# Patient Record
Sex: FEMALE | Race: WHITE | NOT HISPANIC OR LATINO | Employment: OTHER | URBAN - METROPOLITAN AREA
[De-identification: names, ages, dates, MRNs, and addresses within clinical notes are randomized per-mention and may not be internally consistent; named-entity substitution may affect disease eponyms.]

---

## 2017-03-31 ENCOUNTER — OFFICE VISIT (OUTPATIENT)
Dept: FAMILY MEDICINE | Facility: CLINIC | Age: 57
End: 2017-03-31
Payer: COMMERCIAL

## 2017-03-31 ENCOUNTER — RADIANT APPOINTMENT (OUTPATIENT)
Dept: GENERAL RADIOLOGY | Facility: CLINIC | Age: 57
End: 2017-03-31
Attending: FAMILY MEDICINE
Payer: COMMERCIAL

## 2017-03-31 VITALS
HEART RATE: 74 BPM | BODY MASS INDEX: 41.41 KG/M2 | SYSTOLIC BLOOD PRESSURE: 136 MMHG | TEMPERATURE: 98 F | OXYGEN SATURATION: 96 % | WEIGHT: 225 LBS | DIASTOLIC BLOOD PRESSURE: 82 MMHG | HEIGHT: 62 IN

## 2017-03-31 DIAGNOSIS — Z13.220 SCREENING CHOLESTEROL LEVEL: ICD-10-CM

## 2017-03-31 DIAGNOSIS — M25.551 HIP PAIN, RIGHT: Primary | ICD-10-CM

## 2017-03-31 DIAGNOSIS — I10 ESSENTIAL HYPERTENSION WITH GOAL BLOOD PRESSURE LESS THAN 140/90: ICD-10-CM

## 2017-03-31 DIAGNOSIS — G47.00 INSOMNIA, UNSPECIFIED TYPE: ICD-10-CM

## 2017-03-31 LAB
ANION GAP SERPL CALCULATED.3IONS-SCNC: 9 MMOL/L (ref 3–14)
BASOPHILS # BLD AUTO: 0.1 10E9/L (ref 0–0.2)
BASOPHILS NFR BLD AUTO: 1.3 %
BUN SERPL-MCNC: 12 MG/DL (ref 7–30)
CALCIUM SERPL-MCNC: 9.1 MG/DL (ref 8.5–10.1)
CHLORIDE SERPL-SCNC: 108 MMOL/L (ref 94–109)
CHOLEST SERPL-MCNC: 177 MG/DL
CO2 SERPL-SCNC: 25 MMOL/L (ref 20–32)
CREAT SERPL-MCNC: 0.96 MG/DL (ref 0.52–1.04)
DIFFERENTIAL METHOD BLD: NORMAL
EOSINOPHIL # BLD AUTO: 0.1 10E9/L (ref 0–0.7)
EOSINOPHIL NFR BLD AUTO: 1.8 %
ERYTHROCYTE [DISTWIDTH] IN BLOOD BY AUTOMATED COUNT: 13.4 % (ref 10–15)
GFR SERPL CREATININE-BSD FRML MDRD: 60 ML/MIN/1.7M2
GLUCOSE SERPL-MCNC: 92 MG/DL (ref 70–99)
HCT VFR BLD AUTO: 40 % (ref 35–47)
HDLC SERPL-MCNC: 48 MG/DL
HGB BLD-MCNC: 12.7 G/DL (ref 11.7–15.7)
LDLC SERPL CALC-MCNC: 101 MG/DL
LYMPHOCYTES # BLD AUTO: 2.4 10E9/L (ref 0.8–5.3)
LYMPHOCYTES NFR BLD AUTO: 43.8 %
MCH RBC QN AUTO: 28.9 PG (ref 26.5–33)
MCHC RBC AUTO-ENTMCNC: 31.8 G/DL (ref 31.5–36.5)
MCV RBC AUTO: 91 FL (ref 78–100)
MONOCYTES # BLD AUTO: 0.5 10E9/L (ref 0–1.3)
MONOCYTES NFR BLD AUTO: 9 %
NEUTROPHILS # BLD AUTO: 2.4 10E9/L (ref 1.6–8.3)
NEUTROPHILS NFR BLD AUTO: 44.1 %
NONHDLC SERPL-MCNC: 129 MG/DL
PLATELET # BLD AUTO: 315 10E9/L (ref 150–450)
POTASSIUM SERPL-SCNC: 4.2 MMOL/L (ref 3.4–5.3)
RBC # BLD AUTO: 4.4 10E12/L (ref 3.8–5.2)
SODIUM SERPL-SCNC: 142 MMOL/L (ref 133–144)
TRIGL SERPL-MCNC: 142 MG/DL
WBC # BLD AUTO: 5.5 10E9/L (ref 4–11)

## 2017-03-31 PROCEDURE — 80061 LIPID PANEL: CPT | Performed by: FAMILY MEDICINE

## 2017-03-31 PROCEDURE — 99215 OFFICE O/P EST HI 40 MIN: CPT | Performed by: FAMILY MEDICINE

## 2017-03-31 PROCEDURE — 73502 X-RAY EXAM HIP UNI 2-3 VIEWS: CPT

## 2017-03-31 PROCEDURE — 36415 COLL VENOUS BLD VENIPUNCTURE: CPT | Performed by: FAMILY MEDICINE

## 2017-03-31 PROCEDURE — 85025 COMPLETE CBC W/AUTO DIFF WBC: CPT | Performed by: FAMILY MEDICINE

## 2017-03-31 PROCEDURE — 80048 BASIC METABOLIC PNL TOTAL CA: CPT | Performed by: FAMILY MEDICINE

## 2017-03-31 RX ORDER — ATENOLOL 25 MG/1
25 TABLET ORAL DAILY
Qty: 30 TABLET | Refills: 6 | Status: SHIPPED | OUTPATIENT
Start: 2017-03-31 | End: 2017-08-22

## 2017-03-31 RX ORDER — TRAZODONE HYDROCHLORIDE 50 MG/1
100 TABLET, FILM COATED ORAL
Qty: 60 TABLET | Refills: 6 | Status: SHIPPED | OUTPATIENT
Start: 2017-03-31 | End: 2018-01-23

## 2017-03-31 ASSESSMENT — ANXIETY QUESTIONNAIRES
6. BECOMING EASILY ANNOYED OR IRRITABLE: NOT AT ALL
7. FEELING AFRAID AS IF SOMETHING AWFUL MIGHT HAPPEN: NOT AT ALL
3. WORRYING TOO MUCH ABOUT DIFFERENT THINGS: NOT AT ALL
2. NOT BEING ABLE TO STOP OR CONTROL WORRYING: NOT AT ALL
IF YOU CHECKED OFF ANY PROBLEMS ON THIS QUESTIONNAIRE, HOW DIFFICULT HAVE THESE PROBLEMS MADE IT FOR YOU TO DO YOUR WORK, TAKE CARE OF THINGS AT HOME, OR GET ALONG WITH OTHER PEOPLE: NOT DIFFICULT AT ALL
5. BEING SO RESTLESS THAT IT IS HARD TO SIT STILL: NOT AT ALL
GAD7 TOTAL SCORE: 0
1. FEELING NERVOUS, ANXIOUS, OR ON EDGE: NOT AT ALL

## 2017-03-31 ASSESSMENT — PATIENT HEALTH QUESTIONNAIRE - PHQ9: 5. POOR APPETITE OR OVEREATING: NOT AT ALL

## 2017-03-31 NOTE — PATIENT INSTRUCTIONS
1. Continue your current medications.    2. Please stop by the  and make an appointment with the orthopedic doctor. Or call [phone].    3. I will contact you via mail about your test results.    4. If all is well, see you back in 6 months for a physical - we will not need labs at that time.

## 2017-03-31 NOTE — PROGRESS NOTES
"  HPI:     Angela is a 56 year old female who presents to clinic to discuss:    Right hip pain - present since around 2013. Worse recently up to 7/10. Interferes with sleep, throbbing at night. No trauma. She feels the pain more on the right groin area. No bulge. There is morning stiffness. Worse with activity? Better with Tylenol. She feels the right leg is weak. No numbness. Not sure about right low back pain. Has h/o right ankle surgery.     XR HIP RIGHT 2-3 VIEWS 3/31/2017 8:10 AM     HISTORY: Pain in right hip     COMPARISON: None.         IMPRESSION: Mild degenerative changes of the hip. Otherwise negative.        RUBÉN MACE MD    Frequent UTI's/atrophic vaginitis - was happening every other month. Goes away with antibiotic treatment. Symptoms are frequency, burning, as if passing \"clot.\" previous culture grew e coli which was sensitive to all antibiotics tested. Bactrim right before or right after intercourse is working well. Last UTI was Oct 2014. I saw her on 3/3/15 with urinary symptoms Again for the last few days she has had mild burning with urination and some itching. Has baseline frequency. She also has had dryness and painful intercourse. No hematuria. No fevers. She has had hysterectomy.  Evaluation and Treatment:   The u/a has been normal on multiple occasions. For atrophic vaginitis I had prescribed Estrogen vaginal pill but stopped due to fear of side effects. Bactrim prn with intercourse has been helpful.    Depression/insomnia -    Specific type: mild major depression  Duration: since around 2011  Symptoms: decreased mood, mood swings, anhedonia, sleep disturbance, decreased energy.  Compounding factors: \"hates\" her work.   Anxiety: Worries about her kid. Thinks too much at night.  SI or HI: denies  Delusions/hallucinations: denies  Rupali or hypomania now or in the past: denies  Current treatment: Trazodone prn for sleep only.  Compliant: denies  Side effects: denies  Treatments:    Zoloft " stopped since not needed   Trazodone helps but not always   Referral for counseling previously but she says it does not help   she says she only wants to be on Trazodone prn which is fine.   Counseling: done today about the importance of proper diet, regular exercise, avoiding stressful situations, avoiding tobacco, drugs, or excessive alcohol and caffeine.    PHQ-9 SCORE 8/21/2015 3/7/2016 9/8/2016   Total Score - - -   Total Score 6 3 0       DENG-7 SCORE 7/15/2013 8/21/2015 9/8/2016   Total Score 6 - -   Total Score - 3 0     HTN - dx'd around: 2009. Not checking at home. Controlled in clinic.  Treatment:   Atenolol 25 mg qd (chosen due to migraines) - no side effects.        Last Basic Metabolic Panel:  NA      140   8/21/2015   POTASSIUM      4.4   8/21/2015  CHLORIDE      106   8/21/2015  BRITANY      9.3   8/21/2015  CO2       27   8/21/2015  BUN       11   8/21/2015  CR     0.94   8/21/2015  GLC       88   8/21/2015  CBC RESULTS:   Recent Labs   Lab Test  12/09/15   1452   WBC  10.1   RBC  4.35   HGB  12.1   HCT  38.0   MCV  87   MCH  27.8   MCHC  31.8   RDW  13.1   PLT  344     Multiple falls and fractures/osteopenia - She informs me that she has fallen about 6 times in as many years. These were related to stepping on a pot hole, stumbling on stairs and sometimes the ankle is weak and gives way. On 9/2/13 she stepped on a pot hole. She was seen in the ED and dx'd with right distal fibular fx and old lateral malleolus fx. She was then managed by sports medicine with CAM walker. On 10/8/13 she was walking her dog when her right ankle gave out and she fell on her left side. She was seen in ED again and dx'd with left ulnar comminuted fracture. She was subsequently seen by ortho and underwent ORIF on 10/15/13. At the end of Nov 2014 she kicked a chair. She was seen in urgent care on 12/8/14 and dx'd with left 5th proximal phalanx fracture. In addition to all this she has had left humerus fx in the past that required  ORIF.    Has not fallen since Oct 2013. Previously followed with endocrine. Reclast - yearly. It made her sick so she stopped it. She takes Vitamin 2000 units per day. Takes Calcium over the counter - dose unknown.   DX HIP/PELVIS/SPINE 11/19/2013 8:24 AM  HISTORY: Screening. History of fall.  IMPRESSION  IMPRESSION:  1. The T-score of the lumbar spine in the region of L1-L4 is -1.6.  This correlates with moderate osteopenia. If one looks at the L1  vertebral body alone the T score is -2.5 which correlates with  osteoporosis.  2. The T-score of the right femoral neck is -2.3. This correlates with  severe osteopenia.  3. The T-score of the left femoral neck is -2.2. This correlates with  severe osteopenia.  NOTE: With regards to the hips, the T-score for either the femoral  neck or the total hip is reported, whichever is worse.  JUAN ANTONIO SARMIENTO MD    Balance problem - no h/o CVA or other neurological problems. She feels her balance is better since the ankle has improved after surgery.    B12 deficiency - took liquid B12, 1/2 oz per day previously. Not at this time. Last B12 was normal Oct 2013.     Right Carpal tunnel - No further problems.     Obesity - diet and exercise discussed. Commended her losing weight.    Wt Readings from Last 5 Encounters:   03/31/17 225 lb (102.1 kg)   10/21/16 226 lb (102.5 kg)   10/10/16 226 lb (102.5 kg)   09/08/16 223 lb (101.2 kg)   05/12/16 231 lb 3.2 oz (104.9 kg)     Surgical history:     Right ankle surgery   Hysterectomy 2008 due to abnormal PAP, ovaries still in place.  Preventive:        Immunization History   Administered Date(s) Administered     Influenza (IIV3) 11/08/2012     Influenza Vaccine IM 3yrs+ 4 Valent IIV4 10/25/2013, 02/04/2015, 09/08/2016     TD (ADULT, 7+) 01/01/1988, 06/20/2000     TDAP Vaccine (Adacel) 11/08/2012     Lipids screen:     Recent Labs   Lab Test  08/21/15   1229  08/13/14   0743   CHOL  191  196   HDL  39*  47*   LDL  117  116   TRIG  175*  164*  "  CHOLHDMARCIALO  4.9  4.2     Mammogram: up to date  PAP: n/a due to hysterectomy  Colonoscopy: 10/29/07 - repeat in 10 years. Declines any further colonoscopy. Not even FIT.  Advanced Directive: has one at home - she will bring a copy.    ROS:     Const: No fevers or night sweats recently.   ENT: No runny nose, sore throat or ear pain.   Resp: No cough or shortness of breath.   CV: No chest pain, dizziness or cardiac palpitations.   GI: No nausea, vomiting or constipation. Denies blood in stools or black stools.   : No dysuria, frequency or hematuria.     SH:    Marital status:  - good relationship  Kids: one  Employment: administrative work  Exercise: biking and walking and swimming  Tobacco: no  Etoh: none  Recreational drugs: none  Caffeine: one diet coke per day    Exam:    /82 (Cuff Size: Adult Large)  Pulse 74  Temp 98  F (36.7  C) (Oral)  Ht 5' 2\" (1.575 m)  Wt 225 lb (102.1 kg)  LMP 01/08/2008  SpO2 96%  BMI 41.15 kg/m2    Gen: Healthy appearing female in no acute distress  Neck: No enlarged lymph nodes, thyromegally or other masses.  Lungs: Good air movement and otherwise clear.  CV: Heart RRR with no murmurs. No JVD, carotid bruits. Trace ankle edema.  MS: the right hip has pain on ROM. There is not tenderness to palpation over the right hip or right low back and SI areas.      Assessment and Plan - Decision Making    1. Insomnia, unspecified type  Refilled Trazodone,  - traZODone (DESYREL) 50 MG tablet; Take 2 tablets (100 mg) by mouth nightly as needed for sleep  Dispense: 60 tablet; Refill: 6    2. Essential hypertension with goal blood pressure less than 140/90  Seems controlled. Continue same treatment. Check labs.  - atenolol (TENORMIN) 25 MG tablet; Take 1 tablet (25 mg) by mouth daily  Dispense: 30 tablet; Refill: 6  - Basic metabolic panel  - CBC with platelets differential    3. Hip pain, right  Xray showed signs of osteo. I am referring her to ortho. If that does not help " we will have to look at other causes.  - XR Hip Right 2-3 Views  - ORTHOPEDICS ADULT REFERRAL    4. Screening cholesterol level  Fasting labs.  - Lipid panel reflex to direct LDL      Written instructions given as follows:    Patient Instructions   1. Continue your current medications.    2. Please stop by the  and make an appointment with the orthopedic doctor. Or call [phone].    3. I will contact you via mail about your test results.    4. If all is well, see you back in 6 months for a physical - we will not need labs at that time.

## 2017-03-31 NOTE — MR AVS SNAPSHOT
After Visit Summary   3/31/2017    Angela Ibarra    MRN: [id number]           Patient Information     Date Of Birth          1960        Visit Information        Provider Department      3/31/2017 7:30 AM Germán Jernigan MD [facility]        Today's Diagnoses     Hip pain, right    -  1    Insomnia, unspecified type        Essential hypertension with goal blood pressure less than 140/90        Screening cholesterol level          Care Instructions    1. Continue your current medications.    2. Please stop by the  and make an appointment with the orthopedic doctor. Or call [phone].    3. I will contact you via mail about your test results.    4. If all is well, see you back in 6 months for a physical - we will not need labs at that time.        Follow-ups after your visit        Additional Services     ORTHOPEDICS ADULT REFERRAL       Your provider has referred you to: [facility]: [facility] [phone]   [email or web address]    Please be aware that coverage of these services is subject to the terms and limitations of your health insurance plan.  Call member services at your health plan with any benefit or coverage questions.      Please bring the following to your appointment:    >>   Any x-rays, CTs or MRIs which have been performed.  Contact the facility where they were done to arrange for  prior to your scheduled appointment.  Any new CT, MRI or other procedures ordered by your specialist must be performed at a [place] facility or coordinated by your clinic's referral office.    >>   List of current medications   >>   This referral request   >>   Any documents/labs given to you for this referral                  Who to contact     If you have questions or need follow up information about today's clinic visit or your schedule please contact [facility] directly at [phone].  Normal or non-critical  "lab and imaging results will be communicated to you by MyChart, letter or phone within 4 business days after the clinic has received the results. If you do not hear from us within 7 days, please contact the clinic through [employer] or phone. If you have a critical or abnormal lab result, we will notify you by phone as soon as possible.  Submit refill requests through [employer] or call your pharmacy and they will forward the refill request to us. Please allow 3 business days for your refill to be completed.          Additional Information About Your Visit        [employer] Information     [employer] lets you send messages to your doctor, view your test results, renew your prescriptions, schedule appointments and more. To sign up, go to [email or web address] . Click on \"Log in\" on the left side of the screen, which will take you to the Welcome page. Then click on \"Sign up Now\" on the right side of the page.     You will be asked to enter the access code listed below, as well as some personal information. Please follow the directions to create your username and password.     Your access code is: [email or web address]  Expires: 2017  8:29 AM     Your access code will  in 90 days. If you need help or a new code, please call your [place] clinic or [phone].        Care EveryWhere ID     This is your Care EveryWhere ID. This could be used by other organizations to access your [place] medical records  [id number]        Your Vitals Were     Pulse Temperature Height Last Period Pulse Oximetry BMI (Body Mass Index)    74 98  F (36.7  C) (Oral) 5' 2\" (1.575 m) 2008 96% 41.15 kg/m2       Blood Pressure from Last 3 Encounters:   17 136/82   10/10/16 128/81   16 138/80    Weight from Last 3 Encounters:   17 225 lb (102.1 kg)   10/21/16 226 lb (102.5 kg)   10/10/16 226 lb (102.5 kg)              We Performed the Following     Basic metabolic panel     CBC with platelets differential     Lipid panel reflex " to direct LDL     ORTHOPEDICS ADULT REFERRAL     XR Hip Right 2-3 Views          Where to get your medicines      These medications were sent to [id number] - [place], [place] - [address]  [address] [address] [place] [address]     Phone:  [phone]     atenolol 25 MG tablet    traZODone 50 MG tablet          Primary Care Provider Office Phone # Fax #    Germán Jernigan [phone]       [facility] [address] [facility] [address]        Thank you!     Thank you for choosing [facility]  for your care. Our goal is always to provide you with excellent care. Hearing back from our patients is one way we can continue to improve our services. Please take a few minutes to complete the written survey that you may receive in the mail after your visit with us. Thank you!             Your Updated Medication List - Protect others around you: Learn how to safely use, store and throw away your medicines at www.disposemymeds.org.          This list is accurate as of: 3/31/17  8:29 AM.  Always use your most recent med list.                   Brand Name Dispense Instructions for use    ADVIL PO      Take  by mouth. prn       atenolol 25 MG tablet    TENORMIN    30 tablet    Take 1 tablet (25 mg) by mouth daily       CALCIUM + D PO      1 tablet daily       calcium carbonate 500 MG chewable tablet    TUMS    180 chew tab    Take 1 tablet (500 mg) by mouth 3 times daily (with meals)       conjugated estrogens cream    PREMARIN    30 g    Place 0.5 g vaginally twice a week       sulfamethoxazole-trimethoprim 800-160 MG per tablet    BACTRIM DS/SEPTRA DS    30 tablet    Take one pill right before or right after sex.       traZODone 50 MG tablet    DESYREL    60 tablet    Take 2 tablets (100 mg) by mouth nightly as needed for sleep

## 2017-03-31 NOTE — LETTER
[facility]  [address] [place] [address] [phone]        April 3, 2017    Angela Ibarra  [address] [place] [address]            Dear Clarissa Miller,    All your labs were fine. I know you are seeing the orthopedic doctor on 4/6/17 for your hip.    Regards,    Germán Jernigan M.D.    Results for orders placed or performed in visit on 03/31/17   XR Hip Right 2-3 Views    Narrative    XR HIP RIGHT 2-3 VIEWS  3/31/2017 8:10 AM    HISTORY:  Pain in right hip    COMPARISON:  None.      Impression    IMPRESSION:  Mild degenerative changes of the hip. Otherwise negative.      RUBÉN MACE MD   Basic metabolic panel   Result Value Ref Range    Sodium 142 133 - 144 mmol/L    Potassium 4.2 3.4 - 5.3 mmol/L    Chloride 108 94 - 109 mmol/L    Carbon Dioxide 25 20 - 32 mmol/L    Anion Gap 9 3 - 14 mmol/L    Glucose 92 70 - 99 mg/dL    Urea Nitrogen 12 7 - 30 mg/dL    Creatinine 0.96 0.52 - 1.04 mg/dL    GFR Estimate 60 (L) >60 mL/min/1.7m2    GFR Estimate If Black 73 >60 mL/min/1.7m2    Calcium 9.1 8.5 - 10.1 mg/dL   Lipid panel reflex to direct LDL   Result Value Ref Range    Cholesterol 177 <200 mg/dL    Triglycerides 142 <150 mg/dL    HDL Cholesterol 48 (L) >49 mg/dL    LDL Cholesterol Calculated 101 (H) <100 mg/dL    Non HDL Cholesterol 129 <130 mg/dL   CBC with platelets differential   Result Value Ref Range    WBC 5.5 4.0 - 11.0 10e9/L    RBC Count 4.40 3.8 - 5.2 10e12/L    Hemoglobin 12.7 11.7 - 15.7 g/dL    Hematocrit 40.0 35.0 - 47.0 %    MCV 91 78 - 100 fl    MCH 28.9 26.5 - 33.0 pg    MCHC 31.8 31.5 - 36.5 g/dL    RDW 13.4 10.0 - 15.0 %    Platelet Count 315 150 - 450 10e9/L    Diff Method Automated Method     % Neutrophils 44.1 %    % Lymphocytes 43.8 %    % Monocytes 9.0 %    % Eosinophils 1.8 %    % Basophils 1.3 %    Absolute Neutrophil 2.4 1.6 - 8.3 10e9/L    Absolute Lymphocytes 2.4 0.8 - 5.3 10e9/L    Absolute Monocytes 0.5 0.0 - 1.3 10e9/L    Absolute  Eosinophils 0.1 0.0 - 0.7 10e9/L    Absolute Basophils 0.1 0.0 - 0.2 10e9/L

## 2017-03-31 NOTE — NURSING NOTE
"Chief Complaint   Patient presents with     Ankle/Foot right     pain     Hypertension       Initial /82 (Cuff Size: Adult Large)  Pulse 74  Temp 98  F (36.7  C) (Oral)  Ht 5' 2\" (1.575 m)  Wt 225 lb (102.1 kg)  LMP 01/08/2008  SpO2 96%  BMI 41.15 kg/m2 Estimated body mass index is 41.15 kg/(m^2) as calculated from the following:    Height as of this encounter: 5' 2\" (1.575 m).    Weight as of this encounter: 225 lb (102.1 kg).  Medication Reconciliation: complete    Susanne Gotti LPN    "

## 2017-04-01 ASSESSMENT — PATIENT HEALTH QUESTIONNAIRE - PHQ9: SUM OF ALL RESPONSES TO PHQ QUESTIONS 1-9: 0

## 2017-04-01 ASSESSMENT — ANXIETY QUESTIONNAIRES: GAD7 TOTAL SCORE: 0

## 2017-04-03 NOTE — PROGRESS NOTES
Please mail letter:    Clarissa Miller,    All your labs were fine. I know you are seeing the orthopedic doctor on 4/6/17 for your hip.    Regards,    Germán Jernigan M.D.

## 2017-04-05 NOTE — PROGRESS NOTES
SUBJECTIVE:  Angela Ibarra is a 56 year old female who is seen in consultation at the request of Germán Jernigan MD for right hip pain that started 1 month ago.       For quite sometime she has been unable to cross the right leg over the left leg.   Cause: unknown  Symptoms: stiffness, pain at night, pain in low back, pain in right groin, pain radiating to thigh region, worst pain in the buttock, worse with activity.   Symptoms have been worsening within the last few days.     Prior history of related problems: no prior problems with this area in the past.    Treatment up to this point: Antiinflammatories, water aerobic exercises.    All past medical, surgical, family, and social histories have been reveiwed.     Orthopedic PMH: Severe low back pain since spinal anesthetic in . She traces her inability to cross her leg possibly due to urologic procedure in .     Past medical history:  Past Medical History:   Diagnosis Date     Calculus of kidney      Migraine, unspecified, without mention of intractable migraine without mention of status migrainosus      Other specified iron deficiency anemias      Papanicolaou smear of cervix with low grade squamous intraepithelial lesion (LGSIL)     Colpo and hyst pathology benign     Unspecified essential hypertension     Essential hypertension     Surgical:   Past Surgical History:   Procedure Laterality Date     ARTHROSCOPY ANKLE  2014    Procedure: ARTHROSCOPY ANKLE;  Surgeon: Shaun Bhatt DPM;  Location: [facility]     C  DELIVERY ONLY      , Low Cervical     C GASTRIC BYPASS,OBESITY,W/SM BOWEL RECONS  10/99     C LIGATE FALLOPIAN TUBE  2000    laparoscopy     HYSTERECTOMY, PAP NO LONGER INDICATED  2008     LAPAROSCOPIC CHOLECYSTECTOMY  8/3/2012    Procedure: LAPAROSCOPIC CHOLECYSTECTOMY;  Laparoscopic Cholecystectomy ;  Surgeon: Corwin Cabezas MD;  Location: [facility]     OPEN REDUCTION INTERNAL FIXATION ANKLE   4/22/2014    Procedure: OPEN REDUCTION INTERNAL FIXATION ANKLE;  Surgeon: Shaun Bhatt DPM;  [facility]     OPEN REDUCTION INTERNAL FIXATION ELBOW  10/15/2013    Procedure: OPEN REDUCTION INTERNAL FIXATION ELBOW;  OPEN REDUCTION INTERNAL FIXATION LEFT PROXIMAL ULNA;  Surgeon: Artem Last DO;  Location: [facility]     ORTHOPEDIC SURGERY      left shoulder surgery     REMOVE MESH VAGINA  10/10/2011    Procedure:REMOVE MESH VAGINA; Excision of Vaginal Mesh; Surgeon:SERGE SALGADO; Location:[facility]     SURGICAL HISTORY OF -   4/20/10    Transobturator midurethral sling     SURGICAL HISTORY OF -   8/1999    exploratory laparotomy, colitis     SURGICAL HISTORY OF -   4/20/10    Transobturator midurethral sling     TUBAL LIGATION       Family Hx:    Family History   Problem Relation Age of Onset     C.A.D. Mother      MI     Hypertension Mother      Hypertension Father      Breast Cancer No family hx of      Social History     Marital status:      Spouse name: N/A     Number of children: 1     Years of education: N/A     Occupational History      [employer]     Social History Main Topics     Smoking status: Never Smoker     Smokeless tobacco: Never Used     Alcohol use Yes      Comment: occas     Drug use: No     Sexual activity: Yes     Partners: Male     Birth control/ protection: Surgical      Comment: tvh     Other Topics Concern      Service Yes     in [place] out [place] 85-88     Blood Transfusions No     Caffeine Concern No     Occupational Exposure No     Hobby Hazards No     Sleep Concern Yes     Stress Concern No     Weight Concern Yes     always      Special Diet No     watches sugar intake     Back Care No     Exercise Yes     treadmill and bike     Bike Helmet No     Seat Belt Yes     Self-Exams No     Parent/Sibling W/ Cabg, Mi Or Angioplasty Before 65f 55m? No     Social History Narrative    Dairy/d 1 servings/d.     Caffeine 0 servings/d    Exercise 5  "x week, treadmill, bike 1 hour/day    Sunscreen used - No    Seatbelts used - Yes    Working smoke/CO detectors in the home - Yes    Guns stored in the home - No    Self Breast Exams - No    Self Testicular Exam - NA    Eye Exam up to date - Yes    Dental Exam up to date - Yes    Pap Smear up to date - Yes    Mammogram up to date - No 2000    PSA up to date - NA    Dexa Scan up to date - NA    Flex Sig / Colonoscopy up to date - Yes 8/99 Abd pain=neg    Immunizations up to date - unsure    Abuse: Current or Past(Physical, Sexual or Emotional)- No    Do you feel safe in your environment - Yes                     REVIEW OF SYSTEMS:    CONSTITUTIONAL:  NEGATIVE for fever, chills, change in weight  INTEGUMENTARY/SKIN:  NEGATIVE for worrisome rashes, moles or lesions  EYES:  NEGATIVE for vision changes or irritation  ENT/MOUTH:  NEGATIVE for ear, mouth and throat problems  RESP:  NEGATIVE for significant cough or SOB  BREAST:  NEGATIVE for masses, tenderness or discharge  CV:  NEGATIVE for chest pain, palpitations or peripheral edema  GI:  NEGATIVE for nausea, abdominal pain, heartburn, or change in bowel habits  :  Negative   MUSCULOSKELETAL:  See HPI above  NEURO:  NEGATIVE for weakness, dizziness or paresthesias  ENDOCRINE:  NEGATIVE for temperature intolerance, skin/hair changes  HEME/ALLERGY/IMMUNE:  NEGATIVE for bleeding problems  PSYCHIATRIC:  NEGATIVE for changes in mood or affect    /78 (BP Location: Left arm, Patient Position: Chair, Cuff Size: Adult Large)  Pulse 70  Resp 14  Ht 1.575 m (5' 2\")  Wt 100.2 kg (221 lb)  LMP 01/08/2008  BMI 40.42 kg/m2    EXAM:  GENERAL APPEARANCE: healthy, alert and no distress   GAIT: Walks with slightly antalgic gait   SKIN: no suspicious lesions or rashes  NEURO: normal strength and tone, sentation intact, reflexes normal, DTR symmetrically normal in upper extremities and DTR symmetrically normal in lower extremities  PSYCH:  mentation appears normal and affect " normal/bright  VASCULAR: pulses intact, good cappillary refill  LYMPH:  no lymphadenopathy  RESP:  no overt rhonchi or wheezes    MUSCULOSKELETAL:  RIGHT HIP: Palpation: Tender: right greater trochanter, right sciatic notch without radiation, sacroiliac joint. Severe tenderness over the center of sacral area which is chronic from an old injury.  Range of Motion: lacks approximately 20 degrees of hip flexion, 20 degrees flexion contracture, external rotation 30* degrees, internal rotation 15 degrees, abduction full*  Strength: Hip flexor weakness, mild abduction weakness    Lumbar range of motion: flexion to touch toes, extension good without reproduction of pain, side bend: good without reproduction of pain  Toe stand: Able.    Heel stand: Able  Seated SLR: negative  Supine SLR: negative    X-RAY INTERPRETATION: Obtained 3/31/17 of the RIGHT HIP: 2-3 views, reviewed in the office with the patient by myself today and show no joint space narrowing, some osteophytes on the femoral head with some mild CAM lesion on the superior femoral head.     ASSESSMENT:  1. Mild to moderate OA of the right hip    PLAN:  I discussed the findings and diagnosis with the patient. We talked about the treatment options: image guided steroid injection and physical therapy. I recommended a steroid injection under image guidance. She should monitor the amount of relief the injection gives. All questions were answered.   physical therapy declined.  Intra-articular steroid injection under image guidance ordered.     Return to clinic PRN.     NIMISHA Tang MD  Dept. Orthopedic Surgery  [facility]    This document serves as a record of the services and decisions personally performed and made by Dr. NIMISHA Tang MD. It was created on his behalf by Shireen Talvaera, a trained medical scribe. The creation of this record is based on the provider's personal observations and the statements of the patient. This document has been checked and  approved by the attending provider.   Shireen Talavera April 6, 2017 9:31 AM

## 2017-04-06 ENCOUNTER — OFFICE VISIT (OUTPATIENT)
Dept: ORTHOPEDICS | Facility: CLINIC | Age: 57
End: 2017-04-06
Payer: COMMERCIAL

## 2017-04-06 ENCOUNTER — TELEPHONE (OUTPATIENT)
Dept: PALLIATIVE MEDICINE | Facility: CLINIC | Age: 57
End: 2017-04-06

## 2017-04-06 VITALS
HEART RATE: 70 BPM | HEIGHT: 62 IN | RESPIRATION RATE: 14 BRPM | BODY MASS INDEX: 40.67 KG/M2 | SYSTOLIC BLOOD PRESSURE: 137 MMHG | WEIGHT: 221 LBS | DIASTOLIC BLOOD PRESSURE: 78 MMHG

## 2017-04-06 DIAGNOSIS — M16.11 PRIMARY OSTEOARTHRITIS OF RIGHT HIP: Primary | ICD-10-CM

## 2017-04-06 PROCEDURE — 99243 OFF/OP CNSLTJ NEW/EST LOW 30: CPT | Performed by: ORTHOPAEDIC SURGERY

## 2017-04-06 ASSESSMENT — PAIN SCALES - GENERAL: PAINLEVEL: MILD PAIN (2)

## 2017-04-06 NOTE — TELEPHONE ENCOUNTER
Pre-screening Questions for Radiology Injections:    Injection to be done at which interventional clinic site? [facility]    Procedure ordered by     Procedure ordered?  Hip Joint Injection    What insurance would patient like us to bill for this procedure? [employer]care      Worker's comp-Any injection DO NOT SCHEDULE and route to Angela Lopez.      [employer] insurance - For SI joint injections, DO NOT SCHEDULE and route Angela Lopez.      [employer]- MBB's must be scheduled at LEAST two weeks apart      [employer] - Any injection besides hip/shoulder/knee joint DO NOT SCHEDULE and route to Angela Lopez. She will obtain PA and call pt back to schedule procedure or notify pt of denial.     Any chance of pregnancy? Not Applicable   If YES, do NOT schedule and route to RN pool    Is an  needed? No     Patient has a drive home? (mandatory) YES:     Is patient taking any blood thinners (plavix, coumadin, jantoven, warfarin, heparin, pradaxa or dabigatran )? No   If hold needed, do NOT schedule, route to RN pool     Is patient taking any aspirin products? no    If more than 325mg/day do NOT schedule; route to RN pool     For CERVICAL procedures, hold all aspirin products for 6 days.      Does the patient have a bleeding or clotting disorder? No     If yes, okay to schedule AND route to RN nurse pool    **For any patients with platelet count <100, must be forwarded to provider**    Is patient diabetic?  No  If YES, have them bring their glucometer.    Does patient have an active infection or treated for one within the past week? No     Is patient currently taking any antibiotics?  No     For patients on chronic, preventative, or prophylactic antibiotics, procedures may be scheduled.     For patients on antibiotics for active or recent infection:    Aide Brito [place], [place]-antibiotic course must have been completed for 4 days    Lisa Feng and Florian-antibiotic  course must have been completed for 7 days    Is patient currently taking any steroid medications? (i.e. Prednisone, Medrol)  No     For patients on steroid medications:    Lisa Goldsmith, Ellyn Noble [place]-steroid course must have been completed for 4 days    Lisa Ricketts-steroid course must have been completed for 7 days    Reviewed with patient:  If you are started on any steroids or antibiotics between now and your appointment, you must contact us because it may affect our ability to perform your procedure.  Yes    Is patient actively being treated for cancer or immunocompromised, including the spleen having been removed? No    If YES, do NOT schedule and route to RN pool     **For Dr. Feng patients without spleens should have the chart sent to her**    Are you able to get on and off an exam table with minimal or no assistance? Yes  If NO, do NOT schedule and route to RN pool    Are you able to roll over and lay on your stomach with minimal or no assistance? Yes  If NO, do NOT schedule and route to RN pool     Any allergies to contrast dye, iodine, shellfish, or numbing and steroid medications? No  If YES, route to RN pool AND add allergy information to appointment notes    Allergies: No known allergies        Has the patient had a flu shot or any other vaccinations within 7 days before or after the procedure.  No       Does patient have an MRI/CT?  Not Applicable  (SI joint, hip injections, lumbar sympathetic blocks, and stellate ganglion blocks do not require an MRI)    Was the MRI done w/in the last 3 years?  NA    Was MRI done at [facility]? No      If not, where was it done? N/A       If MRI was not done at [facility], [facility] or [facility] do NOT schedule and route to nursing.  If pt has an imaging disc, the injection may be scheduled but pt has to bring disc to appt. If they show up w/out disc the injection cannot be done    Reminders (please tell patient if applicable):       Instructed pt  to arrive 30 minutes early for IV start if this is for a cervical procedure, ALL sympathetic (stellate ganglion, hypogastric, or lumbar sympathetic block) and all sedation procedures (RFA, spinal cord stimulation trials).  Not Applicable    -IVs are not routinely placed for Noble and [place] cervical case       If NPO for sedation, informed patient that it is okay to take medications with sips of water (except if they are to hold blood thinners).  Not Applicable   *DO take blood pressure medication if it is prescribed*      If this is for a cervical ANSON, informed patient that aspirin needs to be held for 6 days.   Not Applicable      For all patients not having spinal cord stimulator (SCS) trials or radiofrequency ablations (RFAs), informed patient:  IV sedation is not provided for this procedure.  If you feel that an oral anti-anxiety medication is needed, you can discuss this further with your referring provider or primary care provider.  The Pain Clinic provider will discuss specifics of what the procedure includes at your appointment.  Most procedures last 10-20 minutes.  We use numbing medications to help with any discomfort during the procedure.  Not Applicable      Do not schedule procedures requiring IV placement in the first appointment of the day or first appointment after lunch.       For patients 85 or older we recommend having an adult stay w/ them for the remainder of the day.       Does the patient have any questions?  No  Van Reese  [facility]

## 2017-04-06 NOTE — PATIENT INSTRUCTIONS
Please remember to call and schedule a follow up appointment if one was recommended at your earliest convenience.   Orthopedics CLINIC HOURS TELEPHONE NUMBER   Mono Tang M.D.      Cathy Bryson  Medical Assistant Tuesday 8 am -5 pm    Wednesday 8 am -1 pm    Thursday 8 am -5 pm   Specialty schedulers:   [phone] to make an appointment with any Specialty Provider.   Main Clinic:   [phone] to make an appointment with your primary provider   Urgent Care locations:    [facility] Monday-Friday 5 pm - 9 pm  Saturday-Sunday 9 am -5pm      Monday-Friday 11 am - 9 pm  Saturday 9 am - 5 pm [phone]     If SURGERY has been recommended, please call our Specialty Schedulers at [phone] to schedule your procedure.    If you need a medication refill, please contact your pharmacy. Please allow 3 business days for your refill to be completed.  Use [employer] (secure e-mail communication and access to your chart) to send a message or to make an appointment. Please ask how you can sign up for [employer].

## 2017-04-06 NOTE — LETTER
2017       RE: Angela Ibarra  [address] [place] [address]           Dear Colleague,    Thank you for referring your patient, Angela Ibarra, to the [facility]. Please see a copy of my visit note below.    SUBJECTIVE:  Angela Ibarra is a 56 year old female who is seen in consultation at the request of Germán Jernigan MD for right hip pain that started 1 month ago.       For quite sometime she has been unable to cross the right leg over the left leg.   Cause: unknown  Symptoms: stiffness, pain at night, pain in low back, pain in right groin, pain radiating to thigh region, worst pain in the buttock, worse with activity.   Symptoms have been worsening within the last few days.     Prior history of related problems: no prior problems with this area in the past.    Treatment up to this point: Antiinflammatories, water aerobic exercises.    All past medical, surgical, family, and social histories have been reveiwed.     Orthopedic PMH: Severe low back pain since spinal anesthetic in . She traces her inability to cross her leg possibly due to urologic procedure in .     Past medical history:  Past Medical History:   Diagnosis Date     Calculus of kidney      Migraine, unspecified, without mention of intractable migraine without mention of status migrainosus      Other specified iron deficiency anemias      Papanicolaou smear of cervix with low grade squamous intraepithelial lesion (LGSIL)     Colpo and hyst pathology benign     Unspecified essential hypertension     Essential hypertension     Surgical:   Past Surgical History:   Procedure Laterality Date     ARTHROSCOPY ANKLE  2014    Procedure: ARTHROSCOPY ANKLE;  Surgeon: Shaun Bhatt DPM;  [facility]     C  DELIVERY ONLY      , Low Cervical     C GASTRIC BYPASS,OBESITY,W/SM BOWEL RECONS  10/99     C LIGATE FALLOPIAN TUBE  2000    laparoscopy     HYSTERECTOMY, PAP NO LONGER  INDICATED  1-     LAPAROSCOPIC CHOLECYSTECTOMY  8/3/2012    Procedure: LAPAROSCOPIC CHOLECYSTECTOMY;  Laparoscopic Cholecystectomy ;  Surgeon: Corwin Cabezas MD;  [facility]     OPEN REDUCTION INTERNAL FIXATION ANKLE  4/22/2014    Procedure: OPEN REDUCTION INTERNAL FIXATION ANKLE;  Surgeon: Shaun Bhatt DPM;  [facility]     OPEN REDUCTION INTERNAL FIXATION ELBOW  10/15/2013    Procedure: OPEN REDUCTION INTERNAL FIXATION ELBOW;  OPEN REDUCTION INTERNAL FIXATION LEFT PROXIMAL ULNA;  Surgeon: Artem Last DO;  Location: [facility]     ORTHOPEDIC SURGERY      left shoulder surgery     REMOVE MESH VAGINA  10/10/2011    Procedure:REMOVE MESH VAGINA; Excision of Vaginal Mesh; Surgeon:SERGE SALGADO; Location:[facility] OR     SURGICAL HISTORY OF -   4/20/10    Transobturator midurethral sling     SURGICAL HISTORY OF - 8/1999    exploratory laparotomy, colitis     SURGICAL HISTORY OF -   4/20/10    Transobturator midurethral sling     TUBAL LIGATION       Family Hx:    Family History   Problem Relation Age of Onset     C.A.D. Mother      MI     Hypertension Mother      Hypertension Father      Breast Cancer No family hx of      Social History     Marital status:      Spouse name: N/A     Number of children: 1     Years of education: N/A     Occupational History      [employer]     Social History Main Topics     Smoking status: Never Smoker     Smokeless tobacco: Never Used     Alcohol use Yes      Comment: occas     Drug use: No     Sexual activity: Yes     Partners: Male     Birth control/ protection: Surgical      Comment: tvh     Other Topics Concern      Service Yes     [place] out [place] 85-88     Blood Transfusions No     Caffeine Concern No     Occupational Exposure No     Hobby Hazards No     Sleep Concern Yes     Stress Concern No     Weight Concern Yes     always      Special Diet No     watches sugar intake     Back Care No     Exercise Yes  "    treadmill and bike     Bike Helmet No     Seat Belt Yes     Self-Exams No     Parent/Sibling W/ Cabg, Mi Or Angioplasty Before 65f 55m? No     Social History Narrative    Dairy/d 1 servings/d.     Caffeine 0 servings/d    Exercise 5 x week, treadmill, bike 1 hour/day    Sunscreen used - No    Seatbelts used - Yes    Working smoke/CO detectors in the home - Yes    Guns stored in the home - No    Self Breast Exams - No    Self Testicular Exam - NA    Eye Exam up to date - Yes    Dental Exam up to date - Yes    Pap Smear up to date - Yes    Mammogram up to date - No 2000    PSA up to date - NA    Dexa Scan up to date - NA    Flex Sig / Colonoscopy up to date - Yes 8/99 Abd pain=neg    Immunizations up to date - unsure    Abuse: Current or Past(Physical, Sexual or Emotional)- No    Do you feel safe in your environment - Yes                     REVIEW OF SYSTEMS:    CONSTITUTIONAL:  NEGATIVE for fever, chills, change in weight  INTEGUMENTARY/SKIN:  NEGATIVE for worrisome rashes, moles or lesions  EYES:  NEGATIVE for vision changes or irritation  ENT/MOUTH:  NEGATIVE for ear, mouth and throat problems  RESP:  NEGATIVE for significant cough or SOB  BREAST:  NEGATIVE for masses, tenderness or discharge  CV:  NEGATIVE for chest pain, palpitations or peripheral edema  GI:  NEGATIVE for nausea, abdominal pain, heartburn, or change in bowel habits  :  Negative   MUSCULOSKELETAL:  See HPI above  NEURO:  NEGATIVE for weakness, dizziness or paresthesias  ENDOCRINE:  NEGATIVE for temperature intolerance, skin/hair changes  HEME/ALLERGY/IMMUNE:  NEGATIVE for bleeding problems  PSYCHIATRIC:  NEGATIVE for changes in mood or affect    /78 (BP Location: Left arm, Patient Position: Chair, Cuff Size: Adult Large)  Pulse 70  Resp 14  Ht 1.575 m (5' 2\")  Wt 100.2 kg (221 lb)  LMP 01/08/2008  BMI 40.42 kg/m2    EXAM:  GENERAL APPEARANCE: healthy, alert and no distress   GAIT: Walks with slightly antalgic gait   SKIN: no " suspicious lesions or rashes  NEURO: normal strength and tone, sentation intact, reflexes normal, DTR symmetrically normal in upper extremities and DTR symmetrically normal in lower extremities  PSYCH:  mentation appears normal and affect normal/bright  VASCULAR: pulses intact, good cappillary refill  LYMPH:  no lymphadenopathy  RESP:  no overt rhonchi or wheezes    MUSCULOSKELETAL:  RIGHT HIP: Palpation: Tender: right greater trochanter, right sciatic notch without radiation, sacroiliac joint. Severe tenderness over the center of sacral area which is chronic from an old injury.  Range of Motion: lacks approximately 20 degrees of hip flexion, 20 degrees flexion contracture, external rotation 30* degrees, internal rotation 15 degrees, abduction full*  Strength: Hip flexor weakness, mild abduction weakness    Lumbar range of motion: flexion to touch toes, extension good without reproduction of pain, side bend: good without reproduction of pain  Toe stand: Able.    Heel stand: Able  Seated SLR: negative  Supine SLR: negative    X-RAY INTERPRETATION: Obtained 3/31/17 of the RIGHT HIP: 2-3 views, reviewed in the office with the patient by myself today and show no joint space narrowing, some osteophytes on the femoral head with some mild CAM lesion on the superior femoral head.     ASSESSMENT:  1. Mild to moderate OA of the right hip    PLAN:  I discussed the findings and diagnosis with the patient. We talked about the treatment options: image guided steroid injection and physical therapy. I recommended a steroid injection under image guidance. She should monitor the amount of relief the injection gives. All questions were answered.   physical therapy declined.  Intra-articular steroid injection under image guidance ordered.     Return to clinic VANESSA.     NIMISHA Tang MD  Dept. Orthopedic Surgery  [facility]    This document serves as a record of the services and decisions personally performed and made  by Dr. NIMISHA Tang MD. It was created on his behalf by Shireen Talavera, a trained medical scribe. The creation of this record is based on the provider's personal observations and the statements of the patient. This document has been checked and approved by the attending provider.   Shireen Talavera April 6, 2017 9:31 AM    Again, thank you for allowing me to participate in the care of your patient.        Sincerely,              Mono Tang MD

## 2017-04-06 NOTE — NURSING NOTE
"Chief Complaint   Patient presents with     Musculoskeletal Problem     Patient is here for right hip pain. sleep, throbbing at night. No trauma. She feels the pain more on the right groin area. morning stiffness. Worse with activity? Better with Tylenol. She feels the right leg is weak. Has right low back pain. She can't lift her leg up to put her socks       Initial /78 (BP Location: Left arm, Patient Position: Chair, Cuff Size: Adult Large)  Pulse 70  Resp 14  Ht 1.575 m (5' 2\")  Wt 100.2 kg (221 lb)  LMP 01/08/2008  BMI 40.42 kg/m2 Estimated body mass index is 40.42 kg/(m^2) as calculated from the following:    Height as of this encounter: 1.575 m (5' 2\").    Weight as of this encounter: 100.2 kg (221 lb).  Medication Reconciliation: complete   Cathy Bryson MA      "

## 2017-04-06 NOTE — MR AVS SNAPSHOT
After Visit Summary   4/6/2017    Angela Ibarra    MRN: [id number]           Patient Information     Date Of Birth          1960        Visit Information        Provider Department      4/6/2017 9:00 AM Mono Tang MD [facility]        Care Instructions    Please remember to call and schedule a follow up appointment if one was recommended at your earliest convenience.   Orthopedics CLINIC HOURS TELEPHONE NUMBER   Mono Tang M.D.      Cathy Bryson  Medical Assistant Tuesday 8 am -5 pm    Wednesday 8 am -1 pm    Thursday 8 am -5 pm   Specialty schedulers:   [phone] to make an appointment with any Specialty Provider.   Main Clinic:   [phone] to make an appointment with your primary provider   Urgent Care locations:    [facility] Monday-Friday 5 pm - 9 pm  Saturday-Sunday 9 am -5pm      Monday-Friday 11 am - 9 pm  Saturday 9 am - 5 pm [phone]     If SURGERY has been recommended, please call our Specialty Schedulers at [phone] to schedule your procedure.    If you need a medication refill, please contact your pharmacy. Please allow 3 business days for your refill to be completed.  Use [employer] (secure e-mail communication and access to your chart) to send a message or to make an appointment. Please ask how you can sign up for [employer].        Follow-ups after your visit        Who to contact     If you have questions or need follow up information about today's clinic visit or your schedule please contact [facility] directly at [phone].  Normal or non-critical lab and imaging results will be communicated to you by MyChart, letter or phone within 4 business days after the clinic has received the results. If you do not hear from us within 7 days, please contact the clinic through [employer]knowledge[employer] or phone. If you have a critical or abnormal lab result, we will notify you by phone as soon as  "possible.  Submit refill requests through [employer] or call your pharmacy and they will forward the refill request to us. Please allow 3 business days for your refill to be completed.          Additional Information About Your Visit        [email or web address] Information     [employer] lets you send messages to your doctor, view your test results, renew your prescriptions, schedule appointments and more. To sign up, go to [email or web address] . Click on \"Log in\" on the left side of the screen, which will take you to the Welcome page. Then click on \"Sign up Now\" on the right side of the page.     You will be asked to enter the access code listed below, as well as some personal information. Please follow the directions to create your username and password.     Your access code is: [email or web address]  Expires: 2017  8:29 AM     Your access code will  in 90 days. If you need help or a new code, please call your [place] clinic or [phone].        Care EveryWhere ID     This is your Care EveryWhere ID. This could be used by other organizations to access your [place] medical records  [id number]        Your Vitals Were     Pulse Respirations Height Last Period BMI (Body Mass Index)       70 14 1.575 m (5' 2\") 2008 40.42 kg/m2        Blood Pressure from Last 3 Encounters:   17 137/78   17 136/82   10/10/16 128/81    Weight from Last 3 Encounters:   17 100.2 kg (221 lb)   17 102.1 kg (225 lb)   10/21/16 102.5 kg (226 lb)              Today, you had the following     No orders found for display       Primary Care Provider Office Phone # Fax #    Germán Jernigan [phone]       [facility] [address] [address]        Thank you!     Thank you for choosing [facility]  for your care. Our goal is always to provide you with excellent care. Hearing back from our patients is one way we can continue to improve our services. Please take a few " minutes to complete the written survey that you may receive in the mail after your visit with us. Thank you!             Your Updated Medication List - Protect others around you: Learn how to safely use, store and throw away your medicines at www.disposemymeds.org.          This list is accurate as of: 4/6/17  9:08 AM.  Always use your most recent med list.                   Brand Name Dispense Instructions for use    ADVIL PO      Take  by mouth. prn       atenolol 25 MG tablet    TENORMIN    30 tablet    Take 1 tablet (25 mg) by mouth daily       CALCIUM + D PO      1 tablet daily       calcium carbonate 500 MG chewable tablet    TUMS    180 chew tab    Take 1 tablet (500 mg) by mouth 3 times daily (with meals)       conjugated estrogens cream    PREMARIN    30 g    Place 0.5 g vaginally twice a week       sulfamethoxazole-trimethoprim 800-160 MG per tablet    BACTRIM DS/SEPTRA DS    30 tablet    Take one pill right before or right after sex.       traZODone 50 MG tablet    DESYREL    60 tablet    Take 2 tablets (100 mg) by mouth nightly as needed for sleep

## 2017-04-10 ENCOUNTER — TELEPHONE (OUTPATIENT)
Dept: FAMILY MEDICINE | Facility: CLINIC | Age: 57
End: 2017-04-10

## 2017-04-10 NOTE — TELEPHONE ENCOUNTER
Pt states she is scheduled with [facility] imaging on Thursday of this week.  She states disregard previous message, she will be fine now that she has appointment that soon.  Melyssa Slaughter RN

## 2017-04-10 NOTE — TELEPHONE ENCOUNTER
Pt saw Dr. Tang on 4/6 and he ordered injection, she is scheduled to get this May 4.  Pt states pain is worsening and it is hard to walk and she is not sleeping.  She is using Motrin PM 3 tablets at night and Aleve and Tylenol during the day.      Advised pt to check with [facility] to [facility] or if another pain clinic would be covered for injection so she can get it sooner.    To provider to advise  Melyssa Slaughter RN

## 2017-04-13 ENCOUNTER — TRANSFERRED RECORDS (OUTPATIENT)
Dept: HEALTH INFORMATION MANAGEMENT | Facility: CLINIC | Age: 57
End: 2017-04-13

## 2017-05-30 ENCOUNTER — OFFICE VISIT (OUTPATIENT)
Dept: URGENT CARE | Facility: URGENT CARE | Age: 57
End: 2017-05-30
Payer: COMMERCIAL

## 2017-05-30 VITALS
WEIGHT: 225 LBS | HEART RATE: 71 BPM | OXYGEN SATURATION: 96 % | DIASTOLIC BLOOD PRESSURE: 79 MMHG | TEMPERATURE: 97.9 F | BODY MASS INDEX: 41.15 KG/M2 | SYSTOLIC BLOOD PRESSURE: 135 MMHG

## 2017-05-30 DIAGNOSIS — N39.0 URINARY TRACT INFECTION, SITE UNSPECIFIED: Primary | ICD-10-CM

## 2017-05-30 LAB
ALBUMIN UR-MCNC: NEGATIVE MG/DL
APPEARANCE UR: ABNORMAL
BACTERIA #/AREA URNS HPF: ABNORMAL /HPF
BILIRUB UR QL STRIP: NEGATIVE
COLOR UR AUTO: YELLOW
GLUCOSE UR STRIP-MCNC: NEGATIVE MG/DL
HGB UR QL STRIP: ABNORMAL
KETONES UR STRIP-MCNC: NEGATIVE MG/DL
LEUKOCYTE ESTERASE UR QL STRIP: ABNORMAL
NITRATE UR QL: NEGATIVE
NON-SQ EPI CELLS #/AREA URNS LPF: ABNORMAL /LPF
PH UR STRIP: 6 PH (ref 5–7)
RBC #/AREA URNS AUTO: ABNORMAL /HPF (ref 0–2)
SP GR UR STRIP: <=1.005 (ref 1–1.03)
URN SPEC COLLECT METH UR: ABNORMAL
UROBILINOGEN UR STRIP-ACNC: 0.2 EU/DL (ref 0.2–1)
WBC #/AREA URNS AUTO: ABNORMAL /HPF (ref 0–2)

## 2017-05-30 PROCEDURE — 87186 SC STD MICRODIL/AGAR DIL: CPT | Performed by: INTERNAL MEDICINE

## 2017-05-30 PROCEDURE — 81001 URINALYSIS AUTO W/SCOPE: CPT | Performed by: INTERNAL MEDICINE

## 2017-05-30 PROCEDURE — 87088 URINE BACTERIA CULTURE: CPT | Performed by: INTERNAL MEDICINE

## 2017-05-30 PROCEDURE — 87086 URINE CULTURE/COLONY COUNT: CPT | Performed by: INTERNAL MEDICINE

## 2017-05-30 PROCEDURE — 99213 OFFICE O/P EST LOW 20 MIN: CPT | Performed by: INTERNAL MEDICINE

## 2017-05-30 RX ORDER — SULFAMETHOXAZOLE/TRIMETHOPRIM 800-160 MG
1 TABLET ORAL 2 TIMES DAILY
Qty: 10 TABLET | Refills: 0 | Status: SHIPPED | OUTPATIENT
Start: 2017-05-30 | End: 2017-06-01 | Stop reason: ALTCHOICE

## 2017-05-30 NOTE — MR AVS SNAPSHOT
"              After Visit Summary   2017    Angela Ibarra    MRN: [id number]           Patient Information     Date Of Birth          1960        Visit Information        Provider Department      2017 5:25 PM Angella Dickey MD [facility]        Today's Diagnoses     Urinary tract infection, site unspecified    -  1       Follow-ups after your visit        Follow-up notes from your care team     Return if symptoms worsen or fail to improve.      Who to contact     If you have questions or need follow up information about today's clinic visit or your schedule please contact [facility] directly at [phone].  Normal or non-critical lab and imaging results will be communicated to you by MyChart, letter or phone within 4 business days after the clinic has received the results. If you do not hear from us within 7 days, please contact the clinic through [employer] or phone. If you have a critical or abnormal lab result, we will notify you by phone as soon as possible.  Submit refill requests through [employer] or call your pharmacy and they will forward the refill request to us. Please allow 3 business days for your refill to be completed.          Additional Information About Your Visit        MyChart Information     [employer] lets you send messages to your doctor, view your test results, renew your prescriptions, schedule appointments and more. To sign up, go to [email or web address] . Click on \"Log in\" on the left side of the screen, which will take you to the Welcome page. Then click on \"Sign up Now\" on the right side of the page.     You will be asked to enter the access code listed below, as well as some personal information. Please follow the directions to create your username and password.     Your access code is: [email or web address]  Expires: 2017  8:29 AM     Your access code will  in 90 days. If you need help or a new code, please call your [facility] or " [phone].        Care EveryWhere ID     This is your Care EveryWhere ID. This could be used by other organizations to access your [place] medical records  [id number]        Your Vitals Were     Pulse Temperature Last Period Pulse Oximetry BMI (Body Mass Index)       71 97.9  F (36.6  C) (Oral) 01/08/2008 96% 41.15 kg/m2        Blood Pressure from Last 3 Encounters:   05/30/17 135/79   04/06/17 137/78   03/31/17 136/82    Weight from Last 3 Encounters:   05/30/17 225 lb (102.1 kg)   04/06/17 221 lb (100.2 kg)   03/31/17 225 lb (102.1 kg)              We Performed the Following     UA reflex to Microscopic and Culture     Urine Culture Aerobic Bacterial     Urine Microscopic          Today's Medication Changes          These changes are accurate as of: 5/30/17  6:59 PM.  If you have any questions, ask your nurse or doctor.               These medicines have changed or have updated prescriptions.        Dose/Directions    * sulfamethoxazole-trimethoprim 800-160 MG per tablet   Commonly known as:  BACTRIM DS/SEPTRA DS   This may have changed:  Another medication with the same name was added. Make sure you understand how and when to take each.   Used for:  Frequent UTI   Changed by:  Germán Jernigan MD        Take one pill right before or right after sex.   Quantity:  30 tablet   Refills:  2       * sulfamethoxazole-trimethoprim 800-160 MG per tablet   Commonly known as:  BACTRIM DS/SEPTRA DS   This may have changed:  You were already taking a medication with the same name, and this prescription was added. Make sure you understand how and when to take each.   Used for:  Urinary tract infection, site unspecified   Changed by:  Angella Dickey MD        Dose:  1 tablet   Take 1 tablet by mouth 2 times daily for 5 days   Quantity:  10 tablet   Refills:  0       * Notice:  This list has 2 medication(s) that are the same as other medications prescribed for you. Read the directions carefully, and ask your doctor or  other care provider to review them with you.         Where to get your medicines      These medications were sent to [id number] [place] [address] [facility]  [address] [address] [place] [address]     Phone:  [phone]     sulfamethoxazole-trimethoprim 800-160 MG per tablet                Primary Care Provider Office Phone # Fax #    Germán Jernigan [phone]       [facility] [address] [address] [facility] [address]        Thank you!     Thank you for choosing [facility]  for your care. Our goal is always to provide you with excellent care. Hearing back from our patients is one way we can continue to improve our services. Please take a few minutes to complete the written survey that you may receive in the mail after your visit with us. Thank you!             Your Updated Medication List - Protect others around you: Learn how to safely use, store and throw away your medicines at www.disposemymeds.org.          This list is accurate as of: 5/30/17  6:59 PM.  Always use your most recent med list.                   Brand Name Dispense Instructions for use    ADVIL PO      Take  by mouth. prn       atenolol 25 MG tablet    TENORMIN    30 tablet    Take 1 tablet (25 mg) by mouth daily       CALCIUM + D PO      1 tablet daily       calcium carbonate 500 MG chewable tablet    TUMS    180 chew tab    Take 1 tablet (500 mg) by mouth 3 times daily (with meals)       conjugated estrogens cream    PREMARIN    30 g    Place 0.5 g vaginally twice a week       * sulfamethoxazole-trimethoprim 800-160 MG per tablet    BACTRIM DS/SEPTRA DS    30 tablet    Take one pill right before or right after sex.       * sulfamethoxazole-trimethoprim 800-160 MG per tablet    BACTRIM DS/SEPTRA DS    10 tablet    Take 1 tablet by mouth 2 times daily for 5 days       traZODone 50 MG tablet    DESYREL    60 tablet    Take 2 tablets (100 mg) by mouth nightly as needed for  sleep       * Notice:  This list has 2 medication(s) that are the same as other medications prescribed for you. Read the directions carefully, and ask your doctor or other care provider to review them with you.

## 2017-05-30 NOTE — NURSING NOTE
"Chief Complaint   Patient presents with     UTI     Patient is here with urinary symptoms since saturday.       Initial /79 (BP Location: Left arm, Patient Position: Chair, Cuff Size: Adult Large)  Pulse 71  Temp 97.9  F (36.6  C) (Oral)  Wt 225 lb (102.1 kg)  LMP 01/08/2008  SpO2 96%  BMI 41.15 kg/m2 Estimated body mass index is 41.15 kg/(m^2) as calculated from the following:    Height as of 4/6/17: 5' 2\" (1.575 m).    Weight as of this encounter: 225 lb (102.1 kg).  Medication Reconciliation: complete    Sindy Lucas CMA  "

## 2017-05-30 NOTE — PROGRESS NOTES
SUBJECTIVE:  Angela Ibarra is an 56 year old female who presents for possible urine infection.  Has h/o UTIs in past and this feels like one.  Last one was about a year ago.  Having burning and itching with urination over the past four days.  A little lower abdominal pressure.  Urine looks a little cloudy.  Increased urinary frequency and urgency.  No fevers, chills or sweats.  No n/v/d.  No back pain.  Took a leftover bactrim tablet this morning.  No cough or runny nose.  No diarrhea.  No vaginal discharge.       has a past medical history of Calculus of kidney; Migraine, unspecified, without mention of intractable migraine without mention of status migrainosus; Other specified iron deficiency anemias; Papanicolaou smear of cervix with low grade squamous intraepithelial lesion (LGSIL) (11/07); and Unspecified essential hypertension (1999).  ALLERGIES:  No known allergies    Current Outpatient Prescriptions   Medication     sulfamethoxazole-trimethoprim (BACTRIM DS/SEPTRA DS) 800-160 MG per tablet     traZODone (DESYREL) 50 MG tablet     atenolol (TENORMIN) 25 MG tablet     calcium carbonate (TUMS) 500 MG chewable tablet     Ibuprofen (ADVIL PO)     CALCIUM + D OR     conjugated estrogens (PREMARIN) vaginal cream     sulfamethoxazole-trimethoprim (BACTRIM DS,SEPTRA DS) 800-160 MG per tablet     No current facility-administered medications for this visit.      Facility-Administered Medications Ordered in Other Visits   Medication     bupivacaine 0.5% EPINEPHrine 1:200,000 injection         ROS:  ROS is done and is negative for general/constitutional, eye, ENT, Respiratory, cardiovascular, GI, , Skin, musculoskeletal except as noted elsewhere.      OBJECTIVE:  /79 (BP Location: Left arm, Patient Position: Chair, Cuff Size: Adult Large)  Pulse 71  Temp 97.9  F (36.6  C) (Oral)  Wt 225 lb (102.1 kg)  LMP 01/08/2008  SpO2 96%  BMI 41.15 kg/m2  GENERAL APPEARANCE: Alert, in no acute distress  EYES:  normal  NOSE: normal  OROPHARYNX:normal  NECK:No adenopathy,masses or thyromegaly  RESP: normal and clear to auscultation  CV:regular rate and rhythm and no murmurs, clicks, or gallops  ABDOMEN: Abdomen soft, non-tender. BS normal. No masses, organomegaly  SKIN: no ulcers, lesions or rash  MUSCULOSKELETAL:Musculoskeletal normal      RECENT LAB RESULTS  Results for orders placed or performed in visit on 05/30/17   UA reflex to Microscopic and Culture   Result Value Ref Range    Color Urine Yellow     Appearance Urine Slightly Cloudy     Glucose Urine Negative NEG mg/dL    Bilirubin Urine Negative NEG    Ketones Urine Negative NEG mg/dL    Specific Gravity Urine <=1.005 1.003 - 1.035    Blood Urine Trace (A) NEG    pH Urine 6.0 5.0 - 7.0 pH    Protein Albumin Urine Negative NEG mg/dL    Urobilinogen Urine 0.2 0.2 - 1.0 EU/dL    Nitrite Urine Negative NEG    Leukocyte Esterase Urine Moderate (A) NEG    Source Midstream Urine    Urine Microscopic   Result Value Ref Range    WBC Urine 10-25 (A) 0 - 2 /HPF    RBC Urine O - 2 0 - 2 /HPF    Squamous Epithelial /LPF Urine Few FEW /LPF    Bacteria Urine Many (A) NEG /HPF   .    ASSESSMENT/PLAN:    ASSESSMENT / PLAN:  (N39.0) Urinary tract infection, site unspecified  (primary encounter diagnosis)  Comment: sxs and UA c/w UTI.  UA results likely altered by the dose of bactrim pt took this morning.  Plan: Urine Culture Aerobic Bacterial, Urine         Microscopic, sulfamethoxazole-trimethoprim         (BACTRIM DS/SEPTRA DS) 800-160 MG per tablet        Reviewed medication instructions and side effects. Follow up if experiences side effects.. I reviewed supportive care, expected course, and signs of concern.  Follow up prn or if she does not improve or if worsens in any way.  Await urine cx and adjust tx if needed.      See [facility] for orders, medications, letters, patient instructions    Angella Dickey M.D.

## 2017-06-01 ENCOUNTER — TELEPHONE (OUTPATIENT)
Dept: URGENT CARE | Facility: URGENT CARE | Age: 57
End: 2017-06-01

## 2017-06-01 DIAGNOSIS — R30.0 DYSURIA: Primary | ICD-10-CM

## 2017-06-01 RX ORDER — NITROFURANTOIN 25; 75 MG/1; MG/1
100 CAPSULE ORAL 2 TIMES DAILY
Qty: 14 CAPSULE | Refills: 0 | Status: SHIPPED | OUTPATIENT
Start: 2017-06-01 | End: 2017-06-08

## 2017-06-01 NOTE — TELEPHONE ENCOUNTER
Notes Recorded by Rachel Amezcua MD on 6/1/2017 at 11:10 AM  Please inform urine culture is resistant to the antibiotic she is on and most of the oral antibiotics as well except for macrobid. So stop bactrim. Start on nitrofurantoin 100mg po bid x 7 days no refills. Follow up if symptoms persist or no relief

## 2017-06-01 NOTE — TELEPHONE ENCOUNTER
Called patient and gave providers message/ instructions.  Patient understands this.   Rx sent to preferred pharmacy.    Eleanor Alejandro RN

## 2017-06-02 LAB
BACTERIA SPEC CULT: ABNORMAL
MICRO REPORT STATUS: ABNORMAL
MICROORGANISM SPEC CULT: ABNORMAL
SPECIMEN SOURCE: ABNORMAL

## 2017-06-12 ENCOUNTER — OFFICE VISIT (OUTPATIENT)
Dept: FAMILY MEDICINE | Facility: CLINIC | Age: 57
End: 2017-06-12
Payer: COMMERCIAL

## 2017-06-12 VITALS
BODY MASS INDEX: 41.22 KG/M2 | WEIGHT: 224 LBS | TEMPERATURE: 98.6 F | SYSTOLIC BLOOD PRESSURE: 134 MMHG | HEIGHT: 62 IN | DIASTOLIC BLOOD PRESSURE: 80 MMHG | HEART RATE: 74 BPM

## 2017-06-12 DIAGNOSIS — N39.0 FREQUENT UTI: ICD-10-CM

## 2017-06-12 DIAGNOSIS — R30.0 DYSURIA: Primary | ICD-10-CM

## 2017-06-12 LAB
ALBUMIN UR-MCNC: NEGATIVE MG/DL
APPEARANCE UR: ABNORMAL
BACTERIA #/AREA URNS HPF: ABNORMAL /HPF
BILIRUB UR QL STRIP: NEGATIVE
COLOR UR AUTO: YELLOW
GLUCOSE UR STRIP-MCNC: NEGATIVE MG/DL
HGB UR QL STRIP: ABNORMAL
KETONES UR STRIP-MCNC: NEGATIVE MG/DL
LEUKOCYTE ESTERASE UR QL STRIP: ABNORMAL
NITRATE UR QL: NEGATIVE
NON-SQ EPI CELLS #/AREA URNS LPF: ABNORMAL /LPF
PH UR STRIP: 6 PH (ref 5–7)
RBC #/AREA URNS AUTO: ABNORMAL /HPF (ref 0–2)
SP GR UR STRIP: 1 (ref 1–1.03)
URN SPEC COLLECT METH UR: ABNORMAL
UROBILINOGEN UR STRIP-ACNC: 0.2 EU/DL (ref 0.2–1)
WBC #/AREA URNS AUTO: ABNORMAL /HPF (ref 0–2)

## 2017-06-12 PROCEDURE — 81001 URINALYSIS AUTO W/SCOPE: CPT | Performed by: FAMILY MEDICINE

## 2017-06-12 PROCEDURE — 87086 URINE CULTURE/COLONY COUNT: CPT | Performed by: FAMILY MEDICINE

## 2017-06-12 PROCEDURE — 99214 OFFICE O/P EST MOD 30 MIN: CPT | Performed by: FAMILY MEDICINE

## 2017-06-12 RX ORDER — CEFTRIAXONE 1 G/1
1000 INJECTION, POWDER, FOR SOLUTION INTRAMUSCULAR; INTRAVENOUS DAILY
Qty: 1 EACH | Refills: 2 | OUTPATIENT
Start: 2017-06-12 | End: 2017-06-15

## 2017-06-12 RX ORDER — CEFTRIAXONE 1 G/1
1000 INJECTION, POWDER, FOR SOLUTION INTRAMUSCULAR; INTRAVENOUS ONCE
Qty: 10 ML | Refills: 0 | OUTPATIENT
Start: 2017-06-12 | End: 2017-06-12

## 2017-06-12 NOTE — PROGRESS NOTES
"SUBJECTIVE:  Angela Ibarra, a 56 year old female scheduled an appointment to discuss the following issues:     Dysuria  Frequent UTI  Seen for uti +e.coli in urgent care. Bactrim changed to macrobid - no sensitive and possible rash. On macrobid and not better. Low grade fever/lower abdominal cramping and continue cloudy urine/frequency. No nausea, vomiting or diarrhea. No constipation. No flank pain. Water intake good. On diet coke daily. No juice. No history dm. Seen urology about 5 years ago and s/p hysterectomy and bladder mesh surgery.   Medical, social, surgical, and family histories reviewed.    ROS:    OBJECTIVE:  /80  Pulse 74  Temp 98.6  F (37  C)  Ht 5' 2\" (1.575 m)  Wt 224 lb (101.6 kg)  LMP 01/08/2008  BMI 40.97 kg/m2    GENERAL APPEARANCE: healthy, alert and no distress  EYES: EOMI,  PERRL  NECK: no adenopathy, no asymmetry, masses, or scars and thyroid normal to palpation  RESP: lungs clear to auscultation - no rales, rhonchi or wheezes  CV: regular rates and rhythm, normal S1 S2, no S3 or S4 and no murmur, click or rub -  ABDOMEN:  soft, nontender, no HSM or masses and bowel sounds normal  MS: extremities normal- no gross deformities noted, no evidence of inflammation in joints, FROM in all extremities.  PSYCH: mentation appears normal and affect normal/bright      ASSESSMENT / PLAN:  (R30.0) Dysuria  (primary encounter diagnosis)  Comment: patient believes uti NOT cleared with macrobid  Plan: UA reflex to Microscopic and Culture, UROLOGY         ADULT REFERRAL        See below    (N39.0) Frequent UTI  Comment: multiple resistencs  Plan: UROLOGY ADULT REFERRAL, cefTRIAXone (ROCEPHIN)         1 GM vial        Rocephin IM today and tomorrow - set-up RN shot visit done and follow-up urology in next 1-2 days. If can't due urology until later will need 3 days total rocephin. Will repeat urine culture too.To ER if worsening symptoms like emesis/flank pain/high fevers. Push water. Reveiwed " risks and side effects of medication  Expected course and warning signs reviewed. Call/email with questions/concerns    Braeden Schilling MD

## 2017-06-12 NOTE — NURSING NOTE
The following medication was given:     MEDICATION: Rocephin 1000 mg and Lidocaine 2.1cc- 1%  Lot # [id number]  Exp   ndc [id number]  ROUTE: IM  SITE: [facility] - Gluteus  DOSE: 2.5 ml  LOT #: [id number]  :  Stephanie  EXPIRATION DATE:      NDC#: 94963-899-67  Melyssa Funez CMA     She did stay 20 minutes

## 2017-06-12 NOTE — MR AVS SNAPSHOT
After Visit Summary   6/12/2017    Angela Ibarra    MRN: [id number]           Patient Information     Date Of Birth          1960        Visit Information        Provider Department      6/12/2017 2:10 PM Braeden Schilling MD [facility]        Today's Diagnoses     Dysuria    -  1    Frequent UTI           Follow-ups after your visit        Additional Services     UROLOGY ADULT REFERRAL       Your provider has referred you to: [facility]: [facility] [place] [phone]   [email or web address]  [facility]: [facility] [place] [facility] [place] [phone]   [email or web address]  FM[facility]: [facility] - [place] [phone]   [email or web address]  FM[facility]: [facility] - [place] [phone]   [email or web address]    Please be aware that coverage of these services is subject to the terms and limitations of your health insurance plan.  Call member services at your health plan with any benefit or coverage questions.      Please bring the following with you to your appointment:    (1) Any X-Rays, CTs or MRIs which have been performed.  Contact the facility where they were done to arrange for  prior to your scheduled appointment.    (2) List of current medications  (3) This referral request   (4) Any documents/labs given to you for this referral                  Your next 10 appointments already scheduled     Jun 13, 2017  8:00 AM CDT   Nurse Only with AN ANCILLARY   [facility]    [address] [facility] [address] [phone]              Who to contact     If you have questions or need follow up information about today's clinic visit or your schedule please contact [facility] directly at [phone].  Normal or non-critical lab and imaging results will be  "communicated to you by [facility], letter or phone within 4 business days after the clinic has received the results. If you do not hear from us within 7 days, please contact the clinic through [employer] or phone. If you have a critical or abnormal lab result, we will notify you by phone as soon as possible.  Submit refill requests through [employer] or call your pharmacy and they will forward the refill request to us. Please allow 3 business days for your refill to be completed.          Additional Information About Your Visit        [employer] Information     [employer] lets you send messages to your doctor, view your test results, renew your prescriptions, schedule appointments and more. To sign up, go to [email or web address] . Click on \"Log in\" on the left side of the screen, which will take you to the Welcome page. Then click on \"Sign up Now\" on the right side of the page.     You will be asked to enter the access code listed below, as well as some personal information. Please follow the directions to create your username and password.     Your access code is: [email or web address]  Expires: 2017  8:29 AM     Your access code will  in 90 days. If you need help or a new code, please call your [place] clinic or [phone].        Care EveryWhere ID     This is your Care EveryWhere ID. This could be used by other organizations to access your [place] medical records  [id number]        Your Vitals Were     Pulse Temperature Height Last Period BMI (Body Mass Index)       74 98.6  F (37  C) 5' 2\" (1.575 m) 2008 40.97 kg/m2        Blood Pressure from Last 3 Encounters:   17 134/80   17 135/79   17 137/78    Weight from Last 3 Encounters:   17 224 lb (101.6 kg)   17 225 lb (102.1 kg)   17 221 lb (100.2 kg)              We Performed the Following     UA reflex to Microscopic and Culture     Urine Culture Aerobic Bacterial     UROLOGY ADULT REFERRAL          Today's Medication Changes "          These changes are accurate as of: 6/12/17  2:37 PM.  If you have any questions, ask your nurse or doctor.               Start taking these medicines.        Dose/Directions    cefTRIAXone 1 GM vial   Commonly known as:  ROCEPHIN   Used for:  Frequent UTI        Dose:  1000 mg   Inject 1 g (1,000 mg) into the muscle once for 1 dose   Quantity:  10 mL   Refills:  0            Where to get your medicines      Some of these will need a paper prescription and others can be bought over the counter.  Ask your nurse if you have questions.     You don't need a prescription for these medications     cefTRIAXone 1 GM vial                Primary Care Provider Office Phone # Fax #    Germán Jernigan [phone]       [facility] [address] [facility] [address]        Thank you!     Thank you for choosing [facility]  for your care. Our goal is always to provide you with excellent care. Hearing back from our patients is one way we can continue to improve our services. Please take a few minutes to complete the written survey that you may receive in the mail after your visit with us. Thank you!             Your Updated Medication List - Protect others around you: Learn how to safely use, store and throw away your medicines at www.disposemymeds.org.          This list is accurate as of: 6/12/17  2:37 PM.  Always use your most recent med list.                   Brand Name Dispense Instructions for use    ADVIL PO      Take  by mouth. prn       atenolol 25 MG tablet    TENORMIN    30 tablet    Take 1 tablet (25 mg) by mouth daily       CALCIUM + D PO      1 tablet daily       calcium carbonate 500 MG chewable tablet    TUMS    180 chew tab    Take 1 tablet (500 mg) by mouth 3 times daily (with meals)       cefTRIAXone 1 GM vial    ROCEPHIN    10 mL    Inject 1 g (1,000 mg) into the muscle once for 1 dose       conjugated estrogens cream    PREMARIN    30 g    Place 0.5 g  vaginally twice a week       sulfamethoxazole-trimethoprim 800-160 MG per tablet    BACTRIM DS/SEPTRA DS    30 tablet    Take one pill right before or right after sex.       traZODone 50 MG tablet    DESYREL    60 tablet    Take 2 tablets (100 mg) by mouth nightly as needed for sleep

## 2017-06-13 ENCOUNTER — OFFICE VISIT (OUTPATIENT)
Dept: UROLOGY | Facility: CLINIC | Age: 57
End: 2017-06-13
Payer: COMMERCIAL

## 2017-06-13 ENCOUNTER — TELEPHONE (OUTPATIENT)
Dept: FAMILY MEDICINE | Facility: CLINIC | Age: 57
End: 2017-06-13

## 2017-06-13 ENCOUNTER — ALLIED HEALTH/NURSE VISIT (OUTPATIENT)
Dept: NURSING | Facility: CLINIC | Age: 57
End: 2017-06-13
Payer: COMMERCIAL

## 2017-06-13 VITALS
BODY MASS INDEX: 40.97 KG/M2 | HEART RATE: 70 BPM | TEMPERATURE: 99 F | SYSTOLIC BLOOD PRESSURE: 136 MMHG | WEIGHT: 224 LBS | RESPIRATION RATE: 14 BRPM | DIASTOLIC BLOOD PRESSURE: 73 MMHG

## 2017-06-13 DIAGNOSIS — R30.0 DYSURIA: Primary | ICD-10-CM

## 2017-06-13 LAB
BACTERIA SPEC CULT: NORMAL
MICRO REPORT STATUS: NORMAL
SPECIMEN SOURCE: NORMAL

## 2017-06-13 PROCEDURE — 99207 ZZC NO CHARGE NURSE ONLY: CPT

## 2017-06-13 PROCEDURE — 96372 THER/PROPH/DIAG INJ SC/IM: CPT

## 2017-06-13 PROCEDURE — 99203 OFFICE O/P NEW LOW 30 MIN: CPT | Mod: 25 | Performed by: UROLOGY

## 2017-06-13 NOTE — TELEPHONE ENCOUNTER
For ancillary nurses, patient states the urologist said she does not need a 3rd shot.    Thank you.

## 2017-06-13 NOTE — NURSING NOTE
"Chief Complaint   Patient presents with     Consult     UTI hx       Initial /73 (BP Location: Left arm, Patient Position: Chair)  Pulse 70  Temp 99  F (37.2  C) (Oral)  Resp 14  Wt 224 lb (101.6 kg)  LMP 01/08/2008  BMI 40.97 kg/m2 Estimated body mass index is 40.97 kg/(m^2) as calculated from the following:    Height as of 6/12/17: 5' 2\" (1.575 m).    Weight as of this encounter: 224 lb (101.6 kg).  Medication Reconciliation: complete   Angella Zhang LPN    "

## 2017-06-13 NOTE — TELEPHONE ENCOUNTER
This writer cancelled tomorrow's injection per urologist office notes from todays visit.    Eleanor Alejandro RN

## 2017-06-13 NOTE — MR AVS SNAPSHOT
"              After Visit Summary   6/13/2017    Angela Ibarra    MRN: [id number]           Patient Information     Date Of Birth          1960        Visit Information        Provider Department      6/13/2017 8:00 AM AN ANCILLARY [facility]        Today's Diagnoses     Dysuria    -  1       Follow-ups after your visit        Your next 10 appointments already scheduled     Jun 13, 2017 10:15 AM CDT   New Visit with Eddie Mack MD   [facility]    [address] [facility] [address] [phone]              Who to contact     If you have questions or need follow up information about today's clinic visit or your schedule please contact [facility] directly at [phone].  Normal or non-critical lab and imaging results will be communicated to you by MyChart, letter or phone within 4 business days after the clinic has received the results. If you do not hear from us within 7 days, please contact the clinic through MyChart or phone. If you have a critical or abnormal lab result, we will notify you by phone as soon as possible.  Submit refill requests through [employer] or call your pharmacy and they will forward the refill request to us. Please allow 3 business days for your refill to be completed.          Additional Information About Your Visit        MyChart Information     [employer] lets you send messages to your doctor, view your test results, renew your prescriptions, schedule appointments and more. To sign up, go to [email or web address] . Click on \"Log in\" on the left side of the screen, which will take you to the Welcome page. Then click on \"Sign up Now\" on the right side of the page.     You will be asked to enter the access code listed below, as well as some personal information. Please follow the directions to create your username and password.     Your access code is: [email or web address]  Expires: 6/29/2017  8:29 AM   "   Your access code will  in 90 days. If you need help or a new code, please call your [place] clinic or [phone].        Care EveryWhere ID     This is your Care EveryWhere ID. This could be used by other organizations to access your [place] medical records  [id number]        Your Vitals Were     Last Period                   2008            Blood Pressure from Last 3 Encounters:   17 134/80   17 135/79   17 137/78    Weight from Last 3 Encounters:   17 224 lb (101.6 kg)   17 225 lb (102.1 kg)   17 221 lb (100.2 kg)              We Performed the Following     CEFTRIAXONE NA INJ /250MG     INJECTION INTRAMUSCULAR OR SUB-Q        Primary Care Provider Office Phone # Fax #    Germán Jernigan [phone]       [facility] [address] [facility] [address]        Thank you!     Thank you for choosing [facility]  for your care. Our goal is always to provide you with excellent care. Hearing back from our patients is one way we can continue to improve our services. Please take a few minutes to complete the written survey that you may receive in the mail after your visit with us. Thank you!             Your Updated Medication List - Protect others around you: Learn how to safely use, store and throw away your medicines at www.disposemymeds.org.          This list is accurate as of: 17  8:46 AM.  Always use your most recent med list.                   Brand Name Dispense Instructions for use    ADVIL PO      Take  by mouth. prn       atenolol 25 MG tablet    TENORMIN    30 tablet    Take 1 tablet (25 mg) by mouth daily       CALCIUM + D PO      1 tablet daily       calcium carbonate 500 MG chewable tablet    TUMS    180 chew tab    Take 1 tablet (500 mg) by mouth 3 times daily (with meals)       cefTRIAXone 1 GM vial    ROCEPHIN    1 each    Inject 1 g (1,000 mg) into the muscle daily for 3 days Total  = 3 days        conjugated estrogens cream    PREMARIN    30 g    Place 0.5 g vaginally twice a week       sulfamethoxazole-trimethoprim 800-160 MG per tablet    BACTRIM DS/SEPTRA DS    30 tablet    Take one pill right before or right after sex.       traZODone 50 MG tablet    DESYREL    60 tablet    Take 2 tablets (100 mg) by mouth nightly as needed for sleep

## 2017-06-13 NOTE — NURSING NOTE
The following medication was given:     MEDICATION: Rocephin 1000mg and Lidocaine 2.1cc  ROUTE: IM  SITE: Vastus Lateralis - Left  DOSE: 1gram  LOT #: [id number]  :  [employer]  EXPIRATION DATE:  5/2018  NDC#: 66014-133-27  Daphne Salgado MA    LIDOCAINE:1% W/O EPI  LOT:-[id number]  EXP:9/1/2018  NDC:1658-9800-60

## 2017-06-13 NOTE — MR AVS SNAPSHOT
"              After Visit Summary   6/13/2017    Angela Ibarra    MRN: [id number]           Patient Information     Date Of Birth          1960        Visit Information        Provider Department      6/13/2017 10:15 AM Eddie Mack MD [facility]        Today's Diagnoses     Dysuria    -  1       Follow-ups after your visit        Follow-up notes from your care team     Return in about 2 weeks (around 6/27/2017).      Your next 10 appointments already scheduled     Jun 14, 2017  8:00 AM CDT   (Arrive by 7:00 AM)   [facility]    [address] [facility] [address] [phone]            Jun 27, 2017  8:00 AM CDT   Return Visit with Eddie Mack MD   [facility] ([facility])    [address] [facility] [address] [phone]              Who to contact     If you have questions or need follow up information about today's clinic visit or your schedule please contact [facility] directly at [phone].  Normal or non-critical lab and imaging results will be communicated to you by MyChart, letter or phone within 4 business days after the clinic has received the results. If you do not hear from us within 7 days, please contact the clinic through [facility] or phone. If you have a critical or abnormal lab result, we will notify you by phone as soon as possible.  Submit refill requests through [employer] or call your pharmacy and they will forward the refill request to us. Please allow 3 business days for your refill to be completed.          Additional Information About Your Visit        MyChart Information     [employer] lets you send messages to your doctor, view your test results, renew your prescriptions, schedule appointments and more. To sign up, go to [email or web address] . Click on \"Log in\" on the left side of the screen, which will take you " "to the Welcome page. Then click on \"Sign up Now\" on the right side of the page.     You will be asked to enter the access code listed below, as well as some personal information. Please follow the directions to create your username and password.     Your access code is: [email or web address]  Expires: 2017  8:29 AM     Your access code will  in 90 days. If you need help or a new code, please call your [place] clinic or [phone].        Care EveryWhere ID     This is your Care EveryWhere ID. This could be used by other organizations to access your [place] medical records  [id number]        Your Vitals Were     Pulse Temperature Respirations Last Period BMI (Body Mass Index)       70 99  F (37.2  C) (Oral) 14 2008 40.97 kg/m2        Blood Pressure from Last 3 Encounters:   17 136/73   17 134/80   17 135/79    Weight from Last 3 Encounters:   17 224 lb (101.6 kg)   17 224 lb (101.6 kg)   17 225 lb (102.1 kg)              Today, you had the following     No orders found for display       Primary Care Provider Office Phone # Fax #    Germán Jernigan [phone]       [facility] [address] [facility] [address]        Thank you!     Thank you for choosing [facility]  for your care. Our goal is always to provide you with excellent care. Hearing back from our patients is one way we can continue to improve our services. Please take a few minutes to complete the written survey that you may receive in the mail after your visit with us. Thank you!             Your Updated Medication List - Protect others around you: Learn how to safely use, store and throw away your medicines at www.disposemymeds.org.          This list is accurate as of: 17 10:40 AM.  Always use your most recent med list.                   Brand Name Dispense Instructions for use    ADVIL PO      Take  by mouth. prn       atenolol 25 MG tablet    TENORMIN "    30 tablet    Take 1 tablet (25 mg) by mouth daily       CALCIUM + D PO      1 tablet daily       calcium carbonate 500 MG chewable tablet    TUMS    180 chew tab    Take 1 tablet (500 mg) by mouth 3 times daily (with meals)       cefTRIAXone 1 GM vial    ROCEPHIN    1 each    Inject 1 g (1,000 mg) into the muscle daily for 3 days Total  = 3 days       traZODone 50 MG tablet    DESYREL    60 tablet    Take 2 tablets (100 mg) by mouth nightly as needed for sleep

## 2017-06-13 NOTE — PROGRESS NOTES
"Here for f/u rec UTI.   Hx incont, s/p [place] done elsewhere 4/10, excision after erosion by us 10/11, then Durasphere 3/6/12.    5/30/17: Seen for UTI, UC grew E coli; initially started on Bactrim but switched to Macrobid based on sensitivities    6/12/17: Seen for dysuria, 'itching down there,' abd and bilateral mid back pain  UA: neg nitrite, 10-25 WBC, 0-2 RBC, few bacteria >> Rocephin 1 gm IM yesterday, second dose today    Now reports feeling much better. Has resolution of both dysuria and 'itching.' Abd and back pain are better; some back pain persists in mid back, bilateral, right = left. Also has chronic back pain in lower midline; [facility] but pt states it is still very tender.    States that she has had issues with \"many, many\" UTI's over the last several yrs; was on post-coital therapy.    Recently took Bactrim as rx, developed skin rash \"on about the third day of therapy;\" no other rxn to Bactrim.    Yesterday had nausea, now resolved. No emesis. No fever, chills.     States that one of her dogs also has UTI and the other has uncontrollable diarrhea; wonders if this may be related.    Bladder control now excellent; wears no pads.    No dysuria, today; also denies gross hematuria. Voiding about q 90 minutes today, has been pushing [place]ana. Noc x 3.    Hx 1 c/s, hyster. Not on HRT. Was rx with Blane Cream many years ago but did not take it; \"it made me feel funny.\"    Drinks 5 Gulkana per day, one diet Coke.      Current Outpatient Prescriptions   Medication     cefTRIAXone (ROCEPHIN) 1 GM vial     traZODone (DESYREL) 50 MG tablet     atenolol (TENORMIN) 25 MG tablet     calcium carbonate (TUMS) 500 MG chewable tablet     Ibuprofen (ADVIL PO)     CALCIUM + D OR     No current facility-administered medications for this visit.      Facility-Administered Medications Ordered in Other Visits   Medication     bupivacaine 0.5% EPINEPHrine 1:200,000 injection             PE: Looks entirely well. No " CVAT. Afebrile. (Remainder of exam deferred)        Component      Latest Ref Rng & Units 7/1/2015   Specimen Description       Midstream Urine   Culture Micro       >100,000 colonies/mL Citrobacter freundii complex (A)   Micro Report Status       FINAL 07/03/2015   Organism:       >100,000 colonies/mL Citrobacter freundii complex     Component      Latest Ref Rng & Units 12/9/2015   Specimen Description       Midstream Urine   Culture Micro       <10,000 colonies/mL mixed urogenital ross   Micro Report Status       FINAL 12/10/2015   Organism:            Component      Latest Ref Rng & Units 5/12/2016   Specimen Description       Midstream Urine   Culture Micro       No growth   Micro Report Status       FINAL 05/13/2016   Organism:            Component      Latest Ref Rng & Units 5/30/2017   Specimen Description       Midstream Urine   Culture Micro       >100,000 colonies/mL Escherichia coli (A)   Micro Report Status       FINAL 06/02/2017   Organism:       >100,000 colonies/mL Escherichia coli         IMP:  1. Possible UTI  2. Hx incont, resolved      PLAN:  1. Discussed situation with patient in detail.  2. Pt has had 2 gm Rocephin at this point and is much improved clinically; will therefore await culture results; may benefit from additional PO abx  3. RTC 2 wks for test-of-cure, pelvic, cysto  4. Precautions given in detail  5. 30 minutes spent with patient, more than 50% spent in counseling and coordination of care for UTI.  6. Copy Y Delon            UC from 6/12/17 is negative. Information left on pt's voice mail. F/u as recommended above.    SBM

## 2017-06-27 ENCOUNTER — OFFICE VISIT (OUTPATIENT)
Dept: UROLOGY | Facility: CLINIC | Age: 57
End: 2017-06-27
Payer: COMMERCIAL

## 2017-06-27 VITALS
HEART RATE: 75 BPM | DIASTOLIC BLOOD PRESSURE: 70 MMHG | SYSTOLIC BLOOD PRESSURE: 135 MMHG | TEMPERATURE: 98.3 F | RESPIRATION RATE: 14 BRPM

## 2017-06-27 DIAGNOSIS — R30.0 DYSURIA: Primary | ICD-10-CM

## 2017-06-27 LAB
ALBUMIN UR-MCNC: NEGATIVE MG/DL
APPEARANCE UR: CLEAR
BILIRUB UR QL STRIP: NEGATIVE
COLOR UR AUTO: YELLOW
GLUCOSE UR STRIP-MCNC: 100 MG/DL
HGB UR QL STRIP: NEGATIVE
KETONES UR STRIP-MCNC: NEGATIVE MG/DL
LEUKOCYTE ESTERASE UR QL STRIP: NEGATIVE
NITRATE UR QL: NEGATIVE
PH UR STRIP: 5.5 PH (ref 5–7)
SP GR UR STRIP: 1.02 (ref 1–1.03)
URN SPEC COLLECT METH UR: ABNORMAL
UROBILINOGEN UR STRIP-ACNC: 0.2 EU/DL (ref 0.2–1)

## 2017-06-27 PROCEDURE — 99215 OFFICE O/P EST HI 40 MIN: CPT | Mod: 25 | Performed by: UROLOGY

## 2017-06-27 PROCEDURE — 52000 CYSTOURETHROSCOPY: CPT | Performed by: UROLOGY

## 2017-06-27 PROCEDURE — 81003 URINALYSIS AUTO W/O SCOPE: CPT | Performed by: UROLOGY

## 2017-06-27 RX ORDER — TOLTERODINE 4 MG/1
4 CAPSULE, EXTENDED RELEASE ORAL DAILY
Qty: 30 CAPSULE | Refills: 1 | Status: SHIPPED | OUTPATIENT
Start: 2017-06-27 | End: 2017-09-28

## 2017-06-27 NOTE — PROGRESS NOTES
Here for f/u rec UTI.   Hx incont, s/p [place] done elsewhere 4/10, excision after erosion by us 10/11, then Durasphere 3/6/12.    Seen here on 6/13/17 for possible UTI; UC was benign.    Isn't feeling 'quite right' today.    Denies dysuria, gross hematuria, frequency; can go 2 hrs between voids during the day and noc x 1.     Completely dry; no pad day or nite.     BM's about QOD.    Was tested for DM in Spring 'and I was told I am OK.'    Has been compliant with Estrace cream thrice weekly.        Current Outpatient Prescriptions   Medication     traZODone (DESYREL) 50 MG tablet     atenolol (TENORMIN) 25 MG tablet     calcium carbonate (TUMS) 500 MG chewable tablet     Ibuprofen (ADVIL PO)     CALCIUM + D OR     No current facility-administered medications for this visit.      Facility-Administered Medications Ordered in Other Visits   Medication     bupivacaine 0.5% EPINEPHrine 1:200,000 injection         Physical Exam:    GENL: NAD.    ABD: Obese, soft, non-tender, no masses.    EG: Moderately-estrogenized, no masses.    VAGINA: Moderagtely-estrogenized, no masses.    BN HYPERMOBILITY: None.    CYSTOCELE: None.    APICAL PROLAPSE: None.    RECTOCELE: None.    BIMANUAL: No mass or tenderness, no palpable mesh.        Cysto:    (Informed consent obtained. Pause for cause performed)   Sterile prep.    17 Fr scope inserted through urethra. Systematic examination w 70 degree lens.   PVR: 10 cc   MUCOSA: Normal without lesion; moderate trabeculation throughout; Durasphere at bladder neck   ORIFICES: Normal location and morphology   CAPACITY: 350 cc; no pain with filling   Scope withdrawn without untoward effect.      Valsalva:   No hypermobility, no leakage noted.      (Pt tolerated procedure without difficulty).        Results for orders placed or performed in visit on 06/27/17   *UA reflex to Microscopic and Culture ([facility] (except [place] and [place])   Result Value Ref Range    Color Urine  Yellow     Appearance Urine Clear     Glucose Urine 100 (A) NEG mg/dL    Bilirubin Urine Negative NEG    Ketones Urine Negative NEG mg/dL    Specific Gravity Urine 1.025 1.003 - 1.035    Blood Urine Negative NEG    pH Urine 5.5 5.0 - 7.0 pH    Protein Albumin Urine Negative NEG mg/dL    Urobilinogen Urine 0.2 0.2 - 1.0 EU/dL    Nitrite Urine Negative NEG    Leukocyte Esterase Urine Negative NEG    Source Midstream Urine      Fasting glucose on 3/31/17: 92        IMP:  1. OAB dry  2. Resolution of LAKSHMI after sling (and excision of part of [place])  3. Glucosuria, new      PLAN:  1. Discussed situation with patient in detail.  2. Consider Detrol; discussed rationale, mech of action, side effects, etc; pt elects trial  3. Consider w/u of glucosuria by Dr Jernigan  4. Continue Estrace cream thrice weekly  5. RTC 2 mos to review progress  6. 45 minutes spent with patient, more than 50% in counseling and coordination of care for OAB, which did not include time spent for the procedure.  7. Copy Y Delon

## 2017-06-27 NOTE — NURSING NOTE
"Chief Complaint   Patient presents with     RECHECK     dysuria       Initial /70 (BP Location: Left arm, Patient Position: Chair)  Pulse 75  Temp 98.3  F (36.8  C) (Oral)  Resp 14  LMP 01/08/2008 Estimated body mass index is 40.97 kg/(m^2) as calculated from the following:    Height as of 6/12/17: 5' 2\" (1.575 m).    Weight as of 6/13/17: 224 lb (101.6 kg).  Medication Reconciliation: complete   Angella Zhang LPN    "

## 2017-06-27 NOTE — MR AVS SNAPSHOT
After Visit Summary   6/27/2017    Angela Ibarra    MRN: [id number]           Patient Information     Date Of Birth          1960        Visit Information        Provider Department      6/27/2017 8:00 AM Eddie Mack MD [facility]        Today's Diagnoses     Dysuria    -  1       Follow-ups after your visit        Follow-up notes from your care team     Return in about 2 months (around 8/27/2017).      Your next 10 appointments already scheduled     Jul 07, 2017  8:50 AM CDT   [facility]    [address] [facility] [address] [phone]           Bring a current list of meds and any records pertaining to this visit.  For Physicals, please bring immunization records and any forms needing to be filled out.  Please arrive 10 minutes early to complete paperwork.            Aug 29, 2017  8:00 AM CDT   Return Visit with Eddie Mack MD   [facility] ([facility]    [address] [address] [phone]              Who to contact     If you have questions or need follow up information about today's clinic visit or your schedule please contact [facility] directly at [phone].  Normal or non-critical lab and imaging results will be communicated to you by MyChart, letter or phone within 4 business days after the clinic has received the results. If you do not hear from us within 7 days, please contact the clinic through MyChart or phone. If you have a critical or abnormal lab result, we will notify you by phone as soon as possible.  Submit refill requests through [employer] or call your pharmacy and they will forward the refill request to us. Please allow 3 business days for your refill to be completed.          Additional Information About Your Visit        [employer] Information     [employer] lets you send messages to  "your doctor, view your test results, renew your prescriptions, schedule appointments and more. To sign up, go to [email or web address] . Click on \"Log in\" on the left side of the screen, which will take you to the Welcome page. Then click on \"Sign up Now\" on the right side of the page.     You will be asked to enter the access code listed below, as well as some personal information. Please follow the directions to create your username and password.     Your access code is: [email or web address]  Expires: 2017  8:29 AM     Your access code will  in 90 days. If you need help or a new code, please call your [facility] clinic or [phone].        Care EveryWhere ID     This is your Care EveryWhere ID. This could be used by other organizations to access your [facility] medical records  [id number]        Your Vitals Were     Pulse Temperature Respirations Last Period          75 98.3  F (36.8  C) (Oral) 14 2008         Blood Pressure from Last 3 Encounters:   17 135/70   17 136/73   17 134/80    Weight from Last 3 Encounters:   17 224 lb (101.6 kg)   17 224 lb (101.6 kg)   17 225 lb (102.1 kg)              We Performed the Following     *UA reflex to Microscopic and Culture ([place] and [facility] (except [place] and [place])     CYSTOURETHROSCOPY          Today's Medication Changes          These changes are accurate as of: 17  8:55 AM.  If you have any questions, ask your nurse or doctor.               Start taking these medicines.        Dose/Directions    tolterodine 4 MG 24 hr capsule   Commonly known as:  DETROL LA   Used for:  Dysuria   Started by:  Eddie Mack MD        Dose:  4 mg   Take 1 capsule (4 mg) by mouth daily   Quantity:  30 capsule   Refills:  1            Where to get your medicines      These medications were sent to [facility] [place], [place] - [address] [address] [facility]  [address] [facility] [address]     " Phone:  [phone]     tolterodine 4 MG 24 hr capsule                Primary Care Provider Office Phone # Fax #    Germán Jernigan [phone]       [facility] [address] [address] [address]        Equal Access to Services     ENOCH ARROYO : Hadii max ku hadasho Soomaali, waaxda luqadaha, qaybta kaalmada adeegyada, waxay omarin haylydian bell ortegamarthaherbie sandoval. So [facility] [phone].    ATENCIÓN: Si habla español, tiene a lebron disposición servicios gratuitos de asistencia lingüística. Kasandra al [phone].    We comply with applicable federal civil rights laws and [place] laws. We do not discriminate on the basis of race, color, national origin, age, disability sex, sexual orientation or gender identity.            Thank you!     Thank you for choosing [facility]  for your care. Our goal is always to provide you with excellent care. Hearing back from our patients is one way we can continue to improve our services. Please take a few minutes to complete the written survey that you may receive in the mail after your visit with us. Thank you!             Your Updated Medication List - Protect others around you: Learn how to safely use, store and throw away your medicines at www.disposemymeds.org.          This list is accurate as of: 6/27/17  8:55 AM.  Always use your most recent med list.                   Brand Name Dispense Instructions for use Diagnosis    ADVIL PO      Take  by mouth. prn        atenolol 25 MG tablet    TENORMIN    30 tablet    Take 1 tablet (25 mg) by mouth daily    Essential hypertension with goal blood pressure less than 140/90       CALCIUM + D PO      1 tablet daily        calcium carbonate 500 MG chewable tablet    TUMS    180 chew tab    Take 1 tablet (500 mg) by mouth 3 times daily (with meals)    Osteoporosis, unspecified       tolterodine 4 MG 24 hr capsule    DETROL LA    30 capsule    Take 1 capsule (4 mg) by mouth daily    Dysuria        traZODone 50 MG tablet    DESYREL    60 tablet    Take 2 tablets (100 mg) by mouth nightly as needed for sleep    Insomnia, unspecified type

## 2017-07-07 ENCOUNTER — OFFICE VISIT (OUTPATIENT)
Dept: FAMILY MEDICINE | Facility: CLINIC | Age: 57
End: 2017-07-07
Payer: COMMERCIAL

## 2017-07-07 VITALS
HEART RATE: 74 BPM | SYSTOLIC BLOOD PRESSURE: 136 MMHG | DIASTOLIC BLOOD PRESSURE: 78 MMHG | TEMPERATURE: 98.6 F | WEIGHT: 222 LBS | OXYGEN SATURATION: 97 % | BODY MASS INDEX: 40.6 KG/M2

## 2017-07-07 DIAGNOSIS — Z12.11 SCREEN FOR COLON CANCER: ICD-10-CM

## 2017-07-07 DIAGNOSIS — R81 GLUCOSURIA: Primary | ICD-10-CM

## 2017-07-07 DIAGNOSIS — I10 ESSENTIAL HYPERTENSION WITH GOAL BLOOD PRESSURE LESS THAN 140/90: ICD-10-CM

## 2017-07-07 LAB
ANION GAP SERPL CALCULATED.3IONS-SCNC: 6 MMOL/L (ref 3–14)
BUN SERPL-MCNC: 13 MG/DL (ref 7–30)
CALCIUM SERPL-MCNC: 9.3 MG/DL (ref 8.5–10.1)
CHLORIDE SERPL-SCNC: 110 MMOL/L (ref 94–109)
CO2 SERPL-SCNC: 27 MMOL/L (ref 20–32)
CREAT SERPL-MCNC: 0.93 MG/DL (ref 0.52–1.04)
GFR SERPL CREATININE-BSD FRML MDRD: 63 ML/MIN/1.7M2
GLUCOSE SERPL-MCNC: 89 MG/DL (ref 70–99)
HBA1C MFR BLD: 5.9 % (ref 4.3–6)
POTASSIUM SERPL-SCNC: 4.5 MMOL/L (ref 3.4–5.3)
SODIUM SERPL-SCNC: 143 MMOL/L (ref 133–144)

## 2017-07-07 PROCEDURE — 99214 OFFICE O/P EST MOD 30 MIN: CPT | Performed by: FAMILY MEDICINE

## 2017-07-07 PROCEDURE — 36415 COLL VENOUS BLD VENIPUNCTURE: CPT | Performed by: FAMILY MEDICINE

## 2017-07-07 PROCEDURE — 80048 BASIC METABOLIC PNL TOTAL CA: CPT | Performed by: FAMILY MEDICINE

## 2017-07-07 PROCEDURE — 83036 HEMOGLOBIN GLYCOSYLATED A1C: CPT | Performed by: FAMILY MEDICINE

## 2017-07-07 RX ORDER — ATENOLOL 50 MG/1
50 TABLET ORAL DAILY
Qty: 30 TABLET | Refills: 5 | Status: SHIPPED | OUTPATIENT
Start: 2017-07-07 | End: 2018-01-11

## 2017-07-07 NOTE — PROGRESS NOTES
"  HPI:     Angela is a 56 year old female who presents to clinic to discuss:     Glucosuria - this was noted during her urology visit on 6/27/17. The glucose was 100 and the rest of the u/a was negative. She has no h/o diabetes and denies symptoms high glucose.  Evaluation and treatment:   This is most like insignificant glucosuria. But we will check BMP and a1c.    Right hip pain - present since around 2013. Interferes with sleep, throbbing at night. No trauma. She feels the pain more on the right groin area. No bulge. There is morning stiffness. Worse with activity? Better with Tylenol. She feels the right leg is weak. No numbness. Not sure about right low back pain. Has h/o right ankle surgery.   Evaluation and treatment:   Saw ortho and steroid injection was helpful.     XR HIP RIGHT 2-3 VIEWS 3/31/2017 8:10 AM     HISTORY: Pain in right hip     COMPARISON: None.         IMPRESSION: Mild degenerative changes of the hip. Otherwise negative.        RUBÉN MACE MD    Frequent UTI's/atrophic vaginitis - was happening every other month. Goes away with antibiotic treatment. Symptoms are frequency, burning, as if passing \"clot.\" previous culture grew e coli which was sensitive to all antibiotics tested.  She also has had dryness and painful intercourse. No hematuria. No fevers. She has had hysterectomy.   Bactrim right before or right after intercourse is working well.    She saw as baseline frequency.    The u/a has been normal on multiple occasions.    For atrophic vaginitis I had prescribed Estrogen vaginal pill but stopped due to fear of side effects.   Saw urologist - advised to continue Estrogen and consider Detrol      Depression/insomnia -    Specific type: mild major depression  Duration: since around 2011  Symptoms: decreased mood, mood swings, anhedonia, sleep disturbance, decreased energy.  Compounding factors: \"hates\" her work.   Anxiety: Worries about her kid. Thinks too much at night.  SI or HI: " denies  Delusions/hallucinations: denies  Rupali or hypomania now or in the past: denies  Current treatment: Trazodone prn for sleep only.  Compliant: denies  Side effects: denies  Treatments:    Zoloft stopped since not needed   Trazodone helps but not always   Referral for counseling previously but she says it does not help   she says she only wants to be on Trazodone prn which is fine.   Counseling: about the importance of proper diet, regular exercise, avoiding stressful situations, avoiding tobacco, drugs, or excessive alcohol and caffeine.    PHQ-9 SCORE 3/7/2016 9/8/2016 3/31/2017   Total Score - - -   Total Score 3 0 0       DENG-7 SCORE 8/21/2015 9/8/2016 3/31/2017   Total Score - - -   Total Score 3 0 0     HTN - dx'd around: 2009. Not checking at home. Fairly controlled in clinic.  Treatment:   Atenolol 25 mg qd (chosen due to migraines) - no side effects - we will increase to 50 mg qd for better control.    Last Basic Metabolic Panel:  Lab Results   Component Value Date     03/31/2017      Lab Results   Component Value Date    POTASSIUM 4.2 03/31/2017     Lab Results   Component Value Date    CHLORIDE 108 03/31/2017     Lab Results   Component Value Date    BRITANY 9.1 03/31/2017     Lab Results   Component Value Date    CO2 25 03/31/2017     Lab Results   Component Value Date    BUN 12 03/31/2017     Lab Results   Component Value Date    CR 0.96 03/31/2017     Lab Results   Component Value Date    GLC 92 03/31/2017       Multiple falls and fractures/osteopenia - She informs me that she has fallen about 6 times in as many years. These were related to stepping on a pot hole, stumbling on stairs and sometimes the ankle is weak and gives way. On 9/2/13 she stepped on a pot hole. She was seen in the ED and dx'd with right distal fibular fx and old lateral malleolus fx. She was then managed by sports medicine with CAM walker. On 10/8/13 she was walking her dog when her right ankle gave out and she fell on her  left side. She was seen in ED again and dx'd with left ulnar comminuted fracture. She was subsequently seen by ortho and underwent ORIF on 10/15/13. At the end of Nov 2014 she kicked a chair. She was seen in urgent care on 12/8/14 and dx'd with left 5th proximal phalanx fracture. In addition to all this she has had left humerus fx in the past that required ORIF.    Has not fallen since Oct 2013. Previously followed with endocrine. Reclast - yearly. It made her sick so she stopped it. She takes Vitamin 2000 units per day. Takes Calcium over the counter - dose unknown.   DX HIP/PELVIS/SPINE 11/19/2013 8:24 AM  HISTORY: Screening. History of fall.  IMPRESSION  IMPRESSION:  1. The T-score of the lumbar spine in the region of L1-L4 is -1.6.  This correlates with moderate osteopenia. If one looks at the L1  vertebral body alone the T score is -2.5 which correlates with  osteoporosis.  2. The T-score of the right femoral neck is -2.3. This correlates with  severe osteopenia.  3. The T-score of the left femoral neck is -2.2. This correlates with  severe osteopenia.  NOTE: With regards to the hips, the T-score for either the femoral  neck or the total hip is reported, whichever is worse.  JUAN ANTONIO SARMIENTO MD    Balance problem - no h/o CVA or other neurological problems. She feels her balance is better since the ankle has improved after surgery.    B12 deficiency - took liquid B12, 1/2 oz per day previously. Not at this time. Last B12 was normal Oct 2013.     Right Carpal tunnel - No further problems.     Obesity - diet and exercise discussed. Commended her losing weight.    Wt Readings from Last 5 Encounters:   07/07/17 222 lb (100.7 kg)   06/13/17 224 lb (101.6 kg)   06/12/17 224 lb (101.6 kg)   05/30/17 225 lb (102.1 kg)   04/06/17 221 lb (100.2 kg)     Surgical history:     Right ankle surgery   Hysterectomy 2008 due to abnormal PAP, ovaries still in place.    Preventive:        Immunization History   Administered Date(s)  Administered     Influenza (IIV3) 11/08/2012     Influenza Vaccine IM 3yrs+ 4 Valent IIV4 10/25/2013, 02/04/2015, 09/08/2016     TD (ADULT, 7+) 01/01/1988, 06/20/2000     TDAP Vaccine (Adacel) 11/08/2012     Lipids screen:     Recent Labs   Lab Test  08/21/15   1229  08/13/14   0743   CHOL  191  196   HDL  39*  47*   LDL  117  116   TRIG  175*  164*   CHOLHDLRATIO  4.9  4.2     Mammogram: up to date  PAP: n/a due to hysterectomy  Colonoscopy: 10/29/07 - repeat in 10 years. Had declines any further colonoscopy, not even FIT. But not she changed her mind - colonoscopy ordered.  Advanced Directive: has one at home - she will bring a copy.    ROS:     Const: No fevers or night sweats recently.   ENT: No runny nose, sore throat or ear pain.   Resp: No cough or shortness of breath.   CV: No chest pain, dizziness or cardiac palpitations.   GI: No nausea, vomiting or constipation. Denies blood in stools or black stools.   : No dysuria, frequency or hematuria.     SH:    Marital status:  - good relationship  Kids: one  Employment: administrative work  Exercise: biking and walking and swimming  Tobacco: no  Etoh: none  Recreational drugs: none  Caffeine: one diet coke per day    Exam:    /78  Pulse 74  Temp 98.6  F (37  C) (Oral)  Wt 222 lb (100.7 kg)  LMP 01/08/2008  SpO2 97%  BMI 40.6 kg/m2    Gen: Healthy appearing female in no acute distress  Neck: No enlarged lymph nodes, thyromegally or other masses.  Lungs: Good air movement and otherwise clear.  CV: Heart RRR with no murmurs. No JVD, carotid bruits. Trace ankle edema.    Assessment and Plan - Decision Making    1. Glucosuria  Per HPI  - Hemoglobin A1c  - Basic metabolic panel    2. Essential hypertension with goal blood pressure less than 140/90  Per HPI  - atenolol (TENORMIN) 50 MG tablet; Take 1 tablet (50 mg) by mouth daily  Dispense: 30 tablet; Refill: 5    3. Screen for colon cancer  Per HPI  - GASTROENTEROLOGY ADULT REF PROCEDURE  ONLY      Written instructions given as follows:    Patient Instructions   1. Increase the Atenolol to 50 mg daily.    2. I will contact you via My Chart.     3. Call [phone] to set up the colonoscopy.    4. If all is well, see you back in 6 months for a physical with fasting labs.

## 2017-07-07 NOTE — MR AVS SNAPSHOT
After Visit Summary   7/7/2017    Angela Ibarra    MRN: [id number]           Patient Information     Date Of Birth          1960        Visit Information        Provider Department      7/7/2017 8:50 AM Germán Jernigan MD [facility]        Today's Diagnoses     Glucosuria    -  1    Essential hypertension with goal blood pressure less than 140/90        Screen for colon cancer          Care Instructions    1. Increase the Atenolol to 50 mg daily.    2. I will contact you via My Chart.     3. Call [phone] to set up the colonoscopy.    4. If all is well, see you back in 6 months for a physical with fasting labs.          Follow-ups after your visit        Additional Services     GASTROENTEROLOGY ADULT REF PROCEDURE ONLY                 Your next 10 appointments already scheduled     Aug 29, 2017  8:00 AM CDT   Return Visit with Eddie Mack MD   [facility]    [address] [facility] [address] [address] [phone]              Who to contact     If you have questions or need follow up information about today's clinic visit or your schedule please contact [facility] directly at [phone].  Normal or non-critical lab and imaging results will be communicated to you by MyChart, letter or phone within 4 business days after the clinic has received the results. If you do not hear from us within 7 days, please contact the clinic through MyChart or phone. If you have a critical or abnormal lab result, we will notify you by phone as soon as possible.  Submit refill requests through [employer] or call your pharmacy and they will forward the refill request to us. Please allow 3 business days for your refill to be completed.          Additional Information About Your Visit        MyChart Information     [employer] lets you send messages to your doctor, view your test results, renew your prescriptions, schedule appointments  "and more. To sign up, go to [email or web address] . Click on \"Log in\" on the left side of the screen, which will take you to the Welcome page. Then click on \"Sign up Now\" on the right side of the page.     You will be asked to enter the access code listed below, as well as some personal information. Please follow the directions to create your username and password.     Your access code is: [email or web address]  Expires: 10/5/2017  9:15 AM     Your access code will  in 90 days. If you need help or a new code, please call your [facility] clinic or [phone].        Care EveryWhere ID     This is your Care EveryWhere ID. This could be used by other organizations to access your [facility] medical records  [id number]        Your Vitals Were     Pulse Temperature Last Period Pulse Oximetry BMI (Body Mass Index)       74 98.6  F (37  C) (Oral) 2008 97% 40.6 kg/m2        Blood Pressure from Last 3 Encounters:   17 136/78   17 135/70   17 136/73    Weight from Last 3 Encounters:   17 222 lb (100.7 kg)   17 224 lb (101.6 kg)   17 224 lb (101.6 kg)              We Performed the Following     Basic metabolic panel     GASTROENTEROLOGY ADULT REF PROCEDURE ONLY     Hemoglobin A1c          Today's Medication Changes          These changes are accurate as of: 17  9:15 AM.  If you have any questions, ask your nurse or doctor.               These medicines have changed or have updated prescriptions.        Dose/Directions    * atenolol 25 MG tablet   Commonly known as:  TENORMIN   This may have changed:  Another medication with the same name was added. Make sure you understand how and when to take each.   Used for:  Essential hypertension with goal blood pressure less than 140/90   Changed by:  Germán Jernigan MD        Dose:  25 mg   Take 1 tablet (25 mg) by mouth daily   Quantity:  30 tablet   Refills:  6       * atenolol 50 MG tablet   Commonly known as:  TENORMIN   This may have " changed:  You were already taking a medication with the same name, and this prescription was added. Make sure you understand how and when to take each.   Used for:  Essential hypertension with goal blood pressure less than 140/90   Changed by:  Germán Jernigan MD        Dose:  50 mg   Take 1 tablet (50 mg) by mouth daily   Quantity:  30 tablet   Refills:  5       * Notice:  This list has 2 medication(s) that are the same as other medications prescribed for you. Read the directions carefully, and ask your doctor or other care provider to review them with you.         Where to get your medicines      These medications were sent to [facility] [id number]  [place] [address] [address] [facility]  [address] [facility] [address]     Phone:  [phone]     atenolol 50 MG tablet                Primary Care Provider Office Phone # Fax #    Germán Jernigan [phone]       [facility] [address] [place] [address]        Equal Access to Services     ENOCH ARROYO AH: Hadii aad ku hadasho Soomaali, waaxda luqadaha, qaybta kaalmada adeegyada, waxay idiin hayaan adeeg kharaherbie la'polo . So [facility] [phone].    ATENCIÓN: Si habla español, tiene a lebron disposición servicios gratuitos de asistencia lingüística. [facility] [phone].    We comply with applicable federal civil rights laws and [place] laws. We do not discriminate on the basis of race, color, national origin, age, disability sex, sexual orientation or gender identity.            Thank you!     Thank you for choosing [facility]  for your care. Our goal is always to provide you with excellent care. Hearing back from our patients is one way we can continue to improve our services. Please take a few minutes to complete the written survey that you may receive in the mail after your visit with us. Thank you!             Your Updated Medication List - Protect others around you: Learn how to safely use, store  and throw away your medicines at www.disposemymeds.org.          This list is accurate as of: 7/7/17  9:15 AM.  Always use your most recent med list.                   Brand Name Dispense Instructions for use Diagnosis    ADVIL PO      Take  by mouth. prn        * atenolol 25 MG tablet    TENORMIN    30 tablet    Take 1 tablet (25 mg) by mouth daily    Essential hypertension with goal blood pressure less than 140/90       * atenolol 50 MG tablet    TENORMIN    30 tablet    Take 1 tablet (50 mg) by mouth daily    Essential hypertension with goal blood pressure less than 140/90       CALCIUM + D PO      1 tablet daily        calcium carbonate 500 MG chewable tablet    TUMS    180 chew tab    Take 1 tablet (500 mg) by mouth 3 times daily (with meals)    Osteoporosis, unspecified       tolterodine 4 MG 24 hr capsule    DETROL LA    30 capsule    Take 1 capsule (4 mg) by mouth daily    Dysuria       traZODone 50 MG tablet    DESYREL    60 tablet    Take 2 tablets (100 mg) by mouth nightly as needed for sleep    Insomnia, unspecified type       * Notice:  This list has 2 medication(s) that are the same as other medications prescribed for you. Read the directions carefully, and ask your doctor or other care provider to review them with you.

## 2017-07-07 NOTE — NURSING NOTE
"Chief Complaint   Patient presents with     RECHECK       Initial /81 (Cuff Size: Adult Large)  Pulse 74  Temp 98.6  F (37  C) (Oral)  Wt 222 lb (100.7 kg)  LMP 01/08/2008  SpO2 97%  BMI 40.6 kg/m2 Estimated body mass index is 40.6 kg/(m^2) as calculated from the following:    Height as of 6/12/17: 5' 2\" (1.575 m).    Weight as of this encounter: 222 lb (100.7 kg).  Medication Reconciliation: complete    Susanne Gotti LPN    "

## 2017-07-07 NOTE — PATIENT INSTRUCTIONS
1. Increase the Atenolol to 50 mg daily.    2. I will contact you via My Chart.     3. Call [phone] to set up the colonoscopy.    4. If all is well, see you back in 6 months for a physical with fasting labs.

## 2017-07-10 NOTE — PROGRESS NOTES
Clarissa Miller,    All results were fine. See you back in 6 months.    Regards,    Germán Jernigan M.D.

## 2017-07-16 ENCOUNTER — OFFICE VISIT (OUTPATIENT)
Dept: URGENT CARE | Facility: URGENT CARE | Age: 57
End: 2017-07-16
Payer: COMMERCIAL

## 2017-07-16 VITALS
TEMPERATURE: 98.2 F | HEART RATE: 74 BPM | RESPIRATION RATE: 15 BRPM | WEIGHT: 224 LBS | DIASTOLIC BLOOD PRESSURE: 80 MMHG | SYSTOLIC BLOOD PRESSURE: 156 MMHG | BODY MASS INDEX: 40.97 KG/M2 | OXYGEN SATURATION: 99 %

## 2017-07-16 DIAGNOSIS — R82.90 NONSPECIFIC FINDING ON EXAMINATION OF URINE: ICD-10-CM

## 2017-07-16 DIAGNOSIS — N30.01 ACUTE CYSTITIS WITH HEMATURIA: ICD-10-CM

## 2017-07-16 DIAGNOSIS — R30.0 DYSURIA: Primary | ICD-10-CM

## 2017-07-16 LAB
ALBUMIN UR-MCNC: ABNORMAL MG/DL
APPEARANCE UR: ABNORMAL
BACTERIA #/AREA URNS HPF: ABNORMAL /HPF
BILIRUB UR QL STRIP: NEGATIVE
COLOR UR AUTO: YELLOW
GLUCOSE UR STRIP-MCNC: NEGATIVE MG/DL
HGB UR QL STRIP: ABNORMAL
KETONES UR STRIP-MCNC: NEGATIVE MG/DL
LEUKOCYTE ESTERASE UR QL STRIP: ABNORMAL
NITRATE UR QL: NEGATIVE
NON-SQ EPI CELLS #/AREA URNS LPF: ABNORMAL /LPF
PH UR STRIP: 5.5 PH (ref 5–7)
RBC #/AREA URNS AUTO: ABNORMAL /HPF (ref 0–2)
SP GR UR STRIP: 1.01 (ref 1–1.03)
URN SPEC COLLECT METH UR: ABNORMAL
UROBILINOGEN UR STRIP-ACNC: 0.2 EU/DL (ref 0.2–1)
WBC #/AREA URNS AUTO: >100 /HPF (ref 0–2)

## 2017-07-16 PROCEDURE — 87186 SC STD MICRODIL/AGAR DIL: CPT | Performed by: FAMILY MEDICINE

## 2017-07-16 PROCEDURE — 81001 URINALYSIS AUTO W/SCOPE: CPT | Performed by: FAMILY MEDICINE

## 2017-07-16 PROCEDURE — 99213 OFFICE O/P EST LOW 20 MIN: CPT | Performed by: FAMILY MEDICINE

## 2017-07-16 PROCEDURE — 87086 URINE CULTURE/COLONY COUNT: CPT | Performed by: FAMILY MEDICINE

## 2017-07-16 PROCEDURE — 87088 URINE BACTERIA CULTURE: CPT | Performed by: FAMILY MEDICINE

## 2017-07-16 RX ORDER — NITROFURANTOIN 25; 75 MG/1; MG/1
100 CAPSULE ORAL 2 TIMES DAILY
Qty: 14 CAPSULE | Refills: 0 | Status: SHIPPED | OUTPATIENT
Start: 2017-07-16 | End: 2017-08-29

## 2017-07-16 NOTE — PROGRESS NOTES
SUBJECTIVE:                                                    Angela Ibarra is a 56 year old female who presents to clinic today for the following health issues:      URINARY TRACT SYMPTOMS      Duration: 1 day    Description  dysuria, frequency and odor    Intensity:  moderate    Accompanying signs and symptoms:  Fever/chills: no   Flank pain no   Nausea and vomiting: no   Vaginal symptoms: none  Abdominal/Pelvic Pain: YES    History  History of frequent UTI's: YES  History of kidney stones: YES- [id number]  Sexually Active: YES  Possibility of pregnancy: Yes    Precipitating or alleviating factors: None    Therapies tried and outcome: none   Outcome: none      Problem list and histories reviewed & adjusted, as indicated.  Additional history: as documented    Patient Active Problem List   Diagnosis     Obesity     NONSPECIFIC COLITIS     Migraine headache     Stress incontinence     Balance disorder     Right leg weakness     Right carpal tunnel syndrome     Osteoporosis     Loose body in ankle and foot joint     Synovitis of ankle     Malunion, fracture     Pain in joint involving ankle and foot     Abnormal gait     Post-proc states NEC     Chondromalacia, left ankle and joints of left foot     CARDIOVASCULAR SCREENING; LDL GOAL LESS THAN 130     Advanced directives, counseling/discussion     Insomnia, unspecified type     Major depressive disorder, recurrent episode, mild (H)     Essential hypertension with goal blood pressure less than 140/90     Past Surgical History:   Procedure Laterality Date     ARTHROSCOPY ANKLE  2014    Procedure: ARTHROSCOPY ANKLE;  Surgeon: Shaun Bhatt DPM;  Location: [facility]     C  DELIVERY ONLY      , Low Cervical     C GASTRIC BYPASS,OBESITY,W/SM BOWEL RECONS  10/99     C LIGATE FALLOPIAN TUBE  2000    laparoscopy     HYSTERECTOMY, PAP NO LONGER INDICATED  2008     LAPAROSCOPIC CHOLECYSTECTOMY  8/3/2012    Procedure: LAPAROSCOPIC  CHOLECYSTECTOMY;  Laparoscopic Cholecystectomy ;  Surgeon: Corwin Cabezas MD;  Location: [facility]     OPEN REDUCTION INTERNAL FIXATION ANKLE  4/22/2014    Procedure: OPEN REDUCTION INTERNAL FIXATION ANKLE;  Surgeon: Shaun Bhatt DPM;  Location: [facility]     OPEN REDUCTION INTERNAL FIXATION ELBOW  10/15/2013    Procedure: OPEN REDUCTION INTERNAL FIXATION ELBOW;  OPEN REDUCTION INTERNAL FIXATION LEFT PROXIMAL ULNA;  Surgeon: Artem Last DO;  Location: [facility]     ORTHOPEDIC SURGERY      left shoulder surgery     REMOVE MESH VAGINA  10/10/2011    Procedure:REMOVE MESH VAGINA; Excision of Vaginal Mesh; Surgeon:SERGE SALGADO; Location:[facility]     SURGICAL HISTORY OF -   4/20/10    Transobturator midurethral sling     SURGICAL HISTORY OF - 8/1999    exploratory laparotomy, colitis     SURGICAL HISTORY OF -   4/20/10    Transobturator midurethral sling     TUBAL LIGATION         Social History   Substance Use Topics     Smoking status: Never Smoker     Smokeless tobacco: Never Used     Alcohol use Yes      Comment: occas     Family History   Problem Relation Age of Onset     C.A.D. Mother      MI     Hypertension Mother      Hypertension Father      Breast Cancer No family hx of          Current Outpatient Prescriptions   Medication Sig Dispense Refill     atenolol (TENORMIN) 50 MG tablet Take 1 tablet (50 mg) by mouth daily 30 tablet 5     tolterodine (DETROL LA) 4 MG 24 hr capsule Take 1 capsule (4 mg) by mouth daily 30 capsule 1     traZODone (DESYREL) 50 MG tablet Take 2 tablets (100 mg) by mouth nightly as needed for sleep 60 tablet 6     calcium carbonate (TUMS) 500 MG chewable tablet Take 1 tablet (500 mg) by mouth 3 times daily (with meals) 180 chew tab 3     Ibuprofen (ADVIL PO) Take  by mouth. prn       CALCIUM + D OR 1 tablet daily       atenolol (TENORMIN) 25 MG tablet Take 1 tablet (25 mg) by mouth daily (Patient not taking: Reported on 7/16/2017) 30 tablet 6     Allergies   Allergen  Reactions     Bactrim [Sulfamethoxazole W/Trimethoprim]      itching     No Known Allergies      Recent Labs   Lab Test  07/07/17   0922  03/31/17   0816  08/21/15   1229  08/13/14   0743   10/25/13   1236   07/16/13   0757   07/30/12   1637  07/23/12   1615   A1C  5.9   --    --    --    --    --    --    --    --    --    --    LDL   --   101*  117  116   --    --    --   117   < >   --    --    HDL   --   48*  39*  47*   --    --    --   36*   < >   --    --    TRIG   --   142  175*  164*   --    --    --   145   < >   --    --    ALT   --    --    --    --    --    --    --   17   --   10  10   CR  0.93  0.96  0.94  1.07*   < >   --    --   0.86   --   0.94  0.89   GFRESTIMATED  63  60*  62  53*   < >   --    --   69   --   63  67   GFRESTBLACK  76  73  75  65   < >   --    --   84   --   76  81   POTASSIUM  4.5  4.2  4.4  4.3   --    --    < >  4.3   --   4.3  4.3   TSH   --    --    --    --    --   1.80   --    --    --    --    --     < > = values in this interval not displayed.      BP Readings from Last 3 Encounters:   07/16/17 156/80   07/07/17 136/78   06/27/17 135/70    Wt Readings from Last 3 Encounters:   07/16/17 224 lb (101.6 kg)   07/07/17 222 lb (100.7 kg)   06/13/17 224 lb (101.6 kg)                  Labs reviewed in EPIC    ROS:  Constitutional, HEENT, cardiovascular, pulmonary, gi and gu systems are negative, except as otherwise noted.    OBJECTIVE:     /80  Pulse 74  Temp 98.2  F (36.8  C)  Resp 15  Wt 224 lb (101.6 kg)  LMP 01/08/2008  SpO2 99%  BMI 40.97 kg/m2  Body mass index is 40.97 kg/(m^2).  GENERAL: alert, no distress and obese  NECK: no adenopathy, no asymmetry, masses, or scars and thyroid normal to palpation  RESP: lungs clear to auscultation - no rales, rhonchi or wheezes  CV: regular rate and rhythm, normal S1 S2, no S3 or S4, no murmur, click or rub, no peripheral edema and peripheral pulses strong  ABDOMEN: soft, nontender, no hepatosplenomegaly, no masses and bowel  sounds normal, no CVA tenderness   MS: no gross musculoskeletal defects noted, no edema    Diagnostic Test Results:  Results for orders placed or performed in visit on 07/16/17 (from the past 24 hour(s))   *UA reflex to Microscopic and Culture ([place] [facility] (except [place] and [place])   Result Value Ref Range    Color Urine Yellow     Appearance Urine Cloudy     Glucose Urine Negative NEG mg/dL    Bilirubin Urine Negative NEG    Ketones Urine Negative NEG mg/dL    Specific Gravity Urine 1.010 1.003 - 1.035    Blood Urine Large (A) NEG    pH Urine 5.5 5.0 - 7.0 pH    Protein Albumin Urine Trace (A) NEG mg/dL    Urobilinogen Urine 0.2 0.2 - 1.0 EU/dL    Nitrite Urine Negative NEG    Leukocyte Esterase Urine Large (A) NEG    Source Midstream Urine    Urine Microscopic   Result Value Ref Range    WBC Urine >100 (A) 0 - 2 /HPF    RBC Urine 10-25 (A) 0 - 2 /HPF    Squamous Epithelial /LPF Urine Few FEW /LPF    Bacteria Urine Moderate (A) NEG /HPF       ASSESSMENT/PLAN:           ICD-10-CM    1. Dysuria R30.0 *UA reflex to Microscopic and Culture ([place] [facility] (except [place] and [place])     Urine Microscopic   2. Nonspecific finding on examination of urine R82.90 Urine Culture Aerobic Bacterial   3. Acute cystitis with hematuria N30.01 nitroFURantoin, macrocrystal-monohydrate, (MACROBID) 100 MG capsule       Discussed in detail differentials and further management. Symptoms are likely secondary to acute cystitis, recurrent. Macrobid ordered, common side effects discussed. Recommended well hydration, azo and  over-the-counter analgesia. Has an appointment with urology in 2 weeks. Written instructions/information provided. Patient understood and in agreement with the above plan. All questions are answered. Follow-up if symptoms persist or worsen.      MEDICATIONS:   Orders Placed This Encounter   Medications     nitroFURantoin, macrocrystal-monohydrate, (MACROBID) 100 MG capsule      "Sig: Take 1 capsule (100 mg) by mouth 2 times daily     Dispense:  14 capsule     Refill:  0       Patient Instructions      * BLADDER INFECTION,Female (Adult)    A bladder infection (\"cystitis\" or \"UTI\") usually causes a constant urge to urinate and a burning when passing urine. Urine may be cloudy, smelly or dark. There may be pain in the lower abdomen. A bladder infection occurs when bacteria from the vaginal area enter the bladder opening (urethra). This can occur from sexual intercourse, wearing tight clothing, dehydration and other factors.  HOME CARE:  1. Drink lots of fluids (at least 6-8 glasses a day, unless you must restrict fluids for other medical reasons). This will force the medicine into your urinary system and flush the bacteria out of your body. Cranberry juice has been shown to help clear out the bacteria.  2. Avoid sexual intercourse until your symptoms are gone.  3. A bladder infection is treated with antibiotics. You may also be given Pyridium (generic = phenazopyridine) to reduce the burning sensation. This medicine will cause your urine to become a bright orange color. The orange urine may stain clothing. You may wear a pad or panty-liner to protect clothing.  PREVENTING FUTURE INFECTIONS:  1. Always wipe from front to back after a bowel movement.  2. Keep the genital area clean and dry.  3. Drink plenty of fluids each day to avoid dehydration.  4. Urinate right after intercourse to flush out the bladder.  5. Wear cotton underwear and cotton-lined panty hose; avoid tight-fitting pants.  6. If you are on birth control pills and are having frequent bladder infections, discuss with your doctor.  FOLLOW UP: Return to this facility or see your doctor if ALL symptoms are not gone after three days of treatment.  GET PROMPT MEDICAL ATTENTION if any of the following occur:    Fever over 101 F (38.3 C)    No improvement by the third day of treatment    Increasing back or abdominal pain    Repeated " vomiting; unable to keep medicine down    Weakness, dizziness or fainting    Vaginal discharge    Pain, redness or swelling in the labia (outer vaginal area)    4819-5604 [employer], [address] [place] [address], [place] [address]. All rights reserved. This information is not intended as a substitute for professional medical care. Always follow your healthcare professional's instructions.    Patient Education    Nitrofurantoin Oral suspension    Nitrofurantoin Oral tablet    Nitrofurantoin, Macrocrystalline Oral capsule    Nitrofurantoin, Nitrofurantoin, Macrocrystalline Oral capsule  Nitrofurantoin Oral tablet  What is this medicine?  NITROFURANTOIN (jazz troe fyoor AN toyn) is an antibiotic. It is used to treat urinary tract infections.  This medicine may be used for other purposes; ask your health care provider or pharmacist if you have questions.  What should I tell my health care provider before I take this medicine?  They need to know if you have any of these conditions:    anemia    diabetes    glucose-6-phosphate dehydrogenase deficiency    kidney disease    liver disease    lung disease    other chronic illness    an unusual or allergic reaction to nitrofurantoin, other antibiotics, other medicines, foods, dyes or preservatives    pregnant or trying to get pregnant    breast-feeding  How should I use this medicine?  Take this medicine by mouth with a glass of water. Follow the directions on the prescription label. Take this medicine with food or milk. Take your doses at regular intervals. Do not take your medicine more often than directed. Do not stop taking except on your doctor's advice.  Talk to your pediatrician regarding the use of this medicine in children. While this drug may be prescribed for selected conditions, precautions do apply.  Overdosage: If you think you have taken too much of this medicine contact a poison control center or emergency room at once.  NOTE: This medicine is only for you.  Do not share this medicine with others.  What if I miss a dose?  If you miss a dose, take it as soon as you can. If it is almost time for your next dose, take only that dose. Do not take double or extra doses.  What may interact with this medicine?    antacids containing magnesium trisilicate    probenecid    quinolone antibiotics like ciprofloxacin, lomefloxacin, norfloxacin and ofloxacin    sulfinpyrazone  This list may not describe all possible interactions. Give your health care provider a list of all the medicines, herbs, non-prescription drugs, or dietary supplements you use. Also tell them if you smoke, drink alcohol, or use illegal drugs. Some items may interact with your medicine.  What should I watch for while using this medicine?  Tell your doctor or health care professional if your symptoms do not improve or if you get new symptoms. Drink several glasses of water a day. If you are taking this medicine for a long time, visit your doctor for regular checks on your progress.  If you are diabetic, you may get a false positive result for sugar in your urine with certain brands of urine tests. Check with your doctor.  What side effects may I notice from receiving this medicine?  Side effects that you should report to your doctor or health care professional as soon as possible:    allergic reactions like skin rash or hives, swelling of the face, lips, or tongue    chest pain    cough    difficulty breathing    dizziness, drowsiness    fever or infection    joint aches or pains    pale or blue-tinted skin    redness, blistering, peeling or loosening of the skin, including inside the mouth    tingling, burning, pain, or numbness in hands or feet    unusual bleeding or bruising    unusually weak or tired    yellowing of eyes or skin  Side effects that usually do not require medical attention (report to your doctor or health care professional if they continue or are bothersome):    dark  urine    diarrhea    headache    loss of appetite    nausea or vomiting    temporary hair loss  This list may not describe all possible side effects. Call your doctor for medical advice about side effects. You may report side effects to FDA at 5-306-XDX-8727.  Where should I keep my medicine?  Keep out of the reach of children.  Store at room temperature between 15 and 30 degrees C (59 and 86 degrees F). Protect from light. Throw away any unused medicine after the expiration date.  NOTE:This sheet is a summary. It may not cover all possible information. If you have questions about this medicine, talk to your doctor, pharmacist, or health care provider. Copyright  2016 [employer]            Marty Suazo MD  [facility]

## 2017-07-16 NOTE — PATIENT INSTRUCTIONS
"   * BLADDER INFECTION,Female (Adult)    A bladder infection (\"cystitis\" or \"UTI\") usually causes a constant urge to urinate and a burning when passing urine. Urine may be cloudy, smelly or dark. There may be pain in the lower abdomen. A bladder infection occurs when bacteria from the vaginal area enter the bladder opening (urethra). This can occur from sexual intercourse, wearing tight clothing, dehydration and other factors.  HOME CARE:  1. Drink lots of fluids (at least 6-8 glasses a day, unless you must restrict fluids for other medical reasons). This will force the medicine into your urinary system and flush the bacteria out of your body. Cranberry juice has been shown to help clear out the bacteria.  2. Avoid sexual intercourse until your symptoms are gone.  3. A bladder infection is treated with antibiotics. You may also be given Pyridium (generic = phenazopyridine) to reduce the burning sensation. This medicine will cause your urine to become a bright orange color. The orange urine may stain clothing. You may wear a pad or panty-liner to protect clothing.  PREVENTING FUTURE INFECTIONS:  1. Always wipe from front to back after a bowel movement.  2. Keep the genital area clean and dry.  3. Drink plenty of fluids each day to avoid dehydration.  4. Urinate right after intercourse to flush out the bladder.  5. Wear cotton underwear and cotton-lined panty hose; avoid tight-fitting pants.  6. If you are on birth control pills and are having frequent bladder infections, discuss with your doctor.  FOLLOW UP: Return to this facility or see your doctor if ALL symptoms are not gone after three days of treatment.  GET PROMPT MEDICAL ATTENTION if any of the following occur:    Fever over 101 F (38.3 C)    No improvement by the third day of treatment    Increasing back or abdominal pain    Repeated vomiting; unable to keep medicine down    Weakness, dizziness or fainting    Vaginal discharge    Pain, redness or swelling in " the labia (outer vaginal area)    0071-4719 [employer], [address] [place] [address], [place] [address]. All rights reserved. This information is not intended as a substitute for professional medical care. Always follow your healthcare professional's instructions.    Patient Education    Nitrofurantoin Oral suspension    Nitrofurantoin Oral tablet    Nitrofurantoin, Macrocrystalline Oral capsule    Nitrofurantoin, Nitrofurantoin, Macrocrystalline Oral capsule  Nitrofurantoin Oral tablet  What is this medicine?  NITROFURANTOIN (jazz trosophie oro AN toyn) is an antibiotic. It is used to treat urinary tract infections.  This medicine may be used for other purposes; ask your health care provider or pharmacist if you have questions.  What should I tell my health care provider before I take this medicine?  They need to know if you have any of these conditions:    anemia    diabetes    glucose-6-phosphate dehydrogenase deficiency    kidney disease    liver disease    lung disease    other chronic illness    an unusual or allergic reaction to nitrofurantoin, other antibiotics, other medicines, foods, dyes or preservatives    pregnant or trying to get pregnant    breast-feeding  How should I use this medicine?  Take this medicine by mouth with a glass of water. Follow the directions on the prescription label. Take this medicine with food or milk. Take your doses at regular intervals. Do not take your medicine more often than directed. Do not stop taking except on your doctor's advice.  Talk to your pediatrician regarding the use of this medicine in children. While this drug may be prescribed for selected conditions, precautions do apply.  Overdosage: If you think you have taken too much of this medicine contact a poison control center or emergency room at once.  NOTE: This medicine is only for you. Do not share this medicine with others.  What if I miss a dose?  If you miss a dose, take it as soon as you can. If it is  almost time for your next dose, take only that dose. Do not take double or extra doses.  What may interact with this medicine?    antacids containing magnesium trisilicate    probenecid    quinolone antibiotics like ciprofloxacin, lomefloxacin, norfloxacin and ofloxacin    sulfinpyrazone  This list may not describe all possible interactions. Give your health care provider a list of all the medicines, herbs, non-prescription drugs, or dietary supplements you use. Also tell them if you smoke, drink alcohol, or use illegal drugs. Some items may interact with your medicine.  What should I watch for while using this medicine?  Tell your doctor or health care professional if your symptoms do not improve or if you get new symptoms. Drink several glasses of water a day. If you are taking this medicine for a long time, visit your doctor for regular checks on your progress.  If you are diabetic, you may get a false positive result for sugar in your urine with certain brands of urine tests. Check with your doctor.  What side effects may I notice from receiving this medicine?  Side effects that you should report to your doctor or health care professional as soon as possible:    allergic reactions like skin rash or hives, swelling of the face, lips, or tongue    chest pain    cough    difficulty breathing    dizziness, drowsiness    fever or infection    joint aches or pains    pale or blue-tinted skin    redness, blistering, peeling or loosening of the skin, including inside the mouth    tingling, burning, pain, or numbness in hands or feet    unusual bleeding or bruising    unusually weak or tired    yellowing of eyes or skin  Side effects that usually do not require medical attention (report to your doctor or health care professional if they continue or are bothersome):    dark urine    diarrhea    headache    loss of appetite    nausea or vomiting    temporary hair loss  This list may not describe all possible side effects.  Call your doctor for medical advice about side effects. You may report side effects to FDA at 0-533-OOY-6026.  Where should I keep my medicine?  Keep out of the reach of children.  Store at room temperature between 15 and 30 degrees C (59 and 86 degrees F). Protect from light. Throw away any unused medicine after the expiration date.  NOTE:This sheet is a summary. It may not cover all possible information. If you have questions about this medicine, talk to your doctor, pharmacist, or health care provider. Copyright  2016 [employer]

## 2017-07-16 NOTE — NURSING NOTE
"Chief Complaint   Patient presents with     UTI     x1 day       Initial /80  Pulse 74  Temp 98.2  F (36.8  C)  Resp 15  Wt 224 lb (101.6 kg)  LMP 01/08/2008  SpO2 99%  BMI 40.97 kg/m2 Estimated body mass index is 40.97 kg/(m^2) as calculated from the following:    Height as of 6/12/17: 5' 2\" (1.575 m).    Weight as of this encounter: 224 lb (101.6 kg).  Medication Reconciliation: complete     Eliz Rao. MA      "

## 2017-07-16 NOTE — MR AVS SNAPSHOT
"              After Visit Summary   7/16/2017    Angela Ibarra    MRN: [id number]           Patient Information     Date Of Birth          1960        Visit Information        Provider Department      7/16/2017 10:10 AM Marty Suazo MD [facility]        Today's Diagnoses     Dysuria    -  1    Nonspecific finding on examination of urine        Acute cystitis with hematuria          Care Instructions       * BLADDER INFECTION,Female (Adult)    A bladder infection (\"cystitis\" or \"UTI\") usually causes a constant urge to urinate and a burning when passing urine. Urine may be cloudy, smelly or dark. There may be pain in the lower abdomen. A bladder infection occurs when bacteria from the vaginal area enter the bladder opening (urethra). This can occur from sexual intercourse, wearing tight clothing, dehydration and other factors.  HOME CARE:  1. Drink lots of fluids (at least 6-8 glasses a day, unless you must restrict fluids for other medical reasons). This will force the medicine into your urinary system and flush the bacteria out of your body. Cranberry juice has been shown to help clear out the bacteria.  2. Avoid sexual intercourse until your symptoms are gone.  3. A bladder infection is treated with antibiotics. You may also be given Pyridium (generic = phenazopyridine) to reduce the burning sensation. This medicine will cause your urine to become a bright orange color. The orange urine may stain clothing. You may wear a pad or panty-liner to protect clothing.  PREVENTING FUTURE INFECTIONS:  1. Always wipe from front to back after a bowel movement.  2. Keep the genital area clean and dry.  3. Drink plenty of fluids each day to avoid dehydration.  4. Urinate right after intercourse to flush out the bladder.  5. Wear cotton underwear and cotton-lined panty hose; avoid tight-fitting pants.  6. If you are on birth control pills and are having frequent bladder infections, discuss with your " doctor.  FOLLOW UP: Return to this facility or see your doctor if ALL symptoms are not gone after three days of treatment.  GET PROMPT MEDICAL ATTENTION if any of the following occur:    Fever over 101 F (38.3 C)    No improvement by the third day of treatment    Increasing back or abdominal pain    Repeated vomiting; unable to keep medicine down    Weakness, dizziness or fainting    Vaginal discharge    Pain, redness or swelling in the labia (outer vaginal area)    7937-9374 [employer], [address] [place] [address], [place] [address]. All rights reserved. This information is not intended as a substitute for professional medical care. Always follow your healthcare professional's instructions.    Patient Education    Nitrofurantoin Oral suspension    Nitrofurantoin Oral tablet    Nitrofurantoin, Macrocrystalline Oral capsule    Nitrofurantoin, Nitrofurantoin, Macrocrystalline Oral capsule  Nitrofurantoin Oral tablet  What is this medicine?  NITROFURANTOIN (jazz troe fyoor AN toyn) is an antibiotic. It is used to treat urinary tract infections.  This medicine may be used for other purposes; ask your health care provider or pharmacist if you have questions.  What should I tell my health care provider before I take this medicine?  They need to know if you have any of these conditions:    anemia    diabetes    glucose-6-phosphate dehydrogenase deficiency    kidney disease    liver disease    lung disease    other chronic illness    an unusual or allergic reaction to nitrofurantoin, other antibiotics, other medicines, foods, dyes or preservatives    pregnant or trying to get pregnant    breast-feeding  How should I use this medicine?  Take this medicine by mouth with a glass of water. Follow the directions on the prescription label. Take this medicine with food or milk. Take your doses at regular intervals. Do not take your medicine more often than directed. Do not stop taking except on your doctor's advice.  Talk to  your pediatrician regarding the use of this medicine in children. While this drug may be prescribed for selected conditions, precautions do apply.  Overdosage: If you think you have taken too much of this medicine contact a poison control center or emergency room at once.  NOTE: This medicine is only for you. Do not share this medicine with others.  What if I miss a dose?  If you miss a dose, take it as soon as you can. If it is almost time for your next dose, take only that dose. Do not take double or extra doses.  What may interact with this medicine?    antacids containing magnesium trisilicate    probenecid    quinolone antibiotics like ciprofloxacin, lomefloxacin, norfloxacin and ofloxacin    sulfinpyrazone  This list may not describe all possible interactions. Give your health care provider a list of all the medicines, herbs, non-prescription drugs, or dietary supplements you use. Also tell them if you smoke, drink alcohol, or use illegal drugs. Some items may interact with your medicine.  What should I watch for while using this medicine?  Tell your doctor or health care professional if your symptoms do not improve or if you get new symptoms. Drink several glasses of water a day. If you are taking this medicine for a long time, visit your doctor for regular checks on your progress.  If you are diabetic, you may get a false positive result for sugar in your urine with certain brands of urine tests. Check with your doctor.  What side effects may I notice from receiving this medicine?  Side effects that you should report to your doctor or health care professional as soon as possible:    allergic reactions like skin rash or hives, swelling of the face, lips, or tongue    chest pain    cough    difficulty breathing    dizziness, drowsiness    fever or infection    joint aches or pains    pale or blue-tinted skin    redness, blistering, peeling or loosening of the skin, including inside the mouth    tingling,  burning, pain, or numbness in hands or feet    unusual bleeding or bruising    unusually weak or tired    yellowing of eyes or skin  Side effects that usually do not require medical attention (report to your doctor or health care professional if they continue or are bothersome):    dark urine    diarrhea    headache    loss of appetite    nausea or vomiting    temporary hair loss  This list may not describe all possible side effects. Call your doctor for medical advice about side effects. You may report side effects to FDA at 7-848-GJK-2827.  Where should I keep my medicine?  Keep out of the reach of children.  Store at room temperature between 15 and 30 degrees C (59 and 86 degrees F). Protect from light. Throw away any unused medicine after the expiration date.  NOTE:This sheet is a summary. It may not cover all possible information. If you have questions about this medicine, talk to your doctor, pharmacist, or health care provider. Copyright  2016 [employer]                Follow-ups after your visit        Your next 10 appointments already scheduled     Aug 29, 2017  8:00 AM CDT   [facility]    [address] [facility] [address] [phone]              Who to contact     If you have questions or need follow up information about today's clinic visit or your schedule please contact [facility] directly at [phone].  Normal or non-critical lab and imaging results will be communicated to you by MyChart, letter or phone within 4 business days after the clinic has received the results. If you do not hear from us within 7 days, please contact the clinic through MyChart or phone. If you have a critical or abnormal lab result, we will notify you by phone as soon as possible.  Submit refill requests through [employer] or call your pharmacy and they will forward the refill request to us. Please allow 3  business days for your refill to be completed.          Additional Information About Your Visit        MyChart Information     [employer] gives you secure access to your electronic health record. If you see a primary care provider, you can also send messages to your care team and make appointments. If you have questions, please call your primary care clinic.  If you do not have a primary care provider, please call [phone] and they will assist you.        Care EveryWhere ID     This is your Care EveryWhere ID. This could be used by other organizations to access your [place] medical records  [id number]        Your Vitals Were     Pulse Temperature Respirations Last Period Pulse Oximetry BMI (Body Mass Index)    74 98.2  F (36.8  C) 15 01/08/2008 99% 40.97 kg/m2       Blood Pressure from Last 3 Encounters:   07/16/17 156/80   07/07/17 136/78   06/27/17 135/70    Weight from Last 3 Encounters:   07/16/17 224 lb (101.6 kg)   07/07/17 222 lb (100.7 kg)   06/13/17 224 lb (101.6 kg)              We Performed the Following     *UA reflex to Microscopic and Culture ([facility] (except [place] and [place])     Urine Culture Aerobic Bacterial     Urine Microscopic          Today's Medication Changes          These changes are accurate as of: 7/16/17 10:28 AM.  If you have any questions, ask your nurse or doctor.               Start taking these medicines.        Dose/Directions    nitroFURantoin (macrocrystal-monohydrate) 100 MG capsule   Commonly known as:  MACROBID   Used for:  Acute cystitis with hematuria   Started by:  Marty Suazo MD        Dose:  100 mg   Take 1 capsule (100 mg) by mouth 2 times daily   Quantity:  14 capsule   Refills:  0            Where to get your medicines      These medications were sent to [facility] [id number]  [place], [place] - [address] [address]  [address] [address] [facility] [address]     Phone:  [phone]     nitroFURantoin (macrocrystal-monohydrate)  100 MG capsule                Primary Care Provider Office Phone # Fax #    Germán Jernigan [phone]       [facility] [address] [facility] [address]        Equal Access to Services     ENOCH ARROYO : Haddominick max moreno ediliao Soraymondali, waaxda luqadaha, qaybta kaalmada adeegyada, waxrain idiin hayaan bell johnson josefina sandoval. So [facility] [phone].    ATENCIÓN: Si habla español, tiene a lebron disposición servicios gratuitos de asistencia lingüística. Llame al [phone].    We comply with applicable federal civil rights laws and [place] laws. We do not discriminate on the basis of race, color, national origin, age, disability sex, sexual orientation or gender identity.            Thank you!     Thank you for choosing [facility]  for your care. Our goal is always to provide you with excellent care. Hearing back from our patients is one way we can continue to improve our services. Please take a few minutes to complete the written survey that you may receive in the mail after your visit with us. Thank you!             Your Updated Medication List - Protect others around you: Learn how to safely use, store and throw away your medicines at www.disposemymeds.org.          This list is accurate as of: 7/16/17 10:28 AM.  Always use your most recent med list.                   Brand Name Dispense Instructions for use Diagnosis    ADVIL PO      Take  by mouth. prn        * atenolol 25 MG tablet    TENORMIN    30 tablet    Take 1 tablet (25 mg) by mouth daily    Essential hypertension with goal blood pressure less than 140/90       * atenolol 50 MG tablet    TENORMIN    30 tablet    Take 1 tablet (50 mg) by mouth daily    Essential hypertension with goal blood pressure less than 140/90       CALCIUM + D PO      1 tablet daily        calcium carbonate 500 MG chewable tablet    TUMS    180 chew tab    Take 1 tablet (500 mg) by mouth 3 times daily (with meals)    Osteoporosis,  unspecified       nitroFURantoin (macrocrystal-monohydrate) 100 MG capsule    MACROBID    14 capsule    Take 1 capsule (100 mg) by mouth 2 times daily    Acute cystitis with hematuria       tolterodine 4 MG 24 hr capsule    DETROL LA    30 capsule    Take 1 capsule (4 mg) by mouth daily    Dysuria       traZODone 50 MG tablet    DESYREL    60 tablet    Take 2 tablets (100 mg) by mouth nightly as needed for sleep    Insomnia, unspecified type       * Notice:  This list has 2 medication(s) that are the same as other medications prescribed for you. Read the directions carefully, and ask your doctor or other care provider to review them with you.

## 2017-07-24 ENCOUNTER — OFFICE VISIT (OUTPATIENT)
Dept: URGENT CARE | Facility: URGENT CARE | Age: 57
End: 2017-07-24
Payer: COMMERCIAL

## 2017-07-24 VITALS
HEART RATE: 65 BPM | TEMPERATURE: 98.7 F | SYSTOLIC BLOOD PRESSURE: 127 MMHG | DIASTOLIC BLOOD PRESSURE: 79 MMHG | OXYGEN SATURATION: 97 %

## 2017-07-24 DIAGNOSIS — Z20.7 SCABIES EXPOSURE: Primary | ICD-10-CM

## 2017-07-24 DIAGNOSIS — L29.9 ITCHING: ICD-10-CM

## 2017-07-24 PROCEDURE — 99213 OFFICE O/P EST LOW 20 MIN: CPT | Performed by: FAMILY MEDICINE

## 2017-07-24 RX ORDER — LORATADINE 10 MG/1
10 TABLET ORAL DAILY
Qty: 10 TABLET | Refills: 1 | Status: SHIPPED | OUTPATIENT
Start: 2017-07-24 | End: 2017-08-29

## 2017-07-24 RX ORDER — PREDNISONE 50 MG/1
50 TABLET ORAL DAILY
Qty: 3 TABLET | Refills: 1 | Status: SHIPPED | OUTPATIENT
Start: 2017-07-24 | End: 2017-07-24

## 2017-07-24 RX ORDER — PERMETHRIN 50 MG/G
CREAM TOPICAL
Qty: 60 G | Refills: 1 | Status: SHIPPED | OUTPATIENT
Start: 2017-07-24 | End: 2017-07-24

## 2017-07-24 RX ORDER — PERMETHRIN 50 MG/G
CREAM TOPICAL
Qty: 60 G | Refills: 1 | Status: SHIPPED | OUTPATIENT
Start: 2017-07-24 | End: 2017-08-29

## 2017-07-24 RX ORDER — LORATADINE 10 MG/1
10 TABLET ORAL DAILY
Qty: 10 TABLET | Refills: 1 | Status: SHIPPED | OUTPATIENT
Start: 2017-07-24 | End: 2017-07-24

## 2017-07-24 RX ORDER — PREDNISONE 50 MG/1
50 TABLET ORAL DAILY
Qty: 3 TABLET | Refills: 1 | Status: SHIPPED | OUTPATIENT
Start: 2017-07-24 | End: 2017-08-29

## 2017-07-24 ASSESSMENT — PAIN SCALES - GENERAL: PAINLEVEL: NO PAIN (0)

## 2017-07-24 NOTE — MR AVS SNAPSHOT
After Visit Summary   7/24/2017    Angela Ibarra    MRN: [id number]           Patient Information     Date Of Birth          1960        Visit Information        Provider Department      7/24/2017 12:30 PM Balaji Locke MD [facility]        Today's Diagnoses     Scabies exposure    -  1    Itching           Follow-ups after your visit        Your next 10 appointments already scheduled     Aug 29, 2017  9:00 AM CDT   Return Visit with Eddie Mack MD   [facility]    [address] [facility] [address]   [phone]              Who to contact     If you have questions or need follow up information about today's clinic visit or your schedule please contact [facility] directly at [phone].  Normal or non-critical lab and imaging results will be communicated to you by MyChart, letter or phone within 4 business days after the clinic has received the results. If you do not hear from us within 7 days, please contact the clinic through MyChart or phone. If you have a critical or abnormal lab result, we will notify you by phone as soon as possible.  Submit refill requests through [employer] or call your pharmacy and they will forward the refill request to us. Please allow 3 business days for your refill to be completed.          Additional Information About Your Visit        MyChart Information     [employer] gives you secure access to your electronic health record. If you see a primary care provider, you can also send messages to your care team and make appointments. If you have questions, please call your primary care clinic.  If you do not have a primary care provider, please call [phone] and they will assist you.        Care EveryWhere ID     This is your Care EveryWhere ID. This could be used by other organizations to access your [place] medical records  [id number]        Your Vitals  Were     Pulse Temperature Last Period Pulse Oximetry Breastfeeding?       65 98.7  F (37.1  C) (Oral) 01/08/2008 97% No        Blood Pressure from Last 3 Encounters:   07/24/17 127/79   07/16/17 156/80   07/07/17 136/78    Weight from Last 3 Encounters:   07/16/17 224 lb (101.6 kg)   07/07/17 222 lb (100.7 kg)   06/13/17 224 lb (101.6 kg)              Today, you had the following     No orders found for display         Today's Medication Changes          These changes are accurate as of: 7/24/17  1:08 PM.  If you have any questions, ask your nurse or doctor.               Start taking these medicines.        Dose/Directions    loratadine 10 MG tablet   Commonly known as:  CLARITIN   Used for:  Itching   Started by:  Balaji Locke MD        Dose:  10 mg   Take 1 tablet (10 mg) by mouth daily for 10 days   Quantity:  10 tablet   Refills:  1       permethrin 5 % cream   Commonly known as:  ELIMITE   Used for:  Scabies exposure   Started by:  Balaji Locke MD        Apply cream from head to toe (except the face); leave on for 8-14 hours then wash off with water; reapply in 1 week if live mites appear.   Quantity:  60 g   Refills:  1       predniSONE 50 MG tablet   Commonly known as:  DELTASONE   Used for:  Itching   Started by:  Balaji Locke MD        Dose:  50 mg   Take 1 tablet (50 mg) by mouth daily for 3 days   Quantity:  3 tablet   Refills:  1            Where to get your medicines      These medications were sent to [facility] [place], [place] - [address] [facility]  [address] [facility] [address]     Phone:  [phone]     loratadine 10 MG tablet    permethrin 5 % cream    predniSONE 50 MG tablet                Primary Care Provider Office Phone # Fax #    Germán Jernigan [phone]       [facility] [address] [address] [facility] [address]        Equal Access to Services     ENOCH ARROYO AH: maryam Cleveland  juanmeaghan zack vann, oleksandr sandoval. So [facility] [phone].    ATENCIÓN: Si oly nazario, tiene a lebron disposición servicios gratuitos de asistencia lingüística. Kasandra al [phone].    We comply with applicable federal civil rights laws and [place] laws. We do not discriminate on the basis of race, color, national origin, age, disability sex, sexual orientation or gender identity.            Thank you!     Thank you for choosing [facility]  for your care. Our goal is always to provide you with excellent care. Hearing back from our patients is one way we can continue to improve our services. Please take a few minutes to complete the written survey that you may receive in the mail after your visit with us. Thank you!             Your Updated Medication List - Protect others around you: Learn how to safely use, store and throw away your medicines at www.disposemymeds.org.          This list is accurate as of: 7/24/17  1:08 PM.  Always use your most recent med list.                   Brand Name Dispense Instructions for use Diagnosis    ADVIL PO      Take  by mouth. prn        * atenolol 25 MG tablet    TENORMIN    30 tablet    Take 1 tablet (25 mg) by mouth daily    Essential hypertension with goal blood pressure less than 140/90       * atenolol 50 MG tablet    TENORMIN    30 tablet    Take 1 tablet (50 mg) by mouth daily    Essential hypertension with goal blood pressure less than 140/90       CALCIUM + D PO      1 tablet daily        calcium carbonate 500 MG chewable tablet    TUMS    180 chew tab    Take 1 tablet (500 mg) by mouth 3 times daily (with meals)    Osteoporosis, unspecified       loratadine 10 MG tablet    CLARITIN    10 tablet    Take 1 tablet (10 mg) by mouth daily for 10 days    Itching       nitroFURantoin (macrocrystal-monohydrate) 100 MG capsule    MACROBID    14 capsule    Take 1 capsule (100 mg) by mouth 2 times daily    Acute  cystitis with hematuria       permethrin 5 % cream    ELIMITE    60 g    Apply cream from head to toe (except the face); leave on for 8-14 hours then wash off with water; reapply in 1 week if live mites appear.    Scabies exposure       predniSONE 50 MG tablet    DELTASONE    3 tablet    Take 1 tablet (50 mg) by mouth daily for 3 days    Itching       tolterodine 4 MG 24 hr capsule    DETROL LA    30 capsule    Take 1 capsule (4 mg) by mouth daily    Dysuria       traZODone 50 MG tablet    DESYREL    60 tablet    Take 2 tablets (100 mg) by mouth nightly as needed for sleep    Insomnia, unspecified type       * Notice:  This list has 2 medication(s) that are the same as other medications prescribed for you. Read the directions carefully, and ask your doctor or other care provider to review them with you.

## 2017-07-24 NOTE — NURSING NOTE
"Chief Complaint   Patient presents with     Derm Problem     Rash hands       Initial /79  Pulse 65  Temp 98.7  F (37.1  C) (Oral)  LMP 01/08/2008  SpO2 97%  Breastfeeding? No Estimated body mass index is 40.97 kg/(m^2) as calculated from the following:    Height as of 6/12/17: 5' 2\" (1.575 m).    Weight as of 7/16/17: 224 lb (101.6 kg).  Medication Reconciliation: complete   Andi ANGELES          "

## 2017-07-24 NOTE — PROGRESS NOTES
Some of this note was populated by a medical assistant.    SUBJECTIVE:                                                    Angela Ibarra is a 56 year old female who presents to clinic today for the following health issues:    Itchy      Duration: x Saturday    Description  Location: both hands  Itching: severe    Intensity:  severe    Accompanying signs and symptoms: None    History (similar episodes/previous evaluation): None    Precipitating or alleviating factors:  New exposures:  None  Recent travel: no      Therapies tried and outcome: Benadryl/diphenhydramine -  usually effective  Benadryl tablets.     Denies seasonal or environmental allergies or food allergies.     Problem list and histories reviewed & adjusted, as indicated.  Additional history: as documented    Patient Active Problem List   Diagnosis     Obesity     NONSPECIFIC COLITIS     Migraine headache     Stress incontinence     Balance disorder     Right leg weakness     Right carpal tunnel syndrome     Osteoporosis     Loose body in ankle and foot joint     Synovitis of ankle     Malunion, fracture     Pain in joint involving ankle and foot     Abnormal gait     Post-proc states NEC     Chondromalacia, left ankle and joints of left foot     CARDIOVASCULAR SCREENING; LDL GOAL LESS THAN 130     Advanced directives, counseling/discussion     Insomnia, unspecified type     Major depressive disorder, recurrent episode, mild (H)     Essential hypertension with goal blood pressure less than 140/90     Past Surgical History:   Procedure Laterality Date     ARTHROSCOPY ANKLE  2014    Procedure: ARTHROSCOPY ANKLE;  Surgeon: Shaun Bhatt DPM;  Location: [facility] OR     C  DELIVERY ONLY      , Low Cervical     C GASTRIC BYPASS,OBESITY,W/SM BOWEL RECONS  10/99     C LIGATE FALLOPIAN TUBE  2000    laparoscopy     HYSTERECTOMY, PAP NO LONGER INDICATED  2008     LAPAROSCOPIC CHOLECYSTECTOMY  8/3/2012     Procedure: LAPAROSCOPIC CHOLECYSTECTOMY;  Laparoscopic Cholecystectomy ;  Surgeon: Corwin Cabezas MD;  Location: [facility]     OPEN REDUCTION INTERNAL FIXATION ANKLE  4/22/2014    Procedure: OPEN REDUCTION INTERNAL FIXATION ANKLE;  Surgeon: Shaun Bhatt DPM;  Location: [facility]     OPEN REDUCTION INTERNAL FIXATION ELBOW  10/15/2013    Procedure: OPEN REDUCTION INTERNAL FIXATION ELBOW;  OPEN REDUCTION INTERNAL FIXATION LEFT PROXIMAL ULNA;  Surgeon: Artem Last DO;  Location: [facility]     ORTHOPEDIC SURGERY      left shoulder surgery     REMOVE MESH VAGINA  10/10/2011    Procedure:REMOVE MESH VAGINA; Excision of Vaginal Mesh; Surgeon:SERGE SALGADO; Location:[facility] OR     SURGICAL HISTORY OF -   4/20/10    Transobturator midurethral sling     SURGICAL HISTORY OF - 8/1999    exploratory laparotomy, colitis     SURGICAL HISTORY OF -   4/20/10    Transobturator midurethral sling     TUBAL LIGATION         Social History   Substance Use Topics     Smoking status: Never Smoker     Smokeless tobacco: Never Used     Alcohol use Yes      Comment: occas     Family History   Problem Relation Age of Onset     C.A.D. Mother      MI     Hypertension Mother      Hypertension Father      Breast Cancer No family hx of          Current Outpatient Prescriptions   Medication Sig Dispense Refill     nitroFURantoin, macrocrystal-monohydrate, (MACROBID) 100 MG capsule Take 1 capsule (100 mg) by mouth 2 times daily 14 capsule 0     atenolol (TENORMIN) 50 MG tablet Take 1 tablet (50 mg) by mouth daily 30 tablet 5     tolterodine (DETROL LA) 4 MG 24 hr capsule Take 1 capsule (4 mg) by mouth daily 30 capsule 1     traZODone (DESYREL) 50 MG tablet Take 2 tablets (100 mg) by mouth nightly as needed for sleep 60 tablet 6     atenolol (TENORMIN) 25 MG tablet Take 1 tablet (25 mg) by mouth daily 30 tablet 6     calcium carbonate (TUMS) 500 MG chewable tablet Take 1 tablet (500 mg) by mouth 3 times daily (with meals) 180 chew  tab 3     Ibuprofen (ADVIL PO) Take  by mouth. prn       CALCIUM + D OR 1 tablet daily       Allergies   Allergen Reactions     Bactrim [Sulfamethoxazole W/Trimethoprim]      itching     No Known Allergies          Reviewed and updated as needed this visit by clinical staffTobacco  Allergies  Meds       Reviewed and updated as needed this visit by Provider         ROS:  Constitutional, HEENT, cardiovascular, pulmonary, gi and gu systems are negative, except as otherwise noted.      OBJECTIVE:   /79  Pulse 65  Temp 98.7  F (37.1  C) (Oral)  LMP 01/08/2008  SpO2 97%  Breastfeeding? No  There is no height or weight on file to calculate BMI.  GENERAL: healthy, alert and no distress  NECK: no adenopathy, no asymmetry, masses, or scars and thyroid normal to palpation  RESP: lungs clear to auscultation - no rales, rhonchi or wheezes  CV: regular rate and rhythm, normal S1 S2, no S3 or S4, no murmur, click or rub, no peripheral edema and peripheral pulses strong  ABDOMEN: soft, nontender, no hepatosplenomegaly, no masses and bowel sounds normal  MS: no gross musculoskeletal defects noted, no edema  Noted few punctate burrow lesions with minimal excoriation consistent with scabies.     Diagnostic Test Results:  none     ASSESSMENT/PLAN:       ICD-10-CM    1. Scabies exposure Z20.89 permethrin (ELIMITE) 5 % cream     DISCONTINUED: permethrin (ELIMITE) 5 % cream   2. Itching L29.9 loratadine (CLARITIN) 10 MG tablet     predniSONE (DELTASONE) 50 MG tablet     DISCONTINUED: loratadine (CLARITIN) 10 MG tablet     DISCONTINUED: predniSONE (DELTASONE) 50 MG tablet      PLAN  Preventative measures explained in written and verbal form.   Patient educational/instructional material provided including reasons for follow-up   The patient indicates understanding of these issues and agrees with the plan.  Balaji Locke MD        [facility]

## 2017-07-27 ENCOUNTER — MYC MEDICAL ADVICE (OUTPATIENT)
Dept: FAMILY MEDICINE | Facility: CLINIC | Age: 57
End: 2017-07-27

## 2017-07-27 DIAGNOSIS — L29.9 ITCHING: Primary | ICD-10-CM

## 2017-07-27 RX ORDER — TRIAMCINOLONE ACETONIDE 1 MG/G
CREAM TOPICAL 2 TIMES DAILY PRN
Qty: 30 G | Refills: 0 | Status: SHIPPED | OUTPATIENT
Start: 2017-07-27 | End: 2017-09-28

## 2017-07-27 RX ORDER — PREDNISONE 10 MG/1
TABLET ORAL
Qty: 30 TABLET | Refills: 0 | Status: SHIPPED | OUTPATIENT
Start: 2017-07-27 | End: 2017-07-27

## 2017-07-27 RX ORDER — PREDNISONE 10 MG/1
TABLET ORAL
Qty: 30 TABLET | Refills: 0 | Status: SHIPPED | OUTPATIENT
Start: 2017-07-27 | End: 2017-08-29

## 2017-08-17 ENCOUNTER — THERAPY VISIT (OUTPATIENT)
Dept: PHYSICAL THERAPY | Facility: CLINIC | Age: 57
End: 2017-08-17
Payer: COMMERCIAL

## 2017-08-17 DIAGNOSIS — M54.50 CHRONIC RIGHT-SIDED LOW BACK PAIN WITHOUT SCIATICA: Primary | ICD-10-CM

## 2017-08-17 DIAGNOSIS — G89.29 CHRONIC RIGHT-SIDED LOW BACK PAIN WITHOUT SCIATICA: Primary | ICD-10-CM

## 2017-08-17 PROCEDURE — 97110 THERAPEUTIC EXERCISES: CPT | Mod: GP | Performed by: PHYSICAL THERAPIST

## 2017-08-17 PROCEDURE — 97162 PT EVAL MOD COMPLEX 30 MIN: CPT | Mod: GP | Performed by: PHYSICAL THERAPIST

## 2017-08-17 PROCEDURE — 97112 NEUROMUSCULAR REEDUCATION: CPT | Mod: GP | Performed by: PHYSICAL THERAPIST

## 2017-08-17 NOTE — MR AVS SNAPSHOT
After Visit Summary   8/17/2017    Angela Ibarra    MRN: [id number]           Patient Information     Date Of Birth          1960        Visit Information        Provider Department      8/17/2017 7:50 AM Rox Julian PT [facility]        Today's Diagnoses     Chronic right-sided low back pain without sciatica    -  1       Follow-ups after your visit        Your next 10 appointments already scheduled     Aug 23, 2017 11:10 AM CDT   [facility] Spine with Rox Julian PT   [facility] Physical [facility] ([facility])    [address]  [facility] [address]   [phone]            Aug 29, 2017  9:00 AM CDT   Return Visit with Eddie Mack MD   [facility]    [address] [address] [phone]              Who to contact     If you have questions or need follow up information about today's clinic visit or your schedule please contact [facility] PH[facility] directly at [phone].  Normal or non-critical lab and imaging results will be communicated to you by SyndicatePlushart, letter or phone within 4 business days after the clinic has received the results. If you do not hear from us within 7 days, please contact the clinic through SyndicatePlushart or phone. If you have a critical or abnormal lab result, we will notify you by phone as soon as possible.  Submit refill requests through Remotemedical or call your pharmacy and they will forward the refill request to us. Please allow 3 business days for your refill to be completed.          Additional Information About Your Visit        MyChart Information     Remotemedical gives you secure access to your electronic health record. If you see a primary care provider, you can also send messages to your care team and make appointments. If you have questions, please  call your primary care clinic.  If you do not have a primary care provider, please call [phone] and they will assist you.        Care EveryWhere ID     This is your Care EveryWhere ID. This could be used by other organizations to access your [facility] medical records  [id number]        Your Vitals Were     Last Period                   01/08/2008            Blood Pressure from Last 3 Encounters:   07/24/17 127/79   07/16/17 156/80   07/07/17 136/78    Weight from Last 3 Encounters:   07/16/17 101.6 kg (224 lb)   07/07/17 100.7 kg (222 lb)   06/13/17 101.6 kg (224 lb)              We Performed the Following     [facility] Inital Eval Report     Neuromuscular Re-Education     PT Margie, Moderate Complexity ([id number])     Therapeutic Exercises        Primary Care Provider Office Phone # Fax #    Germán Jernigan [phone] [address] [facility] [address]        Equal Access to Services     EL[facility]IN [facility] : Hadii aad ku hadasho Soomaali, waaxda luqadaha, qaybta kaalmada adeegyada, waxay idiin hayaan gregeg elizabeth rocha . So [facility] [phone].    ATENCIÓN: Si habla español, tiene a lebron disposición servicios gratuitos de asistencia lingüística. Llame al [phone].    We comply with applicable federal civil rights laws and [place] laws. We do not discriminate on the basis of race, color, national origin, age, disability sex, sexual orientation or gender identity.            Thank you!     Thank you for choosing [facility] PH[facility]  for your care. Our goal is always to provide you with excellent care. Hearing back from our patients is one way we can continue to improve our services. Please take a few minutes to complete the written survey that you may receive in the mail after your visit with us. Thank you!             Your Updated Medication List - Protect others around you: Learn how to safely use, store and throw away your medicines at  www.disposemymeds.org.          This list is accurate as of: 8/17/17 11:59 PM.  Always use your most recent med list.                   Brand Name Dispense Instructions for use Diagnosis    ADVIL PO      Take  by mouth. prn        * atenolol 25 MG tablet    TENORMIN    30 tablet    Take 1 tablet (25 mg) by mouth daily    Essential hypertension with goal blood pressure less than 140/90       * atenolol 50 MG tablet    TENORMIN    30 tablet    Take 1 tablet (50 mg) by mouth daily    Essential hypertension with goal blood pressure less than 140/90       CALCIUM + D PO      1 tablet daily        calcium carbonate 500 MG chewable tablet    TUMS    180 chew tab    Take 1 tablet (500 mg) by mouth 3 times daily (with meals)    Osteoporosis, unspecified       loratadine 10 MG tablet    CLARITIN    10 tablet    Take 1 tablet (10 mg) by mouth daily    Itching       nitroFURantoin (macrocrystal-monohydrate) 100 MG capsule    MACROBID    14 capsule    Take 1 capsule (100 mg) by mouth 2 times daily    Acute cystitis with hematuria       permethrin 5 % cream    ELIMITE    60 g    Apply cream from head to toe (except the face); leave on for 8-14 hours then wash off with water; reapply in 1 week if live mites appear.    Scabies exposure       * predniSONE 50 MG tablet    DELTASONE    3 tablet    Take 1 tablet (50 mg) by mouth daily    Itching       * predniSONE 10 MG tablet    DELTASONE    30 tablet    Take 4 pills per day for 3 days. Then 3 pills per day for 3 days. Then 2 pills per day for 3 days. Then 1 pill per day for 3 days.    Itching       tolterodine 4 MG 24 hr capsule    DETROL LA    30 capsule    Take 1 capsule (4 mg) by mouth daily    Dysuria       traZODone 50 MG tablet    DESYREL    60 tablet    Take 2 tablets (100 mg) by mouth nightly as needed for sleep    Insomnia, unspecified type       triamcinolone 0.1 % cream    KENALOG    30 g    Apply topically 2 times daily as needed for irritation    Itching       * Notice:   This list has 4 medication(s) that are the same as other medications prescribed for you. Read the directions carefully, and ask your doctor or other care provider to review them with you.

## 2017-08-17 NOTE — PROGRESS NOTES
[facility] Initial Evaluation  Subjecti  ve:    Patient is a 56 year old female presenting with rehab back hpi. The history is provided by the patient. No  was used.   Angela Ibarra is a 56 year old female with a lumbar condition.  Condition occurred with:  Other reason.  Condition occurred: other.  This is a chronic condition  Patient reports that in March of 1986, she had to have surgery and they were trying to give her a spinal tap and has had lower back pain since that time. Her most recent episode was that she went to the doctor about her right hip and when the doctor touched her lower back, he noticed that she had had a lot of pain from the pressure and he recommended her to have therapy. MD order from 8-14-17.She has had increased pain over the years. She has never had any treatment to her lower back. She had a recent cortisone injection to her hip which resolved that R hip pain..    Patient reports pain:  Central lumbar spine and lumbar spine right.    Pain is described as aching and is intermittent and reported as 2/10 (up to 8/10 when bad).   Pain is the same all the time.  Symptoms are exacerbated by bending, sitting and lifting (sit yara 1 hour with 4/10 pain, limits her lifting, cannot sleep on her back at all) and relieved by NSAID's and activity/movement.  Since onset symptoms are unchanged.  Special tests:  X-ray (some arthritis per patient).      General health as reported by patient is fair.  Pertinent medical history includes:  High blood pressure, history of fractures and overweight.  Medical allergies: no.  Other surgeries include:  Orthopedic surgery and other.  Current medications:  High blood pressure medication.  Current occupation is administration.  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  Primary job tasks include:  Prolonged sitting.    Barriers include:  None as reported by the patient.    Red flags:  None as reported by the  patient.                        Objective:    System         Lumbar/SI Evaluation  ROM:    AROM Lumbar:   Flexion:          Fingertips to toes and no change  Ext:                    Mod loss and feels better   Side Bend:        Left:     Right:   Rotation:           Left:     Right:   Side Glide:        Left:  Min loss and no change    Right:  Min loss and no change          Lumbar Myotomes:  normal            Lumbar DTR's:  normal      Cord Signs:  normal      Neural Tension/Mobility:  Lumbar:  Normal        Lumbar Palpation:    Tenderness present at Left:    Erector Spinae  Tenderness present at Right: Erector Spinae                                                       Ange Lumbar Evaluation    Posture:  Sitting: fair  Standing: fair  Lordosis: WNL  Lateral Shift: no  Correction of Posture: better      Test Movements:  FIS: During: no effect  After: no effect    Repeat FIS: During: increases  After: worse    EIS: During: no effect  After: no effect    Repeat EIS: During: no effect  After: no effect    ERMA: During: no effect  After: no effect    Repeat ERMA: During: no effect  After: no effect    EIL: During: no effect  After: no effect    Repeat EIL: During: decreases  After: better                                                   ROS    Assessment/Plan:      Patient is a 56 year old female with lumbar complaints.    Patient has the following significant findings with corresponding treatment plan.                Diagnosis 1:  Chronic LBP  Pain -  manual therapy, self management, education, directional preference exercise and home program  Decreased ROM/flexibility - manual therapy, therapeutic exercise and home program  Impaired muscle performance - neuro re-education and home program  Decreased function - therapeutic activities and home program  Impaired posture - neuro re-education and home program    Therapy Evaluation Codes:   1) History comprised of:   Personal factors that impact the plan of care:       Time since onset of symptoms.    Comorbidity factors that impact the plan of care are:      None.     Medications impacting care: None.  2) Examination of Body Systems comprised of:   Body structures and functions that impact the plan of care:      Lumbar spine.   Activity limitations that impact the plan of care are:      Bending, Driving, Dressing, Lifting and Sitting.  3) Clinical presentation characteristics are:   Evolving/Changing.  4) Decision-Making    Moderate complexity using standardized patient assessment instrument and/or measureable assessment of functional outcome.  Cumulative Therapy Evaluation is: Moderate complexity.    Previous and current functional limitations:  (See Goal Flow Sheet for this information)    Short term and Long term goals: (See Goal Flow Sheet for this information)     Communication ability:  Patient appears to be able to clearly communicate and understand verbal and written communication and follow directions correctly.  Treatment Explanation - The following has been discussed with the patient:   RX ordered/plan of care  Anticipated outcomes  Possible risks and side effects  This patient would benefit from PT intervention to resume normal activities.   Rehab potential is good.    Frequency:  1 X week, once daily  Duration:  for 8 weeks  Discharge Plan:  Achieve all LTG.  Independent in home treatment program.  Reach maximal therapeutic benefit.    Please refer to the daily flowsheet for treatment today, total treatment time and time spent performing 1:1 timed codes.

## 2017-08-17 NOTE — LETTER
[facility]  [address]  [place] [id number] [phone]    2017    Re: Angela Ibarra   :   1960  MRN:  [id number]   REFERRING PHYSICIAN:   Israel Haynes    [facility]ICAL [facility]  Date of Initial Evaluation:  2017  Visits:  Rxs Used: 1  Reason for Referral:  Chronic right-sided low back pain without sciatica  EVALUATION SUMMARY  [facility] Initial Evaluation  Subjective:  Patient is a 56 year old female presenting with rehab back hpi. The history is provided by the patient. No  was used.   Angela Ibarra is a 56 year old female with a lumbar condition.  Condition occurred with:  Other reason.  Condition occurred: other.  This is a chronic condition  Patient reports that in 1986, she had to have surgery and they were trying to give her a spinal tap and has had lower back pain since that time. Her most recent episode was that she went to the doctor about her right hip and when the doctor touched her lower back, he noticed that she had had a lot of pain from the pressure and he recommended her to have therapy. MD order from 17.She has had increased pain over the years. She has never had any treatment to her lower back. She had a recent cortisone injection to her hip which resolved that R hip pain..    Patient reports pain:  Central lumbar spine and lumbar spine right.    Pain is described as aching and is intermittent and reported as 2/10 (up to 8/10 when bad).   Pain is the same all the time.  Symptoms are exacerbated by bending, sitting and lifting (sit yara 1 hour with 4/10 pain, limits her lifting, cannot sleep on her back at all) and relieved by NSAID's and activity/movement.  Since onset symptoms are unchanged.  Special tests:  X-ray (some arthritis per patient).      General health as reported by patient is  fair.  Pertinent medical history includes:  High blood pressure, history of fractures and overweight.  Medical allergies: no.  Other surgeries include:  Orthopedic surgery and other.  Current medications:  High blood pressure medication.  Current occupation is administration.  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  Primary job tasks include:  Prolonged sitting.  Barriers include:  None as reported by the patient.  Red flags:  None as reported by the patient.   Objective:     Lumbar/SI Evaluation  ROM:    AROM Lumbar:   Flexion:          Fingertips to toes and no change  Ext:                    Mod loss and feels better   Side Bend:        Left:     Right:   Rotation:           Left:     Right:   Side Glide:        Left:  Min loss and no change    Right:  Min loss and no change          Lumbar Myotomes:  normal  Lumbar DTR's:  normal  Cord Signs:  normal  Neural Tension/Mobility:  Lumbar:  Normal    Lumbar Palpation:    Tenderness present at Left:    Erector Spinae  Tenderness present at Right: Erector Spinae      Ange Lumbar Evaluation  Posture:  Sitting: fair  Standing: fair  Lordosis: WNL  Lateral Shift: no  Correction of Posture: better  Test Movements:  FIS: During: no effect  After: no effect    Repeat FIS: During: increases  After: worse    EIS: During: no effect  After: no effect    Repeat EIS: During: no effect  After: no effect    ERMA: During: no effect  After: no effect    Repeat ERMA: During: no effect  After: no effect    EIL: During: no effect  After: no effect    Repeat EIL: During: decreases  After: better    Assessment/Plan:    Patient is a 56 year old female with lumbar complaints.    Patient has the following significant findings with corresponding treatment plan.                Diagnosis 1:  Chronic LBP  Pain -  manual therapy, self management, education, directional preference exercise and home program  Decreased ROM/flexibility - manual therapy, therapeutic exercise and home  program  Impaired muscle performance - neuro re-education and home program  Decreased function - therapeutic activities and home program  Impaired posture - neuro re-education and home program    Therapy Evaluation Codes:   1) History comprised of:   Personal factors that impact the plan of care:      Time since onset of symptoms.    Comorbidity factors that impact the plan of care are:      None.     Medications impacting care: None.  2) Examination of Body Systems comprised of:   Body structures and functions that impact the plan of care:      Lumbar spine.   Activity limitations that impact the plan of care are:      Bending, Driving, Dressing, Lifting and Sitting.  3) Clinical presentation characteristics are:   Evolving/Changing.  4) Decision-Making    Moderate complexity using standardized patient assessment instrument and/or measureable assessment of functional outcome.  Cumulative Therapy Evaluation is: Moderate complexity.    Previous and current functional limitations:  (See Goal Flow Sheet for this information)    Short term and Long term goals: (See Goal Flow Sheet for this information)     Communication ability:  Patient appears to be able to clearly communicate and understand verbal and written communication and follow directions correctly.  Treatment Explanation - The following has been discussed with the patient:   RX ordered/plan of care  Anticipated outcomes  Possible risks and side effects  This patient would benefit from PT intervention to resume normal activities.   Rehab potential is good.    Frequency:  1 X week, once daily  Duration:  for 8 weeks  Discharge Plan:  Achieve all LTG.  Independent in home treatment program.  Reach maximal therapeutic benefit.  Thank you for your referral.    INQUIRIES  Therapist: Rox Julian, PT  [facility]  [address] [place] [address]. [address]  [facility] [address]  Phone: [phone]  Fax: [phone]

## 2017-08-19 PROBLEM — G89.29 CHRONIC RIGHT-SIDED LOW BACK PAIN WITHOUT SCIATICA: Status: ACTIVE | Noted: 2017-08-19

## 2017-08-19 PROBLEM — M54.50 CHRONIC RIGHT-SIDED LOW BACK PAIN WITHOUT SCIATICA: Status: ACTIVE | Noted: 2017-08-19

## 2017-08-22 ENCOUNTER — OFFICE VISIT (OUTPATIENT)
Dept: FAMILY MEDICINE | Facility: CLINIC | Age: 57
End: 2017-08-22
Payer: COMMERCIAL

## 2017-08-22 VITALS
WEIGHT: 215 LBS | HEART RATE: 79 BPM | SYSTOLIC BLOOD PRESSURE: 126 MMHG | DIASTOLIC BLOOD PRESSURE: 77 MMHG | TEMPERATURE: 98.8 F | OXYGEN SATURATION: 96 % | BODY MASS INDEX: 39.32 KG/M2

## 2017-08-22 DIAGNOSIS — R11.0 NAUSEA: ICD-10-CM

## 2017-08-22 DIAGNOSIS — R42 LIGHTHEADEDNESS: ICD-10-CM

## 2017-08-22 DIAGNOSIS — R19.7 DIARRHEA OF PRESUMED INFECTIOUS ORIGIN: Primary | ICD-10-CM

## 2017-08-22 PROCEDURE — 99213 OFFICE O/P EST LOW 20 MIN: CPT | Performed by: FAMILY MEDICINE

## 2017-08-22 RX ORDER — ONDANSETRON 4 MG/1
TABLET, ORALLY DISINTEGRATING ORAL
Qty: 2 TABLET | Refills: 0 | COMMUNITY
Start: 2017-08-22 | End: 2017-08-29

## 2017-08-22 RX ORDER — ONDANSETRON 8 MG/1
8 TABLET, FILM COATED ORAL EVERY 8 HOURS PRN
Qty: 15 TABLET | Refills: 0 | Status: SHIPPED | OUTPATIENT
Start: 2017-08-22 | End: 2017-08-29

## 2017-08-22 RX ORDER — DIPHENOXYLATE HCL/ATROPINE 2.5-.025MG
2 TABLET ORAL 4 TIMES DAILY PRN
Qty: 30 TABLET | Refills: 0 | Status: SHIPPED | OUTPATIENT
Start: 2017-08-22 | End: 2017-09-28

## 2017-08-22 NOTE — NURSING NOTE
"Chief Complaint   Patient presents with     Diarrhea     x 3 days - abdomen cramps- - tired       Initial /77  Pulse 79  Temp 98.8  F (37.1  C) (Oral)  Wt 215 lb (97.5 kg)  LMP 01/08/2008  SpO2 96%  BMI 39.32 kg/m2 Estimated body mass index is 39.32 kg/(m^2) as calculated from the following:    Height as of 6/12/17: 5' 2\" (1.575 m).    Weight as of this encounter: 215 lb (97.5 kg).  Medication Reconciliation: complete  Courtney Gunderson M.A.    "

## 2017-08-22 NOTE — PROGRESS NOTES
Nausea- diarrhea - abdomen cramps- bloating - tired x 3 days    --------------------------------------------------------------------------------------------------------------------------------------    SUBJECTIVE:  Chief Complaint   Patient presents with     Diarrhea     x 3 days - abdomen cramps- - tired       HPI: Angela Ibarra is a 56 year old female who presents with the CC of diarrhea.    Her symptoms began 3 days ago and started with cramping and diarrhea. After that the patient started having cramping and then started to have nausea.   The patient reports nausea, denies any vomiting,  reports fever (up to 99.6) and reports abdominal cramping.  The patient reports having 8-10 stools/day and the frequency is not changing. She is having type 6 stools today.  The patient  denies any blood in the stool.  The patient reports that she has been drinking fluids frequently.    The patient  denies any recent antibiotic(s) use.  The patient  denies any recent travel outside of the [place].  The patient  denies any recent camping or outdoor trips.   The patient  denies any possible bad food exposures.    Past Medical History:   Diagnosis Date     Calculus of kidney      Migraine, unspecified, without mention of intractable migraine without mention of status migrainosus      Other specified iron deficiency anemias      Papanicolaou smear of cervix with low grade squamous intraepithelial lesion (LGSIL) 11/07    Colpo and hyst pathology benign     Unspecified essential hypertension 1999    Essential hypertension   .  Current Outpatient Prescriptions   Medication Sig Dispense Refill     triamcinolone (KENALOG) 0.1 % cream Apply topically 2 times daily as needed for irritation 30 g 0     predniSONE (DELTASONE) 10 MG tablet Take 4 pills per day for 3 days. Then 3 pills per day for 3 days. Then 2 pills per day for 3 days. Then 1 pill per day for 3 days. 30 tablet 0     permethrin (ELIMITE) 5 % cream Apply cream from  head to toe (except the face); leave on for 8-14 hours then wash off with water; reapply in 1 week if live mites appear. 60 g 1     loratadine (CLARITIN) 10 MG tablet Take 1 tablet (10 mg) by mouth daily 10 tablet 1     predniSONE (DELTASONE) 50 MG tablet Take 1 tablet (50 mg) by mouth daily 3 tablet 1     nitroFURantoin, macrocrystal-monohydrate, (MACROBID) 100 MG capsule Take 1 capsule (100 mg) by mouth 2 times daily 14 capsule 0     atenolol (TENORMIN) 50 MG tablet Take 1 tablet (50 mg) by mouth daily 30 tablet 5     tolterodine (DETROL LA) 4 MG 24 hr capsule Take 1 capsule (4 mg) by mouth daily 30 capsule 1     traZODone (DESYREL) 50 MG tablet Take 2 tablets (100 mg) by mouth nightly as needed for sleep 60 tablet 6     calcium carbonate (TUMS) 500 MG chewable tablet Take 1 tablet (500 mg) by mouth 3 times daily (with meals) 180 chew tab 3     Ibuprofen (ADVIL PO) Take  by mouth. prn       CALCIUM + D OR 1 tablet daily       [DISCONTINUED] atenolol (TENORMIN) 25 MG tablet Take 1 tablet (25 mg) by mouth daily 30 tablet 6     Social History   Substance Use Topics     Smoking status: Never Smoker     Smokeless tobacco: Never Used     Alcohol use Yes      Comment: occas         OBJECTIVE:  /77  Pulse 79  Temp 98.8  F (37.1  C) (Oral)  Wt 215 lb (97.5 kg)  LMP 01/08/2008  SpO2 96%  BMI 39.32 kg/m2     Orthostatic Vitals   BP Pulse Position Site Cuff Size Time Date   126/81 81 Standing Right Arm Large 12:10 PM 8/22/2017   127/81 79 Sitting Right Arm Large 12:10 PM 8/22/2017   106/70 78 Supine Right Arm Large 12:09 PM 8/22/2017     No repeat blood pressure data filed.  No peak flow data filed.  No pain information filed.        GENERAL APPEARANCE: healthy, alert and no distress  EYES: EOMI,  PERRL, conjunctiva clear  HENT: ear canals and TM's normal.  Nose and mouth without ulcers, erythema or lesions  NECK: supple, nontender, no lymphadenopathy  RESP: lungs clear to auscultation - no rales, rhonchi or  wheezes  CV: regular rates and rhythm, normal S1 S2, no murmur noted  ABDOMEN:  soft, nontender, no HSM or masses and bowel sounds normal  ABDOMEN: hyperactive bowel sounds  NEURO: Normal strength and tone, sensory exam grossly normal,  normal speech and mentation  SKIN: no suspicious lesions or rashes    ASSESSMENT:  (A09) Diarrhea of presumed infectious origin  (primary encounter diagnosis)  Comment:   Plan: diphenoxylate-atropine (LOMOTIL) 2.5-0.025 MG         per tablet            (R11.0) Nausea  Comment:   Plan: ondansetron (ZOFRAN ODT) 4 MG ODT tab,         ondansetron (ZOFRAN) 8 MG tablet            (R42) Lightheadedness  She was given 8 mg of zofran SL and then was able to drink 12 ounces of water without difficulty   Comment: she is not orthostatic on testing so she should be able to rehydrate orally especially with the zofran on board  Plan: Orthostatic blood pressure and pulse              I recommended that the patient frequently take small sips of electrolytes containing fluids such as Gatorade or Powerade. Start with bland foods such as soups, apple sauce, toast, crackers and advance to more solid foods    Over the counter Immodium as needed.          Follow-up if your symptoms acutely worsen, if you are dehydrated or have a high fever over 101.5 or if there is blood in your stool.

## 2017-08-22 NOTE — NURSING NOTE
The following medication was given:     MEDICATION: Zofran  ROUTE: PO  SITE: mouth  DOSE: 8 mg sub ligual  LOT #: [id number]  :  Deirdre  EXPIRATION DATE:  06/30/2018  Courtney Gunderson M.A.

## 2017-08-22 NOTE — MR AVS SNAPSHOT
After Visit Summary   8/22/2017    Angela Ibarra    MRN: [id number]           Patient Information     Date Of Birth          1960        Visit Information        Provider Department      8/22/2017 10:45 AM Serge Tolbert MD [facility]        Today's Diagnoses     Diarrhea of presumed infectious origin    -  1    Nausea        Lightheadedness          Care Instructions      Dehydration (Adult)  Dehydration occurs when your body loses too much fluid. This may be the result of prolonged vomiting or diarrhea, excessive sweating, or a high fever. It may also happen if you don t drink enough fluid when you re sick or out in the heat. Misuse of diuretics (water pills) can also be a cause.  Symptoms include thirst and decreased urine output. You may also feel dizzy, weak, fatigued, or very drowsy. The diet described below is usually enough to treat dehydration. In some cases, you may need medicine.  Home care    Drink at least 12 8-ounce glasses of fluid every day to resolve the dehydration. Fluid may include water; orange juice; lemonade; apple, grape, or cranberry juice; clear fruit drinks; electrolyte replacement and sports drinks; and teas and coffee without caffeine. If you have been diagnosed with a kidney disease, ask your doctor how much and what types of fluids you should drink to prevent dehydration. If you have kidney disease, fluid can build up in the body. This can be dangerous to your health.    If you have a fever, muscle aches, or a headache as a result of a cold or flu, you may take acetaminophen or ibuprofen, unless another medicine was prescribed. If you have chronic liver or kidney disease, or have ever had a stomach ulcer or gastrointestinal bleeding, talk with your health care provider before using these medicines. Don't take aspirin if you are younger than 18 and have a fever. Aspirin raises the chance for severe liver injury.  Follow-up care  Follow up with  your health care provider, or as advised.  When to seek medical advice  Call your health care provider right away if any of these occur:    Continued vomiting    Frequent diarrhea (more than 5 times a day); blood (red or black color) or mucus in diarrhea    Blood in vomit or stool    Swollen abdomen or increasing abdominal pain    Weakness, dizziness, or fainting    Unusual drowsiness or confusion    Reduced urine output or extreme thirst    Fever of 100.4 F (34 C) or higher  Date Last Reviewed: 5/31/2015 2000-2017 [employer]. [address] [place] [address]. All rights reserved. This information is not intended as a substitute for professional medical care. Always follow your healthcare professional's instructions.                Follow-ups after your visit        Your next 10 appointments already scheduled     Aug 23, 2017 11:10 AM CDT   WESLEY Spine with Rox Julian    [place] [facility] ([facility])    [address]  [facility] [address]   [phone]            Aug 29, 2017  9:00 AM CDT   Return Visit with Eddie Mack MD   [facility]    [address] [facility] [address] [phone]              Who to contact     If you have questions or need follow up information about today's clinic visit or your schedule please contact [facility] directly at [phone].  Normal or non-critical lab and imaging results will be communicated to you by MyChart, letter or phone within 4 business days after the clinic has received the results. If you do not hear from us within 7 days, please contact the clinic through MyChart or phone. If you have a critical or abnormal lab result, we will notify you by phone as soon as possible.  Submit refill requests through [employer] or call your pharmacy and they will forward the refill request to us. Please allow 3  business days for your refill to be completed.          Additional Information About Your Visit        C[employer] Information     [employer] gives you secure access to your electronic health record. If you see a primary care provider, you can also send messages to your care team and make appointments. If you have questions, please call your primary care clinic.  If you do not have a primary care provider, please call [phone] and they will assist you.        Care EveryWhere ID     This is your Care EveryWhere ID. This could be used by other organizations to access your [place] medical records  [id number]        Your Vitals Were     Pulse Temperature Last Period Pulse Oximetry BMI (Body Mass Index)       79 98.8  F (37.1  C) (Oral) 01/08/2008 96% 39.32 kg/m2        Blood Pressure from Last 3 Encounters:   08/22/17 126/77   07/24/17 127/79   07/16/17 156/80    Weight from Last 3 Encounters:   08/22/17 215 lb (97.5 kg)   07/16/17 224 lb (101.6 kg)   07/07/17 222 lb (100.7 kg)              We Performed the Following     Orthostatic blood pressure and pulse          Today's Medication Changes          These changes are accurate as of: 8/22/17 12:11 PM.  If you have any questions, ask your nurse or doctor.               Start taking these medicines.        Dose/Directions    diphenoxylate-atropine 2.5-0.025 MG per tablet   Commonly known as:  LOMOTIL   Used for:  Diarrhea of presumed infectious origin        Dose:  2 tablet   Take 2 tablets by mouth 4 times daily as needed for diarrhea   Quantity:  30 tablet   Refills:  0       ondansetron 4 MG ODT tab   Commonly known as:  ZOFRAN ODT   Used for:  Nausea        Give 8 mg in clinic now via the sublingual route   Quantity:  2 tablet   Refills:  0       ondansetron 8 MG tablet   Commonly known as:  ZOFRAN   Used for:  Nausea        Dose:  8 mg   Take 1 tablet (8 mg) by mouth every 8 hours as needed for nausea   Quantity:  15 tablet   Refills:  0            Where to get your  medicines      Some of these will need a paper prescription and others can be bought over the counter.  Ask your nurse if you have questions.     Bring a paper prescription for each of these medications     diphenoxylate-atropine 2.5-0.025 MG per tablet    ondansetron 8 MG tablet       You don't need a prescription for these medications     ondansetron 4 MG ODT tab                Primary Care Provider Office Phone # Fax #    Germán Jernigan [phone] [address] [facility] [address]        Equal Access to Services     [facility]: Hadii aad ku hadasho Soomaali, waaxda luqadaha, qaybta kaalmada adeegyada, waxrain rocha . So [facility] [phone].    ATENCIÓN: Si habla español, tiene a lebron disposición servicios gratuitos de asistencia lingüística. [facility] [phone].    We comply with applicable federal civil rights laws and [place] laws. We do not discriminate on the basis of race, color, national origin, age, disability sex, sexual orientation or gender identity.            Thank you!     Thank you for choosing [facility] [place] [facility]  for your care. Our goal is always to provide you with excellent care. Hearing back from our patients is one way we can continue to improve our services. Please take a few minutes to complete the written survey that you may receive in the mail after your visit with us. Thank you!             Your Updated Medication List - Protect others around you: Learn how to safely use, store and throw away your medicines at www.disposemymeds.org.          This list is accurate as of: 8/22/17 12:11 PM.  Always use your most recent med list.                   Brand Name Dispense Instructions for use Diagnosis    ADVIL PO      Take  by mouth. prn        atenolol 50 MG tablet    TENORMIN    30 tablet    Take 1 tablet (50 mg) by mouth daily    Essential hypertension with goal blood pressure less than 140/90       CALCIUM + D PO      1 tablet  daily        calcium carbonate 500 MG chewable tablet    TUMS    180 chew tab    Take 1 tablet (500 mg) by mouth 3 times daily (with meals)    Osteoporosis, unspecified       diphenoxylate-atropine 2.5-0.025 MG per tablet    LOMOTIL    30 tablet    Take 2 tablets by mouth 4 times daily as needed for diarrhea    Diarrhea of presumed infectious origin       loratadine 10 MG tablet    CLARITIN    10 tablet    Take 1 tablet (10 mg) by mouth daily    Itching       nitroFURantoin (macrocrystal-monohydrate) 100 MG capsule    MACROBID    14 capsule    Take 1 capsule (100 mg) by mouth 2 times daily    Acute cystitis with hematuria       ondansetron 4 MG ODT tab    ZOFRAN ODT    2 tablet    Give 8 mg in clinic now via the sublingual route    Nausea       ondansetron 8 MG tablet    ZOFRAN    15 tablet    Take 1 tablet (8 mg) by mouth every 8 hours as needed for nausea    Nausea       permethrin 5 % cream    ELIMITE    60 g    Apply cream from head to toe (except the face); leave on for 8-14 hours then wash off with water; reapply in 1 week if live mites appear.    Scabies exposure       * predniSONE 50 MG tablet    DELTASONE    3 tablet    Take 1 tablet (50 mg) by mouth daily    Itching       * predniSONE 10 MG tablet    DELTASONE    30 tablet    Take 4 pills per day for 3 days. Then 3 pills per day for 3 days. Then 2 pills per day for 3 days. Then 1 pill per day for 3 days.    Itching       tolterodine 4 MG 24 hr capsule    DETROL LA    30 capsule    Take 1 capsule (4 mg) by mouth daily    Dysuria       traZODone 50 MG tablet    DESYREL    60 tablet    Take 2 tablets (100 mg) by mouth nightly as needed for sleep    Insomnia, unspecified type       triamcinolone 0.1 % cream    KENALOG    30 g    Apply topically 2 times daily as needed for irritation    Itching       * Notice:  This list has 2 medication(s) that are the same as other medications prescribed for you. Read the directions carefully, and ask your doctor or other  care provider to review them with you.

## 2017-08-22 NOTE — PATIENT INSTRUCTIONS
Dehydration (Adult)  Dehydration occurs when your body loses too much fluid. This may be the result of prolonged vomiting or diarrhea, excessive sweating, or a high fever. It may also happen if you don t drink enough fluid when you re sick or out in the heat. Misuse of diuretics (water pills) can also be a cause.  Symptoms include thirst and decreased urine output. You may also feel dizzy, weak, fatigued, or very drowsy. The diet described below is usually enough to treat dehydration. In some cases, you may need medicine.  Home care    Drink at least 12 8-ounce glasses of fluid every day to resolve the dehydration. Fluid may include water; orange juice; lemonade; apple, grape, or cranberry juice; clear fruit drinks; electrolyte replacement and sports drinks; and teas and coffee without caffeine. If you have been diagnosed with a kidney disease, ask your doctor how much and what types of fluids you should drink to prevent dehydration. If you have kidney disease, fluid can build up in the body. This can be dangerous to your health.    If you have a fever, muscle aches, or a headache as a result of a cold or flu, you may take acetaminophen or ibuprofen, unless another medicine was prescribed. If you have chronic liver or kidney disease, or have ever had a stomach ulcer or gastrointestinal bleeding, talk with your health care provider before using these medicines. Don't take aspirin if you are younger than 18 and have a fever. Aspirin raises the chance for severe liver injury.  Follow-up care  Follow up with your health care provider, or as advised.  When to seek medical advice  Call your health care provider right away if any of these occur:    Continued vomiting    Frequent diarrhea (more than 5 times a day); blood (red or black color) or mucus in diarrhea    Blood in vomit or stool    Swollen abdomen or increasing abdominal pain    Weakness, dizziness, or fainting    Unusual drowsiness or confusion    Reduced urine  output or extreme thirst    Fever of 100.4 F (34 C) or higher  Date Last Reviewed: 5/31/2015 2000-2017 [employer]. [address] [place] [address], [place] [address]. All rights reserved. This information is not intended as a substitute for professional medical care. Always follow your healthcare professional's instructions.

## 2017-08-29 ENCOUNTER — OFFICE VISIT (OUTPATIENT)
Dept: UROLOGY | Facility: CLINIC | Age: 57
End: 2017-08-29
Payer: COMMERCIAL

## 2017-08-29 VITALS
RESPIRATION RATE: 16 BRPM | DIASTOLIC BLOOD PRESSURE: 70 MMHG | BODY MASS INDEX: 39.32 KG/M2 | WEIGHT: 215 LBS | SYSTOLIC BLOOD PRESSURE: 123 MMHG | HEART RATE: 73 BPM

## 2017-08-29 DIAGNOSIS — R30.0 DYSURIA: Primary | ICD-10-CM

## 2017-08-29 LAB
ALBUMIN UR-MCNC: NEGATIVE MG/DL
APPEARANCE UR: CLEAR
BILIRUB UR QL STRIP: NEGATIVE
COLOR UR AUTO: YELLOW
GLUCOSE UR STRIP-MCNC: NEGATIVE MG/DL
HGB UR QL STRIP: NEGATIVE
KETONES UR STRIP-MCNC: NEGATIVE MG/DL
LEUKOCYTE ESTERASE UR QL STRIP: NEGATIVE
NITRATE UR QL: NEGATIVE
PH UR STRIP: 6 PH (ref 5–7)
SOURCE: NORMAL
SP GR UR STRIP: 1.01 (ref 1–1.03)
UROBILINOGEN UR STRIP-ACNC: 0.2 EU/DL (ref 0.2–1)

## 2017-08-29 PROCEDURE — 99213 OFFICE O/P EST LOW 20 MIN: CPT | Performed by: UROLOGY

## 2017-08-29 PROCEDURE — 81003 URINALYSIS AUTO W/O SCOPE: CPT | Performed by: UROLOGY

## 2017-08-29 NOTE — MR AVS SNAPSHOT
After Visit Summary   8/29/2017    Angela Ibarra    MRN: [id number]           Patient Information     Date Of Birth          1960        Visit Information        Provider Department      8/29/2017 9:00 AM Eddie Mack MD [facility]        Today's Diagnoses     Dysuria    -  1       Follow-ups after your visit        Follow-up notes from your care team     Return if symptoms worsen or fail to improve.      Your next 10 appointments already scheduled     Aug 31, 2017  3:00 PM CDT   WESLEY Spine with Rox Julian, VICKIE   [place] [facility] ([facility])    [address]  [facility] [id number] [phone]            Sep 28, 2017   Procedure with Cody Arana MD   [facility]    [address]  [facility] [address] [phone]              Who to contact     If you have questions or need follow up information about today's clinic visit or your schedule please contact [facility] directly at [phone].  Normal or non-critical lab and imaging results will be communicated to you by MyChart, letter or phone within 4 business days after the clinic has received the results. If you do not hear from us within 7 days, please contact the clinic through FullStoryhart or phone. If you have a critical or abnormal lab result, we will notify you by phone as soon as possible.  Submit refill requests through [employer] or call your pharmacy and they will forward the refill request to us. Please allow 3 business days for your refill to be completed.          Additional Information About Your Visit        MyChart Information     [employer] gives you secure access to your electronic health record. If you see a primary care provider, you can also send messages to your care team and make appointments. If you have questions, please call your primary care clinic.  If you do not have a  primary care provider, please call [phone] and they will assist you.        Care EveryWhere ID     This is your Care EveryWhere ID. This could be used by other organizations to access your [place] medical records  [id number]        Your Vitals Were     Pulse Respirations Last Period BMI (Body Mass Index)          73 16 01/08/2008 39.32 kg/m2         Blood Pressure from Last 3 Encounters:   08/29/17 123/70   08/22/17 126/77   07/24/17 127/79    Weight from Last 3 Encounters:   08/29/17 97.5 kg (215 lb)   08/22/17 97.5 kg (215 lb)   07/16/17 101.6 kg (224 lb)              We Performed the Following     *UA reflex to Microscopic and Culture ([place] and [facility] (except [place] and [place])        Primary Care Provider Office Phone # Fax #    Germán Jernigan [phone] [address] [facility] [address]        Equal Access to Services     [facility] : Hadii aad ku hadasho Soomaali, waaxda luqadaha, qaybta kaalmada adeegyada, waxay idiin haylydian bell rocha . So [facility] [phone].    ATENCIÓN: Si habla español, tiene a lebron disposición servicios gratuitos de asistencia lingüística. Llame al [phone].    We comply with applicable federal civil rights laws and [place] laws. We do not discriminate on the basis of race, color, national origin, age, disability sex, sexual orientation or gender identity.            Thank you!     Thank you for choosing [facility]  for your care. Our goal is always to provide you with excellent care. Hearing back from our patients is one way we can continue to improve our services. Please take a few minutes to complete the written survey that you may receive in the mail after your visit with us. Thank you!             Your Updated Medication List - Protect others around you: Learn how to safely use, store and throw away your medicines at www.disposemymeds.org.          This list is accurate as of: 8/29/17 11:17 AM.   Always use your most recent med list.                   Brand Name Dispense Instructions for use Diagnosis    ADVIL PO      Take  by mouth. prn        atenolol 50 MG tablet    TENORMIN    30 tablet    Take 1 tablet (50 mg) by mouth daily    Essential hypertension with goal blood pressure less than 140/90       CALCIUM + D PO      1 tablet daily        calcium carbonate 500 MG chewable tablet    TUMS    180 chew tab    Take 1 tablet (500 mg) by mouth 3 times daily (with meals)    Osteoporosis, unspecified       diphenoxylate-atropine 2.5-0.025 MG per tablet    LOMOTIL    30 tablet    Take 2 tablets by mouth 4 times daily as needed for diarrhea    Diarrhea of presumed infectious origin       tolterodine 4 MG 24 hr capsule    DETROL LA    30 capsule    Take 1 capsule (4 mg) by mouth daily    Dysuria       traZODone 50 MG tablet    DESYREL    60 tablet    Take 2 tablets (100 mg) by mouth nightly as needed for sleep    Insomnia, unspecified type       triamcinolone 0.1 % cream    KENALOG    30 g    Apply topically 2 times daily as needed for irritation    Itching

## 2017-08-29 NOTE — NURSING NOTE
"Chief Complaint   Patient presents with     Recheck Medication     Detrol LA        Initial /70 (BP Location: Left arm, Patient Position: Chair)  Pulse 73  Resp 16  Wt 215 lb (97.5 kg)  LMP 01/08/2008  BMI 39.32 kg/m2 Estimated body mass index is 39.32 kg/(m^2) as calculated from the following:    Height as of 6/12/17: 5' 2\" (1.575 m).    Weight as of this encounter: 215 lb (97.5 kg).  Medication Reconciliation: complete   Angella Zhang LPN    "

## 2017-08-29 NOTE — PROGRESS NOTES
"Here for f/u rec UTI.   Hx incont, s/p Louise done elsewhere 4/10, excision after erosion by us 10/11, then [facility] 3/6/12.     Had a single UTI in mid July (E coli), now good. Specifically denies dysuria, gross hematuria, frequency.    Detrol did not seem to have much benefit; has not been on it for about one month.    Continues with chronic constipation; had bagel with cream cheese for breakfast; loose stools recently felt to be viral.    Has not been using Estrace; initially did not bother but then caused some \"stinging\" upon application so she d/c'd.      Current Outpatient Prescriptions   Medication     atenolol (TENORMIN) 50 MG tablet     traZODone (DESYREL) 50 MG tablet     Ibuprofen (ADVIL PO)     CALCIUM + D OR     diphenoxylate-atropine (LOMOTIL) 2.5-0.025 MG per tablet     triamcinolone (KENALOG) 0.1 % cream     tolterodine (DETROL LA) 4 MG 24 hr capsule     calcium carbonate (TUMS) 500 MG chewable tablet     No current facility-administered medications for this visit.      Facility-Administered Medications Ordered in Other Visits   Medication     bupivacaine 0.5% EPINEPHrine 1:200,000 injection           Results for orders placed or performed in visit on 08/29/17   *UA reflex to Microscopic and Culture ([place] and [facility] (except [place] and [place])   Result Value Ref Range    Color Urine Yellow     Appearance Urine Clear     Glucose Urine Negative NEG^Negative mg/dL    Bilirubin Urine Negative NEG^Negative    Ketones Urine Negative NEG^Negative mg/dL    Specific Gravity Urine 1.015 1.003 - 1.035    Blood Urine Negative NEG^Negative    pH Urine 6.0 5.0 - 7.0 pH    Protein Albumin Urine Negative NEG^Negative mg/dL    Urobilinogen Urine 0.2 0.2 - 1.0 EU/dL    Nitrite Urine Negative NEG^Negative    Leukocyte Esterase Urine Negative NEG^Negative    Source Midstream Urine        IMP:  1. Rec UTI, clear today  2. Atrophic vaginitis  3. Chronic constipation      PLAN:  1. Discussed situation " with patient in detail.  2. Consider am fiber; pt elects to proceed with Metamucil, as she did in the past  3. Restart Estrace; again disucssed rationale, application, etc; pt expresses understanding  4. Stressed importance of UC prior to starting abx  5. RTC only prn  6. 15 minutes spent with patient, more than 50% spent in counseling and coordination of care for UTI.  7. Copy PMD

## 2017-08-31 ENCOUNTER — THERAPY VISIT (OUTPATIENT)
Dept: PHYSICAL THERAPY | Facility: CLINIC | Age: 57
End: 2017-08-31
Payer: COMMERCIAL

## 2017-08-31 DIAGNOSIS — G89.29 CHRONIC RIGHT-SIDED LOW BACK PAIN WITHOUT SCIATICA: ICD-10-CM

## 2017-08-31 DIAGNOSIS — M54.50 CHRONIC RIGHT-SIDED LOW BACK PAIN WITHOUT SCIATICA: ICD-10-CM

## 2017-08-31 PROCEDURE — 97110 THERAPEUTIC EXERCISES: CPT | Mod: GP | Performed by: PHYSICAL THERAPIST

## 2017-08-31 PROCEDURE — 97140 MANUAL THERAPY 1/> REGIONS: CPT | Mod: GP | Performed by: PHYSICAL THERAPIST

## 2017-08-31 NOTE — PROGRESS NOTES
Subjective:    HPI                    Objective:    System    Physical Exam    General     ROS    Assessment/Plan:      SUBJECTIVE  Subjective changes as noted by pt:  Patient reports that she is doing the press ups about 5-6 times a day. She feels looser after doing them. She feels about 25-35% better since starting therapy. She is being more aware of using better postures. She has less pain with sitting when using better postures.      Current pain level: 0/10 Current Pain level: 0/10   Changes in function:  Yes (See Goal flowsheet attached for changes in current functional level)     Adverse reaction to treatment or activity:  None    OBJECTIVE  Changes in objective findings:  Standing LROM: FB with fingertips to toes and no pain but onset of pain with reps, BB mod loss and no pain and pain decreased and better with increased ROM following reps. Improved postural awareness, but still needs reminders.         ASSESSMENT  Angela continues to require intervention to meet STG and LTG's: PT  Patient's symptoms are resolving.  Patient is progressing as expected.  Response to therapy has shown an improvement in  pain level, posture, body mechanics and function  Progress made towards STG/LTG?  Yes (See Goal flowsheet attached for updates on achievement of STG and LTG)    PLAN  Current treatment program is being advanced to more complex exercises.  The following procedures have been added:  manual therapy    PTA/ATC plan:  N/A    Please refer to the daily flowsheet for treatment today, total treatment time and time spent performing 1:1 timed codes.

## 2017-08-31 NOTE — MR AVS SNAPSHOT
After Visit Summary   8/31/2017    Angela Ibarra    MRN: [id number]           Patient Information     Date Of Birth          1960        Visit Information        Provider Department      8/31/2017 3:00 PM Rox Julian, PT [facility]        Today's Diagnoses     Chronic right-sided low back pain without sciatica           Follow-ups after your visit        Your next 10 appointments already scheduled     Sep 07, 2017 11:50 AM CDT   [facility] Spine with Rox Julian PT   [facility] ([facility])    [address]  [facility] [address]   [phone]            Sep 12, 2017 11:10 AM CDT   [facility] Spine with Rox Julian PT   [facility] ([facility])    [address]  [facility] [id number] [phone]            Sep 28, 2017   Procedure with [place] Arana MD   [facility] (--)    [address]  [facility] [address] [phone]              Who to contact     If you have questions or need follow up information about today's clinic visit or your schedule please contact [facility]ER[facility] directly at [phone].  Normal or non-critical lab and imaging results will be communicated to you by MyChart, letter or phone within 4 business days after the clinic has received the results. If you do not hear from us within 7 days, please contact the clinic through MyChart or phone. If you have a critical or abnormal lab result, we will notify you by phone as soon as possible.  Submit refill requests through frooly or call your pharmacy and they will forward the refill request to us. Please allow 3 business days for your refill to be completed.          Additional Information About Your Visit        MyChart Information     Jump[employer]  gives you secure access to your electronic health record. If you see a primary care provider, you can also send messages to your care team and make appointments. If you have questions, please call your primary care clinic.  If you do not have a primary care provider, please call [phone] and they will assist you.        Care EveryWhere ID     This is your Care EveryWhere ID. This could be used by other organizations to access your [place] medical records  [id number]        Your Vitals Were     Last Period                   01/08/2008            Blood Pressure from Last 3 Encounters:   08/29/17 123/70   08/22/17 126/77   07/24/17 127/79    Weight from Last 3 Encounters:   08/29/17 97.5 kg (215 lb)   08/22/17 97.5 kg (215 lb)   07/16/17 101.6 kg (224 lb)              We Performed the Following     Manual Ther Tech, 1+Regions, EA 15 min     Therapeutic Exercises        Primary Care Provider Office Phone # Fax #    Germán Jernigan [phone] [address] [facility] [address]        Equal Access to Services     [facility]: Hadii aad ku hadasho Soomaali, waaxda luqadaha, qaybta kaalmada adeegyayazmin, oleksandr rocha . So [facility] [phone].    ATENCIÓN: Si habla español, tiene a lebron disposición servicios gratmnsos de asistencia lingüística. [facility] [phone].    We comply with applicable federal civil rights laws and [place] laws. We do not discriminate on the basis of race, color, national origin, age, disability sex, sexual orientation or gender identity.            Thank you!     Thank you for choosing [facility]  for your care. Our goal is always to provide you with excellent care. Hearing back from our patients is one way we can continue to improve our services. Please take a few minutes to complete the written survey that you may receive in the mail after your visit with us. Thank you!              Your Updated Medication List - Protect others around you: Learn how to safely use, store and throw away your medicines at www.disposemymeds.org.          This list is accurate as of: 8/31/17 11:59 PM.  Always use your most recent med list.                   Brand Name Dispense Instructions for use Diagnosis    ADVIL PO      Take  by mouth. prn        atenolol 50 MG tablet    TENORMIN    30 tablet    Take 1 tablet (50 mg) by mouth daily    Essential hypertension with goal blood pressure less than 140/90       CALCIUM + D PO      1 tablet daily        calcium carbonate 500 MG chewable tablet    TUMS    180 chew tab    Take 1 tablet (500 mg) by mouth 3 times daily (with meals)    Osteoporosis, unspecified       diphenoxylate-atropine 2.5-0.025 MG per tablet    LOMOTIL    30 tablet    Take 2 tablets by mouth 4 times daily as needed for diarrhea    Diarrhea of presumed infectious origin       tolterodine 4 MG 24 hr capsule    DETROL LA    30 capsule    Take 1 capsule (4 mg) by mouth daily    Dysuria       traZODone 50 MG tablet    DESYREL    60 tablet    Take 2 tablets (100 mg) by mouth nightly as needed for sleep    Insomnia, unspecified type       triamcinolone 0.1 % cream    KENALOG    30 g    Apply topically 2 times daily as needed for irritation    Itching

## 2017-09-07 ENCOUNTER — THERAPY VISIT (OUTPATIENT)
Dept: PHYSICAL THERAPY | Facility: CLINIC | Age: 57
End: 2017-09-07
Payer: COMMERCIAL

## 2017-09-07 DIAGNOSIS — M54.50 CHRONIC RIGHT-SIDED LOW BACK PAIN WITHOUT SCIATICA: ICD-10-CM

## 2017-09-07 DIAGNOSIS — G89.29 CHRONIC RIGHT-SIDED LOW BACK PAIN WITHOUT SCIATICA: ICD-10-CM

## 2017-09-07 PROCEDURE — 97140 MANUAL THERAPY 1/> REGIONS: CPT | Mod: GP | Performed by: PHYSICAL THERAPIST

## 2017-09-07 PROCEDURE — 97110 THERAPEUTIC EXERCISES: CPT | Mod: GP | Performed by: PHYSICAL THERAPIST

## 2017-09-07 NOTE — MR AVS SNAPSHOT
"              After Visit Summary   9/7/2017    Angela Ibarra    MRN: [id number]           Patient Information     Date Of Birth          1960        Visit Information        Provider Department      9/7/2017 11:50 AM Rox Julian PT [facility]        Today's Diagnoses     Chronic right-sided low back pain without sciatica           Follow-ups after your visit        Your next 10 appointments already scheduled     Sep 12, 2017 11:10 AM CDT   [facility] Spine with Rox Julian PT   [facility] ([facility])    [address]  [facility] [id number] [phone]            Sep 28, 2017   Procedure with [place] Arana MD   [facility] [place]    [address]  [facility] [address] [phone]              Who to contact     If you have questions or need follow up information about today's clinic visit or your schedule please contact [facility] directly at [phone].  Normal or non-critical lab and imaging results will be communicated to you by MyChart, letter or phone within 4 business days after the clinic has received the results. If you do not hear from us within 7 days, please contact the clinic through [employer] or phone. If you have a critical or abnormal lab result, we will notify you by phone as soon as possible.  Submit refill requests through Bit[employer] or call your pharmacy and they will forward the refill request to us. Please allow 3 business days for your refill to be completed.          Additional Information About Your Visit        MyChart Information     [employer] lets you send messages to your doctor, view your test results, renew your prescriptions, schedule appointments and more. To sign up, go to [email or web address] . Click on \"Log in\" on the left side of the screen, which will " "take you to the Welcome page. Then click on \"Sign up Now\" on the right side of the page.     You will be asked to enter the access code listed below, as well as some personal information. Please follow the directions to create your username and password.     Your access code is: [email or web address]  Expires: 12/10/2017  5:49 PM     Your access code will  in 90 days. If you need help or a new code, please call your [place] clinic or [phone].        Care EveryWhere ID     This is your Care EveryWhere ID. This could be used by other organizations to access your [place] medical records  [id number]        Your Vitals Were     Last Period                   2008            Blood Pressure from Last 3 Encounters:   17 (!) 132/94   17 134/81   17 123/70    Weight from Last 3 Encounters:   17 90.7 kg (200 lb)   17 99.8 kg (220 lb)   17 97.5 kg (215 lb)              We Performed the Following     Manual Ther Tech, 1+Regions, EA 15 min     Therapeutic Exercises        Primary Care Provider Office Phone # Fax #    Germán Jernigan [phone] [address] [facility] [address]        Equal Access to Services     [facility] : Hadii aad ku hadasho Soomaali, waaxda luqadaha, qaybta kaalmada adeegyada, oleksandr chen haylydian bell rocha . So [facility] [phone].    ATENCIÓN: Si habla español, tiene a lebron disposición servicios gratuitos de asistencia lingüística. Llame al [phone].    We comply with applicable federal civil rights laws and [place] laws. We do not discriminate on the basis of race, color, national origin, age, disability sex, sexual orientation or gender identity.            Thank you!     Thank you for choosing [facility] PH[facility]ICAL [facility]  for your care. Our goal is always to provide you with excellent care. Hearing back from our patients is one way we can continue to improve our services. Please " take a few minutes to complete the written survey that you may receive in the mail after your visit with us. Thank you!             Your Updated Medication List - Protect others around you: Learn how to safely use, store and throw away your medicines at www.disposemymeds.org.          This list is accurate as of: 9/7/17 11:59 PM.  Always use your most recent med list.                   Brand Name Dispense Instructions for use Diagnosis    ADVIL PO      Take  by mouth. prn        atenolol 50 MG tablet    TENORMIN    30 tablet    Take 1 tablet (50 mg) by mouth daily    Essential hypertension with goal blood pressure less than 140/90       CALCIUM + D PO      1 tablet daily        calcium carbonate 500 MG chewable tablet    TUMS    180 chew tab    Take 1 tablet (500 mg) by mouth 3 times daily (with meals)    Osteoporosis, unspecified       diphenoxylate-atropine 2.5-0.025 MG per tablet    LOMOTIL    30 tablet    Take 2 tablets by mouth 4 times daily as needed for diarrhea    Diarrhea of presumed infectious origin       tolterodine 4 MG 24 hr capsule    DETROL LA    30 capsule    Take 1 capsule (4 mg) by mouth daily    Dysuria       traZODone 50 MG tablet    DESYREL    60 tablet    Take 2 tablets (100 mg) by mouth nightly as needed for sleep    Insomnia, unspecified type       triamcinolone 0.1 % cream    KENALOG    30 g    Apply topically 2 times daily as needed for irritation    Itching

## 2017-09-07 NOTE — PROGRESS NOTES
Subjective:    HPI                    Objective:    System    Physical Exam    General     ROS    Assessment/Plan:      SUBJECTIVE  Subjective changes as noted by pt:  Patient reports that she is about 30-40% better since starting therapy. She no longer has pain every day. She is a bit sorer today and is not sure why. She is doing press ups about twice a day and the standing arch back exercises about 5-6 times a day.     Current Pain level: 2/10   Changes in function:  Yes (See Goal flowsheet attached for changes in current functional level)     Adverse reaction to treatment or activity:  None    OBJECTIVE  Changes in objective findings:  Standing LROM: FB with fingertips to toes and no change, BB mod loss and increase LBP and DAREN decrease and some better and decrease and better following REIL and lumbar extension mobs.         ASSESSMENT  Angela continues to require intervention to meet STG and LTG's: PT  Patient's symptoms are resolving.  Patient is progressing as expected.  Response to therapy has shown an improvement in  pain level, ROM , posture, body mechanics and function  Progress made towards STG/LTG?  Yes (See Goal flowsheet attached for updates on achievement of STG and LTG)    PLAN  Current treatment program is being advanced to more complex exercises.    PTA/ATC plan:  N/A    Please refer to the daily flowsheet for treatment today, total treatment time and time spent performing 1:1 timed codes.

## 2017-09-11 ENCOUNTER — HOSPITAL ENCOUNTER (EMERGENCY)
Facility: CLINIC | Age: 57
Discharge: HOME | End: 2017-09-11
Attending: FAMILY MEDICINE | Admitting: FAMILY MEDICINE
Payer: COMMERCIAL

## 2017-09-11 ENCOUNTER — OFFICE VISIT (OUTPATIENT)
Dept: URGENT CARE | Facility: URGENT CARE | Age: 57
End: 2017-09-11
Payer: COMMERCIAL

## 2017-09-11 VITALS
SYSTOLIC BLOOD PRESSURE: 134 MMHG | RESPIRATION RATE: 15 BRPM | DIASTOLIC BLOOD PRESSURE: 81 MMHG | HEART RATE: 75 BPM | TEMPERATURE: 97.9 F | BODY MASS INDEX: 40.24 KG/M2 | WEIGHT: 220 LBS | OXYGEN SATURATION: 97 %

## 2017-09-11 VITALS
TEMPERATURE: 97.8 F | HEART RATE: 73 BPM | BODY MASS INDEX: 36.8 KG/M2 | HEIGHT: 62 IN | WEIGHT: 200 LBS | DIASTOLIC BLOOD PRESSURE: 94 MMHG | RESPIRATION RATE: 16 BRPM | OXYGEN SATURATION: 95 % | SYSTOLIC BLOOD PRESSURE: 132 MMHG

## 2017-09-11 DIAGNOSIS — T78.40XA ALLERGIC REACTION CAUSED BY A DRUG, INITIAL ENCOUNTER: ICD-10-CM

## 2017-09-11 DIAGNOSIS — R82.90 NONSPECIFIC FINDING ON EXAMINATION OF URINE: ICD-10-CM

## 2017-09-11 DIAGNOSIS — N30.00 ACUTE CYSTITIS WITHOUT HEMATURIA: Primary | ICD-10-CM

## 2017-09-11 LAB
ALBUMIN UR-MCNC: ABNORMAL MG/DL
APPEARANCE UR: ABNORMAL
BACTERIA #/AREA URNS HPF: ABNORMAL /HPF
BILIRUB UR QL STRIP: NEGATIVE
COLOR UR AUTO: YELLOW
GLUCOSE UR STRIP-MCNC: NEGATIVE MG/DL
HGB UR QL STRIP: ABNORMAL
KETONES UR STRIP-MCNC: NEGATIVE MG/DL
LEUKOCYTE ESTERASE UR QL STRIP: ABNORMAL
NITRATE UR QL: NEGATIVE
NON-SQ EPI CELLS #/AREA URNS LPF: ABNORMAL /LPF
PH UR STRIP: 6 PH (ref 5–7)
RBC #/AREA URNS AUTO: ABNORMAL /HPF
SOURCE: ABNORMAL
SP GR UR STRIP: 1.01 (ref 1–1.03)
UROBILINOGEN UR STRIP-ACNC: 0.2 EU/DL (ref 0.2–1)
WBC #/AREA URNS AUTO: >100 /HPF

## 2017-09-11 PROCEDURE — 25000125 ZZHC RX 250: Performed by: FAMILY MEDICINE

## 2017-09-11 PROCEDURE — 81001 URINALYSIS AUTO W/SCOPE: CPT | Performed by: NURSE PRACTITIONER

## 2017-09-11 PROCEDURE — 25000128 H RX IP 250 OP 636: Performed by: FAMILY MEDICINE

## 2017-09-11 PROCEDURE — 99284 EMERGENCY DEPT VISIT MOD MDM: CPT | Performed by: FAMILY MEDICINE

## 2017-09-11 PROCEDURE — 87186 SC STD MICRODIL/AGAR DIL: CPT | Performed by: NURSE PRACTITIONER

## 2017-09-11 PROCEDURE — 96372 THER/PROPH/DIAG INJ SC/IM: CPT

## 2017-09-11 PROCEDURE — 87086 URINE CULTURE/COLONY COUNT: CPT | Performed by: NURSE PRACTITIONER

## 2017-09-11 PROCEDURE — 99284 EMERGENCY DEPT VISIT MOD MDM: CPT | Mod: 25

## 2017-09-11 PROCEDURE — 87088 URINE BACTERIA CULTURE: CPT | Performed by: NURSE PRACTITIONER

## 2017-09-11 PROCEDURE — 99213 OFFICE O/P EST LOW 20 MIN: CPT | Performed by: NURSE PRACTITIONER

## 2017-09-11 RX ORDER — NITROFURANTOIN 25; 75 MG/1; MG/1
100 CAPSULE ORAL 2 TIMES DAILY
Qty: 14 CAPSULE | Refills: 0 | Status: SHIPPED | OUTPATIENT
Start: 2017-09-11 | End: 2017-09-28

## 2017-09-11 RX ORDER — PERMETHRIN 50 MG/G
CREAM TOPICAL
COMMUNITY
Start: 2017-07-24 | End: 2017-09-11

## 2017-09-11 RX ORDER — DIPHENHYDRAMINE HYDROCHLORIDE 50 MG/ML
50 INJECTION INTRAMUSCULAR; INTRAVENOUS ONCE
Status: COMPLETED | OUTPATIENT
Start: 2017-09-11 | End: 2017-09-11

## 2017-09-11 RX ORDER — DEXAMETHASONE SODIUM PHOSPHATE 4 MG/ML
10 VIAL (ML) INJECTION ONCE
Status: COMPLETED | OUTPATIENT
Start: 2017-09-11 | End: 2017-09-11

## 2017-09-11 RX ORDER — CIPROFLOXACIN 500 MG/1
500 TABLET, FILM COATED ORAL 2 TIMES DAILY
Qty: 14 TABLET | Refills: 0 | Status: SHIPPED | OUTPATIENT
Start: 2017-09-11 | End: 2017-09-13 | Stop reason: ALTCHOICE

## 2017-09-11 RX ADMIN — DIPHENHYDRAMINE HYDROCHLORIDE 50 MG: 50 INJECTION, SOLUTION INTRAMUSCULAR; INTRAVENOUS at 22:11

## 2017-09-11 RX ADMIN — DEXAMETHASONE SODIUM PHOSPHATE 10 MG: 4 INJECTION, SOLUTION INTRAMUSCULAR; INTRAVENOUS at 22:10

## 2017-09-11 NOTE — ED AVS SNAPSHOT
[facility] Emergency Department    [address] [facility] [address]    Phone:  [phone]    Fax:  [phone]                                       Angela Ibarra   MRN: [id number]    Department:  [facility] Emergency Department   Date of Visit:  9/11/2017           Patient Information     Date Of Birth          1960        Your diagnoses for this visit were:     Allergic reaction caused by a drug, initial encounter macrobid       You were seen by Garry Carvalho MD.        Discharge Instructions       Return to the Emergency Room if the following occurs:     Worsened breathing, new mouth swelling, or for any concern at anytime.    Or, follow-up with the following provider as we discussed:     Return to your primary doctor later this week if urinary symptoms aren't improved.     Medications discussed:    Ciprofloxacin.  STOP Macrobid.    If you received pain-relieving or sedating medication during your time in the ER, avoid alcohol, driving automobiles, or working with machinery.  Also, a responsible adult must stay with you.      If you had X-rays or labs done we will attempt to contact you if there is a change needed in your care.      Call the Nurse Advice Line at [phone] or [phone] for any concern at anytime.      Future Appointments        Provider Department Dept Phone Center    9/12/2017 11:10 AM KIT DARDEN PT [facility] [phone] [facility]      24 Hour Appointment Hotline       To make an appointment at any [facility], call [phone] ([phone]). If you don't have a family doctor or clinic, we will help you find one. [facility] are conveniently located to serve the needs of you and your family.             Review of your medicines      START taking        Dose / Directions Last dose taken    ciprofloxacin 500 MG tablet   Commonly known as:  CIPRO   Dose:  500 mg   Quantity:  14  tablet        Take 1 tablet (500 mg) by mouth 2 times daily for 7 days   Refills:  0          Our records show that you are taking the medicines listed below. If these are incorrect, please call your family doctor or clinic.        Dose / Directions Last dose taken    ADVIL PO        Take  by mouth. prn   Refills:  0        atenolol 50 MG tablet   Commonly known as:  TENORMIN   Dose:  50 mg   Quantity:  30 tablet        Take 1 tablet (50 mg) by mouth daily   Refills:  5        CALCIUM + D PO        1 tablet daily   Refills:  0        calcium carbonate 500 MG chewable tablet   Commonly known as:  TUMS   Dose:  1 chew tab   Quantity:  180 chew tab        Take 1 tablet (500 mg) by mouth 3 times daily (with meals)   Refills:  3        diphenoxylate-atropine 2.5-0.025 MG per tablet   Commonly known as:  LOMOTIL   Dose:  2 tablet   Quantity:  30 tablet        Take 2 tablets by mouth 4 times daily as needed for diarrhea   Refills:  0        nitroFURantoin (macrocrystal-monohydrate) 100 MG capsule   Commonly known as:  MACROBID   Dose:  100 mg   Quantity:  14 capsule        Take 1 capsule (100 mg) by mouth 2 times daily   Refills:  0        tolterodine 4 MG 24 hr capsule   Commonly known as:  DETROL LA   Dose:  4 mg   Quantity:  30 capsule        Take 1 capsule (4 mg) by mouth daily   Refills:  1        traZODone 50 MG tablet   Commonly known as:  DESYREL   Dose:  100 mg   Quantity:  60 tablet        Take 2 tablets (100 mg) by mouth nightly as needed for sleep   Refills:  6        triamcinolone 0.1 % cream   Commonly known as:  KENALOG   Quantity:  30 g        Apply topically 2 times daily as needed for irritation   Refills:  0                Prescriptions were sent or printed at these locations (1 Prescription)                   Other Prescriptions                Printed at Department/Unit printer (1 of 1)         ciprofloxacin (CIPRO) 500 MG tablet                Orders Needing Specimen Collection     None      Pending  "Results     Date and Time Order Name Status Description    2017 1729 URINE CULTURE AEROBIC BACTERIAL In process             Pending Culture Results     Date and Time Order Name Status Description    2017 1729 URINE CULTURE AEROBIC BACTERIAL In process             Pending Results Instructions     If you had any lab results that were not finalized at the time of your Discharge, you can call the ED Lab Result RN at [phone]. You will be contacted by this team for any positive Lab results or changes in treatment. The nurses are available 7 days a week from 10A to 6:30P.  You can leave a message 24 hours per day and they will return your call.        Test Results From Your Hospital Stay               Thank you for choosing [place]       Thank you for choosing [place] for your care. Our goal is always to provide you with excellent care. Hearing back from our patients is one way we can continue to improve our services. Please take a few minutes to complete the written survey that you may receive in the mail after you visit with us. Thank you!        Who is Undercover Spyharfrenting Information     [employer] lets you send messages to your doctor, view your test results, renew your prescriptions, schedule appointments and more. To sign up, go to [email or web address] . Click on \"Log in\" on the left side of the screen, which will take you to the Welcome page. Then click on \"Sign up Now\" on the right side of the page.     You will be asked to enter the access code listed below, as well as some personal information. Please follow the directions to create your username and password.     Your access code is: [email or web address]  Expires: 12/10/2017  5:49 PM     Your access code will  in 90 days. If you need help or a new code, please call your [place] clinic or [phone].        Care EveryWhere ID     This is your Care EveryWhere ID. This could be used by other organizations to access your [place] medical records  [id number]      "   Equal Access to Services     ENOCH ARROYO : Anusha Diaz, maryam jimneez, oleksandr richards. So [facility] [phone].    ATENCIÓN: Si habla español, tiene a lebron disposición servicios gratuitos de asistencia lingüística. Llame al [phone].    We comply with applicable federal civil rights laws and [place] laws. We do not discriminate on the basis of race, color, national origin, age, disability sex, sexual orientation or gender identity.            After Visit Summary       This is your record. Keep this with you and show to your community pharmacist(s) and doctor(s) at your next visit.

## 2017-09-11 NOTE — ED AVS SNAPSHOT
[facility]    [address] [address]    Phone:  [phone]    Fax:  [phone]                                       Angela Ibarra   MRN: [id number]    Department:  [facility]   Date of Visit:  9/11/2017           After Visit Summary Signature Page     I have received my discharge instructions, and my questions have been answered. I have discussed any challenges I see with this plan with the nurse or doctor.    ..........................................................................................................................................  Patient/Patient Representative Signature      ..........................................................................................................................................  Patient Representative Print Name and Relationship to Patient    ..................................................               ................................................  Date                                            Time    ..........................................................................................................................................  Reviewed by Signature/Title    ...................................................              ..............................................  Date                                                            Time

## 2017-09-11 NOTE — PROGRESS NOTES
SUBJECTIVE:                                                    Angela Ibarra is a 56 year old female who presents to clinic today for the following health issues:      URINARY TRACT SYMPTOMS      Duration: 2 days    Description  dysuria, frequency and urgency    Intensity:  moderate    Accompanying signs and symptoms:  Fever/chills: no   Flank pain no   Nausea and vomiting: no   Vaginal symptoms: odor and itching  Abdominal/Pelvic Pain: no     History  History of frequent UTI's: YES  History of kidney stones: YES  Sexually Active: YES  Possibility of pregnancy: No    Precipitating or alleviating factors: None    Therapies tried and outcome: none   Outcome:       Problem list and histories reviewed & adjusted, as indicated.  Additional history: as documented    Patient Active Problem List   Diagnosis     Obesity     NONSPECIFIC COLITIS     Migraine headache     Stress incontinence     Balance disorder     Right leg weakness     Right carpal tunnel syndrome     Osteoporosis     Loose body in ankle and foot joint     Synovitis of ankle     Malunion, fracture     Pain in joint involving ankle and foot     Abnormal gait     Post-proc states NEC     Chondromalacia, left ankle and joints of left foot     CARDIOVASCULAR SCREENING; LDL GOAL LESS THAN 130     Advanced directives, counseling/discussion     Insomnia, unspecified type     Major depressive disorder, recurrent episode, mild (H)     Essential hypertension with goal blood pressure less than 140/90     Chronic right-sided low back pain without sciatica     Past Surgical History:   Procedure Laterality Date     ARTHROSCOPY ANKLE  2014    Procedure: ARTHROSCOPY ANKLE;  Surgeon: Shaun Bhatt DPM;  Location: [facility] OR     C  DELIVERY ONLY      , Low Cervical     C GASTRIC BYPASS,OBESITY,W/SM BOWEL RECONS  10/99     C LIGATE FALLOPIAN TUBE  2000    laparoscopy     HYSTERECTOMY, PAP NO LONGER INDICATED  2008     LAPAROSCOPIC  CHOLECYSTECTOMY  8/3/2012    Procedure: LAPAROSCOPIC CHOLECYSTECTOMY;  Laparoscopic Cholecystectomy ;  Surgeon: Corwin Cabezas MD;  Location: [facility]     OPEN REDUCTION INTERNAL FIXATION ANKLE  4/22/2014    Procedure: OPEN REDUCTION INTERNAL FIXATION ANKLE;  Surgeon: Shaun Bhatt DPM;  Location: [facility] OR     OPEN REDUCTION INTERNAL FIXATION ELBOW  10/15/2013    Procedure: OPEN REDUCTION INTERNAL FIXATION ELBOW;  OPEN REDUCTION INTERNAL FIXATION LEFT PROXIMAL ULNA;  Surgeon: Artem Last DO;  Location: [facility] OR     ORTHOPEDIC SURGERY      left shoulder surgery     REMOVE MESH VAGINA  10/10/2011    Procedure:REMOVE MESH VAGINA; Excision of Vaginal Mesh; Surgeon:SERGE SALGADO; Location:[facility] OR     SURGICAL HISTORY OF -   4/20/10    Transobturator midurethral sling     SURGICAL HISTORY OF -   8/1999    exploratory laparotomy, colitis     SURGICAL HISTORY OF -   4/20/10    Transobturator midurethral sling     TUBAL LIGATION         Social History   Substance Use Topics     Smoking status: Never Smoker     Smokeless tobacco: Never Used     Alcohol use Yes      Comment: occas     Family History   Problem Relation Age of Onset     C.A.D. Mother      MI     Hypertension Mother      Hypertension Father      Breast Cancer No family hx of              ROS:  Constitutional, HEENT, cardiovascular, pulmonary, gi and gu systems are negative, except as otherwise noted.      OBJECTIVE:   /81  Pulse 75  Temp 97.9  F (36.6  C) (Oral)  Resp 15  Wt 220 lb (99.8 kg)  LMP 01/08/2008  SpO2 97%  Breastfeeding? No  BMI 40.24 kg/m2  Body mass index is 40.24 kg/(m^2).  GENERAL: healthy, alert and no distress  RESP: lungs clear to auscultation - no rales, rhonchi or wheezes  CV: regular rate and rhythm, normal S1 S2, no S3 or S4, no murmur, click or rub, no peripheral edema and peripheral pulses strong  ABDOMEN: soft, nontender, no hepatosplenomegaly, no masses and bowel sounds normal    Diagnostic Test  Results:  Results for orders placed or performed in visit on 09/11/17 (from the past 24 hour(s))   *UA reflex to Microscopic and Culture ([place] [facility] (except [place] and [place])   Result Value Ref Range    Color Urine Yellow     Appearance Urine Cloudy     Glucose Urine Negative NEG^Negative mg/dL    Bilirubin Urine Negative NEG^Negative    Ketones Urine Negative NEG^Negative mg/dL    Specific Gravity Urine 1.010 1.003 - 1.035    Blood Urine Large (A) NEG^Negative    pH Urine 6.0 5.0 - 7.0 pH    Protein Albumin Urine Trace (A) NEG^Negative mg/dL    Urobilinogen Urine 0.2 0.2 - 1.0 EU/dL    Nitrite Urine Negative NEG^Negative    Leukocyte Esterase Urine Large (A) NEG^Negative    Source Midstream Urine    Urine Microscopic   Result Value Ref Range    WBC Urine >100 (A) OTO2^O - 2 /HPF    RBC Urine 10-25 (A) OTO2^O - 2 /HPF    Squamous Epithelial /LPF Urine Few FEW^Few /LPF    Bacteria Urine Few (A) NEG^Negative /HPF       ASSESSMENT/PLAN:     1. Acute cystitis without hematuria    crystal-monohydrate, (MACROBID) 100 MG capsule; Take 1 capsule (100 mg) by mouth 2 times daily  Dispense: 14 capsule; Refill: 0    2. Nonspecific finding on examination of urine    - Urine Culture Aerobic Bacterial    See Patient Instructions    [facility]

## 2017-09-11 NOTE — MR AVS SNAPSHOT
"              After Visit Summary   9/11/2017    Angela Ibarra    MRN: [id number]           Patient Information     Date Of Birth          1960        Visit Information        Provider Department      9/11/2017 5:20 PM Zuleyma Zhang APRN CNP [facility]        Today's Diagnoses     Acute cystitis without hematuria    -  1    Nonspecific finding on examination of urine           Follow-ups after your visit        Your next 10 appointments already scheduled     Sep 12, 2017 11:10 AM CDT   [facility] with Rox Julian PT   [facility] ([facility])    [address]  [facility] [id number] [phone]            Sep 28, 2017   Procedure with Cody Arana MD   [facility]    [address]  [facility] [id number] [phone]              Who to contact     If you have questions or need follow up information about today's clinic visit or your schedule please contact [facility] directly at [phone].  Normal or non-critical lab and imaging results will be communicated to you by [employer], letter or phone within 4 business days after the clinic has received the results. If you do not hear from us within 7 days, please contact the clinic through [employer] or phone. If you have a critical or abnormal lab result, we will notify you by phone as soon as possible.  Submit refill requests through [employer] or call your pharmacy and they will forward the refill request to us. Please allow 3 business days for your refill to be completed.          Additional Information About Your Visit        [employer] Information     [employer] lets you send messages to your doctor, view your test results, renew your prescriptions, schedule appointments and more. To sign up, go to [email or web address] . Click on \"Log in\" on the left side of the screen, which will take you to the Welcome page. " "Then click on \"Sign up Now\" on the right side of the page.     You will be asked to enter the access code listed below, as well as some personal information. Please follow the directions to create your username and password.     Your access code is: [email or web address]  Expires: 12/10/2017  5:49 PM     Your access code will  in 90 days. If you need help or a new code, please call your [place] clinic or [phone].        Care EveryWhere ID     This is your Care EveryWhere ID. This could be used by other organizations to access your [place] medical records  [id number]        Your Vitals Were     Pulse Temperature Respirations Last Period Pulse Oximetry Breastfeeding?    75 97.9  F (36.6  C) (Oral) 15 2008 97% No    BMI (Body Mass Index)                   40.24 kg/m2            Blood Pressure from Last 3 Encounters:   17 134/81   17 123/70   17 126/77    Weight from Last 3 Encounters:   17 220 lb (99.8 kg)   17 215 lb (97.5 kg)   17 215 lb (97.5 kg)              We Performed the Following     *UA reflex to Microscopic and Culture ([place] and [facility] (except [place] and [place])     Urine Culture Aerobic Bacterial     Urine Microscopic          Today's Medication Changes          These changes are accurate as of: 17  5:49 PM.  If you have any questions, ask your nurse or doctor.               Start taking these medicines.        Dose/Directions    nitroFURantoin (macrocrystal-monohydrate) 100 MG capsule   Commonly known as:  MACROBID   Used for:  Acute cystitis without hematuria   Started by:  Zuleyma Zhang APRN CNP        Dose:  100 mg   Take 1 capsule (100 mg) by mouth 2 times daily   Quantity:  14 capsule   Refills:  0            Where to get your medicines      These medications were sent to [facility] [address]  [address] [address] [facility] [address], [facility] [address]     Phone:  [phone]     " nitroFURantoin (macrocrystal-monohydrate) 100 MG capsule                Primary Care Provider Office Phone # Fax #    Germán Jernigan [phone] [address] [address] [facility] [address]        Equal Access to Services     ENOCH ARROYO : Hadii aad ku hadinoo Soomaali, waaxda luqadaha, qaybta kaalmada adeegyada, waxrain omarin haylydian ademario johnson la'lydiamahendra sandoval. So [facility] [phone].    ATENCIÓN: Si habla español, tiene a lebron disposición servicios gratuitos de asistencia lingüística. Llame al [phone].    We comply with applicable federal civil rights laws and [place] laws. We do not discriminate on the basis of race, color, national origin, age, disability sex, sexual orientation or gender identity.            Thank you!     Thank you for choosing [facility]  for your care. Our goal is always to provide you with excellent care. Hearing back from our patients is one way we can continue to improve our services. Please take a few minutes to complete the written survey that you may receive in the mail after your visit with us. Thank you!             Your Updated Medication List - Protect others around you: Learn how to safely use, store and throw away your medicines at www.disposemymeds.org.          This list is accurate as of: 9/11/17  5:49 PM.  Always use your most recent med list.                   Brand Name Dispense Instructions for use Diagnosis    ADVIL PO      Take  by mouth. prn        atenolol 50 MG tablet    TENORMIN    30 tablet    Take 1 tablet (50 mg) by mouth daily    Essential hypertension with goal blood pressure less than 140/90       CALCIUM + D PO      1 tablet daily        calcium carbonate 500 MG chewable tablet    TUMS    180 chew tab    Take 1 tablet (500 mg) by mouth 3 times daily (with meals)    Osteoporosis, unspecified       diphenoxylate-atropine 2.5-0.025 MG per tablet    LOMOTIL    30 tablet    Take 2 tablets by mouth 4 times daily as needed for diarrhea     Diarrhea of presumed infectious origin       nitroFURantoin (macrocrystal-monohydrate) 100 MG capsule    MACROBID    14 capsule    Take 1 capsule (100 mg) by mouth 2 times daily    Acute cystitis without hematuria       tolterodine 4 MG 24 hr capsule    DETROL LA    30 capsule    Take 1 capsule (4 mg) by mouth daily    Dysuria       traZODone 50 MG tablet    DESYREL    60 tablet    Take 2 tablets (100 mg) by mouth nightly as needed for sleep    Insomnia, unspecified type       triamcinolone 0.1 % cream    KENALOG    30 g    Apply topically 2 times daily as needed for irritation    Itching

## 2017-09-11 NOTE — NURSING NOTE
"Chief Complaint   Patient presents with     UTI     c/o frequency       Initial /81  Pulse 75  Temp 97.9  F (36.6  C) (Oral)  Resp 15  Wt 220 lb (99.8 kg)  LMP 01/08/2008  SpO2 97%  Breastfeeding? No  BMI 40.24 kg/m2 Estimated body mass index is 40.24 kg/(m^2) as calculated from the following:    Height as of 6/12/17: 5' 2\" (1.575 m).    Weight as of this encounter: 220 lb (99.8 kg).  Medication Reconciliation: complete   Deonte Montaño MA      "

## 2017-09-12 ENCOUNTER — THERAPY VISIT (OUTPATIENT)
Dept: PHYSICAL THERAPY | Facility: CLINIC | Age: 57
End: 2017-09-12
Payer: COMMERCIAL

## 2017-09-12 DIAGNOSIS — M54.50 CHRONIC RIGHT-SIDED LOW BACK PAIN WITHOUT SCIATICA: ICD-10-CM

## 2017-09-12 DIAGNOSIS — G89.29 CHRONIC RIGHT-SIDED LOW BACK PAIN WITHOUT SCIATICA: ICD-10-CM

## 2017-09-12 DIAGNOSIS — I10 ESSENTIAL HYPERTENSION WITH GOAL BLOOD PRESSURE LESS THAN 140/90: ICD-10-CM

## 2017-09-12 PROCEDURE — 97140 MANUAL THERAPY 1/> REGIONS: CPT | Mod: GP | Performed by: PHYSICAL THERAPIST

## 2017-09-12 PROCEDURE — 97110 THERAPEUTIC EXERCISES: CPT | Mod: GP | Performed by: PHYSICAL THERAPIST

## 2017-09-12 NOTE — PROGRESS NOTES
Subjective:    HPI                    Objective:    System    Physical Exam    General     ROS    Assessment/Plan:      SUBJECTIVE  Subjective changes as noted by pt:  Patient [place] and the drive bothered and the beds weren't the greatest. She also went [place] for about an hour and got quite sore from that. Her pain level was about 5/10 on Sunday. She had only done her press ups about twice each day on the weekend. She started to feel much better after doing the press ups more often yesterday. She is better overall though.       Current pain level: 0/10     Changes in function:  Yes (See Goal flowsheet attached for changes in current functional level)     Adverse reaction to treatment or activity:  None    OBJECTIVE  Changes in objective findings:  Standing LROM: FB with fingertips to toes and no pain and RFIS increases and worse, BB mod loss and increase, but decrease with reps. Following REIL and lumbar extension mobs, pain decreased by 50+% and BB min to mod loss and min ache.         ASSESSMENT  Angela continues to require intervention to meet STG and LTG's: PT  Patient has experienced an exacerbation of symptoms, but is now again improving.     Response to therapy has shown a worsening of  pain level  Progress made towards STG/LTG?  Yes (See Goal flowsheet attached for updates on achievement of STG and LTG)    PLAN  Continue current treatment plan until patient demonstrates readiness to progress to higher level exercises.    PTA/ATC plan:  N/A    Please refer to the daily flowsheet for treatment today, total treatment time and time spent performing 1:1 timed codes.

## 2017-09-12 NOTE — ED PROVIDER NOTES
"HPI  Patient is a 56-year-old female presenting with concern for medication reaction versus allergy.  She was diagnosed with a urinary tract infection today while in the clinic.  She was told to take Macrobid.  She took her first tablet at about 6:15 PM.  Approximately 30 minutes later, she began to feel diffuse pruritus.  She did not appreciate a rash.  She had some nausea but no vomiting.  She denies chest tightness or wheezing.  No difficulty with breathing.  No diarrhea.  No lightheadedness or fainting.  She has taken Macrobid previously without difficulty.    ROS: All other review of systems are negative other than that noted above.   PMH: Reviewed.  SH: Reviewed.  FH: Reviewed.      PHYSICAL  BP (!) 132/94  Pulse 72  Temp 97.8  F (36.6  C) (Oral)  Resp 16  Ht 1.575 m (5' 2\")  Wt 90.7 kg (200 lb)  LMP 01/08/2008  SpO2 95%  BMI 36.58 kg/m2  General: Patient is alert and in moderate distress.  Leaning against her , concerned.  Ob.  Neurological: Alert.  Moving upper and lower extremities equally, bilaterally.  Head / Neck: Atraumatic.  Ears: Not done.  Eyes: Pupils are equal, round, and reactive.  Normal conjunctiva.  Nose: Midline.  No epistaxis.  Mouth / Throat: No ulcerations or lesions.  Upper pharynx is not erythematous.  Moist.  Respiratory: No respiratory distress. CTA B.  Cardiovascular: Regular rhythm.  Peripheral extremities are warm.  No edema.  No calf tenderness.  Abdomen / Pelvis: Not tender.  No distention.  Soft throughout.  Genitalia: Not done.  Musculoskeletal: No tenderness over major muscles and joints.  Skin: No evidence of rash or trauma.        PHYSICIAN  2207.  Patient has symptoms concerning for an allergic reaction.  She talks about having \"a loss of time\" for about 30 minutes shortly after the onset of her pruritus.  I'm not sure if this is going to be related to her reaction or if this is hyperventilation or anxiety.  I will provide Benadryl 50 mg IM and Decadron 10 mg " oral dosing here.    2253.  Patient no longer has pruritus.  She feels better, overall.  She does continue to have some anxiety but is comfortable going home.  Ciprofloxacin will be filled and started tomorrow.      IMPRESSION    ICD-10-CM    1. Allergic reaction caused by a drug, initial encounter T78.40XA     macrobid           Critical Care time:  none                      Garry Carvalho MD  09/11/17 [id number]

## 2017-09-12 NOTE — DISCHARGE INSTRUCTIONS
Return to the Emergency Room if the following occurs:     Worsened breathing, new mouth swelling, or for any concern at anytime.    Or, follow-up with the following provider as we discussed:     Return to your primary doctor later this week if urinary symptoms aren't improved.     Medications discussed:    Ciprofloxacin.  STOP Macrobid.    If you received pain-relieving or sedating medication during your time in the ER, avoid alcohol, driving automobiles, or working with machinery.  Also, a responsible adult must stay with you.      If you had X-rays or labs done we will attempt to contact you if there is a change needed in your care.      Call the Nurse Advice Line at [phone] or [phone] for any concern at anytime.

## 2017-09-12 NOTE — MR AVS SNAPSHOT
After Visit Summary   9/12/2017    Angela Ibarra    MRN: [id number]           Patient Information     Date Of Birth          1960        Visit Information        Provider Department      9/12/2017 11:10 AM Rox Julian, PT [facility]        Today's Diagnoses     Chronic right-sided low back pain without sciatica           Follow-ups after your visit        Your next 10 appointments already scheduled     Sep 19, 2017 11:50 AM CDT   [facility]LEY Spine with Rox Julian PT   [facility] ([facility])    [address]  [facility] [address]   [phone]            Sep 26, 2017 11:50 AM CDT   [facility] Spine with Rox Julian PT   [facility] ([facility])    [address]  [place] [id number] [phone]            Sep 28, 2017   Procedure with Cody Arana MD   [facility] (--)    [address]  [facility] [address] [phone]              Who to contact     If you have questions or need follow up information about today's clinic visit or your schedule please contact [facility] PHYSICAL THERAP[facility] directly at [phone].  Normal or non-critical lab and imaging results will be communicated to you by MyChart, letter or phone within 4 business days after the clinic has received the results. If you do not hear from us within 7 days, please contact the clinic through MyChart or phone. If you have a critical or abnormal lab result, we will notify you by phone as soon as possible.  Submit refill requests through [employer] or call your pharmacy and they will forward the refill request to us. Please allow 3 business days for your refill to be completed.          Additional Information About Your Visit        MyChart Information     [employer]  gives you secure access to your electronic health record. If you see a primary care provider, you can also send messages to your care team and make appointments. If you have questions, please call your primary care clinic.  If you do not have a primary care provider, please call [phone] and they will assist you.        Care EveryWhere ID     This is your Care EveryWhere ID. This could be used by other organizations to access your [facility] medical records  [id number]        Your Vitals Were     Last Period                   01/08/2008            Blood Pressure from Last 3 Encounters:   09/11/17 (!) 132/94   09/11/17 134/81   08/29/17 123/70    Weight from Last 3 Encounters:   09/11/17 90.7 kg (200 lb)   09/11/17 99.8 kg (220 lb)   08/29/17 97.5 kg (215 lb)              We Performed the Following     Manual Ther Tech, 1+Regions, EA 15 min     Therapeutic Exercises        Primary Care Provider Office Phone # Fax #    Germán Jernigan [phone] [address] [facility] [address]        Equal Access to Services     : Hadii aad ku hadasho Soomaali, waaxda luqadaha, qaybta kaalmada soo, oleksandr rocha . So [facility] [phone].    ATENCIÓN: Si habla español, tiene a lebron disposición servicios gratgkpos de asistencia lingüística. Kasandra al [phone].    We comply with applicable federal civil rights laws and [place] laws. We do not discriminate on the basis of race, color, national origin, age, disability sex, sexual orientation or gender identity.            Thank you!     Thank you for choosing [facility] PH[facility]ICAL TH[facility]  for your care. Our goal is always to provide you with excellent care. Hearing back from our patients is one way we can continue to improve our services. Please take a few minutes to complete the written survey that you may receive in the mail after your visit with us. Thank you!              Your Updated Medication List - Protect others around you: Learn how to safely use, store and throw away your medicines at www.disposemymeds.org.          This list is accurate as of: 9/12/17 11:59 PM.  Always use your most recent med list.                   Brand Name Dispense Instructions for use Diagnosis    ADVIL PO      Take  by mouth. prn        atenolol 50 MG tablet    TENORMIN    30 tablet    Take 1 tablet (50 mg) by mouth daily    Essential hypertension with goal blood pressure less than 140/90       CALCIUM + D PO      1 tablet daily        calcium carbonate 500 MG chewable tablet    TUMS    180 chew tab    Take 1 tablet (500 mg) by mouth 3 times daily (with meals)    Osteoporosis, unspecified       ciprofloxacin 500 MG tablet    CIPRO    14 tablet    Take 1 tablet (500 mg) by mouth 2 times daily for 7 days        diphenoxylate-atropine 2.5-0.025 MG per tablet    LOMOTIL    30 tablet    Take 2 tablets by mouth 4 times daily as needed for diarrhea    Diarrhea of presumed infectious origin       nitroFURantoin (macrocrystal-monohydrate) 100 MG capsule    MACROBID    14 capsule    Take 1 capsule (100 mg) by mouth 2 times daily    Acute cystitis without hematuria       tolterodine 4 MG 24 hr capsule    DETROL LA    30 capsule    Take 1 capsule (4 mg) by mouth daily    Dysuria       traZODone 50 MG tablet    DESYREL    60 tablet    Take 2 tablets (100 mg) by mouth nightly as needed for sleep    Insomnia, unspecified type       triamcinolone 0.1 % cream    KENALOG    30 g    Apply topically 2 times daily as needed for irritation    Itching

## 2017-09-12 NOTE — ED NOTES
Pt presents to ED with complaints of itching, chills and confusion after taking a new medicine today. Some dizziness earlier today (room spinning), was accompanied with nausea and both have now passed. Pt reports her mental status has cleared up some. Pt notes she was dropping things, which is abnormal for her. Concerned with allergic reaction; was prescribed Nitrofurantoin after being diagnosed with UTI earlier today. No hives. No trouble breathing, but pt feels tired/worn out and therefore short of breath.

## 2017-09-13 RX ORDER — ATENOLOL 50 MG/1
50 TABLET ORAL DAILY
Qty: 30 TABLET | Refills: 5 | OUTPATIENT
Start: 2017-09-13

## 2017-09-14 LAB
BACTERIA SPEC CULT: ABNORMAL
BACTERIA SPEC CULT: ABNORMAL
SPECIMEN SOURCE: ABNORMAL

## 2017-09-19 ENCOUNTER — THERAPY VISIT (OUTPATIENT)
Dept: PHYSICAL THERAPY | Facility: CLINIC | Age: 57
End: 2017-09-19
Payer: COMMERCIAL

## 2017-09-19 DIAGNOSIS — M54.50 CHRONIC RIGHT-SIDED LOW BACK PAIN WITHOUT SCIATICA: ICD-10-CM

## 2017-09-19 DIAGNOSIS — G89.29 CHRONIC RIGHT-SIDED LOW BACK PAIN WITHOUT SCIATICA: ICD-10-CM

## 2017-09-19 PROCEDURE — 97140 MANUAL THERAPY 1/> REGIONS: CPT | Mod: GP | Performed by: PHYSICAL THERAPIST

## 2017-09-19 PROCEDURE — 97110 THERAPEUTIC EXERCISES: CPT | Mod: GP | Performed by: PHYSICAL THERAPIST

## 2017-09-19 NOTE — PROGRESS NOTES
Subjective:    HPI                    Objective:    System    Physical Exam    General     ROS    Assessment/Plan:      DISCHARGE REPORT    Progress reporting period is from 8-17-17 to 9-19-17.       SUBJECTIVE  Subjective changes noted by patient:  Patient reports that she has improved by about 75% since starting therapy. She states that she still can't take a lot of extreme pressure to the center of her back, but it is quite a bit better. She is doing the standing extension exercise at least every 2 hours and the press ups 3 times a day.  The extension exercises help a lot. She has had no pain at all for the last 3 days and that is very unusual.      Current Pain level: 0/10.     Previous pain level was  2/10(up to 8/10)  .   Changes in function:  Yes (See Goal flowsheet attached for changes in current functional level)  Adverse reaction to treatment or activity: None    OBJECTIVE  Changes noted in objective findings:  Standing LROM: FB with fingertips to floor and no pain and RFIS caused no pain, BB min loss and no pain, and B side glides min loss and no pain. Neuro B L/E intact. -dural signs/symptoms. Able to tolerate pressure of Grade IV PAs tot he lumbar spine without problems. Much improved postural awareness noted.       ASSESSMENT/PLAN  Updated problem list and treatment plan: Diagnosis 1:  Chronic LBP  Pain -  manual therapy, self management, education, directional preference exercise and home program  Decreased ROM/flexibility - manual therapy, therapeutic exercise and home program  Decreased strength - therapeutic exercise, therapeutic activities and home program  Impaired muscle performance - neuro re-education and home program  Decreased function - therapeutic activities and home program  Impaired posture - neuro re-education and home program  STG/LTGs have been met or progress has been made towards goals:  Yes (See Goal flow sheet completed today.)  Assessment of Progress: The patient's condition is  improving.  The patient's condition has potential to improve.  The patient has met all of their long term goals.  Self Management Plans:  Patient is independent in a home treatment program.  Patient is independent in self management of symptoms.    Angela continues to require the following intervention to meet STG and LTG's:  PT intervention is no longer required to meet STG/LTG.    Recommendations:  This patient is ready to be discharged from therapy and continue their home treatment program.    Please refer to the daily flowsheet for treatment today, total treatment time and time spent performing 1:1 timed codes.

## 2017-09-19 NOTE — MR AVS SNAPSHOT
After Visit Summary   9/19/2017    Angela Ibarra    MRN: [id number]           Patient Information     Date Of Birth          1960        Visit Information        Provider Department      9/19/2017 11:50 AM Rox Julian PT [facility]        Today's Diagnoses     Chronic right-sided low back pain without sciatica           Follow-ups after your visit        Your next 10 appointments already scheduled     Sep 26, 2017 11:50 AM CDT   [facility]ES[facility] Spine with Rox Julian PT   [facility] ([facility])    [address]  [facility] [address] [phone]            Sep 28, 2017   Procedure with Cody Arana MD   [facility] (--)    [address]  [facility] [address] [phone]              Who to contact     If you have questions or need follow up information about today's clinic visit or your schedule please contact [facility] ATHLET[facility] PHYSICAL THERAPY directly at [phone].  Normal or non-critical lab and imaging results will be communicated to you by [email or web address], letter or phone within 4 business days after the clinic has received the results. If you do not hear from us within 7 days, please contact the clinic through [email or web address] or phone. If you have a critical or abnormal lab result, we will notify you by phone as soon as possible.  Submit refill requests through [employer] or call your pharmacy and they will forward the refill request to us. Please allow 3 business days for your refill to be completed.          Additional Information About Your Visit        [email or web address] Information     [employer] gives you secure access to your electronic health record. If you see a primary care provider, you can also send messages to your care team and make appointments. If you have questions, please call your primary care clinic.  If  you do not have a primary care provider, please call [phone] and they will assist you.        Care EveryWhere ID     This is your Care EveryWhere ID. This could be used by other organizations to access your [place] medical records  [id number]        Your Vitals Were     Last Period                   01/08/2008            Blood Pressure from Last 3 Encounters:   09/11/17 (!) 132/94   09/11/17 134/81   08/29/17 123/70    Weight from Last 3 Encounters:   09/11/17 90.7 kg (200 lb)   09/11/17 99.8 kg (220 lb)   08/29/17 97.5 kg (215 lb)              We Performed the Following     WESLEY Progress Notes Report     Manual Ther Tech, 1+Regions, EA 15 min     Therapeutic Exercises        Primary Care Provider Office Phone # Fax #    Germán Jernigan [phone] [address] [facility] [address]        Equal Access to Services     ELVIN [facility] Hospital[facility] : Hadii aad ku hadasho Soraymondali, waaxda luqadaha, qaybta kaalmada adeegyada, oleksandr nelsonin haylydian bell rocha . So [facility] [phone].    ATENCIÓN: Si habla español, tiene a lebron disposición servicios gratuitos de asistencia lingüística. Llame al [phone].    We comply with applicable federal civil rights laws and [place] laws. We do not discriminate on the basis of race, color, national origin, age, disability sex, sexual orientation or gender identity.            Thank you!     Thank you for choosing [facility] PHYSICAL THERAPY  for your care. Our goal is always to provide you with excellent care. Hearing back from our patients is one way we can continue to improve our services. Please take a few minutes to complete the written survey that you may receive in the mail after your visit with us. Thank you!             Your Updated Medication List - Protect others around you: Learn how to safely use, store and throw away your medicines at www.disposemymeds.org.          This list is accurate as of: 9/19/17 11:59  PM.  Always use your most recent med list.                   Brand Name Dispense Instructions for use Diagnosis    ADVIL PO      Take  by mouth. prn        atenolol 50 MG tablet    TENORMIN    30 tablet    Take 1 tablet (50 mg) by mouth daily    Essential hypertension with goal blood pressure less than 140/90       CALCIUM + D PO      1 tablet daily        calcium carbonate 500 MG chewable tablet    TUMS    180 chew tab    Take 1 tablet (500 mg) by mouth 3 times daily (with meals)    Osteoporosis, unspecified       cefuroxime 250 MG tablet    CEFTIN    20 tablet    Take 1 tablet (250 mg) by mouth 2 times daily for 10 days    Acute cystitis with hematuria       diphenoxylate-atropine 2.5-0.025 MG per tablet    LOMOTIL    30 tablet    Take 2 tablets by mouth 4 times daily as needed for diarrhea    Diarrhea of presumed infectious origin       nitroFURantoin (macrocrystal-monohydrate) 100 MG capsule    MACROBID    14 capsule    Take 1 capsule (100 mg) by mouth 2 times daily    Acute cystitis without hematuria       tolterodine 4 MG 24 hr capsule    DETROL LA    30 capsule    Take 1 capsule (4 mg) by mouth daily    Dysuria       traZODone 50 MG tablet    DESYREL    60 tablet    Take 2 tablets (100 mg) by mouth nightly as needed for sleep    Insomnia, unspecified type       triamcinolone 0.1 % cream    KENALOG    30 g    Apply topically 2 times daily as needed for irritation    Itching

## 2017-09-19 NOTE — LETTER
[facility] PHYSICAL THERAPY  [address]  [place] [address] [phone]    2017    Re: Angela Ibarra   :   1960  MRN:  [id number]   REFERRING PHYSICIAN:   Demian Ferguson    [facility]    Date of Initial Evaluation:  2017  Visits:  Rxs Used: 5  Reason for Referral:  Chronic right-sided low back pain without sciatica    DISCHARGE REPORT    Progress reporting period is from 17 to 17.       SUBJECTIVE  Subjective changes noted by patient:  Patient reports that she has improved by about 75% since starting therapy. She states that she still can't take a lot of extreme pressure to the center of her back, but it is quite a bit better. She is doing the standing extension exercise at least every 2 hours and the press ups 3 times a day.  The extension exercises help a lot. She has had no pain at all for the last 3 days and that is very unusual.      Current Pain level: 0/10.     Previous pain level was  2/10(up to 8/10)  .   Changes in function:  Yes (See Goal flowsheet attached for changes in current functional level)  Adverse reaction to treatment or activity: None    OBJECTIVE  Changes noted in objective findings:  Standing LROM: FB with fingertips to floor and no pain and RFIS caused no pain, BB min loss and no pain, and B side glides min loss and no pain. Neuro B L/E intact. -dural signs/symptoms. Able to tolerate pressure of Grade IV PAs tot he lumbar spine without problems. Much improved postural awareness noted.       ASSESSMENT/PLAN  Updated problem list and treatment plan: Diagnosis 1:  Chronic LBP  Pain -  manual therapy, self management, education, directional preference exercise and home program  Decreased ROM/flexibility - manual therapy, therapeutic exercise and home program  Decreased strength - therapeutic exercise, therapeutic activities and home  program  Impaired muscle performance - neuro re-education and home program  Decreased function - therapeutic activities and home program  Impaired posture - neuro re-education and home program  STG/LTGs have been met or progress has been made towards goals:  Yes (See Goal flow sheet completed today.)  Assessment of Progress: The patient's condition is improving.  Re: Angela Ibarra   :   1960        The patient's condition has potential to improve.  The patient has met all of their long term goals.  Self Management Plans:  Patient is independent in a home treatment program.  Patient is independent in self management of symptoms.    Angela continues to require the following intervention to meet STG and LTG's:  PT intervention is no longer required to meet STG/LTG.    Recommendations:  This patient is ready to be discharged from therapy and continue their home treatment program.    Thank you for your referral.        INQUIRIES  Therapist: Rox Julian, PT   [facility]  [address] [place] [address]. [address]  [facility] [address]  Phone: [phone]  Fax: [phone]

## 2017-09-20 PROBLEM — G89.29 CHRONIC RIGHT-SIDED LOW BACK PAIN WITHOUT SCIATICA: Status: RESOLVED | Noted: 2017-08-19 | Resolved: 2017-09-20

## 2017-09-20 PROBLEM — M54.50 CHRONIC RIGHT-SIDED LOW BACK PAIN WITHOUT SCIATICA: Status: RESOLVED | Noted: 2017-08-19 | Resolved: 2017-09-20

## 2017-09-28 ENCOUNTER — SURGERY (OUTPATIENT)
Age: 57
End: 2017-09-28

## 2017-09-28 ENCOUNTER — HOSPITAL ENCOUNTER (OUTPATIENT)
Facility: AMBULATORY SURGERY CENTER | Age: 57
Discharge: HOME | End: 2017-09-28
Attending: SURGERY | Admitting: SURGERY
Payer: COMMERCIAL

## 2017-09-28 VITALS
HEIGHT: 62 IN | BODY MASS INDEX: 36.8 KG/M2 | RESPIRATION RATE: 16 BRPM | WEIGHT: 200 LBS | TEMPERATURE: 97.3 F | SYSTOLIC BLOOD PRESSURE: 132 MMHG | DIASTOLIC BLOOD PRESSURE: 86 MMHG | OXYGEN SATURATION: 96 %

## 2017-09-28 LAB — COLONOSCOPY: NORMAL

## 2017-09-28 PROCEDURE — 99153 MOD SED SAME PHYS/QHP EA: CPT | Performed by: SURGERY

## 2017-09-28 PROCEDURE — 45385 COLONOSCOPY W/LESION REMOVAL: CPT

## 2017-09-28 PROCEDURE — G8918 PT W/O PREOP ORDER IV AB PRO: HCPCS

## 2017-09-28 PROCEDURE — 88305 TISSUE EXAM BY PATHOLOGIST: CPT | Performed by: SURGERY

## 2017-09-28 PROCEDURE — G8907 PT DOC NO EVENTS ON DISCHARG: HCPCS

## 2017-09-28 PROCEDURE — 99152 MOD SED SAME PHYS/QHP 5/>YRS: CPT | Performed by: SURGERY

## 2017-09-28 PROCEDURE — 45385 COLONOSCOPY W/LESION REMOVAL: CPT | Mod: PT | Performed by: SURGERY

## 2017-09-28 RX ORDER — LIDOCAINE 40 MG/G
CREAM TOPICAL
Status: DISCONTINUED | OUTPATIENT
Start: 2017-09-28 | End: 2017-09-29 | Stop reason: HOSPADM

## 2017-09-28 RX ORDER — DIPHENHYDRAMINE HYDROCHLORIDE 50 MG/ML
INJECTION INTRAMUSCULAR; INTRAVENOUS PRN
Status: DISCONTINUED | OUTPATIENT
Start: 2017-09-28 | End: 2017-09-28 | Stop reason: HOSPADM

## 2017-09-28 RX ORDER — FENTANYL CITRATE 50 UG/ML
INJECTION, SOLUTION INTRAMUSCULAR; INTRAVENOUS PRN
Status: DISCONTINUED | OUTPATIENT
Start: 2017-09-28 | End: 2017-09-28 | Stop reason: HOSPADM

## 2017-09-28 RX ADMIN — FENTANYL CITRATE 100 MCG: 50 INJECTION, SOLUTION INTRAMUSCULAR; INTRAVENOUS at 09:58

## 2017-09-28 RX ADMIN — FENTANYL CITRATE 50 MCG: 50 INJECTION, SOLUTION INTRAMUSCULAR; INTRAVENOUS at 10:03

## 2017-09-28 RX ADMIN — FENTANYL CITRATE 50 MCG: 50 INJECTION, SOLUTION INTRAMUSCULAR; INTRAVENOUS at 10:01

## 2017-09-28 RX ADMIN — DIPHENHYDRAMINE HYDROCHLORIDE 12.5 MG: 50 INJECTION INTRAMUSCULAR; INTRAVENOUS at 09:53

## 2017-09-28 NOTE — DISCHARGE INSTRUCTIONS
You may benefit from MAC.  Make sure your doctor orders it with MAC.  This is just added to the orders and is just typed into the area where they ask questions about your taking blood thinners.  This is administered by anesthesia to make you more comfortable than I can do safely during your colonosocpy.  This is done with anesthesia.   You have a lot of sharp turns in your colon and ansesthesia may be able to keep you more comfortable.  You will need and History and physical for this.  Just remind your primary doctor about this when ordering your next colonocsocpy.

## 2017-09-28 NOTE — LETTER
[facility]           [address] [place] [address]           Tel [phone]  Angela Varnerdamian  [address] [place] [address]      October 3, 2017    Dear Angela,  This letter is to inform you of the results of your pathology report on your colonoscopy.  If you have questions please feel free to call my assistant  At [phone] .    Your pathology report was:  Showed an Adenomatous polyp. This is a benign (not cancerous) growth; however these can become cancer over time. This polyp is usually removed completely at the time of the biopsy. Because it is an Adenomatous polyp you do have a slight higher risk for colon cancer. This is why you will need a repeat colonoscopy in approximately 5 years.  If you do have further questions please don t hesitate to call my assistant at  .  We do not have someone answering the phone all the time at my assistants number so if leave a message may take a day or so to get back to you.  So if more urgent then call the below number.    To make an appointment call [phone]: .   Sincerely,     Cody Arana M.D.  ___

## 2017-10-03 LAB — COPATH REPORT: NORMAL

## 2018-01-11 DIAGNOSIS — I10 ESSENTIAL HYPERTENSION WITH GOAL BLOOD PRESSURE LESS THAN 140/90: ICD-10-CM

## 2018-01-11 RX ORDER — ATENOLOL 50 MG/1
TABLET ORAL
Qty: 30 TABLET | Refills: 0 | Status: SHIPPED | OUTPATIENT
Start: 2018-01-11 | End: 2018-01-23

## 2018-01-11 NOTE — TELEPHONE ENCOUNTER
#30 only given as patient is due for physical and labs this month per PCP's last office notes.     TC, patient due for:  Physical and fastin labs.     Eleanor Alejandro RN

## 2018-01-15 ENCOUNTER — DOCUMENTATION ONLY (OUTPATIENT)
Dept: LAB | Facility: CLINIC | Age: 58
End: 2018-01-15

## 2018-01-15 DIAGNOSIS — Z00.00 ROUTINE GENERAL MEDICAL EXAMINATION AT A HEALTH CARE FACILITY: ICD-10-CM

## 2018-01-15 DIAGNOSIS — Z13.6 CARDIOVASCULAR SCREENING; LDL GOAL LESS THAN 130: ICD-10-CM

## 2018-01-15 DIAGNOSIS — I10 ESSENTIAL HYPERTENSION WITH GOAL BLOOD PRESSURE LESS THAN 140/90: Primary | ICD-10-CM

## 2018-01-15 NOTE — PROGRESS NOTES
This patient is scheduled for lab work on 1/18/2018 but does not qualify for pre-visit protocol. Please place future orders, or have your care team call and advise patient to cancel lab appointment. she has an appointment with Dr. Jernigan on 1/23/2018.    Thank you,    [facility]

## 2018-01-18 DIAGNOSIS — I10 ESSENTIAL HYPERTENSION WITH GOAL BLOOD PRESSURE LESS THAN 140/90: ICD-10-CM

## 2018-01-18 DIAGNOSIS — Z13.6 CARDIOVASCULAR SCREENING; LDL GOAL LESS THAN 130: ICD-10-CM

## 2018-01-18 DIAGNOSIS — Z00.00 ROUTINE GENERAL MEDICAL EXAMINATION AT A HEALTH CARE FACILITY: ICD-10-CM

## 2018-01-18 LAB
ANION GAP SERPL CALCULATED.3IONS-SCNC: 10 MMOL/L (ref 3–14)
BUN SERPL-MCNC: 17 MG/DL (ref 7–30)
CALCIUM SERPL-MCNC: 8.9 MG/DL (ref 8.5–10.1)
CHLORIDE SERPL-SCNC: 107 MMOL/L (ref 94–109)
CHOLEST SERPL-MCNC: 152 MG/DL
CO2 SERPL-SCNC: 25 MMOL/L (ref 20–32)
CREAT SERPL-MCNC: 0.9 MG/DL (ref 0.52–1.04)
GFR SERPL CREATININE-BSD FRML MDRD: 65 ML/MIN/1.7M2
GLUCOSE SERPL-MCNC: 88 MG/DL (ref 70–99)
HDLC SERPL-MCNC: 49 MG/DL
LDLC SERPL CALC-MCNC: 82 MG/DL
NONHDLC SERPL-MCNC: 103 MG/DL
POTASSIUM SERPL-SCNC: 4.4 MMOL/L (ref 3.4–5.3)
SODIUM SERPL-SCNC: 142 MMOL/L (ref 133–144)
TRIGL SERPL-MCNC: 104 MG/DL

## 2018-01-18 PROCEDURE — 80048 BASIC METABOLIC PNL TOTAL CA: CPT | Performed by: FAMILY MEDICINE

## 2018-01-18 PROCEDURE — 80061 LIPID PANEL: CPT | Performed by: FAMILY MEDICINE

## 2018-01-18 PROCEDURE — 36415 COLL VENOUS BLD VENIPUNCTURE: CPT | Performed by: FAMILY MEDICINE

## 2018-01-23 ENCOUNTER — OFFICE VISIT (OUTPATIENT)
Dept: FAMILY MEDICINE | Facility: CLINIC | Age: 58
End: 2018-01-23
Payer: COMMERCIAL

## 2018-01-23 VITALS
DIASTOLIC BLOOD PRESSURE: 80 MMHG | SYSTOLIC BLOOD PRESSURE: 130 MMHG | HEART RATE: 72 BPM | BODY MASS INDEX: 41.41 KG/M2 | OXYGEN SATURATION: 96 % | TEMPERATURE: 97.4 F | HEIGHT: 62 IN | WEIGHT: 225 LBS

## 2018-01-23 DIAGNOSIS — R06.2 WHEEZING: ICD-10-CM

## 2018-01-23 DIAGNOSIS — I10 ESSENTIAL HYPERTENSION WITH GOAL BLOOD PRESSURE LESS THAN 140/90: ICD-10-CM

## 2018-01-23 DIAGNOSIS — Z00.00 ROUTINE GENERAL MEDICAL EXAMINATION AT A HEALTH CARE FACILITY: Primary | ICD-10-CM

## 2018-01-23 DIAGNOSIS — Z12.31 VISIT FOR SCREENING MAMMOGRAM: ICD-10-CM

## 2018-01-23 DIAGNOSIS — G47.00 INSOMNIA, UNSPECIFIED TYPE: ICD-10-CM

## 2018-01-23 PROCEDURE — 99396 PREV VISIT EST AGE 40-64: CPT | Performed by: FAMILY MEDICINE

## 2018-01-23 RX ORDER — ATENOLOL 50 MG/1
50 TABLET ORAL DAILY
Qty: 90 TABLET | Refills: 3 | Status: SHIPPED | OUTPATIENT
Start: 2018-01-23 | End: 2019-01-23

## 2018-01-23 RX ORDER — ALBUTEROL SULFATE 90 UG/1
2 AEROSOL, METERED RESPIRATORY (INHALATION) EVERY 4 HOURS PRN
Qty: 1 INHALER | Refills: 0 | Status: SHIPPED | OUTPATIENT
Start: 2018-01-23 | End: 2018-02-16

## 2018-01-23 RX ORDER — PREDNISONE 20 MG/1
20 TABLET ORAL DAILY
Qty: 5 TABLET | Refills: 0 | Status: SHIPPED | OUTPATIENT
Start: 2018-01-23 | End: 2018-01-28

## 2018-01-23 RX ORDER — TRAZODONE HYDROCHLORIDE 50 MG/1
100 TABLET, FILM COATED ORAL
Qty: 180 TABLET | Refills: 3 | Status: SHIPPED | OUTPATIENT
Start: 2018-01-23 | End: 2019-01-23

## 2018-01-23 ASSESSMENT — PATIENT HEALTH QUESTIONNAIRE - PHQ9
5. POOR APPETITE OR OVEREATING: NOT AT ALL
SUM OF ALL RESPONSES TO PHQ QUESTIONS 1-9: 2

## 2018-01-23 ASSESSMENT — ANXIETY QUESTIONNAIRES
5. BEING SO RESTLESS THAT IT IS HARD TO SIT STILL: NOT AT ALL
GAD7 TOTAL SCORE: 0
6. BECOMING EASILY ANNOYED OR IRRITABLE: NOT AT ALL
7. FEELING AFRAID AS IF SOMETHING AWFUL MIGHT HAPPEN: NOT AT ALL
IF YOU CHECKED OFF ANY PROBLEMS ON THIS QUESTIONNAIRE, HOW DIFFICULT HAVE THESE PROBLEMS MADE IT FOR YOU TO DO YOUR WORK, TAKE CARE OF THINGS AT HOME, OR GET ALONG WITH OTHER PEOPLE: NOT DIFFICULT AT ALL
2. NOT BEING ABLE TO STOP OR CONTROL WORRYING: NOT AT ALL
3. WORRYING TOO MUCH ABOUT DIFFERENT THINGS: NOT AT ALL
1. FEELING NERVOUS, ANXIOUS, OR ON EDGE: NOT AT ALL

## 2018-01-23 NOTE — PROGRESS NOTES
"   SUBJECTIVE:   CC: Angela Ibarra is an 57 year old woman who presents for preventive health visit.     Physical   Annual:     Getting at least 3 servings of Calcium per day::  Yes    Bi-annual eye exam::  Yes    Dental care twice a year::  Yes    Sleep apnea or symptoms of sleep apnea::  None    Diet::  Regular (no restrictions)    Frequency of exercise::  2-3 days/week    Duration of exercise::  45-60 minutes    Taking medications regularly::  Yes    Medication side effects::  None    Additional concerns today::  No                Acute bronchitis - one week of sinus congestion and cough. No shortness of breath but there is wheezing. No fevers or chills.  Evaluation and treatment:    Prednisone and Alb rx'd   Come back if not better next week.    Glucosuria - this was noted during her urology visit on 6/27/17. The glucose was 100 and the rest of the u/a was negative. She has no h/o diabetes and denies symptoms high glucose.  Evaluation and treatment:   This is most like insignificant glucosuria. Serum glucose is fine.    Right hip pain - present since around 2013. Interferes with sleep, throbbing at night. No trauma. She feels the pain more on the right groin area. No bulge. There is morning stiffness. Worse with activity? Better with Tylenol. She feels the right leg is weak. No numbness. Not sure about right low back pain. Has h/o right ankle surgery.   Evaluation and treatment:   Saw ortho and steroid injection was helpful.     XR HIP RIGHT 2-3 VIEWS 3/31/2017 8:10 AM     HISTORY: Pain in right hip     COMPARISON: None.         IMPRESSION: Mild degenerative changes of the hip. Otherwise negative.        RUBÉN MACE MD    Frequent UTI's/atrophic vaginitis - was happening every other month. Goes away with antibiotic treatment. Symptoms are frequency, burning, as if passing \"clot.\" previous culture grew e coli which was sensitive to all antibiotics tested.  She also has had dryness and painful intercourse. " "No hematuria. No fevers. She has had hysterectomy.   Bactrim right before or right after intercourse is working well.    She saw as baseline frequency.    The u/a has been normal on multiple occasions.    For atrophic vaginitis I had prescribed Estrogen vaginal pill but stopped due to fear of side effects.   Saw urologist - advised to continue Estrogen and consider Detrol      Previous Depression and ongoing insomnia -    Specific type: mild major depression  Duration: since around 2011  Symptoms: decreased mood, mood swings, anhedonia, sleep disturbance, decreased energy.  Compounding factors: \"hates\" her work.   Anxiety: Worries about her kid. Thinks too much at night.  SI or HI: denies  Delusions/hallucinations: denies  Rupali or hypomania now or in the past: denies  Current treatment: Trazodone prn for sleep only.  Compliant: denies  Side effects: denies  Treatments:    Zoloft stopped since not needed   Trazodone helps sometimes.   Referral for counseling previously but she says it does not help   she says she only wants to be on Trazodone prn which is fine.     PHQ-9 SCORE 9/8/2016 3/31/2017 1/23/2018   Total Score - - -   Total Score 0 0 2       DENG-7 SCORE 9/8/2016 3/31/2017 1/23/2018   Total Score - - -   Total Score 0 0 0     HTN - dx'd around: 2009. Not checking at home. Fairly controlled in clinic.  Evaluation and treatment:    Atenolol 50 mg qd (chosen due to migraines) - no side effects.   Continue same tx.    Last Basic Metabolic Panel:  Lab Results   Component Value Date     01/18/2018      Lab Results   Component Value Date    POTASSIUM 4.4 01/18/2018     Lab Results   Component Value Date    CHLORIDE 107 01/18/2018     Lab Results   Component Value Date    BRITANY 8.9 01/18/2018     Lab Results   Component Value Date    CO2 25 01/18/2018     Lab Results   Component Value Date    BUN 17 01/18/2018     Lab Results   Component Value Date    CR 0.90 01/18/2018     Lab Results   Component Value Date    " GLC 88 01/18/2018     Multiple falls and fractures/osteopenia - She informs me that she has fallen about 6 times in as many years. These were related to stepping on a pot hole, stumbling on stairs and sometimes the ankle is weak and gives way. On 9/2/13 she stepped on a pot hole. She was seen in the ED and dx'd with right distal fibular fx and old lateral malleolus fx. She was then managed by sports medicine with CAM walker. On 10/8/13 she was walking her dog when her right ankle gave out and she fell on her left side. She was seen in ED again and dx'd with left ulnar comminuted fracture. She was subsequently seen by ortho and underwent ORIF on 10/15/13. At the end of Nov 2014 she kicked a chair. She was seen in urgent care on 12/8/14 and dx'd with left 5th proximal phalanx fracture. In addition to all this she has had left humerus fx in the past that required ORIF.    Has not fallen since Oct 2013. Previously followed with endocrine. Reclast - yearly. It made her sick so she stopped it. She takes Vitamin 2000 units per day. Takes Calcium over the counter - dose unknown.   DX HIP/PELVIS/SPINE 11/19/2013 8:24 AM  HISTORY: Screening. History of fall.  IMPRESSION  IMPRESSION:  1. The T-score of the lumbar spine in the region of L1-L4 is -1.6.  This correlates with moderate osteopenia. If one looks at the L1  vertebral body alone the T score is -2.5 which correlates with  osteoporosis.  2. The T-score of the right femoral neck is -2.3. This correlates with  severe osteopenia.  3. The T-score of the left femoral neck is -2.2. This correlates with  severe osteopenia.  NOTE: With regards to the hips, the T-score for either the femoral  neck or the total hip is reported, whichever is worse.  JUAN ANTONIO SARMIENTO MD    Balance problem - no h/o CVA or other neurological problems. She feels her balance is better since the ankle has improved after surgery.    B12 deficiency - took liquid B12, 1/2 oz per day previously. Not at this  time. Last B12 was normal Oct 2013.     Right Carpal tunnel - No further problems.     Obesity - diet and exercise discussed. Commended her losing weight.    Wt Readings from Last 5 Encounters:   01/23/18 225 lb (102.1 kg)   09/21/17 200 lb (90.7 kg)   09/11/17 200 lb (90.7 kg)   09/11/17 220 lb (99.8 kg)   08/29/17 215 lb (97.5 kg)     Surgical history:     Right ankle surgery   Hysterectomy 2008 due to abnormal PAP, ovaries still in place.    Preventive:        Immunization History   Administered Date(s) Administered     Influenza (IIV3) PF 11/08/2012     Influenza Vaccine IM 3yrs+ 4 Valent IIV4 10/25/2013, 02/04/2015, 09/08/2016, 10/11/2017     TD (ADULT, 7+) 01/01/1988, 06/20/2000     TDAP Vaccine (Adacel) 11/08/2012     Lipids screen:     Recent Labs   Lab Test  01/18/18   0714  03/31/17   0816  08/21/15   1229  08/13/14   0743   CHOL  152  177  191  196   HDL  49*  48*  39*  47*   LDL  82  101*  117  116   TRIG  104  142  175*  164*   CHOLHDLRATIO   --    --   4.9  4.2         Mammogram: ordered.    PAP: n/a due to hysterectomy    Colonoscopy: 9/28/17 - repeat in 5 years. We are supposed to order it with MAC next time due to tortuous colon.     Advanced Directive: has one at home - she will bring a copy.    SH:    Marital status:  - good relationship  Kids: one  Employment: administrative work  Exercise: biking and walking and swimming  Tobacco: no  Etoh: none  Recreational drugs: none  Caffeine: one diet coke per day          Today's PHQ-2 Score: PHQ-2 ( 1999 Pfizer) 1/23/2018   Q1: Little interest or pleasure in doing things 0   Q2: Feeling down, depressed or hopeless 0   PHQ-2 Score 0   Q1: Little interest or pleasure in doing things Not at all   Q2: Feeling down, depressed or hopeless Not at all   PHQ-2 Score 0       Abuse: Current or Past(Physical, Sexual or Emotional)- No  Do you feel safe in your environment - Yes    Social History   Substance Use Topics     Smoking status: Never Smoker      "Smokeless tobacco: Never Used     Alcohol use Yes      Comment: occas     Alcohol Use 1/23/2018   If you drink alcohol, do you typically have greater than 3 drinks per day OR greater than 7 drinks per week?   No   No flowsheet data found.      Reviewed orders with patient.  Reviewed health maintenance and updated orders accordingly - Yes    Review of Systems  C: NEGATIVE for fever, chills, change in weight  I: NEGATIVE for worrisome rashes, moles or lesions  E: NEGATIVE for vision changes or irritation  ENT: NEGATIVE for ear, mouth and throat problems  R: NEGATIVE for significant cough or SOB  B: NEGATIVE for masses, tenderness or discharge  CV: NEGATIVE for chest pain, palpitations or peripheral edema  GI: NEGATIVE for nausea, abdominal pain, heartburn, or change in bowel habits  : NEGATIVE for unusual urinary or vaginal symptoms. No vaginal bleeding.  M: NEGATIVE for significant arthralgias or myalgia  N: NEGATIVE for weakness, dizziness or paresthesias  P: NEGATIVE for changes in mood or affect      OBJECTIVE:   /80  Pulse 72  Temp 97.4  F (36.3  C) (Oral)  Ht 5' 1.75\" (1.568 m)  Wt 225 lb (102.1 kg)  LMP 01/08/2008  SpO2 96%  BMI 41.49 kg/m2  Physical Exam  GENERAL APPEARANCE: healthy, alert and no distress  EYES: Eyes grossly normal to inspection, PERRL and conjunctivae and sclerae normal  HENT: ear canals and TM's normal, nose and mouth without ulcers or lesions, oropharynx clear and oral mucous membranes moist  NECK: no adenopathy, no asymmetry, masses, or scars and thyroid normal to palpation  RESP: lungs with good air movement and mild exp wheezing. O 2 sat noted. No tachypnea or other signs of respiratory distress.   BREAST: normal without masses, tenderness or nipple discharge and no palpable axillary masses or adenopathy  CV: regular rate and rhythm, normal S1 S2, no S3 or S4, no murmur, click or rub, no peripheral edema and peripheral pulses strong  ABDOMEN: soft, nontender, no " "hepatosplenomegaly, no masses and bowel sounds normal  MS: no musculoskeletal defects are noted and gait is age appropriate without ataxia  SKIN: no suspicious lesions or rashes  NEURO: Normal strength and tone, sensory exam grossly normal, mentation intact and speech normal  PSYCH: mentation appears normal and affect normal/bright    ASSESSMENT/PLAN:       COUNSELING:  Reviewed preventive health counseling, as reflected in patient instructions       Regular exercise       Healthy diet/nutrition         reports that she has never smoked. She has never used smokeless tobacco.    Estimated body mass index is 36.58 kg/(m^2) as calculated from the following:    Height as of 9/21/17: 5' 2\" (1.575 m).    Weight as of 9/21/17: 200 lb (90.7 kg).   Weight management plan: Discussed healthy diet and exercise guidelines and patient will follow up in 12 months in clinic to re-evaluate.    Assessment and Plan - Decision Making    1. Routine general medical examination at a health care facility    Results of today's exam given to patient verbally along with age appropriate preventive measures. Written instructions reviewed and handed to the patient.      2. Insomnia, unspecified type  Per HPI  - traZODone (DESYREL) 50 MG tablet; Take 2 tablets (100 mg) by mouth nightly as needed for sleep  Dispense: 180 tablet; Refill: 3    3. Essential hypertension with goal blood pressure less than 140/90  Per HPI  - atenolol (TENORMIN) 50 MG tablet; Take 1 tablet (50 mg) by mouth daily  Dispense: 90 tablet; Refill: 3    4. Visit for screening mammogram  Per HPI  - *MA Screening Digital Bilateral; Future    5. Wheezing  Per HPI  - predniSONE (DELTASONE) 20 MG tablet; Take 1 tablet (20 mg) by mouth daily for 5 days  Dispense: 5 tablet; Refill: 0  - albuterol (PROAIR HFA/PROVENTIL HFA/VENTOLIN HFA) 108 (90 BASE) MCG/ACT Inhaler; Inhale 2 puffs into the lungs every 4 hours as needed for shortness of breath / dyspnea or wheezing  Dispense: 1 " Inhaler; Refill: 0      Written instructions given as follows:    Patient Instructions   Keep up the good work.    See you in one year for a physical with fasting labs.

## 2018-01-23 NOTE — MR AVS SNAPSHOT
After Visit Summary   1/23/2018    Angela Ibarra    MRN: [id number]           Patient Information     Date Of Birth          1960        Visit Information        Provider Department      1/23/2018 12:30 PM Germán Jernigan MD [facility]        Today's Diagnoses     Routine general medical examination at a health care facility    -  1    Insomnia, unspecified type        Essential hypertension with goal blood pressure less than 140/90        Visit for screening mammogram          Care Instructions    Keep up the good work.    See you in one year for a physical with fasting labs.          Follow-ups after your visit        Your next 10 appointments already scheduled     Feb 01, 2018  5:15 PM CST   (Arrive by 5:00 PM)   MA SCREENING DIGITAL BILATERAL with [facility]   [facility]    [address] [facility] [address] [phone]           Do not use any powder, lotion or deodorant under your arms or on your breast. If you do, we will ask you to remove it before your exam.  Wear comfortable, two-piece clothing.  If you have any allergies, tell your care team.  Bring any previous mammograms from other facilities or have them mailed to the breast center.              Future tests that were ordered for you today     Open Future Orders        Priority Expected Expires Ordered    *MA Screening Digital Bilateral Routine  1/23/2019 1/23/2018            Who to contact     If you have questions or need follow up information about today's clinic visit or your schedule please contact [facility] directly at [phone].  Normal or non-critical lab and imaging results will be communicated to you by MyChart, letter or phone within 4 business days after the clinic has received the results. If you do not hear from us within 7 days, please contact the clinic through MyChart or phone. If you have a critical or abnormal lab result, we will  "notify you by phone as soon as possible.  Submit refill requests through [employer] or call your pharmacy and they will forward the refill request to us. Please allow 3 business days for your refill to be completed.          Additional Information About Your Visit        [employer] Information     [employer] gives you secure access to your electronic health record. If you see a primary care provider, you can also send messages to your care team and make appointments. If you have questions, please call your primary care clinic.  If you do not have a primary care provider, please call [phone] and they will assist you.        Care EveryWhere ID     This is your Care EveryWhere ID. This could be used by other organizations to access your [facility] medical records  [id number]        Your Vitals Were     Pulse Temperature Height Last Period Pulse Oximetry BMI (Body Mass Index)    72 97.4  F (36.3  C) (Oral) 5' 1.75\" (1.568 m) 01/08/2008 96% 41.49 kg/m2       Blood Pressure from Last 3 Encounters:   01/23/18 130/80   09/28/17 132/86   09/11/17 (!) 132/94    Weight from Last 3 Encounters:   01/23/18 225 lb (102.1 kg)   09/21/17 200 lb (90.7 kg)   09/11/17 200 lb (90.7 kg)                 Today's Medication Changes          These changes are accurate as of: 1/23/18  1:01 PM.  If you have any questions, ask your nurse or doctor.               These medicines have changed or have updated prescriptions.        Dose/Directions    atenolol 50 MG tablet   Commonly known as:  TENORMIN   This may have changed:  See the new instructions.   Used for:  Essential hypertension with goal blood pressure less than 140/90   Changed by:  Germán Jernigan MD        Dose:  50 mg   Take 1 tablet (50 mg) by mouth daily   Quantity:  90 tablet   Refills:  3            Where to get your medicines      These medications were sent to [facility] [place], [place] - [address] [facility], [address]  [address] [facility], [address] [address]     " Phone:  [phone]     atenolol 50 MG tablet    traZODone 50 MG tablet                Primary Care Provider Office Phone # Fax #    Germán Jernigan [phone] [address] [facility] [address]        Equal Access to Services     [facility]: Hadii aad ku hadasho Soomaali, waaxda luqadaha, qaybta kaalmada adeegyada, waxay idiin hayaan adeeg khmarthash la'lydian jaime. So [facility] [phone].    ATENCIÓN: Si habla español, tiene a lebron disposición servicios gratuitos de asistencia lingüística. [facility] [phone].    We comply with applicable federal civil rights laws and [place] laws. We do not discriminate on the basis of race, color, national origin, age, disability, sex, sexual orientation, or gender identity.            Thank you!     Thank you for choosing [facility]  for your care. Our goal is always to provide you with excellent care. Hearing back from our patients is one way we can continue to improve our services. Please take a few minutes to complete the written survey that you may receive in the mail after your visit with us. Thank you!             Your Updated Medication List - Protect others around you: Learn how to safely use, store and throw away your medicines at www.disposemymeds.org.          This list is accurate as of: 1/23/18  1:01 PM.  Always use your most recent med list.                   Brand Name Dispense Instructions for use Diagnosis    ADVIL PO      Take  by mouth. prn        atenolol 50 MG tablet    TENORMIN    90 tablet    Take 1 tablet (50 mg) by mouth daily    Essential hypertension with goal blood pressure less than 140/90       traZODone 50 MG tablet    DESYREL    180 tablet    Take 2 tablets (100 mg) by mouth nightly as needed for sleep    Insomnia, unspecified type

## 2018-01-23 NOTE — NURSING NOTE
"Chief Complaint   Patient presents with     Physical       Initial /82 (Cuff Size: Adult Large)  Pulse 72  Temp 97.4  F (36.3  C) (Oral)  Ht 5' 1.75\" (1.568 m)  Wt 225 lb (102.1 kg)  LMP 01/08/2008  SpO2 96%  BMI 41.49 kg/m2 Estimated body mass index is 41.49 kg/(m^2) as calculated from the following:    Height as of this encounter: 5' 1.75\" (1.568 m).    Weight as of this encounter: 225 lb (102.1 kg).  Medication Reconciliation: complete    Susanne Gotti LPN    "

## 2018-01-23 NOTE — LETTER
My Depression Action Plan  Name: Angela Ibarra   Date of Birth 1960  Date: 1/23/2018    My doctor: Germán Jernigan   My clinic: [address] [address] [phone]          GREEN    ZONE   Good Control    What it looks like:     Things are going generally well. You have normal up s and down s. You may even feel depressed from time to time, but bad moods usually last less than a day.   What you need to do:  1. Continue to care for yourself (see self care plan)  2. Check your depression survival kit and update it as needed  3. Follow your physician s recommendations including any medication.  4. Do not stop taking medication unless you consult with your physician first.           YELLOW         ZONE Getting Worse    What it looks like:     Depression is starting to interfere with your life.     It may be hard to get out of bed; you may be starting to isolate yourself from others.    Symptoms of depression are starting to last most all day and this has happened for several days.     You may have suicidal thoughts but they are not constant.   What you need to do:     1. Call your care team, your response to treatment will improve if you keep your care team informed of your progress. Yellow periods are signs an adjustment may need to be made.     2. Continue your self-care, even if you have to fake it!    3. Talk to someone in your support network    4. Open up your depression survival kit           RED    ZONE Medical Alert - Get Help    What it looks like:     Depression is seriously interfering with your life.     You may experience these or other symptoms: You can t get out of bed most days, can t work or engage in other necessary activities, you have trouble taking care of basic hygiene, or basic responsibilities, thoughts of suicide or death that will not go away, self-injurious behavior.     What you need to do:  1. Call your care team and  request a same-day appointment. If they are not available (weekends or after hours) call your local crisis line, emergency room or 911.        Depression Self Care Plan / Survival Kit    Self-Care for Depression  Here s the deal. Your body and mind are really not as separate as most people think.  What you do and think affects how you feel and how you feel influences what you do and think. This means if you do things that people who feel good do, it will help you feel better.  Sometimes this is all it takes.  There is also a place for medication and therapy depending on how severe your depression is, so be sure to consult with your medical provider and/ or Behavioral Health Consultant if your symptoms are worsening or not improving.     In order to better manage my stress, I will:    Exercise  Get some form of exercise, every day. This will help reduce pain and release endorphins, the  feel good  chemicals in your brain. This is almost as good as taking antidepressants!  This is not the same as joining a gym and then never going! (they count on that by the way ) It can be as simple as just going for a walk or doing some gardening, anything that will get you moving.      Hygiene   Maintain good hygiene (Get out of bed in the morning, Make your bed, Brush your teeth, Take a shower, and Get dressed like you were going to work, even if you are unemployed).  If your clothes don't fit try to get ones that do.    Diet  I will strive to eat foods that are good for me, drink plenty of water, and avoid excessive sugar, caffeine, alcohol, and other mood-altering substances.  Some foods that are helpful in depression are: complex carbohydrates, B vitamins, flaxseed, fish or fish oil, fresh fruits and vegetables.    Psychotherapy  I agree to participate in Individual Therapy (if recommended).    Medication  If prescribed medications, I agree to take them.  Missing doses can result in serious side effects.  I understand that  drinking alcohol, or other illicit drug use, may cause potential side effects.  I will not stop my medication abruptly without first discussing it with my provider.    Staying Connected With Others  I will stay in touch with my friends, family members, and my primary care provider/team.    Use your imagination  Be creative.  We all have a creative side; it doesn t matter if it s oil painting, sand castles, or mud pies! This will also kick up the endorphins.    Witness Beauty  (AKA stop and smell the roses) Take a look outside, even in mid-winter. Notice colors, textures. Watch the squirrels and birds.     Service to others  Be of service to others.  There is always someone else in need.  By helping others we can  get out of ourselves  and remember the really important things.  This also provides opportunities for practicing all the other parts of the program.    Humor  Laugh and be silly!  Adjust your TV habits for less news and crime-drama and more comedy.    Control your stress  Try breathing deep, massage therapy, biofeedback, and meditation. Find time to relax each day.     My support system    Clinic Contact:  Phone number:    Contact 1:  Phone number:    Contact 2:  Phone number:    Taoism/:  Phone number:    Therapist:  Phone number:    Local crisis center:    Phone number:    Other community support:  Phone number:

## 2018-01-24 ASSESSMENT — ANXIETY QUESTIONNAIRES: GAD7 TOTAL SCORE: 0

## 2018-01-27 ENCOUNTER — HEALTH MAINTENANCE LETTER (OUTPATIENT)
Age: 58
End: 2018-01-27

## 2018-02-01 ENCOUNTER — RADIANT APPOINTMENT (OUTPATIENT)
Dept: MAMMOGRAPHY | Facility: CLINIC | Age: 58
End: 2018-02-01
Attending: FAMILY MEDICINE
Payer: COMMERCIAL

## 2018-02-01 DIAGNOSIS — Z12.31 VISIT FOR SCREENING MAMMOGRAM: ICD-10-CM

## 2018-02-01 PROCEDURE — 77067 SCR MAMMO BI INCL CAD: CPT | Mod: TC

## 2018-02-16 ENCOUNTER — TRANSFERRED RECORDS (OUTPATIENT)
Dept: HEALTH INFORMATION MANAGEMENT | Facility: CLINIC | Age: 58
End: 2018-02-16

## 2018-02-16 ENCOUNTER — OFFICE VISIT (OUTPATIENT)
Dept: FAMILY MEDICINE | Facility: CLINIC | Age: 58
End: 2018-02-16
Payer: COMMERCIAL

## 2018-02-16 VITALS
HEART RATE: 68 BPM | DIASTOLIC BLOOD PRESSURE: 80 MMHG | RESPIRATION RATE: 18 BRPM | OXYGEN SATURATION: 98 % | SYSTOLIC BLOOD PRESSURE: 136 MMHG | TEMPERATURE: 97.6 F | HEIGHT: 62 IN

## 2018-02-16 DIAGNOSIS — M16.11 PRIMARY OSTEOARTHRITIS OF RIGHT HIP: Primary | ICD-10-CM

## 2018-02-16 PROCEDURE — 99213 OFFICE O/P EST LOW 20 MIN: CPT | Performed by: FAMILY MEDICINE

## 2018-02-16 NOTE — PROGRESS NOTES
"HPI:    Angela is a 57 year old female here to discuss:    Right hip osteoarthritis - present since around 2013. Seemed ok after injection. Now back in the last 3 weeks. Interferes with sleep, throbbing at night. No trauma. She feels the pain more on the right groin area. No bulge. There is morning stiffness. Worse with activity? Better with Tylenol. She feels the right leg is weak. No numbness. Not sure about right low back pain. Has h/o right ankle surgery.   Evaluation and treatment:   Saw ortho and steroid injection was helpful 4/13/17 done at [facility].   She would like to get another steroid injection at [facility] which I ordered.   Declines PT.    XR HIP RIGHT 2-3 VIEWS 3/31/2017 8:10 AM     HISTORY: Pain in right hip     COMPARISON: None.         IMPRESSION: Mild degenerative changes of the hip. Otherwise negative.        RUBÉN MACE MD    The following issues reviewed but not addressed today:    Glucosuria - this was noted during her urology visit on 6/27/17. The glucose was 100 and the rest of the u/a was negative. She has no h/o diabetes and denies symptoms high glucose.  Evaluation and treatment:   This is most like insignificant glucosuria. Serum glucose is fine.    Frequent UTI's/atrophic vaginitis - was happening every other month. Goes away with antibiotic treatment. Symptoms are frequency, burning, as if passing \"clot.\" previous culture grew e coli which was sensitive to all antibiotics tested.  She also has had dryness and painful intercourse. No hematuria. No fevers. She has had hysterectomy.   Bactrim right before or right after intercourse is working well.    She saw as baseline frequency.    The u/a has been normal on multiple occasions.    For atrophic vaginitis I had prescribed Estrogen vaginal pill but stopped due to fear of side effects.   Saw urologist - advised to continue Estrogen and consider Detrol    Previous Depression and ongoing insomnia -    Specific type: mild major " "depression  Duration: since around 2011  Symptoms: decreased mood, mood swings, anhedonia, sleep disturbance, decreased energy.  Compounding factors: \"hates\" her work.   Anxiety: Worries about her kid. Thinks too much at night.  SI or HI: denies  Delusions/hallucinations: denies  Rupali or hypomania now or in the past: denies  Current treatment: Trazodone prn for sleep only.  Compliant: denies  Side effects: denies  Treatments:    Zoloft stopped since not needed   Trazodone helps sometimes.   Referral for counseling previously but she says it does not help   she says she only wants to be on Trazodone prn which is fine.     PHQ-9 SCORE 9/8/2016 3/31/2017 1/23/2018   Total Score - - -   Total Score 0 0 2       DENG-7 SCORE 9/8/2016 3/31/2017 1/23/2018   Total Score - - -   Total Score 0 0 0     HTN - dx'd around: 2009. Not checking at home. Fairly controlled in clinic.  Evaluation and treatment:    Atenolol 50 mg qd (chosen due to migraines) - no side effects.   Continue same tx.    BP Readings from Last 6 Encounters:   02/16/18 145/80   01/23/18 130/80   09/28/17 132/86   09/11/17 (!) 132/94   09/11/17 134/81   08/29/17 123/70         Last Basic Metabolic Panel:  Lab Results   Component Value Date     01/18/2018      Lab Results   Component Value Date    POTASSIUM 4.4 01/18/2018     Lab Results   Component Value Date    CHLORIDE 107 01/18/2018     Lab Results   Component Value Date    BRITANY 8.9 01/18/2018     Lab Results   Component Value Date    CO2 25 01/18/2018     Lab Results   Component Value Date    BUN 17 01/18/2018     Lab Results   Component Value Date    CR 0.90 01/18/2018     Lab Results   Component Value Date    GLC 88 01/18/2018     Multiple falls and fractures/osteopenia - She informs me that she has fallen about 6 times in as many years. These were related to stepping on a pot hole, stumbling on stairs and sometimes the ankle is weak and gives way. On 9/2/13 she stepped on a pot hole. She was seen in " the ED and dx'd with right distal fibular fx and old lateral malleolus fx. She was then managed by sports medicine with CAM walker. On 10/8/13 she was walking her dog when her right ankle gave out and she fell on her left side. She was seen in ED again and dx'd with left ulnar comminuted fracture. She was subsequently seen by ortho and underwent ORIF on 10/15/13. At the end of Nov 2014 she kicked a chair. She was seen in urgent care on 12/8/14 and dx'd with left 5th proximal phalanx fracture. In addition to all this she has had left humerus fx in the past that required ORIF.    Has not fallen since Oct 2013. Previously followed with endocrine. Reclast - yearly. It made her sick so she stopped it. She takes Vitamin 2000 units per day. Takes Calcium over the counter - dose unknown.   DX HIP/PELVIS/SPINE 11/19/2013 8:24 AM  HISTORY: Screening. History of fall.  IMPRESSION  IMPRESSION:  1. The T-score of the lumbar spine in the region of L1-L4 is -1.6.  This correlates with moderate osteopenia. If one looks at the L1  vertebral body alone the T score is -2.5 which correlates with  osteoporosis.  2. The T-score of the right femoral neck is -2.3. This correlates with  severe osteopenia.  3. The T-score of the left femoral neck is -2.2. This correlates with  severe osteopenia.  NOTE: With regards to the hips, the T-score for either the femoral  neck or the total hip is reported, whichever is worse.  JUAN ANTONIO SARMIENTO MD    Balance problem - no h/o CVA or other neurological problems. She feels her balance is better since the ankle has improved after surgery.    B12 deficiency - took liquid B12, 1/2 oz per day previously. Not at this time. Last B12 was normal Oct 2013.     Right Carpal tunnel - No further problems.     Obesity - diet and exercise discussed. Commended her losing weight.    Wt Readings from Last 5 Encounters:   01/23/18 225 lb (102.1 kg)   09/21/17 200 lb (90.7 kg)   09/11/17 200 lb (90.7 kg)   09/11/17 220 lb  "(99.8 kg)   08/29/17 215 lb (97.5 kg)     Surgical history:     Right ankle surgery   Hysterectomy 2008 due to abnormal PAP, ovaries still in place.    Preventive:    Immunization History   Administered Date(s) Administered     Influenza (IIV3) PF 11/08/2012     Influenza Vaccine IM 3yrs+ 4 Valent IIV4 10/25/2013, 02/04/2015, 09/08/2016, 10/11/2017     TD (ADULT, 7+) 01/01/1988, 06/20/2000     TDAP Vaccine (Adacel) 11/08/2012     Lipids screen:     Recent Labs   Lab Test  01/18/18   0714  03/31/17   0816  08/21/15   1229  08/13/14   0743   CHOL  152  177  191  196   HDL  49*  48*  39*  47*   LDL  82  101*  117  116   TRIG  104  142  175*  164*   CHOLHDLRATIO   --    --   4.9  4.2         Mammogram: negative 2/1/18    PAP: n/a due to hysterectomy    Colonoscopy: 9/28/17 - repeat in 5 years. We are supposed to order it with MAC next time due to tortuous colon.     Advanced Directive: has one at home - she will bring a copy.    SH:    Marital status:  - good relationship  Kids: one  Employment: administrative work  Exercise: biking and walking and swimming  Tobacco: no  Etoh: none  Recreational drugs: none  Caffeine: one diet coke per day        Exam:    /80  Pulse 68  Temp 97.6  F (36.4  C) (Oral)  Resp 18  Ht 5' 2\" (1.575 m)  LMP 01/08/2008  SpO2 98%    Gen: Healthy appearing female in no acute distress  MS: pain with ROM at the right hip.    Assessment and Plan - Decision Making    1. Primary osteoarthritis of right hip  Per HPI  - RADIOLOGY REFERRAL      Written instructions given as follows:    Patient Instructions   1. Set up the hip injection.    2. If all is well, see you in Jan 2019 for a physical with fasting labs - sooner if needed.      "

## 2018-02-16 NOTE — MR AVS SNAPSHOT
After Visit Summary   2/16/2018    Angela Ibarra    MRN: [id number]           Patient Information     Date Of Birth          1960        Visit Information        Provider Department      2/16/2018 10:10 AM Germán Jernigan MD [facility]        Today's Diagnoses     Primary osteoarthritis of right hip    -  1      Care Instructions    1. Set up the hip injection.    2. If all is well, see you in Jan 2019 for a physical with fasting labs - sooner if needed.          Follow-ups after your visit        Additional Services     RADIOLOGY REFERRAL       Your provider has referred you to: [facility]: [facility] [place] [phone]   [email or web address]  [place] [phone]   [email or web address]    Steroid injection of the right hip.                  Who to contact     If you have questions or need follow up information about today's clinic visit or your schedule please contact [facility] directly at [phone].  Normal or non-critical lab and imaging results will be communicated to you by [employer], letter or phone within 4 business days after the clinic has received the results. If you do not hear from us within 7 days, please contact the clinic through [facility] [employer] or phone. If you have a critical or abnormal lab result, we will notify you by phone as soon as possible.  Submit refill requests through [employer] or call your pharmacy and they will forward the refill request to us. Please allow 3 business days for your refill to be completed.          Additional Information About Your Visit        MyChart Information     [employer] gives you secure access to your electronic health record. If you see a primary care provider, you can also send messages to your care team and make appointments. If you have questions, please call your primary care clinic.  If you do not have a primary care provider, please call [phone] and they will assist you.        Care EveryWhere ID      "This is your Care EveryWhere ID. This could be used by other organizations to access your [place] medical records  [id number]        Your Vitals Were     Pulse Temperature Respirations Height Last Period Pulse Oximetry    68 97.6  F (36.4  C) (Oral) 18 5' 2\" (1.575 m) 01/08/2008 98%       Blood Pressure from Last 3 Encounters:   02/16/18 136/80   01/23/18 130/80   09/28/17 132/86    Weight from Last 3 Encounters:   01/23/18 225 lb (102.1 kg)   09/21/17 200 lb (90.7 kg)   09/11/17 200 lb (90.7 kg)              We Performed the Following     RADIOLOGY REFERRAL          Today's Medication Changes          These changes are accurate as of 2/16/18 10:59 AM.  If you have any questions, ask your nurse or doctor.               Stop taking these medicines if you haven't already. Please contact your care team if you have questions.     albuterol 108 (90 BASE) MCG/ACT Inhaler   Commonly known as:  PROAIR HFA/PROVENTIL HFA/VENTOLIN HFA   Stopped by:  Germná Jernigan MD                    Primary Care Provider Office Phone # Fax #    Germán Jernigan [phone] [address] [facility] [address]        Equal Access to Services     ENOCH ARROYO : Hadii max moreno hadasho Soomaali, waaxda luqadaha, qaybta kaalmada adeegyada, waxay omarin haypolo rocha . So [facility] [phone].    ATENCIÓN: Si habla español, tiene a lebron disposición servicios gratuitos de asistencia lingüística. Ll[facility] [phone].    We comply with applicable federal civil rights laws and [place] laws. We do not discriminate on the basis of race, color, national origin, age, disability, sex, sexual orientation, or gender identity.            Thank you!     Thank you for choosing [facility]  for your care. Our goal is always to provide you with excellent care. Hearing back from our patients is one way we can continue to improve our services. Please take a few minutes to complete the written survey that you may " receive in the mail after your visit with us. Thank you!             Your Updated Medication List - Protect others around you: Learn how to safely use, store and throw away your medicines at www.disposemymeds.org.          This list is accurate as of 2/16/18 10:59 AM.  Always use your most recent med list.                   Brand Name Dispense Instructions for use Diagnosis    ADVIL PO      Take  by mouth. prn        atenolol 50 MG tablet    TENORMIN    90 tablet    Take 1 tablet (50 mg) by mouth daily    Essential hypertension with goal blood pressure less than 140/90       traZODone 50 MG tablet    DESYREL    180 tablet    Take 2 tablets (100 mg) by mouth nightly as needed for sleep    Insomnia, unspecified type

## 2018-02-16 NOTE — PATIENT INSTRUCTIONS
1. Set up the hip injection.    2. If all is well, see you in Jan 2019 for a physical with fasting labs - sooner if needed.

## 2018-03-01 ENCOUNTER — TELEPHONE (OUTPATIENT)
Dept: FAMILY MEDICINE | Facility: CLINIC | Age: 58
End: 2018-03-01

## 2018-04-02 NOTE — PATIENT INSTRUCTIONS
1. For your blood pressure, please add Amlodipine 5 mg daily.    2. Check the blood pressure at home a couple of times per week - left me know if 140/90 or higher persistently.    3. You can take all your medications as usual including the day of surgery - but only with a sip of water.    4. If all is well, see you in Jan 2019 for a physical with fasting labs.

## 2018-04-06 ENCOUNTER — OFFICE VISIT (OUTPATIENT)
Dept: FAMILY MEDICINE | Facility: CLINIC | Age: 58
End: 2018-04-06
Payer: COMMERCIAL

## 2018-04-06 VITALS
BODY MASS INDEX: 42.25 KG/M2 | HEART RATE: 72 BPM | RESPIRATION RATE: 16 BRPM | WEIGHT: 231 LBS | DIASTOLIC BLOOD PRESSURE: 70 MMHG | OXYGEN SATURATION: 100 % | SYSTOLIC BLOOD PRESSURE: 138 MMHG | TEMPERATURE: 97.6 F

## 2018-04-06 DIAGNOSIS — H26.9 CATARACT OF BOTH EYES, UNSPECIFIED CATARACT TYPE: ICD-10-CM

## 2018-04-06 DIAGNOSIS — Z01.818 PREOP GENERAL PHYSICAL EXAM: Primary | ICD-10-CM

## 2018-04-06 DIAGNOSIS — I10 ESSENTIAL HYPERTENSION WITH GOAL BLOOD PRESSURE LESS THAN 140/90: ICD-10-CM

## 2018-04-06 PROCEDURE — 99215 OFFICE O/P EST HI 40 MIN: CPT | Performed by: FAMILY MEDICINE

## 2018-04-06 RX ORDER — AMLODIPINE BESYLATE 5 MG/1
5 TABLET ORAL DAILY
Qty: 90 TABLET | Refills: 2 | Status: SHIPPED | OUTPATIENT
Start: 2018-04-06 | End: 2019-01-17

## 2018-04-06 NOTE — MR AVS SNAPSHOT
After Visit Summary   4/6/2018    Angela Ibarra    MRN: [id number]           Patient Information     Date Of Birth          1960        Visit Information        Provider Department      4/6/2018 7:50 AM Germán Jernigan MD [facility]        Today's Diagnoses     Preop general physical exam    -  1    Cataract of both eyes, unspecified cataract type        Essential hypertension with goal blood pressure less than 140/90          Care Instructions    1. For your blood pressure, please add Amlodipine 5 mg daily.    2. Check the blood pressure at home a couple of times per week - left me know if 140/90 or higher persistently.    3. You can take all your medications as usual including the day of surgery - but only with a sip of water.    4. If all is well, see you in Jan 2019 for a physical with fasting labs.            Follow-ups after your visit        Who to contact     If you have questions or need follow up information about today's clinic visit or your schedule please contact [facility] directly at [phone].  Normal or non-critical lab and imaging results will be communicated to you by [facility], letter or phone within 4 business days after the clinic has received the results. If you do not hear from us within 7 days, please contact the clinic through [employer] or phone. If you have a critical or abnormal lab result, we will notify you by phone as soon as possible.  Submit refill requests through [employer] or call your pharmacy and they will forward the refill request to us. Please allow 3 business days for your refill to be completed.          Additional Information About Your Visit        [facility] Information     [employer] gives you secure access to your electronic health record. If you see a primary care provider, you can also send messages to your care team and make appointments. If you have questions, please call your primary care clinic.  If you do not have a  primary care provider, please call [phone] and they will assist you.        Care EveryWhere ID     This is your Care EveryWhere ID. This could be used by other organizations to access your [place] medical records  [id number]        Your Vitals Were     Pulse Temperature Respirations Last Period Pulse Oximetry Breastfeeding?    72 97.6  F (36.4  C) (Oral) 16 01/08/2008 100% No    BMI (Body Mass Index)                   42.25 kg/m2            Blood Pressure from Last 3 Encounters:   04/06/18 138/70   02/16/18 136/80   01/23/18 130/80    Weight from Last 3 Encounters:   04/06/18 231 lb (104.8 kg)   01/23/18 225 lb (102.1 kg)   09/21/17 200 lb (90.7 kg)              Today, you had the following     No orders found for display         Today's Medication Changes          These changes are accurate as of 4/6/18  8:20 AM.  If you have any questions, ask your nurse or doctor.               Start taking these medicines.        Dose/Directions    amLODIPine 5 MG tablet   Commonly known as:  NORVASC   Used for:  Essential hypertension with goal blood pressure less than 140/90   Started by:  Germán Jernigan MD        Dose:  5 mg   Take 1 tablet (5 mg) by mouth daily   Quantity:  90 tablet   Refills:  2            Where to get your medicines      These medications were sent to [facility] [address], [address]  [address] [place] [address]     Phone:  [phone]     amLODIPine 5 MG tablet                Primary Care Provider Office Phone # Fax #    Germán Jernigan [phone]       [address] [place] [address]        Equal Access to Services     [facility] : Hadii max moreno hadasho Soomaali, waaxda luqadaha, qaybta kaalmada adeegyada, oleksandr rocha . So [facility] [phone].    ATENCIÓN: Si habla español, tiene a lebron disposición servicios gratuitos de asistencia lingüística. Llame al [phone].    We comply with  applicable federal civil rights laws and [place] laws. We do not discriminate on the basis of race, color, national origin, age, disability, sex, sexual orientation, or gender identity.            Thank you!     [facility] you [facility]  [facility]. Our goal is always to provide you with excellent care. Hearing back from our patients is one way we can continue to improve our services. Please take a few minutes to complete the written survey that you may receive in the mail after your visit with us. Thank you!             Your Updated Medication List - Protect others around you: Learn how to safely use, store and throw away your medicines at www.disposemymeds.org.          This list is accurate as of 4/6/18  8:20 AM.  Always use your most recent med list.                   Brand Name Dispense Instructions for use Diagnosis    amLODIPine 5 MG tablet    NORVASC    90 tablet    Take 1 tablet (5 mg) by mouth daily    Essential hypertension with goal blood pressure less than 140/90       atenolol 50 MG tablet    TENORMIN    90 tablet    Take 1 tablet (50 mg) by mouth daily    Essential hypertension with goal blood pressure less than 140/90       traZODone 50 MG tablet    DESYREL    180 tablet    Take 2 tablets (100 mg) by mouth nightly as needed for sleep    Insomnia, unspecified type

## 2018-04-06 NOTE — NURSING NOTE
"Chief Complaint   Patient presents with     Pre-Op Exam       Initial /78 (Cuff Size: Adult Large)  Pulse 72  Temp 97.6  F (36.4  C) (Oral)  Resp 16  Wt 231 lb (104.8 kg)  LMP 01/08/2008  SpO2 100%  Breastfeeding? No  BMI 42.25 kg/m2 Estimated body mass index is 42.25 kg/(m^2) as calculated from the following:    Height as of 2/16/18: 5' 2\" (1.575 m).    Weight as of this encounter: 231 lb (104.8 kg).      Susanne Gotti LPN    "

## 2018-04-08 ENCOUNTER — OFFICE VISIT (OUTPATIENT)
Dept: URGENT CARE | Facility: URGENT CARE | Age: 58
End: 2018-04-08
Payer: COMMERCIAL

## 2018-04-08 VITALS
HEART RATE: 68 BPM | HEIGHT: 62 IN | SYSTOLIC BLOOD PRESSURE: 141 MMHG | BODY MASS INDEX: 42.14 KG/M2 | WEIGHT: 229 LBS | OXYGEN SATURATION: 97 % | DIASTOLIC BLOOD PRESSURE: 75 MMHG | TEMPERATURE: 97.1 F

## 2018-04-08 DIAGNOSIS — N30.91 CYSTITIS WITH HEMATURIA: Primary | ICD-10-CM

## 2018-04-08 DIAGNOSIS — N30.01 ACUTE CYSTITIS WITH HEMATURIA: ICD-10-CM

## 2018-04-08 DIAGNOSIS — N39.0 RECURRENT UTI: ICD-10-CM

## 2018-04-08 DIAGNOSIS — N89.8 VAGINAL ITCHING: ICD-10-CM

## 2018-04-08 LAB
ALBUMIN UR-MCNC: 100 MG/DL
APPEARANCE UR: ABNORMAL
BACTERIA #/AREA URNS HPF: ABNORMAL /HPF
BILIRUB UR QL STRIP: NEGATIVE
COLOR UR AUTO: YELLOW
GLUCOSE UR STRIP-MCNC: NEGATIVE MG/DL
HGB UR QL STRIP: ABNORMAL
KETONES UR STRIP-MCNC: NEGATIVE MG/DL
LEUKOCYTE ESTERASE UR QL STRIP: ABNORMAL
NITRATE UR QL: NEGATIVE
PH UR STRIP: 7.5 PH (ref 5–7)
RBC #/AREA URNS AUTO: ABNORMAL /HPF
SOURCE: ABNORMAL
SP GR UR STRIP: 1.02 (ref 1–1.03)
SPECIMEN SOURCE: NORMAL
UROBILINOGEN UR STRIP-ACNC: 1 EU/DL (ref 0.2–1)
WBC #/AREA URNS AUTO: >100 /HPF
WET PREP SPEC: NORMAL

## 2018-04-08 PROCEDURE — 87088 URINE BACTERIA CULTURE: CPT | Performed by: FAMILY MEDICINE

## 2018-04-08 PROCEDURE — 87086 URINE CULTURE/COLONY COUNT: CPT | Performed by: FAMILY MEDICINE

## 2018-04-08 PROCEDURE — 99214 OFFICE O/P EST MOD 30 MIN: CPT | Performed by: FAMILY MEDICINE

## 2018-04-08 PROCEDURE — 87186 SC STD MICRODIL/AGAR DIL: CPT | Performed by: FAMILY MEDICINE

## 2018-04-08 PROCEDURE — 81001 URINALYSIS AUTO W/SCOPE: CPT | Performed by: FAMILY MEDICINE

## 2018-04-08 PROCEDURE — 87210 SMEAR WET MOUNT SALINE/INK: CPT | Performed by: FAMILY MEDICINE

## 2018-04-08 RX ORDER — CEFUROXIME AXETIL 250 MG/1
250 TABLET ORAL 2 TIMES DAILY
Qty: 20 TABLET | Refills: 0 | Status: SHIPPED | OUTPATIENT
Start: 2018-04-08 | End: 2018-05-29

## 2018-04-08 NOTE — MR AVS SNAPSHOT
After Visit Summary   4/8/2018    Angela Ibarra    MRN: [id number]           Patient Information     Date Of Birth          1960        Visit Information        Provider Department      4/8/2018 11:25 AM Rachel Amezcua MD [facility]        Today's Diagnoses     Cystitis with hematuria    -  1    Recurrent UTI        Vaginal itching        Acute cystitis with hematuria           Follow-ups after your visit        Future tests that were ordered for you today     Open Future Orders        Priority Expected Expires Ordered    UA with Microscopic reflex to Culture Routine 5/20/2018 4/8/2019 4/8/2018            Who to contact     If you have questions or need follow up information about today's clinic visit or your schedule please contact [facility] directly at [phone].  Normal or non-critical lab and imaging results will be communicated to you by [facility], letter or phone within 4 business days after the clinic has received the results. If you do not hear from us within 7 days, please contact the clinic through [facility] or phone. If you have a critical or abnormal lab result, we will notify you by phone as soon as possible.  Submit refill requests through [employer] or call your pharmacy and they will forward the refill request to us. Please allow 3 business days for your refill to be completed.          Additional Information About Your Visit        MyChart Information     [employer] gives you secure access to your electronic health record. If you see a primary care provider, you can also send messages to your care team and make appointments. If you have questions, please call your primary care clinic.  If you do not have a primary care provider, please call [phone] and they will assist you.        Care EveryWhere ID     This is your Care EveryWhere ID. This could be used by other organizations to access your [facility] medical records  [id number]       "  Your Vitals Were     Pulse Temperature Height Last Period Pulse Oximetry BMI (Body Mass Index)    68 97.1  F (36.2  C) (Oral) 5' 2\" (1.575 m) 01/08/2008 97% 41.88 kg/m2       Blood Pressure from Last 3 Encounters:   04/08/18 141/75   04/06/18 138/70   02/16/18 136/80    Weight from Last 3 Encounters:   04/08/18 229 lb (103.9 kg)   04/06/18 231 lb (104.8 kg)   01/23/18 225 lb (102.1 kg)              We Performed the Following     UA reflex to Microscopic and Culture     Urine Culture Aerobic Bacterial     Urine Microscopic     Wet prep          Today's Medication Changes          These changes are accurate as of 4/8/18 12:45 PM.  If you have any questions, ask your nurse or doctor.               Start taking these medicines.        Dose/Directions    cefuroxime 250 MG tablet   Commonly known as:  CEFTIN   Used for:  Acute cystitis with hematuria   Started by:  Rachel Amezcua MD        Dose:  250 mg   Take 1 tablet (250 mg) by mouth 2 times daily   Quantity:  20 tablet   Refills:  0            Where to get your medicines      These medications were sent to [facility]/pharmacy #[id number] - [address] AT [address] [address]     Phone:  [phone]     cefuroxime 250 MG tablet                Primary Care Provider Office Phone # Fax #    Germán Jernigan [phone] [address] [facility] [address]        Equal Access to Services     ELVIN ARROYO AH: Haddominick rodriguez Soisamar, waaxda luqadaha, qaybta kaalmada ademarioyayazmin, waxay idiin hayaan ademario sandoval. So [facility] [phone].    ATENCIÓN: Si habla español, tiene a lebron disposición servicios gratuitos de asistencia lingüística. Llame al [phone].    We comply with applicable federal civil rights laws and [place] laws. We do not discriminate on the basis of race, color, national origin, age, disability, sex, sexual orientation, or gender " identity.            Thank you!     Thank you for choosing [facility]  [facility]. Our goal is always to provide you with excellent care. Hearing back from our patients is one way we can continue to improve our services. Please take a few minutes to complete the written survey that you may receive in the mail after your visit with us. Thank you!             Your Updated Medication List - Protect others around you: Learn how to safely use, store and throw away your medicines at www.disposemymeds.org.          This list is accurate as of 4/8/18 12:45 PM.  Always use your most recent med list.                   Brand Name Dispense Instructions for use Diagnosis    amLODIPine 5 MG tablet    NORVASC    90 tablet    Take 1 tablet (5 mg) by mouth daily    Essential hypertension with goal blood pressure less than 140/90       atenolol 50 MG tablet    TENORMIN    90 tablet    Take 1 tablet (50 mg) by mouth daily    Essential hypertension with goal blood pressure less than 140/90       cefuroxime 250 MG tablet    CEFTIN    20 tablet    Take 1 tablet (250 mg) by mouth 2 times daily    Acute cystitis with hematuria       traZODone 50 MG tablet    DESYREL    180 tablet    Take 2 tablets (100 mg) by mouth nightly as needed for sleep    Insomnia, unspecified type

## 2018-04-08 NOTE — LETTER
April 11, 2018      Angela Fred  [address] [place] [address]        Dear ,    We are writing to inform you of your test results.    Your test results fall within the expected range(s) or remain unchanged from previous results.  Please continue with current treatment plan.    Resulted Orders   UA reflex to Microscopic and Culture   Result Value Ref Range    Color Urine Yellow     Appearance Urine Cloudy     Glucose Urine Negative NEG^Negative mg/dL    Bilirubin Urine Negative NEG^Negative    Ketones Urine Negative NEG^Negative mg/dL    Specific Gravity Urine 1.020 1.003 - 1.035    Blood Urine Large (A) NEG^Negative    pH Urine 7.5 (H) 5.0 - 7.0 pH    Protein Albumin Urine 100 (A) NEG^Negative mg/dL    Urobilinogen Urine 1.0 0.2 - 1.0 EU/dL    Nitrite Urine Negative NEG^Negative    Leukocyte Esterase Urine Moderate (A) NEG^Negative    Source Midstream Urine    Urine Microscopic   Result Value Ref Range    WBC Urine >100 (A) OTO5^0 - 5 /HPF    RBC Urine 25-50 (A) OTO2^O - 2 /HPF    Bacteria Urine Few (A) NEG^Negative /HPF   Urine Culture Aerobic Bacterial   Result Value Ref Range    Specimen Description Midstream Urine     Culture Micro (A)      >100,000 colonies/mL  Escherichia coli  This isolate does not meet the criteria of an ESBL . A different resistance   mechanism may be present.      Culture Micro       <10,000 colonies/mL  urogenital ross  Susceptibility testing not routinely done     Wet prep   Result Value Ref Range    Specimen Description Vagina     Wet Prep No yeast seen     Wet Prep No clue cells seen     Wet Prep No Trichomonas seen        If you have any questions or concerns, please call the clinic at the number listed above.       Sincerely,        Rachel Amezcua MD

## 2018-04-08 NOTE — PROGRESS NOTES
"    SUBJECTIVE:                                                    Angela Ibarra is a 57 year old female who presents to clinic today for the following health issues:      URINARY TRACT SYMPTOMS      Duration: yesterday    Description  dysuria, frequency, urgency, odor and hx kidney stone years ago    Intensity:  moderate    Accompanying signs and symptoms:  Fever/chills: YES - just chills   Flank pain NONE   Nausea and vomiting: YES  Vaginal symptoms: odor and itching  Abdominal/Pelvic Pain: YES    History  History of frequent UTI's: YES  History of kidney stones: YES- years ago  Sexually Active: YES  Possibility of pregnancy: No    Precipitating or alleviating factors: None    Therapies tried and outcome: increase fluid intake      Not sure of the reaction to macrobid - had a reaction felt \"wierd\" and itchy.  In the past macrobid has worked for her and is willing to try again.     Felt nauseous yesterday and had some itchiness and burning  Today the odor is just horrible and now having a lot of pain with urination.       Problem list and histories reviewed & adjusted, as indicated.  Additional history: as documented    Problem list, Medication list, Allergies, and Medical/Social/Surgical histories reviewed in EPIC and updated as appropriate.    ROS:  Constitutional, HEENT, cardiovascular, pulmonary, gi and gu systems are negative, except as otherwise noted.    OBJECTIVE:                                                    /75 (BP Location: Right arm, Patient Position: Sitting, Cuff Size: Adult Large)  Pulse 68  Temp 97.1  F (36.2  C) (Oral)  Ht 5' 2\" (1.575 m)  Wt 229 lb (103.9 kg)  LMP 01/08/2008  SpO2 97%  BMI 41.88 kg/m2  Body mass index is 41.88 kg/(m^2).  GENERAL: healthy, alert and no distress  NECK: no adenopathy, no asymmetry, masses, or scars and thyroid normal to palpation  RESP: lungs clear to auscultation - no rales, rhonchi or wheezes  CV: regular rate and rhythm, normal S1 S2, no S3 or " S4, no murmur, click or rub, no peripheral edema and peripheral pulses strong  ABDOMEN: soft, nontender, no hepatosplenomegaly, no masses and bowel sounds normal  MS: no gross musculoskeletal defects noted, no edema    Diagnostic Test Results:  Results for orders placed or performed in visit on 04/08/18 (from the past 24 hour(s))   UA reflex to Microscopic and Culture   Result Value Ref Range    Color Urine Yellow     Appearance Urine Cloudy     Glucose Urine Negative NEG^Negative mg/dL    Bilirubin Urine Negative NEG^Negative    Ketones Urine Negative NEG^Negative mg/dL    Specific Gravity Urine 1.020 1.003 - 1.035    Blood Urine Large (A) NEG^Negative    pH Urine 7.5 (H) 5.0 - 7.0 pH    Protein Albumin Urine 100 (A) NEG^Negative mg/dL    Urobilinogen Urine 1.0 0.2 - 1.0 EU/dL    Nitrite Urine Negative NEG^Negative    Leukocyte Esterase Urine Moderate (A) NEG^Negative    Source Midstream Urine    Urine Microscopic   Result Value Ref Range    WBC Urine >100 (A) OTO5^0 - 5 /HPF    RBC Urine 25-50 (A) OTO2^O - 2 /HPF    Bacteria Urine Few (A) NEG^Negative /HPF   Wet prep   Result Value Ref Range    Specimen Description Vagina     Wet Prep No yeast seen     Wet Prep No clue cells seen     Wet Prep No Trichomonas seen         ASSESSMENT/PLAN:                                                        ICD-10-CM    1. Cystitis with hematuria N30.91 UA reflex to Microscopic and Culture     Urine Microscopic     UA with Microscopic reflex to Culture   2. Recurrent UTI N39.0 Urine Culture Aerobic Bacterial   3. Vaginal itching L29.8 Wet prep   4. Acute cystitis with hematuria N30.01 cefuroxime (CEFTIN) 250 MG tablet       Prescribed with ceftin - patient stated this one worked best for her last time and preferred this one  Patient with hematuria advised a repeat urinalysis in 6 weeks is needed to make sure hematuria is resolved. If persistent aware will need further evaluation to rule out possibility of stones or tumors. Patient  will make lab appointment  Alarm signs or symptoms discussed, if present recommend go to ER   Mild vaginal discomfort- wet prep negative - she declined pelvic exam  Aware to go to ER or come in immediately if with any fever chills nausea vomiting or flank pain.  Adverse reactions of medications discussed.  Over the counter medications discussed.   Aware to come back in if with worsening symptoms or if no relief despite treatment plan  Patient voiced understanding and had no further questions.     >15 minutes of the 25 minute visit was spent counseling the patient on her diagnosis and treatment plan see above. Face to face.       MD Rachel Callaway MD  [facility]

## 2018-04-09 ENCOUNTER — TELEPHONE (OUTPATIENT)
Dept: FAMILY MEDICINE | Facility: CLINIC | Age: 58
End: 2018-04-09

## 2018-04-09 NOTE — TELEPHONE ENCOUNTER
Robin from [facility] calling in regards to pt preop info. Please fax over to [phone].

## 2018-04-11 LAB
BACTERIA SPEC CULT: ABNORMAL
BACTERIA SPEC CULT: ABNORMAL
SPECIMEN SOURCE: ABNORMAL

## 2018-05-27 ENCOUNTER — VIRTUAL VISIT (OUTPATIENT)
Dept: FAMILY MEDICINE | Facility: OTHER | Age: 58
End: 2018-05-27

## 2018-05-27 NOTE — PROGRESS NOTES
"Date:   Clinician: Serge Parks  Clinician NPI: [id number]  Patient: Angela Ibarra  Patient : 1960  Patient Address: [address] [place] [address] [place] [address]  Patient Phone: [phone]  Visit Protocol: UTI  Patient Summary:  Angela is a 57 year old ( : 1960 ) female who initiated a Visit for a presumed bladder infection. When asked the question \"Please sign me up to receive news, health information and promotions. \", Angela responded \"No\".    Her symptoms began yesterday and consist of urinary frequency, dysuria, foul smelling urine, hesitation, urgency, and urinary incontinence.   Symptom Details   Urinary Frequency: Several times each hour    She denies flank pain, loss of appetite, feeling feverish, recent antibiotic use, chills, nausea, hematuria, vaginal discharge, abdominal pain, and vomiting. She has not been hospitalized, been a patient in a nursing home, or had a catheter in the past two weeks. She denies risk factors for sexually transmitted infections.   She has a history of kidney stones. Her last episode was more than 6 months ago.   Angela has had two (2) UTIs in the past 12 months. Her most recent bladder infection was not within the last 4 weeks. Her current symptoms are similar to the previous UTI symptoms. She took an antibiotic for her last infection but does not remember which one.   Angela does not get yeast infections when she takes antibiotics.   She denies pregnancy and denies breastfeeding. She does not menstruate.  MEDICATIONS: [{\"id\"=&gt;[id number], \"description\"=&gt;\"atenolol oral\"}], ALLERGIES: []  Clinician Response:  Dear Angela,  Based on the information you have provided, you likely have a bladder infection, also called acute urinary tract infection (UTI).   To treat your infection, I am prescribing:   Sulfamethoxazole-trimethoprim (Bactrim DS) 800-160 mg oral tablet. Take 1 tablet by mouth every 12 hours for 3 days. Continue taking the tablets even if you " feel better before all of the medication is gone. There are no refills with this prescription.  Some women may develop a yeast infection as a side effect of taking antibiotics. If you notice symptoms of a yeast infection, [facility] can help treat that condition as well. Simply log in and complete another Visit, which will cover all of the necessary questions to determine the best treatment for you.   Some people develop allergies to antibiotics. If you notice a new rash, significant swelling, or difficulty breathing, stop the medication immediately and go into a clinic for physical evaluation.   If you become pregnant during this course of treatment, stop taking the medication and contact your primary care provider.   Unless you are allergic to the following over-the-counter medication, I recommend:  phenazopyridine (AZO, Uristat, or store brand) oral tablet to treat your discomfort with urination. Swallow two (2) tablets three times a day for up to 2 days. Take the pills with a full glass of water after a meal.  You will notice that this medication adds an orange/red color to your urine, which may stain fabric. Your contact lenses may also stain if handled after touching the tablets. If your skin or the whites of your eyes develop a yellowish color, it may indicate that your kidneys are not correctly removing the medication. Although this is uncommon, stop using the medication and immediately contact your clinic if this happens.  This is an over-the-counter medication that does not require a prescription.   To help treat your current UTI and prevent future occurrences, remember to:     Drink 8-10, 8-ounce glasses of water daily.    Urinate after sexual intercourse.    Wipe front to back after using the bathroom.     You should visit a clinic for a follow-up visit if your symptoms do not improve in 1-2 days or if you experience another urinary tract infection soon after completing this treatment.   Diagnosis: Acute  uncomplicated bladder infection  Diagnosis ICD: N39.0  Prescription: sulfamethoxazole-trimethoprim (Bactrim DS) 800-160 mg oral tablet 6 tablet, 3 days supply. Take 1 tablet by mouth every 12 hours for 3 days. Refills: 0, Refill as needed: no, Allow substitutions: yes  Pharmacy: [facility]/pharmacy #[id number] - [phone] - [address] [place] [address]

## 2018-05-29 ENCOUNTER — OFFICE VISIT (OUTPATIENT)
Dept: OBGYN | Facility: CLINIC | Age: 58
End: 2018-05-29
Payer: COMMERCIAL

## 2018-05-29 VITALS
HEART RATE: 71 BPM | TEMPERATURE: 98.3 F | OXYGEN SATURATION: 93 % | SYSTOLIC BLOOD PRESSURE: 149 MMHG | WEIGHT: 232 LBS | HEIGHT: 62 IN | RESPIRATION RATE: 16 BRPM | BODY MASS INDEX: 42.69 KG/M2 | DIASTOLIC BLOOD PRESSURE: 79 MMHG

## 2018-05-29 DIAGNOSIS — R30.0 DYSURIA: Primary | ICD-10-CM

## 2018-05-29 DIAGNOSIS — N30.01 ACUTE CYSTITIS WITH HEMATURIA: ICD-10-CM

## 2018-05-29 LAB
ALBUMIN UR-MCNC: NEGATIVE MG/DL
APPEARANCE UR: ABNORMAL
BACTERIA #/AREA URNS HPF: ABNORMAL /HPF
BILIRUB UR QL STRIP: NEGATIVE
COLOR UR AUTO: YELLOW
GLUCOSE UR STRIP-MCNC: NEGATIVE MG/DL
HGB UR QL STRIP: ABNORMAL
KETONES UR STRIP-MCNC: NEGATIVE MG/DL
LEUKOCYTE ESTERASE UR QL STRIP: ABNORMAL
NITRATE UR QL: NEGATIVE
NON-SQ EPI CELLS #/AREA URNS LPF: ABNORMAL /LPF
PH UR STRIP: 6 PH (ref 5–7)
RBC #/AREA URNS AUTO: ABNORMAL /HPF
SOURCE: ABNORMAL
SP GR UR STRIP: <=1.005 (ref 1–1.03)
UROBILINOGEN UR STRIP-ACNC: 0.2 EU/DL (ref 0.2–1)
WBC #/AREA URNS AUTO: ABNORMAL /HPF

## 2018-05-29 PROCEDURE — 87088 URINE BACTERIA CULTURE: CPT | Performed by: NURSE PRACTITIONER

## 2018-05-29 PROCEDURE — 81001 URINALYSIS AUTO W/SCOPE: CPT | Performed by: NURSE PRACTITIONER

## 2018-05-29 PROCEDURE — 87186 SC STD MICRODIL/AGAR DIL: CPT | Performed by: NURSE PRACTITIONER

## 2018-05-29 PROCEDURE — 87086 URINE CULTURE/COLONY COUNT: CPT | Performed by: NURSE PRACTITIONER

## 2018-05-29 PROCEDURE — 99203 OFFICE O/P NEW LOW 30 MIN: CPT | Performed by: NURSE PRACTITIONER

## 2018-05-29 RX ORDER — CEFUROXIME AXETIL 250 MG/1
250 TABLET ORAL 2 TIMES DAILY
Qty: 20 TABLET | Refills: 0 | Status: SHIPPED | OUTPATIENT
Start: 2018-05-29 | End: 2018-09-27

## 2018-05-29 NOTE — MR AVS SNAPSHOT
"              After Visit Summary   5/29/2018    Angela Ibarra    MRN: [id number]           Patient Information     Date Of Birth          1960        Visit Information        Provider Department      5/29/2018 2:50 PM Yelena James APRN CNP [facility]        Today's Diagnoses     Dysuria    -  1    Acute cystitis with hematuria           Follow-ups after your visit        Who to contact     If you have questions or need follow up information about today's clinic visit or your schedule please contact [facility] directly at [phone].  Normal or non-critical lab and imaging results will be communicated to you by "[employer]hart, letter or phone within 4 business days after the clinic has received the results. If you do not hear from us within 7 days, please contact the clinic through [employer] or phone. If you have a critical or abnormal lab result, we will notify you by phone as soon as possible.  Submit refill requests through [employer] or call your pharmacy and they will forward the refill request to us. Please allow 3 business days for your refill to be completed.          Additional Information About Your Visit        MyChart Information     [employer] gives you secure access to your electronic health record. If you see a primary care provider, you can also send messages to your care team and make appointments. If you have questions, please call your primary care clinic.  If you do not have a primary care provider, please call [phone] and they will assist you.        Care EveryWhere ID     This is your Care EveryWhere ID. This could be used by other organizations to access your [facility] medical records  [id number]        Your Vitals Were     Pulse Temperature Respirations Height Last Period Pulse Oximetry    71 98.3  F (36.8  C) (Oral) 16 5' 2\" (1.575 m) 01/08/2008 93%    BMI (Body Mass Index)                   42.43 kg/m2            Blood Pressure from Last 3 Encounters: "   05/29/18 149/79   04/08/18 141/75   04/06/18 138/70    Weight from Last 3 Encounters:   05/29/18 232 lb (105.2 kg)   04/08/18 229 lb (103.9 kg)   04/06/18 231 lb (104.8 kg)              We Performed the Following     UA with Microscopic     Urine Culture Aerobic Bacterial          Where to get your medicines      These medications were sent to [facility] [address] [address] [facility], [address]  [address] [facility], [address], [facility] [address]     Phone:  [phone]     cefuroxime 250 MG tablet          Primary Care Provider Office Phone # Fax #    Germán Jernigan [phone] [address] [address] [facility] [address]        Equal Access to Services     ENOCH ARROYO : Anusha rodriguez Soisamar, waaxda luqadaha, qaybta kaalmada ademarioyayazmin, oleksandr rocha . So [facility] [phone].    ATENCIÓN: Si habla español, tiene a lebron disposición servicios gratuitos de asistencia lingüística. Kasandra al [phone].    We comply with applicable federal civil rights laws and [place] laws. We do not discriminate on the basis of race, color, national origin, age, disability, sex, sexual orientation, or gender identity.            Thank you!     Thank you for choosing [facility]  for your care. Our goal is always to provide you with excellent care. Hearing back from our patients is one way we can continue to improve our services. Please take a few minutes to complete the written survey that you may receive in the mail after your visit with us. Thank you!             Your Updated Medication List - Protect others around you: Learn how to safely use, store and throw away your medicines at www.disposemymeds.org.          This list is accurate as of 5/29/18  3:09 PM.  Always use your most recent med list.                   Brand Name Dispense Instructions for use Diagnosis    amLODIPine 5 MG tablet    NORVASC    90 tablet    Take 1 tablet (5 mg) by mouth  daily    Essential hypertension with goal blood pressure less than 140/90       atenolol 50 MG tablet    TENORMIN    90 tablet    Take 1 tablet (50 mg) by mouth daily    Essential hypertension with goal blood pressure less than 140/90       cefuroxime 250 MG tablet    CEFTIN    20 tablet    Take 1 tablet (250 mg) by mouth 2 times daily    Acute cystitis with hematuria       traZODone 50 MG tablet    DESYREL    180 tablet    Take 2 tablets (100 mg) by mouth nightly as needed for sleep    Insomnia, unspecified type

## 2018-05-29 NOTE — PROGRESS NOTES
SUBJECTIVE:   Angela Ibarra is a 57 year old female who presents to clinic today for the following health issues:    konrad Ibarra is 57 year old female   Chief Complaint   Patient presents with     UTI     URINARY TRACT SYMPTOMS  Onset: saturday    Description:   Painful urination (Dysuria): YES  Blood in urine (Hematuria): no   Delay in urine (Hesitency): no     Intensity: mild    Progression of Symptoms:  worsening    Accompanying Signs & Symptoms:  Fever/chills: no   Flank pain no   Nausea and vomiting: no   Any vaginal symptoms: none  Abdominal/Pelvic Pain: no     History:   History of frequent UTI's: YES  History of kidney stones: no   Sexually Active: YES  Possibility of pregnancy: No    Precipitating factors:   na    Therapies Tried and outcome: Bactrium and Increase fluid intake    Patient did [facility] visit 3 days ago and was prescribed Bactrim-not feeling any improvement of symptoms, feels the frequency has increased. Continues to have dysuria, frequency, cloudy and foul smelling urine, urgency. Denies hematuria, low back pain, fevers, nausea.   Has had a few infections in the last 1 year with E. Coli. Resistant to Cipro, Bactrim. Saw Urology in August. Was to start Estrace cream as she had not been using it. Patient admits to being very bad about using it. Has been good with hygiene before and after intercourse.   Denies current abnormal vaginal symptoms.     Problem list and histories reviewed & adjusted, as indicated.  Additional history: as documented    Patient Active Problem List   Diagnosis     Obesity     NONSPECIFIC COLITIS     Migraine headache     Stress incontinence     Balance disorder     Right leg weakness     Right carpal tunnel syndrome     Osteoporosis     Loose body in ankle and foot joint     Synovitis of ankle     Malunion, fracture     Pain in joint involving ankle and foot     Abnormal gait     Other postprocedural status(V45.89)     Chondromalacia, left ankle and joints of  left foot     CARDIOVASCULAR SCREENING; LDL GOAL LESS THAN 130     Advanced directives, counseling/discussion     Insomnia, unspecified type     Essential hypertension with goal blood pressure less than 140/90     Primary osteoarthritis of right hip     Cataract of both eyes, unspecified cataract type     Past Surgical History:   Procedure Laterality Date     ARTHROSCOPY ANKLE  2014    Procedure: ARTHROSCOPY ANKLE;  Surgeon: Shaun Bhatt DPM;  Location: [facility] OR     C  DELIVERY ONLY      , Low Cervical     C GASTRIC BYPASS,OBESITY,W/SM BOWEL RECONS  10/99     C LIGATE FALLOPIAN TUBE  2000    laparoscopy     COLONOSCOPY WITH CO2 INSUFFLATION N/A 2017    Procedure: COLONOSCOPY WITH CO2 INSUFFLATION;  COLON SCREEN/ YURI;  Surgeon: Cody Arana MD;  Location: [facility]     HYSTERECTOMY, PAP NO LONGER INDICATED  2008     LAPAROSCOPIC CHOLECYSTECTOMY  8/3/2012    Procedure: LAPAROSCOPIC CHOLECYSTECTOMY;  Laparoscopic Cholecystectomy ;  Surgeon: Corwin Cabezas MD;  Location: [facility]     OPEN REDUCTION INTERNAL FIXATION ANKLE  2014    Procedure: OPEN REDUCTION INTERNAL FIXATION ANKLE;  Surgeon: Shaun Bhatt DPM;  Location: [facility]     OPEN REDUCTION INTERNAL FIXATION ELBOW  10/15/2013    Procedure: OPEN REDUCTION INTERNAL FIXATION ELBOW;  OPEN REDUCTION INTERNAL FIXATION LEFT PROXIMAL ULNA;  Surgeon: Artem Last DO;  Location: [facility]     ORTHOPEDIC SURGERY      left shoulder surgery     REMOVE MESH VAGINA  10/10/2011    Procedure:REMOVE MESH VAGINA; Excision of Vaginal Mesh; Surgeon:SERGE SALGADO; Location:[facility] OR     SURGICAL HISTORY OF -   4/20/10    Transobturator midurethral sling     SURGICAL HISTORY OF -   1999    exploratory laparotomy, colitis     SURGICAL HISTORY OF -   4/20/10    Transobturator midurethral sling     TUBAL LIGATION         Social History   Substance Use Topics     Smoking status: Never Smoker     Smokeless  "tobacco: Never Used     Alcohol use Yes      Comment: occas     Family History   Problem Relation Age of Onset     C.A.D. Mother      MI     Hypertension Mother      Hypertension Father      Breast Cancer No family hx of            Reviewed and updated as needed this visit by clinical staff  Tobacco  Allergies  Meds  Problems  Med Hx  Surg Hx  Fam Hx  Soc Hx        Reviewed and updated as needed this visit by Provider  Tobacco  Allergies  Meds  Problems  Med Hx  Surg Hx  Fam Hx  Soc Hx          ROS:  Constitutional, HEENT, cardiovascular, pulmonary, gi and gu systems are negative, except as otherwise noted.    OBJECTIVE:     /79  Pulse 71  Temp 98.3  F (36.8  C) (Oral)  Resp 16  Ht 5' 2\" (1.575 m)  Wt 232 lb (105.2 kg)  LMP 01/08/2008  SpO2 93%  BMI 42.43 kg/m2  Body mass index is 42.43 kg/(m^2).  GENERAL: healthy, alert and no distress  RESP: lungs clear to auscultation - no rales, rhonchi or wheezes  CV: regular rate and rhythm, normal S1 S2, no S3 or S4, no murmur, click or rub, no peripheral edema and peripheral pulses strong  ABDOMEN: soft, nontender, no hepatosplenomegaly, no masses and bowel sounds normal  MS: no gross musculoskeletal defects noted, no edema  SKIN: no suspicious lesions or rashes  BACK: no CVA tenderness, no paralumbar tenderness  PSYCH: mentation appears normal, affect normal/bright    Diagnostic Test Results:  Results for orders placed or performed in visit on 05/29/18 (from the past 24 hour(s))   UA with Microscopic   Result Value Ref Range    Color Urine Yellow     Appearance Urine Slightly Cloudy     Glucose Urine Negative NEG^Negative mg/dL    Bilirubin Urine Negative NEG^Negative    Ketones Urine Negative NEG^Negative mg/dL    Specific Gravity Urine <=1.005 1.003 - 1.035    pH Urine 6.0 5.0 - 7.0 pH    Protein Albumin Urine Negative NEG^Negative mg/dL    Urobilinogen Urine 0.2 0.2 - 1.0 EU/dL    Nitrite Urine Negative NEG^Negative    Blood Urine Trace (A) " NEG^Negative    Leukocyte Esterase Urine Moderate (A) NEG^Negative    Source Midstream Urine     WBC Urine 10-25 (A) OTO5^0 - 5 /HPF    RBC Urine 2-5 (A) OTO2^O - 2 /HPF    Squamous Epithelial /LPF Urine Few FEW^Few /LPF    Bacteria Urine Few (A) NEG^Negative /HPF       ASSESSMENT/PLAN:   1. Dysuria  - Urine Culture Aerobic Bacterial  - UA with Microscopic    2. Acute cystitis with hematuria  Reviewed her history of recurrent urinary tract infections in the last 12 months. Will treat with Ceftin as previous cultures have been resistant to Cipro, has allergies to Macrobid and Bactrim-though has tolerated Bactrim for the last 3 days. Encouraged patient to restart her vaginal estrogen cream. We discussed how vaginal atrophy can increase urinary infections after menopause. To increased fluid intake, reviewed symptoms to monitor for and when to return to clinic. Questions answered, does not need new prescription for estrogen cream.  - cefuroxime (CEFTIN) 250 MG tablet; Take 1 tablet (250 mg) by mouth 2 times daily  Dispense: 20 tablet; Refill: 0    LORIE Torres CNP  [facility]

## 2018-06-01 ENCOUNTER — TELEPHONE (OUTPATIENT)
Dept: OBGYN | Facility: CLINIC | Age: 58
End: 2018-06-01

## 2018-06-01 DIAGNOSIS — N95.2 ATROPHY OF VAGINA: Primary | ICD-10-CM

## 2018-06-01 LAB
BACTERIA SPEC CULT: ABNORMAL
BACTERIA SPEC CULT: ABNORMAL
SPECIMEN SOURCE: ABNORMAL

## 2018-06-01 RX ORDER — ESTRADIOL 0.1 MG/G
2 CREAM VAGINAL
Qty: 42.5 G | Refills: 2 | Status: ON HOLD | OUTPATIENT
Start: 2018-06-04 | End: 2020-07-06

## 2018-06-01 NOTE — TELEPHONE ENCOUNTER
Spoke with patient. Symptoms have resolved on Ceftin. Discussed multiple cultures with antibiotic resistance. Patient has restarted vaginal estrogen in hopes of reducing infections-does need new prescription as she has less remaining than she thought. Will send new prescription for her. Discussed that if she continues to have infections, recommend Urology follow up due to multiple antibiotic resistance. Questions answered. Yelena MELO CNP

## 2018-06-01 NOTE — TELEPHONE ENCOUNTER
Left message on machine for patient to call back regarding results. Last few urine cultures have shown infection with E.coli, but resistant to many drugs. Yelena MELO CNP

## 2018-08-02 ENCOUNTER — MYC MEDICAL ADVICE (OUTPATIENT)
Dept: FAMILY MEDICINE | Facility: CLINIC | Age: 58
End: 2018-08-02

## 2018-08-07 ENCOUNTER — OFFICE VISIT (OUTPATIENT)
Dept: FAMILY MEDICINE | Facility: CLINIC | Age: 58
End: 2018-08-07
Payer: COMMERCIAL

## 2018-08-07 VITALS
TEMPERATURE: 98.3 F | OXYGEN SATURATION: 96 % | BODY MASS INDEX: 42.43 KG/M2 | HEART RATE: 72 BPM | DIASTOLIC BLOOD PRESSURE: 83 MMHG | WEIGHT: 232 LBS | RESPIRATION RATE: 18 BRPM | SYSTOLIC BLOOD PRESSURE: 128 MMHG

## 2018-08-07 DIAGNOSIS — M54.17 RIGHT LUMBOSACRAL RADICULOPATHY: Primary | ICD-10-CM

## 2018-08-07 PROCEDURE — 99214 OFFICE O/P EST MOD 30 MIN: CPT | Performed by: FAMILY MEDICINE

## 2018-08-07 RX ORDER — PREDNISONE 20 MG/1
40 TABLET ORAL DAILY
Qty: 10 TABLET | Refills: 0 | Status: SHIPPED | OUTPATIENT
Start: 2018-08-07 | End: 2018-08-12

## 2018-08-07 ASSESSMENT — PAIN SCALES - GENERAL: PAINLEVEL: MILD PAIN (2)

## 2018-08-07 NOTE — PATIENT INSTRUCTIONS
1. Take Prednisone as prescribed. This is a steroid anti-inflammatory medication. When used long term it can have many health consequences. But short term use is safe - common side effects are insomnia and anxiety. It should be taken 1st part of the day.    2. Set up the MRI and I will contact you with results. I will order you a steroid injection if needed.

## 2018-08-07 NOTE — MR AVS SNAPSHOT
After Visit Summary   8/7/2018    Angela Ibarra    MRN: [id number]           Patient Information     Date Of Birth          1960        Visit Information        Provider Department      8/7/2018 2:10 PM Germán Jernigan MD [facility]        Today's Diagnoses     Right lumbosacral radiculopathy    -  1      Care Instructions    1. Take Prednisone as prescribed. This is a steroid anti-inflammatory medication. When used long term it can have many health consequences. But short term use is safe - common side effects are insomnia and anxiety. It should be taken 1st part of the day.    2. Set up the MRI and I will contact you with results. I will order you a steroid injection if needed.          Follow-ups after your visit        Your next 10 appointments already scheduled     Aug 13, 2018 12:10 PM CDT   [facility]Chart Long with Germán Jernigan MD   [facility]    [address] [facility] [address] [phone]              Future tests that were ordered for you today     Open Future Orders        Priority Expected Expires Ordered    MR Lumbar Spine w/o Contrast Routine  9/21/2018 8/7/2018            Who to contact     If you have questions or need follow up information about today's clinic visit or your schedule please contact [facility] directly at [phone].  Normal or non-critical lab and imaging results will be communicated to you by MyChart, letter or phone within 4 business days after the clinic has received the results. If you do not hear from us within 7 days, please contact the clinic through MyChart or phone. If you have a critical or abnormal lab result, we will notify you by phone as soon as possible.  Submit refill requests through [employer] or call your pharmacy and they will forward the refill request to us. Please allow 3 business days for your refill to be completed.          Additional Information About Your  Visit        [employer] Information     [employer] gives you secure access to your electronic health record. If you see a primary care provider, you can also send messages to your care team and make appointments. If you have questions, please call your primary care clinic.  If you do not have a primary care provider, please call [phone] and they will assist you.        Care EveryWhere ID     This is your Care EveryWhere ID. This could be used by other organizations to access your [facility] medical records  [id number]        Your Vitals Were     Pulse Temperature Respirations Last Period Pulse Oximetry Breastfeeding?    72 98.3  F (36.8  C) (Oral) 18 01/08/2008 96% No    BMI (Body Mass Index)                   42.43 kg/m2            Blood Pressure from Last 3 Encounters:   08/07/18 128/83   05/29/18 149/79   04/08/18 141/75    Weight from Last 3 Encounters:   08/07/18 232 lb (105.2 kg)   05/29/18 232 lb (105.2 kg)   04/08/18 229 lb (103.9 kg)                 Today's Medication Changes          These changes are accurate as of 8/7/18  2:15 PM.  If you have any questions, ask your nurse or doctor.               Start taking these medicines.        Dose/Directions    predniSONE 20 MG tablet   Commonly known as:  DELTASONE   Used for:  Right lumbosacral radiculopathy   Started by:  Germán Jernigan MD        Dose:  40 mg   Take 2 tablets (40 mg) by mouth daily for 5 days   Quantity:  10 tablet   Refills:  0            Where to get your medicines      These medications were sent to [facility] [address], [facility] [address]  [address] [address]     Phone:  [phone]     predniSONE 20 MG tablet                Primary Care Provider Office Phone # Fax #    Germán Jernigan [phone] [address] [facility] [address]        Equal Access to Services     ENOCH ARROYO AH: Anusha Diaz, wadonnda lujed, qaybta kesha vann,  oleksandr cosbypolo keating'aan ah. So [facility] [phone].    ATENCIÓN: Si nawafla mansi, tiene a lebron disposición servicios gratuitos de asistencia lingüística. Kasandra currie [phone].    We comply with applicable federal civil rights laws and [place] laws. We do not discriminate on the basis of race, color, national origin, age, disability, sex, sexual orientation, or gender identity.            Thank you!     Thank you for choosing [facility]  for your care. Our goal is always to provide you with excellent care. Hearing back from our patients is one way we can continue to improve our services. Please take a few minutes to complete the written survey that you may receive in the mail after your visit with us. Thank you!             Your Updated Medication List - Protect others around you: Learn how to safely use, store and throw away your medicines at www.disposemymeds.org.          This list is accurate as of 8/7/18  2:15 PM.  Always use your most recent med list.                   Brand Name Dispense Instructions for use Diagnosis    amLODIPine 5 MG tablet    NORVASC    90 tablet    Take 1 tablet (5 mg) by mouth daily    Essential hypertension with goal blood pressure less than 140/90       atenolol 50 MG tablet    TENORMIN    90 tablet    Take 1 tablet (50 mg) by mouth daily    Essential hypertension with goal blood pressure less than 140/90       cefuroxime 250 MG tablet    CEFTIN    20 tablet    Take 1 tablet (250 mg) by mouth 2 times daily    Acute cystitis with hematuria       estradiol 0.1 MG/GM cream    ESTRACE    42.5 g    Place 2 g vaginally twice a week    Atrophy of vagina       predniSONE 20 MG tablet    DELTASONE    10 tablet    Take 2 tablets (40 mg) by mouth daily for 5 days    Right lumbosacral radiculopathy       traZODone 50 MG tablet    DESYREL    180 tablet    Take 2 tablets (100 mg) by mouth nightly as needed for sleep    Insomnia, unspecified type

## 2018-08-07 NOTE — NURSING NOTE
"Chief Complaint   Patient presents with     Hip right     leg pain       Initial /83 (Cuff Size: Adult Regular)  Pulse 72  Temp 98.3  F (36.8  C) (Oral)  Resp 18  Wt 232 lb (105.2 kg)  LMP 01/08/2008  SpO2 96%  Breastfeeding? No  BMI 42.43 kg/m2 Estimated body mass index is 42.43 kg/(m^2) as calculated from the following:    Height as of 5/29/18: 5' 2\" (1.575 m).    Weight as of this encounter: 232 lb (105.2 kg).      Susanne Gotti LPN    "

## 2018-08-07 NOTE — PROGRESS NOTES
HPI:    Angela is a 57 year old female here to discuss:    Back pain - present since 2011 intermittent but this episode started around 7/20/18. No trauma or other precipitating events. The pain is located on the right lower lumbar area. It is described as a cramp and rated as moderate to severe (makes her cry). It is aggravated by activity. It is alleviated by rest. It radiates to the right buttock and right thigh. No numbness, tingling, focal weakness. No red flag symptoms like fever, weight loss or incontinence. Of note she has had right hip arthritis - shows helped (see below).  Evaluation and treatment:    I discussed the diff dx which includes lumbar radiculopathy and/or hip arthritis.   She tells me PT did not help in the past.   I ordered lumbar MRI for her. I showed her some stretching exercises.   Tizanidine and Prednisone - side effects discussed.    Cataracts - has had blurry vision.  Evaluation and treatment:    She had surgery April 2018.    Right hip osteoarthritis - present since around 2013. Seemed ok after injection. Now back in the last 3 weeks. Interferes with sleep, throbbing at night. No trauma. She feels the pain more on the right groin area. No bulge. There is morning stiffness. Worse with activity? Better with Tylenol. She feels the right leg is weak. No numbness. Not sure about right low back pain. Has h/o right ankle surgery.   Evaluation and treatment:   Saw ortho and steroid injection was helpful 4/13/17 done at [facility].   [facility] was helpful.   Declines PT.    XR HIP RIGHT 2-3 VIEWS 3/31/2017 8:10 AM     HISTORY: Pain in right hip     COMPARISON: None.         IMPRESSION: Mild degenerative changes of the hip. Otherwise negative.        RUBÉN MACE MD    MRI RIGHT LOWER EXTREMITY NON-JOINT    May 2, 2011 4:58:00 PM     HISTORY: Right thigh pain.      COMPARISON: None.     TECHNIQUE: Routine multiplanar, multisequence imaging was  "performed.     FINDINGS:   Osseous structures: Bone marrow signal is normal throughout. No  evidence of fracture or AVN.     Additional findings: No masses or soft tissue signal abnormality  identified. Common hamstrings tendon is normal bilaterally.     IMPRESSION: Unremarkable MRI of the thigh regions.    Glucosuria - this was noted during her urology visit on 6/27/17. The glucose was 100 and the rest of the u/a was negative. She has no h/o diabetes and denies symptoms high glucose.  Evaluation and treatment:   This is most like insignificant glucosuria. Serum glucose is fine.    Frequent UTI's/atrophic vaginitis - was happening every other month. Goes away with antibiotic treatment. Symptoms are frequency, burning, as if passing \"clot.\" previous culture grew e coli which was sensitive to all antibiotics tested.  She also has had dryness and painful intercourse. No hematuria. No fevers. She has had hysterectomy.   Bactrim right before or right after intercourse is working well.    She saw as baseline frequency.    The u/a has been normal on multiple occasions.    For atrophic vaginitis I had prescribed Estrogen vaginal pill but stopped due to fear of side effects.   Saw urologist - advised to continue Estrogen and consider Detrol    Previous Depression and ongoing insomnia -    Specific type: mild major depression  Duration: since around 2011  Symptoms: decreased mood, mood swings, anhedonia, sleep disturbance, decreased energy.  Compounding factors: \"hates\" her work.   Anxiety: Worries about her kid. Thinks too much at night.  SI or HI: denies  Delusions/hallucinations: denies  Rupali or hypomania now or in the past: denies  Current treatment: Trazodone prn for sleep only.  Compliant: denies  Side effects: denies  Treatments:    Zoloft stopped since not needed   Trazodone helps sometimes.   Referral for counseling previously but she says it does not help   she says she only wants to be on Trazodone prn which is " fine.     PHQ-9 SCORE 9/8/2016 3/31/2017 1/23/2018   Total Score - - -   Total Score 0 0 2       DENG-7 SCORE 9/8/2016 3/31/2017 1/23/2018   Total Score - - -   Total Score 0 0 0     HTN - dx'd around: 2009. Not checking at home. Fairly controlled in clinic but could be better.  Evaluation and treatment:    Atenolol 50 mg qd (chosen due to migraines) - no side effects.   Add Norvasc 5 mg qd.    BP Readings from Last 6 Encounters:   08/07/18 128/83   05/29/18 149/79   04/08/18 141/75   04/06/18 138/70   02/16/18 136/80   01/23/18 130/80         Last Basic Metabolic Panel:  Lab Results   Component Value Date     01/18/2018      Lab Results   Component Value Date    POTASSIUM 4.4 01/18/2018     Lab Results   Component Value Date    CHLORIDE 107 01/18/2018     Lab Results   Component Value Date    BRITANY 8.9 01/18/2018     Lab Results   Component Value Date    CO2 25 01/18/2018     Lab Results   Component Value Date    BUN 17 01/18/2018     Lab Results   Component Value Date    CR 0.90 01/18/2018     Lab Results   Component Value Date    GLC 88 01/18/2018     Multiple falls and fractures/osteopenia - She informs me that she has fallen about 6 times in as many years. These were related to stepping on a pot hole, stumbling on stairs and sometimes the ankle is weak and gives way. On 9/2/13 she stepped on a pot hole. She was seen in the ED and dx'd with right distal fibular fx and old lateral malleolus fx. She was then managed by sports medicine with CAM walker. On 10/8/13 she was walking her dog when her right ankle gave out and she fell on her left side. She was seen in ED again and dx'd with left ulnar comminuted fracture. She was subsequently seen by ortho and underwent ORIF on 10/15/13. At the end of Nov 2014 she kicked a chair. She was seen in urgent care on 12/8/14 and dx'd with left 5th proximal phalanx fracture. In addition to all this she has had left humerus fx in the past that required ORIF.    Has not fallen  since Oct 2013. Previously followed with endocrine. Reclast - yearly. It made her sick so she stopped it. She takes Vitamin 2000 units per day. Takes Calcium over the counter - dose unknown.   DX HIP/PELVIS/SPINE 11/19/2013 8:24 AM  HISTORY: Screening. History of fall.  IMPRESSION  IMPRESSION:  1. The T-score of the lumbar spine in the region of L1-L4 is -1.6.  This correlates with moderate osteopenia. If one looks at the L1  vertebral body alone the T score is -2.5 which correlates with  osteoporosis.  2. The T-score of the right femoral neck is -2.3. This correlates with  severe osteopenia.  3. The T-score of the left femoral neck is -2.2. This correlates with  severe osteopenia.  NOTE: With regards to the hips, the T-score for either the femoral  neck or the total hip is reported, whichever is worse.  JUAN ANTONIO SARMIENTO MD    Balance problem - no h/o CVA or other neurological problems. She feels her balance is better since the ankle has improved after surgery.    B12 deficiency - took liquid B12, 1/2 oz per day previously. Not at this time. Last B12 was normal Oct 2013.     Right Carpal tunnel - No further problems.     Obesity - diet and exercise discussed. Commended her losing weight.    Wt Readings from Last 5 Encounters:   08/07/18 232 lb (105.2 kg)   05/29/18 232 lb (105.2 kg)   04/08/18 229 lb (103.9 kg)   04/06/18 231 lb (104.8 kg)   01/23/18 225 lb (102.1 kg)     Surgical history:     Right ankle surgery   Hysterectomy 2008 due to abnormal PAP, ovaries still in place.    Preventive:    Immunization History   Administered Date(s) Administered     Influenza (IIV3) PF 11/08/2012     Influenza Vaccine IM 3yrs+ 4 Valent IIV4 10/25/2013, 02/04/2015, 09/08/2016, 10/11/2017     TD (ADULT, 7+) 01/01/1988, 06/20/2000     TDAP Vaccine (Adacel) 11/08/2012     Lipids screen:     Recent Labs   Lab Test  01/18/18   0714  03/31/17   0816  08/21/15   1229  08/13/14   0743   CHOL  152  177  191  196   HDL  49*  48*  39*  47*    LDL  82  101*  117  116   TRIG  104  142  175*  164*   CHOLHDLRATIO   --    --   4.9  4.2     Mammogram: negative 2/1/18    PAP: n/a due to hysterectomy    Colonoscopy: 9/28/17 - repeat in 5 years. We are supposed to order it with MAC next time due to tortuous colon.     Advanced Directive: has one at home - she will bring a copy.    SH:    Marital status:  - good relationship  Kids: one  Employment: administrative work  Exercise: biking and walking and swimming  Tobacco: no  Etoh: none  Recreational drugs: none  Caffeine: one diet coke per day      Exam:    /83 (Cuff Size: Adult Regular)  Pulse 72  Temp 98.3  F (36.8  C) (Oral)  Resp 18  Wt 232 lb (105.2 kg)  LMP 01/08/2008  SpO2 96%  Breastfeeding? No  BMI 42.43 kg/m2    Gen: Healthy appearing female in no acute distress  Lungs: Good air movement and otherwise clear.  CV: Heart RRR with no murmurs. No JVD, carotid bruits or leg edema.  MS: Full ROM at the lumbar spine. There is mild tenderness at the low right lumbar area. No bony tenderness. FABERE test negative both sides.  Neuro: Straight leg raise positive right leg. No sensory or motor defecits lower ext. Gait is antalgic.      Assessment and Plan - Decision Making    1. Right lumbosacral radiculopathy  Per HPI  - predniSONE (DELTASONE) 20 MG tablet; Take 2 tablets (40 mg) by mouth daily for 5 days  Dispense: 10 tablet; Refill: 0  - MR Lumbar Spine w/o Contrast; Future  - tiZANidine (ZANAFLEX) 4 MG tablet; Take 1 tablet (4 mg) by mouth 3 times daily as needed for muscle spasms  Dispense: 30 tablet; Refill: 0      Written instructions given as follows:    Patient Instructions   1. Take Prednisone as prescribed. This is a steroid anti-inflammatory medication. When used long term it can have many health consequences. But short term use is safe - common side effects are insomnia and anxiety. It should be taken 1st part of the day.    2. Set up the MRI and I will contact you with results. I  will order you a steroid injection if needed.

## 2018-08-10 ENCOUNTER — OFFICE VISIT (OUTPATIENT)
Dept: FAMILY MEDICINE | Facility: CLINIC | Age: 58
End: 2018-08-10
Payer: COMMERCIAL

## 2018-08-10 ENCOUNTER — RADIANT APPOINTMENT (OUTPATIENT)
Dept: GENERAL RADIOLOGY | Facility: CLINIC | Age: 58
End: 2018-08-10
Attending: FAMILY MEDICINE
Payer: COMMERCIAL

## 2018-08-10 VITALS
DIASTOLIC BLOOD PRESSURE: 80 MMHG | OXYGEN SATURATION: 96 % | HEART RATE: 65 BPM | TEMPERATURE: 98 F | SYSTOLIC BLOOD PRESSURE: 132 MMHG | BODY MASS INDEX: 41.7 KG/M2 | WEIGHT: 228 LBS

## 2018-08-10 DIAGNOSIS — M25.512 LEFT SHOULDER PAIN, UNSPECIFIED CHRONICITY: ICD-10-CM
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
## 2018-08-10 DIAGNOSIS — S16.1XXA STRAIN OF NECK MUSCLE, INITIAL ENCOUNTER: ICD-10-CM

## 2018-08-10 PROCEDURE — 72040 X-RAY EXAM NECK SPINE 2-3 VW: CPT | Mod: FY

## 2018-08-10 PROCEDURE — 99214 OFFICE O/P EST MOD 30 MIN: CPT | Performed by: FAMILY MEDICINE

## 2018-08-10 PROCEDURE — 73030 X-RAY EXAM OF SHOULDER: CPT | Mod: LT
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
## 2018-08-10 NOTE — PATIENT INSTRUCTIONS
1. You can expect more pain in 1-2 days.    2. Massage, heat, gentle stretching.    3. If the left shoulder continues to be a problem in the next 3-4 weeks, then we have to visit with the specialist.
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
## 2018-08-10 NOTE — LETTER
[place] [address] [address] [facility] [address]  Phone: [phone]    August 10, 2018        Angela Ibarra  [address] [place] [address]        To whom it may concern:    RE: Angela Ibarra    Patient was seen and treated today at our clinic and missed work. Please excuse her absence.    Please contact me for questions or concerns.      Sincerely,        ALE WELCH MD

## 2018-08-14 ENCOUNTER — MYC MEDICAL ADVICE (OUTPATIENT)
Dept: FAMILY MEDICINE | Facility: CLINIC | Age: 58
End: 2018-08-14

## 2018-08-15 ENCOUNTER — RADIANT APPOINTMENT (OUTPATIENT)
Dept: MRI IMAGING | Facility: CLINIC | Age: 58
End: 2018-08-15
Attending: FAMILY MEDICINE
Payer: COMMERCIAL

## 2018-08-15 DIAGNOSIS — M54.17 RIGHT LUMBOSACRAL RADICULOPATHY: ICD-10-CM

## 2018-08-15 PROCEDURE — 72148 MRI LUMBAR SPINE W/O DYE: CPT | Mod: TC

## 2018-08-16 ENCOUNTER — TELEPHONE (OUTPATIENT)
Dept: FAMILY MEDICINE | Facility: CLINIC | Age: 58
End: 2018-08-16

## 2018-08-16 DIAGNOSIS — M47.819 ARTHROPATHY OF FACET JOINT: Primary | ICD-10-CM

## 2018-08-17 ENCOUNTER — MYC MEDICAL ADVICE (OUTPATIENT)
Dept: FAMILY MEDICINE | Facility: CLINIC | Age: 58
End: 2018-08-17

## 2018-08-17 ENCOUNTER — TELEPHONE (OUTPATIENT)
Dept: PALLIATIVE MEDICINE | Facility: CLINIC | Age: 58
End: 2018-08-17
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
## 2018-08-17 NOTE — TELEPHONE ENCOUNTER
Per our discussion in clinic I ordered her a spine injection. I sent her a Results Note on the MRI.    Germán Jernigan M.D.

## 2018-08-28 ENCOUNTER — RADIANT APPOINTMENT (OUTPATIENT)
Dept: GENERAL RADIOLOGY | Facility: CLINIC | Age: 58
End: 2018-08-28
Attending: PHYSICIAN ASSISTANT
Payer: COMMERCIAL

## 2018-08-28 ENCOUNTER — OFFICE VISIT (OUTPATIENT)
Dept: URGENT CARE | Facility: URGENT CARE | Age: 58
End: 2018-08-28
Payer: COMMERCIAL

## 2018-08-28 VITALS
DIASTOLIC BLOOD PRESSURE: 83 MMHG | SYSTOLIC BLOOD PRESSURE: 145 MMHG | WEIGHT: 216.2 LBS | BODY MASS INDEX: 39.54 KG/M2 | TEMPERATURE: 98.2 F | OXYGEN SATURATION: 97 % | HEART RATE: 70 BPM
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
## 2018-08-28 DIAGNOSIS — S89.90XA KNEE INJURY, UNSPECIFIED LATERALITY, INITIAL ENCOUNTER: ICD-10-CM

## 2018-08-28 DIAGNOSIS — S89.90XA KNEE INJURY, UNSPECIFIED LATERALITY, INITIAL ENCOUNTER: Primary | ICD-10-CM

## 2018-08-28 PROBLEM — E66.01 MORBID OBESITY (H): Status: ACTIVE | Noted: 2018-08-28

## 2018-08-28 PROCEDURE — 99213 OFFICE O/P EST LOW 20 MIN: CPT | Performed by: PHYSICIAN ASSISTANT

## 2018-08-28 PROCEDURE — 73562 X-RAY EXAM OF KNEE 3: CPT | Mod: RT

## 2018-08-28 PROCEDURE — 73562 X-RAY EXAM OF KNEE 3: CPT | Mod: LT

## 2018-08-28 ASSESSMENT — PAIN SCALES - GENERAL: PAINLEVEL: WORST PAIN (10)

## 2018-08-28 NOTE — MR AVS SNAPSHOT
After Visit Summary   8/28/2018    Angela Ibarra    MRN: [id number]           Patient Information     Date Of Birth          1960        Visit Information        Provider Department      8/28/2018 6:30 PM Dawn Triplett PA-C [facility]        Today's Diagnoses     Knee injury, unspecified laterality, initial encounter    -  1    Fall, initial encounter          Care Instructions    Can rotate with Tylenol every 3 hours.    Take 2 OTC Aleve 2 times a day          Follow-ups after your visit        Additional Services     ORTHO  REFERRAL       [facility] is referring you to the Orthopedic  Services at [facility].       The  Representative will assist you in the coordination of your Orthopedic and Musculoskeletal Care as prescribed by your physician.    The  Representative will call you within 1 business day to help schedule your appointment, or you may contact the  Representative at:    All areas ~ [phone]     Type of Referral : Non Surgical       Timeframe requested: 3 - 5 days  L>R knee pain after a fall. Left knee knees like it will lock or give out    Coverage of these services is subject to the terms and limitations of your health insurance plan.  Please call member services at your health plan with any benefit or coverage questions.      If X-rays, CT or MRI's have been performed, please contact the facility where they were done to arrange for , prior to your scheduled appointment.  Please bring this referral request to your appointment and present it to your specialist.                  Your next 10 appointments already scheduled     Sep 07, 2018  7:45 AM CDT   Radiology Injections with Noel Radford MD   [facility] [place] ([facility] [place])    [address]  [place] [address] [phone]              Who to contact      If you have questions or need follow up information about today's clinic visit or your schedule please contact [facility] directly at [phone].  Normal or non-critical lab and imaging results will be communicated to you by MyChart, letter or phone within 4 business days after the clinic has received the results. If you do not hear from us within 7 days, please contact the clinic through [employer] or phone. If you have a critical or abnormal lab result, we will notify you by phone as soon as possible.  Submit refill requests through [employer] or call your pharmacy and they will forward the refill request to us. Please allow 3 business days for your refill to be completed.          Additional Information About Your Visit        PreggersharDatapipe Information     [employer]ient gives you secure access to your electronic health record. If you see a primary care provider, you can also send messages to your care team and make appointments. If you have questions, please call your primary care clinic.  If you do not have a primary care provider, please call [phone] and they will assist you.        Care EveryWhere ID     This is your Care EveryWhere ID. This could be used by other organizations to access your [place] medical records  [id number]        Your Vitals Were     Pulse Temperature Last Period Pulse Oximetry BMI (Body Mass Index)       70 98.2  F (36.8  C) (Tympanic) 01/08/2008 97% 39.54 kg/m2        Blood Pressure from Last 3 Encounters:   08/28/18 145/83   08/10/18 132/80   08/07/18 128/83    Weight from Last 3 Encounters:   08/28/18 216 lb 3.2 oz (98.1 kg)   08/10/18 228 lb (103.4 kg)   08/07/18 232 lb (105.2 kg)              We Performed the Following     ORTHO  REFERRAL          Today's Medication Changes          These changes are accurate as of 8/28/18  7:36 PM.  If you have any questions, ask your nurse or doctor.               Start taking these medicines.        Dose/Directions    order for DME    Used for:  Knee injury, unspecified laterality, initial encounter, Fall, initial encounter   Started by:  Dawn Triplett PA-C        Equipment being ordered: Hinged knee brace   Quantity:  1 Device   Refills:  0            Where to get your medicines      Some of these will need a paper prescription and others can be bought over the counter.  Ask your nurse if you have questions.     Bring a paper prescription for each of these medications     order for DME                Primary Care Provider Office Phone # Fax #    Germán Jernigan [phone] [address] [address]        Equal Access to Services     [facility]: Hadii aad ku hadasho Soomaali, waaxda luqadaha, qaybta kaalmada adeegyada, oleksandr chen haypolo rocha . So [facility] [phone].    ATENCIÓN: Si habla español, tiene a lebron disposición servicios gratuitos de asistencia lingüística. [facility] [phone].    We comply with applicable federal civil rights laws and [place] laws. We do not discriminate on the basis of race, color, national origin, age, disability, sex, sexual orientation, or gender identity.            Thank you!     Thank you for choosing [facility]  for your care. Our goal is always to provide you with excellent care. Hearing back from our patients is one way we can continue to improve our services. Please take a few minutes to complete the written survey that you may receive in the mail after your visit with us. Thank you!             Your Updated Medication List - Protect others around you: Learn how to safely use, store and throw away your medicines at www.disposemymeds.org.          This list is accurate as of 8/28/18  7:36 PM.  Always use your most recent med list.                   Brand Name Dispense Instructions for use Diagnosis    amLODIPine 5 MG tablet    NORVASC    90 tablet    Take 1 tablet (5 mg) by mouth daily    Essential hypertension with goal blood  pressure less than 140/90       atenolol 50 MG tablet    TENORMIN    90 tablet    Take 1 tablet (50 mg) by mouth daily    Essential hypertension with goal blood pressure less than 140/90       cefuroxime 250 MG tablet    CEFTIN    20 tablet    Take 1 tablet (250 mg) by mouth 2 times daily    Acute cystitis with hematuria       estradiol 0.1 MG/GM cream    ESTRACE    42.5 g    Place 2 g vaginally twice a week    Atrophy of vagina       order for DME     1 Device    Equipment being ordered: Hinged knee brace    Knee injury, unspecified laterality, initial encounter, Fall, initial encounter       tiZANidine 4 MG tablet    ZANAFLEX    30 tablet    Take 1 tablet (4 mg) by mouth 3 times daily as needed for muscle spasms    Right lumbosacral radiculopathy       traZODone 50 MG tablet    DESYREL    180 tablet    Take 2 tablets (100 mg) by mouth nightly as needed for sleep    Insomnia, unspecified type

## 2018-08-29 NOTE — PROGRESS NOTES
S: 57-year-old female presents for evaluation of left greater than right knee pain after a fall 1 week ago.  She states she was on uneven pavement.  The left knee feels like it will lock or give out.  She does have an abrasion on her right knee her knee.  Last tetanus 2012.  No fever.  No calf pain or swelling.  No shortness of breath.        Allergies   Allergen Reactions     Bactrim [Sulfamethoxazole W/Trimethoprim]      itching     Nitrofurantoin Itching       Past Medical History:   Diagnosis Date     Calculus of kidney      Migraine, unspecified, without mention of intractable migraine without mention of status migrainosus      Other specified iron deficiency anemias      Papanicolaou smear of cervix with low grade squamous intraepithelial lesion (LGSIL) 11/07    Colpo and hyst pathology benign     Primary osteoarthritis of right hip      Synovitis of ankle      Unspecified essential hypertension 1999    Essential hypertension         Current Outpatient Prescriptions on File Prior to Visit:  amLODIPine (NORVASC) 5 MG tablet Take 1 tablet (5 mg) by mouth daily   atenolol (TENORMIN) 50 MG tablet Take 1 tablet (50 mg) by mouth daily   cefuroxime (CEFTIN) 250 MG tablet Take 1 tablet (250 mg) by mouth 2 times daily   estradiol (ESTRACE) 0.1 MG/GM cream Place 2 g vaginally twice a week   tiZANidine (ZANAFLEX) 4 MG tablet Take 1 tablet (4 mg) by mouth 3 times daily as needed for muscle spasms   traZODone (DESYREL) 50 MG tablet Take 2 tablets (100 mg) by mouth nightly as needed for sleep     Current Facility-Administered Medications on File Prior to Visit:  bupivacaine 0.5% EPINEPHrine 1:200,000 injection       Social History   Substance Use Topics     Smoking status: Never Smoker     Smokeless tobacco: Never Used     Alcohol use Yes      Comment: occas       ROS:  General: no fevers  Musculoskel: + as above  Skin: No erythema    OBJECTIVE:  /83  Pulse 70  Temp 98.2  F (36.8  C) (Tympanic)  Wt 216 lb 3.2 oz  (98.1 kg)  [facility] 01/08/2008  SpO2 97%  BMI 39.54 kg/m2   General:   awake, alert, and cooperative.  NAD.   Head: Normocephalic, atraumatic.  Eyes: Conjunctiva clear,   MS:   Right knee is with a 3 cm x 2 cm oval abrasion at the tibial tuberosity.  No exudate.  No surrounding erythema.  She has bruising and ecchymosis on the anterior shin.  Knee without any gross laxity.  Full range of motion.  No joint line tenderness.  No tenderness over the patella.     Left knee is with no obvious effusion.  She has a bruise over the medial joint line.  No gross laxity noted.  She has decreased extension 110 .  Decreased flexion to about 90 .  Very tender over the medial joint line.     Neuro: Alert and oriented - normal speech.  Gait-antalgic.  Limping on that left knee.  Circulation sensation lower extremities intact.  No calf tenderness or swelling.  Negative Homans bilaterally.  X-rays knees-I see no obvious fracture.  ASSESSMENT:    ICD-10-CM    1. Knee injury, unspecified laterality, initial encounter S89.90XA XR Knee Right 3 Views     XR Knee Left 3 Views     ORTHO  REFERRAL   2. Fall, initial encounter W19.XXXA XR Knee Right 3 Views     XR Knee Left 3 Views     ORTHO  REFERRAL           PLAN: She has tried ibuprofen and Aleve for the pain.  Instructed to increase the over-the-counter Aleve to 2 tablets twice a day with food.  Told her she could also alternate this every 3 hours with over-the-counter Tylenol.  She declines a knee immobilizer.  I think the neoprene knee sleeve opening will put too much pressure on the medial joint line.  DME order given to try a hinged knee brace but she refuses to even try it on.  See Ortho this week.  I suspect she may have a meniscal tear.  Ice.  Elevate.  Advised about symptoms which might herald more serious problems.      Dawn Triplett PA-C

## 2018-08-30 ENCOUNTER — MYC MEDICAL ADVICE (OUTPATIENT)
Dept: FAMILY MEDICINE | Facility: CLINIC | Age: 58
End: 2018-08-30

## 2018-08-30 DIAGNOSIS — E66.01 MORBID OBESITY (H): Primary | ICD-10-CM

## 2018-08-31 ENCOUNTER — HOSPITAL ENCOUNTER (EMERGENCY)
Facility: CLINIC | Age: 58
Discharge: HOME | End: 2018-08-31
Attending: FAMILY MEDICINE | Admitting: FAMILY MEDICINE
Payer: COMMERCIAL

## 2018-08-31 VITALS
TEMPERATURE: 98.1 F | SYSTOLIC BLOOD PRESSURE: 132 MMHG | HEART RATE: 63 BPM | DIASTOLIC BLOOD PRESSURE: 86 MMHG | OXYGEN SATURATION: 97 % | WEIGHT: 210 LBS | BODY MASS INDEX: 38.41 KG/M2 | RESPIRATION RATE: 16 BRPM

## 2018-08-31 DIAGNOSIS — S89.92XA KNEE INJURY, LEFT, INITIAL ENCOUNTER: ICD-10-CM

## 2018-08-31 PROCEDURE — 99282 EMERGENCY DEPT VISIT SF MDM: CPT | Performed by: FAMILY MEDICINE

## 2018-08-31 PROCEDURE — 99284 EMERGENCY DEPT VISIT MOD MDM: CPT | Mod: Z6 | Performed by: FAMILY MEDICINE

## 2018-08-31 RX ORDER — TRAMADOL HYDROCHLORIDE 50 MG/1
50-100 TABLET ORAL EVERY 6 HOURS PRN
Qty: 12 TABLET | Refills: 0 | Status: SHIPPED | OUTPATIENT
Start: 2018-08-31 | End: 2018-09-27

## 2018-08-31 NOTE — ED PROVIDER NOTES
HPI  Patient is a 57-year-old female presenting by private car with her  for persistent left knee pain.  She was seen on  for similar symptoms.  She reported an injury that occurred while at work.  She denies any prior injury to her left knee.  No prior surgery involving her left knee.  Other past medical history is reviewed.  Medications reviewed.    The patient reports falling in the parking lot while at work about 7-10 days ago.  She describes landing onto both knees.  She had difficulty getting up following.  She has had severe pain since.  She has a known abrasion on the right knee but no skin trauma on the left.  She feels like her left knee is swollen and has limited range of motion.  She has difficulty bearing weight because of pain.  She feels like her pain is worsening with time.  She has some radiating pain down the back side of her knee into the calf.  No leg edema.  No skin changes.  No chest pain.  No shortness of breath.  No fever.    ROS: All other review of systems are negative other than that noted above.     Past Medical History:   Diagnosis Date     Calculus of kidney      Migraine, unspecified, without mention of intractable migraine without mention of status migrainosus      Other specified iron deficiency anemias      Papanicolaou smear of cervix with low grade squamous intraepithelial lesion (LGSIL)     Colpo and hyst pathology benign     Primary osteoarthritis of right hip      Synovitis of ankle      Unspecified essential hypertension     Essential hypertension     Past Surgical History:   Procedure Laterality Date     ARTHROSCOPY ANKLE  2014    Procedure: ARTHROSCOPY ANKLE;  Surgeon: Shaun Bhatt DPM;  Location: [facility] OR     C  DELIVERY ONLY      , Low Cervical     C GASTRIC BYPASS,OBESITY,W/SM BOWEL RECONS  10/99     C LIGATE FALLOPIAN TUBE  2000    laparoscopy     COLONOSCOPY WITH CO2 INSUFFLATION N/A 2017    Procedure:  COLONOSCOPY WITH CO2 INSUFFLATION;  COLON SCREEN/ YURI;  Surgeon: Cody Arana MD;  Location: [facility] OR     HYSTERECTOMY, PAP NO LONGER INDICATED  1-     LAPAROSCOPIC CHOLECYSTECTOMY  8/3/2012    Procedure: LAPAROSCOPIC CHOLECYSTECTOMY;  Laparoscopic Cholecystectomy ;  Surgeon: Corwin Cabezas MD;  Location: [facility] OR     OPEN REDUCTION INTERNAL FIXATION ANKLE  4/22/2014    Procedure: OPEN REDUCTION INTERNAL FIXATION ANKLE;  Surgeon: Shaun Bhatt DPM;  Location: [facility] OR     OPEN REDUCTION INTERNAL FIXATION ELBOW  10/15/2013    Procedure: OPEN REDUCTION INTERNAL FIXATION ELBOW;  OPEN REDUCTION INTERNAL FIXATION LEFT PROXIMAL ULNA;  Surgeon: Artem Last DO;  Location: [facility] OR     ORTHOPEDIC SURGERY      left shoulder surgery     REMOVE MESH VAGINA  10/10/2011    Procedure:REMOVE MESH VAGINA; Excision of Vaginal Mesh; Surgeon:SERGE SALGADO; Location:[facility] OR     SURGICAL HISTORY OF -   4/20/10    Transobturator midurethral sling     SURGICAL HISTORY OF -   8/1999    exploratory laparotomy, colitis     SURGICAL HISTORY OF -   4/20/10    Transobturator midurethral sling     TUBAL LIGATION       Family History   Problem Relation Age of Onset     C.A.D. Mother      MI     Hypertension Mother      Hypertension Father      Breast Cancer No family hx of      Social History   Substance Use Topics     Smoking status: Never Smoker     Smokeless tobacco: Never Used     Alcohol use Yes      Comment: occas         PHYSICAL  /86  Pulse 63  Temp 98.1  F (36.7  C) (Oral)  Resp 16  Wt 95.3 kg (210 lb)  LMP 01/08/2008  SpO2 97%  BMI 38.41 kg/m2  General: Patient is alert and in moderate to severe distress.  Neurological: Alert.  Moving upper and lower extremities equally, bilaterally.  Head / Neck: Atraumatic.  Ears: Not done.  Eyes: Pupils are equal, round, and reactive.  Normal conjunctiva.  Nose: Midline.  No epistaxis.  Mouth / Throat: No ulcerations or lesions.  Upper pharynx is  not erythematous.  Moist.  Respiratory: No respiratory distress. CTA B.  Cardiovascular: Regular rhythm.  Peripheral extremities are warm.  No edema.  No calf tenderness.  Abdomen / Pelvis: Not tender.  No distention.  Soft throughout.  Genitalia: Not done.  Musculoskeletal: No obvious effusion involving the left knee with visualization and palpation.  She has tenderness as I palpate along the medial and anterior knee.  She has some overlying bruising involving the skin in this area.  She has very limited range of motion because of pain.  I am not able to perform any further examination maneuvers because of her pain.  Skin: No evidence of rash.  Bruising is present on the anterior knee, left side.      ED COURSE  0758.  The patient has had an x-ray of the left knee in the clinic 3 days ago.  This was unremarkable.  She reports blunt trauma with a fall while at work.  She has an appointment with orthopedics tomorrow.  I am unable to provide an MRI of the knee today.  She has crutches.  She has been using ibuprofen and Tylenol.  I will provide tramadol, #12 tablets.    Labs Ordered and Resulted from Time of ED Arrival Up to the Time of Departure from the ED - No data to display    Medications - No data to display      IMPRESSION    ICD-10-CM    1. Knee injury, left, initial encounter S89.92XA            Critical Care time:  none                    Garry Carvalho MD  08/31/18 0758

## 2018-08-31 NOTE — ED NOTES
Pt upset over plan of tylenol/ibuprofen/tramadol medications. Stated coming here was a waste of time. Offered to have md come back in. Pt declined

## 2018-08-31 NOTE — DISCHARGE INSTRUCTIONS
Return to the Emergency Room if the following occurs:     Fever >101, expanding redness and tenderness involving the skin overlying the knee, or for any concern at anytime.    Or, follow-up with the following provider as we discussed:     Return to your orthopedic doctor tomorrow as scheduled.    Medications discussed:    Ibuprofen 600 mg every six hours for pain (7 days duration).  Tylenol 1000 mg every six hours for pain (7 days duration).  Therefore, you can alternate these every three hours and do it safely.  Tramadol for pain not relieved by the above.    If you received pain-relieving or sedating medication during your time in the ER, avoid alcohol, driving automobiles, or working with machinery.  Also, a responsible adult must stay with you.        Call the Nurse Advice Line at [phone] or [phone] for any concern at anytime.

## 2018-08-31 NOTE — ED AVS SNAPSHOT
[facility] Emergency Department    [address]    [facility] [address]    Phone:  [phone]    Fax:  [phone]                                       Angela Ibarra   MRN: [id number]    Department:  [facility] Emergency Department   Date of Visit:  8/31/2018           Patient Information     Date Of Birth          1960        Your diagnoses for this visit were:     Knee injury, left, initial encounter        You were seen by Garry Carvalho MD.        Discharge Instructions       Return to the Emergency Room if the following occurs:     Fever >101, expanding redness and tenderness involving the skin overlying the knee, or for any concern at anytime.    Or, follow-up with the following provider as we discussed:     Return to your orthopedic doctor tomorrow as scheduled.    Medications discussed:    Ibuprofen 600 mg every six hours for pain (7 days duration).  Tylenol 1000 mg every six hours for pain (7 days duration).  Therefore, you can alternate these every three hours and do it safely.  Tramadol for pain not relieved by the above.    If you received pain-relieving or sedating medication during your time in the ER, avoid alcohol, driving automobiles, or working with machinery.  Also, a responsible adult must stay with you.        Call the Nurse Advice Line at [phone] or [phone] for any concern at anytime.      Your next 10 appointments already scheduled     Sep 01, 2018  9:20 AM CDT   New Visit with Albert Yeo, MD   [facility] [place] ([facility] [place])    [address]  [place] [id number] [phone]            Sep 07, 2018  7:45 AM CDT   Radiology Injections with Noel Radford MD   [facility] [place] ([facility] [place])    [address]  [place] [id number] [phone]              24 Hour Appointment Hotline       To make an appointment at any [facility]  clinic, call [phone] ([phone]). If you don't have a family doctor or clinic, we will help you find one. [facility] are conveniently located to serve the needs of you and your family.             Review of your medicines      START taking        Dose / Directions Last dose taken    traMADol 50 MG tablet   Commonly known as:  ULTRAM   Dose:   mg   Quantity:  12 tablet        Take 1-2 tablets ( mg) by mouth every 6 hours as needed for severe pain   Refills:  0          Our records show that you are taking the medicines listed below. If these are incorrect, please call your family doctor or clinic.        Dose / Directions Last dose taken    amLODIPine 5 MG tablet   Commonly known as:  NORVASC   Dose:  5 mg   Quantity:  90 tablet        Take 1 tablet (5 mg) by mouth daily   Refills:  2        atenolol 50 MG tablet   Commonly known as:  TENORMIN   Dose:  50 mg   Quantity:  90 tablet        Take 1 tablet (50 mg) by mouth daily   Refills:  3        cefuroxime 250 MG tablet   Commonly known as:  CEFTIN   Dose:  250 mg   Quantity:  20 tablet        Take 1 tablet (250 mg) by mouth 2 times daily   Refills:  0        estradiol 0.1 MG/GM cream   Commonly known as:  ESTRACE   Dose:  2 g   Quantity:  42.5 g        Place 2 g vaginally twice a week   Refills:  2        tiZANidine 4 MG tablet   Commonly known as:  ZANAFLEX   Dose:  4 mg   Quantity:  30 tablet        Take 1 tablet (4 mg) by mouth 3 times daily as needed for muscle spasms   Refills:  0        traZODone 50 MG tablet   Commonly known as:  DESYREL   Dose:  100 mg   Quantity:  180 tablet        Take 2 tablets (100 mg) by mouth nightly as needed for sleep   Refills:  3                Information about OPIOIDS     PRESCRIPTION OPIOIDS: WHAT YOU NEED TO KNOW   We gave you an opioid (narcotic) pain medicine. It is important to manage your pain, but opioids are not always the best choice. You should first try all the other options your care team  gave you. Take this medicine for as short a time (and as few doses) as possible.    Some activities can increase your pain, such as bandage changes or therapy sessions. It may help to take your pain medicine 30 to 60 minutes before these activities. Reduce your stress by getting enough sleep, working on hobbies you enjoy and practicing relaxation or meditation. Talk to your care team about ways to manage your pain beyond prescription opioids.    These medicines have risks:    DO NOT drive when on new or higher doses of pain medicine. These medicines can affect your alertness and reaction times, and you could be arrested for driving under the influence (DUI). If you need to use opioids long-term, talk to your care team about driving.    DO NOT operate heavy machinery    DO NOT do any other dangerous activities while taking these medicines.    DO NOT drink any alcohol while taking these medicines.     If the opioid prescribed includes acetaminophen, DO NOT take with any other medicines that contain acetaminophen. Read all labels carefully. Look for the word  acetaminophen  or  Tylenol.  Ask your pharmacist if you have questions or are unsure.    You can get addicted to pain medicines, especially if you have a history of addiction (chemical, alcohol or substance dependence). Talk to your care team about ways to reduce this risk.    All opioids tend to cause constipation. Drink plenty of water and eat foods that have a lot of fiber, such as fruits, vegetables, prune juice, apple juice and high-fiber cereal. Take a laxative (Miralax, milk of magnesia, Colace, Senna) if you don t move your bowels at least every other day. Other side effects include upset stomach, sleepiness, dizziness, throwing up, tolerance (needing more of the medicine to have the same effect), physical dependence and slowed breathing.    Store your pills in a secure place, locked if possible. We will not replace any lost or stolen medicine. If you  don t finish your medicine, please throw away (dispose) as directed by your pharmacist. The [place] Pollution Control Agency has more information about safe disposal: https://www.pca.state.[place]/living-green/managing-unwanted-medications        Prescriptions were sent or printed at these locations (1 Prescription)                   Other Prescriptions                Printed at Department/Unit printer (1 of 1)         traMADol (ULTRAM) 50 MG tablet                Orders Needing Specimen Collection     None      Pending Results     No orders found from 8/29/2018 to 9/1/2018.            Pending Culture Results     No orders found from 8/29/2018 to 9/1/2018.            Pending Results Instructions     If you had any lab results that were not finalized at the time of your Discharge, you can call the ED Lab Result RN at [phone]. You will be contacted by this team for any positive Lab results or changes in treatment. The nurses are available 7 days a week from 10A to 6:30P.  You can leave a message 24 hours per day and they will return your call.        Test Results From Your Hospital Stay               Thank you for choosing [facility]       Thank you for choosing [facility] for your care. Our goal is always to provide you with excellent care. Hearing back from our patients is one way we can continue to improve our services. Please take a few minutes to complete the written survey that you may receive in the mail after you visit with us. Thank you!        [employer] Information     [employer] gives you secure access to your electronic health record. If you see a primary care provider, you can also send messages to your care team and make appointments. If you have questions, please call your primary care clinic.  If you do not have a primary care provider, please call [phone] and they will assist you.        Care EveryWhere ID     This is your Care EveryWhere ID. This could be used by other organizations to access your  [facility] medical records  [id number]        Equal Access to Services     ENOCH ARROYO : Anusha Diaz, maryam jimenez, zack vann, oleksandr sandoval. So [facility] [phone].    ATENCIÓN: Si habla español, tiene a lebron disposición servicios gratuitos de asistencia lingüística. Llame al [phone].    We comply with applicable federal civil rights laws and [place] laws. We do not discriminate on the basis of race, color, national origin, age, disability, sex, sexual orientation, or gender identity.            After Visit Summary       This is your record. Keep this with you and show to your community pharmacist(s) and doctor(s) at your next visit.

## 2018-09-01 ENCOUNTER — OFFICE VISIT (OUTPATIENT)
Dept: ORTHOPEDICS | Facility: CLINIC | Age: 58
End: 2018-09-01
Payer: COMMERCIAL

## 2018-09-01 VITALS
WEIGHT: 210 LBS | BODY MASS INDEX: 38.64 KG/M2 | SYSTOLIC BLOOD PRESSURE: 130 MMHG | HEIGHT: 62 IN | DIASTOLIC BLOOD PRESSURE: 84 MMHG

## 2018-09-01 DIAGNOSIS — M25.562 ACUTE PAIN OF LEFT KNEE: Primary | ICD-10-CM

## 2018-09-01 DIAGNOSIS — M25.561 ACUTE PAIN OF RIGHT KNEE: ICD-10-CM

## 2018-09-01 PROCEDURE — 99244 OFF/OP CNSLTJ NEW/EST MOD 40: CPT | Performed by: FAMILY MEDICINE

## 2018-09-01 NOTE — MR AVS SNAPSHOT
After Visit Summary   9/1/2018    Angela Ibarra    MRN: [id number]           Patient Information     Date Of Birth          1960        Visit Information        Provider Department      9/1/2018 9:20 AM Yeo, Albert, MD [facility] [place]        Today's Diagnoses     Acute pain of left knee    -  1    Acute pain of right knee          Care Instructions    1. Acute pain of left knee    2. Acute pain of right knee      -Patient has bilateral knee pain after a fall that happened at work.  -Right knee pain is due to contusions and aggravation of her osteoarthritis in the right knee.  -Left knee pain may be due to a medial meniscus tear and/or other intra-articular injuries.  -Once patient has her Workmen's Comp. claim open, will obtain an MRI without contrast for further evaluation.  -Patient should continue with the pain medications as needed which were prescribed by her previous physician.  -She will ambulate as tolerated on crutches until after her Workmen's Comp. claim is open and further medical treatment can be administered.  -Call direct clinic number [[phone]] at any time with questions or concerns.    Albert Yeo MD [facility]  [facility]                Follow-ups after your visit        Your next 10 appointments already scheduled     Sep 07, 2018  7:45 AM CDT   Radiology Injections with Noel Radford MD   [facility] [place] ([facility] [place])    [address]  [place] [address] [phone]              Who to contact     If you have questions or need follow up information about today's clinic visit or your schedule please contact [facility] directly at [phone].  Normal or non-critical lab and imaging results will be communicated to you by MyChart, letter or phone within 4 business days after the clinic has received the  "results. If you do not hear from us within 7 days, please contact the clinic through [employer] or phone. If you have a critical or abnormal lab result, we will notify you by phone as soon as possible.  Submit refill requests through [employer] or call your pharmacy and they will forward the refill request to us. Please allow 3 business days for your refill to be completed.          Additional Information About Your Visit        [employer] Information     [employer] gives you secure access to your electronic health record. If you see a primary care provider, you can also send messages to your care team and make appointments. If you have questions, please call your primary care clinic.  If you do not have a primary care provider, please call [phone] and they will assist you.        Care EveryWhere ID     This is your Care EveryWhere ID. This could be used by other organizations to access your [facility] medical records  [id number]        Your Vitals Were     Height Last Period BMI (Body Mass Index)             5' 2\" (1.575 m) 01/08/2008 38.41 kg/m2          Blood Pressure from Last 3 Encounters:   09/01/18 130/84   08/31/18 132/86   08/28/18 145/83    Weight from Last 3 Encounters:   09/01/18 210 lb (95.3 kg)   08/31/18 210 lb (95.3 kg)   08/28/18 216 lb 3.2 oz (98.1 kg)              Today, you had the following     No orders found for display       Primary Care Provider Office Phone # Fax #    Germán Jernigan [phone] [address] [address]        Equal Access to Services     ELVIN [facility] : Hadii aad ku hadasho Soomaali, waaxda luqadaha, qaybta kaalmada adeegyada, waxay gustavo rocha . So [facility] [phone].    ATENCIÓN: Si habla español, tiene a lebron disposición servicios gratuitos de asistencia lingüística. Llame al [phone].    We comply with applicable federal civil rights laws and [place] laws. We do not discriminate on the basis of race, color, " national origin, age, disability, sex, sexual orientation, or gender identity.            Thank you!     Thank you for choosing [facility]  for your care. Our goal is always to provide you with excellent care. Hearing back from our patients is one way we can continue to improve our services. Please take a few minutes to complete the written survey that you may receive in the mail after your visit with us. Thank you!             Your Updated Medication List - Protect others around you: Learn how to safely use, store and throw away your medicines at www.disposemymeds.org.          This list is accurate as of 9/1/18  9:44 AM.  Always use your most recent med list.                   Brand Name Dispense Instructions for use Diagnosis    amLODIPine 5 MG tablet    NORVASC    90 tablet    Take 1 tablet (5 mg) by mouth daily    Essential hypertension with goal blood pressure less than 140/90       atenolol 50 MG tablet    TENORMIN    90 tablet    Take 1 tablet (50 mg) by mouth daily    Essential hypertension with goal blood pressure less than 140/90       cefuroxime 250 MG tablet    CEFTIN    20 tablet    Take 1 tablet (250 mg) by mouth 2 times daily    Acute cystitis with hematuria       estradiol 0.1 MG/GM cream    ESTRACE    42.5 g    Place 2 g vaginally twice a week    Atrophy of vagina       tiZANidine 4 MG tablet    ZANAFLEX    30 tablet    Take 1 tablet (4 mg) by mouth 3 times daily as needed for muscle spasms    Right lumbosacral radiculopathy       traMADol 50 MG tablet    ULTRAM    12 tablet    Take 1-2 tablets ( mg) by mouth every 6 hours as needed for severe pain        traZODone 50 MG tablet    DESYREL    180 tablet    Take 2 tablets (100 mg) by mouth nightly as needed for sleep    Insomnia, unspecified type

## 2018-09-01 NOTE — PROGRESS NOTES
ASSESSMENT & PLAN  Patient Instructions     1. Acute pain of left knee    2. Acute pain of right knee      -Patient has bilateral knee pain after a fall that happened at work.  -Right knee pain is due to contusions and aggravation of her osteoarthritis in the right knee.  -Left knee pain may be due to a medial meniscus tear and/or other intra-articular injuries.  -Once patient has her Workmen's Comp. claim open, will obtain an MRI without contrast for further evaluation.  -Patient should continue with the pain medications as needed which were prescribed by her previous physician.  -She will ambulate as tolerated on crutches until after her Workmen's Comp. claim is open and further medical treatment can be administered.  -Patient will follow-up after MRI is complete to review results and determine further care.  -Call direct clinic number [[phone]] at any time with questions or concerns.    Albert Yeo MD [facility]  [facility]          -----    SUBJECTIVE  Angela Ibarra is a/an 57 year old female who is seen in consultation at the request of  Dawn Triplett M.D. for evaluation of bilateral knee pain. The patient is seen with their .    Onset: 10 day(s) ago. Patient describes injury as fell directly on both knees.   Location of Pain: left knee, anterior and medial  Rating of Pain at worst: 10/10  Rating of Pain Currently: 6/10  Worsened by: weight bearing   Better with: activity avoidance, pain medication  Treatments tried: ice, Tylenol, ibuprofen and other medications: Tramadol (Ultram)  Associated symptoms: no distal numbness or tingling; denies swelling or warmth  Orthopedic history: NO  Relevant surgical history: NO  Past Medical History:   Diagnosis Date     Calculus of kidney      Migraine, unspecified, without mention of intractable migraine without mention of status migrainosus      Other specified iron deficiency anemias       Papanicolaou smear of cervix with low grade squamous intraepithelial lesion (LGSIL) 11/07    Colpo and hyst pathology benign     Primary osteoarthritis of right hip      Synovitis of ankle      Unspecified essential hypertension 1999    Essential hypertension     Social History     Social History     Marital status:      Spouse name: N/A     Number of children: 1     Years of education: N/A     Occupational History      [employer]     Social History Main Topics     Smoking status: Never Smoker     Smokeless tobacco: Never Used     Alcohol use Yes      Comment: occas     Drug use: No     Sexual activity: Yes     Partners: Male     Birth control/ protection: Surgical      Comment: tvh     Other Topics Concern      Service Yes     in [place] out of [place] 85[place]88     Blood Transfusions No     Caffeine Concern No     Occupational Exposure No     Hobby Hazards No     Sleep Concern Yes     Stress Concern No     Weight Concern Yes     always      Special Diet No     watches sugar intake     Back Care No     Exercise Yes     treadmill and bike     Bike Helmet No     Seat Belt Yes     Self-Exams No     Parent/Sibling W/ Cabg, Mi Or Angioplasty Before 65f 55m? No     Social History Narrative    Dairy/d 1 servings/d.     Caffeine 0 servings/d    Exercise 5 x week, treadmill, bike 1 hour/day    Sunscreen used - No    Seatbelts used - Yes    Working smoke/CO detectors in the home - Yes    Guns stored in the home - No    Self Breast Exams - No    Self Testicular Exam - NA    Eye Exam up to date - Yes    Dental Exam up to date - Yes    Pap Smear up to date - Yes    Mammogram up to date - No 2000    PSA up to date - NA    Dexa Scan up to date - NA    Flex Sig / Colonoscopy up to date - Yes 8/99 Abd pain=neg    Immunizations up to date - unsure    Abuse: Current or Past(Physical, Sexual or Emotional)- No    Do you feel safe in your environment - Yes                         Patient's past medical, surgical,  "social, and family histories were reviewed today and no changes are noted.    REVIEW OF SYSTEMS:  10 point ROS is negative other than symptoms noted above in HPI, Past Medical History or as stated below  Constitutional: NEGATIVE for fever, chills, change in weight  Skin: NEGATIVE for worrisome rashes, moles or lesions  GI/: NEGATIVE for bowel or bladder changes  Neuro: NEGATIVE for weakness, dizziness or paresthesias    OBJECTIVE:  /84  Ht 5' 2\" (1.575 m)  Wt 210 lb (95.3 kg)  LMP 01/08/2008  BMI 38.41 kg/m2   General: healthy, alert and in no distress  HEENT: no scleral icterus or conjunctival erythema  Skin: no suspicious lesions or rash. No jaundice.  CV: no pedal edema  Resp: normal respiratory effort without conversational dyspnea   Psych: normal mood and affect  Gait: normal steady gait with appropriate coordination and balance  Neuro: Normal light sensory exam of lower extremity  MSK:  BILATERAL KNEE  Inspection:  Approximately 1 x 1 cm healed 6 superficial abrasion with overlying scab on the inferolateral portion of the right knee.  Ecchymosis along the proximal to mid shin region.  Palpation:    Tender about the lateral joint line and medial joint line of the left knee. Remainder of bony and ligamentous landmarks are nontender.  Patient denies bony tenderness in the right knee.    No effusion is present    Patellofemoral crepitus is Present  Range of Motion:     00 extension to 1200 flexion on the right, 0  of extension to 40  flexion on the left  Strength:    Quadriceps 5-/5 and hamstrings 5-/5    Extensor mechanism intact  Special Tests:    Positive: None    Negative: MCL/valgus stress (0 & 30 deg), LCL/varus stress (0 & 30 deg), Lachman's, anterior drawer, posterior drawer, Pauly's in the right knee.  Patient was unable to tolerate special testing left knee due to limited range of motion and pain.    Independent visualization of the below image:  Recent Results (from the past 744 hour(s)) "                                                            XR Knee Right 3 Views    Narrative    XR KNEE RT 3 VW 8/28/2018 7:27 PM     HISTORY: ; Knee injury, unspecified laterality, initial encounter;  Fall, initial encounter    COMPARISON: None      Impression    IMPRESSION: No evidence of fracture. There is mild medial compartment  joint space narrowing.    PANCHO LOYOLA MD   XR Knee Left 3 Views    Narrative    XR KNEE LT 3 VW 8/28/2018 7:28 PM     HISTORY: ; Knee injury, unspecified laterality, initial encounter;  Fall, initial encounter    COMPARISON: None      Impression    IMPRESSION: No evidence of fracture. Joint spaces are maintained.    PANCHO LOYOLA MD       XR KNEE LT 3 VW 8/28/2018 7:28 PM      HISTORY: ; Knee injury, unspecified laterality, initial encounter;  Fall, initial encounter     COMPARISON: None         IMPRESSION: No evidence of fracture. Joint spaces are maintained.     PANCHO LOYOLA MD    Patient's conditions were thoroughly discussed during today's visit with greater than 50% of the visit spent counseling the patient with total time spent face-to-face with the patient being 30 minutes.    Albert Yeo MD [facility]

## 2018-09-01 NOTE — LETTER
September 1, 2018      Angela Ibarra  [address]  [facility] [address]        To Whom It May Concern:    Angela Ibarra  was seen on 9/1/2018.  Please excuse her  until medically cleared due to injury of her knees that occurred while leaving work.  Patient is unable to ambulate, stand or bend her knees.        Sincerely,        Albert Yeo, MD

## 2018-09-01 NOTE — LETTER
9/1/2018         RE: Angela Ibarra  [address] [place] [address]        Dear Colleague,    Thank you for referring your patient, Angela Ibarra, to the [facility]. Please see a copy of my visit note below.    ASSESSMENT & PLAN  Patient Instructions     1. Acute pain of left knee    2. Acute pain of right knee      -Patient has bilateral knee pain after a fall that happened at work.  -Right knee pain is due to contusions and aggravation of her osteoarthritis in the right knee.  -Left knee pain may be due to a medial meniscus tear and/or other intra-articular injuries.  -Once patient has her Workmen's Comp. claim open, will obtain an MRI without contrast for further evaluation.  -Patient should continue with the pain medications as needed which were prescribed by her previous physician.  -She will ambulate as tolerated on crutches until after her Workmen's Comp. claim is open and further medical treatment can be administered.  -Patient will follow-up after MRI is complete to review results and determine further care.  -Call direct clinic number [[phone]] at any time with questions or concerns.    Albert Yeo MD [facility]  [facility] [place]          -----    SUBJECTIVE  Angela Ibarra is a/an 57 year old female who is seen in consultation at the request of  Dawn Triplett M.D. for evaluation of bilateral knee pain. The patient is seen with their .    Onset: 10 day(s) ago. Patient describes injury as fell directly on both knees.   Location of Pain: left knee, anterior and medial  Rating of Pain at worst: 10/10  Rating of Pain Currently: 6/10  Worsened by: weight bearing   Better with: activity avoidance, pain medication  Treatments tried: ice, Tylenol, ibuprofen and other medications: Tramadol (Ultram)  Associated symptoms: no distal numbness or tingling; denies swelling or warmth  Orthopedic history:  NO  Relevant surgical history: NO  Past Medical History:   Diagnosis Date     Calculus of kidney      Migraine, unspecified, without mention of intractable migraine without mention of status migrainosus      Other specified iron deficiency anemias      Papanicolaou smear of cervix with low grade squamous intraepithelial lesion (LGSIL) 11/07    Colpo and hyst pathology benign     Primary osteoarthritis of right hip      Synovitis of ankle      Unspecified essential hypertension 1999    Essential hypertension     Social History     Social History     Marital status:      Spouse name: N/A     Number of children: 1     Years of education: N/A     Occupational History      [employer]     Social History Main Topics     Smoking status: Never Smoker     Smokeless tobacco: Never Used     Alcohol use Yes      Comment: occas     Drug use: No     Sexual activity: Yes     Partners: Male     Birth control/ protection: Surgical      Comment: tvh     Other Topics Concern      Service Yes     in [place] out of [place] 85[place]88     Blood Transfusions No     Caffeine Concern No     Occupational Exposure No     Hobby Hazards No     Sleep Concern Yes     Stress Concern No     Weight Concern Yes     always      Special Diet No     watches sugar intake     Back Care No     Exercise Yes     treadmill and bike     Bike Helmet No     Seat Belt Yes     Self-Exams No     Parent/Sibling W/ Cabg, Mi Or Angioplasty Before 65f 55m? No     Social History Narrative    Dairy/d 1 servings/d.     Caffeine 0 servings/d    Exercise 5 x week, treadmill, bike 1 hour/day    Sunscreen used - No    Seatbelts used - Yes    Working smoke/CO detectors in the home - Yes    Guns stored in the home - No    Self Breast Exams - No    Self Testicular Exam - NA    Eye Exam up to date - Yes    Dental Exam up to date - Yes    Pap Smear up to date - Yes    Mammogram up to date - No 2000    PSA up to date - NA    Dexa Scan up to date - NA    Flex  "Sig / Colonoscopy up to date - Yes 8/99 Abd pain=neg    Immunizations up to date - unsure    Abuse: Current or Past(Physical, Sexual or Emotional)- No    Do you feel safe in your environment - Yes                         Patient's past medical, surgical, social, and family histories were reviewed today and no changes are noted.    REVIEW OF SYSTEMS:  10 point ROS is negative other than symptoms noted above in HPI, Past Medical History or as stated below  Constitutional: NEGATIVE for fever, chills, change in weight  Skin: NEGATIVE for worrisome rashes, moles or lesions  GI/: NEGATIVE for bowel or bladder changes  Neuro: NEGATIVE for weakness, dizziness or paresthesias    OBJECTIVE:  /84  Ht 5' 2\" (1.575 m)  Wt 210 lb (95.3 kg)  LMP 01/08/2008  BMI 38.41 kg/m2   General: healthy, alert and in no distress  HEENT: no scleral icterus or conjunctival erythema  Skin: no suspicious lesions or rash. No jaundice.  CV: no pedal edema  Resp: normal respiratory effort without conversational dyspnea   Psych: normal mood and affect  Gait: normal steady gait with appropriate coordination and balance  Neuro: Normal light sensory exam of lower extremity  MSK:  BILATERAL KNEE  Inspection:  Approximately 1 x 1 cm healed 6 superficial abrasion with overlying scab on the inferolateral portion of the right knee.  Ecchymosis along the proximal to mid shin region.  Palpation:    Tender about the lateral joint line and medial joint line of the left knee. Remainder of bony and ligamentous landmarks are nontender.  Patient denies bony tenderness in the right knee.    No effusion is present    Patellofemoral crepitus is Present  Range of Motion:     00 extension to 1200 flexion on the right, 0  of extension to 40  flexion on the left  Strength:    Quadriceps 5-/5 and hamstrings 5-/5    Extensor mechanism intact  Special Tests:    Positive: None    Negative: MCL/valgus stress (0 & 30 deg), LCL/varus stress (0 & 30 deg), Lachman's, " anterior drawer, posterior drawer, Pauly's in the right knee.  Patient was unable to tolerate special testing left knee due to limited range of motion and pain.    Independent visualization of the below image:  Recent Results (from the past 744 hour(s))                                                            XR Knee Right 3 Views    Narrative    XR KNEE RT 3 VW 8/28/2018 7:27 PM     HISTORY: ; Knee injury, unspecified laterality, initial encounter;  Fall, initial encounter    COMPARISON: None      Impression    IMPRESSION: No evidence of fracture. There is mild medial compartment  joint space narrowing.    PANCHO LOYOLA MD   XR Knee Left 3 Views    Narrative    XR KNEE LT 3 VW 8/28/2018 7:28 PM     HISTORY: ; Knee injury, unspecified laterality, initial encounter;  Fall, initial encounter    COMPARISON: None      Impression    IMPRESSION: No evidence of fracture. Joint spaces are maintained.    PANCHO LOYOLA MD       XR KNEE LT 3 VW 8/28/2018 7:28 PM      HISTORY: ; Knee injury, unspecified laterality, initial encounter;  Fall, initial encounter     COMPARISON: None         IMPRESSION: No evidence of fracture. Joint spaces are maintained.     PANCHO LOYOLA MD    Patient's conditions were thoroughly discussed during today's visit with greater than 50% of the visit spent counseling the patient with total time spent face-to-face with the patient being 30 minutes.    Albert Yeo MD [facility]      Again, thank you for allowing me to participate in the care of your patient.        Sincerely,        Albert Yeo, MD

## 2018-09-01 NOTE — PATIENT INSTRUCTIONS
1. Acute pain of left knee    2. Acute pain of right knee      -Patient has bilateral knee pain after a fall that happened at work.  -Right knee pain is due to contusions and aggravation of her osteoarthritis in the right knee.  -Left knee pain may be due to a medial meniscus tear and/or other intra-articular injuries.  -Once patient has her Workmen's Comp. claim open, will obtain an MRI without contrast for further evaluation.  -Patient should continue with the pain medications as needed which were prescribed by her previous physician.  -She will ambulate as tolerated on crutches until after her Workmen's Comp. claim is open and further medical treatment can be administered.  -Patient will follow up after MRI is complete to review results and determine further care.  -Call direct clinic number [[phone]] at any time with questions or concerns.    Albert Yeo MD [facility]  [facility] [place]

## 2018-09-04 ENCOUNTER — MYC MEDICAL ADVICE (OUTPATIENT)
Dept: ORTHOPEDICS | Facility: CLINIC | Age: 58
End: 2018-09-04

## 2018-09-04 NOTE — TELEPHONE ENCOUNTER
Called patient to discuss her request for a new work letter allowing her to go back to work before she receives her MRI. Informed patient that Dr. 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 Yeo for her MRI results. A letter was sent to the patient through [employer]. Patient can call with any other questions or concerns.    Katya Mcgee ATC, ATR

## 2018-09-04 NOTE — LETTER
September 4, 2018      Angela Ibarra  [address] [place] [address]        To Whom It May Concern:    Angela Ibarra was seen in our clinic on 9/1/18. She may return to work and can complete activities as tolerated until her follow up appointment for MRI results.      Sincerely,        Albert Yeo, MD

## 2018-09-07 ENCOUNTER — RADIANT APPOINTMENT (OUTPATIENT)
Dept: RADIOLOGY | Facility: CLINIC | Age: 58
End: 2018-09-07
Attending: PAIN MEDICINE
Payer: COMMERCIAL

## 2018-09-07 ENCOUNTER — RADIOLOGY INJECTION OFFICE VISIT (OUTPATIENT)
Dept: PALLIATIVE MEDICINE | Facility: CLINIC | Age: 58
End: 2018-09-07
Attending: FAMILY MEDICINE
Payer: COMMERCIAL

## 2018-09-07 VITALS — SYSTOLIC BLOOD PRESSURE: 145 MMHG | OXYGEN SATURATION: 96 % | DIASTOLIC BLOOD PRESSURE: 71 MMHG | HEART RATE: 64 BPM

## 2018-09-07 DIAGNOSIS — M54.16 LUMBAR RADICULOPATHY: ICD-10-CM

## 2018-09-07 DIAGNOSIS — M54.16 LUMBAR RADICULOPATHY: Primary | ICD-10-CM

## 2018-09-07 PROCEDURE — 64483 NJX AA&/STRD TFRM EPI L/S 1: CPT | Mod: RT | Performed by: PAIN MEDICINE

## 2018-09-07 PROCEDURE — 64484 NJX AA&/STRD TFRM EPI L/S EA: CPT | Mod: RT | Performed by: PAIN MEDICINE

## 2018-09-07 ASSESSMENT — PAIN SCALES - GENERAL: PAINLEVEL: MILD PAIN (3)

## 2018-09-07 NOTE — PROGRESS NOTES
Pre procedure Diagnosis: lumbar radiculopathy, lumbar degenerative disc disease   Post procedure Diagnosis: Same  Procedure performed: lumbar transforaminal epidural steroid injection at R L2-3, L3-4, fluoroscopically guided, contrast controlled  Anesthesia: none  Complications: none  Operators: Noel Radford MD     Indications:   Angela Ibarra is a 57 year old female.  They have a history of R LBp radiating to her groin and ant thigh.  Exam shows + bk/fadir unable to tolerate SLR and they have tried conservative treatment including meds.    MRI   T12-L1: No foraminal or spinal canal stenosis.      L1-L2: No foraminal or spinal canal stenosis.       L2-L3: No foraminal or spinal canal stenosis.       L3-L4: No foraminal or spinal canal stenosis.  A 2 mm facet joint cyst  is present posteriorly off the posteroinferior aspect of the right  L2-L3 facet joint where there is mild facet arthropathy.     L4-L5: No foraminal or spinal canal stenosis. Moderate to severe  bilateral facet arthropathy.       L5-S1: No foraminal or spinal canal stenosis. Moderate bilateral facet  arthropathy.         IMPRESSION: Mild degenerative changes detailed. No foraminal or spinal  canal stenosis.  Options/alternatives, benefits and risks were discussed with the patient including bleeding, infection, tissue trauma, numbness, weakness, paralysis, spinal cord injury, radiation exposure, headache and reaction to medications. Questions were answered to her satisfaction and she agrees to proceed. Voluntary informed consent was obtained and signed.     Vitals were reviewed: Yes  /71  Pulse 64  LMP 01/08/2008  SpO2 96%  Allergies were reviewed:  Yes   Medications were reviewed:  Yes   Pre-procedure pain score: /10    Procedure:  After getting informed consent, patient was brought into the procedure suite and was placed in a prone position on the procedure table.   A Pause for the Cause was performed.  Patient was prepped and  draped in sterile fashion.     After identifying the right L2-3, l3-4 neuroforamen, the C-arm was rotated to a right lateral oblique angle.  A total of 4ml of Lidocaine 1% was used to anesthetize the skin and the needle track at a skin entry site coaxial with the fluoroscopy beam, and overriding the superior aspect of the neuroforamen.  A 22 gauge 3.5 inch spinal needle was advanced under intermittent fluoroscopy until it entered the foramen superiorly.    The position was then inspected from anteroposterior and lateral views, and the needle adjusted appropriately.  A total of 1ml of Omnipaque-300 was injected, confirming appropriate position, with spread into the nerve root sheath and the epidural space, with no intravascular uptake. 9ml was wasted    Then, after repeated negative aspiration, a combination of Decadron 10 mg, 0.25% bupivacaine 2 ml, diluted with 3ml of normal saline was injected.     Hemostasis was achieved, the area was cleaned, and bandaids were placed when appropriate.  The patient tolerated the procedure well, and was taken to the recovery room.    Images were saved to PACS.    Post-procedure pain score: 0/10  Follow-up includes:   -f/u phone call in one week  -f/u with referring provider  - May consider hip injection if not relief with epidural. Would need new order would preform with fluoroscopy   Noel Radford MD  [facility] [place]

## 2018-09-07 NOTE — NURSING NOTE
Discharge Information    IV Discontiued Time:  NA    Amount of Fluid Infused:  NA    Discharge Criteria = When patient returns to baseline or as per MD order    Consciousness:  Pt is fully awake    Circulation:  BP +/- 20% of pre-procedure level    Respiration:  Patient is able to breathe deeply    O2 Sat:  Patient is able to maintain O2 Sat >92% on room air    Activity:  Moves 4 extremities on command    Ambulation:  Patient is able to stand and walk or stand and pivot into wheelchair    Dressing:  Clean/dry or No Dressing    Notes:   Discharge instructions and AVS given to patient    Patient meets criteria for discharge?  YES    Admitted to PCU?  No    Responsible adult present to accompany patient home?  Yes    Signature/Title:    narda jansen RN Care Coordinator  [facility]

## 2018-09-07 NOTE — Clinical Note
- May consider hip injection if not relief with epidural. Would need new order would preform with fluoroscopy

## 2018-09-07 NOTE — PATIENT INSTRUCTIONS
[facility]   Procedure Discharge Instructions    Today you saw:   Dr. Noel Radford      You had an:  Epidural steroid injection  -lumbar  Medications used:  Lidocaine   Bupivacaine   Dexamethasone  Omnipaque            Be cautious when walking. Numbness and/or weakness in the lower extremities may occur for up to 6-8 hours after the procedure due to effect of the local anesthetic    Do not drive for 6 hours. The effect of the local anesthetic could slow your reflexes.     You may resume your regular activities after 24 hours    Avoid strenuous activity for the first 24 hours    You may shower, however avoid swimming, tub baths or hot tubs for 24 hours following your procedure    You may have a mild to moderate increase in pain for several days following the injection.    It may take up to 14 days for the steroid medication to start working although you may feel the effect as early as a few days after the procedure.       You may use ice packs for 10-15 minutes, 3 to 4 times a day at the injection site for comfort    Do not use heat to painful areas for 6 to 8 hours. This will give the local anesthetic time to wear off and prevent you from accidentally burning your skin.     You may use anti-inflammatory medications (such as Ibuprofen or Aleve or Advil) or Tylenol for pain control if necessary    If you were fasting, you may resume your normal diet and medications after the procedure    If you have diabetes, check your blood sugar more frequently than usual as your blood sugar may be higher than normal for 10-14 days following a steroid injection. Contact your doctor who manages your diabetes if your blood sugar is higher than usual    If you experience any of the following, call the Pain Clinic during work hours at [phone] or the Provider Line after hours at [phone]:  -Fever over 100 degree F  -Swelling, bleeding, redness, drainage, warmth at the injection site  -Progressive  weakness or numbness in your legs or arms  -Loss of bowel or bladder function  -Unusual headache that is not relieved by Tylenol or other pain reliever  -Unusual new onset of pain that is not improving

## 2018-09-07 NOTE — NURSING NOTE
Pre-procedure Intake    Have you been fasting? NA    If yes, for how long? No     Are you taking a prescribed blood thinner such as coumadin, Plavix, Xarelto?    No    If yes, when did you take your last dose? No     Do you take aspirin?  No    If cervical procedure, have you held aspirin for 6 days?   NA    Do you have any allergies to contrast dye, iodine, steroid and/or numbing medications?  NO    Are you currently taking antibiotics or have an active infection?  NO    Have you had a fever/elevated temperature within the past week? NO    Are you currently taking oral steroids? NO    Do you have a ? Yes       Are you pregnant or breastfeeding?  NO    Are the vital signs normal?  Yes  Beto Hong MA

## 2018-09-07 NOTE — MR AVS SNAPSHOT
After Visit Summary   9/7/2018    Angela Ibarra    MRN: [id number]           Patient Information     Date Of Birth          1960        Visit Information        Provider Department      9/7/2018 7:45 AM Noel Radford MD [facility] ([facility])        Care Instructions    [facility]   Procedure Discharge Instructions    Today you saw:   Dr. Noel Radford      You had an:  Epidural steroid injection  -lumbar  Medications used:  Lidocaine   Bupivacaine   Dexamethasone  Omnipaque            Be cautious when walking. Numbness and/or weakness in the lower extremities may occur for up to 6-8 hours after the procedure due to effect of the local anesthetic    Do not drive for 6 hours. The effect of the local anesthetic could slow your reflexes.     You may resume your regular activities after 24 hours    Avoid strenuous activity for the first 24 hours    You may shower, however avoid swimming, tub baths or hot tubs for 24 hours following your procedure    You may have a mild to moderate increase in pain for several days following the injection.    It may take up to 14 days for the steroid medication to start working although you may feel the effect as early as a few days after the procedure.       You may use ice packs for 10-15 minutes, 3 to 4 times a day at the injection site for comfort    Do not use heat to painful areas for 6 to 8 hours. This will give the local anesthetic time to wear off and prevent you from accidentally burning your skin.     You may use anti-inflammatory medications (such as Ibuprofen or Aleve or Advil) or Tylenol for pain control if necessary    If you were fasting, you may resume your normal diet and medications after the procedure    If you have diabetes, check your blood sugar more frequently than usual as your blood sugar may be higher than normal for 10-14 days following a steroid injection. Contact your doctor who manages your  diabetes if your blood sugar is higher than usual    If you experience any of the following, call the Pain Clinic during work hours at [phone] or the Provider Line after hours at [phone]:  -Fever over 100 degree F  -Swelling, bleeding, redness, drainage, warmth at the injection site  -Progressive weakness or numbness in your legs or arms  -Loss of bowel or bladder function  -Unusual headache that is not relieved by Tylenol or other pain reliever  -Unusual new onset of pain that is not improving                Follow-ups after your visit        Your next 10 appointments already scheduled     Sep 18, 2018  7:15 AM CDT   MR KNEE LEFT W/O CONTRAST with BEMR1   [facility]    [address] [facility] [address] [phone]           Take your medicines as usual, unless your doctor tells you not to. Bring a list of your current medicines to your exam (including vitamins, minerals and over-the-counter drugs). Also bring the results of similar scans you may have had.  Please remove any body piercings and hair extensions before you arrive.  Follow your doctor s orders. If you do not, we may have to postpone your exam.  You may or may not receive IV contrast for this exam pending the discretion of the Radiologist.  You do not need to do anything special to prepare.  The MRI machine uses a strong magnet. Please wear clothes without metal (snaps, zippers). A sweatsuit works well, or we may give you a hospital gown.   **IMPORTANT** THE INSTRUCTIONS BELOW ARE ONLY FOR THOSE PATIENTS WHO HAVE BEEN PRESCRIBED SEDATION OR GENERAL ANESTHESIA DURING THEIR MRI PROCEDURE:  IF YOUR DOCTOR PRESCRIBED ORAL SEDATION (take medicine to help you relax during your exam):   You must get the medicine from your doctor (oral medication) before you arrive. Bring the medicine to the exam. Do not take it at home. You ll be told when to take it upon arriving for your exam.   Arrive one  hour early. Bring someone who can take you home after the test. Your medicine will make you sleepy. After the exam, you may not drive, take a bus or take a taxi by yourself.  IF YOUR DOCTOR PRESCRIBED IV SEDATION:   Arrive one hour early. Bring someone who can take you home after the test. Your medicine will make you sleepy. After the exam, you may not drive, take a bus or take a taxi by yourself.   No eating 6 hours before your exam. You may have clear liquids up until 4 hours before your exam. (Clear liquids include water, clear tea, black coffee and fruit juice without pulp.)  IF YOUR DOCTOR PRESCRIBED ANESTHESIA (be asleep for your exam):   Arrive 1 1/2 hours early. Bring someone who can take you home after the test. You may not drive, take a bus or take a taxi by yourself.   No eating 8 hours before your exam. You may have clear liquids up until 4 hours before your exam. (Clear liquids include water, clear tea, black coffee and fruit juice without pulp.)   You will spend four to five hours in the recovery room.  Please call the Imaging Department at your exam site with any questions.              Who to contact     If you have questions or need follow up information about today's clinic visit or your schedule please contact [facility] ([facility]) directly at [phone].  Normal or non-critical lab and imaging results will be communicated to you by i[employer], letter or phone within 4 business days after the clinic has received the results. If you do not hear from us within 7 days, please contact the clinic through i[employer] or phone. If you have a critical or abnormal lab result, we will notify you by phone as soon as possible.  Submit refill requests through [employer] or call your pharmacy and they will forward the refill request to us. Please allow 3 business days for your refill to be completed.          Additional Information About Your Visit        [employer] Information     [employer] gives you secure access to  your electronic health record. If you see a primary care provider, you can also send messages to your care team and make appointments. If you have questions, please call your primary care clinic.  If you do not have a primary care provider, please call [phone] and they will assist you.        Care EveryWhere ID     This is your Care EveryWhere ID. This could be used by other organizations to access your [facility] medical records  [id number]        Your Vitals Were     Pulse Last Period                61 01/08/2008           Blood Pressure from Last 3 Encounters:   09/07/18 149/77   09/01/18 130/84   08/31/18 132/86    Weight from Last 3 Encounters:   09/01/18 95.3 kg (210 lb)   08/31/18 95.3 kg (210 lb)   08/28/18 98.1 kg (216 lb 3.2 oz)              Today, you had the following     No orders found for display       Primary Care Provider Office Phone # Fax #    Germán Jernigan [phone] [address] [facility] [address]        Equal Access to Services     [facility]: Hadii aad ku hadasho Soomaali, waaxda luqadaha, qaybta kaalmada adeegyada, oleksandr rocha . So [facility] [phone].    ATENCIÓN: Si habla español, tiene a lebron disposición servicios gratuitos de asistencia lingüística. Llame al [phone].    We comply with applicable federal civil rights laws and [place] laws. We do not discriminate on the basis of race, color, national origin, age, disability, sex, sexual orientation, or gender identity.            Thank you!     Thank you for choosing [facility]  for your care. Our goal is always to provide you with excellent care. Hearing back from our patients is one way we can continue to improve our services. Please take a few minutes to complete the written survey that you may receive in the mail after your visit with us. Thank you!             Your Updated Medication List - Protect others around you: Learn how to safely use, store and  throw away your medicines at www.disposemymeds.org.          This list is accurate as of 9/7/18  8:11 AM.  Always use your most recent med list.                   Brand Name Dispense Instructions for use Diagnosis    amLODIPine 5 MG tablet    NORVASC    90 tablet    Take 1 tablet (5 mg) by mouth daily    Essential hypertension with goal blood pressure less than 140/90       atenolol 50 MG tablet    TENORMIN    90 tablet    Take 1 tablet (50 mg) by mouth daily    Essential hypertension with goal blood pressure less than 140/90       cefuroxime 250 MG tablet    CEFTIN    20 tablet    Take 1 tablet (250 mg) by mouth 2 times daily    Acute cystitis with hematuria       estradiol 0.1 MG/GM cream    ESTRACE    42.5 g    Place 2 g vaginally twice a week    Atrophy of vagina       tiZANidine 4 MG tablet    ZANAFLEX    30 tablet    Take 1 tablet (4 mg) by mouth 3 times daily as needed for muscle spasms    Right lumbosacral radiculopathy       traMADol 50 MG tablet    ULTRAM    12 tablet    Take 1-2 tablets ( mg) by mouth every 6 hours as needed for severe pain        traZODone 50 MG tablet    DESYREL    180 tablet    Take 2 tablets (100 mg) by mouth nightly as needed for sleep    Insomnia, unspecified type

## 2018-09-13 ENCOUNTER — RADIANT APPOINTMENT (OUTPATIENT)
Dept: MRI IMAGING | Facility: CLINIC | Age: 58
End: 2018-09-13
Attending: FAMILY MEDICINE
Payer: COMMERCIAL

## 2018-09-13 DIAGNOSIS — M25.562 ACUTE PAIN OF LEFT KNEE: ICD-10-CM

## 2018-09-13 PROCEDURE — 73721 MRI JNT OF LWR EXTRE W/O DYE: CPT | Mod: LT | Performed by: RADIOLOGY

## 2018-09-14 ENCOUNTER — TELEPHONE (OUTPATIENT)
Dept: PALLIATIVE MEDICINE | Facility: CLINIC | Age: 58
End: 2018-09-14

## 2018-09-14 NOTE — TELEPHONE ENCOUNTER
Patient had a lumbar transforaminal epidural steroid injection at R L2-3, L3-4 on 9/7/18.  Called patient for an update.      Left message that we were calling for an update about how she was doing after the injection.  LM that if she has any problems or questions to call the clinic at [phone].

## 2018-09-19 ENCOUNTER — OFFICE VISIT (OUTPATIENT)
Dept: ORTHOPEDICS | Facility: CLINIC | Age: 58
End: 2018-09-19
Payer: COMMERCIAL

## 2018-09-19 VITALS
BODY MASS INDEX: 38.64 KG/M2 | SYSTOLIC BLOOD PRESSURE: 134 MMHG | HEIGHT: 62 IN | DIASTOLIC BLOOD PRESSURE: 80 MMHG | RESPIRATION RATE: 12 BRPM | WEIGHT: 210 LBS

## 2018-09-19 DIAGNOSIS — S83.242D OTHER TEAR OF MEDIAL MENISCUS OF LEFT KNEE AS CURRENT INJURY, SUBSEQUENT ENCOUNTER: Primary | ICD-10-CM

## 2018-09-19 PROCEDURE — 99214 OFFICE O/P EST MOD 30 MIN: CPT | Performed by: PEDIATRICS

## 2018-09-19 NOTE — PROGRESS NOTES
Sports Medicine Clinic Visit    PCP: Germán Jernigan    Angela Ibarra is a 57 year old female who is seen  as a self referral presenting with left knee pain    Injury: Fell 18    **  Acute injury of left knee. Positive for swelling and ecchymosis after acute injury.  Negative for sound, catching, or locking.  No pain at rest. Reports instability. Causes difficulty sleeping. Aggravated by sit to stand, steps. Wears knee compression sleeve that improves pain.        Location of Pain: left medial knee  Duration of Pain: 18  Rating of Pain at worst: 8/10  Rating of Pain Currently: 4/10  Symptoms are better with:  Ice, rest   Symptoms are worse with: stairs, walking, standing  Additional Features:   Positive: swelling, instability, paresthesias, numbness and weakness  Other evaluation and/or treatments so far consists of: MRI, brace  Prior History of related problems: none    Social History: Retired    Review of Systems  Musculoskeletal: as above  Remainder of review of systems is negative including constitutional, CV, pulmonary, GI, Skin and Neurologic except as noted in HPI or medical history.    Past Medical History:   Diagnosis Date     Calculus of kidney      Migraine, unspecified, without mention of intractable migraine without mention of status migrainosus      Other specified iron deficiency anemias      Papanicolaou smear of cervix with low grade squamous intraepithelial lesion (LGSIL)     Colpo and hyst pathology benign     Primary osteoarthritis of right hip      Synovitis of ankle      Unspecified essential hypertension     Essential hypertension     Past Surgical History:   Procedure Laterality Date     ARTHROSCOPY ANKLE  2014    Procedure: ARTHROSCOPY ANKLE;  Surgeon: Shaun Bhatt DPM;  Location: [facility] OR     C  DELIVERY ONLY      , Low Cervical     C GASTRIC BYPASS,OBESITY,W/SM BOWEL RECONS  10/99     C LIGATE FALLOPIAN TUBE  2000    laparoscopy      COLONOSCOPY WITH CO2 INSUFFLATION N/A 9/28/2017    Procedure: COLONOSCOPY WITH CO2 INSUFFLATION;  COLON SCREEN/ YURI;  Surgeon: Cody Arana MD;  Location: [facility] OR     HYSTERECTOMY, PAP NO LONGER INDICATED  1-     LAPAROSCOPIC CHOLECYSTECTOMY  8/3/2012    Procedure: LAPAROSCOPIC CHOLECYSTECTOMY;  Laparoscopic Cholecystectomy ;  Surgeon: Corwin Cabezas MD;  Location: [facility] OR     OPEN REDUCTION INTERNAL FIXATION ANKLE  4/22/2014    Procedure: OPEN REDUCTION INTERNAL FIXATION ANKLE;  Surgeon: Shaun Bhatt DPM;  Location: [facility] OR     OPEN REDUCTION INTERNAL FIXATION ELBOW  10/15/2013    Procedure: OPEN REDUCTION INTERNAL FIXATION ELBOW;  OPEN REDUCTION INTERNAL FIXATION LEFT PROXIMAL ULNA;  Surgeon: Artem Last DO;  Location: [facility] OR     ORTHOPEDIC SURGERY      left shoulder surgery     REMOVE MESH VAGINA  10/10/2011    Procedure:REMOVE MESH VAGINA; Excision of Vaginal Mesh; Surgeon:SERGE SALGADO; Location:[facility] OR     SURGICAL HISTORY OF -   4/20/10    Transobturator midurethral sling     SURGICAL HISTORY OF - 8/1999    exploratory laparotomy, colitis     SURGICAL HISTORY OF -   4/20/10    Transobturator midurethral sling     TUBAL LIGATION       Family History   Problem Relation Age of Onset     C.A.D. Mother      MI     Hypertension Mother      Hypertension Father      Breast Cancer No family hx of      Social History     Social History     Marital status:      Spouse name: N/A     Number of children: 1     Years of education: N/A     Occupational History      [employer]     Social History Main Topics     Smoking status: Never Smoker     Smokeless tobacco: Never Used     Alcohol use Yes      Comment: occas     Drug use: No     Sexual activity: Yes     Partners: Male     Birth control/ protection: Surgical      Comment: tvh     Other Topics Concern      Service Yes     in and out of [place] 85[place]88     Blood Transfusions No     Caffeine Concern  "No     Occupational Exposure No     Hobby Hazards No     Sleep Concern Yes     Stress Concern No     Weight Concern Yes     always      Special Diet No     watches sugar intake     Back Care No     Exercise Yes     treadmill and bike     Bike Helmet No     Seat Belt Yes     Self-Exams No     Parent/Sibling W/ Cabg, Mi Or Angioplasty Before 65f 55m? No     Social History Narrative    Dairy/d 1 servings/d.     Caffeine 0 servings/d    Exercise 5 x week, treadmill, bike 1 hour/day    Sunscreen used - No    Seatbelts used - Yes    Working smoke/CO detectors in the home - Yes    Guns stored in the home - No    Self Breast Exams - No    Self Testicular Exam - NA    Eye Exam up to date - Yes    Dental Exam up to date - Yes    Pap Smear up to date - Yes    Mammogram up to date - No 2000    PSA up to date - NA    Dexa Scan up to date - NA    Flex Sig / Colonoscopy up to date - Yes 8/99 Abd pain=neg    Immunizations up to date - unsure    Abuse: Current or Past(Physical, Sexual or Emotional)- No    Do you feel safe in your environment - Yes                     This document serves as a record of the services and decisions personally performed and made by DO CARLOS Grande. It was created on their behalf by Marck Ye, a trained medical scribe. The creation of this document is based the provider's statements to the medical scribe.  Marck Ye September 19, 2018 8:10 AM    Objective  /80  Resp 12  Ht 5' 2\" (1.575 m)  Wt 210 lb (95.3 kg)  LMP 01/08/2008  BMI 38.41 kg/m2    GENERAL APPEARANCE: obese, alert and no distress   GAIT: antalgic favoring left   SKIN: no suspicious lesions or rashes  NEURO: Normal strength and tone, mentation intact and speech normal  PSYCH:  mentation appears normal and affect normal/bright   HEENT: no scleral icterus  CV: no extremity edema  RESP: nonlabored breathing      Left Knee exam    ROM:        Flexion 110 degrees       Extension lacking 10-20 degrees actively; passive " grossly full    Inspection:       no visible ecchymosis       Trace-mild effusion    Skin:       no visible deformities       well perfused       capillary refill brisk    Tender:        medial joint line    Non Tender:         remainder of knee area    Special Tests:        Mild pain with Pauly       pain with forced extension        Radiology  Visualized MRI of the left knee without contrast taken on 9/13/2018, and reviewed images and report with patient.  MRI demonstrates     MR left knee without contrast 9/13/2018 8:44 AM     Techniques: Multiplanar multisequence imaging of the left knee was  obtained without  administration of intra-articular or intravenous  contrast using routing protocol.     History: ; Acute pain of left knee      Additional History from EMR: History of acute knee pain beginning  after a fall. Date of injury approximately 8/21/2018.     Comparison: Knee radiographs dated 8/28/2018.     Findings:     MENISCI:  Medial meniscus: Predominantly radial tearing of the posterior  horn/root ligament junction of the medial meniscus with tear extending  into the posterior root ligament.  Lateral meniscus: Intact     LIGAMENTS  Cruciate ligaments: Intact.  Medial supporting structures: There is edema superficial to the tibial  collateral ligament, extending posteriorly over the posterior oblique  ligament. Findings are consistent with low-grade ligamentous sprain,  likely secondary to altered biomechanics related to underlying  meniscal pathology.  Lateral supporting structures: Edema superficial to the iliotibial  band,  consistent with low-grade sprain. Lateral supporting structures  are otherwise intact.     EXTENSOR MECHANISM  Intact.      FLUID  Small joint effusion. No substantial Baker's cyst.     OSSEOUS and ARTICULAR STRUCTURES  Bones: No evidence of fracture or contusion.     Patellofemoral compartment: Increased signal in the lateral patellar  facet consistent with low-grade  "chondromalacia. Focal area of  subchondral cystic change/mild edema in the central trochlear with  associated overlying chondral fissuring.     Medial compartment: No high-grade cartilage loss.     Lateral compartment: No high-grade cartilage loss.     ANCILLARY FINDINGS  Mild, nonspecific edema of the subcutaneous tissues anterior to the  knee, may be related to reported recent trauma.         Impression:  1. Medial meniscal radial tear involving the posterior horn/root  ligament junction with extension into the posterior root.  2. 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  3. Nonspecific subcutaneous edema in the anterior soft tissues, which  may be related to reported recent trauma.     I have personally reviewed the examination and initial interpretation  and I agree with the findings.     CATHERINE RODRIGUEZ    ==============================================================    Visualized radiographs of left knee taken on 8/28/2018, and reviewed the images with the patient.  Impression:   XR KNEE LT 3 VW 8/28/2018 7:28 PM      HISTORY: ; Knee injury, unspecified laterality, initial encounter;  Fall, initial encounter     COMPARISON: None         IMPRESSION: No evidence of fracture. Joint spaces are maintained.     PANCHO LOYOLA MD      Assessment:  1. Other tear of medial meniscus of left knee as current injury, subsequent encounter        Plan:  Discussed the assessment with the patient. Discussed nature of injury. With potential for posterior root involvement, and with intact medial and lateral compartment weightbearing articular surfaces, we discussed potential see orthopedic surgery.  Plan this next, for discussion of a \"fix\" for her knee condition.    Radiologic images reviewed and discussed with patient today  We discussed the following: symptom treatment, activity modification/rest, rehab, injection therapy, imaging, support for the " affected area and referral to Orthopedic Surgeon.     Following discussion, plan:  Topical Treatments: Ice as needed  Over the counter medication: Patient's preferred OTC medication as needed.  Activity Modification: as discussed  Discussed referral to an Orthopedic Surgeon. Referral placed. She desires to discuss more definitive options for her knee.  Discussed support for left knee. Patient presented with a compression sleeve. May continue with prn.  Rehab: Physical Therapy: Discussed as consideration. Hold for now given referral.  Steroid injection of the left knee was discussed today in clinic. Patient declined for now, will proceed with referral. May return for injection if desired.  Follow up: as needed  Questions answered. The patient indicates understanding of these issues and agrees with the plan.    Shaun Patten DO, CAQ            Disclaimer: This note consists of symbols derived from keyboarding, dictation and/or voice recognition software. As a result, there may be errors in the script that have gone undetected. Please consider this when interpreting information found in this chart.    The information in this document, created by the medical scribe for me, accurately reflects the services I personally performed and the decisions made by me. I have reviewed and approved this document for accuracy prior to leaving the patient care area.

## 2018-09-19 NOTE — MR AVS SNAPSHOT
After Visit Summary   9/19/2018    Angela Ibarra    MRN: [id number]           Patient Information     Date Of Birth          1960        Visit Information        Provider Department      9/19/2018 8:00 AM Shaun Patten,  [facility]        Today's Diagnoses     Other tear of medial meniscus of left knee as current injury, subsequent encounter    -  1      Care Instructions    We discussed:  Icing, over the counter medication  Modifying activities  Support--ok to continue with knee compression  Physical therapy  Trial of injection  Referral to orthopedic surgery--referral placed          Follow-ups after your visit        Additional Services     ORTHO  REFERRAL       [facility] is referring you to the Orthopedic  Services at [facility].       The  Representative will assist you in the coordination of your Orthopedic and Musculoskeletal Care as prescribed by your physician.    The  Representative will call you within 24 hours to help schedule your appointment, or you may contact the  Representative at:    [facility] ~ [phone]  [facility] ~ [phone]  [facility] ~ [phone]    Type of Referral : Surgical / Specialist -- orthopedic surgeon, posterior root medial meniscus tear      Timeframe requested: Routine     Coverage of these services is subject to the terms and limitations of your health insurance plan.  Please call member services at your health plan with any benefit or coverage questions.      If X-rays, CT or MRI's have been performed, please contact the facility where they were done to arrange for , prior to your scheduled appointment.  Please bring this referral request to your appointment and present it to your specialist.                  Follow-up notes from your care team     Return if symptoms  "worsen or fail to improve.      Who to contact     If you have questions or need follow up information about today's clinic visit or your schedule please contact [facility] directly at [phone].  Normal or non-critical lab and imaging results will be communicated to you by MyChart, letter or phone within 4 business days after the clinic has received the results. If you do not hear from us within 7 days, please contact the clinic through MyChart or phone. If you have a critical or abnormal lab result, we will notify you by phone as soon as possible.  Submit refill requests through [employer] or call your pharmacy and they will forward the refill request to us. Please allow 3 business days for your refill to be completed.          Additional Information About Your Visit        [employer] Information     [employer] gives you secure access to your electronic health record. If you see a primary care provider, you can also send messages to your care team and make appointments. If you have questions, please call your primary care clinic.  If you do not have a primary care provider, please call [phone] and they will assist you.        Care EveryWhere ID     This is your Care EveryWhere ID. This could be used by other organizations to access your [facility] medical records  [id number]        Your Vitals Were     Respirations Height Last Period BMI (Body Mass Index)          12 5' 2\" (1.575 m) 01/08/2008 38.41 kg/m2         Blood Pressure from Last 3 Encounters:   09/19/18 134/80   09/07/18 145/71   09/01/18 130/84    Weight from Last 3 Encounters:   09/19/18 210 lb (95.3 kg)   09/01/18 210 lb (95.3 kg)   08/31/18 210 lb (95.3 kg)              We Performed the Following     ORTHO  REFERRAL        Primary Care Provider Office Phone # Fax #    Germán Jernigan [phone] [address] [facility] [address]        Equal Access to Services     ENOCH ARROYO AH: Hadii " max Diaz, wadonnda luqadaha, qaybta kaalmada gregyao, waxrain gustavo ortegamarthaherbie rocha jaime. So [facility] [phone].    ATENCIÓN: Si habla mansi, tiene a lebron disposición servicios gratuitos de asistencia lingüística. Kasandra al [phone].    We comply with applicable federal civil rights laws and [place] laws. We do not discriminate on the basis of race, color, national origin, age, disability, sex, sexual orientation, or gender identity.            Thank you!     Thank you for choosing [facility]  for your care. Our goal is always to provide you with excellent care. Hearing back from our patients is one way we can continue to improve our services. Please take a few minutes to complete the written survey that you may receive in the mail after your visit with us. Thank you!             Your Updated Medication List - Protect others around you: Learn how to safely use, store and throw away your medicines at www.disposemymeds.org.          This list is accurate as of 9/19/18  8:37 AM.  Always use your most recent med list.                   Brand Name Dispense Instructions for use Diagnosis    amLODIPine 5 MG tablet    NORVASC    90 tablet    Take 1 tablet (5 mg) by mouth daily    Essential hypertension with goal blood pressure less than 140/90       atenolol 50 MG tablet    TENORMIN    90 tablet    Take 1 tablet (50 mg) by mouth daily    Essential hypertension with goal blood pressure less than 140/90       cefuroxime 250 MG tablet    CEFTIN    20 tablet    Take 1 tablet (250 mg) by mouth 2 times daily    Acute cystitis with hematuria       estradiol 0.1 MG/GM cream    ESTRACE    42.5 g    Place 2 g vaginally twice a week    Atrophy of vagina       tiZANidine 4 MG tablet    ZANAFLEX    30 tablet    Take 1 tablet (4 mg) by mouth 3 times daily as needed for muscle spasms    Right lumbosacral radiculopathy       traMADol 50 MG tablet    ULTRAM    12 tablet    Take 1-2  tablets ( mg) by mouth every 6 hours as needed for severe pain        traZODone 50 MG tablet    DESYREL    180 tablet    Take 2 tablets (100 mg) by mouth nightly as needed for sleep    Insomnia, unspecified type

## 2018-09-19 NOTE — PATIENT INSTRUCTIONS
We discussed:  Icing, over the counter medication  Modifying activities  Support--ok to continue with knee compression  Physical therapy  Trial of injection  Referral to orthopedic surgery--referral placed

## 2018-09-19 NOTE — LETTER
9/19/2018         RE: Angela Ibarra  [address] [place] [address]        Dear Colleague,    Thank you for referring your patient, Angela Ibarra, to the [facility]. Please see a copy of my visit note below.    Sports Medicine Clinic Visit    PCP: Germán Jernigan    Angela Ibarra is a 57 year old female who is seen  as a self referral presenting with left knee pain    Injury: Fell 8/21/18    **  Acute injury of left knee. Positive for swelling and ecchymosis after acute injury.  Negative for sound, catching, or locking.  No pain at rest. Reports instability. Causes difficulty sleeping. Aggravated by sit to stand, steps. Wears knee compression sleeve that improves pain.        Location of Pain: left medial knee  Duration of Pain: 8/21/18  Rating of Pain at worst: 8/10  Rating of Pain Currently: 4/10  Symptoms are better with:  Ice, rest   Symptoms are worse with: stairs, walking, standing  Additional Features:   Positive: swelling, instability, paresthesias, numbness and weakness  Other evaluation and/or treatments so far consists of: MRI, brace  Prior History of related problems: none    Social History: Retired    Review of Systems  Musculoskeletal: as above  Remainder of review of systems is negative including constitutional, CV, pulmonary, GI, Skin and Neurologic except as noted in HPI or medical history.    Past Medical History:   Diagnosis Date     Calculus of kidney      Migraine, unspecified, without mention of intractable migraine without mention of status migrainosus      Other specified iron deficiency anemias      Papanicolaou smear of cervix with low grade squamous intraepithelial lesion (LGSIL) 11/07    Colpo and hyst pathology benign     Primary osteoarthritis of right hip      Synovitis of ankle      Unspecified essential hypertension 1999    Essential hypertension     Past Surgical History:   Procedure Laterality Date     ARTHROSCOPY ANKLE  4/22/2014     Procedure: ARTHROSCOPY ANKLE;  Surgeon: Shaun Bhatt DPM;  Location: [facility] OR     C  DELIVERY ONLY      , Low Cervical     C GASTRIC BYPASS,OBESITY,W/SM BOWEL RECONS  10/99     C LIGATE FALLOPIAN TUBE  2000    laparoscopy     COLONOSCOPY WITH CO2 INSUFFLATION N/A 2017    Procedure: COLONOSCOPY WITH CO2 INSUFFLATION;  COLON SCREEN/ YURI;  Surgeon: Cody Arana MD;  Location: [facility] OR     HYSTERECTOMY, PAP NO LONGER INDICATED  2008     LAPAROSCOPIC CHOLECYSTECTOMY  8/3/2012    Procedure: LAPAROSCOPIC CHOLECYSTECTOMY;  Laparoscopic Cholecystectomy ;  Surgeon: Corwin Cabezas MD;  Location: [facility] OR     OPEN REDUCTION INTERNAL FIXATION ANKLE  2014    Procedure: OPEN REDUCTION INTERNAL FIXATION ANKLE;  Surgeon: Shaun Bhatt DPM;  Location: [facility] OR     OPEN REDUCTION INTERNAL FIXATION ELBOW  10/15/2013    Procedure: OPEN REDUCTION INTERNAL FIXATION ELBOW;  OPEN REDUCTION INTERNAL FIXATION LEFT PROXIMAL ULNA;  Surgeon: Artem Last DO;  Location: [facility] OR     ORTHOPEDIC SURGERY      left shoulder surgery     REMOVE MESH VAGINA  10/10/2011    Procedure:REMOVE MESH VAGINA; Excision of Vaginal Mesh; Surgeon:SERGE SALGADO; Location:[facility] OR     SURGICAL HISTORY OF -   4/20/10    Transobturator midurethral sling     SURGICAL HISTORY OF -   1999    exploratory laparotomy, colitis     SURGICAL HISTORY OF -   4/20/10    Transobturator midurethral sling     TUBAL LIGATION       Family History   Problem Relation Age of Onset     C.A.D. Mother      MI     Hypertension Mother      Hypertension Father      Breast Cancer No family hx of      Social History     Social History     Marital status:      Spouse name: N/A     Number of children: 1     Years of education: N/A     Occupational History      [employer]     Social History Main Topics     Smoking status: Never Smoker     Smokeless tobacco: Never Used     Alcohol use Yes      Comment:  "occas     Drug use: No     Sexual activity: Yes     Partners: Male     Birth control/ protection: Surgical      Comment: tvh     Other Topics Concern      Service Yes     in and out of [place] 85[place]88     Blood Transfusions No     Caffeine Concern No     Occupational Exposure No     Hobby Hazards No     Sleep Concern Yes     Stress Concern No     Weight Concern Yes     always      Special Diet No     watches sugar intake     Back Care No     Exercise Yes     treadmill and bike     Bike Helmet No     Seat Belt Yes     Self-Exams No     Parent/Sibling W/ Cabg, Mi Or Angioplasty Before 65f 55m? No     Social History Narrative    Dairy/d 1 servings/d.     Caffeine 0 servings/d    Exercise 5 x week, treadmill, bike 1 hour/day    Sunscreen used - No    Seatbelts used - Yes    Working smoke/CO detectors in the home - Yes    Guns stored in the home - No    Self Breast Exams - No    Self Testicular Exam - NA    Eye Exam up to date - Yes    Dental Exam up to date - Yes    Pap Smear up to date - Yes    Mammogram up to date - No 2000    PSA up to date - NA    Dexa Scan up to date - NA    Flex Sig / Colonoscopy up to date - Yes 8/99 Abd pain=neg    Immunizations up to date - unsure    Abuse: Current or Past(Physical, Sexual or Emotional)- No    Do you feel safe in your environment - Yes                     This document serves as a record of the services and decisions personally performed and made by DO CARLOS Grande. It was created on their behalf by Marck Ye, a trained medical scribe. The creation of this document is based the provider's statements to the medical scribe.  Marck Ye September 19, 2018 8:10 AM    Objective  /80  Resp 12  Ht 5' 2\" (1.575 m)  Wt 210 lb (95.3 kg)  LMP 01/08/2008  BMI 38.41 kg/m2    GENERAL APPEARANCE: obese, alert and no distress   GAIT: antalgic favoring left   SKIN: no suspicious lesions or rashes  NEURO: Normal strength and tone, mentation intact and speech " normal  PSYCH:  mentation appears normal and affect normal/bright   HEENT: no scleral icterus  CV: no extremity edema  RESP: nonlabored breathing      Left Knee exam    ROM:        Flexion 110 degrees       Extension lacking 10-20 degrees actively; passive grossly full    Inspection:       no visible ecchymosis       Trace-mild effusion    Skin:       no visible deformities       well perfused       capillary refill brisk    Tender:        medial joint line    Non Tender:         remainder of knee area    Special Tests:        Mild pain with Pauly       pain with forced extension        Radiology  Visualized MRI of the left knee without contrast taken on 9/13/2018, and reviewed images and report with patient.  MRI demonstrates     MR left knee without contrast 9/13/2018 8:44 AM     Techniques: Multiplanar multisequence imaging of the left knee was  obtained without  administration of intra-articular or intravenous  contrast using routing protocol.     History: ; Acute pain of left knee      Additional History from EMR: History of acute knee pain beginning  after a fall. Date of injury approximately 8/21/2018.     Comparison: Knee radiographs dated 8/28/2018.     Findings:     MENISCI:  Medial meniscus: Predominantly radial tearing of the posterior  horn/root ligament junction of the medial meniscus with tear extending  into the posterior root ligament.  Lateral meniscus: Intact     LIGAMENTS  Cruciate ligaments: Intact.  Medial supporting structures: There is edema superficial to the tibial  collateral ligament, extending posteriorly over the posterior oblique  ligament. Findings are consistent with low-grade ligamentous sprain,  likely secondary to altered biomechanics related to underlying  meniscal pathology.  Lateral supporting structures: Edema superficial to the iliotibial  band,  consistent with low-grade sprain. Lateral supporting structures  are otherwise intact.     EXTENSOR MECHANISM  Intact.       FLUID  Small joint effusion. No substantial Baker's cyst.     OSSEOUS and ARTICULAR STRUCTURES  Bones: No evidence of fracture or contusion.     Patellofemoral compartment: Increased signal in the lateral patellar  facet consistent with low-grade chondromalacia. Focal area of  subchondral cystic change/mild edema in the central trochlear with  associated overlying chondral fissuring.     Medial compartment: No high-grade cartilage loss.     Lateral compartment: No high-grade cartilage loss.     ANCILLARY FINDINGS  Mild, nonspecific edema of the subcutaneous tissues anterior to the  knee, may be related to reported recent trauma.         Impression:  1. Medial meniscal radial tear involving the posterior horn/root  ligament junction with extension into the posterior root.  2. 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  3. Nonspecific subcutaneous edema in the anterior soft tissues, which  may be related to reported recent trauma.     I have personally reviewed the examination and initial interpretation  and I agree with the findings.     CATHERINE RODRIGUEZ    ==============================================================    Visualized radiographs of left knee taken on 8/28/2018, and reviewed the images with the patient.  Impression:   XR KNEE LT 3 VW 8/28/2018 7:28 PM      HISTORY: ; Knee injury, unspecified laterality, initial encounter;  Fall, initial encounter     COMPARISON: None         IMPRESSION: No evidence of fracture. Joint spaces are maintained.     PANCHO LOYOLA MD      Assessment:  1. Other tear of medial meniscus of left knee as current injury, subsequent encounter        Plan:  Discussed the assessment with the patient. Discussed nature of injury. With potential for posterior root involvement, and with intact medial and lateral compartment weightbearing articular surfaces, we discussed potential see orthopedic surgery.   "Plan this next, for discussion of a \"fix\" for her knee condition.    Radiologic images reviewed and discussed with patient today  We discussed the following: symptom treatment, activity modification/rest, rehab, injection therapy, imaging, support for the affected area and referral to Orthopedic Surgeon.     Following discussion, plan:  Topical Treatments: Ice as needed  Over the counter medication: Patient's preferred OTC medication as needed.  Activity Modification: as discussed  Discussed referral to an Orthopedic Surgeon. Referral placed. She desires to discuss more definitive options for her knee.  Discussed support for left knee. Patient presented with a compression sleeve. May continue with prn.  Rehab: Physical Therapy: Discussed as consideration. Hold for now given referral.  Steroid injection of the left knee was discussed today in clinic. Patient declined for now, will proceed with referral. May return for injection if desired.  Follow up: as needed  Questions answered. The patient indicates understanding of these issues and agrees with the plan.    Shaun Patten DO, CAQ            Disclaimer: This note consists of symbols derived from keyboarding, dictation and/or voice recognition software. As a result, there may be errors in the script that have gone undetected. Please consider this when interpreting information found in this chart.    The information in this document, created by the medical scribe for me, accurately reflects the services I personally performed and the decisions made by me. I have reviewed and approved this document for accuracy prior to leaving the patient care area.      Again, thank you for allowing me to participate in the care of your patient.        Sincerely,        Shaun Patten DO    "

## 2018-09-20 ENCOUNTER — OFFICE VISIT (OUTPATIENT)
Dept: ORTHOPEDICS | Facility: CLINIC | Age: 58
End: 2018-09-20
Payer: COMMERCIAL

## 2018-09-20 VITALS
HEART RATE: 66 BPM | WEIGHT: 228.6 LBS | OXYGEN SATURATION: 95 % | SYSTOLIC BLOOD PRESSURE: 122 MMHG | HEIGHT: 62 IN | BODY MASS INDEX: 42.07 KG/M2 | DIASTOLIC BLOOD PRESSURE: 77 MMHG

## 2018-09-20 DIAGNOSIS — S83.232A COMPLEX TEAR OF MEDIAL MENISCUS OF LEFT KNEE AS CURRENT INJURY, INITIAL ENCOUNTER: Primary | ICD-10-CM

## 2018-09-20 DIAGNOSIS — M22.42 CHONDROMALACIA OF PATELLA, LEFT: ICD-10-CM

## 2018-09-20 PROCEDURE — 99244 OFF/OP CNSLTJ NEW/EST MOD 40: CPT | Performed by: ORTHOPAEDIC SURGERY

## 2018-09-20 ASSESSMENT — PAIN SCALES - GENERAL: PAINLEVEL: MODERATE PAIN (4)

## 2018-09-20 NOTE — LETTER
9/20/2018         RE: Angela Ibarra  [address] [place] [address]        Dear Colleague,    Thank you for referring your patient, Angela Ibarra, to the [facility] [place]. Please see a copy of my visit note below.    chief complaint:   Chief Complaint   Patient presents with     Left Knee - Pain     DOI: 8/21/18. Patient states she fell in the parking lot at her work, landing on both knees, fell forward. W/C denied. Immediate pain and swelling. Pain is posterior and medial. No treatments. Wears a knee sleeve.     Angela Ibarra is seen today in the [facility] for evaluation of left knee pain at the request of Dr. Shaun Patten,       HISTORY OF PRESENT ILLNESS    Angela Ibarra is a 57 year old female seen for evaluation of a left knee injury that occurred on 8/21/2018, 1 month ago. The injury occurred at her work parking lot. She tripped in a pothole or bump, fell forward and landed on both her knees. Had immediate pain and swelling. She was seen at urgent care on 8/28/2018. Right knee pain improved, but left knee pain has continued. Moderate pain today, 4/10. Her pain is posterior and medial. Pain occurs with walking and other weightbearing activities. For treatment, she wears a knee sleeve. She has also tried icing and resting. Also reports some numbness and tingling from the knee down to the ankle. Reports no previous left knee injury or pain in the past. Presents with her .     Of note: has right hip/low back issues, severely altering gait.     Present symptoms: moderate pain, + swelling.    Symptoms occur with walking.    The frequency of symptoms frequently.  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     Orthopedic PMH: history of right hip/low back pain. She reports having a long history of orthopaedics: \"a shoulder surgery, a plate in the arm surgery\"    Other PMH:  has a past medical history of Calculus of kidney; Migraine, unspecified, without mention of intractable migraine without mention of status migrainosus; Other specified iron deficiency anemias; Papanicolaou smear of cervix with low grade squamous intraepithelial lesion (LGSIL) (11/07); Primary osteoarthritis of right hip; Synovitis of ankle; and Unspecified essential hypertension (1999). She also has no past medical history of Cancer (H); Cerebral infarction (H); Congestive heart failure (H); COPD (chronic obstructive pulmonary disease) (H); Depressive disorder; Diabetes (H); Heart disease; History of blood transfusion; Malignant hyperthermia; Thyroid disease; or Uncomplicated asthma.  Patient Active Problem List    Diagnosis Date Noted     Morbid obesity (H) 08/28/2018     Priority: Medium     Arthropathy of facet joint 08/16/2018     Priority: Medium     Cataract of both eyes, unspecified cataract type 04/06/2018     Priority: Medium     Primary osteoarthritis of right hip 02/16/2018     Priority: Medium     Insomnia, unspecified type 09/08/2016     Priority: Medium     Essential hypertension with goal blood pressure less than 140/90 09/08/2016     Priority: Medium     Advanced directives, counseling/discussion 12/29/2015     Priority: Medium     Patient states has Advance Directive and will bring in a copy to clinic.  Susanne Gotti LPN         CARDIOVASCULAR SCREENING; LDL GOAL LESS THAN 130 08/04/2014     Priority: Medium     Chondromalacia, left ankle and joints of left foot 05/22/2014     Priority: Medium     Pain in joint involving ankle and foot 05/13/2014     " Priority: Medium     Abnormal gait 2014     Priority: Medium     Other postprocedural status(V45.89) 2014     Priority: Medium     Malunion, fracture 2014     Priority: Medium     Loose body in ankle and foot joint 2014     Priority: Medium     Synovitis of ankle 2014     Priority: Medium     Osteoporosis 2013     Priority: Medium     Problem list name updated by automated process. Provider to review       Right carpal tunnel syndrome 2013     Priority: Medium     Right leg weakness 2011     Priority: Medium     Balance disorder 2011     Priority: Medium     Thought possibly to be due to low B12 by neuro       Stress incontinence 2010     Priority: Medium     S/p TOT procedure 4/20/10       Migraine headache 2008     Priority: Medium     Reduced with atenolol  (Problem list name updated by automated process. Provider to review and confirm.)       Obesity 2007     Priority: Medium     Gastric bypass  Problem list name updated by automated process. Provider to review       NONSPECIFIC COLITIS 2007     Priority: Medium     Hosp in 99  Recurrent ? Infectious colitis  No definate signs of inflamatory bowel disease         Surgical Hx:  has a past surgical history that includes surgical history of -  (4/20/10); surgical history of -  (1999); surgical history of -  (4/20/10);  DELIVERY ONLY (); LIGATE FALLOPIAN TUBE (2000); hysterectomy, pap no longer indicated (2008); tubal ligation; GASTRIC BYPASS,OBESITY,W/SM BOWEL RECONS (10/99); orthopedic surgery; Remove mesh vagina (10/10/2011); Laparoscopic cholecystectomy (8/3/2012); Open reduction internal fixation elbow (10/15/2013); Arthroscopy ankle (2014); Open reduction internal fixation ankle (2014); and Colonoscopy with CO2 insufflation (N/A, 2017).    Medications:   Current Outpatient Prescriptions:      amLODIPine (NORVASC) 5 MG tablet, Take 1 tablet (5 mg)  by mouth daily, Disp: 90 tablet, Rfl: 2     atenolol (TENORMIN) 50 MG tablet, Take 1 tablet (50 mg) by mouth daily, Disp: 90 tablet, Rfl: 3     cefuroxime (CEFTIN) 250 MG tablet, Take 1 tablet (250 mg) by mouth 2 times daily, Disp: 20 tablet, Rfl: 0     estradiol (ESTRACE) 0.1 MG/GM cream, Place 2 g vaginally twice a week, Disp: 42.5 g, Rfl: 2     tiZANidine (ZANAFLEX) 4 MG tablet, Take 1 tablet (4 mg) by mouth 3 times daily as needed for muscle spasms, Disp: 30 tablet, Rfl: 0     traMADol (ULTRAM) 50 MG tablet, Take 1-2 tablets ( mg) by mouth every 6 hours as needed for severe pain, Disp: 12 tablet, Rfl: 0     traZODone (DESYREL) 50 MG tablet, Take 2 tablets (100 mg) by mouth nightly as needed for sleep, Disp: 180 tablet, Rfl: 3  No current facility-administered medications for this visit.     Facility-Administered Medications Ordered in Other Visits:      bupivacaine 0.5% EPINEPHrine 1:200,000 injection, , , PRN, Fausto Estrada APRN CRNA, 30 mL at 10/15/13 1001    Allergies:   Allergies   Allergen Reactions     Bactrim [Sulfamethoxazole W/Trimethoprim]      itching     Nitrofurantoin Itching       Social Hx: administrative duties.   reports that she has never smoked. She has never used smokeless tobacco. She reports that she drinks alcohol. She reports that she does not use illicit drugs.    Family Hx: family history includes C.A.D. in her mother; Hypertension in her father and mother. There is no history of Breast Cancer.    REVIEW OF SYSTEMS: 10 point ROS neg other than the symptoms noted above in the HPI and PMH. Notables include  CONSTITUTIONAL:NEGATIVE for fever, chills, change in weight  INTEGUMENTARY/SKIN: NEGATIVE for worrisome rashes, moles or lesions  MUSCULOSKELETAL:See HPI above  NEURO: NEGATIVE for weakness, dizziness or paresthesias    This document serves as a record of the services and decisions personally performed and made by Aryan Holley MD. It was created on his behalf by Stella Cortes  "a trained medical scribe. The creation of this document is based the provider's statements to the medical scribe.    Tiarra Stella Cortes 4:09 PM 9/20/2018    PHYSICAL EXAM:  /77  Pulse 66  Ht 1.575 m (5' 2\")  Wt 103.7 kg (228 lb 9.6 oz)  LMP 01/08/2008  SpO2 95%  BMI 41.81 kg/m2   GENERAL APPEARANCE: healthy, alert, no distress; accompanied by   SKIN: no suspicious lesions or rashes  NEURO: Normal strength and tone, mentation intact and speech normal  PSYCH:  mentation appears normal and affect normal, not anxious  RESPIRATORY: No increased work of breathing.    Hands: no clubbing, nail pitting or nodes.    BILATERAL LOWER EXTREMITIES:  Gait: exaggerated antalgic favoring left knee  No gross deformities or masses.  No Quad atrophy, strength normal.  Intact sensation deep peroneal nerve, superficial peroneal nerve, med/lat tibial nerve, sural nerve, saphenous nerve  Intact EHL, EDL, TA, FHL, GS, quadriceps hamstrings and hip flexors  Toes warm and well perfused, brisk capillary refill. Palpable 2+ dp pulses.  Bilateral calf soft and nttp or squeeze.  No palpable popliteal lymphadenopathy.  DTRs: achilles 2+, patella 2+.  Edema: none    LEFT KNEE EXAM:    Skin: intact, no ecchymosis or erythema  Squat: limited by pain.     ROM: guarded, 0 extension to 120 flexion  Tight hamstrings on straight leg raise.  Effusion: trace  Tender: hypersensitivity to medial joint line, anterior knee  nontender to palpation lat joint line, posterior knee  McMurrays: negative    Valgus stress/MCL: stable, and non-painful at both 0 and 30 degrees knee flexion  Varus stress: stable, and non-painful at both 0 and 30 degrees knee flexion  Patellofemoral joint:                Extensor Lag: none              Q Angle: normal              Patellar Mobility: normal              Apprehension: negative              Crepitations: mild   Grind: positive     RIGHT KNEE EXAM:    Skin: intact, no ecchymosis or erythema; right " anterolateral knee abrasion.   ROM: 0 extension to 120 flexion  Tight hamstrings on straight leg raise.  Effusion: none  Tender: NTTP med/lat joint line, anterior or posterior knee  McMurrays: negative    Valgus stress/MCL: stable, and non-painful at both 0 and 30 degrees knee flexion  Varus stress: stable, and non-painful at both 0 and 30 degrees knee flexion  Patellofemoral joint:                Extensor Lag: none              Q Angle: normal              Patellar Mobility: normal              Apprehension: negative              Crepitations: minimal   Grind: negative     X-RAY:  3 views left knee from 8/28/2018 were reviewed in clinic today. On my review, no obvious fractures or dislocations. Preserved joint spaces, slight medial narrowing.    MRI:  MRI left knee from 9/13/2018 was reviewed in clinic today.    Impression:  1. Medial meniscal radial tear involving the posterior horn/root  ligament junction with extension into the posterior root.  2. 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  3. Nonspecific subcutaneous edema in the anterior soft tissues, which  may be related to reported recent trauma.      Impression:  57 year old female with acute left knee pain, medial meniscus tear, patellofemoral chondromalacia , contusion.    Plan:   * discussed injury with patient, what appears to be a left knee medial meniscal tear on MRI, as well as some underlying arthritic changes, which is consistent with symptoms and physical examination findings.     * Discussed treatment options including nonoperative treatment with continued rest, ice, elevation, activity modification, NSAIDS and Physical Therapy, bracing and potential injections versus surgical treatment with arthroscopy and meniscal repair versus debridement. Risks and benefits of each discussed in detail.  * in the setting of underlying chondrosis, predictability of arthroscopy is  uncertain, unless mechanical symptoms present due to the meniscus tear.    * surgical risks discussed: bleeding, infection, pain, scar, damage to adjacent structures (nerve, vessels, cartilage), stiffness, post-traumatic arthritis, failure to relieve symptoms, recurrence of symptoms, blood clots (DVT), pulmonary emolism, risks of anesthesia and death. This surgery is not intended nor expected to alleviate arthritic pain symptoms, nor will it treat or correct underlying arthritic changes. Arthritis and symptoms related to arthritis could worsen with arthroscopy and meniscal / chondral debridement. Patient understands.    * understanding the risks of surgery, patient elected to proceed with arthroscopy  * plan: left knee arthroscopy with partial meniscal debridement versus repair, possible chondral debridement, outpatient surgery  * will arrange at a time in future mutual convenience  * will need H+P from PCP prior to surgery  * patient will be on ASA x2 weeks postoperative, as well as use of TEDs, crutches  * plan Physical Therapy to start 1-2 weeks postoperative, to be determined at postoperative visit.  * return to clinic 2 week postop for wound check, sooner as needed.  * all questions addressed and answered prior to discharge from clinic today.  * patient to call if any questions or concerns in the meantime.        The information in this document, created by a scribe for me, accurately reflects the services I personally performed and the decisions made by me. I have reviewed and approved this document for accuracy.     Aryan Holley M.D., M.S.  Dept. of Orthopaedic Surgery  [facility]      Again, thank you for allowing me to participate in the care of your patient.        Sincerely,        Aryan Holley MD

## 2018-09-20 NOTE — PROGRESS NOTES
chief complaint:   Chief Complaint   Patient presents with     Left Knee - Pain     DOI: 8/21/18. Patient states she fell in the parking lot at her work, landing on both knees, fell forward. W/C denied. Immediate pain and swelling. Pain is posterior and medial. No treatments. Wears a knee sleeve.     Angela Ibarra is seen today in the [facility] for evaluation of left knee pain at the request of Dr. Shaun Patten,       HISTORY OF PRESENT ILLNESS    Angela Ibarra is a 57 year old female seen for evaluation of a left knee injury that occurred on 8/21/2018, 1 month ago. The injury occurred at her work parking lot. She tripped in a pothole or bump, fell forward and landed on both her knees. Had immediate pain and swelling. She was seen at urgent care on 8/28/2018. Right knee pain improved, but left knee pain has continued. Moderate pain today, 4/10. Her pain is posterior and medial. Pain occurs with walking and other weightbearing activities. For treatment, she wears a knee sleeve. She has also tried icing and resting. Also reports some numbness and tingling from the knee down to the ankle. Reports no previous left knee injury or pain in the past. Presents with her .     Of note: has right hip/low back issues, severely altering gait.     Present symptoms: moderate pain, + swelling.    Symptoms occur with walking.    The frequency of symptoms frequently.  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     Orthopedic PMH: history of right hip/low back pain. She reports having a long  "history of orthopaedics: \"a shoulder surgery, a plate in the arm surgery\"    Other PMH:  has a past medical history of Calculus of kidney; Migraine, unspecified, without mention of intractable migraine without mention of status migrainosus; Other specified iron deficiency anemias; Papanicolaou smear of cervix with low grade squamous intraepithelial lesion (LGSIL) (11/07); Primary osteoarthritis of right hip; Synovitis of ankle; and Unspecified essential hypertension (1999). She also has no past medical history of Cancer (H); Cerebral infarction (H); Congestive heart failure (H); COPD (chronic obstructive pulmonary disease) (H); Depressive disorder; Diabetes (H); Heart disease; History of blood transfusion; Malignant hyperthermia; Thyroid disease; or Uncomplicated asthma.  Patient Active Problem List    Diagnosis Date Noted     Morbid obesity (H) 08/28/2018     Priority: Medium     Arthropathy of facet joint 08/16/2018     Priority: Medium     Cataract of both eyes, unspecified cataract type 04/06/2018     Priority: Medium     Primary osteoarthritis of right hip 02/16/2018     Priority: Medium     Insomnia, unspecified type 09/08/2016     Priority: Medium     Essential hypertension with goal blood pressure less than 140/90 09/08/2016     Priority: Medium     Advanced directives, counseling/discussion 12/29/2015     Priority: Medium     Patient states has Advance Directive and will bring in a copy to clinic.  Susanne Gotti LPN         CARDIOVASCULAR SCREENING; LDL GOAL LESS THAN 130 08/04/2014     Priority: Medium     Chondromalacia, left ankle and joints of left foot 05/22/2014     Priority: Medium     Pain in joint involving ankle and foot 05/13/2014     Priority: Medium     Abnormal gait 05/13/2014     Priority: Medium     Other postprocedural status(V45.89) 05/13/2014     Priority: Medium     Malunion, fracture 03/21/2014     Priority: Medium     Loose body in ankle and foot joint 03/07/2014     Priority: Medium "     Synovitis of ankle 2014     Priority: Medium     Osteoporosis 2013     Priority: Medium     Problem list name updated by automated process. Provider to review       Right carpal tunnel syndrome 2013     Priority: Medium     Right leg weakness 2011     Priority: Medium     Balance disorder 2011     Priority: Medium     Thought possibly to be due to low B12 by neuro       Stress incontinence 2010     Priority: Medium     S/p TOT procedure 4/20/10       Migraine headache 2008     Priority: Medium     Reduced with atenolol  (Problem list name updated by automated process. Provider to review and confirm.)       Obesity 2007     Priority: Medium     Gastric bypass  Problem list name updated by automated process. Provider to review       NONSPECIFIC COLITIS 2007     Priority: Medium     Hosp in 99  Recurrent ? Infectious colitis  No definate signs of inflamatory bowel disease         Surgical Hx:  has a past surgical history that includes surgical history of -  (4/20/10); surgical history of -  (1999); surgical history of -  (4/20/10);  DELIVERY ONLY (); LIGATE FALLOPIAN TUBE (2000); hysterectomy, pap no longer indicated (2008); tubal ligation; GASTRIC BYPASS,OBESITY,W/SM BOWEL RECONS (10/99); orthopedic surgery; Remove mesh vagina (10/10/2011); Laparoscopic cholecystectomy (8/3/2012); Open reduction internal fixation elbow (10/15/2013); Arthroscopy ankle (2014); Open reduction internal fixation ankle (2014); and Colonoscopy with CO2 insufflation (N/A, 2017).    Medications:   Current Outpatient Prescriptions:      amLODIPine (NORVASC) 5 MG tablet, Take 1 tablet (5 mg) by mouth daily, Disp: 90 tablet, Rfl: 2     atenolol (TENORMIN) 50 MG tablet, Take 1 tablet (50 mg) by mouth daily, Disp: 90 tablet, Rfl: 3     cefuroxime (CEFTIN) 250 MG tablet, Take 1 tablet (250 mg) by mouth 2 times daily, Disp: 20 tablet, Rfl: 0     estradiol  "(ESTRACE) 0.1 MG/GM cream, Place 2 g vaginally twice a week, Disp: 42.5 g, Rfl: 2     tiZANidine (ZANAFLEX) 4 MG tablet, Take 1 tablet (4 mg) by mouth 3 times daily as needed for muscle spasms, Disp: 30 tablet, Rfl: 0     traMADol (ULTRAM) 50 MG tablet, Take 1-2 tablets ( mg) by mouth every 6 hours as needed for severe pain, Disp: 12 tablet, Rfl: 0     traZODone (DESYREL) 50 MG tablet, Take 2 tablets (100 mg) by mouth nightly as needed for sleep, Disp: 180 tablet, Rfl: 3  No current facility-administered medications for this visit.     Facility-Administered Medications Ordered in Other Visits:      bupivacaine 0.5% EPINEPHrine 1:200,000 injection, , , GORDONN, Fausto Estrada APRN CRNA, 30 mL at 10/15/13 1001    Allergies:   Allergies   Allergen Reactions     Bactrim [Sulfamethoxazole W/Trimethoprim]      itching     Nitrofurantoin Itching       Social Hx: administrative duties.   reports that she has never smoked. She has never used smokeless tobacco. She reports that she drinks alcohol. She reports that she does not use illicit drugs.    Family Hx: family history includes C.A.D. in her mother; Hypertension in her father and mother. There is no history of Breast Cancer.    REVIEW OF SYSTEMS: 10 point ROS neg other than the symptoms noted above in the HPI and PMH. Notables include  CONSTITUTIONAL:NEGATIVE for fever, chills, change in weight  INTEGUMENTARY/SKIN: NEGATIVE for worrisome rashes, moles or lesions  MUSCULOSKELETAL:See HPI above  NEURO: NEGATIVE for weakness, dizziness or paresthesias    This document serves as a record of the services and decisions personally performed and made by Aryan Holley MD. It was created on his behalf by Stella Cortes, a trained medical scribe. The creation of this document is based the provider's statements to the medical scribe.    Tiarra Cortes 4:09 PM 9/20/2018    PHYSICAL EXAM:  /77  Pulse 66  Ht 1.575 m (5' 2\")  Wt 103.7 kg (228 lb 9.6 oz)  LMP 01/08/2008  " SpO2 95%  BMI 41.81 kg/m2   GENERAL APPEARANCE: healthy, alert, no distress; accompanied by   SKIN: no suspicious lesions or rashes  NEURO: Normal strength and tone, mentation intact and speech normal  PSYCH:  mentation appears normal and affect normal, not anxious  RESPIRATORY: No increased work of breathing.    Hands: no clubbing, nail pitting or nodes.    BILATERAL LOWER EXTREMITIES:  Gait: exaggerated antalgic favoring left knee  No gross deformities or masses.  No Quad atrophy, strength normal.  Intact sensation deep peroneal nerve, superficial peroneal nerve, med/lat tibial nerve, sural nerve, saphenous nerve  Intact EHL, EDL, TA, FHL, GS, quadriceps hamstrings and hip flexors  Toes warm and well perfused, brisk capillary refill. Palpable 2+ dp pulses.  Bilateral calf soft and nttp or squeeze.  No palpable popliteal lymphadenopathy.  DTRs: achilles 2+, patella 2+.  Edema: none    LEFT KNEE EXAM:    Skin: intact, no ecchymosis or erythema  Squat: limited by pain.     ROM: guarded, 0 extension to 120 flexion  Tight hamstrings on straight leg raise.  Effusion: trace  Tender: hypersensitivity to medial joint line, anterior knee  nontender to palpation lat joint line, posterior knee  McMurrays: negative    Valgus stress/MCL: stable, and non-painful at both 0 and 30 degrees knee flexion  Varus stress: stable, and non-painful at both 0 and 30 degrees knee flexion  Patellofemoral joint:                Extensor Lag: none              Q Angle: normal              Patellar Mobility: normal              Apprehension: negative              Crepitations: mild   Grind: positive     RIGHT KNEE EXAM:    Skin: intact, no ecchymosis or erythema; right anterolateral knee abrasion.   ROM: 0 extension to 120 flexion  Tight hamstrings on straight leg raise.  Effusion: none  Tender: NTTP med/lat joint line, anterior or posterior knee  McMurrays: negative    Valgus stress/MCL: stable, and non-painful at both 0 and 30 degrees  knee flexion  Varus stress: stable, and non-painful at both 0 and 30 degrees knee flexion  Patellofemoral joint:                Extensor Lag: none              Q Angle: normal              Patellar Mobility: normal              Apprehension: negative              Crepitations: minimal   Grind: negative     X-RAY:  3 views left knee from 8/28/2018 were reviewed in clinic today. On my review, no obvious fractures or dislocations. Preserved joint spaces, slight medial narrowing.    MRI:  MRI left knee from 9/13/2018 was reviewed in clinic today.    Impression:  1. Medial meniscal radial tear involving the posterior horn/root  ligament junction with extension into the posterior root.  2. 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  3. Nonspecific subcutaneous edema in the anterior soft tissues, which  may be related to reported recent trauma.      Impression:  57 year old female with acute left knee pain, medial meniscus tear, patellofemoral chondromalacia , contusion.    Plan:   * discussed injury with patient, what appears to be a left knee medial meniscal tear on MRI, as well as some underlying arthritic changes, which is consistent with symptoms and physical examination findings.     * Discussed treatment options including nonoperative treatment with continued rest, ice, elevation, activity modification, NSAIDS and Physical Therapy, bracing and potential injections versus surgical treatment with arthroscopy and meniscal repair versus debridement. Risks and benefits of each discussed in detail.  * in the setting of underlying chondrosis, predictability of arthroscopy is uncertain, unless mechanical symptoms present due to the meniscus tear.    * surgical risks discussed: bleeding, infection, pain, scar, damage to adjacent structures (nerve, vessels, cartilage), stiffness, post-traumatic arthritis, failure to relieve symptoms, recurrence of  symptoms, blood clots (DVT), pulmonary emolism, risks of anesthesia and death. This surgery is not intended nor expected to alleviate arthritic pain symptoms, nor will it treat or correct underlying arthritic changes. Arthritis and symptoms related to arthritis could worsen with arthroscopy and meniscal / chondral debridement. Patient understands.    * understanding the risks of surgery, patient elected to proceed with arthroscopy  * plan: left knee arthroscopy with partial meniscal debridement versus repair, possible chondral debridement, outpatient surgery  * will arrange at a time in future mutual convenience  * will need H+P from PCP prior to surgery  * patient will be on ASA x2 weeks postoperative, as well as use of TEDs, crutches  * plan Physical Therapy to start 1-2 weeks postoperative, to be determined at postoperative visit.  * return to clinic 2 week postop for wound check, sooner as needed.  * all questions addressed and answered prior to discharge from clinic today.  * patient to call if any questions or concerns in the meantime.        The information in this document, created by a scribe for me, accurately reflects the services I personally performed and the decisions made by me. I have reviewed and approved this document for accuracy.     Aryan Holley M.D., M.S.  Dept. of Orthopaedic Surgery  [facility]

## 2018-09-21 ENCOUNTER — TELEPHONE (OUTPATIENT)
Dept: ORTHOPEDICS | Facility: CLINIC | Age: 58
End: 2018-09-21

## 2018-09-21 NOTE — TELEPHONE ENCOUNTER
Type of surgery: Left knee arthroscopy meniscal chondral debridement   CPT 53773  Complex tear of medial meniscus of left knee as current injury, initial encounter [S83.232A]  - Primary       Chondromalacia of patella, left [M22.42]         Location of surgery: [facility]  Date and time of surgery: 10-4-18  TBD  Surgeon: Dr Holley  Pre-Op Appt Date: 9-26-18  Post-Op Appt Date: 10-22-18   Packet sent out: Yes  Pre-cert/Authorization completed: I submitted insurance precert on [employer], waiting for response  The notification/prior authorization reference number is [id number]  Date: 09/21/2018

## 2018-09-26 ENCOUNTER — OFFICE VISIT (OUTPATIENT)
Dept: FAMILY MEDICINE | Facility: CLINIC | Age: 58
End: 2018-09-26
Payer: COMMERCIAL

## 2018-09-26 VITALS
BODY MASS INDEX: 42.14 KG/M2 | DIASTOLIC BLOOD PRESSURE: 83 MMHG | SYSTOLIC BLOOD PRESSURE: 149 MMHG | HEIGHT: 62 IN | RESPIRATION RATE: 17 BRPM | OXYGEN SATURATION: 97 % | HEART RATE: 76 BPM | TEMPERATURE: 97.5 F | WEIGHT: 229 LBS

## 2018-09-26 DIAGNOSIS — M23.352 MENISCUS, LATERAL, POSTERIOR HORN DERANGEMENT, LEFT: ICD-10-CM

## 2018-09-26 DIAGNOSIS — Z01.818 PREOP GENERAL PHYSICAL EXAM: Primary | ICD-10-CM

## 2018-09-26 PROCEDURE — 93000 ELECTROCARDIOGRAM COMPLETE: CPT | Performed by: FAMILY MEDICINE

## 2018-09-26 PROCEDURE — 99214 OFFICE O/P EST MOD 30 MIN: CPT | Performed by: FAMILY MEDICINE

## 2018-09-26 ASSESSMENT — PAIN SCALES - GENERAL: PAINLEVEL: NO PAIN (0)

## 2018-09-26 NOTE — MR AVS SNAPSHOT
After Visit Summary   9/26/2018    Angela Ibarra    MRN: [id number]           Patient Information     Date Of Birth          1960        Visit Information        Provider Department      9/26/2018 4:30 PM Hany Hinojosa MD [facility]        Today's Diagnoses     Preop general physical exam    -  1    Meniscus, lateral, posterior horn derangement, left          Care Instructions      Before Your Surgery      Call your surgeon if there is any change in your health. This includes signs of a cold or flu (such as a sore throat, runny nose, cough, rash or fever).    Do not smoke, drink alcohol or take over the counter medicine (unless your surgeon or primary care doctor tells you to) for the 24 hours before and after surgery.    If you take prescribed drugs: Follow your doctor s orders about which medicines to take and which to stop until after surgery.    Eating and drinking prior to surgery: follow the instructions from your surgeon    Take a shower or bath the night before surgery. Use the soap your surgeon gave you to gently clean your skin. If you do not have soap from your surgeon, use your regular soap. Do not shave or scrub the surgery site.  Wear clean pajamas and have clean sheets on your bed.           Follow-ups after your visit        Your next 10 appointments already scheduled     Oct 04, 2018   Procedure with Aryan Holley MD   [facility] (--)    [address] [place] [address] [phone]            Oct 22, 2018  8:15 AM CDT   Return Visit with Aryan Holley MD   [facility] [place] ([facility] [place])    [address]  [place] [address] [phone]              Who to contact     If you have questions or need follow up information about today's clinic visit or your schedule please contact [facility] directly at [phone].  Normal or  "non-critical lab and imaging results will be communicated to you by MyChart, letter or phone within 4 business days after the clinic has received the results. If you do not hear from us within 7 days, please contact the clinic through [employer] or phone. If you have a critical or abnormal lab result, we will notify you by phone as soon as possible.  Submit refill requests through [employer] or call your pharmacy and they will forward the refill request to us. Please allow 3 business days for your refill to be completed.          Additional Information About Your Visit        [employer] Information     [employer] gives you secure access to your electronic health record. If you see a primary care provider, you can also send messages to your care team and make appointments. If you have questions, please call your primary care clinic.  If you do not have a primary care provider, please call [phone] and they will assist you.        Care EveryWhere ID     This is your Care EveryWhere ID. This could be used by other organizations to access your [facility] medical records  [id number]        Your Vitals Were     Pulse Temperature Respirations Height Last Period Pulse Oximetry    76 97.5  F (36.4  C) (Oral) 17 5' 2\" (1.575 m) 01/08/2008 97%    BMI (Body Mass Index)                   41.88 kg/m2            Blood Pressure from Last 3 Encounters:   09/26/18 149/83   09/20/18 122/77   09/19/18 134/80    Weight from Last 3 Encounters:   09/26/18 229 lb (103.9 kg)   09/20/18 228 lb 9.6 oz (103.7 kg)   09/19/18 210 lb (95.3 kg)              We Performed the Following     EKG 12-lead complete w/read - Clinics        Primary Care Provider Office Phone # Fax #    Germán Jernigan [phone] [address] [facility] [address]        Equal Access to Services     [facility] CRUZ : Anusha yeboaho Soisamar, waaxda luqadaha, qaybta kaalmada adeegyada, oleksandr sandoval. So wa " [phone].    ATENCIÓN: Si oly nazario, tiene a lebron disposición servicios gratuitos de asistencia lingüística. Kasandra currie [phone].    We comply with applicable federal civil rights laws and [place] laws. We do not discriminate on the basis of race, color, national origin, age, disability, sex, sexual orientation, or gender identity.            Thank you!     Thank you for choosing [facility]  for your care. Our goal is always to provide you with excellent care. Hearing back from our patients is one way we can continue to improve our services. Please take a few minutes to complete the written survey that you may receive in the mail after your visit with us. Thank you!             Your Updated Medication List - Protect others around you: Learn how to safely use, store and throw away your medicines at www.disposemymeds.org.          This list is accurate as of 9/26/18  5:03 PM.  Always use your most recent med list.                   Brand Name Dispense Instructions for use Diagnosis    amLODIPine 5 MG tablet    NORVASC    90 tablet    Take 1 tablet (5 mg) by mouth daily    Essential hypertension with goal blood pressure less than 140/90       atenolol 50 MG tablet    TENORMIN    90 tablet    Take 1 tablet (50 mg) by mouth daily    Essential hypertension with goal blood pressure less than 140/90       cefuroxime 250 MG tablet    CEFTIN    20 tablet    Take 1 tablet (250 mg) by mouth 2 times daily    Acute cystitis with hematuria       estradiol 0.1 MG/GM cream    ESTRACE    42.5 g    Place 2 g vaginally twice a week    Atrophy of vagina       tiZANidine 4 MG tablet    ZANAFLEX    30 tablet    Take 1 tablet (4 mg) by mouth 3 times daily as needed for muscle spasms    Right lumbosacral radiculopathy       traMADol 50 MG tablet    ULTRAM    12 tablet    Take 1-2 tablets ( mg) by mouth every 6 hours as needed for severe pain        traZODone 50 MG tablet    DESYREL    180 tablet    Take 2  tablets (100 mg) by mouth nightly as needed for sleep    Insomnia, unspecified type

## 2018-09-26 NOTE — NURSING NOTE
"Chief Complaint   Patient presents with     Pre-Op Exam       Initial /83  Pulse 76  Temp 97.5  F (36.4  C) (Oral)  Resp 17  Ht 5' 2\" (1.575 m)  Wt 229 lb (103.9 kg)  LMP 01/08/2008  SpO2 97%  BMI 41.88 kg/m2 Estimated body mass index is 41.88 kg/(m^2) as calculated from the following:    Height as of this encounter: 5' 2\" (1.575 m).    Weight as of this encounter: 229 lb (103.9 kg).  Medication Reconciliation: complete  Courtney Gunderson M.A.    "

## 2018-09-26 NOTE — PROGRESS NOTES
[facility]  [address] [facility] [id number] [phone]  Dept: [phone]    PRE-OP EVALUATION:  Today's date: 2018    Angela Ibarra (: 1960) presents for pre-operative evaluation assessment as requested by Dr. Jones.  She requires evaluation and anesthesia risk assessment prior to undergoing surgery/procedure for treatment of knee- left .    Fax number for surgical facility: [facility]  Primary Physician: Germán Jernigan  Type of Anesthesia Anticipated: General    Patient has a Health Care Directive or Living Will:  NO    Preop Questions 2018   Who is doing your surgery? dr parmjit jones   What are you having done? left knee   Date of Surgery/Procedure: oct 4   Facility or Hospital where procedure/surgery will be performed: [facility]   1.  Do you have a history of Heart attack, stroke, stent, coronary bypass surgery, or other heart surgery? No   2.  Do you ever have any pain or discomfort in your chest? No   3.  Do you have a history of  Heart Failure? No   4.   Are you troubled by shortness of breath when:  walking on a level surface, or up a slight hill, or at night? No   5.  Do you currently have a cold, bronchitis or other respiratory infection? No   6.  Do you have a cough, shortness of breath, or wheezing? No   7.  Do you sometimes get pains in the calves of your legs when you walk? No   8. Do you or anyone in your family have previous history of blood clots? No   9.  Do you or does anyone in your family have a serious bleeding problem such as prolonged bleeding following surgeries or cuts? No   10. Have you ever had problems with anemia or been told to take iron pills? No   11. Have you had any abnormal blood loss such as black, tarry or bloody stools, or abnormal vaginal bleeding? No   12. Have you ever had a blood transfusion? No   13. Have you or any of your relatives ever had problems with anesthesia? No   14. Do you  have sleep apnea, excessive snoring or daytime drowsiness? No   15. Do you have any prosthetic heart valves? No   16. Do you have prosthetic joints? No   17. Is there any chance that you may be pregnant? No         HPI:     HPI related to upcoming procedure: left knee pain      HYPERTENSION - Patient has longstanding history of HTN , currently denies any symptoms referable to elevated blood pressure. Specifically denies chest pain, palpitations, dyspnea, orthopnea, PND or peripheral edema. Blood pressure readings have been in normal range. Current medication regimen is as listed below. Patient denies any side effects of medication.                                                                                                                                                                                          .    MEDICAL HISTORY:     Patient Active Problem List    Diagnosis Date Noted     Morbid obesity (H) 08/28/2018     Priority: Medium     Arthropathy of facet joint 08/16/2018     Priority: Medium     Cataract of both eyes, unspecified cataract type 04/06/2018     Priority: Medium     Primary osteoarthritis of right hip 02/16/2018     Priority: Medium     Insomnia, unspecified type 09/08/2016     Priority: Medium     Essential hypertension with goal blood pressure less than 140/90 09/08/2016     Priority: Medium     Advanced directives, counseling/discussion 12/29/2015     Priority: Medium     Patient states has Advance Directive and will bring in a copy to clinic.  Susanne Gotti LPN         CARDIOVASCULAR SCREENING; LDL GOAL LESS THAN 130 08/04/2014     Priority: Medium     Chondromalacia, left ankle and joints of left foot 05/22/2014     Priority: Medium     Pain in joint involving ankle and foot 05/13/2014     Priority: Medium     Abnormal gait 05/13/2014     Priority: Medium     Other postprocedural status(V45.89) 05/13/2014     Priority: Medium     Malunion, fracture 03/21/2014     Priority: Medium      Loose body in ankle and foot joint 2014     Priority: Medium     Synovitis of ankle 2014     Priority: Medium     Osteoporosis 2013     Priority: Medium     Problem list name updated by automated process. Provider to review       Right carpal tunnel syndrome 2013     Priority: Medium     Right leg weakness 2011     Priority: Medium     Balance disorder 2011     Priority: Medium     Thought possibly to be due to low B12 by neuro       Stress incontinence 2010     Priority: Medium     S/p TOT procedure 4/20/10       Migraine headache 2008     Priority: Medium     Reduced with atenolol  (Problem list name updated by automated process. Provider to review and confirm.)       Obesity 2007     Priority: Medium     Gastric bypass  Problem list name updated by automated process. Provider to review       NONSPECIFIC COLITIS 2007     Priority: Medium     Hosp in 99  Recurrent ? Infectious colitis  No definate signs of inflamatory bowel disease        Past Medical History:   Diagnosis Date     Calculus of kidney      Migraine, unspecified, without mention of intractable migraine without mention of status migrainosus      Other specified iron deficiency anemias      Papanicolaou smear of cervix with low grade squamous intraepithelial lesion (LGSIL)     Colpo and hyst pathology benign     Primary osteoarthritis of right hip      Synovitis of ankle      Unspecified essential hypertension     Essential hypertension     Past Surgical History:   Procedure Laterality Date     ARTHROSCOPY ANKLE  2014    Procedure: ARTHROSCOPY ANKLE;  Surgeon: Shaun Bhatt DPM;  Location: [facility] OR     C  DELIVERY ONLY      , Low Cervical     C GASTRIC BYPASS,OBESITY,W/SM BOWEL RECONS  10/99     C LIGATE FALLOPIAN TUBE  2000    laparoscopy     COLONOSCOPY WITH CO2 INSUFFLATION N/A 2017    Procedure: COLONOSCOPY WITH CO2 INSUFFLATION;  COLON SCREEN/  YURI;  Surgeon: Cody Arana MD;  Location: [facility] OR     HYSTERECTOMY, PAP NO LONGER INDICATED  1-     LAPAROSCOPIC CHOLECYSTECTOMY  8/3/2012    Procedure: LAPAROSCOPIC CHOLECYSTECTOMY;  Laparoscopic Cholecystectomy ;  Surgeon: Corwin Cabezas MD;  Location: [facility] OR     OPEN REDUCTION INTERNAL FIXATION ANKLE  4/22/2014    Procedure: OPEN REDUCTION INTERNAL FIXATION ANKLE;  Surgeon: Shaun Bhatt DPM;  Location: [facility] OR     OPEN REDUCTION INTERNAL FIXATION ELBOW  10/15/2013    Procedure: OPEN REDUCTION INTERNAL FIXATION ELBOW;  OPEN REDUCTION INTERNAL FIXATION LEFT PROXIMAL ULNA;  Surgeon: Artem Last DO;  Location: [facility] OR     ORTHOPEDIC SURGERY      left shoulder surgery     REMOVE MESH VAGINA  10/10/2011    Procedure:REMOVE MESH VAGINA; Excision of Vaginal Mesh; Surgeon:SERGE SALGADO; Location:[facility] OR     SURGICAL HISTORY OF -   4/20/10    Transobturator midurethral sling     SURGICAL HISTORY OF -   8/1999    exploratory laparotomy, colitis     SURGICAL HISTORY OF -   4/20/10    Transobturator midurethral sling     TUBAL LIGATION       Current Outpatient Prescriptions   Medication Sig Dispense Refill     amLODIPine (NORVASC) 5 MG tablet Take 1 tablet (5 mg) by mouth daily 90 tablet 2     atenolol (TENORMIN) 50 MG tablet Take 1 tablet (50 mg) by mouth daily 90 tablet 3     cefuroxime (CEFTIN) 250 MG tablet Take 1 tablet (250 mg) by mouth 2 times daily 20 tablet 0     estradiol (ESTRACE) 0.1 MG/GM cream Place 2 g vaginally twice a week 42.5 g 2     tiZANidine (ZANAFLEX) 4 MG tablet Take 1 tablet (4 mg) by mouth 3 times daily as needed for muscle spasms 30 tablet 0     traMADol (ULTRAM) 50 MG tablet Take 1-2 tablets ( mg) by mouth every 6 hours as needed for severe pain 12 tablet 0     traZODone (DESYREL) 50 MG tablet Take 2 tablets (100 mg) by mouth nightly as needed for sleep 180 tablet 3     OTC products: None, except as noted above    Allergies   Allergen Reactions  "    Bactrim [Sulfamethoxazole W/Trimethoprim]      itching     Nitrofurantoin Itching      Latex Allergy: NO    Social History   Substance Use Topics     Smoking status: Never Smoker     Smokeless tobacco: Never Used     Alcohol use Yes      Comment: occas     History   Drug Use No       REVIEW OF SYSTEMS:   CONSTITUTIONAL: NEGATIVE for fever, chills, change in weight  INTEGUMENTARY/SKIN: NEGATIVE for worrisome rashes, moles or lesions  EYES: NEGATIVE for vision changes or irritation  ENT/MOUTH: NEGATIVE for ear, mouth and throat problems  RESP: NEGATIVE for significant cough or SOB  BREAST: NEGATIVE for masses, tenderness or discharge  CV: NEGATIVE for chest pain, palpitations or peripheral edema  GI: NEGATIVE for nausea, abdominal pain, heartburn, or change in bowel habits  : NEGATIVE for frequency, dysuria, or hematuria  MUSCULOSKELETAL: NEGATIVE for significant arthralgias or myalgia  NEURO: NEGATIVE for weakness, dizziness or paresthesias  ENDOCRINE: NEGATIVE for temperature intolerance, skin/hair changes  HEME: NEGATIVE for bleeding problems  PSYCHIATRIC: NEGATIVE for changes in mood or affect    EXAM:   /83  Pulse 76  Temp 97.5  F (36.4  C) (Oral)  Resp 17  Ht 5' 2\" (1.575 m)  Wt 229 lb (103.9 kg)  LMP 01/08/2008  SpO2 97%  BMI 41.88 kg/m2    GENERAL APPEARANCE: healthy, alert and no distress     EYES: EOMI, PERRL     HENT: ear canals and TM's normal and nose and mouth without ulcers or lesions     NECK: no adenopathy, no asymmetry, masses, or scars and thyroid normal to palpation     RESP: lungs clear to auscultation - no rales, rhonchi or wheezes     CV: regular rates and rhythm, normal S1 S2, no S3 or S4 and no murmur, click or rub     ABDOMEN:  soft, nontender, no HSM or masses and bowel sounds normal     MS: extremities normal- no gross deformities noted, no evidence of inflammation in joints, FROM in all extremities.     SKIN: no suspicious lesions or rashes     NEURO: Normal strength " and tone, sensory exam grossly normal, mentation intact and speech normal     PSYCH: mentation appears normal. and affect normal/bright     LYMPHATICS: No cervical adenopathy    DIAGNOSTICS:   EKG: appears normal, NSR    Recent Labs   Lab Test  01/18/18   0714  07/07/17   0922  03/31/17   0816  12/09/15   1452   01/19/11   0750   HGB   --    --   12.7  12.1   < >  14.0   PLT   --    --   315  344   < >  266   INR   --    --    --    --    --   0.91   NA  142  143  142   --    < >   --    POTASSIUM  4.4  4.5  4.2   --    < >   --    CR  0.90  0.93  0.96   --    < >   --    A1C   --   5.9   --    --    --    --     < > = values in this interval not displayed.        IMPRESSION:   Reason for surgery/procedure: .medial meniscal tear  Diagnosis/reason for consult: Anesthesia     The proposed surgical procedure is considered INTERMEDIATE risk.    REVISED CARDIAC RISK INDEX  The patient has the following serious cardiovascular risks for perioperative complications such as (MI, PE, VFib and 3  AV Block):  No serious cardiac risks  INTERPRETATION: 0 risks: Class I (very low risk - 0.4% complication rate)    The patient has the following additional risks for perioperative complications:  No identified additional risks      ICD-10-CM    1. Preop general physical exam Z01.818 EKG 12-lead complete w/read - Clinics   2. Meniscus, lateral, posterior horn derangement, left M23.352        RECOMMENDATIONS:       --Patient is to take all scheduled medications on the day of surgery EXCEPT for modifications listed below.    APPROVAL GIVEN to proceed with proposed procedure, without further diagnostic evaluation       Signed Electronically by: Hany Hinojosa MD    Copy of this evaluation report is provided to requesting physician.    [facility] Preop Guidelines    Revised Cardiac Risk Index

## 2018-09-27 RX ORDER — IBUPROFEN 200 MG
400 TABLET ORAL EVERY 6 HOURS PRN
COMMUNITY
End: 2024-05-17

## 2018-10-01 NOTE — TELEPHONE ENCOUNTER
Per [facility] website:  [id number] Arthroscopy, knee, surgical; with menisc more Covered/Approved 09/21/2018    Thank you,   Angella Crook   [facility] Department  [phone]

## 2018-10-03 ENCOUNTER — ANESTHESIA EVENT (OUTPATIENT)
Dept: SURGERY | Facility: AMBULATORY SURGERY CENTER | Age: 58
End: 2018-10-03

## 2018-10-04 ENCOUNTER — ANESTHESIA (OUTPATIENT)
Dept: SURGERY | Facility: AMBULATORY SURGERY CENTER | Age: 58
End: 2018-10-04
Payer: COMMERCIAL

## 2018-10-04 ENCOUNTER — SURGERY (OUTPATIENT)
Age: 58
End: 2018-10-04

## 2018-10-04 ENCOUNTER — HOSPITAL ENCOUNTER (OUTPATIENT)
Facility: AMBULATORY SURGERY CENTER | Age: 58
Discharge: HOME | End: 2018-10-04
Attending: ORTHOPAEDIC SURGERY | Admitting: ORTHOPAEDIC SURGERY
Payer: COMMERCIAL

## 2018-10-04 VITALS
OXYGEN SATURATION: 92 % | DIASTOLIC BLOOD PRESSURE: 68 MMHG | SYSTOLIC BLOOD PRESSURE: 130 MMHG | TEMPERATURE: 98.2 F | RESPIRATION RATE: 14 BRPM

## 2018-10-04 DIAGNOSIS — S83.232D COMPLEX TEAR OF MEDIAL MENISCUS OF LEFT KNEE, SUBSEQUENT ENCOUNTER: Primary | ICD-10-CM

## 2018-10-04 PROCEDURE — G8907 PT DOC NO EVENTS ON DISCHARG: HCPCS

## 2018-10-04 PROCEDURE — 29881 ARTHRS KNE SRG MNISECTMY M/L: CPT | Mod: LT

## 2018-10-04 PROCEDURE — G8916 PT W IV AB GIVEN ON TIME: HCPCS

## 2018-10-04 PROCEDURE — 29881 ARTHRS KNE SRG MNISECTMY M/L: CPT | Mod: LT | Performed by: ORTHOPAEDIC SURGERY

## 2018-10-04 RX ORDER — FENTANYL CITRATE 50 UG/ML
25-50 INJECTION, SOLUTION INTRAMUSCULAR; INTRAVENOUS
Status: DISCONTINUED | OUTPATIENT
Start: 2018-10-04 | End: 2018-10-05 | Stop reason: HOSPADM

## 2018-10-04 RX ORDER — GABAPENTIN 300 MG/1
300 CAPSULE ORAL ONCE
Status: COMPLETED | OUTPATIENT
Start: 2018-10-04 | End: 2018-10-04

## 2018-10-04 RX ORDER — ONDANSETRON 4 MG/1
4 TABLET, ORALLY DISINTEGRATING ORAL
Status: DISCONTINUED | OUTPATIENT
Start: 2018-10-04 | End: 2018-10-05 | Stop reason: HOSPADM

## 2018-10-04 RX ORDER — ONDANSETRON 2 MG/ML
4 INJECTION INTRAMUSCULAR; INTRAVENOUS EVERY 30 MIN PRN
Status: DISCONTINUED | OUTPATIENT
Start: 2018-10-04 | End: 2018-10-05 | Stop reason: HOSPADM

## 2018-10-04 RX ORDER — AMOXICILLIN 250 MG
1-2 CAPSULE ORAL 2 TIMES DAILY
Qty: 30 TABLET | Refills: 0 | Status: SHIPPED | OUTPATIENT
Start: 2018-10-04 | End: 2018-10-16

## 2018-10-04 RX ORDER — SODIUM CHLORIDE, SODIUM LACTATE, POTASSIUM CHLORIDE, CALCIUM CHLORIDE 600; 310; 30; 20 MG/100ML; MG/100ML; MG/100ML; MG/100ML
INJECTION, SOLUTION INTRAVENOUS CONTINUOUS
Status: DISCONTINUED | OUTPATIENT
Start: 2018-10-04 | End: 2018-10-05 | Stop reason: HOSPADM

## 2018-10-04 RX ORDER — PROPOFOL 10 MG/ML
INJECTION, EMULSION INTRAVENOUS PRN
Status: DISCONTINUED | OUTPATIENT
Start: 2018-10-04 | End: 2018-10-04

## 2018-10-04 RX ORDER — ONDANSETRON 4 MG/1
4 TABLET, ORALLY DISINTEGRATING ORAL EVERY 30 MIN PRN
Status: DISCONTINUED | OUTPATIENT
Start: 2018-10-04 | End: 2018-10-05 | Stop reason: HOSPADM

## 2018-10-04 RX ORDER — HYDROMORPHONE HYDROCHLORIDE 1 MG/ML
.3-.5 INJECTION, SOLUTION INTRAMUSCULAR; INTRAVENOUS; SUBCUTANEOUS EVERY 10 MIN PRN
Status: DISCONTINUED | OUTPATIENT
Start: 2018-10-04 | End: 2018-10-05 | Stop reason: HOSPADM

## 2018-10-04 RX ORDER — BUPIVACAINE HYDROCHLORIDE 2.5 MG/ML
INJECTION, SOLUTION INFILTRATION; PERINEURAL PRN
Status: DISCONTINUED | OUTPATIENT
Start: 2018-10-04 | End: 2018-10-04 | Stop reason: HOSPADM

## 2018-10-04 RX ORDER — MEPERIDINE HYDROCHLORIDE 25 MG/ML
12.5 INJECTION INTRAMUSCULAR; INTRAVENOUS; SUBCUTANEOUS
Status: DISCONTINUED | OUTPATIENT
Start: 2018-10-04 | End: 2018-10-05 | Stop reason: HOSPADM

## 2018-10-04 RX ORDER — HYDROCODONE BITARTRATE AND ACETAMINOPHEN 5; 325 MG/1; MG/1
2 TABLET ORAL
Status: DISCONTINUED | OUTPATIENT
Start: 2018-10-04 | End: 2018-10-05 | Stop reason: HOSPADM

## 2018-10-04 RX ORDER — LIDOCAINE 40 MG/G
CREAM TOPICAL
Status: DISCONTINUED | OUTPATIENT
Start: 2018-10-04 | End: 2018-10-05 | Stop reason: HOSPADM

## 2018-10-04 RX ORDER — DEXAMETHASONE SODIUM PHOSPHATE 4 MG/ML
INJECTION, SOLUTION INTRA-ARTICULAR; INTRALESIONAL; INTRAMUSCULAR; INTRAVENOUS; SOFT TISSUE PRN
Status: DISCONTINUED | OUTPATIENT
Start: 2018-10-04 | End: 2018-10-04

## 2018-10-04 RX ORDER — DEXAMETHASONE SODIUM PHOSPHATE 4 MG/ML
4 INJECTION, SOLUTION INTRA-ARTICULAR; INTRALESIONAL; INTRAMUSCULAR; INTRAVENOUS; SOFT TISSUE EVERY 10 MIN PRN
Status: DISCONTINUED | OUTPATIENT
Start: 2018-10-04 | End: 2018-10-05 | Stop reason: HOSPADM

## 2018-10-04 RX ORDER — KETOROLAC TROMETHAMINE 30 MG/ML
INJECTION, SOLUTION INTRAMUSCULAR; INTRAVENOUS PRN
Status: DISCONTINUED | OUTPATIENT
Start: 2018-10-04 | End: 2018-10-04

## 2018-10-04 RX ORDER — NALOXONE HYDROCHLORIDE 0.4 MG/ML
.1-.4 INJECTION, SOLUTION INTRAMUSCULAR; INTRAVENOUS; SUBCUTANEOUS
Status: DISCONTINUED | OUTPATIENT
Start: 2018-10-04 | End: 2018-10-05 | Stop reason: HOSPADM

## 2018-10-04 RX ORDER — ACETAMINOPHEN 325 MG/1
975 TABLET ORAL ONCE
Status: COMPLETED | OUTPATIENT
Start: 2018-10-04 | End: 2018-10-04

## 2018-10-04 RX ORDER — METHOCARBAMOL 750 MG/1
750 TABLET, FILM COATED ORAL
Status: DISCONTINUED | OUTPATIENT
Start: 2018-10-04 | End: 2018-10-05 | Stop reason: HOSPADM

## 2018-10-04 RX ORDER — HYDROCODONE BITARTRATE AND ACETAMINOPHEN 5; 325 MG/1; MG/1
1-2 TABLET ORAL EVERY 4 HOURS PRN
Qty: 20 TABLET | Refills: 0 | Status: SHIPPED | OUTPATIENT
Start: 2018-10-04 | End: 2018-10-16

## 2018-10-04 RX ORDER — CEFAZOLIN SODIUM 1 G/3ML
1 INJECTION, POWDER, FOR SOLUTION INTRAMUSCULAR; INTRAVENOUS SEE ADMIN INSTRUCTIONS
Status: DISCONTINUED | OUTPATIENT
Start: 2018-10-04 | End: 2018-10-05 | Stop reason: HOSPADM

## 2018-10-04 RX ORDER — KETOROLAC TROMETHAMINE 30 MG/ML
30 INJECTION, SOLUTION INTRAMUSCULAR; INTRAVENOUS EVERY 6 HOURS PRN
Status: DISCONTINUED | OUTPATIENT
Start: 2018-10-04 | End: 2018-10-05 | Stop reason: HOSPADM

## 2018-10-04 RX ORDER — LIDOCAINE HYDROCHLORIDE 20 MG/ML
INJECTION, SOLUTION INFILTRATION; PERINEURAL PRN
Status: DISCONTINUED | OUTPATIENT
Start: 2018-10-04 | End: 2018-10-04

## 2018-10-04 RX ORDER — OXYCODONE HYDROCHLORIDE 5 MG/1
5-10 TABLET ORAL EVERY 4 HOURS PRN
Status: DISCONTINUED | OUTPATIENT
Start: 2018-10-04 | End: 2018-10-05 | Stop reason: HOSPADM

## 2018-10-04 RX ORDER — ONDANSETRON 2 MG/ML
INJECTION INTRAMUSCULAR; INTRAVENOUS PRN
Status: DISCONTINUED | OUTPATIENT
Start: 2018-10-04 | End: 2018-10-04

## 2018-10-04 RX ORDER — FENTANYL CITRATE 50 UG/ML
INJECTION, SOLUTION INTRAMUSCULAR; INTRAVENOUS PRN
Status: DISCONTINUED | OUTPATIENT
Start: 2018-10-04 | End: 2018-10-04

## 2018-10-04 RX ORDER — ALBUTEROL SULFATE 0.83 MG/ML
2.5 SOLUTION RESPIRATORY (INHALATION) EVERY 4 HOURS PRN
Status: DISCONTINUED | OUTPATIENT
Start: 2018-10-04 | End: 2018-10-05 | Stop reason: HOSPADM

## 2018-10-04 RX ORDER — HYDROXYZINE HYDROCHLORIDE 25 MG/1
25 TABLET, FILM COATED ORAL
Status: DISCONTINUED | OUTPATIENT
Start: 2018-10-04 | End: 2018-10-05 | Stop reason: HOSPADM

## 2018-10-04 RX ORDER — CEFAZOLIN SODIUM 2 G/100ML
2 INJECTION, SOLUTION INTRAVENOUS
Status: COMPLETED | OUTPATIENT
Start: 2018-10-04 | End: 2018-10-04

## 2018-10-04 RX ADMIN — OXYCODONE HYDROCHLORIDE 5 MG: 5 TABLET ORAL at 09:05

## 2018-10-04 RX ADMIN — FENTANYL CITRATE 25 MCG: 50 INJECTION, SOLUTION INTRAMUSCULAR; INTRAVENOUS at 09:21

## 2018-10-04 RX ADMIN — PROPOFOL 140 MG: 10 INJECTION, EMULSION INTRAVENOUS at 08:09

## 2018-10-04 RX ADMIN — SODIUM CHLORIDE, SODIUM LACTATE, POTASSIUM CHLORIDE, CALCIUM CHLORIDE: 600; 310; 30; 20 INJECTION, SOLUTION INTRAVENOUS at 08:04

## 2018-10-04 RX ADMIN — CEFAZOLIN SODIUM 2 G: 2 INJECTION, SOLUTION INTRAVENOUS at 08:04

## 2018-10-04 RX ADMIN — ONDANSETRON 4 MG: 2 INJECTION INTRAMUSCULAR; INTRAVENOUS at 08:04

## 2018-10-04 RX ADMIN — PROPOFOL 60 MG: 10 INJECTION, EMULSION INTRAVENOUS at 08:23

## 2018-10-04 RX ADMIN — KETOROLAC TROMETHAMINE 30 MG: 30 INJECTION, SOLUTION INTRAMUSCULAR; INTRAVENOUS at 08:44

## 2018-10-04 RX ADMIN — ACETAMINOPHEN 975 MG: 325 TABLET ORAL at 06:58

## 2018-10-04 RX ADMIN — GABAPENTIN 300 MG: 300 CAPSULE ORAL at 06:59

## 2018-10-04 RX ADMIN — LIDOCAINE HYDROCHLORIDE 100 MG: 20 INJECTION, SOLUTION INFILTRATION; PERINEURAL at 08:09

## 2018-10-04 RX ADMIN — FENTANYL CITRATE 25 MCG: 50 INJECTION, SOLUTION INTRAMUSCULAR; INTRAVENOUS at 09:05

## 2018-10-04 RX ADMIN — FENTANYL CITRATE 100 MCG: 50 INJECTION, SOLUTION INTRAMUSCULAR; INTRAVENOUS at 08:04

## 2018-10-04 RX ADMIN — BUPIVACAINE HYDROCHLORIDE 30 ML: 2.5 INJECTION, SOLUTION INFILTRATION; PERINEURAL at 08:29

## 2018-10-04 RX ADMIN — DEXAMETHASONE SODIUM PHOSPHATE 4 MG: 4 INJECTION, SOLUTION INTRA-ARTICULAR; INTRALESIONAL; INTRAMUSCULAR; INTRAVENOUS; SOFT TISSUE at 08:04

## 2018-10-04 NOTE — ANESTHESIA POSTPROCEDURE EVALUATION
Patient: Angela Ibarra    Procedure(s):  Left knee arthroscopy  medial meniscal and chondral debridement - Wound Class: I-Clean    Diagnosis:Left knee medial meniscus tear and condromalacia  Diagnosis Additional Information: No value filed.    Anesthesia Type:  General    Note:  Anesthesia Post Evaluation    Patient location during evaluation: Phase 2  Patient participation: Able to fully participate in evaluation  Level of consciousness: awake and alert  Pain management: adequate  Airway patency: patent  Cardiovascular status: acceptable  Respiratory status: acceptable  Hydration status: acceptable  PONV: none     Anesthetic complications: None          Last vitals:  Vitals:    10/04/18 0915 10/04/18 0930 10/04/18 0945   BP: 143/77 132/80 130/68   Resp: 10 15 14   Temp:      SpO2: 97% 93% 92%         Electronically Signed By: Phillip Hall DO  October 4, 2018  10:31 AM

## 2018-10-04 NOTE — IP AVS SNAPSHOT
MRN:[id number]                      After Visit Summary   10/4/2018    Angela Ibarra    MRN: [id number]           Thank you!     Thank you for choosing [facility] for your care. Our goal is always to provide you with excellent care. Hearing back from our patients is one way we can continue to improve our services. Please take a few minutes to complete the written survey that you may receive in the mail after you visit with us. Thank you!        Patient Information     Date Of Birth          1960        About your hospital stay     You were admitted on:  October 4, 2018 You last received care in the:  [facility]    You were discharged on:  October 4, 2018       Who to Call     For medical emergencies, please call 911.  For non-urgent questions about your medical care, please call your primary care provider or clinic, [phone]  For questions related to your surgery, please call your surgery clinic        Attending Provider     Provider Aryan Morrissey MD Orthopedics       Primary Care Provider Office Phone # Fax #    Germán Jernigan [phone]      After Care Instructions      Diet as Tolerated       Return to diet before surgery, unless instructed otherwise.            Discharge Instructions       Review outpatient procedure discharge instructions with patient as directed by Provider            Dressing Change       Change dressing on third day after surgery.            Ice to affected area       Ice pack to surgical site every 15 minutes per hour for 24 hours            No Alcohol       For 24 hours post procedure or while taking pain medications.            No driving or operating machinery        until the day after procedure or until off all pain medications.            Notify Provider       For signs and symptoms of infection: Fever greater than 101.5, redness, swelling, heat at site, drainage, pus.            Return to clinic        2 weeks after date of surgery in Dr. Holley's clinic for suture removal and wound check. Please call for appointment, [phone]. Office locations include [facility], [facility] and [facility].            Shower        Cover dressing if dressing is not going to be changed today. Ok to remove dressing and shower on postoperative day #3. Leave steri strips in place. Dab dry. Cover with gauze. No soaking baths, hot tubs, whirlpools, swimming pools.            Weight bearing - As tolerated           Wound care       Do not immerse wound in water until sutures removed                  Your next 10 appointments already scheduled     Oct 22, 2018  8:15 AM CDT   Return Visit with Aryan Holley MD   [facility] [place] ([facility] [place])    [address]  [place] [id number] [phone]              Further instructions from your care team       1. Name: Angela Ibarra MRN #: [id number]  2. Date: 10/4/2018  Procedure: left knee arthroscopy medial meniscus debridement  3. Discharge to home when stable, tolerating clear liquids, and patient has urinated  4. Call for follow-up appointment, [phone], with Dr. Holley in:  2 weeks.   WOUND CARE    The bandage may be slightly bloody. This is normal.  5. Ice:  Keep an ice bag on your knee for 20 minutes at a time.  6. Keep incisions clean and dry following surgery for:  72 hours   7. Change all bandages in:  72 hours       8. If bandages are changed before follow-up, cover all incisions with fresh bandages or bandaids.  9. O.K. to shower (may get incision wet) in:  72 hours  10. No tub baths, swimming pools, hot tubs, etc. for a minimum of 2 weeks following surgery  ACTIVITY  11. Keep leg elevated on a pillow placed under ankle. Do not keep pillow under your knee.  12. Weight-bearing ([place]):  Weight-bear as tolerated       May discontinue crutches in 2-3 days if able to walk  without a limp.  13. Bracing: no brace needed unless per comfort  14. Range of motion limits: no limit. Work on regaining full range of motion.  15. Exercises:  Perform exercises 3 times a day for a minimum of 25 reps each time (start today or tomorrow):             Quadriceps sets  Calf Pumps Straight leg raises  Heels Slides   16. Start Physical Therapy: will discuss upon return to clinic.  17. OK to drive:  Not for 24 hours or until off pain medications.    When going back to driving, be sure to test braking/acceleration maneuvers in an empty parking lot before entry into any traffic areas.      ABSOLUTELY NO DRIVING WHILE TAKING NARCOTICS!    DISCHARGE MEDICATIONS:   Aspirin 325 mg, 1 tablet, take once a day for 14 days then stop (to prevent blood clots) (over the counter)  Norco (5/325), 1 to 2 tablets, take every 4 to 6 hours as needed for pain, do not exceed 12 tabs/day  Other: stool softeners  Ok to take over the counter ibuprofen or acetaminophen as needed.    Strong pain medication has been prescribed. Use as directed. Do not combine with alcohol. Be careful as you walk or climb stairs.   DIET:  If no nausea, clear liquids should be taken initially.  Then progress to solid foods when clear liquids are tolerated.   RESPONSE TO SURGERY: It is normal to have pain and swelling in your knee after surgery. It may take 4 weeks or longer for the swelling to go away. It is also common to notice some bruising around the knee, thigh, and calf as the swelling resolves.  EMERGENCY: Call or return for any fevers (temperature greater than 101.5   or sustained fevers greater than 100.5   that haven t resolved within 3 to 4 days following surgery) or chills, increasing pain, swelling, redness, calf pain, drainage (especially if yellow, green, or foul smelling), excessive bleeding), chest pain, shortness of breath:  Phone #: [phone]; If emergency, go to local ER or dial 911.    Aryan Holley M.D., M.S.  Dept. of  Orthopaedic Surgery  [facility]    10/4/2018        KNEE SURGERY - HOME EXERCISE PROGRAM    All exercises to be performed at least 3 times per day.     Quad Sets    Sit with leg extended    Tighten quad muscles in front of leg, trying to  push back of knee downward    Hold exercise for 10 seconds    Rest 10 seconds between reps    Perform 1 set of 20 reps, 3 times a day     Heel Slides     Lie on back with legs straight    Slide heel to buttocks     Return to start position    Repeat with other leg    Perform 1 rep every 4 seconds    Perform 3 sets of 20 reps, 3 times a day    Rest 1 minute between sets     Ankle Pumps     Lie on back with foot elevated on pillow    Move foot up and down, pumping ankle    Perform 3 sets of 20 reps, 3 times a day    Perform 1 rep every 4 seconds    Rest 1 minute between sets     Straight Leg Raise    Lie on back with uninvolved knee bent    Raise straight leg to thigh level of bend leg    Return to starting position    Perform 3 sets of 20 reps, 3 times a day    Perform 1 rep every 4 seconds    Rest 1 minute between sets          [facility]  Same-Day Surgery   Adult Discharge Orders & Instructions   For 24 hours after surgery  1. Get plenty of rest.  A responsible adult must stay with you for at least 24 hours after you leave the hospital.   2. Do not drive or use heavy equipment.  If you have weakness or tingling, don't drive or use heavy equipment until this feeling goes away.  3. Do not drink alcohol.  4. Avoid strenuous or risky activities.  Ask for help when climbing stairs.   5. You may feel lightheaded.  IF so, sit for a few minutes before standing.  Have someone help you get up.   6. If you have nausea (feel sick to your stomach): Drink only clear liquids such as apple juice, ginger ale, broth or 7-Up.  Rest may also help.  Be sure to drink enough fluids.  Move to a regular diet as you feel able.  7. You may have a slight fever. Call the  doctor if your fever is over 100 F (37.7 C) (taken under the tongue) or lasts longer than 24 hours.  8. You may have a dry mouth, a sore throat, muscle aches or trouble sleeping.  These should go away after 24 hours.  9. Do not make important or legal decisions.   Call your doctor for any of the followin.  Signs of infection (fever, growing tenderness at the surgery site, a large amount of drainage or bleeding, severe pain, foul-smelling drainage, redness, swelling).    2. It has been over 8 to 10 hours since surgery and you are still not able to urinate (pass water).    3.  Headache for over 24 hours.    4.  Numbness, tingling or weakness the day after surgery (if you had spinal anesthesia).  To contact Dr Holley call:  [phone]        Pending Results     No orders found from 10/2/2018 to 10/5/2018.            Admission Information     Date & Time Provider Department Dept. Phone    10/4/2018 Aryan Holley MD [facility] [phone]      Your Vitals Were     Blood Pressure Temperature Respirations Last Period Pulse Oximetry       136/80 98.2  F (36.8  C) (Temporal) 19 2008 96%       MyChart Information     [facility] gives you secure access to your electronic health record. If you see a primary care provider, you can also send messages to your care team and make appointments. If you have questions, please call your primary care clinic.  If you do not have a primary care provider, please call [phone] and they will assist you.        Care EveryWhere ID     This is your Care EveryWhere ID. This could be used by other organizations to access your [facility] medical records  [id number]        Equal Access to Services     ELVIN ARROYO : Hadii max Diaz, maryam jimenez, oleksandr richards. So [facility] [phone].    ATENCIÓN: Si habla español, tiene a lebron disposición servicios gratuitos de asistencia lingüística. Llame al  [phone].    We comply with applicable federal civil rights laws and [place] laws. We do not discriminate on the basis of race, color, national origin, age, disability, sex, sexual orientation, or gender identity.               Review of your medicines      START taking        Dose / Directions    aspirin 325 MG EC tablet   Used for:  Complex tear of medial meniscus of left knee, subsequent encounter        Dose:  325 mg   Take 1 tablet (325 mg) by mouth daily for 14 days   Quantity:  14 tablet   Refills:  0       HYDROcodone-acetaminophen 5-325 MG per tablet   Commonly known as:  NORCO   Used for:  Complex tear of medial meniscus of left knee, subsequent encounter        Dose:  1-2 tablet   Take 1-2 tablets by mouth every 4 hours as needed (Moderate to Severe Pain)   Quantity:  20 tablet   Refills:  0       senna-docusate 8.6-50 MG per tablet   Commonly known as:  SENOKOT-S;PERICOLACE   Used for:  Complex tear of medial meniscus of left knee, subsequent encounter        Dose:  1-2 tablet   Take 1-2 tablets by mouth 2 times daily Take while on oral narcotics to prevent or treat constipation.   Quantity:  30 tablet   Refills:  0         CONTINUE these medicines which have NOT CHANGED        Dose / Directions    amLODIPine 5 MG tablet   Commonly known as:  NORVASC   Used for:  Essential hypertension with goal blood pressure less than 140/90        Dose:  5 mg   Take 1 tablet (5 mg) by mouth daily   Quantity:  90 tablet   Refills:  2       atenolol 50 MG tablet   Commonly known as:  TENORMIN   Used for:  Essential hypertension with goal blood pressure less than 140/90        Dose:  50 mg   Take 1 tablet (50 mg) by mouth daily   Quantity:  90 tablet   Refills:  3       estradiol 0.1 MG/GM cream   Commonly known as:  ESTRACE   Used for:  Atrophy of vagina        Dose:  2 g   Place 2 g vaginally twice a week   Quantity:  42.5 g   Refills:  2       IBUPROFEN PO        Dose:  400 mg   Take 400 mg by mouth every 6  hours as needed for moderate pain   Refills:  0       traZODone 50 MG tablet   Commonly known as:  DESYREL   Used for:  Insomnia, unspecified type        Dose:  100 mg   Take 2 tablets (100 mg) by mouth nightly as needed for sleep   Quantity:  180 tablet   Refills:  3       TYLENOL EXTRA STRENGTH PO        Dose:  1-2 tablet   Take 1-2 tablets by mouth as needed   Refills:  0            Where to get your medicines      These medications were sent to [facility] - [place], [place] - [address], [address]  [address], [address], [facility] [address]     Phone:  [phone]     aspirin 325 MG EC tablet    senna-docusate 8.6-50 MG per tablet         Some of these will need a paper prescription and others can be bought over the counter. Ask your nurse if you have questions.     Bring a paper prescription for each of these medications     HYDROcodone-acetaminophen 5-325 MG per tablet                Protect others around you: Learn how to safely use, store and throw away your medicines at www.disposemymeds.org.        Information about OPIOIDS     PRESCRIPTION OPIOIDS: WHAT YOU NEED TO KNOW   We gave you an opioid (narcotic) pain medicine. It is important to manage your pain, but opioids are not always the best choice. You should first try all the other options your care team gave you. Take this medicine for as short a time (and as few doses) as possible.    Some activities can increase your pain, such as bandage changes or therapy sessions. It may help to take your pain medicine 30 to 60 minutes before these activities. Reduce your stress by getting enough sleep, working on hobbies you enjoy and practicing relaxation or meditation. Talk to your care team about ways to manage your pain beyond prescription opioids.    These medicines have risks:    DO NOT drive when on new or higher doses of pain medicine. These medicines can affect your alertness and reaction times, and you could be  arrested for driving under the influence (DUI). If you need to use opioids long-term, talk to your care team about driving.    DO NOT operate heavy machinery    DO NOT do any other dangerous activities while taking these medicines.    DO NOT drink any alcohol while taking these medicines.     If the opioid prescribed includes acetaminophen, DO NOT take with any other medicines that contain acetaminophen. Read all labels carefully. Look for the word  acetaminophen  or  Tylenol.  Ask your pharmacist if you have questions or are unsure.    You can get addicted to pain medicines, especially if you have a history of addiction (chemical, alcohol or substance dependence). Talk to your care team about ways to reduce this risk.    All opioids tend to cause constipation. Drink plenty of water and eat foods that have a lot of fiber, such as fruits, vegetables, prune juice, apple juice and high-fiber cereal. Take a laxative (Miralax, milk of magnesia, Colace, Senna) if you don t move your bowels at least every other day. Other side effects include upset stomach, sleepiness, dizziness, throwing up, tolerance (needing more of the medicine to have the same effect), physical dependence and slowed breathing.    Store your pills in a secure place, locked if possible. We will not replace any lost or stolen medicine. If you don t finish your medicine, please throw away (dispose) as directed by your pharmacist. The [place] Pollution [employer] has more information about safe disposal: https://www.pca.[facility].[place].us/living-green/managing-unwanted-medications             Medication List: This is a list of all your medications and when to take them. Check marks below indicate your daily home schedule. Keep this list as a reference.      Medications           Morning Afternoon Evening Bedtime As Needed    amLODIPine 5 MG tablet   Commonly known as:  NORVASC   Take 1 tablet (5 mg) by mouth daily                                aspirin 325  MG EC tablet   Take 1 tablet (325 mg) by mouth daily for 14 days                                atenolol 50 MG tablet   Commonly known as:  TENORMIN   Take 1 tablet (50 mg) by mouth daily                                estradiol 0.1 MG/GM cream   Commonly known as:  ESTRACE   Place 2 g vaginally twice a week                                HYDROcodone-acetaminophen 5-325 MG per tablet   Commonly known as:  NORCO   Take 1-2 tablets by mouth every 4 hours as needed (Moderate to Severe Pain)                                IBUPROFEN PO   Take 400 mg by mouth every 6 hours as needed for moderate pain                                senna-docusate 8.6-50 MG per tablet   Commonly known as:  SENOKOT-S;PERICOLACE   Take 1-2 tablets by mouth 2 times daily Take while on oral narcotics to prevent or treat constipation.                                traZODone 50 MG tablet   Commonly known as:  DESYREL   Take 2 tablets (100 mg) by mouth nightly as needed for sleep                                TYLENOL EXTRA STRENGTH PO   Take 1-2 tablets by mouth as needed

## 2018-10-04 NOTE — INTERVAL H&P NOTE
The History and Physical on patient's chart was personally reviewed today with the patient. there have been no interval changes in patient's history since H+P performed.    History:  Angela Ibarra is a 57 year old female seen for evaluation of a left knee injury that occurred on 8/21/2018, >1 month ago. The injury occurred at her work parking lot. She tripped in a pothole or bump, fell forward and landed on both her knees. Had immediate pain and swelling. She was seen at urgent care on 8/28/2018. Right knee pain improved, but left knee pain has continued. Moderate pain today, 4/10. Her pain is posterior and medial. Pain occurs with walking and other weightbearing activities. For treatment, she wears a knee sleeve. She has also tried icing and resting. Also reports some numbness and tingling from the knee down to the ankle. Reports no previous left knee injury or pain in the past.     Of note: has right hip/low back issues, severely altering gait.     X-RAY:  3 views left knee from 8/28/2018 : no obvious fractures or dislocations. Preserved joint spaces, slight medial narrowing.     MRI:  MRI left knee from 9/13/2018 was reviewed in clinic today.    Impression:  1. Medial meniscal radial tear involving the posterior horn/root  ligament junction with extension into the posterior root.  2. 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  3. Nonspecific subcutaneous edema in the anterior soft tissues, which  may be related to reported recent trauma.      Impression:  57 year old female with acute left knee pain, medial meniscus tear, patellofemoral chondromalacia , contusion.     Plan:   * discussed injury with patient, what appears to be a left knee medial meniscal tear on MRI, as well as some underlying arthritic changes, which is consistent with symptoms and physical examination findings.      * Discussed treatment options including nonoperative  treatment with continued rest, ice, elevation, activity modification, NSAIDS and Physical Therapy, bracing and potential injections versus surgical treatment with arthroscopy and meniscal repair versus debridement. Risks and benefits of each discussed in detail.  * in the setting of underlying chondrosis, predictability of arthroscopy is uncertain, unless mechanical symptoms present due to the meniscus tear.     * surgical risks discussed: bleeding, infection, pain, scar, damage to adjacent structures (nerve, vessels, cartilage), stiffness, post-traumatic arthritis, failure to relieve symptoms, recurrence of symptoms, blood clots (DVT), pulmonary emolism, risks of anesthesia and death. This surgery is not intended nor expected to alleviate arthritic pain symptoms, nor will it treat or correct underlying arthritic changes. Arthritis and symptoms related to arthritis could worsen with arthroscopy and meniscal / chondral debridement. Patient understands.     * understanding the risks of surgery, patient elected to proceed with arthroscopy  * plan: left knee arthroscopy with partial meniscal debridement versus repair, possible chondral debridement, outpatient surgery      Risks and perceived benefits of surgery again discussed with patient. Patient's questions addressed and answered. Written informed consent obtained and reviewed. Surgical site marked with indelible marker with patient's participation after confirming site with patient.      Aryan Holley M.D., M.S.  Dept. of Orthopaedic Surgery  [facility]

## 2018-10-04 NOTE — ANESTHESIA CARE TRANSFER NOTE
Patient: Angela Ibarra    Procedure(s):  Left knee arthroscopy  medial meniscal and chondral debridement - Wound Class: I-Clean    Diagnosis: Left knee medial meniscus tear and condromalacia  Diagnosis Additional Information: No value filed.    Anesthesia Type:   No value filed.     Note:  Airway :Room Air  Patient transferred to:PACU  Handoff Report: Identifed the Patient, Identified the Reponsible Provider, Reviewed the pertinent medical history, Discussed the surgical course, Reviewed Intra-OP anesthesia mangement and issues during anesthesia, Set expectations for post-procedure period and Allowed opportunity for questions and acknowledgement of understanding      Vitals: (Last set prior to Anesthesia Care Transfer)    CRNA VITALS  10/4/2018 0824 - 10/4/2018 0858      10/4/2018             Pulse: 76    SpO2: 92 %                Electronically Signed By: LORIE Andujar CRNA  October 4, 2018  8:58 AM

## 2018-10-04 NOTE — DISCHARGE INSTRUCTIONS
1. Name: Angela Ibarra MRN #: [id number]  2. Date: 10/4/2018  Procedure: left knee arthroscopy medial meniscus debridement  3. Discharge to home when stable, tolerating clear liquids, and patient has urinated  4. Call for follow-up appointment, [phone], with Dr. Holley in:  2 weeks.   WOUND CARE    The bandage may be slightly bloody. This is normal.  5. Ice:  Keep an ice bag on your knee for 20 minutes at a time.  6. Keep incisions clean and dry following surgery for:  72 hours   7. Change all bandages in:  72 hours       8. If bandages are changed before follow-up, cover all incisions with fresh bandages or bandaids.  9. O.K. to shower (may get incision wet) in:  72 hours  10. No tub baths, swimming pools, hot tubs, etc. for a minimum of 2 weeks following surgery  ACTIVITY  11. Keep leg elevated on a pillow placed under ankle. Do not keep pillow under your knee.  12. Weight-bearing (Kashia):  Weight-bear as tolerated       May discontinue crutches in 2-3 days if able to walk without a limp.  13. Bracing: no brace needed unless per comfort  14. Range of motion limits: no limit. Work on regaining full range of motion.  15. Exercises:  Perform exercises 3 times a day for a minimum of 25 reps each time (start today or tomorrow):             Quadriceps sets  Calf Pumps Straight leg raises  Heels Slides   16. Start Physical Therapy: will discuss upon return to clinic.  17. OK to drive:  Not for 24 hours or until off pain medications.    When going back to driving, be sure to test braking/acceleration maneuvers in an empty parking lot before entry into any traffic areas.      ABSOLUTELY NO DRIVING WHILE TAKING NARCOTICS!    DISCHARGE MEDICATIONS:   Aspirin 325 mg, 1 tablet, take once a day for 14 days then stop (to prevent blood clots) (over the counter)  Norco (5/325), 1 to 2 tablets, take every 4 to 6 hours as needed for pain, do not exceed 12 tabs/day  Other: stool softeners  Ok to take over the counter  ibuprofen or acetaminophen as needed.    Strong pain medication has been prescribed. Use as directed. Do not combine with alcohol. Be careful as you walk or climb stairs.   DIET:  If no nausea, clear liquids should be taken initially.  Then progress to solid foods when clear liquids are tolerated.   RESPONSE TO SURGERY: It is normal to have pain and swelling in your knee after surgery. It may take 4 weeks or longer for the swelling to go away. It is also common to notice some bruising around the knee, thigh, and calf as the swelling resolves.  EMERGENCY: Call or return for any fevers (temperature greater than 101.5   or sustained fevers greater than 100.5   that haven t resolved within 3 to 4 days following surgery) or chills, increasing pain, swelling, redness, calf pain, drainage (especially if yellow, green, or foul smelling), excessive bleeding), chest pain, shortness of breath:  Phone #: [phone]; If emergency, go to local ER or dial 911.    Aryan Holley M.D., M.S.  Dept. of Orthopaedic Surgery  [facility]    10/4/2018        KNEE SURGERY - HOME EXERCISE PROGRAM    All exercises to be performed at least 3 times per day.     Quad Sets    Sit with leg extended    Tighten quad muscles in front of leg, trying to  push back of knee downward    Hold exercise for 10 seconds    Rest 10 seconds between reps    Perform 1 set of 20 reps, 3 times a day     Heel Slides     Lie on back with legs straight    Slide heel to buttocks     Return to start position    Repeat with other leg    Perform 1 rep every 4 seconds    Perform 3 sets of 20 reps, 3 times a day    Rest 1 minute between sets     Ankle Pumps     Lie on back with foot elevated on pillow    Move foot up and down, pumping ankle    Perform 3 sets of 20 reps, 3 times a day    Perform 1 rep every 4 seconds    Rest 1 minute between sets     Straight Leg Raise    Lie on back with uninvolved knee bent    Raise straight leg to thigh level of bend  leg    Return to starting position    Perform 3 sets of 20 reps, 3 times a day    Perform 1 rep every 4 seconds    Rest 1 minute between sets          [facility]  Same-Day Surgery   Adult Discharge Orders & Instructions   For 24 hours after surgery  1. Get plenty of rest.  A responsible adult must stay with you for at least 24 hours after you leave the hospital.   2. Do not drive or use heavy equipment.  If you have weakness or tingling, don't drive or use heavy equipment until this feeling goes away.  3. Do not drink alcohol.  4. Avoid strenuous or risky activities.  Ask for help when climbing stairs.   5. You may feel lightheaded.  IF so, sit for a few minutes before standing.  Have someone help you get up.   6. If you have nausea (feel sick to your stomach): Drink only clear liquids such as apple juice, ginger ale, broth or 7-Up.  Rest may also help.  Be sure to drink enough fluids.  Move to a regular diet as you feel able.  7. You may have a slight fever. Call the doctor if your fever is over 100 F (37.7 C) (taken under the tongue) or lasts longer than 24 hours.  8. You may have a dry mouth, a sore throat, muscle aches or trouble sleeping.  These should go away after 24 hours.  9. Do not make important or legal decisions.   Call your doctor for any of the followin.  Signs of infection (fever, growing tenderness at the surgery site, a large amount of drainage or bleeding, severe pain, foul-smelling drainage, redness, swelling).    2. It has been over 8 to 10 hours since surgery and you are still not able to urinate (pass water).    3.  Headache for over 24 hours.    4.  Numbness, tingling or weakness the day after surgery (if you had spinal anesthesia).  To contact Dr Holley call:  [phone]

## 2018-10-04 NOTE — BRIEF OP NOTE
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    Tourniquet Time: 19 minutes @ 250mmHg    Findings/Conclusions: medial meniscus root tear. Grade 4 chondrosis of trochlea See Op Note for further detail.    Estimated Blood Loss: 2ml    Post Op Plan:  *Rest   *Ice   *Elevation   *Weight bearing as tolerated, crutches as needed   *oral pain medications  *Daily asa x2 weeks  *Home exercise program   *Return to clinic 2 weeks for wound check, suture removal, sooner if needed      Adin Betancur PA-C, CAQ (Ortho)  Supervising Physician: Aryan Holley M.D., M.S.  Dept. of Orthopaedic Surgery  [facility]

## 2018-10-04 NOTE — IP AVS SNAPSHOT
[facility]    [address] [place] [address]    Phone:  [phone]                                       After Visit Summary   10/4/2018    Angela Ibarra    MRN: [id number]           After Visit Summary Signature Page     I have received my discharge instructions, and my questions have been answered. I have discussed any challenges I see with this plan with the nurse or doctor.    ..........................................................................................................................................  Patient/Patient Representative Signature      ..........................................................................................................................................  Patient Representative Print Name and Relationship to Patient    ..................................................               ................................................  Date                                   Time    ..........................................................................................................................................  Reviewed by Signature/Title    ...................................................              ..............................................  Date                                               Time          22EPIC Rev 08/18

## 2018-10-04 NOTE — H&P (VIEW-ONLY)
[facility]  [address] [facility] [address] [phone]  Dept: [phone]    PRE-OP EVALUATION:  Today's date: 2018    Angela Ibarra (: 1960) presents for pre-operative evaluation assessment as requested by Dr. Jones.  She requires evaluation and anesthesia risk assessment prior to undergoing surgery/procedure for treatment of knee- left .    Fax number for surgical facility: [facility]  Primary Physician: Germán Jernigan  Type of Anesthesia Anticipated: General    Patient has a Health Care Directive or Living Will:  NO    Preop Questions 2018   Who is doing your surgery? dr parmjit jones   What are you having done? left knee   Date of Surgery/Procedure: oct 4   Facility or Hospital where procedure/surgery will be performed: [facility]   1.  Do you have a history of Heart attack, stroke, stent, coronary bypass surgery, or other heart surgery? No   2.  Do you ever have any pain or discomfort in your chest? No   3.  Do you have a history of  Heart Failure? No   4.   Are you troubled by shortness of breath when:  walking on a level surface, or up a slight hill, or at night? No   5.  Do you currently have a cold, bronchitis or other respiratory infection? No   6.  Do you have a cough, shortness of breath, or wheezing? No   7.  Do you sometimes get pains in the calves of your legs when you walk? No   8. Do you or anyone in your family have previous history of blood clots? No   9.  Do you or does anyone in your family have a serious bleeding problem such as prolonged bleeding following surgeries or cuts? No   10. Have you ever had problems with anemia or been told to take iron pills? No   11. Have you had any abnormal blood loss such as black, tarry or bloody stools, or abnormal vaginal bleeding? No   12. Have you ever had a blood transfusion? No   13. Have you or any of your relatives ever had problems with anesthesia? No   14. Do you  have sleep apnea, excessive snoring or daytime drowsiness? No   15. Do you have any prosthetic heart valves? No   16. Do you have prosthetic joints? No   17. Is there any chance that you may be pregnant? No         HPI:     HPI related to upcoming procedure: left knee pain      HYPERTENSION - Patient has longstanding history of HTN , currently denies any symptoms referable to elevated blood pressure. Specifically denies chest pain, palpitations, dyspnea, orthopnea, PND or peripheral edema. Blood pressure readings have been in normal range. Current medication regimen is as listed below. Patient denies any side effects of medication.                                                                                                                                                                                          .    MEDICAL HISTORY:     Patient Active Problem List    Diagnosis Date Noted     Morbid obesity (H) 08/28/2018     Priority: Medium     Arthropathy of facet joint 08/16/2018     Priority: Medium     Cataract of both eyes, unspecified cataract type 04/06/2018     Priority: Medium     Primary osteoarthritis of right hip 02/16/2018     Priority: Medium     Insomnia, unspecified type 09/08/2016     Priority: Medium     Essential hypertension with goal blood pressure less than 140/90 09/08/2016     Priority: Medium     Advanced directives, counseling/discussion 12/29/2015     Priority: Medium     Patient states has Advance Directive and will bring in a copy to clinic.  Susanne Gotti LPN         CARDIOVASCULAR SCREENING; LDL GOAL LESS THAN 130 08/04/2014     Priority: Medium     Chondromalacia, left ankle and joints of left foot 05/22/2014     Priority: Medium     Pain in joint involving ankle and foot 05/13/2014     Priority: Medium     Abnormal gait 05/13/2014     Priority: Medium     Other postprocedural status(V45.89) 05/13/2014     Priority: Medium     Malunion, fracture 03/21/2014     Priority: Medium      Loose body in ankle and foot joint 2014     Priority: Medium     Synovitis of ankle 2014     Priority: Medium     Osteoporosis 2013     Priority: Medium     Problem list name updated by automated process. Provider to review       Right carpal tunnel syndrome 2013     Priority: Medium     Right leg weakness 2011     Priority: Medium     Balance disorder 2011     Priority: Medium     Thought possibly to be due to low B12 by neuro       Stress incontinence 2010     Priority: Medium     S/p TOT procedure 4/20/10       Migraine headache 2008     Priority: Medium     Reduced with atenolol  (Problem list name updated by automated process. Provider to review and confirm.)       Obesity 2007     Priority: Medium     Gastric bypass  Problem list name updated by automated process. Provider to review       NONSPECIFIC COLITIS 2007     Priority: Medium     Hosp in 99  Recurrent ? Infectious colitis  No definate signs of inflamatory bowel disease        Past Medical History:   Diagnosis Date     Calculus of kidney      Migraine, unspecified, without mention of intractable migraine without mention of status migrainosus      Other specified iron deficiency anemias      Papanicolaou smear of cervix with low grade squamous intraepithelial lesion (LGSIL)     Colpo and hyst pathology benign     Primary osteoarthritis of right hip      Synovitis of ankle      Unspecified essential hypertension     Essential hypertension     Past Surgical History:   Procedure Laterality Date     ARTHROSCOPY ANKLE  2014    Procedure: ARTHROSCOPY ANKLE;  Surgeon: Shaun Bhatt DPM;  Location: [facility] OR     C  DELIVERY ONLY      , Low Cervical     C GASTRIC BYPASS,OBESITY,W/SM BOWEL RECONS  10/99     C LIGATE FALLOPIAN TUBE  2000    laparoscopy     COLONOSCOPY WITH CO2 INSUFFLATION N/A 2017    Procedure: COLONOSCOPY WITH CO2 INSUFFLATION;  COLON SCREEN/  YURI;  Surgeon: Cody Arana MD;  Location: [facility] OR     HYSTERECTOMY, PAP NO LONGER INDICATED  1-     LAPAROSCOPIC CHOLECYSTECTOMY  8/3/2012    Procedure: LAPAROSCOPIC CHOLECYSTECTOMY;  Laparoscopic Cholecystectomy ;  Surgeon: Corwin Cabezas MD;  Location: [facility] OR     OPEN REDUCTION INTERNAL FIXATION ANKLE  4/22/2014    Procedure: OPEN REDUCTION INTERNAL FIXATION ANKLE;  Surgeon: Shaun Bhatt DPM;  Location: [facility] OR     OPEN REDUCTION INTERNAL FIXATION ELBOW  10/15/2013    Procedure: OPEN REDUCTION INTERNAL FIXATION ELBOW;  OPEN REDUCTION INTERNAL FIXATION LEFT PROXIMAL ULNA;  Surgeon: Artem Last DO;  Location: [facility] OR     ORTHOPEDIC SURGERY      left shoulder surgery     REMOVE MESH VAGINA  10/10/2011    Procedure:REMOVE MESH VAGINA; Excision of Vaginal Mesh; Surgeon:SERGE SALGADO; Location:[facility] OR     SURGICAL HISTORY OF -   4/20/10    Transobturator midurethral sling     SURGICAL HISTORY OF -   8/1999    exploratory laparotomy, colitis     SURGICAL HISTORY OF -   4/20/10    Transobturator midurethral sling     TUBAL LIGATION       Current Outpatient Prescriptions   Medication Sig Dispense Refill     amLODIPine (NORVASC) 5 MG tablet Take 1 tablet (5 mg) by mouth daily 90 tablet 2     atenolol (TENORMIN) 50 MG tablet Take 1 tablet (50 mg) by mouth daily 90 tablet 3     cefuroxime (CEFTIN) 250 MG tablet Take 1 tablet (250 mg) by mouth 2 times daily 20 tablet 0     estradiol (ESTRACE) 0.1 MG/GM cream Place 2 g vaginally twice a week 42.5 g 2     tiZANidine (ZANAFLEX) 4 MG tablet Take 1 tablet (4 mg) by mouth 3 times daily as needed for muscle spasms 30 tablet 0     traMADol (ULTRAM) 50 MG tablet Take 1-2 tablets ( mg) by mouth every 6 hours as needed for severe pain 12 tablet 0     traZODone (DESYREL) 50 MG tablet Take 2 tablets (100 mg) by mouth nightly as needed for sleep 180 tablet 3     OTC products: None, except as noted above    Allergies   Allergen Reactions  "    Bactrim [Sulfamethoxazole W/Trimethoprim]      itching     Nitrofurantoin Itching      Latex Allergy: NO    Social History   Substance Use Topics     Smoking status: Never Smoker     Smokeless tobacco: Never Used     Alcohol use Yes      Comment: occas     History   Drug Use No       REVIEW OF SYSTEMS:   CONSTITUTIONAL: NEGATIVE for fever, chills, change in weight  INTEGUMENTARY/SKIN: NEGATIVE for worrisome rashes, moles or lesions  EYES: NEGATIVE for vision changes or irritation  ENT/MOUTH: NEGATIVE for ear, mouth and throat problems  RESP: NEGATIVE for significant cough or SOB  BREAST: NEGATIVE for masses, tenderness or discharge  CV: NEGATIVE for chest pain, palpitations or peripheral edema  GI: NEGATIVE for nausea, abdominal pain, heartburn, or change in bowel habits  : NEGATIVE for frequency, dysuria, or hematuria  MUSCULOSKELETAL: NEGATIVE for significant arthralgias or myalgia  NEURO: NEGATIVE for weakness, dizziness or paresthesias  ENDOCRINE: NEGATIVE for temperature intolerance, skin/hair changes  HEME: NEGATIVE for bleeding problems  PSYCHIATRIC: NEGATIVE for changes in mood or affect    EXAM:   /83  Pulse 76  Temp 97.5  F (36.4  C) (Oral)  Resp 17  Ht 5' 2\" (1.575 m)  Wt 229 lb (103.9 kg)  LMP 01/08/2008  SpO2 97%  BMI 41.88 kg/m2    GENERAL APPEARANCE: healthy, alert and no distress     EYES: EOMI, PERRL     HENT: ear canals and TM's normal and nose and mouth without ulcers or lesions     NECK: no adenopathy, no asymmetry, masses, or scars and thyroid normal to palpation     RESP: lungs clear to auscultation - no rales, rhonchi or wheezes     CV: regular rates and rhythm, normal S1 S2, no S3 or S4 and no murmur, click or rub     ABDOMEN:  soft, nontender, no HSM or masses and bowel sounds normal     MS: extremities normal- no gross deformities noted, no evidence of inflammation in joints, FROM in all extremities.     SKIN: no suspicious lesions or rashes     NEURO: Normal strength " and tone, sensory exam grossly normal, mentation intact and speech normal     PSYCH: mentation appears normal. and affect normal/bright     LYMPHATICS: No cervical adenopathy    DIAGNOSTICS:   EKG: appears normal, NSR    Recent Labs   Lab Test  01/18/18   0714  07/07/17   0922  03/31/17   0816  12/09/15   1452   01/19/11   0750   HGB   --    --   12.7  12.1   < >  14.0   PLT   --    --   315  344   < >  266   INR   --    --    --    --    --   0.91   NA  142  143  142   --    < >   --    POTASSIUM  4.4  4.5  4.2   --    < >   --    CR  0.90  0.93  0.96   --    < >   --    A1C   --   5.9   --    --    --    --     < > = values in this interval not displayed.        IMPRESSION:   Reason for surgery/procedure: .medial meniscal tear  Diagnosis/reason for consult: Anesthesia     The proposed surgical procedure is considered INTERMEDIATE risk.    REVISED CARDIAC RISK INDEX  The patient has the following serious cardiovascular risks for perioperative complications such as (MI, PE, VFib and 3  AV Block):  No serious cardiac risks  INTERPRETATION: 0 risks: Class I (very low risk - 0.4% complication rate)    The patient has the following additional risks for perioperative complications:  No identified additional risks      ICD-10-CM    1. Preop general physical exam Z01.818 EKG 12-lead complete w/read - Clinics   2. Meniscus, lateral, posterior horn derangement, left M23.352        RECOMMENDATIONS:       --Patient is to take all scheduled medications on the day of surgery EXCEPT for modifications listed below.    APPROVAL GIVEN to proceed with proposed procedure, without further diagnostic evaluation       Signed Electronically by: Hany Hinojosa MD    Copy of this evaluation report is provided to requesting physician.    [facility] Preop Guidelines    Revised Cardiac Risk Index

## 2018-10-04 NOTE — OP NOTE
Procedure Date: 10/04/2018      PREOPERATIVE DIAGNOSES:     1.  Left knee complex medial meniscus tear.   2.  Left knee Chondromalacia.      POSTOPERATIVE DIAGNOSES:     1.  Left knee complex medial meniscus tear.   2.  Left knee Chondromalacia.      PROCEDURES:   1.  Left knee arthroscopic partial medial meniscectomy.   2.  Left knee arthroscopic shaving chondroplasty of the medial femoral condyle and femoral trochlea.      ATTENDING SURGEON:  Aryan Holley MD      ASSISTANT:  Adin Betancur PA-C      INTRAVENOUS FLUID:  400  mL lactated Ringer's.      URINE OUTPUT:  Zero, no Birch.      ESTIMATED BLOOD LOSS:  2 mL.      ANTIBIOTICS:  Cefazolin 2 grams IV prior to incision and tourniquet inflation.      TOURNIQUET TIME:  19 minutes at 250 mmHg of the left upper thigh.      COMPLICATIONS:  None apparent.      IMPLANTS:  None.      SPECIMENS:  None.      DRAINS:  None.      FINDINGS:  Complex tear of the posterior horn and root of the medial meniscus. Medial meniscus with myxoid degeneration.  Grade 4 chondrosis of the femoral trochlea.  Grade 2 and 3 chondrosis of the medial femoral condyle, grade 1 and 2 chondrosis of the medial tibia.  Intact lateral meniscus, intact lateral articular surfaces.      INDICATIONS FOR PROCEDURE:  Angela Ibarra is a 57-year-old female with ongoing left knee pain following an injury on 08/21/2018.  She was at her work parking lot.  She tripped in a pothole or a bump, falling forward and landing onto the front of both of her knees.  Immediate pain and swelling.  She was seen in Urgent Care a week later.  Her right knee pain improved but continued left knee pain, mostly posterior and medial.  Pain occurs with walking and weightbearing activities.  She has tried wearing a knee sleeve, icing and resting.  She has numbness and tingling from the knee down to the ankle.  Reports no previous left knee injury in the past.  She has had some right hip and low back issues that also alter her  gait.  She was seen in [facility] Clinic and referred for an MRI showing a radial tear at the posterior horn/root ligament junction with chondromalacia of the patellofemoral joint and some subcutaneous edema.  She was referred for surgical evaluation.  We discussed her exam and imaging findings.  We discussed treatment options, nonsurgical and surgical with arthroscopy and meniscal debridement.  Risks and perceived benefits of both surgical and nonsurgical options were discussed in detail in our consultation on 09/20/2018.  Surgery is not intended nor expected to alleviate arthritic pain symptoms, nor will it treat or correct underlying arthritic changes.  Arthritis and symptoms related to arthritis could worsen with arthroscopy and meniscal and/or chondral debridement.  Despite these risks, as a quality of life decision, she elected to proceed.      DETAILS OF PROCEDURE:  The patient was identified in the preoperative holding area.  After confirming with the patient the correct procedure and procedural site, the left knee was marked with an indelible marker by myself, the attending surgeon.  After reviewing the risks and perceived benefits of surgery, written informed consent was obtained and reviewed.      The patient was then taken to the operating room and placed supine on the operating table.  All bony prominences were well padded.  She was adequately secured.  After adequate general anesthesia, a nonsterile tourniquet was placed to the left upper thigh.  Left lower extremity prepped and draped in the usual sterile fashion.      A timeout was then performed, confirming the correct patient, procedure, procedure site, availability of instruments and implants, review of the patient's allergies as well as the administration of prophylactic antibiotics by operating staff.        At that time, the left lower extremity was elevated, exsanguinated with an Esmarch and tourniquet inflated.  Standard anterolateral  arthroscopy portal was made.  Upon entering the suprapatellar pouch, there was no obvious effusion.  Mild synovitis.  Medial and lateral gutters were inspected.  There were no loose bodies.  Small medial plica.  Grade 1 chondrosis of the patella, with a grade 4 fissure of the femoral trochlea.  Upon entering the notch, the ACL was grossly intact.  Anteromedial arthroscopy portal was made, guided with a spinal needle.  Ligamentum mucosum was resected to get better visualization.  The knee was then placed in a figure-of-4 position.  Evaluating the lateral compartment revealed intact articular surface.  Probing superior and inferior surfaces of lateral meniscus revealed no obvious lateral meniscus tear.      Then back up to the suprapatellar pouch, the medial plica was resected.  I then proceeded with gentle valgus stress to enter the medial compartment of the knee.  I could see complex tearing of the posterior horn root junction.  There was myxoid degeneration.  Alternating between an oscillating debrider and meniscal biter, this was debrided back until it was stable, confirmed with probing.  Approximately 70% of the posterior horn root junction was debrided.  There appeared to be approximately 20% or 25% of the posterior horn root connection intact and it did not appear to be full thickness.  I then used the oscillating debrider and performed a shaving chondroplasty of the medial femoral condyle.  Back up to the femoral trochlea, and gently debrided this down as well.  Final examination of the knee was performed.  No loose bodies.  Remaining fluid was drained from the knee and instruments were removed.  Wounds were closed with 3-0 Prolene and injected with 0.25% Marcaine.  Steri-Strips, well-padded soft dressing and Ace wrap and tourniquet deflated.  Awakened and taken to recovery in stable condition.      Postoperatively, rest, ice, elevate.  Weightbear as tolerated.  Home exercise program.  Oral pain medications  will include hydrocodone.  Stool softeners.  Daily aspirin for 2 weeks for blood thinning.  Will discussed physical therapy at her postop visit in 2 weeks.        No apparent complications.         SAURAV SUNG MD             D: 10/04/2018   T: 10/04/2018   MT: AMINA      Name:     KATHIE HERNANDEZ   MRN:      [id number]        Account:        [id number]   :      1960           Procedure Date: 10/04/2018      Document: [id number]

## 2018-10-11 ENCOUNTER — RADIANT APPOINTMENT (OUTPATIENT)
Dept: ULTRASOUND IMAGING | Facility: CLINIC | Age: 58
End: 2018-10-11
Attending: ORTHOPAEDIC SURGERY
Payer: COMMERCIAL

## 2018-10-11 ENCOUNTER — TELEPHONE (OUTPATIENT)
Dept: ORTHOPEDICS | Facility: CLINIC | Age: 58
End: 2018-10-11

## 2018-10-11 DIAGNOSIS — Z98.890 S/P ARTHROSCOPIC SURGERY OF LEFT KNEE: Primary | ICD-10-CM

## 2018-10-11 DIAGNOSIS — Z98.890 S/P ARTHROSCOPIC SURGERY OF LEFT KNEE: ICD-10-CM

## 2018-10-11 PROCEDURE — 93971 EXTREMITY STUDY: CPT | Mod: LT

## 2018-10-11 NOTE — TELEPHONE ENCOUNTER
Reason for Call:  Form, our goal is to have forms completed with 72 hours, however, some forms may require a visit or additional information.    Type of letter, form or note:  short term disability    Who is the form from?: Insurance comp    Where did the form come from: form was faxed in    What clinic location was the form placed at?: [place]    Where the form was placed: Form was faxed to [place] [facility] on 10/04.    What number is listed as a contact on the form?: NA       Additional comments: Patient is calling regarding status of the forms for STD.    Call taken on 10/11/2018 at 10:35 AM by Daphne Jean

## 2018-10-11 NOTE — TELEPHONE ENCOUNTER
Reason for Call:  Other call back    Detailed comments: Patient calling because she had surgery last week on Oct 4th and is having some tightness in her left calf. Please call back    Phone Number Patient can be reached at: Home number on file [phone] (home)    Best Time: any    Can we leave a detailed message on this number? YES    Call taken on 10/11/2018 at 10:42 AM by Angela Robert

## 2018-10-11 NOTE — TELEPHONE ENCOUNTER
Called and spoke to patient letting her know we would put in an order for a DVT scan. I gave her the number to call and schedule. She also had questions on her form that was faxed in. I let her know [employer] filled it out and faxed it in but Estela was saying they did not receive the form. She will call them and have them fax another form for us to fill out. They will fax it to the [facility].   Taylor Sanchez Certified Medical Assistant

## 2018-10-12 NOTE — TELEPHONE ENCOUNTER
Kimberly from the disability company called. She said that she faxed paperwork to us and wants to know that we received it. We are to send to her the status and statement forms for Angela. We can call her back at [phone]  Amrita Ruiz RT (R)

## 2018-10-15 ENCOUNTER — OFFICE VISIT (OUTPATIENT)
Dept: FAMILY MEDICINE | Facility: CLINIC | Age: 58
End: 2018-10-15
Payer: COMMERCIAL

## 2018-10-15 ENCOUNTER — RADIANT APPOINTMENT (OUTPATIENT)
Dept: GENERAL RADIOLOGY | Facility: CLINIC | Age: 58
End: 2018-10-15
Attending: FAMILY MEDICINE
Payer: COMMERCIAL

## 2018-10-15 VITALS
OXYGEN SATURATION: 98 % | WEIGHT: 225 LBS | HEART RATE: 69 BPM | BODY MASS INDEX: 41.15 KG/M2 | RESPIRATION RATE: 18 BRPM | TEMPERATURE: 98.3 F | DIASTOLIC BLOOD PRESSURE: 83 MMHG | SYSTOLIC BLOOD PRESSURE: 139 MMHG

## 2018-10-15 DIAGNOSIS — M25.551 HIP PAIN, RIGHT: Primary | ICD-10-CM

## 2018-10-15 DIAGNOSIS — M25.551 HIP PAIN, RIGHT: ICD-10-CM

## 2018-10-15 PROCEDURE — 99213 OFFICE O/P EST LOW 20 MIN: CPT | Performed by: FAMILY MEDICINE

## 2018-10-15 PROCEDURE — 72170 X-RAY EXAM OF PELVIS: CPT | Mod: FY

## 2018-10-15 ASSESSMENT — PAIN SCALES - GENERAL: PAINLEVEL: EXTREME PAIN (8)

## 2018-10-15 NOTE — PROGRESS NOTES
SUBJECTIVE:  58 year old.The patient has a complaint of right upper leg pain.  This started 7 years ago. Location right  quality sharp.  Patient has had 1 epidural steroid injections without improvement.  Two steroid injections into the right hip with no relief..  Brought on by sitting and then going to stand up. .  Better with exercises some help. ROS no pooping or cracking.       Reviewed health maintenance  Patient Active Problem List   Diagnosis     Obesity     NONSPECIFIC COLITIS     Migraine headache     Stress incontinence     Balance disorder     Right leg weakness     Right carpal tunnel syndrome     Osteoporosis     Loose body in ankle and foot joint     Synovitis of ankle     Malunion, fracture     Pain in joint involving ankle and foot     Abnormal gait     Other postprocedural status(V45.89)     Chondromalacia, left ankle and joints of left foot     CARDIOVASCULAR SCREENING; LDL GOAL LESS THAN 130     Advanced directives, counseling/discussion     Insomnia, unspecified type     Essential hypertension with goal blood pressure less than 140/90     Primary osteoarthritis of right hip     Cataract of both eyes, unspecified cataract type     Arthropathy of facet joint     Morbid obesity (H)     Past Medical History:   Diagnosis Date     Calculus of kidney      Migraine, unspecified, without mention of intractable migraine without mention of status migrainosus      Other specified iron deficiency anemias      Papanicolaou smear of cervix with low grade squamous intraepithelial lesion (LGSIL) 11/07    Colpo and hyst pathology benign     Primary osteoarthritis of right hip      Synovitis of ankle      Unspecified essential hypertension 1999    Essential hypertension       OBJECTIVE:  no apparent distress  /83  Pulse 69  Temp 98.3  F (36.8  C) (Oral)  Resp 18  Wt 225 lb (102.1 kg)  LMP 01/08/2008  SpO2 98%  BMI 41.15 kg/m2  Markedly tender SI joint on the right.  Decreased range of motion hip and  tender with motion  slr 90 degrees.  X ray mild DJD    ICD-10-CM    1. Hip pain, right M25.551 XR Pelvis 1/2 Views    PLAN: ref ortho  Consider SI joint and hip DJD

## 2018-10-15 NOTE — MR AVS SNAPSHOT
After Visit Summary   10/15/2018    Angela Ibarra    MRN: [id number]           Patient Information     Date Of Birth          1960        Visit Information        Provider Department      10/15/2018 2:00 PM Hany Hinojosa MD [facility]        Today's Diagnoses     Hip pain, right    -  1       Follow-ups after your visit        Additional Services     ORTHOPEDICS ADULT REFERRAL       Your provider has referred you to: [facility]: [facility] [phone]   [email or web address]    Please be aware that coverage of these services is subject to the terms and limitations of your health insurance plan.  Call member services at your health plan with any benefit or coverage questions.      Please bring the following to your appointment:    >>   Any x-rays, CTs or MRIs which have been performed.  Contact the facility where they were done to arrange for  prior to your scheduled appointment.    >>   List of current medications   >>   This referral request   >>   Any documents/labs given to you for this referral                  Your next 10 appointments already scheduled     Oct 22, 2018  8:15 AM CDT   Return Visit with Aryan Holley MD   [facility] [place] ([facility] [place])    [address] [address]  [place] [address] [phone]              Who to contact     If you have questions or need follow up information about today's clinic visit or your schedule please contact [facility] directly at [phone].  Normal or non-critical lab and imaging results will be communicated to you by MyChart, letter or phone within 4 business days after the clinic has received the results. If you do not hear from us within 7 days, please contact the clinic through MyChart or phone. If you have a critical or abnormal lab result, we will notify you by phone as soon as  possible.  Submit refill requests through [employer] or call your pharmacy and they will forward the refill request to us. Please allow 3 business days for your refill to be completed.          Additional Information About Your Visit        [facility] Information     [employer] gives you secure access to your electronic health record. If you see a primary care provider, you can also send messages to your care team and make appointments. If you have questions, please call your primary care clinic.  If you do not have a primary care provider, please call [phone] and they will assist you.        Care EveryWhere ID     This is your Care EveryWhere ID. This could be used by other organizations to access your [facility] medical records  [id number]        Your Vitals Were     Pulse Temperature Respirations Last Period Pulse Oximetry BMI (Body Mass Index)    69 98.3  F (36.8  C) (Oral) 18 01/08/2008 98% 41.15 kg/m2       Blood Pressure from Last 3 Encounters:   10/15/18 139/83   10/04/18 130/68   09/26/18 149/83    Weight from Last 3 Encounters:   10/15/18 225 lb (102.1 kg)   09/26/18 229 lb (103.9 kg)   09/20/18 228 lb 9.6 oz (103.7 kg)              We Performed the Following     ORTHOPEDICS ADULT REFERRAL        Primary Care Provider Office Phone # Fax #    Germán [place]igan [phone] [address] [facility] [address]        Equal Access to Services     [facility] Center[facility]UARDO : Hadii aad ku hadasho Soomaali, waaxda luqadaha, qaybta kaalmada adeegyada, oleksandr rocha . So [facility] [phone].    ATENCIÓN: Si habla español, tiene a lebron disposición servicios gratuitos de asistencia lingüística. Llame al [phone].    We comply with applicable federal civil rights laws and [place] laws. We do not discriminate on the basis of race, color, national origin, age, disability, sex, sexual orientation, or gender identity.            Thank you!     Thank you for choosing [facility]  [place]  for your care. Our goal is always to provide you with excellent care. Hearing back from our patients is one way we can continue to improve our services. Please take a few minutes to complete the written survey that you may receive in the mail after your visit with us. Thank you!             Your Updated Medication List - Protect others around you: Learn how to safely use, store and throw away your medicines at www.disposemymeds.org.          This list is accurate as of 10/15/18  2:49 PM.  Always use your most recent med list.                   Brand Name Dispense Instructions for use Diagnosis    amLODIPine 5 MG tablet    NORVASC    90 tablet    Take 1 tablet (5 mg) by mouth daily    Essential hypertension with goal blood pressure less than 140/90       aspirin 325 MG EC tablet     14 tablet    Take 1 tablet (325 mg) by mouth daily for 14 days    Complex tear of medial meniscus of left knee, subsequent encounter       atenolol 50 MG tablet    TENORMIN    90 tablet    Take 1 tablet (50 mg) by mouth daily    Essential hypertension with goal blood pressure less than 140/90       estradiol 0.1 MG/GM cream    ESTRACE    42.5 g    Place 2 g vaginally twice a week    Atrophy of vagina       HYDROcodone-acetaminophen 5-325 MG per tablet    NORCO    20 tablet    Take 1-2 tablets by mouth every 4 hours as needed (Moderate to Severe Pain)    Complex tear of medial meniscus of left knee, subsequent encounter       IBUPROFEN PO      Take 400 mg by mouth every 6 hours as needed for moderate pain        senna-docusate 8.6-50 MG per tablet    SENOKOT-S;PERICOLACE    30 tablet    Take 1-2 tablets by mouth 2 times daily Take while on oral narcotics to prevent or treat constipation.    Complex tear of medial meniscus of left knee, subsequent encounter       traZODone 50 MG tablet    DESYREL    180 tablet    Take 2 tablets (100 mg) by mouth nightly as needed for sleep    Insomnia, unspecified type       TYLENOL EXTRA STRENGTH  PO      Take 1-2 tablets by mouth as needed

## 2018-10-15 NOTE — NURSING NOTE
"Chief Complaint   Patient presents with     Musculoskeletal Problem     right hip/upper leg pain x 1 year        Initial /83  Pulse 69  Temp 98.3  F (36.8  C) (Oral)  Resp 18  Wt 225 lb (102.1 kg)  LMP 01/08/2008  SpO2 98%  BMI 41.15 kg/m2 Estimated body mass index is 41.15 kg/(m^2) as calculated from the following:    Height as of 9/26/18: 5' 2\" (1.575 m).    Weight as of this encounter: 225 lb (102.1 kg).  Medication Reconciliation: complete  Courtney Gunderson M.A.    "

## 2018-10-15 NOTE — TELEPHONE ENCOUNTER
I spoke to a representative at [facility]. They did receive the initial paperwork on 10/1/18 that I faxed (it is in the media tab dated 10/4/18). The were wondering if the surgery was completed on the scheduled date and what the new Dx was, etc. I let them know that I received their paperwork and that her first post-op is on 10/22/18. I will ter she is seen at that time.     Adin Betancur PA-C, CAQ (Ortho)  Supervising Physician: Aryan Holley M.D., M.S.  Dept. of Orthopaedic Surgery  [facility]

## 2018-10-16 ENCOUNTER — OFFICE VISIT (OUTPATIENT)
Dept: ORTHOPEDICS | Facility: CLINIC | Age: 58
End: 2018-10-16
Payer: COMMERCIAL

## 2018-10-16 VITALS
HEART RATE: 74 BPM | SYSTOLIC BLOOD PRESSURE: 154 MMHG | WEIGHT: 225 LBS | OXYGEN SATURATION: 94 % | BODY MASS INDEX: 41.41 KG/M2 | DIASTOLIC BLOOD PRESSURE: 85 MMHG | HEIGHT: 62 IN

## 2018-10-16 DIAGNOSIS — M16.11 PRIMARY OSTEOARTHRITIS OF RIGHT HIP: Primary | ICD-10-CM

## 2018-10-16 PROCEDURE — 99244 OFF/OP CNSLTJ NEW/EST MOD 40: CPT | Performed by: ORTHOPAEDIC SURGERY

## 2018-10-16 NOTE — LETTER
10/16/2018         RE: Angela Ibarra  [address] [place] [address]        Dear Colleague,    Thank you for referring your patient, Angela Ibarra, to the [facility]. Please see a copy of my visit note below.    SUBJECTIVE:   Angela Ibarra is a 58 year old female who is seen in consultation at the request of Dr. Hany Hinojosa for evaluation of right hip pain that has been present for over 7 years. No known injury. Two previous ultrasound-guided intra-articular steroid injections in the hip and one previous ANSON didn't help her pain.     Present symptoms: groin area and inner thigh pain  Symptoms occur when: sitting for long periods of time, getting up from a seated position, and walking.     Treatments tried to this point: steroid injections    Orthopedic PMH: Left Knee arthroscopy for medial meniscectomy and chondral debridement on 10/04/18 by Dr. Holley. Severe osteoporosis of lumbar spine from 11/19/13 DEXA scan.    Review of Systems:  Constitutional:  NEGATIVE for fever, chills, change in weight  Integumentary/Skin:  NEGATIVE for worrisome rashes, moles or lesions  Eyes:  NEGATIVE for vision changes or irritation  ENT/Mouth:  NEGATIVE for ear, mouth and throat problems  Resp:  NEGATIVE for significant cough or SOB  Breast:  NEGATIVE for masses, tenderness or discharge  CV:  NEGATIVE for chest pain, palpitations or peripheral edema  GI:  NEGATIVE for nausea, abdominal pain, heartburn, or change in bowel habits  :  Negative   Musculoskeletal:  See HPI above  Neuro:  NEGATIVE for weakness, dizziness or paresthesias  Endocrine:  NEGATIVE for temperature intolerance, skin/hair changes  Heme/allergy/immune:  NEGATIVE for bleeding problems  Psychiatric:  NEGATIVE for changes in mood or affect    Past Medical History:   Past Medical History:   Diagnosis Date     Calculus of kidney      Migraine, unspecified, without mention of intractable migraine without mention of status migrainosus       Other specified iron deficiency anemias      Papanicolaou smear of cervix with low grade squamous intraepithelial lesion (LGSIL)     Colpo and hyst pathology benign     Primary osteoarthritis of right hip      Synovitis of ankle      Unspecified essential hypertension     Essential hypertension     Past Surgical History:   Past Surgical History:   Procedure Laterality Date     ARTHROSCOPY ANKLE  2014    Procedure: ARTHROSCOPY ANKLE;  Surgeon: Shaun Bhatt DPM;  Location: [facility] OR     ARTHROSCOPY KNEE Left 10/4/2018    Procedure: ARTHROSCOPY KNEE;  Left knee arthroscopy  medial meniscal and chondral debridement;  Surgeon: Aryan Holley MD;  Location: [facility] OR     C  DELIVERY ONLY      , Low Cervical     C GASTRIC BYPASS,OBESITY,W/SM BOWEL RECONS  10/99     C LIGATE FALLOPIAN TUBE  2000    laparoscopy     COLONOSCOPY WITH CO2 INSUFFLATION N/A 2017    Procedure: COLONOSCOPY WITH CO2 INSUFFLATION;  COLON SCREEN/ YURI;  Surgeon: Cody Arana MD;  Location: [facility] OR     HYSTERECTOMY, PAP NO LONGER INDICATED  2008     LAPAROSCOPIC CHOLECYSTECTOMY  8/3/2012    Procedure: LAPAROSCOPIC CHOLECYSTECTOMY;  Laparoscopic Cholecystectomy ;  Surgeon: Corwin Cabezas MD;  Location: [facility] OR     OPEN REDUCTION INTERNAL FIXATION ANKLE  2014    Procedure: OPEN REDUCTION INTERNAL FIXATION ANKLE;  Surgeon: Shaun Bhatt DPM;  Location: [facility] OR     OPEN REDUCTION INTERNAL FIXATION ELBOW  10/15/2013    Procedure: OPEN REDUCTION INTERNAL FIXATION ELBOW;  OPEN REDUCTION INTERNAL FIXATION LEFT PROXIMAL ULNA;  Surgeon: Artem Last DO;  Location: [facility] OR     ORTHOPEDIC SURGERY      left shoulder surgery     REMOVE MESH VAGINA  10/10/2011    Procedure:REMOVE MESH VAGINA; Excision of Vaginal Mesh; Surgeon:SERGE SALGADO; Location:[facility] OR     SURGICAL HISTORY OF -   4/20/10    Transobturator midurethral sling     SURGICAL HISTORY OF -   1999    exploratory  "laparotomy, colitis     SURGICAL HISTORY OF -   4/20/10    Transobturator midurethral sling     TUBAL LIGATION       Family History:   Family History   Problem Relation Age of Onset     C.A.D. Mother      MI     Hypertension Mother      Hypertension Father      Breast Cancer No family hx of      Social History:   Social History   Substance Use Topics     Smoking status: Never Smoker     Smokeless tobacco: Never Used     Alcohol use Yes      Comment: occas     OBJECTIVE:  Physical Exam:  /85 (BP Location: Left arm, Patient Position: Sitting, Cuff Size: Adult Large)  Pulse 74  Ht 1.575 m (5' 2\")  Wt 102.1 kg (225 lb)  LMP 01/08/2008  SpO2 94%  BMI 41.15 kg/m2  General Appearance: healthy, alert and no distress   Skin: no suspicious lesions or rashes  Neuro: Normal strength and tone, mentation intact and speech normal  Vascular: good pulses, and cappillary refill   Lymph: no lymphadenopathy   Psych:  mentation appears normal and affect normal/bright  Resp: no increased work of breathing     Right Hip Exam:  Gait: walks with very antalgic gait favoring the right side  Hip range of motion:    Internal Rotation: 10* degrees, pain is in the groin are   External Rotation: 20 degrees, pain is in the groin area   Abduction: painful    X-rays:  Obtained 10/15/18 of the PELVIS: 1/2 views, reviewed in the office with the patient by myself today and show:  No acute fracture or dislocation. Mild osteoarthritis in the hips bilaterally, right greater than left.  No evidence of AVN    MRI:  From 08/15/18 of the LUMBAR SPINE showed:  Mild degenerative changes detailed. No foraminal or spinal canal stenosis.     ASSESSMENT:   Mild OA, right hip    PLAN:   Because the patient's pain was not helped with intra-articular steroid injections or an epidural steroid injection, we decided to proceed with an MRI of the right hip. The patient will follow up for her MRI results. In the meantime, the patient should use a cane to assist " with ambulation. In addition, we emphasized that the patient should follow up with PCP regarding elevated blood pressure.     NIMISHA Tang MD  Dept. Orthopedic Surgery  [facility]     This document serves as a record of the services and decisions personally performed and made by Dr. NIMISHA Tang MD. It was created on his behalf by Tahir Guaman, a trained medical scribe. The creation of this record is based on the provider's personal observations and the statements of the patient. This document has been checked and approved by the attending provider.   Tahir Guaman October 16, 2018 10:19 AM     Again, thank you for allowing me to participate in the care of your patient.        Sincerely,        Mono Tang MD

## 2018-10-16 NOTE — PATIENT INSTRUCTIONS
Patient to follow up with Primary Care provider regarding elevated blood pressure.  April St King CRANE 10/16/2018 9:43 AM

## 2018-10-16 NOTE — PROGRESS NOTES
SUBJECTIVE:   Angela Ibarra is a 58 year old female who is seen in consultation at the request of Dr. Hany Hinojosa for evaluation of right hip pain that has been present for over 7 years. No known injury. Two previous ultrasound-guided intra-articular steroid injections in the hip and one previous ANSON didn't help her pain.     Present symptoms: groin area and inner thigh pain  Symptoms occur when: sitting for long periods of time, getting up from a seated position, and walking.     Treatments tried to this point: steroid injections    Orthopedic PMH: Left Knee arthroscopy for medial meniscectomy and chondral debridement on 10/04/18 by Dr. Holley. Severe osteoporosis of lumbar spine from 11/19/13 DEXA scan.    Review of Systems:  Constitutional:  NEGATIVE for fever, chills, change in weight  Integumentary/Skin:  NEGATIVE for worrisome rashes, moles or lesions  Eyes:  NEGATIVE for vision changes or irritation  ENT/Mouth:  NEGATIVE for ear, mouth and throat problems  Resp:  NEGATIVE for significant cough or SOB  Breast:  NEGATIVE for masses, tenderness or discharge  CV:  NEGATIVE for chest pain, palpitations or peripheral edema  GI:  NEGATIVE for nausea, abdominal pain, heartburn, or change in bowel habits  :  Negative   Musculoskeletal:  See HPI above  Neuro:  NEGATIVE for weakness, dizziness or paresthesias  Endocrine:  NEGATIVE for temperature intolerance, skin/hair changes  Heme/allergy/immune:  NEGATIVE for bleeding problems  Psychiatric:  NEGATIVE for changes in mood or affect    Past Medical History:   Past Medical History:   Diagnosis Date     Calculus of kidney      Migraine, unspecified, without mention of intractable migraine without mention of status migrainosus      Other specified iron deficiency anemias      Papanicolaou smear of cervix with low grade squamous intraepithelial lesion (LGSIL) 11/07    Colpo and hyst pathology benign     Primary osteoarthritis of right hip      Synovitis of ankle       Unspecified essential hypertension     Essential hypertension     Past Surgical History:   Past Surgical History:   Procedure Laterality Date     ARTHROSCOPY ANKLE  2014    Procedure: ARTHROSCOPY ANKLE;  Surgeon: Shaun Bhatt DPM;  Location: [facility] OR     ARTHROSCOPY KNEE Left 10/4/2018    Procedure: ARTHROSCOPY KNEE;  Left knee arthroscopy  medial meniscal and chondral debridement;  Surgeon: Aryan Holley MD;  Location: [facility] OR     C  DELIVERY ONLY      , Low Cervical     C GASTRIC BYPASS,OBESITY,W/SM BOWEL RECONS  10/99     C LIGATE FALLOPIAN TUBE  2000    laparoscopy     COLONOSCOPY WITH CO2 INSUFFLATION N/A 2017    Procedure: COLONOSCOPY WITH CO2 INSUFFLATION;  COLON SCREEN/ YURI;  Surgeon: Cody Arana MD;  Location: [facility] OR     HYSTERECTOMY, PAP NO LONGER INDICATED  2008     LAPAROSCOPIC CHOLECYSTECTOMY  8/3/2012    Procedure: LAPAROSCOPIC CHOLECYSTECTOMY;  Laparoscopic Cholecystectomy ;  Surgeon: Corwin Cabezas MD;  Location: [facility]     OPEN REDUCTION INTERNAL FIXATION ANKLE  2014    Procedure: OPEN REDUCTION INTERNAL FIXATION ANKLE;  Surgeon: Shaun Bhatt DPM;  Location: [facility]     OPEN REDUCTION INTERNAL FIXATION ELBOW  10/15/2013    Procedure: OPEN REDUCTION INTERNAL FIXATION ELBOW;  OPEN REDUCTION INTERNAL FIXATION LEFT PROXIMAL ULNA;  Surgeon: Artem Last DO;  Location: [facility]     ORTHOPEDIC SURGERY      left shoulder surgery     REMOVE MESH VAGINA  10/10/2011    Procedure:REMOVE MESH VAGINA; Excision of Vaginal Mesh; Surgeon:SERGE SALGADO; Location:[facility] OR     SURGICAL HISTORY OF -   4/20/10    Transobturator midurethral sling     SURGICAL HISTORY OF -   1999    exploratory laparotomy, colitis     SURGICAL HISTORY OF -   4/20/10    Transobturator midurethral sling     TUBAL LIGATION       Family History:   Family History   Problem Relation Age of Onset     C.A.D. Mother      MI     Hypertension Mother       "Hypertension Father      Breast Cancer No family hx of      Social History:   Social History   Substance Use Topics     Smoking status: Never Smoker     Smokeless tobacco: Never Used     Alcohol use Yes      Comment: occas     OBJECTIVE:  Physical Exam:  /85 (BP Location: Left arm, Patient Position: Sitting, Cuff Size: Adult Large)  Pulse 74  Ht 1.575 m (5' 2\")  Wt 102.1 kg (225 lb)  LMP 01/08/2008  SpO2 94%  BMI 41.15 kg/m2  General Appearance: healthy, alert and no distress   Skin: no suspicious lesions or rashes  Neuro: Normal strength and tone, mentation intact and speech normal  Vascular: good pulses, and cappillary refill   Lymph: no lymphadenopathy   Psych:  mentation appears normal and affect normal/bright  Resp: no increased work of breathing     Right Hip Exam:  Gait: walks with very antalgic gait favoring the right side  Hip range of motion:    Internal Rotation: 10* degrees, pain is in the groin are   External Rotation: 20 degrees, pain is in the groin area   Abduction: painful    X-rays:  Obtained 10/15/18 of the PELVIS: 1/2 views, reviewed in the office with the patient by myself today and show:  No acute fracture or dislocation. Mild osteoarthritis in the hips bilaterally, right greater than left.  No evidence of AVN    MRI:  From 08/15/18 of the LUMBAR SPINE showed:  Mild degenerative changes detailed. No foraminal or spinal canal stenosis.     ASSESSMENT:   Mild OA, right hip    PLAN:   Because the patient's pain was not helped with intra-articular steroid injections or an epidural steroid injection, we decided to proceed with an MRI of the right hip. The patient will follow up for her MRI results. In the meantime, the patient should use a cane to assist with ambulation. In addition, we emphasized that the patient should follow up with PCP regarding elevated blood pressure.     NIMISHA Tang MD  Dept. Orthopedic Surgery  [facility]     This document serves as a record " of the services and decisions personally performed and made by Dr. NIMISHA Tang MD. It was created on his behalf by Tahir Guaman, a trained medical scribe. The creation of this record is based on the provider's personal observations and the statements of the patient. This document has been checked and approved by the attending provider.   Tahir Guaman October 16, 2018 10:19 AM

## 2018-10-16 NOTE — MR AVS SNAPSHOT
After Visit Summary   10/16/2018    Angela Ibarra    MRN: [id number]           Patient Information     Date Of Birth          1960        Visit Information        Provider Department      10/16/2018 9:45 AM Mono Tang MD [facility] [place]        Today's Diagnoses     Primary osteoarthritis of right hip    -  1      Care Instructions    Patient to follow up with Primary Care provider regarding elevated blood pressure.  April St [facility]ik [facility] 10/16/2018 9:43 AM            Follow-ups after your visit        Your next 10 appointments already scheduled     Oct 19, 2018  9:30 AM CDT   MR HIP RIGHT W/O CONTRAST with BEMR1   [facility] [place] ([facility])    [address]  [place] [address] [phone]           How do I prepare for my exam? (Food and drink instructions) **If you will be receiving sedation or general anesthesia, please see special notes below.**  How do I prepare for my exam? (Other instructions) Take your medicines as usual, unless your doctor tells you not to. Please remove any body piercings and hair extensions before you arrive. Follow your doctor s orders. If you do not, we may have to postpone your exam. You may or may not receive IV contrast for this exam pending the discretion of the Radiologist.  You do not need to do anything special to prepare. **If you will be receiving sedation or general anesthesia, please see special notes below.**  What should I wear:  The MRI machine uses a strong magnet. Please wear clothes without metal (snaps, zippers). A sweatsuit works well, or we may give you a hospital gown.  How long does the exam take: Most tests take 30 to 60 minutes.  HOWEVER, IF YOUR DOCTOR PRESCRIBES ANESTHESIA please plan on spending four to five hours in the recovery room.  What should I bring: Bring a list of your current medicines to your exam (including vitamins, minerals and over-the-counter drugs). Also bring  the results of similar scans you may have had.  Do I need a : **If you will be receiving sedation or general anesthesia, please see special notes below.**  What should I do after the exam? No Restrictions, You may resume normal activities.  What is this test: MRI (magnetic resonance imaging) uses a strong magnet and radio waves to look inside the body. An MRA (magnetic resonance angiogram) does the same thing, but it lets us look at your blood vessels. A computer turns the radio waves into pictures showing cross sections of the body, much like slices of bread. This helps us see any problems more clearly.  Who should I call with questions: Please call the Imaging Department at your exam site with any questions. Directions, parking instructions, and other information is available on our website, [email or web address].  How do I prepare if I m having sedation or anesthesia? **IMPORTANT** THE INSTRUCTIONS BELOW ARE ONLY FOR THOSE PATIENTS WHO HAVE BEEN TOLD THEY WILL RECEIVE SEDATION OR GENERAL ANESTHESIA DURING THEIR MRI PROCEDURE:  IF YOU WILL RECEIVE SEDATION (take medicine to help you relax during your exam): You must get the medicine from your doctor before you arrive. Bring the medicine to the exam. Do not take it at home. Arrive one hour early. Bring someone who can take you home after the test. Your medicine will make you sleepy. After the exam, you may not drive, take a bus or take a taxi by yourself. No eating 8 hours before your exam. You may have clear liquids up until 4 hours before your exam. (Clear liquids include water, clear tea, black coffee and fruit juice without pulp.)  IF YOU WILL RECEIVE ANESTHESIA (be asleep for your exam): Arrive 1 1/2 hours early. Bring someone who can take you home after the test. You may not drive, take a bus or take a taxi by yourself. No eating 8 hours before your exam. You may have clear liquids up until 4 hours before your exam. (Clear liquids include water, clear  "tea, black coffee and fruit juice without pulp.) You will spend four to five hours in the recovery room.            Oct 22, 2018  8:15 AM CDT   Return Visit with Aryan Holley MD   [facility] [place] ([facility] [place])    [address]  [place] [id number] [phone]              Future tests that were ordered for you today     Open Future Orders        Priority Expected Expires Ordered    MR Hip Right w/o Contrast Routine 10/16/2018 10/16/2019 10/16/2018            Who to contact     If you have questions or need follow up information about today's clinic visit or your schedule please contact [facility] directly at [phone].  Normal or non-critical lab and imaging results will be communicated to you by MyChart, letter or phone within 4 business days after the clinic has received the results. If you do not hear from us within 7 days, please contact the clinic through [employer] or phone. If you have a critical or abnormal lab result, we will notify you by phone as soon as possible.  Submit refill requests through [employer] or call your pharmacy and they will forward the refill request to us. Please allow 3 business days for your refill to be completed.          Additional Information About Your Visit        [employer] Information     [employer] gives you secure access to your electronic health record. If you see a primary care provider, you can also send messages to your care team and make appointments. If you have questions, please call your primary care clinic.  If you do not have a primary care provider, please call [phone] and they will assist you.        Care EveryWhere ID     This is your Care EveryWhere ID. This could be used by other organizations to access your [facility] medical records  [id number]        Your Vitals Were     Pulse Height Last Period Pulse Oximetry BMI (Body Mass Index)       74 1.575 m (5' 2\") 01/08/2008 94% " 41.15 kg/m2        Blood Pressure from Last 3 Encounters:   10/16/18 154/85   10/15/18 139/83   10/04/18 130/68    Weight from Last 3 Encounters:   10/16/18 102.1 kg (225 lb)   10/15/18 102.1 kg (225 lb)   09/26/18 103.9 kg (229 lb)                 Today's Medication Changes          These changes are accurate as of 10/16/18 12:19 PM.  If you have any questions, ask your nurse or doctor.               Stop taking these medicines if you haven't already. Please contact your care team if you have questions.     HYDROcodone-acetaminophen 5-325 MG per tablet   Commonly known as:  NORCO   Stopped by:  Mono Tang MD           senna-docusate 8.6-50 MG per tablet   Commonly known as:  SENOKOT-S;PERICOLACE   Stopped by:  Mono Tang MD                    Primary Care Provider Office Phone # Fax #    Germán Jernigan [phone] [address] [address] [address]        Equal Access to Services     [facility]: Hadii aad ku hadasho Soomaali, waaxda luqadaha, qaybta kaalmada adeegyada, waxay gustavo haypolo rocha . So [facility] [phone].    ATENCIÓN: Si habla español, tiene a lebron disposición servicios gratuitos de asistencia lingüística. Llame al [phone].    We comply with applicable federal civil rights laws and [place] laws. We do not discriminate on the basis of race, color, national origin, age, disability, sex, sexual orientation, or gender identity.            Thank you!     Thank you for choosing [facility]  for your care. Our goal is always to provide you with excellent care. Hearing back from our patients is one way we can continue to improve our services. Please take a few minutes to complete the written survey that you may receive in the mail after your visit with us. Thank you!             Your Updated Medication List - Protect others around you: Learn how to safely use, store and throw away your medicines at www.disposemymeds.org.           This list is accurate as of 10/16/18 12:19 PM.  Always use your most recent med list.                   Brand Name Dispense Instructions for use Diagnosis    amLODIPine 5 MG tablet    NORVASC    90 tablet    Take 1 tablet (5 mg) by mouth daily    Essential hypertension with goal blood pressure less than 140/90       aspirin 325 MG EC tablet     14 tablet    Take 1 tablet (325 mg) by mouth daily for 14 days    Complex tear of medial meniscus of left knee, subsequent encounter       atenolol 50 MG tablet    TENORMIN    90 tablet    Take 1 tablet (50 mg) by mouth daily    Essential hypertension with goal blood pressure less than 140/90       estradiol 0.1 MG/GM cream    ESTRACE    42.5 g    Place 2 g vaginally twice a week    Atrophy of vagina       IBUPROFEN PO      Take 400 mg by mouth every 6 hours as needed for moderate pain        traZODone 50 MG tablet    DESYREL    180 tablet    Take 2 tablets (100 mg) by mouth nightly as needed for sleep    Insomnia, unspecified type       TYLENOL EXTRA STRENGTH PO      Take 1-2 tablets by mouth as needed

## 2018-10-17 ENCOUNTER — MYC MEDICAL ADVICE (OUTPATIENT)
Dept: FAMILY MEDICINE | Facility: CLINIC | Age: 58
End: 2018-10-17

## 2018-10-17 DIAGNOSIS — R10.2 PELVIC PAIN IN FEMALE: Primary | ICD-10-CM

## 2018-10-18 ENCOUNTER — OFFICE VISIT (OUTPATIENT)
Dept: UROLOGY | Facility: CLINIC | Age: 58
End: 2018-10-18
Payer: COMMERCIAL

## 2018-10-18 VITALS — OXYGEN SATURATION: 98 % | DIASTOLIC BLOOD PRESSURE: 80 MMHG | SYSTOLIC BLOOD PRESSURE: 138 MMHG | HEART RATE: 72 BPM

## 2018-10-18 DIAGNOSIS — N39.46 MIXED INCONTINENCE: Primary | ICD-10-CM

## 2018-10-18 PROCEDURE — 99215 OFFICE O/P EST HI 40 MIN: CPT | Mod: 25 | Performed by: UROLOGY

## 2018-10-18 PROCEDURE — 52000 CYSTOURETHROSCOPY: CPT | Performed by: UROLOGY

## 2018-10-18 PROCEDURE — 81003 URINALYSIS AUTO W/O SCOPE: CPT | Performed by: UROLOGY

## 2018-10-18 NOTE — PROGRESS NOTES
"S/p [place] done elsewhere 4/10 (Dr Jonas), excision after erosion by us on 10/11, Durasphere x 1 3/12    Last seen 8/17 for \"UTI\" (negative urine), chronic constipation, atrophic vaginitis    Today here for right hip/groin pain, progressive, now \"I can barely walk.\" Has had 2 injections into right hip and one into back without relief. Has been told she has arthritis in her hip. Pt feels that the pain may be due to the monarch mesh placed in 2010. Pt reiterates that she had some RLE pain immediately post-operatively which gradually improved, then became considerably worse beginning in \"April of 2017.\" Cannot describe any factors that may have affected it at that time.     States that she had no relief from the back injection but did have some relief from one of the two hip injections.    Pt walks with limp; left knee in brace due to recent miniscal tear.    To have hip MRI tomorrow. Has not done hip PT>     Denies dysuria, gross hematuria, frequency, incont, need for pad. BM's now satisfactory. Compliant with Estrace cream twice weekly.      Current Outpatient Prescriptions   Medication     Acetaminophen (TYLENOL EXTRA STRENGTH PO)     amLODIPine (NORVASC) 5 MG tablet     aspirin 325 MG EC tablet     atenolol (TENORMIN) 50 MG tablet     estradiol (ESTRACE) 0.1 MG/GM cream     IBUPROFEN PO     traZODone (DESYREL) 50 MG tablet     No current facility-administered medications for this visit.      Facility-Administered Medications Ordered in Other Visits   Medication     bupivacaine 0.5% EPINEPHrine 1:200,000 injection       Physical Exam:    GENL: NAD. Extremely tearful during today's discussion, improves during exam.   ABD: Obese, soft. Extreme point tenderness in right groin to even minimal palpation.      EG: Well-estrogenized, no masses.    VAGINA: Well-estrogenized, no masses.    BN HYPERMOBILITY: None.    CYSTOCELE: Grade 1.    APICAL PROLAPSE: None.    RECTOCELE: None.    BIMANUAL: No mass or " tenderness.      Cysto:    (Informed consent obtained. Pause for cause performed)   Sterile prep.    17 Fr scope inserted through urethra. Systematic examination w 70 degree lens.   PVR: 10 cc   MUCOSA: Normal without lesion; Durasphere visible at bladder neck bilaterally   ORIFICES: Normal location and morphology   Scope withdrawn without untoward effect.    (Pt tolerated procedure without difficulty).        Results for orders placed or performed in visit on 10/18/18   UA reflex to Microscopic   Result Value Ref Range    Color Urine Yellow     Appearance Urine Clear     Glucose Urine Negative NEG^Negative mg/dL    Bilirubin Urine Negative NEG^Negative    Ketones Urine Negative NEG^Negative mg/dL    Specific Gravity Urine 1.015 1.003 - 1.035    Blood Urine Negative NEG^Negative    pH Urine 6.0 5.0 - 7.0 pH    Protein Albumin Urine Negative NEG^Negative mg/dL    Urobilinogen Urine 0.2 0.2 - 1.0 EU/dL    Nitrite Urine Negative NEG^Negative    Leukocyte Esterase Urine Negative NEG^Negative    Source [facility]stream Urine        IMP:  1. Groin and hip pain as well as ambulation difficulties 8 years after TOT, some improvement with hip injection  2. Satisfactory bladder control  3. 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 Hx constipation, resolved      PLAN:  1. Discussed situation with patient in detail. Suspect pain is more likely due to hip due to history, PE and response to one of two hip injections. If MRI is benign I wonder if she might benefit from PT.     2. Discussed option of surgical excision of mesh. At this point I do not believe that it is likely to significantly improve her discomfort. Pt understands that I do not perform this type of surgery but could make referral. Numerous questions answered in detail.    3. RTC prn    4. 60 minutes spent with patient, more than 50% in counseling and coordination of care for pelvid pain, which did not include time spent for the procedure.

## 2018-10-18 NOTE — TELEPHONE ENCOUNTER
Received call from [facility] requesting status of forms that they faxed on 10/4 & 10/11. Requests forms be faxed to [phone]. Claim# [id number]

## 2018-10-18 NOTE — MR AVS SNAPSHOT
After Visit Summary   10/18/2018    Angela Ibarra    MRN: [id number]           Patient Information     Date Of Birth          1960        Visit Information        Provider Department      10/18/2018 9:00 AM Eddie Mack MD [facility]        Today's Diagnoses     Mixed incontinence    -  1       Follow-ups after your visit        Follow-up notes from your care team     Return if symptoms worsen or fail to improve.      Your next 10 appointments already scheduled     Oct 19, 2018  9:30 AM CDT   MR HIP RIGHT W/O CONTRAST with BEMR1   [facility] [place] ([facility])    [address]  [place] [address]   [phone]           How do I prepare for my exam? (Food and drink instructions) **If you will be receiving sedation or general anesthesia, please see special notes below.**  How do I prepare for my exam? (Other instructions) Take your medicines as usual, unless your doctor tells you not to. Please remove any body piercings and hair extensions before you arrive. Follow your doctor s orders. If you do not, we may have to postpone your exam. You may or may not receive IV contrast for this exam pending the discretion of the Radiologist.  You do not need to do anything special to prepare. **If you will be receiving sedation or general anesthesia, please see special notes below.**  What should I wear:  The MRI machine uses a strong magnet. Please wear clothes without metal (snaps, zippers). A sweatsuit works well, or we may give you a hospital gown.  How long does the exam take: Most tests take 30 to 60 minutes.  HOWEVER, IF YOUR DOCTOR PRESCRIBES ANESTHESIA please plan on spending four to five hours in the recovery room.  What should I bring: Bring a list of your current medicines to your exam (including vitamins, minerals and over-the-counter drugs). Also bring the results of similar scans you may have had.  Do I need a : **If you will be  receiving sedation or general anesthesia, please see special notes below.**  What should I do after the exam? No Restrictions, You may resume normal activities.  What is this test: MRI (magnetic resonance imaging) uses a strong magnet and radio waves to look inside the body. An MRA (magnetic resonance angiogram) does the same thing, but it lets us look at your blood vessels. A computer turns the radio waves into pictures showing cross sections of the body, much like slices of bread. This helps us see any problems more clearly.  Who should I call with questions: Please call the Imaging Department at your exam site with any questions. Directions, parking instructions, and other information is available on our website, [email or web address].  How do I prepare if I m having sedation or anesthesia? **IMPORTANT** THE INSTRUCTIONS BELOW ARE ONLY FOR THOSE PATIENTS WHO HAVE BEEN TOLD THEY WILL RECEIVE SEDATION OR GENERAL ANESTHESIA DURING THEIR MRI PROCEDURE:  IF YOU WILL RECEIVE SEDATION (take medicine to help you relax during your exam): You must get the medicine from your doctor before you arrive. Bring the medicine to the exam. Do not take it at home. Arrive one hour early. Bring someone who can take you home after the test. Your medicine will make you sleepy. After the exam, you may not drive, take a bus or take a taxi by yourself. No eating 8 hours before your exam. You may have clear liquids up until 4 hours before your exam. (Clear liquids include water, clear tea, black coffee and fruit juice without pulp.)  IF YOU WILL RECEIVE ANESTHESIA (be asleep for your exam): Arrive 1 1/2 hours early. Bring someone who can take you home after the test. You may not drive, take a bus or take a taxi by yourself. No eating 8 hours before your exam. You may have clear liquids up until 4 hours before your exam. (Clear liquids include water, clear tea, black coffee and fruit juice without pulp.) You will spend four to five hours in  the recovery room.            Oct 22, 2018  8:15 AM CDT   Return Visit with Aryan Holley MD   [facility] [place] ([facility] [place])    [address]  [place] [address] [phone]              Who to contact     If you have questions or need follow up information about today's clinic visit or your schedule please contact [facility] directly at [phone].  Normal or non-critical lab and imaging results will be communicated to you by [employer], letter or phone within 4 business days after the clinic has received the results. If you do not hear from us within 7 days, please contact the clinic through [employer] or phone. If you have a critical or abnormal lab result, we will notify you by phone as soon as possible.  Submit refill requests through [employer] or call your pharmacy and they will forward the refill request to us. Please allow 3 business days for your refill to be completed.          Additional Information About Your Visit        Atmail Information     [employer] gives you secure access to your electronic health record. If you see a primary care provider, you can also send messages to your care team and make appointments. If you have questions, please call your primary care clinic.  If you do not have a primary care provider, please call [phone] and they will assist you.        Care EveryWhere ID     This is your Care EveryWhere ID. This could be used by other organizations to access your [facility] records  [id number]        Your Vitals Were     Pulse Last Period Pulse Oximetry             72 01/08/2008 98%          Blood Pressure from Last 3 Encounters:   10/18/18 138/80   10/16/18 154/85   10/15/18 139/83    Weight from Last 3 Encounters:   10/16/18 102.1 kg (225 lb)   10/15/18 102.1 kg (225 lb)   09/26/18 103.9 kg (229 lb)              We Performed the Following     UA reflex to Microscopic        Primary Care  Provider Office Phone # Fax #    Germán Jernigan [phone] [id number] [id number]        Equal Access to Services     ENOCH ARROYO : Haddominick max moreno hadinoo Soraymondali, waaxda luqadaha, qaybta kaalmada justoda, oleksandr holman gregmario johnson laMeaganpolo sandoval. So [facility] [phone].    ATENCIÓN: Si habla español, tiene a lebron disposición servicios gratuitos de asistencia lingüística. Llame al [phone].    We comply with applicable federal civil rights laws and [place] laws. We do not discriminate on the basis of race, color, national origin, age, disability, sex, sexual orientation, or gender identity.            Thank you!     Thank you for choosing [facility]  for your care. Our goal is always to provide you with excellent care. Hearing back from our patients is one way we can continue to improve our services. Please take a few minutes to complete the written survey that you may receive in the mail after your visit with us. Thank you!             Your Updated Medication List - Protect others around you: Learn how to safely use, store and throw away your medicines at www.disposemymeds.org.          This list is accurate as of 10/18/18  9:51 AM.  Always use your most recent med list.                   Brand Name Dispense Instructions for use Diagnosis    amLODIPine 5 MG tablet    NORVASC    90 tablet    Take 1 tablet (5 mg) by mouth daily    Essential hypertension with goal blood pressure less than 140/90       aspirin 325 MG EC tablet     14 tablet    Take 1 tablet (325 mg) by mouth daily for 14 days    Complex tear of medial meniscus of left knee, subsequent encounter       atenolol 50 MG tablet    TENORMIN    90 tablet    Take 1 tablet (50 mg) by mouth daily    Essential hypertension with goal blood pressure less than 140/90       estradiol 0.1 MG/GM cream    ESTRACE    42.5 g    Place 2 g vaginally twice a week    Atrophy of vagina       IBUPROFEN PO      Take 400  mg by mouth every 6 hours as needed for moderate pain        traZODone 50 MG tablet    DESYREL    180 tablet    Take 2 tablets (100 mg) by mouth nightly as needed for sleep    Insomnia, unspecified type       TYLENOL EXTRA STRENGTH PO      Take 1-2 tablets by mouth as needed

## 2018-10-19 ENCOUNTER — RADIANT APPOINTMENT (OUTPATIENT)
Dept: MRI IMAGING | Facility: CLINIC | Age: 58
End: 2018-10-19
Attending: ORTHOPAEDIC SURGERY
Payer: COMMERCIAL

## 2018-10-19 DIAGNOSIS — M16.11 PRIMARY OSTEOARTHRITIS OF RIGHT HIP: ICD-10-CM

## 2018-10-19 PROCEDURE — 73721 MRI JNT OF LWR EXTRE W/O DYE: CPT | Mod: TC

## 2018-10-22 ENCOUNTER — OFFICE VISIT (OUTPATIENT)
Dept: ORTHOPEDICS | Facility: CLINIC | Age: 58
End: 2018-10-22
Payer: COMMERCIAL

## 2018-10-22 VITALS
SYSTOLIC BLOOD PRESSURE: 140 MMHG | WEIGHT: 225 LBS | DIASTOLIC BLOOD PRESSURE: 75 MMHG | BODY MASS INDEX: 41.15 KG/M2 | OXYGEN SATURATION: 98 % | HEART RATE: 80 BPM

## 2018-10-22 DIAGNOSIS — Z98.890 S/P LEFT KNEE ARTHROSCOPY: Primary | ICD-10-CM

## 2018-10-22 PROCEDURE — 99024 POSTOP FOLLOW-UP VISIT: CPT | Performed by: ORTHOPAEDIC SURGERY

## 2018-10-22 NOTE — PROGRESS NOTES
Chief Complaint   Patient presents with     Left Knee - Follow Up For     Post-op Visit     Pt had surgery done on 10/4 pt doing well zero pain. removing stitches today.       SURGERY: left knee arthroscopy . ( [facility] )  1. Left knee arthroscopic partial medial meniscectomy  2. Left knee arthroscopic shaving chondroplasty of the medial femoral condyle and femora trochlea    DATE OF SURGERY: 10/4/2018.      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 Surgery occurred 2.5 weeks ago. Returns today stating she is doing well. Pain has been mild. Some soreness and stretching feeling with prolonged knee flexion. No problems with the surgical wounds. Denies fevers chills or night sweats. No associated numbness or tingling. Has been taking it easy since surgery as recommended. Taking aspirin daily. She would like to return to work tomorrow. 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 Pain 0/10. She is pleased with her recovery and improvements since surgery.        Past Medical History:   Diagnosis Date     Calculus of kidney      Migraine, unspecified, without mention of intractable migraine without mention of status migrainosus      Other specified iron deficiency anemias      Papanicolaou smear of cervix with low grade squamous intraepithelial lesion (LGSIL) 11/07    Colpo and hyst pathology benign     Primary osteoarthritis of right hip      Synovitis of ankle      Unspecified essential hypertension 1999    Essential hypertension       Past Surgical History:   Procedure Laterality Date     ARTHROSCOPY ANKLE  4/22/2014    Procedure: ARTHROSCOPY ANKLE;  Surgeon: Shaun Bhatt DPM;  Location: [facility] OR     ARTHROSCOPY KNEE Left 10/4/2018    Procedure:  ARTHROSCOPY KNEE;  Left knee arthroscopy  medial meniscal and chondral debridement;  Surgeon: Aryan Holley MD;  Location: [facility] OR     C  DELIVERY ONLY      , Low Cervical     C GASTRIC BYPASS,OBESITY,W/SM BOWEL RECONS  10/99     C LIGATE FALLOPIAN TUBE  2000    laparoscopy     COLONOSCOPY WITH CO2 INSUFFLATION N/A 2017    Procedure: COLONOSCOPY WITH CO2 INSUFFLATION;  COLON SCREEN/ YURI;  Surgeon: Cody Arana MD;  Location: [facility] OR     HYSTERECTOMY, PAP NO LONGER INDICATED  2008     LAPAROSCOPIC CHOLECYSTECTOMY  8/3/2012    Procedure: LAPAROSCOPIC CHOLECYSTECTOMY;  Laparoscopic Cholecystectomy ;  Surgeon: Corwin Cabezas MD;  Location: [facility] OR     OPEN REDUCTION INTERNAL FIXATION ANKLE  2014    Procedure: OPEN REDUCTION INTERNAL FIXATION ANKLE;  Surgeon: Shaun Bhatt DPM;  Location: [facility] OR     OPEN REDUCTION INTERNAL FIXATION ELBOW  10/15/2013    Procedure: OPEN REDUCTION INTERNAL FIXATION ELBOW;  OPEN REDUCTION INTERNAL FIXATION LEFT PROXIMAL ULNA;  Surgeon: Artem Last DO;  Location: [facility] OR     ORTHOPEDIC SURGERY      left shoulder surgery     REMOVE MESH VAGINA  10/10/2011    Procedure:REMOVE MESH VAGINA; Excision of Vaginal Mesh; Surgeon:SERGE SALGADO; Location:[facility] OR     SURGICAL HISTORY OF -   4/20/10    Transobturator midurethral sling     SURGICAL HISTORY OF -   1999    exploratory laparotomy, colitis     SURGICAL HISTORY OF -   4/20/10    Transobturator midurethral sling     TUBAL LIGATION         Medications:   Current Outpatient Prescriptions:      Acetaminophen (TYLENOL EXTRA STRENGTH PO), Take 1-2 tablets by mouth as needed, Disp: , Rfl:      amLODIPine (NORVASC) 5 MG tablet, Take 1 tablet (5 mg) by mouth daily, Disp: 90 tablet, Rfl: 2     atenolol (TENORMIN) 50 MG tablet, Take 1 tablet (50 mg) by mouth daily, Disp: 90 tablet, Rfl: 3     estradiol (ESTRACE) 0.1 MG/GM cream, Place 2 g vaginally twice a week, Disp: 42.5 g,  Rfl: 2     IBUPROFEN PO, Take 400 mg by mouth every 6 hours as needed for moderate pain, Disp: , Rfl:      traZODone (DESYREL) 50 MG tablet, Take 2 tablets (100 mg) by mouth nightly as needed for sleep, Disp: 180 tablet, Rfl: 3  No current facility-administered medications for this visit.     Facility-Administered Medications Ordered in Other Visits:      bupivacaine 0.5% EPINEPHrine 1:200,000 injection, , , PRN, Fausto Estrada APRN CRNA, 30 mL at 10/15/13 1001    Allergies:   Allergies   Allergen Reactions     Bactrim [Sulfamethoxazole W/Trimethoprim]      itching     Nitrofurantoin Itching       REVIEW OF SYSTEMS:   CONSTITUTIONAL:NEGATIVE for fever, chills, night sweats  INTEGUMENTARY/SKIN: NEGATIVE for worrisome wound problems or redness.  MUSCULOSKELETAL:See HPI above  NEURO: NEGATIVE for weakness, dizziness or paresthesias    This document serves as a record of the services and decisions personally performed and made by Aryan Holley MD. It was created on his behalf by Stella Cortes, a trained medical scribe. The creation of this document is based the provider's statements to the medical scribe.    Scribe Stella Cortes 8:13 AM 10/22/2018     PHYSICAL EXAM:  /75  Pulse 80  Wt 225 lb (102.1 kg)  LMP 01/08/2008  SpO2 98%  BMI 41.15 kg/m2   GENERAL APPEARANCE: healthy, alert, no distress  SKIN: no suspicious lesions or rashes  NEURO: Normal strength and tone, mentation intact and speech normal  PSYCH:  mentation appears normal and affect normal, not anxious  RESPIRATORY: No increased work of breathing.    LEFT  LOWER EXTREMITY:  Gait: normal  No Quad atrophy, strength normal.  Intact sensation deep peroneal nerve, superficial peroneal nerve, med/lat tibial nerve, sural nerve, saphenous nerve  Intact EHL, EDL, TA, FHL, GS, quadriceps hamstrings and hip flexors  Toes warm and well perfused, brisk capillary refill. Palpable 2+ dp pulses.  calf soft and nttp or squeeze.  Edema: none    left  KNEE EXAM:     Skin: intact, no ecchymosis or erythema  Incisions: skin edges well approximated, sutures in place. Dry. No erythema.  ROM: 0 extension to 120 flexion  Effusion: minimal  Tender: NTTP med/lat joint line, anterior or posterior knee           X-RAY: none indicated.      Impression: Angela Ibarra is a 58 year old female 2 weeks status post left knee arthroscopy and partial medial meniscectomy and shaving chondroplasty, doing well.       Plan: routine postoperative knee arthroscopy  * suture removal  * reviewed arthroscopy photos with patient.    * WB status: Cautioned not to overdo it too quickly. Increased activities too soon will lead to more swelling of the knee and leg, more pain, slower recovery. Expect 6-8 weeks.    * Rest  * Activity modification - avoid activities that aggravate symptoms.  * NSAIDS - regular use for inflammation, with food, as long as no contra-indications. Please discuss with pcp if needed.  * Ice twice daily to three times daily as needed.  * Compression wrap as needed.  * Elevation of extremity to reduce swelling as needed.  * PT ordered for strengthening, stretching and range of motion exercises, effusion control.  * Tylenol as needed for pain  * OK to return to work 10/23/2018 with no restrictions  * return to clinic as needed.      * We did also discuss that based on the amount of arthritic changes seen at the time of surgery, may have continued knee pain due to the arthritis. Once recovered from the knee arthroscopy, and arthritic symptoms persist, consider full treatment of arthritis starting with Physical Therapy and injections, NSAIDS, activity modification, bracing.      Aryan Holley M.D., M.S.  Dept. of Orthopaedic Surgery  [facility]

## 2018-10-22 NOTE — LETTER
[facility] [place]  [address]  [place] [address] [phone]      WORKABILITY    [facility], Dr. Aryan Holley M.D., BRONSON Bernal Amanda Waetrs        10/22/2018      RE: Angela Ibarra    [address] [place] [place] [address]        To whom it may concern:     Angela Ibarra is under my professional care for   1. S/P left knee arthroscopy    .     Date of Surgery: 10/4/18.    Return to work date: 10/23/18   Ms. Ibarra can return to work with no restrictions    Next appointment: As needed        Adin Betancur PA-C, CAQ (Ortho)  Supervising Physician: Aryan Holley M.D., M.S.  Dept. of Orthopaedic Surgery  [facility]

## 2018-10-22 NOTE — LETTER
10/22/2018         RE: Angela Ibarra  [address] [place] [address]        Dear Colleague,    Thank you for referring your patient, Angela Ibarra, to the [facility]. Please see a copy of my visit note below.    Chief Complaint   Patient presents with     Left Knee - Follow Up For     Post-op Visit     Pt had surgery done on 10/4 pt doing well zero pain. removing stitches today.       SURGERY: left knee arthroscopy . ( [facility] )  1. Left knee arthroscopic partial medial meniscectomy  2. Left knee arthroscopic shaving chondroplasty of the medial femoral condyle and femora trochlea    DATE OF SURGERY: 10/4/2018.      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 Surgery occurred 2.5 weeks ago. Returns today stating she is doing well. Pain has been mild. Some soreness and stretching feeling with prolonged knee flexion. No problems with the surgical wounds. Denies fevers chills or night sweats. No associated numbness or tingling. Has been taking it easy since surgery as recommended. Taking aspirin daily. She would like to return to work tomorrow. 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 Pain 0/10. She is pleased with her recovery and improvements since surgery.        Past Medical History:   Diagnosis Date     Calculus of kidney      Migraine, unspecified, without mention of intractable migraine without mention of status migrainosus      Other specified iron deficiency anemias      Papanicolaou smear of cervix with low grade squamous intraepithelial lesion (LGSIL) 11/07    Colpo and hyst pathology benign     Primary osteoarthritis of right hip      Synovitis of ankle      Unspecified essential hypertension 1999     Essential hypertension       Past Surgical History:   Procedure Laterality Date     ARTHROSCOPY ANKLE  2014    Procedure: ARTHROSCOPY ANKLE;  Surgeon: Shaun Bhatt DPM;  Location: [facility] OR     ARTHROSCOPY KNEE Left 10/4/2018    Procedure: ARTHROSCOPY KNEE;  Left knee arthroscopy  medial meniscal and chondral debridement;  Surgeon: Aryan Holley MD;  Location: [facility] OR     C  DELIVERY ONLY      , Low Cervical     C GASTRIC BYPASS,OBESITY,W/SM BOWEL RECONS  10/99     C LIGATE FALLOPIAN TUBE  2000    laparoscopy     COLONOSCOPY WITH CO2 INSUFFLATION N/A 2017    Procedure: COLONOSCOPY WITH CO2 INSUFFLATION;  COLON SCREEN/ YURI;  Surgeon: Cody Arana MD;  Location: [facility] OR     HYSTERECTOMY, PAP NO LONGER INDICATED  2008     LAPAROSCOPIC CHOLECYSTECTOMY  8/3/2012    Procedure: LAPAROSCOPIC CHOLECYSTECTOMY;  Laparoscopic Cholecystectomy ;  Surgeon: Cowrin Cabezas MD;  Location: [facility] OR     OPEN REDUCTION INTERNAL FIXATION ANKLE  2014    Procedure: OPEN REDUCTION INTERNAL FIXATION ANKLE;  Surgeon: Shaun Bhatt DPM;  Location: [facility] OR     OPEN REDUCTION INTERNAL FIXATION ELBOW  10/15/2013    Procedure: OPEN REDUCTION INTERNAL FIXATION ELBOW;  OPEN REDUCTION INTERNAL FIXATION LEFT PROXIMAL ULNA;  Surgeon: Artem Last DO;  Location: [facility] OR     ORTHOPEDIC SURGERY      left shoulder surgery     REMOVE MESH VAGINA  10/10/2011    Procedure:REMOVE MESH VAGINA; Excision of Vaginal Mesh; Surgeon:SERGE SALGADO; Location:[facility] OR     SURGICAL HISTORY OF -   4/20/10    Transobturator midurethral sling     SURGICAL HISTORY OF -   1999    exploratory laparotomy, colitis     SURGICAL HISTORY OF -   4/20/10    Transobturator midurethral sling     TUBAL LIGATION         Medications:   Current Outpatient Prescriptions:      Acetaminophen (TYLENOL EXTRA STRENGTH PO), Take 1-2 tablets by mouth as needed, Disp: , Rfl:      amLODIPine (NORVASC) 5 MG tablet,  Take 1 tablet (5 mg) by mouth daily, Disp: 90 tablet, Rfl: 2     atenolol (TENORMIN) 50 MG tablet, Take 1 tablet (50 mg) by mouth daily, Disp: 90 tablet, Rfl: 3     estradiol (ESTRACE) 0.1 MG/GM cream, Place 2 g vaginally twice a week, Disp: 42.5 g, Rfl: 2     IBUPROFEN PO, Take 400 mg by mouth every 6 hours as needed for moderate pain, Disp: , Rfl:      traZODone (DESYREL) 50 MG tablet, Take 2 tablets (100 mg) by mouth nightly as needed for sleep, Disp: 180 tablet, Rfl: 3  No current facility-administered medications for this visit.     Facility-Administered Medications Ordered in Other Visits:      bupivacaine 0.5% EPINEPHrine 1:200,000 injection, , , PRN, Fausto Estrada APRN CRNA, 30 mL at 10/15/13 1001    Allergies:   Allergies   Allergen Reactions     Bactrim [Sulfamethoxazole W/Trimethoprim]      itching     Nitrofurantoin Itching       REVIEW OF SYSTEMS:   CONSTITUTIONAL:NEGATIVE for fever, chills, night sweats  INTEGUMENTARY/SKIN: NEGATIVE for worrisome wound problems or redness.  MUSCULOSKELETAL:See HPI above  NEURO: NEGATIVE for weakness, dizziness or paresthesias    This document serves as a record of the services and decisions personally performed and made by Aryan Holley MD. It was created on his behalf by Stella Cortes, a trained medical scribe. The creation of this document is based the provider's statements to the medical scribe.    Scribe Stella Cortes 8:13 AM 10/22/2018     PHYSICAL EXAM:  /75  Pulse 80  Wt 225 lb (102.1 kg)  LMP 01/08/2008  SpO2 98%  BMI 41.15 kg/m2   GENERAL APPEARANCE: healthy, alert, no distress  SKIN: no suspicious lesions or rashes  NEURO: Normal strength and tone, mentation intact and speech normal  PSYCH:  mentation appears normal and affect normal, not anxious  RESPIRATORY: No increased work of breathing.    LEFT  LOWER EXTREMITY:  Gait: normal  No Quad atrophy, strength normal.  Intact sensation deep peroneal nerve, superficial peroneal nerve, med/lat tibial  nerve, sural nerve, saphenous nerve  Intact EHL, EDL, TA, FHL, GS, quadriceps hamstrings and hip flexors  Toes warm and well perfused, brisk capillary refill. Palpable 2+ dp pulses.  calf soft and nttp or squeeze.  Edema: none    left  KNEE EXAM:    Skin: intact, no ecchymosis or erythema  Incisions: skin edges well approximated, sutures in place. Dry. No erythema.  ROM: 0 extension to 120 flexion  Effusion: minimal  Tender: NTTP med/lat joint line, anterior or posterior knee           X-RAY: none indicated.      Impression: Angela Ibarra is a 58 year old female 2 weeks status post left knee arthroscopy and partial medial meniscectomy and shaving chondroplasty, doing well.       Plan: routine postoperative knee arthroscopy  * suture removal  * reviewed arthroscopy photos with patient.    * WB status: Cautioned not to overdo it too quickly. Increased activities too soon will lead to more swelling of the knee and leg, more pain, slower recovery. Expect 6-8 weeks.    * Rest  * Activity modification - avoid activities that aggravate symptoms.  * NSAIDS - regular use for inflammation, with food, as long as no contra-indications. Please discuss with pcp if needed.  * Ice twice daily to three times daily as needed.  * Compression wrap as needed.  * Elevation of extremity to reduce swelling as needed.  * PT ordered for strengthening, stretching and range of motion exercises, effusion control.  * Tylenol as needed for pain  * OK to return to work 10/23/2018 with no restrictions  * return to clinic as needed.      * We did also discuss that based on the amount of arthritic changes seen at the time of surgery, may have continued knee pain due to the arthritis. Once recovered from the knee arthroscopy, and arthritic symptoms persist, consider full treatment of arthritis starting with Physical Therapy and injections, NSAIDS, activity modification, bracing.      Aryan Hollye M.D., M.S.  Dept. of Orthopaedic  Surgery  [facility]      Again, thank you for allowing me to participate in the care of your patient.        Sincerely,        Aryan Holley MD

## 2018-10-22 NOTE — PROGRESS NOTES
Patient returns for her right hip MRI results, which showed:   No femoral head osteonecrosis. There is a 1.5 cm  diameter area of prominent grade II chondromalacia within the anterior  aspect of the right femoral head apex with moderate underlying marrow  edema. No actual bone collapse is seen. The remaining cartilage  surfaces are well preserved.  I am not certain that we can rule out osteonecrosis, but the bone marrow edema pattern shows no cresenteric pattern        Impression:  Bone marrow edema vs possible osteonecrosis without collapse.    She had 1-2 month response to her last corticosteroid injection, this would suggest that the problem is from her hip joint, and not from an external issue, such as lumbar mediated or from an obturator nerve injury (she was wondering because of a vaginal mesh she had placed in 2011)    We talked about possible decompression procedure, +/- injection of bone marrow aspirate/PRP vs simple limited weight bearing x 6 weeks.    She has had this pain since 2011, so any treatment may be sub optimal   Thus, our plan is partial weight bearing x 6 weeks with a walker.  She wanted to try another corticosteroid injection.  This was ordered.    I haven't ruled out total hip arthroplasty as an option.      Total time spent was 25 minutes; with 25 minutes spent face-to-face with patient discussing test results, treatment options, and estimated recovery time.    NIMISHA Tang MD  Dept. Orthopedic Surgery  [facility]

## 2018-10-22 NOTE — MR AVS SNAPSHOT
After Visit Summary   10/22/2018    Angela Ibarra    MRN: [id number]           Patient Information     Date Of Birth          1960        Visit Information        Provider Department      10/22/2018 8:15 AM Aryan Holley MD [facility] [place]        Today's Diagnoses     S/P left knee arthroscopy    -  1       Follow-ups after your visit        Additional Services     [facility] PT, HAND, AND CHIROPRACTIC REFERRAL       Physical Therapy, Hand Therapy and Chiropractic Care are available through:  *[facility]  *Hand Therapy (Occupational Therapy or Physical Therapy)  *[facility]    Call one number to schedule at any of the above locations: [phone].    Physical therapy, Hand therapy and/or Chiropractic care has been recommended by your physician as an excellent treatment option to reduce pain and help people return to normal activities, including sports.  Therapy and/or chiropractic care services are a great complement or alternative to expensive and invasive surgery, injections, or long-term use of prescription medications. The primary goal is to identify the underlying problem and provide you the tools to manage your condition on your own.     Please be aware that coverage of these services is subject to the terms and limitations of your health insurance plan.  Call member services at your health plan with any benefit or coverage questions.      Please bring the following to your appointment:  *Your personal calendar for scheduling future appointments  *Comfortable clothing                  Follow-up notes from your care team     Return if symptoms worsen or fail to improve.      Your next 10 appointments already scheduled     Oct 23, 2018  8:30 AM CDT   Return Visit with Mono Tang MD   [facility] [place] ([facility] [place])    [address]  [place] [address] [phone]             Oct 25, 2018  8:40 AM CDT   (Arrive by 8:25 AM)   [facility] Extremity with Alex Lanza PT   [facility] [place] [facility] ([facility] [place])    [address]  [place] [address]   [phone]            Nov 01, 2018  8:00 AM CDT   [facility] Extremity with Alex Lanza PT   [facility] [place] [facility] ([facility] [place])    [address]  [address]  [place] [address]   [phone]            Nov 08, 2018  8:00 AM CST   [facility] Extremity with Alex Lanza PT   [facility] [place] [facility] ([facility] [place])    [address]  [place] [address]   [phone]              Future tests that were ordered for you today     Open Future Orders        Priority Expected Expires Ordered    [facility], [facility] CH[facility]RACTIC REFERRAL Routine  10/22/2019 10/22/2018            Who to contact     If you have questions or need follow up information about today's clinic visit or your schedule please contact [facility] [place] directly at [phone].  Normal or non-critical lab and imaging results will be communicated to you by [employer], letter or phone within 4 business days after the clinic has received the results. If you do not hear from us within 7 days, please contact the clinic through [employer] or phone. If you have a critical or abnormal lab result, we will notify you by phone as soon as possible.  Submit refill requests through [employer] or call your pharmacy and they will forward the refill request to us. Please allow 3 business days for your refill to be completed.          Additional Information About Your Visit        [employer] Information     [employer] gives you secure access to your electronic health record. If you see a primary care provider, you can also send messages to your care team and make appointments. If you have questions, please call your primary care clinic.  If you do not have a  primary care provider, please call [phone] and they will assist you.        Care EveryWhere ID     This is your Care EveryWhere ID. This could be used by other organizations to access your [facility] medical records  [id number]        Your Vitals Were     Pulse Last Period Pulse Oximetry BMI (Body Mass Index)          80 01/08/2008 98% 41.15 kg/m2         Blood Pressure from Last 3 Encounters:   10/22/18 140/75   10/18/18 138/80   10/16/18 154/85    Weight from Last 3 Encounters:   10/22/18 225 lb (102.1 kg)   10/16/18 225 lb (102.1 kg)   10/15/18 225 lb (102.1 kg)               Primary Care Provider Office Phone # Fax #    Germán Jernigan [phone] [address] [address]        Equal Access to Services     [facility]: Hadii aad ku hadasho Soomaali, waaxda luqadaha, qaybta kaalmada adeegyada, oleksandr nelsonin haylydian bell rocha . So [facility] [phone].    ATENCIÓN: Si habla español, tiene a lebron disposición servicios gratuitos de asistencia lingüística. Llame al [phone].    We comply with applicable federal civil rights laws and [place] laws. We do not discriminate on the basis of race, color, national origin, age, disability, sex, sexual orientation, or gender identity.            Thank you!     Thank you for choosing [facility]  for your care. Our goal is always to provide you with excellent care. Hearing back from our patients is one way we can continue to improve our services. Please take a few minutes to complete the written survey that you may receive in the mail after your visit with us. Thank you!             Your Updated Medication List - Protect others around you: Learn how to safely use, store and throw away your medicines at www.disposemymeds.org.          This list is accurate as of 10/22/18  5:06 PM.  Always use your most recent med list.                   Brand Name Dispense Instructions for use Diagnosis     amLODIPine 5 MG tablet    NORVASC    90 tablet    Take 1 tablet (5 mg) by mouth daily    Essential hypertension with goal blood pressure less than 140/90       atenolol 50 MG tablet    TENORMIN    90 tablet    Take 1 tablet (50 mg) by mouth daily    Essential hypertension with goal blood pressure less than 140/90       estradiol 0.1 MG/GM cream    ESTRACE    42.5 g    Place 2 g vaginally twice a week    Atrophy of vagina       IBUPROFEN PO      Take 400 mg by mouth every 6 hours as needed for moderate pain        traZODone 50 MG tablet    DESYREL    180 tablet    Take 2 tablets (100 mg) by mouth nightly as needed for sleep    Insomnia, unspecified type       TYLENOL EXTRA STRENGTH PO      Take 1-2 tablets by mouth as needed

## 2018-10-23 ENCOUNTER — OFFICE VISIT (OUTPATIENT)
Dept: ORTHOPEDICS | Facility: CLINIC | Age: 58
End: 2018-10-23
Payer: COMMERCIAL

## 2018-10-23 VITALS
WEIGHT: 225 LBS | OXYGEN SATURATION: 96 % | HEART RATE: 66 BPM | TEMPERATURE: 98.4 F | DIASTOLIC BLOOD PRESSURE: 80 MMHG | SYSTOLIC BLOOD PRESSURE: 151 MMHG | BODY MASS INDEX: 41.15 KG/M2

## 2018-10-23 DIAGNOSIS — M16.12 PRIMARY OSTEOARTHRITIS OF LEFT HIP: ICD-10-CM

## 2018-10-23 DIAGNOSIS — R93.7 BONE MARROW EDEMA: Primary | ICD-10-CM

## 2018-10-23 PROCEDURE — 99214 OFFICE O/P EST MOD 30 MIN: CPT | Mod: 24 | Performed by: ORTHOPAEDIC SURGERY

## 2018-10-23 ASSESSMENT — PAIN SCALES - GENERAL: PAINLEVEL: MILD PAIN (2)

## 2018-10-23 NOTE — LETTER
10/23/2018         RE: Angela Ibarra  [address] [place] [address]        Dear Colleague,    Thank you for referring your patient, Angela Ibarra, to the [facility]. Please see a copy of my visit note below.    Patient returns for her right hip MRI results, which showed:   No femoral head osteonecrosis. There is a 1.5 cm  diameter area of prominent grade II chondromalacia within the anterior  aspect of the right femoral head apex with moderate underlying marrow  edema. No actual bone collapse is seen. The remaining cartilage  surfaces are well preserved.  I am not certain that we can rule out osteonecrosis, but the bone marrow edema pattern shows no cresenteric pattern        Impression:  Bone marrow edema vs possible osteonecrosis without collapse.    She had 1-2 month response to her last corticosteroid injection, this would suggest that the problem is from her hip joint, and not from an external issue, such as lumbar mediated or from an obturator nerve injury (she was wondering because of a vaginal mesh she had placed in 2011)    We talked about possible decompression procedure, +/- injection of bone marrow aspirate/PRP.  Possible simple limited weight bearing x 6 weeks.    Thus, our plan is partial weight bearing x 6 weeks with a walker.  She wanted to try another corticosteroid injection.  This was ordered.  I haven't ruled out total hip arthroplasty as an option.      Total time spent was 25 minutes; with 25 minutes spent face-to-face with patient discussing test results, treatment options, and estimated recovery time.    NIMISHA Tang MD  Dept. Orthopedic Surgery  [facility]         Again, thank you for allowing me to participate in the care of your patient.        Sincerely,        Mono Tang MD

## 2018-10-23 NOTE — MR AVS SNAPSHOT
After Visit Summary   10/23/2018    Angela Ibarra    MRN: [id number]           Patient Information     Date Of Birth          1960        Visit Information        Provider Department      10/23/2018 8:30 AM Mono Tang MD [facility]        Today's Diagnoses     Bone marrow edema    -  1    Primary osteoarthritis of left hip           Follow-ups after your visit        Additional Services     [facility] PT, HAND, AND CHIROPRACTIC REFERRAL       Physical Therapy, Hand Therapy and Chiropractic Care are available through:  *[facility]  *Hand Therapy (Occupational Therapy or Physical Therapy)  *[facility] [place]  Hip area strengthening, stretching.  Call one number to schedule at any of the above locations: [phone].    Physical therapy, Hand therapy and/or Chiropractic care has been recommended by your physician as an excellent treatment option to reduce pain and help people return to normal activities, including sports.  Therapy and/or chiropractic care services are a great complement or alternative to expensive and invasive surgery, injections, or long-term use of prescription medications. The primary goal is to identify the underlying problem and provide you the tools to manage your condition on your own.     Please be aware that coverage of these services is subject to the terms and limitations of your health insurance plan.  Call member services at your health plan with any benefit or coverage questions.      Please bring the following to your appointment:  *Your personal calendar for scheduling future appointments  *Comfortable clothing            [facility] PT, HAND, AND CHIROPRACTIC REFERRAL       Physical Therapy, Hand Therapy and Chiropractic Care are available through:  *[facility]  *Hand Therapy (Occupational Therapy or Physical Therapy)  *[facility]    Call one number to schedule at  any of the above locations: [phone].    Physical therapy, Hand therapy and/or Chiropractic care has been recommended by your physician as an excellent treatment option to reduce pain and help people return to normal activities, including sports.  Therapy and/or chiropractic care services are a great complement or alternative to expensive and invasive surgery, injections, or long-term use of prescription medications. The primary goal is to identify the underlying problem and provide you the tools to manage your condition on your own.     Please be aware that coverage of these services is subject to the terms and limitations of your health insurance plan.  Call member services at your health plan with any benefit or coverage questions.      Please bring the following to your appointment:  *Your personal calendar for scheduling future appointments  *Comfortable clothing            ORTHO  REFERRAL       [facility] is referring you to the Orthopedic  Services at [facility].       The  Representative will assist you in the coordination of your Orthopedic and Musculoskeletal Care as prescribed by your physician.    The  Representative will call you within 1 business day to help schedule your appointment, or you may contact the  Representative at:    All areas ~ [phone]     Type of Referral : Non Surgical / Sport Medicine  [place] for LEFT hip image guided steroid injection..      Timeframe requested: 1 - 2 weeks    Coverage of these services is subject to the terms and limitations of your health insurance plan.  Please call member services at your health plan with any benefit or coverage questions.      If X-rays, CT or MRI's have been performed, please contact the facility where they were done to arrange for , prior to your scheduled appointment.  Please bring this referral request to your appointment and present it to  your specialist.                  Your next 10 appointments already scheduled     Oct 25, 2018  8:40 AM CDT   (Arrive by 8:25 AM)   WESLEY Extremity with Alex Lanza, PT   [facility] [place] [facility] ([facility] [place])    [address]  [place] [address]   [phone]            Nov 01, 2018  8:00 AM CDT   WESLEY Extremity with Alex Lanza PT   [facility] [place] [facility] ([facility] [place])    [address]  [address]  [place] [address]   [phone]            Nov 08, 2018  8:00 AM CST   WESLEY Extremity with Alex Lanza PT   [facility] [place] [facility] ([facility] [place])    [address]  [place] [address]   [phone]              Future tests that were ordered for you today     Open Future Orders        Priority Expected Expires Ordered    [facility] PT, HAND, AND CHIROPRACTIC REFERRAL Routine 10/23/2018 10/23/2019 10/23/2018    [facility] PT, HAND, AND CHIROPRACTIC REFERRAL Routine  10/23/2019 10/23/2018    [facility] PT, HAND, AND CHIROPRACTIC REFERRAL Routine  10/22/2019 10/22/2018            Who to contact     If you have questions or need follow up information about today's clinic visit or your schedule please contact [facility] directly at [phone].  Normal or non-critical lab and imaging results will be communicated to you by Truisthart, letter or phone within 4 business days after the clinic has received the results. If you do not hear from us within 7 days, please contact the clinic through Truisthart or phone. If you have a critical or abnormal lab result, we will notify you by phone as soon as possible.  Submit refill requests through CleverMiles or call your pharmacy and they will forward the refill request to us. Please allow 3 business days for your refill to be completed.          Additional Information About Your Visit        CleverMiles Information     CleverMiles gives you secure access  to your electronic health record. If you see a primary care provider, you can also send messages to your care team and make appointments. If you have questions, please call your primary care clinic.  If you do not have a primary care provider, please call [phone] and they will assist you.        Care EveryWhere ID     This is your Care EveryWhere ID. This could be used by other organizations to access your [place] medical records  [id number]        Your Vitals Were     Pulse Temperature Last Period Pulse Oximetry BMI (Body Mass Index)       66 98.4  F (36.9  C) (Oral) 01/08/2008 96% 41.15 kg/m2        Blood Pressure from Last 3 Encounters:   10/23/18 151/80   10/22/18 140/75   10/18/18 138/80    Weight from Last 3 Encounters:   10/23/18 102.1 kg (225 lb)   10/22/18 102.1 kg (225 lb)   10/16/18 102.1 kg (225 lb)              We Performed the Following     ORTHO  REFERRAL        Primary Care Provider Office Phone # Fax #    Germán Jernigan [phone] [address] [facility] [address]        Equal Access to Services     [facility] CRUZ : Hadii aad ku hadasho Soomaali, waaxda luqadaha, qaybta kaalmada adeegyada, oleksandr sandoval. So [facility] [phone].    ATENCIÓN: Si habla español, tiene a lebron disposición servicios gratuitos de asistencia lingüística. [facility] [phone].    We comply with applicable federal civil rights laws and [place] laws. We do not discriminate on the basis of race, color, national origin, age, disability, sex, sexual orientation, or gender identity.            Thank you!     Thank you for choosing [facility]  for your care. Our goal is always to provide you with excellent care. Hearing back from our patients is one way we can continue to improve our services. Please take a few minutes to complete the written survey that you may receive in the mail after your visit with us. Thank you!             Your Updated  Medication List - Protect others around you: Learn how to safely use, store and throw away your medicines at www.disposemymeds.org.          This list is accurate as of 10/23/18  8:56 AM.  Always use your most recent med list.                   Brand Name Dispense Instructions for use Diagnosis    amLODIPine 5 MG tablet    NORVASC    90 tablet    Take 1 tablet (5 mg) by mouth daily    Essential hypertension with goal blood pressure less than 140/90       atenolol 50 MG tablet    TENORMIN    90 tablet    Take 1 tablet (50 mg) by mouth daily    Essential hypertension with goal blood pressure less than 140/90       estradiol 0.1 MG/GM cream    ESTRACE    42.5 g    Place 2 g vaginally twice a week    Atrophy of vagina       IBUPROFEN PO      Take 400 mg by mouth every 6 hours as needed for moderate pain        traZODone 50 MG tablet    DESYREL    180 tablet    Take 2 tablets (100 mg) by mouth nightly as needed for sleep    Insomnia, unspecified type       TYLENOL EXTRA STRENGTH PO      Take 1-2 tablets by mouth as needed

## 2018-10-25 ENCOUNTER — THERAPY VISIT (OUTPATIENT)
Dept: PHYSICAL THERAPY | Facility: CLINIC | Age: 58
End: 2018-10-25
Payer: COMMERCIAL

## 2018-10-25 DIAGNOSIS — M25.562 ACUTE PAIN OF LEFT KNEE: Primary | ICD-10-CM

## 2018-10-25 DIAGNOSIS — Z98.890 S/P LEFT KNEE ARTHROSCOPY: ICD-10-CM

## 2018-10-25 PROCEDURE — 97161 PT EVAL LOW COMPLEX 20 MIN: CPT | Mod: GP | Performed by: PHYSICAL THERAPIST

## 2018-10-25 PROCEDURE — 97110 THERAPEUTIC EXERCISES: CPT | Mod: GP | Performed by: PHYSICAL THERAPIST

## 2018-10-25 ASSESSMENT — ACTIVITIES OF DAILY LIVING (ADL)
WALK: ACTIVITY IS SOMEWHAT DIFFICULT
GO UP STAIRS: ACTIVITY IS SOMEWHAT DIFFICULT
KNEE_ACTIVITY_OF_DAILY_LIVING_SUM: 42
GO DOWN STAIRS: ACTIVITY IS FAIRLY DIFFICULT
HOW_WOULD_YOU_RATE_THE_OVERALL_FUNCTION_OF_YOUR_KNEE_DURING_YOUR_USUAL_DAILY_ACTIVITIES?: ABNORMAL
GIVING WAY, BUCKLING OR SHIFTING OF KNEE: THE SYMPTOM AFFECTS MY ACTIVITY SLIGHTLY
HOW_WOULD_YOU_RATE_THE_CURRENT_FUNCTION_OF_YOUR_KNEE_DURING_YOUR_USUAL_DAILY_ACTIVITIES_ON_A_SCALE_FROM_0_TO_100_WITH_100_BEING_YOUR_LEVEL_OF_KNEE_FUNCTION_PRIOR_TO_YOUR_INJURY_AND_0_BEING_THE_INABILITY_TO_PERFORM_ANY_OF_YOUR_USUAL_DAILY_ACTIVITIES?: 75
AS_A_RESULT_OF_YOUR_KNEE_INJURY,_HOW_WOULD_YOU_RATE_YOUR_CURRENT_LEVEL_OF_DAILY_ACTIVITY?: NEARLY NORMAL
KNEE_ACTIVITY_OF_DAILY_LIVING_SCORE: 60
PAIN: THE SYMPTOM AFFECTS MY ACTIVITY MODERATELY
SQUAT: ACTIVITY IS FAIRLY DIFFICULT
SWELLING: I HAVE THE SYMPTOM BUT IT DOES NOT AFFECT MY ACTIVITY
RISE FROM A CHAIR: ACTIVITY IS SOMEWHAT DIFFICULT
KNEEL ON THE FRONT OF YOUR KNEE: ACTIVITY IS VERY DIFFICULT
WEAKNESS: THE SYMPTOM AFFECTS MY ACTIVITY SLIGHTLY
SIT WITH YOUR KNEE BENT: ACTIVITY IS NOT DIFFICULT
STIFFNESS: THE SYMPTOM AFFECTS MY ACTIVITY SLIGHTLY
LIMPING: THE SYMPTOM AFFECTS MY ACTIVITY SLIGHTLY
STAND: ACTIVITY IS NOT DIFFICULT
RAW_SCORE: 42

## 2018-10-25 NOTE — MR AVS SNAPSHOT
After Visit Summary   10/25/2018    Angela Ibarra    MRN: [id number]           Patient Information     Date Of Birth          1960        Visit Information        Provider Department      10/25/2018 8:40 AM Alex Lanza, PT [facility] [place] [facility]        Today's Diagnoses     Acute pain of left knee    -  1    S/P left knee arthroscopy           Follow-ups after your visit        Your next 10 appointments already scheduled     Oct 27, 2018 11:40 AM CDT   PROCEDURE with Shaun Munoz,    [facility] [place] ([facility] [place])    [address]  [place] [address]   [phone]            Nov 01, 2018  8:00 AM CDT   WESLEY Extremity with Alex Lanza PT   [facility] [place] [facility] ([facility] [place])    [address]  [address]  [place] [address]   [phone]            Nov 03, 2018  8:40 AM CDT   (Arrive by 8:25 AM)   WESLEY Extremity with Lisa Valladares PT   [facility] [place] [facility] ([facility] [place])    [address]  [address]  [place] [address]   [phone]            Nov 08, 2018  8:00 AM CST   WESLEY Extremity with Alex Lanza, PT   [facility] [place] [facility] ([facility] [place])    [address]  [address]  [place] [address]   [phone]              Who to contact     If you have questions or need follow up information about today's clinic visit or your schedule please contact [facility] directly at [phone].  Normal or non-critical lab and imaging results will be communicated to you by MyChart, letter or phone within 4 business days after the clinic has received the results. If you do not hear from us within 7 days, please contact the clinic through MyChart or phone. If you have a critical or abnormal lab result, we will notify you by phone as soon as  possible.  Submit refill requests through BookFresh or call your pharmacy and they will forward the refill request to us. Please allow 3 business days for your refill to be completed.          Additional Information About Your Visit        [employer] [place] Bankhart Information     BookFresh gives you secure access to your electronic health record. If you see a primary care provider, you can also send messages to your care team and make appointments. If you have questions, please call your primary care clinic.  If you do not have a primary care provider, please call [phone] and they will assist you.        Care EveryWhere ID     This is your Care EveryWhere ID. This could be used by other organizations to access your [place] medical records  [id number]        Your Vitals Were     Last Period                   01/08/2008            Blood Pressure from Last 3 Encounters:   10/23/18 151/80   10/22/18 140/75   10/18/18 138/80    Weight from Last 3 Encounters:   10/23/18 102.1 kg (225 lb)   10/22/18 102.1 kg (225 lb)   10/16/18 102.1 kg (225 lb)              We Performed the Following     HC PT EVAL, LOW COMPLEXITY     [facility] PT, HAND, AND CHIROPRACTIC REFERRAL     THERAPEUTIC EXERCISES        Primary Care Provider Office Phone # Fax #    Germán Jernigan [phone] [address] [facility] [address]        Equal Access to Services     [facility]: Hadii aad ku hadasho Soomaali, waaxda luqadaha, qaybta kaalmada adeegyada, waxay omarin haypolo rocha . So [facility] [phone].    ATENCIÓN: Si habla español, tiene a lebron disposición servicios gratuitos de asistencia lingüística. Llame al [phone].    We comply with applicable federal civil rights laws and [place] laws. We do not discriminate on the basis of race, color, national origin, age, disability, sex, sexual orientation, or gender identity.            Thank you!     Thank you for choosing [facility]  for your  care. Our goal is always to provide you with excellent care. Hearing back from our patients is one way we can continue to improve our services. Please take a few minutes to complete the written survey that you may receive in the mail after your visit with us. Thank you!             Your Updated Medication List - Protect others around you: Learn how to safely use, store and throw away your medicines at www.disposemymeds.org.          This list is accurate as of 10/25/18  9:45 AM.  Always use your most recent med list.                   Brand Name Dispense Instructions for use Diagnosis    amLODIPine 5 MG tablet    NORVASC    90 tablet    Take 1 tablet (5 mg) by mouth daily    Essential hypertension with goal blood pressure less than 140/90       atenolol 50 MG tablet    TENORMIN    90 tablet    Take 1 tablet (50 mg) by mouth daily    Essential hypertension with goal blood pressure less than 140/90       estradiol 0.1 MG/GM cream    ESTRACE    42.5 g    Place 2 g vaginally twice a week    Atrophy of vagina       IBUPROFEN PO      Take 400 mg by mouth every 6 hours as needed for moderate pain        traZODone 50 MG tablet    DESYREL    180 tablet    Take 2 tablets (100 mg) by mouth nightly as needed for sleep    Insomnia, unspecified type       TYLENOL EXTRA STRENGTH PO      Take 1-2 tablets by mouth as needed

## 2018-10-25 NOTE — PROGRESS NOTES
[facility] Initial Evaluation  Subjective:  Patient is a 58 year old female presenting with rehab left knee hpi. The history is provided by the patient. The history is limited by a language barrier. No  was used.   Angela Ibarra is a 58 year old female with a left knee condition.  Condition occurred with:  A fall/slip.  Condition occurred: at work.  This is a new condition  Aug 21 2018 fell in parking lot at work when she tripped over an area where the asphalt was uneven (pt states this was denied as a work injury).  Dx with MMT and had arthroscopy performed on 10/26/18.    Patient reports pain:  Medial.    Pain is described as aching and is intermittent and reported as 3/10.  Associated symptoms:  Loss of motion/stiffness and loss of strength. Pain is worse in the P.M..  Symptoms are exacerbated by bending/squatting, ascending stairs, descending stairs and walking and relieved by ice.  Since onset symptoms are gradually improving.  Special tests:  MRI.  Previous treatment includes surgery.  There was moderate improvement following previous treatment.  General health as reported by patient is fair.  Pertinent medical history includes:  High blood pressure, history of fractures, osteoarthritis and overweight.  Medical allergies: yes (see Epic).    Current medications:  High blood pressure medication.  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  Primary job tasks include:  Prolonged sitting.    Barriers include:  None as reported by the patient.    Red flags:  None as reported by the patient.                        Objective:    Gait:    Gait Type:  Antalgic   Weight Bearing Status:  WBAT   Assistive Devices:  None                                                        Knee Evaluation:  ROM:    AROM      Extension:  Left: 5    Right:  0  Flexion: Left: 122    Right: 135  PROM      Extension: Left: 1   Right:   Flexion: Left: 129    Right:       Strength:     Extension:  Left: 5-/5    Pain:-      Right: 5/5    Pain:-  Flexion:  Left: 4+/5    Pain:-      Right: 5/5    Pain:-    Quad Set Left:  Fair    Pain: -                     General     ROS    Assessment/Plan:    Patient is a 58 year old female with left knee complaints.    Patient has the following significant findings with corresponding treatment plan.                Diagnosis 1:  Knee pain s/p scope  Pain -  self management, education and home program  Decreased ROM/flexibility - manual therapy, therapeutic exercise and home program  Decreased strength - therapeutic exercise, therapeutic activities and home program  Impaired gait - gait training and home program  Impaired muscle performance - neuro re-education and home program    Therapy Evaluation Codes:   1) History comprised of:   Personal factors that impact the plan of care:      None.    Comorbidity factors that impact the plan of care are:      Osteoarthritis and Overweight.     Medications impacting care: None.  2) Examination of Body Systems comprised of:   Body structures and functions that impact the plan of care:      Knee.   Activity limitations that impact the plan of care are:      Squatting/kneeling, Stairs, Standing and Walking.  3) Clinical presentation characteristics are:   Stable/Uncomplicated.  4) Decision-Making    Low complexity using standardized patient assessment instrument and/or measureable assessment of functional outcome.  Cumulative Therapy Evaluation is: Low complexity.    Previous and current functional limitations:  (See Goal Flow Sheet for this information)    Short term and Long term goals: (See Goal Flow Sheet for this information)     Communication ability:  Patient appears to be able to clearly communicate and understand verbal and written communication and follow directions correctly.  Treatment Explanation - The following has been discussed with the patient:   RX ordered/plan of care  Anticipated  outcomes  Possible risks and side effects  This patient would benefit from PT intervention to resume normal activities.   Rehab potential is good.    Frequency:  1 X week, once daily  Duration:  for 4 weeks  Discharge Plan:  Achieve all LTG.  Independent in home treatment program.  Reach maximal therapeutic benefit.    Please refer to the daily flowsheet for treatment today, total treatment time and time spent performing 1:1 timed codes.

## 2018-10-27 ENCOUNTER — OFFICE VISIT (OUTPATIENT)
Dept: ORTHOPEDICS | Facility: CLINIC | Age: 58
End: 2018-10-27
Payer: COMMERCIAL

## 2018-10-27 VITALS
HEIGHT: 62 IN | DIASTOLIC BLOOD PRESSURE: 78 MMHG | WEIGHT: 225 LBS | SYSTOLIC BLOOD PRESSURE: 138 MMHG | BODY MASS INDEX: 41.41 KG/M2

## 2018-10-27 DIAGNOSIS — M25.552 LEFT HIP PAIN: Primary | ICD-10-CM

## 2018-10-27 PROCEDURE — 20611 DRAIN/INJ JOINT/BURSA W/US: CPT | Mod: RT | Performed by: FAMILY MEDICINE

## 2018-10-27 RX ORDER — TRIAMCINOLONE ACETONIDE 40 MG/ML
40 INJECTION, SUSPENSION INTRA-ARTICULAR; INTRAMUSCULAR
Status: DISCONTINUED | OUTPATIENT
Start: 2018-10-27 | End: 2019-04-25

## 2018-10-27 RX ORDER — ROPIVACAINE HYDROCHLORIDE 5 MG/ML
3 INJECTION, SOLUTION EPIDURAL; INFILTRATION; PERINEURAL
Status: DISCONTINUED | OUTPATIENT
Start: 2018-10-27 | End: 2019-04-25

## 2018-10-27 RX ADMIN — TRIAMCINOLONE ACETONIDE 40 MG: 40 INJECTION, SUSPENSION INTRA-ARTICULAR; INTRAMUSCULAR at 12:00

## 2018-10-27 RX ADMIN — ROPIVACAINE HYDROCHLORIDE 3 ML: 5 INJECTION, SOLUTION EPIDURAL; INFILTRATION; PERINEURAL at 12:00

## 2018-10-27 NOTE — PROGRESS NOTES
Angela Fred  :  1960  DOS: 10/27/2018  MRN: [id number]    Sports Medicine Clinic Procedure    Ultrasound Guided Right Intra-Articular Hip Injection    Clinical History: Chronic right hip pain over last 5+ years.  Patient reports multiple intra-articular steroid injection in past with varying relief.   Most recent injection completed 17 provided good relief for ~ 1 month.     Diagnosis:   1. Left hip pain      Referring Physician: NICHOLAS Tang MD  Large Joint Injection/Arthocentesis  Date/Time: 10/27/2018 12:00 PM  Performed by: SHAUN IVERSON  Authorized by: SHAUN IVERSON     Indications:  Pain and diagnostic evaluation  Needle Size:  22 G  Guidance: ultrasound    Approach:  Anterior  Location:  Hip  Site:  R hip joint  Medications:  40 mg triamcinolone acetonide 40 MG/ML; 3 mL ropivacaine 5 MG/ML  Outcome:  Tolerated well, no immediate complications  Procedure discussed: discussed risks, benefits, and alternatives    Consent Given by:  Patient  Prep: patient was prepped and draped in usual sterile fashion     4 ml's of 1% lidocaine was used as local anesthetic prior to injection          Impression:  Successful Left intra-articular hip injection.    Plan:  Follow up as directed by Dr Tang  Excellent initial relief when from anesthetic effect, especially with severe pain walking in  Expectations and goals of CSI reviewed  Often 2-3 days for steroid effect, and can take up to two weeks for maximum effect  We discussed modified progressive pain-free activity as tolerated  Do not overuse in first two weeks if feeling better due to concern for vulnerability while steroid is working  Supportive care reviewed  All questions were answered today  Contact us with additional questions or concerns  Signs and sx of concern reviewed      Shaun Iverson DO, CARLOS  Primary Care Sports Medicine  [facility]

## 2018-10-27 NOTE — MR AVS SNAPSHOT
After Visit Summary   10/27/2018    Angela Ibarra    MRN: [id number]           Patient Information     Date Of Birth          1960        Visit Information        Provider Department      10/27/2018 11:40 AM Shaun Munoz,  [facility] [place]        Today's Diagnoses     Left hip pain    -  1       Follow-ups after your visit        Your next 10 appointments already scheduled     Nov 01, 2018  8:00 AM CDT   WESLEY Extremity with Alex Lanza PT   [facility] [place] [facility] ([facility] [place])    [address]  [place] [address]   [phone]            Nov 03, 2018  8:40 AM CDT   (Arrive by 8:25 AM)   WESLEY Extremity with Lisa Valladares PT   [facility] [place] [facility] ([facility] [place])    [address]  [place] [address]   [phone]            Nov 08, 2018  8:00 AM CST   WESLEY Extremity with Alex Lanza PT   [facility] [place] [facility] ([facility] [place])    [address]  [place] [address]   [phone]              Who to contact     If you have questions or need follow up information about today's clinic visit or your schedule please contact [facility] directly at [phone].  Normal or non-critical lab and imaging results will be communicated to you by MyChart, letter or phone within 4 business days after the clinic has received the results. If you do not hear from us within 7 days, please contact the clinic through [employer] or phone. If you have a critical or abnormal lab result, we will notify you by phone as soon as possible.  Submit refill requests through [employer] or call your pharmacy and they will forward the refill request to us. Please allow 3 business days for your refill to be completed.          Additional Information About Your Visit        MyChart Information     [employer] gives  "you secure access to your electronic health record. If you see a primary care provider, you can also send messages to your care team and make appointments. If you have questions, please call your primary care clinic.  If you do not have a primary care provider, please call [phone] and they will assist you.        Care EveryWhere ID     This is your Care EveryWhere ID. This could be used by other organizations to access your [facility] medical records  [id number]        Your Vitals Were     Height Last Period BMI (Body Mass Index)             5' 2\" (1.575 m) 01/08/2008 41.15 kg/m2          Blood Pressure from Last 3 Encounters:   10/27/18 138/78   10/23/18 151/80   10/22/18 140/75    Weight from Last 3 Encounters:   10/27/18 225 lb (102.1 kg)   10/23/18 225 lb (102.1 kg)   10/22/18 225 lb (102.1 kg)              We Performed the Following     Large Joint Injection/Arthocentesis        Primary Care Provider Office Phone # Fax #    Germán Jernigan [phone] [address] [address]        Equal Access to Services     ELVIN ARROYO : Hadii aad ku hadasho Soomaali, waaxda luqadaha, qaybta kaalmada adeegyada, oleksandr chen haylydian bell sandoval. So [facility] [phone].    ATENCIÓN: Si habla español, tiene a lebron disposición servicios gratuitos de asistencia lingüística. Llame al [phone].    We comply with applicable federal civil rights laws and [place] laws. We do not discriminate on the basis of race, color, national origin, age, disability, sex, sexual orientation, or gender identity.            Thank you!     Thank you for choosing [facility]  for your care. Our goal is always to provide you with excellent care. Hearing back from our patients is one way we can continue to improve our services. Please take a few minutes to complete the written survey that you may receive in the mail after your visit with us. Thank you!             Your " Updated Medication List - Protect others around you: Learn how to safely use, store and throw away your medicines at www.disposemymeds.org.          This list is accurate as of 10/27/18  1:45 PM.  Always use your most recent med list.                   Brand Name Dispense Instructions for use Diagnosis    amLODIPine 5 MG tablet    NORVASC    90 tablet    Take 1 tablet (5 mg) by mouth daily    Essential hypertension with goal blood pressure less than 140/90       atenolol 50 MG tablet    TENORMIN    90 tablet    Take 1 tablet (50 mg) by mouth daily    Essential hypertension with goal blood pressure less than 140/90       estradiol 0.1 MG/GM cream    ESTRACE    42.5 g    Place 2 g vaginally twice a week    Atrophy of vagina       IBUPROFEN PO      Take 400 mg by mouth every 6 hours as needed for moderate pain        traZODone 50 MG tablet    DESYREL    180 tablet    Take 2 tablets (100 mg) by mouth nightly as needed for sleep    Insomnia, unspecified type       TYLENOL EXTRA STRENGTH PO      Take 1-2 tablets by mouth as needed

## 2018-10-27 NOTE — LETTER
10/27/2018         RE: Angela Ibarra  [address] [place] [address]        Dear Colleague,    Thank you for referring your patient, Angela Ibarra, to the [facility] [place]. Please see a copy of my visit note below.    Angela Ibarra  :  1960  DOS: 10/27/2018  MRN: [id number]    Sports Medicine Clinic Procedure    Ultrasound Guided Right Intra-Articular Hip Injection    Clinical History: Chronic right hip pain over last 5+ years.  Patient reports multiple intra-articular steroid injection in past with varying relief.   Most recent injection completed 17 provided good relief for ~ 1 month.     Diagnosis:   1. Left hip pain      Referring Physician: NICHOLAS Tang MD  Large Joint Injection/Arthocentesis  Date/Time: 10/27/2018 12:00 PM  Performed by: JACIK IVERSON  Authorized by: JACKI IVERSON     Indications:  Pain and diagnostic evaluation  Needle Size:  22 G  Guidance: ultrasound    Approach:  Anterior  Location:  Hip  Site:  R hip joint  Medications:  40 mg triamcinolone acetonide 40 MG/ML; 3 mL ropivacaine 5 MG/ML  Outcome:  Tolerated well, no immediate complications  Procedure discussed: discussed risks, benefits, and alternatives    Consent Given by:  Patient  Prep: patient was prepped and draped in usual sterile fashion     4 ml's of 1% lidocaine was used as local anesthetic prior to injection          Impression:  Successful Left intra-articular hip injection.    Plan:  Follow up as directed by Dr Tang  Excellent initial relief when from anesthetic effect, especially with severe pain walking in  Expectations and goals of CSI reviewed  Often 2-3 days for steroid effect, and can take up to two weeks for maximum effect  We discussed modified progressive pain-free activity as tolerated  Do not overuse in first two weeks if feeling better due to concern for vulnerability while steroid is working  Supportive care reviewed  All questions  were answered today  Contact us with additional questions or concerns  Signs and sx of concern reviewed      Shaun Munoz DO, CAQ  Primary Care Sports Medicine  [facility]         Again, thank you for allowing me to participate in the care of your patient.        Sincerely,        Shaun Munoz DO

## 2018-11-01 ENCOUNTER — THERAPY VISIT (OUTPATIENT)
Dept: PHYSICAL THERAPY | Facility: CLINIC | Age: 58
End: 2018-11-01
Payer: COMMERCIAL

## 2018-11-01 DIAGNOSIS — Z98.890 S/P LEFT KNEE ARTHROSCOPY: ICD-10-CM

## 2018-11-01 DIAGNOSIS — M25.562 ACUTE PAIN OF LEFT KNEE: ICD-10-CM

## 2018-11-01 PROCEDURE — 97110 THERAPEUTIC EXERCISES: CPT | Mod: GP | Performed by: PHYSICAL THERAPIST

## 2018-11-01 NOTE — PROGRESS NOTES
Subjective:  HPI                    Objective:  System    Physical Exam    General     ROS    Assessment/Plan:    SUBJECTIVE  Subjective: Knee has been improving since starting her HEP.  As long as she keeps it moving it feels pretty good, sitting still is the worst   Current Pain level: 2/10   Changes in function:  None   Adverse reaction to treatment or activity:  None    OBJECTIVE  Objective: AROM L knee: 2-126     ASSESSMENT  Angela continues to require intervention to meet STG and LTG's: PT  Patient is progressing as expected.  Response to therapy has shown an improvement in  pain level and ROM   Progress made towards STG/LTG?  None    PLAN  Current treatment program is being advanced to more complex exercises.    PTA/ATC plan:  N/A      Please refer to the daily flowsheet for treatment today, total treatment time and time spent performing 1:1 timed codes.

## 2018-11-01 NOTE — MR AVS SNAPSHOT
After Visit Summary   11/1/2018    Angela Ibarra    MRN: [id number]           Patient Information     Date Of Birth          1960        Visit Information        Provider Department      11/1/2018 8:00 AM Alex Lanza, PT [facility] [place] [facility]        Today's Diagnoses     Acute pain of left knee        S/P left knee arthroscopy           Follow-ups after your visit        Your next 10 appointments already scheduled     Nov 03, 2018  8:40 AM CDT   (Arrive by 8:25 AM)   WESLEY Extremity with Lisa Valladares, PT   [facility] [place] [facility] ([facility] [place]    [address]  [place] [address]   [phone]            Nov 08, 2018  8:00 AM CST   WESLEY Extremity with Alex Lanza PT   [facility] [place] [facility] ([facility] [place])    [address]  [place] [address]   [phone]              Who to contact     If you have questions or need follow up information about today's clinic visit or your schedule please contact [facility] directly at [phone].  Normal or non-critical lab and imaging results will be communicated to you by MyChart, letter or phone within 4 business days after the clinic has received the results. If you do not hear from us within 7 days, please contact the clinic through MyChart or phone. If you have a critical or abnormal lab result, we will notify you by phone as soon as possible.  Submit refill requests through [employer] or call your pharmacy and they will forward the refill request to us. Please allow 3 business days for your refill to be completed.          Additional Information About Your Visit        [employer] Information     [employer] gives you secure access to your electronic health record. If you see a primary care provider, you can also send messages to your care team and make appointments. If you have questions, please call  your primary care clinic.  If you do not have a primary care provider, please call [phone] and they will assist you.        Care EveryWhere ID     This is your Care EveryWhere ID. This could be used by other organizations to access your [facility] medical records  [id number]        Your Vitals Were     Last Period                   01/08/2008            Blood Pressure from Last 3 Encounters:   10/27/18 138/78   10/23/18 151/80   10/22/18 140/75    Weight from Last 3 Encounters:   10/27/18 102.1 kg (225 lb)   10/23/18 102.1 kg (225 lb)   10/22/18 102.1 kg (225 lb)              We Performed the Following     THERAPEUTIC EXERCISES        Primary Care Provider Office Phone # Fax #    Germán Jernigan [phone] [address] [address]        Equal Access to Services     [facility]: Hadii aad ku hadasho Soomaali, waaxda luqadaha, qaybta kaalmada adeegyada, waxay omarin haylydian bell rocha . So [facility] [phone].    ATENCIÓN: Si habla español, tiene a lebron disposición servicios gratuitos de asistencia lingüística. Llame al [phone].    We comply with applicable federal civil rights laws and [place] laws. We do not discriminate on the basis of race, color, national origin, age, disability, sex, sexual orientation, or gender identity.            Thank you!     Thank you for choosing [facility]   for your care. Our goal is always to provide you with excellent care. Hearing back from our patients is one way we can continue to improve our services. Please take a few minutes to complete the written survey that you may receive in the mail after your visit with us. Thank you!             Your Updated Medication List - Protect others around you: Learn how to safely use, store and throw away your medicines at www.disposemymeds.org.          This list is accurate as of 11/1/18  9:54 AM.  Always use your most recent med list.                    Brand Name Dispense Instructions for use Diagnosis    amLODIPine 5 MG tablet    NORVASC    90 tablet    Take 1 tablet (5 mg) by mouth daily    Essential hypertension with goal blood pressure less than 140/90       atenolol 50 MG tablet    TENORMIN    90 tablet    Take 1 tablet (50 mg) by mouth daily    Essential hypertension with goal blood pressure less than 140/90       estradiol 0.1 MG/GM cream    ESTRACE    42.5 g    Place 2 g vaginally twice a week    Atrophy of vagina       IBUPROFEN PO      Take 400 mg by mouth every 6 hours as needed for moderate pain        traZODone 50 MG tablet    DESYREL    180 tablet    Take 2 tablets (100 mg) by mouth nightly as needed for sleep    Insomnia, unspecified type       TYLENOL EXTRA STRENGTH PO      Take 1-2 tablets by mouth as needed

## 2018-11-03 ENCOUNTER — THERAPY VISIT (OUTPATIENT)
Dept: PHYSICAL THERAPY | Facility: CLINIC | Age: 58
End: 2018-11-03
Attending: ORTHOPAEDIC SURGERY
Payer: COMMERCIAL

## 2018-11-03 DIAGNOSIS — M25.551 HIP PAIN, RIGHT: Primary | ICD-10-CM

## 2018-11-03 DIAGNOSIS — M16.12 PRIMARY OSTEOARTHRITIS OF LEFT HIP: ICD-10-CM

## 2018-11-03 DIAGNOSIS — R93.7 BONE MARROW EDEMA: ICD-10-CM

## 2018-11-03 PROCEDURE — 97161 PT EVAL LOW COMPLEX 20 MIN: CPT | Mod: GP | Performed by: PHYSICAL THERAPIST

## 2018-11-03 PROCEDURE — 97110 THERAPEUTIC EXERCISES: CPT | Mod: GP | Performed by: PHYSICAL THERAPIST

## 2018-11-03 ASSESSMENT — ACTIVITIES OF DAILY LIVING (ADL)
TWISTING/PIVOTING_ON_INVOLVED_LEG: NO DIFFICULTY AT ALL
STANDING_FOR_15_MINUTES: SLIGHT DIFFICULTY
HOS_ADL_SCORE(%): 76.67
HOS_ADL_COUNT: 15
PUTTING_ON_SOCKS_AND_SHOES: NO DIFFICULTY AT ALL
WALKING_15_MINUTES_OR_GREATER: SLIGHT DIFFICULTY
ROLLING_OVER_IN_BED: NO DIFFICULTY AT ALL
DEEP_SQUATTING: UNABLE TO DO
WALKING_DOWN_STEEP_HILLS: SLIGHT DIFFICULTY
WALKING_UP_STEEP_HILLS: SLIGHT DIFFICULTY
HOS_ADL_ITEM_SCORE_TOTAL: 46
HOS_ADL_HIGHEST_POTENTIAL_SCORE: 60
HOW_WOULD_YOU_RATE_YOUR_CURRENT_LEVEL_OF_FUNCTION_DURING_YOUR_USUAL_ACTIVITIES_OF_DAILY_LIVING_FROM_0_TO_100_WITH_100_BEING_YOUR_LEVEL_OF_FUNCTION_PRIOR_TO_YOUR_HIP_PROBLEM_AND_0_BEING_THE_INABILITY_TO_PERFORM_ANY_OF_YOUR_USUAL_DAILY_ACTIVITIES?: 75
GETTING_INTO_AND_OUT_OF_AN_AVERAGE_CAR: NO DIFFICULTY AT ALL
WALKING_APPROXIMATELY_10_MINUTES: SLIGHT DIFFICULTY
STEPPING_UP_AND_DOWN_CURBS: SLIGHT DIFFICULTY
LIGHT_TO_MODERATE_WORK: NO DIFFICULTY AT ALL
RECREATIONAL_ACTIVITIES: SLIGHT DIFFICULTY
GOING_UP_1_FLIGHT_OF_STAIRS: NO DIFFICULTY AT ALL
HEAVY_WORK: SLIGHT DIFFICULTY
WALKING_INITIALLY: MODERATE DIFFICULTY
SITTING_FOR_15_MINUTES: NO DIFFICULTY AT ALL

## 2018-11-03 NOTE — PROGRESS NOTES
[facility] Initial Evaluation  Subjective:  Patient is a 58 year old female presenting with rehab right hip hpi. The history is provided by the patient. No  was used.   Angela Ibarra is a 58 year old female with a right hip condition.  Condition occurred with:  Insidious onset.  Condition occurred: for unknown reasons.  This is a chronic condition  The patient is reporting insidious onset R anterior hip pain that has increased over the past 6 months (MD order 10/23/2018). States this pain is located in the R groin and radiates down the anterior thigh, stopping at the knee. MRI indicates marrow edema and mild OA, per Dr. Braswell's PN, cannot rule out AVN. The pain increases with transfers and walking after prolonged sitting and R sidelying. Received a steroid injection on 10/27/2018 with moderate relief. Continues to report occasional radiation down the thigh since the injection. .    Patient reports pain:  Groin, anterior and joint.  Radiates to:  Thigh.  Pain is described as other (dull) and is constant and reported as 5/10.  Associated symptoms:  Buckling/giving out and loss of strength.   Symptoms are exacerbated by transfers, sitting, walking and lying on extremity and relieved by nothing.  Since onset symptoms are gradually improving.  Special tests:  MRI.  Previous treatment includes other (steriod injection).  There was moderate improvement following previous treatment.  General health as reported by patient is fair.  Pertinent medical history includes:  High blood pressure, history of fractures, migraines/headaches and overweight.  Medical allergies: no.  Other surgeries include:  Orthopedic surgery.  Current medications:  High blood pressure medication.  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  Primary job tasks include:  Repetitive tasks and other (computer).    Barriers include:  None as reported by the patient.    Red  flags:  None as reported by the patient.                        Objective:    Gait:  Limping today d/t L knee pain/stiffness  Gait Type:  Antalgic                                                      Hip Evaluation  Hip PROM:    Flexion: Left: 105   Right: 100  Extension: Left: 30   Right: 20      Internal Rotation: Left: 30    Right: 17+  External Rotation: Left: 25    Right: 30              Hip Strength:    Flexion:   Left: 4+/5   Pain:  Right: 4/5   ++  Pain:                    Extension:  Left: 4+/5  Pain:Right: 4-/5    Pain:    Abduction:  Left: 3+/5     Pain:Right: 3+/5    Pain:        Knee Flexion:  Left: 5/5   Pain:Right: 5/5   Pain:  Knee Extension:  Left: 5-/5   Pain:Right: 5/5    Pain:        Hip Special Testing:       Right hip positive for the following special tests:  Anamaria and Fadir/Labrum    Hip Palpation:      Right hip tenderness present at:  Greater Trachanter; hip flexors; ASIS and Gluteus Medius  Right hip tenderness not present at:  IT Band             General     ROS    Assessment/Plan:    Patient is a 58 year old female with right side hip complaints.    Patient has the following significant findings with corresponding treatment plan.                Diagnosis 1:  R hip pain  Pain -  hot/cold therapy, manual therapy, education, directional preference exercise and home program  Decreased ROM/flexibility - manual therapy and therapeutic exercise  Decreased strength - therapeutic exercise and therapeutic activities  Impaired gait - gait training  Impaired muscle performance - neuro re-education  Decreased function - therapeutic activities    Therapy Evaluation Codes:   1) History comprised of:   Personal factors that impact the plan of care:      Time since onset of symptoms.    Comorbidity factors that impact the plan of care are:      High blood pressure, Migraines/headaches, Overweight and recent L knee surgery.     Medications impacting care: High blood pressure.  2) Examination of Body  Systems comprised of:   Body structures and functions that impact the plan of care:      Hip.   Activity limitations that impact the plan of care are:      Sitting, Walking, Laying down and transfers.  3) Clinical presentation characteristics are:   Stable/Uncomplicated.  4) Decision-Making    Low complexity using standardized patient assessment instrument and/or measureable assessment of functional outcome.  Cumulative Therapy Evaluation is: Low complexity.    Previous and current functional limitations:  (See Goal Flow Sheet for this information)    Short term and Long term goals: (See Goal Flow Sheet for this information)     Communication ability:  Patient appears to be able to clearly communicate and understand verbal and written communication and follow directions correctly.  Treatment Explanation - The following has been discussed with the patient:   RX ordered/plan of care  Anticipated outcomes  Possible risks and side effects  This patient would benefit from PT intervention to resume normal activities.   Rehab potential is good.    Frequency:  1 X week, once daily  Duration:  for 8 weeks  Discharge Plan:  Achieve all LTG.  Independent in home treatment program.  Reach maximal therapeutic benefit.    Please refer to the daily flowsheet for treatment today, total treatment time and time spent performing 1:1 timed codes.

## 2018-11-03 NOTE — MR AVS SNAPSHOT
After Visit Summary   11/3/2018    Angela Ibarra    MRN: [id number]           Patient Information     Date Of Birth          1960        Visit Information        Provider Department      11/3/2018 8:40 AM Lisa Valladares, PT [facility] [place] [facility]        Today's Diagnoses     Hip pain, right    -  1    Primary osteoarthritis of left hip        Bone marrow edema           Follow-ups after your visit        Your next 10 appointments already scheduled     Nov 08, 2018  8:00 AM CST   WESLEY Extremity with Alex Lanza PT   [facility] [place] [facility] ([facility] [place])    [address]  [address]  [place] [address] [phone]              Who to contact     If you have questions or need follow up information about today's clinic visit or your schedule please contact [facility] directly at [phone].  Normal or non-critical lab and imaging results will be communicated to you by [employer], letter or phone within 4 business days after the clinic has received the results. If you do not hear from us within 7 days, please contact the clinic through [employer] or phone. If you have a critical or abnormal lab result, we will notify you by phone as soon as possible.  Submit refill requests through [employer] or call your pharmacy and they will forward the refill request to us. Please allow 3 business days for your refill to be completed.          Additional Information About Your Visit        MyChart Information     [employer] gives you secure access to your electronic health record. If you see a primary care provider, you can also send messages to your care team and make appointments. If you have questions, please call your primary care clinic.  If you do not have a primary care provider, please call [phone] and they will assist you.        Care EveryWhere ID     This is your Care EveryWhere ID. This could be used by other  organizations to access your [facility] medical records  [id number]        Your Vitals Were     Last Period                   01/08/2008            Blood Pressure from Last 3 Encounters:   10/27/18 138/78   10/23/18 151/80   10/22/18 140/75    Weight from Last 3 Encounters:   10/27/18 102.1 kg (225 lb)   10/23/18 102.1 kg (225 lb)   10/22/18 102.1 kg (225 lb)              We Performed the Following     HC PT EVAL, LOW COMPLEXITY     [facility] INITIAL EVAL REPORT     [facility] PT, HAND, AND CHIROPRACTIC REFERRAL     THERAPEUTIC EXERCISES        Primary Care Provider Office Phone # Fax #    Germán Jernigan [phone] [address] [facility] [address]        Equal Access to Services     ENOCH ARROYO : Hadii amx moreno hadasho Soomaali, waaxda luqadaha, qaybta kaalmada adeegyada, oleksandr rocha . So [facility] [phone].    ATENCIÓN: Si habla español, tiene a lebron disposición servicios gratuitos de asistencia lingüística. Llame al [phone].    We comply with applicable federal civil rights laws and [place] laws. We do not discriminate on the basis of race, color, national origin, age, disability, sex, sexual orientation, or gender identity.            Thank you!     Thank you for choosing [facility]  for your care. Our goal is always to provide you with excellent care. Hearing back from our patients is one way we can continue to improve our services. Please take a few minutes to complete the written survey that you may receive in the mail after your visit with us. Thank you!             Your Updated Medication List - Protect others around you: Learn how to safely use, store and throw away your medicines at www.disposemymeds.org.          This list is accurate as of 11/3/18 12:19 PM.  Always use your most recent med list.                   Brand Name Dispense Instructions for use Diagnosis    amLODIPine 5 MG tablet    NORVASC    90 tablet    Take 1  tablet (5 mg) by mouth daily    Essential hypertension with goal blood pressure less than 140/90       atenolol 50 MG tablet    TENORMIN    90 tablet    Take 1 tablet (50 mg) by mouth daily    Essential hypertension with goal blood pressure less than 140/90       estradiol 0.1 MG/GM cream    ESTRACE    42.5 g    Place 2 g vaginally twice a week    Atrophy of vagina       IBUPROFEN PO      Take 400 mg by mouth every 6 hours as needed for moderate pain        traZODone 50 MG tablet    DESYREL    180 tablet    Take 2 tablets (100 mg) by mouth nightly as needed for sleep    Insomnia, unspecified type       TYLENOL EXTRA STRENGTH PO      Take 1-2 tablets by mouth as needed

## 2018-11-08 ENCOUNTER — THERAPY VISIT (OUTPATIENT)
Dept: PHYSICAL THERAPY | Facility: CLINIC | Age: 58
End: 2018-11-08
Payer: COMMERCIAL

## 2018-11-08 DIAGNOSIS — M25.562 ACUTE PAIN OF LEFT KNEE: ICD-10-CM

## 2018-11-08 DIAGNOSIS — M25.551 HIP PAIN, RIGHT: Primary | ICD-10-CM

## 2018-11-08 DIAGNOSIS — Z98.890 S/P LEFT KNEE ARTHROSCOPY: ICD-10-CM

## 2018-11-08 PROCEDURE — 97110 THERAPEUTIC EXERCISES: CPT | Mod: GP | Performed by: PHYSICAL THERAPIST

## 2018-11-08 PROCEDURE — 97140 MANUAL THERAPY 1/> REGIONS: CPT | Mod: GP | Performed by: PHYSICAL THERAPIST

## 2018-11-08 NOTE — MR AVS SNAPSHOT
After Visit Summary   11/8/2018    Angela Ibarra    MRN: [id number]           Patient Information     Date Of Birth          1960        Visit Information        Provider Department      11/8/2018 8:00 AM Alex Lanza, PT [facility] [place] [facility]        Today's Diagnoses     Hip pain, right    -  1    Acute pain of left knee        S/P left knee arthroscopy           Follow-ups after your visit        Your next 10 appointments already scheduled     Nov 14, 2018  8:00 AM CST   WELSEY Extremity with Linda Villarreal, PT   [facility] [place] [facility] ([facility] [place])    [address]  [place] [address]   [phone]            Nov 23, 2018  7:40 AM CST   WESLEY Extremity with Alex Lanza, PT   [facility] [place] [facility] ([facility] [place])    [address]  [place] [address]   [phone]            Nov 29, 2018  8:00 AM CST   WESLEY Extremity with Alex Lanza, PT   [facility] ([facility] [place])    [address]  [place] [address]   [phone]              Who to contact     If you have questions or need follow up information about today's clinic visit or your schedule please contact [facility] directly at [phone].  Normal or non-critical lab and imaging results will be communicated to you by MyChart, letter or phone within 4 business days after the clinic has received the results. If you do not hear from us within 7 days, please contact the clinic through MyChart or phone. If you have a critical or abnormal lab result, we will notify you by phone as soon as possible.  Submit refill requests through [employer] or call your pharmacy and they will forward the refill request to us. Please allow 3 business days for your refill to be completed.          Additional Information About Your Visit         Commun[employer] Information     [employer] gives you secure access to your electronic health record. If you see a primary care provider, you can also send messages to your care team and make appointments. If you have questions, please call your primary care clinic.  If you do not have a primary care provider, please call [phone] and they will assist you.        Care EveryWhere ID     This is your Care EveryWhere ID. This could be used by other organizations to access your [place] medical records  [id number]        Your Vitals Were     Last Period                   01/08/2008            Blood Pressure from Last 3 Encounters:   10/27/18 138/78   10/23/18 151/80   10/22/18 140/75    Weight from Last 3 Encounters:   10/27/18 102.1 kg (225 lb)   10/23/18 102.1 kg (225 lb)   10/22/18 102.1 kg (225 lb)              We Performed the Following     MANUAL THER TECH,1+REGIONS,EA 15 MIN     THERAPEUTIC EXERCISES        Primary Care Provider Office Phone # Fax #    Germán Jernigan [phone] [address] [facility] [address]        Equal Access to Services     ELVIN [facility] : Hadii aad ku hadasho Soomaali, waaxda luqadaha, qaybta kaalmada ademarioyada, oleksandr rocha . So [facility] [phone].    ATENCIÓN: Si habla español, tiene a lebron disposición servicios gratPeak Behavioral Health Servicesos de asistencia lingüística. [facility] [phone].    We comply with applicable federal civil rights laws and [place] laws. We do not discriminate on the basis of race, color, national origin, age, disability, sex, sexual orientation, or gender identity.            Thank you!     Thank you for choosing [facility]  for your care. Our goal is always to provide you with excellent care. Hearing back from our patients is one way we can continue to improve our services. Please take a few minutes to complete the written survey that you may receive in the mail after your visit with us. Thank  you!             Your Updated Medication List - Protect others around you: Learn how to safely use, store and throw away your medicines at www.disposemymeds.org.          This list is accurate as of 11/8/18  9:18 AM.  Always use your most recent med list.                   Brand Name Dispense Instructions for use Diagnosis    amLODIPine 5 MG tablet    NORVASC    90 tablet    Take 1 tablet (5 mg) by mouth daily    Essential hypertension with goal blood pressure less than 140/90       atenolol 50 MG tablet    TENORMIN    90 tablet    Take 1 tablet (50 mg) by mouth daily    Essential hypertension with goal blood pressure less than 140/90       estradiol 0.1 MG/GM cream    ESTRACE    42.5 g    Place 2 g vaginally twice a week    Atrophy of vagina       IBUPROFEN PO      Take 400 mg by mouth every 6 hours as needed for moderate pain        traZODone 50 MG tablet    DESYREL    180 tablet    Take 2 tablets (100 mg) by mouth nightly as needed for sleep    Insomnia, unspecified type       TYLENOL EXTRA STRENGTH PO      Take 1-2 tablets by mouth as needed

## 2018-11-08 NOTE — PROGRESS NOTES
Subjective:  HPI                    Objective:  System    Physical Exam    General     ROS    Assessment/Plan:    SUBJECTIVE  Subjective: The knee itself feels like it is progressing but the muscle pain in her calf and quad are not good.  No change in R hip yet   Current Pain level: 3/10   Changes in function:  None     Adverse reaction to treatment or activity:  None    OBJECTIVE  Objective: AROM L knee: 0-128.  Arrives with antalgic gait with very poor push off on L.  Post STM limp was significantly less and pt reported feeling looser     ASSESSMENT  Angela continues to require intervention to meet STG and LTG's: PT  Patient is progressing as expected.  Response to therapy has shown an improvement in  ROM   Response to therapy has shown lack of progress in  pain level  Progress made towards STG/LTG?  None    PLAN  Current treatment program is being advanced to more complex exercises.  The following procedures have been added:  manual therapy    PTA/ATC plan:  N/A    Please refer to the daily flowsheet for treatment today, total treatment time and time spent performing 1:1 timed codes.

## 2018-11-14 ENCOUNTER — THERAPY VISIT (OUTPATIENT)
Dept: PHYSICAL THERAPY | Facility: CLINIC | Age: 58
End: 2018-11-14
Payer: COMMERCIAL

## 2018-11-14 DIAGNOSIS — Z98.890 S/P LEFT KNEE ARTHROSCOPY: ICD-10-CM

## 2018-11-14 DIAGNOSIS — M25.562 ACUTE PAIN OF LEFT KNEE: ICD-10-CM

## 2018-11-14 PROCEDURE — 97110 THERAPEUTIC EXERCISES: CPT | Mod: GP | Performed by: PHYSICAL THERAPIST

## 2018-11-14 PROCEDURE — 97116 GAIT TRAINING THERAPY: CPT | Mod: GP | Performed by: PHYSICAL THERAPIST

## 2018-11-14 PROCEDURE — 97140 MANUAL THERAPY 1/> REGIONS: CPT | Mod: GP | Performed by: PHYSICAL THERAPIST

## 2018-11-15 ENCOUNTER — OFFICE VISIT (OUTPATIENT)
Dept: ORTHOPEDICS | Facility: CLINIC | Age: 58
End: 2018-11-15
Payer: COMMERCIAL

## 2018-11-15 VITALS — SYSTOLIC BLOOD PRESSURE: 138 MMHG | DIASTOLIC BLOOD PRESSURE: 90 MMHG | TEMPERATURE: 97.6 F | HEART RATE: 88 BPM

## 2018-11-15 DIAGNOSIS — M62.838 MUSCLE SPASM OF LEFT LOWER EXTREMITY: ICD-10-CM

## 2018-11-15 DIAGNOSIS — Z98.890 S/P ARTHROSCOPIC SURGERY OF LEFT KNEE: Primary | ICD-10-CM

## 2018-11-15 PROCEDURE — 99024 POSTOP FOLLOW-UP VISIT: CPT | Performed by: ORTHOPAEDIC SURGERY

## 2018-11-15 RX ORDER — CYCLOBENZAPRINE HCL 10 MG
10 TABLET ORAL 3 TIMES DAILY PRN
Qty: 20 TABLET | Refills: 1 | Status: SHIPPED | OUTPATIENT
Start: 2018-11-15 | End: 2019-04-25

## 2018-11-15 ASSESSMENT — PAIN SCALES - GENERAL: PAINLEVEL: EXTREME PAIN (8)

## 2018-11-15 NOTE — MR AVS SNAPSHOT
After Visit Summary   11/15/2018    Angela Ibarra    MRN: [id number]           Patient Information     Date Of Birth          1960        Visit Information        Provider Department      11/15/2018 4:15 PM Aryan Holley MD [facility] [place]        Today's Diagnoses     S/P arthroscopic surgery of left knee    -  1    Muscle spasm of left lower extremity           Follow-ups after your visit        Follow-up notes from your care team     Return if symptoms worsen or fail to improve.      Your next 10 appointments already scheduled     Nov 23, 2018  7:40 AM CST   WESLEY Extremity with Alex Lanza PT   [facility] [place] [facility] ([facility] [place]    [address]  [place] [address]   [phone]            Nov 29, 2018  8:00 AM CST   WESLEY Extremity with Alex Lanza PT   [facility] [place] [facility] ([facility] [place])    [address]  [place] [address]   [phone]              Who to contact     If you have questions or need follow up information about today's clinic visit or your schedule please contact [facility] directly at [phone].  Normal or non-critical lab and imaging results will be communicated to you by [employer], letter or phone within 4 business days after the clinic has received the results. If you do not hear from us within 7 days, please contact the clinic through [employer] or phone. If you have a critical or abnormal lab result, we will notify you by phone as soon as possible.  Submit refill requests through [employer] or call your pharmacy and they will forward the refill request to us. Please allow 3 business days for your refill to be completed.          Additional Information About Your Visit        [employer] Information     [employer] gives you secure access to your electronic health record. If you see a primary care  provider, you can also send messages to your care team and make appointments. If you have questions, please call your primary care clinic.  If you do not have a primary care provider, please call [phone] and they will assist you.        Care EveryWhere ID     This is your Care EveryWhere ID. This could be used by other organizations to access your [facility] medical records  [id number]        Your Vitals Were     Pulse Temperature Last Period             88 97.6  F (36.4  C) (Oral) 01/08/2008          Blood Pressure from Last 3 Encounters:   11/15/18 138/90   10/27/18 138/78   10/23/18 151/80    Weight from Last 3 Encounters:   10/27/18 225 lb (102.1 kg)   10/23/18 225 lb (102.1 kg)   10/22/18 225 lb (102.1 kg)              Today, you had the following     No orders found for display         Today's Medication Changes          These changes are accurate as of 11/15/18 11:59 PM.  If you have any questions, ask your nurse or doctor.               Start taking these medicines.        Dose/Directions    cyclobenzaprine 10 MG tablet   Commonly known as:  FLEXERIL   Used for:  Muscle spasm of left lower extremity, S/P arthroscopic surgery of left knee   Started by:  Aryan Holley MD        Dose:  10 mg   Take 1 tablet (10 mg) by mouth 3 times daily as needed for muscle spasms   Quantity:  20 tablet   Refills:  1            Where to get your medicines      These medications were sent to [facility] - [address]  [address] [address]     Phone:  [phone]     cyclobenzaprine 10 MG tablet                Primary Care Provider Office Phone # Fax #    Germán Jernigan [phone] [address] [facility] [address]        Equal Access to Services     ENOCH ARROYO : Anusha rodriguez Soisamar, waaxda luqadaha, qaybta kaalmaoleksandr tang. So [facility] [phone].    ATENCIÓN: Rosio nazario,  tiene a lebron disposición servicios gratuitos de asistencia lingüística. Kasandra currie [phone].    We comply with applicable federal civil rights laws and [place] laws. We do not discriminate on the basis of race, color, national origin, age, disability, sex, sexual orientation, or gender identity.            Thank you!     Thank you for choosing [facility]  for your care. Our goal is always to provide you with excellent care. Hearing back from our patients is one way we can continue to improve our services. Please take a few minutes to complete the written survey that you may receive in the mail after your visit with us. Thank you!             Your Updated Medication List - Protect others around you: Learn how to safely use, store and throw away your medicines at www.disposemymeds.org.          This list is accurate as of 11/15/18 11:59 PM.  Always use your most recent med list.                   Brand Name Dispense Instructions for use Diagnosis    amLODIPine 5 MG tablet    NORVASC    90 tablet    Take 1 tablet (5 mg) by mouth daily    Essential hypertension with goal blood pressure less than 140/90       atenolol 50 MG tablet    TENORMIN    90 tablet    Take 1 tablet (50 mg) by mouth daily    Essential hypertension with goal blood pressure less than 140/90       cyclobenzaprine 10 MG tablet    FLEXERIL    20 tablet    Take 1 tablet (10 mg) by mouth 3 times daily as needed for muscle spasms    Muscle spasm of left lower extremity, S/P arthroscopic surgery of left knee       estradiol 0.1 MG/GM cream    ESTRACE    42.5 g    Place 2 g vaginally twice a week    Atrophy of vagina       IBUPROFEN PO      Take 400 mg by mouth every 6 hours as needed for moderate pain        traZODone 50 MG tablet    DESYREL    180 tablet    Take 2 tablets (100 mg) by mouth nightly as needed for sleep    Insomnia, unspecified type       TYLENOL EXTRA STRENGTH PO      Take 1-2 tablets by mouth as needed

## 2018-11-15 NOTE — LETTER
"    11/15/2018         RE: Angela Ibarra  [address] [place] [address]        Dear Colleague,    Thank you for referring your patient, Angela Ibarra, to the [facility] [place]. Please see a copy of my visit note below.    Chief Complaint   Patient presents with     Surgical Followup     Left Knee Arthroscopy on 10/4.  Was doing well until she started PT.. Knee in throbbing and feels like it is going to \"Give out\"       SURGERY: left knee arthroscopy . ( [facility] )  1. Left knee arthroscopic partial medial meniscectomy  2. Left knee arthroscopic shaving chondroplasty of the medial femoral condyle and femoral trochlea    DATE OF SURGERY: 10/4/2018.      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 Surgery occurred 6 weeks ago. At last visit, she was doing well. After starting physical therapy, she started to develop throbbing thigh, knee and leg pain. Notes that her knee feels the knee is going to \"give out\" on her, but hasn't. Feels weak. Extreme pain today, rated an 8/10. Pain is located over the thigh, medial knee pain as well as calf pain. Radiating pain. Presents with her  today. Had DVT scan to rule out DVT, which was negative for DVT.    OR FINDINGS:  Complex tear of the posterior horn and root of the medial meniscus. Medial meniscus with myxoid degeneration.  Grade 4 chondrosis of the femoral trochlea.  Grade 2 and 3 chondrosis of the medial femoral condyle, grade 1 and 2 chondrosis of the medial tibia.  Intact lateral meniscus, intact lateral articular surfaces.     Past Medical History:   Diagnosis Date     Calculus of kidney      Migraine, unspecified, without mention of intractable migraine without mention of status migrainosus      Other " specified iron deficiency anemias      Papanicolaou smear of cervix with low grade squamous intraepithelial lesion (LGSIL)     Colpo and hyst pathology benign     Primary osteoarthritis of right hip      Synovitis of ankle      Unspecified essential hypertension     Essential hypertension       Past Surgical History:   Procedure Laterality Date     ARTHROSCOPY ANKLE  2014    Procedure: ARTHROSCOPY ANKLE;  Surgeon: Shaun Bhatt DPM;  Location: [facility] OR     ARTHROSCOPY KNEE Left 10/4/2018    Procedure: ARTHROSCOPY KNEE;  Left knee arthroscopy  medial meniscal and chondral debridement;  Surgeon: Aryan Holley MD;  Location: [facility] OR     C  DELIVERY ONLY  1985    , Low Cervical     C GASTRIC BYPASS,OBESITY,W/SM BOWEL RECONS  10/99     C LIGATE FALLOPIAN TUBE  2000    laparoscopy     COLONOSCOPY WITH CO2 INSUFFLATION N/A 2017    Procedure: COLONOSCOPY WITH CO2 INSUFFLATION;  COLON SCREEN/ YURI;  Surgeon: Cody Arana MD;  Location: [facility] OR     HYSTERECTOMY, PAP NO LONGER INDICATED  2008     LAPAROSCOPIC CHOLECYSTECTOMY  8/3/2012    Procedure: LAPAROSCOPIC CHOLECYSTECTOMY;  Laparoscopic Cholecystectomy ;  Surgeon: Corwin Cabezas MD;  Location: [facility] OR     OPEN REDUCTION INTERNAL FIXATION ANKLE  2014    Procedure: OPEN REDUCTION INTERNAL FIXATION ANKLE;  Surgeon: Shaun Bhatt DPM;  Location: [facility] OR     OPEN REDUCTION INTERNAL FIXATION ELBOW  10/15/2013    Procedure: OPEN REDUCTION INTERNAL FIXATION ELBOW;  OPEN REDUCTION INTERNAL FIXATION LEFT PROXIMAL ULNA;  Surgeon: Artem Last DO;  Location:  OR     ORTHOPEDIC SURGERY      left shoulder surgery     REMOVE MESH VAGINA  10/10/2011    Procedure:REMOVE MESH VAGINA; Excision of Vaginal Mesh; Surgeon:SERGE SALGADO; Location:[facility] OR     SURGICAL HISTORY OF -   4/20/10    Transobturator midurethral sling     SURGICAL HISTORY OF -   1999    exploratory laparotomy, colitis      SURGICAL HISTORY OF -   4/20/10    Transobturator midurethral sling     TUBAL LIGATION         Medications:   Current Outpatient Prescriptions:      Acetaminophen (TYLENOL EXTRA STRENGTH PO), Take 1-2 tablets by mouth as needed, Disp: , Rfl:      amLODIPine (NORVASC) 5 MG tablet, Take 1 tablet (5 mg) by mouth daily, Disp: 90 tablet, Rfl: 2     atenolol (TENORMIN) 50 MG tablet, Take 1 tablet (50 mg) by mouth daily, Disp: 90 tablet, Rfl: 3     estradiol (ESTRACE) 0.1 MG/GM cream, Place 2 g vaginally twice a week, Disp: 42.5 g, Rfl: 2     IBUPROFEN PO, Take 400 mg by mouth every 6 hours as needed for moderate pain, Disp: , Rfl:      traZODone (DESYREL) 50 MG tablet, Take 2 tablets (100 mg) by mouth nightly as needed for sleep, Disp: 180 tablet, Rfl: 3    Current Facility-Administered Medications:      ropivacaine (NAROPIN) injection 3 mL, 3 mL, , , Shaun Munoz DO, 3 mL at 10/27/18 1200     triamcinolone acetonide (KENALOG-40) injection 40 mg, 40 mg, , , Shaun Munoz DO, 40 mg at 10/27/18 1200    Facility-Administered Medications Ordered in Other Visits:      bupivacaine 0.5% EPINEPHrine 1:200,000 injection, , , GORDONN, Fausto Estrada APRN CRNA, 30 mL at 10/15/13 1001    Allergies:   Allergies   Allergen Reactions     Bactrim [Sulfamethoxazole W/Trimethoprim]      itching     Nitrofurantoin Itching       REVIEW OF SYSTEMS:   CONSTITUTIONAL:NEGATIVE for fever, chills, night sweats  INTEGUMENTARY/SKIN: NEGATIVE for worrisome wound problems or redness.  MUSCULOSKELETAL:See HPI above  NEURO: NEGATIVE for weakness, dizziness or paresthesias    This document serves as a record of the services and decisions personally performed and made by Aryan Holley MD. It was created on his behalf by Stella Cortes, a trained medical scribe. The creation of this document is based the provider's statements to the medical scribe.    Scribe Stella Cortes 4:20 PM 11/15/2018      PHYSICAL EXAM:  /90  Pulse 88  Temp  97.6  F (36.4  C) (Oral)  LMP 01/08/2008   GENERAL APPEARANCE: healthy, alert, no distress  SKIN: no suspicious lesions or rashes  NEURO: Normal strength and tone, mentation intact and speech normal  PSYCH:  mentation appears normal and affect normal, not anxious  RESPIRATORY: No increased work of breathing.    LEFT  LOWER EXTREMITY:  Gait: using cane right hand, favors the left leg.  No Quad atrophy, strength weak on the left.  Intact sensation deep peroneal nerve, superficial peroneal nerve, med/lat tibial nerve, sural nerve, saphenous nerve  Intact EHL, EDL, TA, FHL, GS, quadriceps hamstrings and hip flexors  Toes warm and well perfused, brisk capillary refill. Palpable 2+ dp pulses.  Left calf tenderness. Left thigh / quadriceps tenderness.  Edema: none    left  KNEE EXAM:    Skin: intact, no ecchymosis or erythema  Incisions: well healed. Dry. No erythema.  ROM: 0 extension to 120 flexion  Effusion: minimal  Tender: medial joint line, but essentially tender diffuse from the hip to foot.           X-RAY: none indicated.      Impression: Angela Ibarra is a 58 year old female 6 weeks status post left knee arthroscopy and partial medial meniscectomy and shaving chondroplasty, with diffuse left lower extremity pain, consistent muscular pain and cramping.       Plan:     * appears that a lot of her pain is muscular in nature, likely from altered gait and exercises as appears to have started upon commencement of Physical Therapy.  * I'd like her to try muscle massage, stretching as well as muscle relaxers.  * continue with Physical Therapy.    * Rest  * Activity modification - avoid activities that aggravate symptoms.  * NSAIDS - regular use for inflammation, with food, as long as no contra-indications. Please discuss with pcp if needed.  * Ice twice daily to three times daily as needed.  * Compression wrap as needed.  * Elevation of extremity to reduce swelling as needed.  * Continue physical therapy for  strengthening, stretching and range of motion exercises, effusion control.  * Prescribed flexeril today. Take as prescribed  * Tylenol as needed for pain  * return to clinic as needed.      * We did also discuss that based on the amount of arthritic changes seen at the time of surgery, may have continued knee pain due to the arthritis. Once recovered from the knee arthroscopy, and arthritic symptoms persist, consider full treatment of arthritis starting with Physical Therapy and injections, NSAIDS, activity modification, bracing.    The information in this document, created by a scribe for me, accurately reflects the services I personally performed and the decisions made by me. I have reviewed and approved this document for accuracy.        Aryan Holley M.D., M.S.  Dept. of Orthopaedic Surgery  [facility]      Again, thank you for allowing me to participate in the care of your patient.        Sincerely,        Aryan Holley MD

## 2018-11-15 NOTE — PROGRESS NOTES
"Chief Complaint   Patient presents with     Surgical Followup     Left Knee Arthroscopy on 10/4.  Was doing well until she started PT.. Knee in throbbing and feels like it is going to \"Give out\"       SURGERY: left knee arthroscopy . ( [facility] )  1. Left knee arthroscopic partial medial meniscectomy  2. Left knee arthroscopic shaving chondroplasty of the medial femoral condyle and femoral trochlea    DATE OF SURGERY: 10/4/2018.      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 Surgery occurred 6 weeks ago. At last visit, she was doing well. After starting physical therapy, she started to develop throbbing thigh, knee and leg pain. Notes that her knee feels the knee is going to \"give out\" on her, but hasn't. Feels weak. Extreme pain today, rated an 8/10. Pain is located over the thigh, medial knee pain as well as calf pain. Radiating pain. Presents with her  today. Had DVT scan to rule out DVT, which was negative for DVT.    OR FINDINGS:  Complex tear of the posterior horn and root of the medial meniscus. Medial meniscus with myxoid degeneration.  Grade 4 chondrosis of the femoral trochlea.  Grade 2 and 3 chondrosis of the medial femoral condyle, grade 1 and 2 chondrosis of the medial tibia.  Intact lateral meniscus, intact lateral articular surfaces.     Past Medical History:   Diagnosis Date     Calculus of kidney      Migraine, unspecified, without mention of intractable migraine without mention of status migrainosus      Other specified iron deficiency anemias      Papanicolaou smear of cervix with low grade squamous intraepithelial lesion (LGSIL) 11/07    Colpo and hyst pathology benign     Primary osteoarthritis of right hip      Synovitis of ankle      Unspecified essential " hypertension     Essential hypertension       Past Surgical History:   Procedure Laterality Date     ARTHROSCOPY ANKLE  2014    Procedure: ARTHROSCOPY ANKLE;  Surgeon: Shaun Bhatt DPM;  Location: [facility] OR     ARTHROSCOPY KNEE Left 10/4/2018    Procedure: ARTHROSCOPY KNEE;  Left knee arthroscopy  medial meniscal and chondral debridement;  Surgeon: Aryan Holley MD;  Location: [facility] OR     C  DELIVERY ONLY      , Low Cervical     C GASTRIC BYPASS,OBESITY,W/SM BOWEL RECONS  10/99     C LIGATE FALLOPIAN TUBE  2000    laparoscopy     COLONOSCOPY WITH CO2 INSUFFLATION N/A 2017    Procedure: COLONOSCOPY WITH CO2 INSUFFLATION;  COLON SCREEN/ YURI;  Surgeon: Cody Arana MD;  Location: [facility] OR     HYSTERECTOMY, PAP NO LONGER INDICATED  2008     LAPAROSCOPIC CHOLECYSTECTOMY  8/3/2012    Procedure: LAPAROSCOPIC CHOLECYSTECTOMY;  Laparoscopic Cholecystectomy ;  Surgeon: Corwin Cabezas MD;  Location: [facility] OR     OPEN REDUCTION INTERNAL FIXATION ANKLE  2014    Procedure: OPEN REDUCTION INTERNAL FIXATION ANKLE;  Surgeon: Shaun Bhatt DPM;  Location: [facility] OR     OPEN REDUCTION INTERNAL FIXATION ELBOW  10/15/2013    Procedure: OPEN REDUCTION INTERNAL FIXATION ELBOW;  OPEN REDUCTION INTERNAL FIXATION LEFT PROXIMAL ULNA;  Surgeon: Artem Last DO;  Location: [facility] OR     ORTHOPEDIC SURGERY      left shoulder surgery     REMOVE MESH VAGINA  10/10/2011    Procedure:REMOVE MESH VAGINA; Excision of Vaginal Mesh; Surgeon:SERGE SALGADO; Location:[facility] OR     SURGICAL HISTORY OF -   4/20/10    Transobturator midurethral sling     SURGICAL HISTORY OF -   1999    exploratory laparotomy, colitis     SURGICAL HISTORY OF -   4/20/10    Transobturator midurethral sling     TUBAL LIGATION         Medications:   Current Outpatient Prescriptions:      Acetaminophen (TYLENOL EXTRA STRENGTH PO), Take 1-2 tablets by mouth as needed, Disp: , Rfl:      amLODIPine  (NORVASC) 5 MG tablet, Take 1 tablet (5 mg) by mouth daily, Disp: 90 tablet, Rfl: 2     atenolol (TENORMIN) 50 MG tablet, Take 1 tablet (50 mg) by mouth daily, Disp: 90 tablet, Rfl: 3     estradiol (ESTRACE) 0.1 MG/GM cream, Place 2 g vaginally twice a week, Disp: 42.5 g, Rfl: 2     IBUPROFEN PO, Take 400 mg by mouth every 6 hours as needed for moderate pain, Disp: , Rfl:      traZODone (DESYREL) 50 MG tablet, Take 2 tablets (100 mg) by mouth nightly as needed for sleep, Disp: 180 tablet, Rfl: 3    Current Facility-Administered Medications:      ropivacaine (NAROPIN) injection 3 mL, 3 mL, , , Shaun Munoz DO, 3 mL at 10/27/18 1200     triamcinolone acetonide (KENALOG-40) injection 40 mg, 40 mg, , , Shaun Munoz DO, 40 mg at 10/27/18 1200    Facility-Administered Medications Ordered in Other Visits:      bupivacaine 0.5% EPINEPHrine 1:200,000 injection, , , GORDONN, Fausto Estrada APRN CRNA, 30 mL at 10/15/13 1001    Allergies:   Allergies   Allergen Reactions     Bactrim [Sulfamethoxazole W/Trimethoprim]      itching     Nitrofurantoin Itching       REVIEW OF SYSTEMS:   CONSTITUTIONAL:NEGATIVE for fever, chills, night sweats  INTEGUMENTARY/SKIN: NEGATIVE for worrisome wound problems or redness.  MUSCULOSKELETAL:See HPI above  NEURO: NEGATIVE for weakness, dizziness or paresthesias    This document serves as a record of the services and decisions personally performed and made by Aryan Holley MD. It was created on his behalf by Stella Cortes, a trained medical scribe. The creation of this document is based the provider's statements to the medical scribe.    Scribe Stella Cortes 4:20 PM 11/15/2018      PHYSICAL EXAM:  /90  Pulse 88  Temp 97.6  F (36.4  C) (Oral)  LMP 01/08/2008   GENERAL APPEARANCE: healthy, alert, no distress  SKIN: no suspicious lesions or rashes  NEURO: Normal strength and tone, mentation intact and speech normal  PSYCH:  mentation appears normal and affect normal, not  anxious  RESPIRATORY: No increased work of breathing.    LEFT  LOWER EXTREMITY:  Gait: using cane right hand, favors the left leg.  No Quad atrophy, strength weak on the left.  Intact sensation deep peroneal nerve, superficial peroneal nerve, med/lat tibial nerve, sural nerve, saphenous nerve  Intact EHL, EDL, TA, FHL, GS, quadriceps hamstrings and hip flexors  Toes warm and well perfused, brisk capillary refill. Palpable 2+ dp pulses.  Left calf tenderness. Left thigh / quadriceps tenderness.  Edema: none    left  KNEE EXAM:    Skin: intact, no ecchymosis or erythema  Incisions: well healed. Dry. No erythema.  ROM: 0 extension to 120 flexion  Effusion: minimal  Tender: medial joint line, but essentially tender diffuse from the hip to foot.           X-RAY: none indicated.      Impression: Angela Ibarra is a 58 year old female 6 weeks status post left knee arthroscopy and partial medial meniscectomy and shaving chondroplasty, with diffuse left lower extremity pain, consistent muscular pain and cramping.       Plan:     * appears that a lot of her pain is muscular in nature, likely from altered gait and exercises as appears to have started upon commencement of Physical Therapy.  * I'd like her to try muscle massage, stretching as well as muscle relaxers.  * continue with Physical Therapy.    * Rest  * Activity modification - avoid activities that aggravate symptoms.  * NSAIDS - regular use for inflammation, with food, as long as no contra-indications. Please discuss with pcp if needed.  * Ice twice daily to three times daily as needed.  * Compression wrap as needed.  * Elevation of extremity to reduce swelling as needed.  * Continue physical therapy for strengthening, stretching and range of motion exercises, effusion control.  * Prescribed flexeril today. Take as prescribed  * Tylenol as needed for pain  * return to clinic as needed.      * We did also discuss that based on the amount of arthritic changes seen at  the time of surgery, may have continued knee pain due to the arthritis. Once recovered from the knee arthroscopy, and arthritic symptoms persist, consider full treatment of arthritis starting with Physical Therapy and injections, NSAIDS, activity modification, bracing.    The information in this document, created by a scribe for me, accurately reflects the services I personally performed and the decisions made by me. I have reviewed and approved this document for accuracy.        Aryan Holley M.D., M.S.  Dept. of Orthopaedic Surgery  [facility]

## 2018-11-23 ENCOUNTER — THERAPY VISIT (OUTPATIENT)
Dept: PHYSICAL THERAPY | Facility: CLINIC | Age: 58
End: 2018-11-23
Payer: COMMERCIAL

## 2018-11-23 DIAGNOSIS — Z98.890 S/P LEFT KNEE ARTHROSCOPY: ICD-10-CM

## 2018-11-23 DIAGNOSIS — M25.551 HIP PAIN, RIGHT: ICD-10-CM

## 2018-11-23 DIAGNOSIS — M25.562 ACUTE PAIN OF LEFT KNEE: ICD-10-CM

## 2018-11-23 PROCEDURE — 97530 THERAPEUTIC ACTIVITIES: CPT | Mod: GP | Performed by: PHYSICAL THERAPIST

## 2018-11-23 PROCEDURE — 97110 THERAPEUTIC EXERCISES: CPT | Mod: GP | Performed by: PHYSICAL THERAPIST

## 2018-11-23 PROCEDURE — 97140 MANUAL THERAPY 1/> REGIONS: CPT | Mod: GP | Performed by: PHYSICAL THERAPIST

## 2018-11-23 NOTE — PROGRESS NOTES
"Subjective:  HPI                    Objective:  System    Physical Exam    General     ROS    Assessment/Plan:    PROGRESS  REPORT    Progress reporting period is from 10/25/18 to 11/23/18.       SUBJECTIVE  Subjective: Things are finally starting to loosen up so it feels better.  Has been taking a muscle relaxer that helps too    Current Pain level: 2/10.     Initial Pain level: 5/10.   Changes in function:  Yes (See Goal flowsheet attached for changes in current functional level)  Adverse reaction to treatment or activity: None    OBJECTIVE  Objective: Gait is much less antalgic than what we have seen previously.  AROM L knee: 0-127.  Performed 4\" step ups without much difficulty     ASSESSMENT/PLAN  Updated problem list and treatment plan: Diagnosis 1:  Knee pain s/p scope  Pain -  self management, education and home program  Decreased ROM/flexibility - manual therapy, therapeutic exercise and home program  Decreased strength - therapeutic exercise, therapeutic activities and home program  Impaired gait - gait training and home program  STG/LTGs have been met or progress has been made towards goals:  Yes (See Goal flow sheet completed today.)  Assessment of Progress: The patient's condition is improving.  The patient's condition has potential to improve.  Self Management Plans:  Patient has been instructed in a home treatment program.  Angela continues to require the following intervention to meet STG and LTG's:  PT    Recommendations:  This patient would benefit from continued therapy.     Frequency:  1 X week, once daily  Duration:  for 4 weeks        Please refer to the daily flowsheet for treatment today, total treatment time and time spent performing 1:1 timed codes.            "

## 2018-11-23 NOTE — MR AVS SNAPSHOT
After Visit Summary   11/23/2018    Angela Ibarra    MRN: [id number]           Patient Information     Date Of Birth          1960        Visit Information        Provider Department      11/23/2018 7:40 AM Alex Lanza, PT [facility] [place] [facility]        Today's Diagnoses     Acute pain of left knee        S/P left knee arthroscopy        Hip pain, right           Follow-ups after your visit        Your next 10 appointments already scheduled     Nov 29, 2018  8:00 AM CST   [facility] Extremity with Alex Lanza PT   [facility] [place] [facility] ([facility] [place])    [address]  [place] [address]   [phone]            Dec 06, 2018  8:00 AM CST   WES[facility] Extremity with Alex Lanza PT   [facility] [place] [facility] ([facility] [place])    [address]  [place] [address]   [phone]              Who to contact     If you have questions or need follow up information about today's clinic visit or your schedule please contact [facility] directly at [phone].  Normal or non-critical lab and imaging results will be communicated to you by [employer], letter or phone within 4 business days after the clinic has received the results. If you do not hear from us within 7 days, please contact the clinic through [employer] or phone. If you have a critical or abnormal lab result, we will notify you by phone as soon as possible.  Submit refill requests through [email or web address] or call your pharmacy and they will forward the refill request to us. Please allow 3 business days for your refill to be completed.          Additional Information About Your Visit        MyChart Information     [email or web address] gives you secure access to your electronic health record. If you see a primary care provider, you can also send messages to your care team and make appointments. If you have questions,  please call your primary care clinic.  If you do not have a primary care provider, please call [phone] and they will assist you.        Care EveryWhere ID     This is your Care EveryWhere ID. This could be used by other organizations to access your [facility] medical records  [id number]        Your Vitals Were     Last Period                   01/08/2008            Blood Pressure from Last 3 Encounters:   11/15/18 138/90   10/27/18 138/78   10/23/18 151/80    Weight from Last 3 Encounters:   10/27/18 102.1 kg (225 lb)   10/23/18 102.1 kg (225 lb)   10/22/18 102.1 kg (225 lb)              We Performed the Following     MANUAL THER TECH,1+REGIONS,EA 15 MIN     THERAPEUTIC ACTIVITIES     THERAPEUTIC EXERCISES        Primary Care Provider Office Phone # Fax #    Germán Jernigan [phone] [address] [address]        Equal Access to Services     [place] : Hadii aad ku hadasho Soomaali, waaxda luqadaha, qaybta kaalmada adeegyada, waxay idiin haylydian bell rocha . So [facility] [phone].    ATENCIÓN: Si nawafla mansi, tiene a lebron disposición servicios gratuitos de asistencia lingüística. Llame al [phone].    We comply with applicable federal civil rights laws and [place] laws. We do not discriminate on the basis of race, color, national origin, age, disability, sex, sexual orientation, or gender identity.            Thank you!     Thank you for choosing [facility]   for your care. Our goal is always to provide you with excellent care. Hearing back from our patients is one way we can continue to improve our services. Please take a few minutes to complete the written survey that you may receive in the mail after your visit with us. Thank you!             Your Updated Medication List - Protect others around you: Learn how to safely use, store and throw away your medicines at www.disposemymeds.org.          This list is accurate as  of 11/23/18  4:10 PM.  Always use your most recent med list.                   Brand Name Dispense Instructions for use Diagnosis    amLODIPine 5 MG tablet    NORVASC    90 tablet    Take 1 tablet (5 mg) by mouth daily    Essential hypertension with goal blood pressure less than 140/90       atenolol 50 MG tablet    TENORMIN    90 tablet    Take 1 tablet (50 mg) by mouth daily    Essential hypertension with goal blood pressure less than 140/90       cyclobenzaprine 10 MG tablet    FLEXERIL    20 tablet    Take 1 tablet (10 mg) by mouth 3 times daily as needed for muscle spasms    Muscle spasm of left lower extremity, S/P arthroscopic surgery of left knee       estradiol 0.1 MG/GM cream    ESTRACE    42.5 g    Place 2 g vaginally twice a week    Atrophy of vagina       IBUPROFEN PO      Take 400 mg by mouth every 6 hours as needed for moderate pain        traZODone 50 MG tablet    DESYREL    180 tablet    Take 2 tablets (100 mg) by mouth nightly as needed for sleep    Insomnia, unspecified type       TYLENOL EXTRA STRENGTH PO      Take 1-2 tablets by mouth as needed

## 2018-11-29 ENCOUNTER — THERAPY VISIT (OUTPATIENT)
Dept: PHYSICAL THERAPY | Facility: CLINIC | Age: 58
End: 2018-11-29
Payer: COMMERCIAL

## 2018-11-29 DIAGNOSIS — Z98.890 S/P LEFT KNEE ARTHROSCOPY: ICD-10-CM

## 2018-11-29 DIAGNOSIS — M25.562 ACUTE PAIN OF LEFT KNEE: ICD-10-CM

## 2018-11-29 DIAGNOSIS — M25.551 HIP PAIN, RIGHT: ICD-10-CM

## 2018-11-29 PROCEDURE — 97110 THERAPEUTIC EXERCISES: CPT | Mod: GP | Performed by: PHYSICAL THERAPIST

## 2018-11-29 PROCEDURE — 97530 THERAPEUTIC ACTIVITIES: CPT | Mod: GP | Performed by: PHYSICAL THERAPIST

## 2018-11-29 NOTE — PROGRESS NOTES
"Subjective:  HPI                    Objective:  System    Physical Exam    General     ROS    Assessment/Plan:    SUBJECTIVE  Subjective: \"I'm walking somwhat better.\"  Slowly but surely improving   Current Pain level: 2/10   Changes in function:  Yes (See Goal flowsheet attached for changes in current functional level)     Adverse reaction to treatment or activity:  None    OBJECTIVE  Objective: Improved control on step but still only ready for 4\" rise.  Initially mini squats were sore but felt better with reps     ASSESSMENT  Angela continues to require intervention to meet STG and LTG's: PT  Patient is progressing as expected.  Response to therapy has shown an improvement in  pain level, ROM  and strength  Progress made towards STG/LTG?  Yes (See Goal flowsheet attached for updates on achievement of STG and LTG)    PLAN  Current treatment program is being advanced to more complex exercises.    PTA/ATC plan:  N/A    Please refer to the daily flowsheet for treatment today, total treatment time and time spent performing 1:1 timed codes.          "

## 2018-11-29 NOTE — MR AVS SNAPSHOT
After Visit Summary   11/29/2018    Angeal Ibarra    MRN: [id number]           Patient Information     Date Of Birth          1960        Visit Information        Provider Department      11/29/2018 8:00 AM Alex Lanza, PT [facility] [place] [facility]        Today's Diagnoses     Hip pain, right        Acute pain of left knee        S/P left knee arthroscopy           Follow-ups after your visit        Your next 10 appointments already scheduled     Dec 06, 2018  8:00 AM CST   WES[facility] Extremity with Alex Lanza PT   [facility] [place] [facility] ([facility] [place])    [address]  [place] [address]   [phone]            Dec 13, 2018  5:10 PM CST   WESLEY Extremity with Alex Lanza PT   [facility] [place] [facility] ([facility] [place])    [address]  [place] [address]   [phone]            Dec 20, 2018  8:00 AM CST   WESLEY Extremity with Alex Lanza PT   [facility] [place] [facility] ([facility] [place])    [address]  [place] [address]   [phone]              Who to contact     If you have questions or need follow up information about today's clinic visit or your schedule please contact [facility] directly at [phone].  Normal or non-critical lab and imaging results will be communicated to you by MyChart, letter or phone within 4 business days after the clinic has received the results. If you do not hear from us within 7 days, please contact the clinic through MyChart or phone. If you have a critical or abnormal lab result, we will notify you by phone as soon as possible.  Submit refill requests through [employer] or call your pharmacy and they will forward the refill request to us. Please allow 3 business days for your refill to be completed.          Additional Information About Your Visit         [employer] Information     [employer] gives you secure access to your electronic health record. If you see a primary care provider, you can also send messages to your care team and make appointments. If you have questions, please call your primary care clinic.  If you do not have a primary care provider, please call [phone] and they will assist you.        Care EveryWhere ID     This is your Care EveryWhere ID. This could be used by other organizations to access your [facility] medical records  [id number]        Your Vitals Were     Last Period                   01/08/2008            Blood Pressure from Last 3 Encounters:   11/15/18 138/90   10/27/18 138/78   10/23/18 151/80    Weight from Last 3 Encounters:   10/27/18 102.1 kg (225 lb)   10/23/18 102.1 kg (225 lb)   10/22/18 102.1 kg (225 lb)              We Performed the Following     THERAPEUTIC ACTIVITIES     THERAPEUTIC EXERCISES        Primary Care Provider Office Phone # Fax #    Germán Jernigan [phone] [address] [address] [address]        Equal Access to Services     [facility]: Hadii aad ku hadasho Soomaali, waaxda luqadaha, qaybta kaalmada adeegyada, oleksandr rocha . So [facility] [phone].    ATENCIÓN: Si habla español, tiene a lebron disposición servicios gratbryanos de asistencia lingüística. Ll[facility] [phone].    We comply with applicable federal civil rights laws and [place] laws. We do not discriminate on the basis of race, color, national origin, age, disability, sex, sexual orientation, or gender identity.            Thank you!     Thank you for choosing [facility]  for your care. Our goal is always to provide you with excellent care. Hearing back from our patients is one way we can continue to improve our services. Please take a few minutes to complete the written survey that you may receive in the mail after your visit with us. Thank you!              Your Updated Medication List - Protect others around you: Learn how to safely use, store and throw away your medicines at www.disposemymeds.org.          This list is accurate as of 11/29/18  8:55 AM.  Always use your most recent med list.                   Brand Name Dispense Instructions for use Diagnosis    amLODIPine 5 MG tablet    NORVASC    90 tablet    Take 1 tablet (5 mg) by mouth daily    Essential hypertension with goal blood pressure less than 140/90       atenolol 50 MG tablet    TENORMIN    90 tablet    Take 1 tablet (50 mg) by mouth daily    Essential hypertension with goal blood pressure less than 140/90       cyclobenzaprine 10 MG tablet    FLEXERIL    20 tablet    Take 1 tablet (10 mg) by mouth 3 times daily as needed for muscle spasms    Muscle spasm of left lower extremity, S/P arthroscopic surgery of left knee       estradiol 0.1 MG/GM vaginal cream    ESTRACE    42.5 g    Place 2 g vaginally twice a week    Atrophy of vagina       IBUPROFEN PO      Take 400 mg by mouth every 6 hours as needed for moderate pain        traZODone 50 MG tablet    DESYREL    180 tablet    Take 2 tablets (100 mg) by mouth nightly as needed for sleep    Insomnia, unspecified type       TYLENOL EXTRA STRENGTH PO      Take 1-2 tablets by mouth as needed

## 2018-12-06 ENCOUNTER — THERAPY VISIT (OUTPATIENT)
Dept: PHYSICAL THERAPY | Facility: CLINIC | Age: 58
End: 2018-12-06
Payer: COMMERCIAL

## 2018-12-06 DIAGNOSIS — Z98.890 S/P LEFT KNEE ARTHROSCOPY: ICD-10-CM

## 2018-12-06 DIAGNOSIS — M25.551 HIP PAIN, RIGHT: ICD-10-CM

## 2018-12-06 DIAGNOSIS — M25.562 ACUTE PAIN OF LEFT KNEE: ICD-10-CM

## 2018-12-06 PROCEDURE — 97530 THERAPEUTIC ACTIVITIES: CPT | Mod: GP | Performed by: PHYSICAL THERAPIST

## 2018-12-06 PROCEDURE — 97110 THERAPEUTIC EXERCISES: CPT | Mod: GP | Performed by: PHYSICAL THERAPIST

## 2018-12-06 NOTE — MR AVS SNAPSHOT
After Visit Summary   12/6/2018    Angela Ibarra    MRN: [id number]           Patient Information     Date Of Birth          1960        Visit Information        Provider Department      12/6/2018 8:00 AM Alex Lanza, PT [facility] [place] [facility]        Today's Diagnoses     Acute pain of left knee        S/P left knee arthroscopy        Hip pain, right           Follow-ups after your visit        Your next 10 appointments already scheduled     Dec 13, 2018  5:10 PM CST   [facility] Extremity with Alex Lanza PT   [facility] ([facility] [place])    [address]  [place] [address]   [phone]            Dec 20, 2018  8:00 AM CST   [facility] Extremity with Alex Lanza PT   [facility] ([facility] [place])    [address]  [place] [address]   [phone]              Who to contact     If you have questions or need follow up information about today's clinic visit or your schedule please contact [facility] directly at [phone].  Normal or non-critical lab and imaging results will be communicated to you by [employer], letter or phone within 4 business days after the clinic has received the results. If you do not hear from us within 7 days, please contact the clinic through [employer] or phone. If you have a critical or abnormal lab result, we will notify you by phone as soon as possible.  Submit refill requests through [employer] or call your pharmacy and they will forward the refill request to us. Please allow 3 business days for your refill to be completed.          Additional Information About Your Visit        MyChart Information     [employer] gives you secure access to your electronic health record. If you see a primary care provider, you can also send messages to your care team and make appointments. If you have questions,  please call your primary care clinic.  If you do not have a primary care provider, please call [phone] and they will assist you.        Care EveryWhere ID     This is your Care EveryWhere ID. This could be used by other organizations to access your [facility] medical records  [id number]        Your Vitals Were     Last Period                   01/08/2008            Blood Pressure from Last 3 Encounters:   11/15/18 138/90   10/27/18 138/78   10/23/18 151/80    Weight from Last 3 Encounters:   10/27/18 102.1 kg (225 lb)   10/23/18 102.1 kg (225 lb)   10/22/18 102.1 kg (225 lb)              We Performed the Following     THERAPEUTIC ACTIVITIES     THERAPEUTIC EXERCISES        Primary Care Provider Office Phone # Fax #    Germán Jernigan [phone] [address] [address]        Equal Access to Services     [facility]: Hadii aad ku hadasho Soomaali, waaxda luqadaha, qaybta kaalmada adeegyada, waxay omarin hayaan bell rocha . So [facility] [phone].    ATENCIÓN: Si habla español, tiene a lebron disposición servicios gratuitos de asistencia lingüística. Llame al [phone].    We comply with applicable federal civil rights laws and [place] laws. We do not discriminate on the basis of race, color, national origin, age, disability, sex, sexual orientation, or gender identity.            Thank you!     Thank you for choosing [facility]  for your care. Our goal is always to provide you with excellent care. Hearing back from our patients is one way we can continue to improve our services. Please take a few minutes to complete the written survey that you may receive in the mail after your visit with us. Thank you!             Your Updated Medication List - Protect others around you: Learn how to safely use, store and throw away your medicines at www.disposemymeds.org.          This list is accurate as of 12/6/18  8:40 AM.  Always use your most  recent med list.                   Brand Name Dispense Instructions for use Diagnosis    amLODIPine 5 MG tablet    NORVASC    90 tablet    Take 1 tablet (5 mg) by mouth daily    Essential hypertension with goal blood pressure less than 140/90       atenolol 50 MG tablet    TENORMIN    90 tablet    Take 1 tablet (50 mg) by mouth daily    Essential hypertension with goal blood pressure less than 140/90       cyclobenzaprine 10 MG tablet    FLEXERIL    20 tablet    Take 1 tablet (10 mg) by mouth 3 times daily as needed for muscle spasms    Muscle spasm of left lower extremity, S/P arthroscopic surgery of left knee       estradiol 0.1 MG/GM vaginal cream    ESTRACE    42.5 g    Place 2 g vaginally twice a week    Atrophy of vagina       IBUPROFEN PO      Take 400 mg by mouth every 6 hours as needed for moderate pain        traZODone 50 MG tablet    DESYREL    180 tablet    Take 2 tablets (100 mg) by mouth nightly as needed for sleep    Insomnia, unspecified type       TYLENOL EXTRA STRENGTH PO      Take 1-2 tablets by mouth as needed

## 2018-12-06 NOTE — PROGRESS NOTES
"Subjective:  HPI                    Objective:  System    Physical Exam    General     ROS    Assessment/Plan:    SUBJECTIVE  Subjective: Knee is just generally sore, especially as the day goes on.  Hip doesn't hurt as bad   Current Pain level: 2/10   Changes in function:  Yes (See Goal flowsheet attached for changes in current functional level)     Adverse reaction to treatment or activity:  None    OBJECTIVE  Objective: Able to perform step ups on 6\" step now.  For step down needed extensive cuing to perform correctly- was not flexing hip at all     ASSESSMENT  Angela continues to require intervention to meet STG and LTG's: PT  Patient is progressing as expected.  Response to therapy has shown an improvement in  pain level, strength and muscle control  Progress made towards STG/LTG?  Yes (See Goal flowsheet attached for updates on achievement of STG and LTG)    PLAN  Current treatment program is being advanced to more complex exercises.    PTA/ATC plan:  N/A    Please refer to the daily flowsheet for treatment today, total treatment time and time spent performing 1:1 timed codes.          "

## 2018-12-13 ENCOUNTER — THERAPY VISIT (OUTPATIENT)
Dept: PHYSICAL THERAPY | Facility: CLINIC | Age: 58
End: 2018-12-13
Payer: COMMERCIAL

## 2018-12-13 DIAGNOSIS — M25.551 HIP PAIN, RIGHT: ICD-10-CM

## 2018-12-13 DIAGNOSIS — Z98.890 S/P LEFT KNEE ARTHROSCOPY: ICD-10-CM

## 2018-12-13 DIAGNOSIS — M25.562 ACUTE PAIN OF LEFT KNEE: ICD-10-CM

## 2018-12-13 PROCEDURE — 97530 THERAPEUTIC ACTIVITIES: CPT | Mod: GP | Performed by: PHYSICAL THERAPIST

## 2018-12-13 PROCEDURE — 97110 THERAPEUTIC EXERCISES: CPT | Mod: GP | Performed by: PHYSICAL THERAPIST

## 2018-12-14 NOTE — PROGRESS NOTES
Subjective:  HPI                    Objective:  System    Physical Exam    General     ROS    Assessment/Plan:    SUBJECTIVE  Subjective: Knee has been better this past week.  Feels less sore and stronger.  But now the R hip is worse.  Wonders of the cortisone injection is wearing off   Current Pain level: 3/10   Changes in function:  Yes (See Goal flowsheet attached for changes in current functional level)     Adverse reaction to treatment or activity:  None    OBJECTIVE  Objective: Improved control of L knee performing step ups and step downs.  Painful flexion of R hip (active and passive).  Tolerated ABD and Ext well though     ASSESSMENT  Angela continues to require intervention to meet STG and LTG's: PT  Patient is progressing as expected for L knee but has had a minor setback in regards to R hip pain.  Response to therapy has shown an improvement in  pain level, ROM  and strength  Progress made towards STG/LTG?  Yes (See Goal flowsheet attached for updates on achievement of STG and LTG)    PLAN  Current treatment program is being advanced to more complex exercises.    PTA/ATC plan:  N/A    Please refer to the daily flowsheet for treatment today, total treatment time and time spent performing 1:1 timed codes.

## 2018-12-19 ENCOUNTER — OFFICE VISIT (OUTPATIENT)
Dept: URGENT CARE | Facility: URGENT CARE | Age: 58
End: 2018-12-19
Payer: COMMERCIAL

## 2018-12-19 VITALS
HEIGHT: 62 IN | SYSTOLIC BLOOD PRESSURE: 139 MMHG | BODY MASS INDEX: 40.85 KG/M2 | DIASTOLIC BLOOD PRESSURE: 87 MMHG | TEMPERATURE: 97.8 F | RESPIRATION RATE: 14 BRPM | WEIGHT: 222 LBS | OXYGEN SATURATION: 95 % | HEART RATE: 78 BPM

## 2018-12-19 DIAGNOSIS — R19.7 DIARRHEA, UNSPECIFIED TYPE: Primary | ICD-10-CM

## 2018-12-19 LAB
BASOPHILS # BLD AUTO: 0 10E9/L (ref 0–0.2)
BASOPHILS NFR BLD AUTO: 0.4 %
DIFFERENTIAL METHOD BLD: NORMAL
EOSINOPHIL # BLD AUTO: 0.3 10E9/L (ref 0–0.7)
EOSINOPHIL NFR BLD AUTO: 2.5 %
ERYTHROCYTE [DISTWIDTH] IN BLOOD BY AUTOMATED COUNT: 13 % (ref 10–15)
HCT VFR BLD AUTO: 41.7 % (ref 35–47)
HGB BLD-MCNC: 13.5 G/DL (ref 11.7–15.7)
LYMPHOCYTES # BLD AUTO: 2.8 10E9/L (ref 0.8–5.3)
LYMPHOCYTES NFR BLD AUTO: 27.7 %
MCH RBC QN AUTO: 29 PG (ref 26.5–33)
MCHC RBC AUTO-ENTMCNC: 32.4 G/DL (ref 31.5–36.5)
MCV RBC AUTO: 90 FL (ref 78–100)
MONOCYTES # BLD AUTO: 1 10E9/L (ref 0–1.3)
MONOCYTES NFR BLD AUTO: 10.2 %
NEUTROPHILS # BLD AUTO: 6 10E9/L (ref 1.6–8.3)
NEUTROPHILS NFR BLD AUTO: 59.2 %
PLATELET # BLD AUTO: 355 10E9/L (ref 150–450)
RBC # BLD AUTO: 4.66 10E12/L (ref 3.8–5.2)
WBC # BLD AUTO: 10.2 10E9/L (ref 4–11)

## 2018-12-19 PROCEDURE — 99213 OFFICE O/P EST LOW 20 MIN: CPT | Mod: 24 | Performed by: NURSE PRACTITIONER

## 2018-12-19 PROCEDURE — 85025 COMPLETE CBC W/AUTO DIFF WBC: CPT | Performed by: NURSE PRACTITIONER

## 2018-12-19 PROCEDURE — 36415 COLL VENOUS BLD VENIPUNCTURE: CPT | Performed by: NURSE PRACTITIONER

## 2018-12-19 RX ORDER — ONDANSETRON 4 MG/1
4 TABLET, ORALLY DISINTEGRATING ORAL EVERY 6 HOURS PRN
Qty: 8 TABLET | Refills: 0 | Status: SHIPPED | OUTPATIENT
Start: 2018-12-19 | End: 2019-04-25

## 2018-12-19 RX ORDER — LOPERAMIDE HYDROCHLORIDE 2 MG/1
2 TABLET ORAL 4 TIMES DAILY PRN
Qty: 10 TABLET | Refills: 0 | Status: SHIPPED | OUTPATIENT
Start: 2018-12-19 | End: 2019-04-25

## 2018-12-19 ASSESSMENT — ENCOUNTER SYMPTOMS
WEAKNESS: 1
MUSCULOSKELETAL NEGATIVE: 1
CHILLS: 1
BLOOD IN STOOL: 0
NAUSEA: 1
CARDIOVASCULAR NEGATIVE: 1
RESPIRATORY NEGATIVE: 1
DIZZINESS: 0
FEVER: 0
DIARRHEA: 1

## 2018-12-19 ASSESSMENT — MIFFLIN-ST. JEOR: SCORE: 1540.24

## 2018-12-20 ENCOUNTER — THERAPY VISIT (OUTPATIENT)
Dept: PHYSICAL THERAPY | Facility: CLINIC | Age: 58
End: 2018-12-20
Payer: COMMERCIAL

## 2018-12-20 DIAGNOSIS — M25.562 ACUTE PAIN OF LEFT KNEE: ICD-10-CM

## 2018-12-20 DIAGNOSIS — Z98.890 S/P LEFT KNEE ARTHROSCOPY: ICD-10-CM

## 2018-12-20 DIAGNOSIS — M25.551 HIP PAIN, RIGHT: ICD-10-CM

## 2018-12-20 PROCEDURE — 97110 THERAPEUTIC EXERCISES: CPT | Mod: GP | Performed by: PHYSICAL THERAPIST

## 2018-12-20 PROCEDURE — 97530 THERAPEUTIC ACTIVITIES: CPT | Mod: GP | Performed by: PHYSICAL THERAPIST

## 2018-12-20 ASSESSMENT — ACTIVITIES OF DAILY LIVING (ADL)
AS_A_RESULT_OF_YOUR_KNEE_INJURY,_HOW_WOULD_YOU_RATE_YOUR_CURRENT_LEVEL_OF_DAILY_ACTIVITY?: NEARLY NORMAL
HOS_ADL_COUNT: 15
STEPPING_UP_AND_DOWN_CURBS: SLIGHT DIFFICULTY
GOING_UP_1_FLIGHT_OF_STAIRS: NO DIFFICULTY AT ALL
HOS_ADL_HIGHEST_POTENTIAL_SCORE: 60
SWELLING: I DO NOT HAVE THE SYMPTOM
KNEE_ACTIVITY_OF_DAILY_LIVING_SUM: 62
RISE FROM A CHAIR: ACTIVITY IS NOT DIFFICULT
WALKING_UP_STEEP_HILLS: SLIGHT DIFFICULTY
RAW_SCORE: 62
WALK: ACTIVITY IS NOT DIFFICULT
SIT WITH YOUR KNEE BENT: ACTIVITY IS MINIMALLY DIFFICULT
HOS_ADL_ITEM_SCORE_TOTAL: 54
HOW_WOULD_YOU_RATE_YOUR_CURRENT_LEVEL_OF_FUNCTION_DURING_YOUR_USUAL_ACTIVITIES_OF_DAILY_LIVING_FROM_0_TO_100_WITH_100_BEING_YOUR_LEVEL_OF_FUNCTION_PRIOR_TO_YOUR_HIP_PROBLEM_AND_0_BEING_THE_INABILITY_TO_PERFORM_ANY_OF_YOUR_USUAL_DAILY_ACTIVITIES?: 90
HOW_WOULD_YOU_RATE_THE_OVERALL_FUNCTION_OF_YOUR_KNEE_DURING_YOUR_USUAL_DAILY_ACTIVITIES?: NEARLY NORMAL
RECREATIONAL_ACTIVITIES: NO DIFFICULTY AT ALL
GIVING WAY, BUCKLING OR SHIFTING OF KNEE: I DO NOT HAVE THE SYMPTOM
PUTTING_ON_SOCKS_AND_SHOES: NO DIFFICULTY AT ALL
GOING_DOWN_1_FLIGHT_OF_STAIRS: SLIGHT DIFFICULTY
WALKING_DOWN_STEEP_HILLS: NO DIFFICULTY AT ALL
KNEE_ACTIVITY_OF_DAILY_LIVING_SCORE: 88.57
STIFFNESS: I HAVE THE SYMPTOM BUT IT DOES NOT AFFECT MY ACTIVITY
HOW_WOULD_YOU_RATE_THE_CURRENT_FUNCTION_OF_YOUR_KNEE_DURING_YOUR_USUAL_DAILY_ACTIVITIES_ON_A_SCALE_FROM_0_TO_100_WITH_100_BEING_YOUR_LEVEL_OF_KNEE_FUNCTION_PRIOR_TO_YOUR_INJURY_AND_0_BEING_THE_INABILITY_TO_PERFORM_ANY_OF_YOUR_USUAL_DAILY_ACTIVITIES?: 90
WEAKNESS: I HAVE THE SYMPTOM BUT IT DOES NOT AFFECT MY ACTIVITY
KNEEL ON THE FRONT OF YOUR KNEE: ACTIVITY IS SOMEWHAT DIFFICULT
WALKING_INITIALLY: SLIGHT DIFFICULTY
WALKING_15_MINUTES_OR_GREATER: SLIGHT DIFFICULTY
GO DOWN STAIRS: ACTIVITY IS MINIMALLY DIFFICULT
WALKING_APPROXIMATELY_10_MINUTES: NO DIFFICULTY AT ALL
TWISTING/PIVOTING_ON_INVOLVED_LEG: NO DIFFICULTY AT ALL
GO UP STAIRS: ACTIVITY IS NOT DIFFICULT
STANDING_FOR_15_MINUTES: NO DIFFICULTY AT ALL
DEEP_SQUATTING: SLIGHT DIFFICULTY
HOS_ADL_SCORE(%): 90
GETTING_INTO_AND_OUT_OF_AN_AVERAGE_CAR: NO DIFFICULTY AT ALL
LIGHT_TO_MODERATE_WORK: NO DIFFICULTY AT ALL
SQUAT: ACTIVITY IS MINIMALLY DIFFICULT
STAND: ACTIVITY IS NOT DIFFICULT
SITTING_FOR_15_MINUTES: NO DIFFICULTY AT ALL
ROLLING_OVER_IN_BED: NO DIFFICULTY AT ALL
LIMPING: I DO NOT HAVE THE SYMPTOM
PAIN: I HAVE THE SYMPTOM BUT IT DOES NOT AFFECT MY ACTIVITY

## 2018-12-20 NOTE — PROGRESS NOTES
HPI  Angela Ibarra 58 year old presents with 2 day hx of diarrhea and nausea. Negative for bloody stools. Mild cramping without significant abdominal pain. Taking fluids and urinating several times today. 2 stools waiting for room in .     Past Medical History:   Diagnosis Date     Calculus of kidney      Migraine, unspecified, without mention of intractable migraine without mention of status migrainosus      Other specified iron deficiency anemias      Papanicolaou smear of cervix with low grade squamous intraepithelial lesion (LGSIL) 11/07    Colpo and hyst pathology benign     Primary osteoarthritis of right hip      Synovitis of ankle      Unspecified essential hypertension 1999    Essential hypertension      Patient Active Problem List   Diagnosis     Obesity     NONSPECIFIC COLITIS     Migraine headache     Stress incontinence     Balance disorder     Right leg weakness     Right carpal tunnel syndrome     Osteoporosis     Loose body in ankle and foot joint     Synovitis of ankle     Malunion, fracture     Pain in joint involving ankle and foot     Abnormal gait     Other postprocedural status(V45.89)     Chondromalacia, left ankle and joints of left foot     CARDIOVASCULAR SCREENING; LDL GOAL LESS THAN 130     Advanced directives, counseling/discussion     Insomnia, unspecified type     Essential hypertension with goal blood pressure less than 140/90     Primary osteoarthritis of right hip     Cataract of both eyes, unspecified cataract type     Arthropathy of facet joint     Morbid obesity (H)     Acute pain of left knee     S/P left knee arthroscopy     Hip pain, right     Past Surgical History:   Procedure Laterality Date     ARTHROSCOPY ANKLE  4/22/2014    Procedure: ARTHROSCOPY ANKLE;  Surgeon: Shaun Bhatt DPM;  Location: [facility] OR     ARTHROSCOPY KNEE Left 10/4/2018    Procedure: ARTHROSCOPY KNEE;  Left knee arthroscopy  medial meniscal and chondral debridement;  Surgeon: Aryan Holley,  MD;  Location: [facility] OR     C  DELIVERY ONLY  1985    , Low Cervical     C GASTRIC BYPASS,OBESITY,W/SM BOWEL RECONS  10/99     C LIGATE FALLOPIAN TUBE  2000    laparoscopy     COLONOSCOPY WITH CO2 INSUFFLATION N/A 2017    Procedure: COLONOSCOPY WITH CO2 INSUFFLATION;  COLON SCREEN/ YURI;  Surgeon: Cody Arana MD;  Location: [facility] OR     HYSTERECTOMY, PAP NO LONGER INDICATED  2008     LAPAROSCOPIC CHOLECYSTECTOMY  8/3/2012    Procedure: LAPAROSCOPIC CHOLECYSTECTOMY;  Laparoscopic Cholecystectomy ;  Surgeon: Corwin Cabezas MD;  Location: [facility]     OPEN REDUCTION INTERNAL FIXATION ANKLE  2014    Procedure: OPEN REDUCTION INTERNAL FIXATION ANKLE;  Surgeon: Shaun Bhatt DPM;  Location: [facility]     OPEN REDUCTION INTERNAL FIXATION ELBOW  10/15/2013    Procedure: OPEN REDUCTION INTERNAL FIXATION ELBOW;  OPEN REDUCTION INTERNAL FIXATION LEFT PROXIMAL ULNA;  Surgeon: Artem Last DO;  Location: [facility]     ORTHOPEDIC SURGERY      left shoulder surgery     REMOVE MESH VAGINA  10/10/2011    Procedure:REMOVE MESH VAGINA; Excision of Vaginal Mesh; Surgeon:SERGE SALGADO; Location:[facility] OR     SURGICAL HISTORY OF -   4/20/10    Transobturator midurethral sling     SURGICAL HISTORY OF -   1999    exploratory laparotomy, colitis     SURGICAL HISTORY OF -   4/20/10    Transobturator midurethral sling     TUBAL LIGATION       Allergies   Allergen Reactions     Bactrim [Sulfamethoxazole W/Trimethoprim]      itching     Nitrofurantoin Itching     Current Outpatient Medications   Medication     Acetaminophen (TYLENOL EXTRA STRENGTH PO)     amLODIPine (NORVASC) 5 MG tablet     atenolol (TENORMIN) 50 MG tablet     estradiol (ESTRACE) 0.1 MG/GM cream     IBUPROFEN PO     loperamide (IMODIUM A-D) 2 MG tablet     ondansetron (ZOFRAN-ODT) 4 MG ODT tab     traZODone (DESYREL) 50 MG tablet     cyclobenzaprine (FLEXERIL) 10 MG tablet     Current Facility-Administered  "Medications   Medication     ropivacaine (NAROPIN) injection 3 mL     triamcinolone acetonide (KENALOG-40) injection 40 mg     Facility-Administered Medications Ordered in Other Visits   Medication     bupivacaine 0.5% EPINEPHrine 1:200,000 injection         Review of Systems   Constitutional: Positive for chills and malaise/fatigue. Negative for fever.   HENT: Negative.    Respiratory: Negative.    Cardiovascular: Negative.    Gastrointestinal: Positive for diarrhea and nausea. Negative for blood in stool and melena.   Genitourinary: Negative.    Musculoskeletal: Negative.    Skin: Negative.    Neurological: Positive for weakness. Negative for dizziness.   Endo/Heme/Allergies: Negative.    All other systems reviewed and are negative.        Physical Exam   Constitutional: She is oriented to person, place, and time. She appears well-developed and well-nourished. No distress.   /87   Pulse 78   Temp 97.8  F (36.6  C) (Oral)   Resp 14   Ht 1.575 m (5' 2\")   Wt 100.7 kg (222 lb)   LMP 01/08/2008   SpO2 95%   BMI 40.60 kg/m       HENT:   Head: Normocephalic.   Mouth/Throat: Oropharynx is clear and moist.   Eyes: Conjunctivae are normal. No scleral icterus.   Cardiovascular: Normal rate and regular rhythm.   Pulmonary/Chest: Effort normal and breath sounds normal.   Abdominal: Soft. Bowel sounds are increased. There is no hepatosplenomegaly. There is no tenderness.   Neurological: She is alert and oriented to person, place, and time.   Skin: Skin is warm and dry.   Nursing note and vitals reviewed.    Results for orders placed or performed in visit on 12/19/18   CBC with platelets and differential   Result Value Ref Range    WBC 10.2 4.0 - 11.0 10e9/L    RBC Count 4.66 3.8 - 5.2 10e12/L    Hemoglobin 13.5 11.7 - 15.7 g/dL    Hematocrit 41.7 35.0 - 47.0 %    MCV 90 78 - 100 fl    MCH 29.0 26.5 - 33.0 pg    MCHC 32.4 31.5 - 36.5 g/dL    RDW 13.0 10.0 - 15.0 %    Platelet Count 355 150 - 450 10e9/L    % " Neutrophils 59.2 %    % Lymphocytes 27.7 %    % Monocytes 10.2 %    % Eosinophils 2.5 %    % Basophils 0.4 %    Absolute Neutrophil 6.0 1.6 - 8.3 10e9/L    Absolute Lymphocytes 2.8 0.8 - 5.3 10e9/L    Absolute Monocytes 1.0 0.0 - 1.3 10e9/L    Absolute Eosinophils 0.3 0.0 - 0.7 10e9/L    Absolute Basophils 0.0 0.0 - 0.2 10e9/L    Diff Method Automated Method      Assessment:  1. Diarrhea, unspecified type        Plan:  Orders Placed This Encounter     CBC with platelets and differential     loperamide (IMODIUM A-D) 2 MG tablet     ondansetron (ZOFRAN-ODT) 4 MG ODT tab   Push fluids, clear liquids for next 24 hours to rest bowel then progress  Slowly. No dairy until after you have a normal formed stool  Instructions regarding self-care of patient/child reviewed.   Written instructions provided in after visit summary and reviewed.  Patient instructed to see primary care provider for new or persistent symptoms.   Red flag symptoms reviewed and patient has been instructed to seek emergent   Care at the closest emergency room for evaluation and treatment.   Please contact pharmacy for medication questions.  Patient instructed to take medications as directed on package.      Samantha Mayes, APRN, CNP

## 2018-12-20 NOTE — PROGRESS NOTES
Subjective:  HPI                    Objective:  System    Physical Exam    General     ROS    Assessment/Plan:    DISCHARGE REPORT    Progress reporting period is from 10/25/18 to 12/20/18.       SUBJECTIVE  Subjective: Pain is almost completely gone in L knee.  R hip has settled down again so that is manageable but not pain free.  Going down stairs is going well now    Current Pain level: 2/10.     Initial Pain level: 5/10.   Changes in function:  Yes (See Goal flowsheet attached for changes in current functional level)  Adverse reaction to treatment or activity: None    OBJECTIVE  Objective: Good control with ascending stairs and improving control descending.  Less cuing needed for squat technique     ASSESSMENT/PLAN  Updated problem list and treatment plan: Diagnosis 1:  S/p L knee scope    Diagnosis 2:  R hip pain     STG/LTGs have been met or progress has been made towards goals:  Yes (See Goal flow sheet completed today.)  Assessment of Progress: The patient's condition is improving.  Patient is meeting short term goals and is progressing towards long term goals.  Self Management Plans:  Patient is independent in a home treatment program.  Angela continues to require the following intervention to meet STG and LTG's:  PT intervention is no longer required to meet STG/LTG.    Recommendations:  This patient is ready to be discharged from therapy and continue their home treatment program.    Please refer to the daily flowsheet for treatment today, total treatment time and time spent performing 1:1 timed codes.

## 2018-12-20 NOTE — PATIENT INSTRUCTIONS
Patient Education     Treating Diarrhea    Diarrhea happens when you have loose, watery, or frequent bowel movements. It is a common problem with many causes. Most cases of diarrhea clear up on their own. But certain cases may need treatment. Be sure to see your healthcare provider if your symptoms do not improve within a few days.  Getting relief  Treatment of diarrhea depends on its cause. Diarrhea caused by bacterial or parasite infection is often treated with antibiotics. Diarrhea caused by other factors, such as a stomach virus, often improves with simple home treatment. The tips below may also help relieve your symptoms.    Drink plenty of fluids. This helps prevent too much fluid loss (dehydration). Water, clear soups, and electrolyte solutions are good choices. Avoid alcohol, coffee, tea, and milk. These can irritate your intestines and make symptoms worse.    Suck on ice chips if drinking makes you queasy.    Return to your normal diet slowly. You may want to eat bland foods at first, such as rice and toast. Also, you may need to avoid certain foods for a while, such as dairy products. These can make symptoms worse. Ask your healthcare provider if there are any other foods you should avoid.    If you were prescribed antibiotics, take them as directed.    Do not take anti-diarrhea medicines without asking your healthcare provider first.  Call your healthcare provider   Call your healthcare provider if you have any of the following:     A fever of 100.4 F (38.0 C) or higher, or as directed by your healthcare provider    Severe pain    Worsening diarrhea or diarrhea for more than 2 days    Bloody vomit or stool    Signs of dehydration (dizziness, dry mouth and tongue, rapid pulse, dark urine)  Date Last Reviewed: 7/1/2016 2000-2018 [employer]. [address] [place] [address], [place] [address]. All rights reserved. This information is not intended as a substitute for professional medical care.  Always follow your healthcare professional's instructions.      Results for orders placed or performed in visit on 12/19/18   CBC with platelets and differential   Result Value Ref Range    WBC 10.2 4.0 - 11.0 10e9/L    RBC Count 4.66 3.8 - 5.2 10e12/L    Hemoglobin 13.5 11.7 - 15.7 g/dL    Hematocrit 41.7 35.0 - 47.0 %    MCV 90 78 - 100 fl    MCH 29.0 26.5 - 33.0 pg    MCHC 32.4 31.5 - 36.5 g/dL    RDW 13.0 10.0 - 15.0 %    Platelet Count 355 150 - 450 10e9/L    % Neutrophils 59.2 %    % Lymphocytes 27.7 %    % Monocytes 10.2 %    % Eosinophils 2.5 %    % Basophils 0.4 %    Absolute Neutrophil 6.0 1.6 - 8.3 10e9/L    Absolute Lymphocytes 2.8 0.8 - 5.3 10e9/L    Absolute Monocytes 1.0 0.0 - 1.3 10e9/L    Absolute Eosinophils 0.3 0.0 - 0.7 10e9/L    Absolute Basophils 0.0 0.0 - 0.2 10e9/L    Diff Method Automated Method

## 2019-01-05 ENCOUNTER — OFFICE VISIT (OUTPATIENT)
Dept: URGENT CARE | Facility: URGENT CARE | Age: 59
End: 2019-01-05
Payer: COMMERCIAL

## 2019-01-05 VITALS
DIASTOLIC BLOOD PRESSURE: 83 MMHG | OXYGEN SATURATION: 95 % | WEIGHT: 222 LBS | BODY MASS INDEX: 40.6 KG/M2 | SYSTOLIC BLOOD PRESSURE: 153 MMHG | HEART RATE: 72 BPM | TEMPERATURE: 98.1 F

## 2019-01-05 DIAGNOSIS — R30.0 DYSURIA: Primary | ICD-10-CM

## 2019-01-05 LAB
ALBUMIN UR-MCNC: NEGATIVE MG/DL
APPEARANCE UR: ABNORMAL
BACTERIA #/AREA URNS HPF: ABNORMAL /HPF
BILIRUB UR QL STRIP: NEGATIVE
COLOR UR AUTO: YELLOW
GLUCOSE UR STRIP-MCNC: NEGATIVE MG/DL
HGB UR QL STRIP: ABNORMAL
KETONES UR STRIP-MCNC: ABNORMAL MG/DL
LEUKOCYTE ESTERASE UR QL STRIP: ABNORMAL
NITRATE UR QL: NEGATIVE
NON-SQ EPI CELLS #/AREA URNS LPF: ABNORMAL /LPF
PH UR STRIP: 6 PH (ref 5–7)
RBC #/AREA URNS AUTO: ABNORMAL /HPF
SOURCE: ABNORMAL
SP GR UR STRIP: 1.01 (ref 1–1.03)
SPECIMEN SOURCE: NORMAL
UROBILINOGEN UR STRIP-ACNC: 0.2 EU/DL (ref 0.2–1)
WBC #/AREA URNS AUTO: ABNORMAL /HPF
WBC CLUMPS #/AREA URNS HPF: PRESENT /HPF
WET PREP SPEC: NORMAL

## 2019-01-05 PROCEDURE — 87086 URINE CULTURE/COLONY COUNT: CPT | Performed by: PHYSICIAN ASSISTANT

## 2019-01-05 PROCEDURE — 81001 URINALYSIS AUTO W/SCOPE: CPT | Performed by: PHYSICIAN ASSISTANT

## 2019-01-05 PROCEDURE — 87186 SC STD MICRODIL/AGAR DIL: CPT | Performed by: PHYSICIAN ASSISTANT

## 2019-01-05 PROCEDURE — 99214 OFFICE O/P EST MOD 30 MIN: CPT | Performed by: PHYSICIAN ASSISTANT

## 2019-01-05 PROCEDURE — 87088 URINE BACTERIA CULTURE: CPT | Performed by: PHYSICIAN ASSISTANT

## 2019-01-05 PROCEDURE — 87210 SMEAR WET MOUNT SALINE/INK: CPT | Performed by: PHYSICIAN ASSISTANT

## 2019-01-05 RX ORDER — NITROFURANTOIN 25; 75 MG/1; MG/1
100 CAPSULE ORAL DAILY
COMMUNITY
Start: 2011-09-22 | End: 2019-04-25

## 2019-01-05 RX ORDER — FLUCONAZOLE 150 MG/1
150 TABLET ORAL ONCE
Qty: 1 TABLET | Refills: 0 | Status: SHIPPED | OUTPATIENT
Start: 2019-01-05 | End: 2019-01-05

## 2019-01-05 RX ORDER — FLUCONAZOLE 150 MG/1
150 TABLET ORAL ONCE
Qty: 2 TABLET | Refills: 0 | Status: SHIPPED | OUTPATIENT
Start: 2019-01-05 | End: 2019-04-25

## 2019-01-05 RX ORDER — CIPROFLOXACIN 500 MG/1
500 TABLET, FILM COATED ORAL 2 TIMES DAILY
Qty: 20 TABLET | Refills: 0 | Status: SHIPPED | OUTPATIENT
Start: 2019-01-05 | End: 2019-04-25

## 2019-01-05 ASSESSMENT — ENCOUNTER SYMPTOMS
PALPITATIONS: 0
COUGH: 0
RESPIRATORY NEGATIVE: 1
FEVER: 0
DYSURIA: 1
FLANK PAIN: 0
FREQUENCY: 1
DIAPHORESIS: 0
HEMOPTYSIS: 0
CONSTITUTIONAL NEGATIVE: 1
CARDIOVASCULAR NEGATIVE: 1
GASTROINTESTINAL NEGATIVE: 1
WEIGHT LOSS: 0
HEMATURIA: 0
EYE PAIN: 0

## 2019-01-05 NOTE — PROGRESS NOTES
SUBJECTIVE:     HPI  Angela Ibarra is a 58 year old female who presents to clinic today for the following health issues:  URINARY TRACT SYMPTOMS    Duration: 5days.  Was initially seen 5days ago at [facility] urgent care in which she was started on macrobid for UTI without any relief.  Urine culture was negative per patient report.    Description  dysuria, frequency, urgency and itching.  No hematuria    Intensity:  moderate    Accompanying signs and symptoms:  Fever/chills: no   Flank pain no   Nausea and vomiting: no   Vaginal symptoms:  Itching but no discharge  Abdominal/Pelvic Pain: No abdominal pain, n/v, constipation, diarrhea, bloody or black tarry stools.     History  History of frequent UTI's: YES-   History of kidney stones: YES, remote hx   Sexually Active: YES  Possibility of pregnancy: No    Precipitating or alleviating factors: None    Therapies tried and outcome: pyridium  and increase fluid intake   Outcome: with some relief    Reviewed PMH, FMH and SOH.  Patient Active Problem List   Diagnosis     Obesity     NONSPECIFIC COLITIS     Migraine headache     Stress incontinence     Balance disorder     Right leg weakness     Right carpal tunnel syndrome     Osteoporosis     Loose body in ankle and foot joint     Synovitis of ankle     Malunion, fracture     Pain in joint involving ankle and foot     Abnormal gait     Other postprocedural status(V45.89)     Chondromalacia, left ankle and joints of left foot     CARDIOVASCULAR SCREENING; LDL GOAL LESS THAN 130     Advanced directives, counseling/discussion     Insomnia, unspecified type     Essential hypertension with goal blood pressure less than 140/90     Primary osteoarthritis of right hip     Cataract of both eyes, unspecified cataract type     Arthropathy of facet joint     Morbid obesity (H)     Acute pain of left knee     S/P left knee arthroscopy     Hip pain, right     Current Outpatient Medications   Medication Sig Dispense Refill      Acetaminophen (TYLENOL EXTRA STRENGTH PO) Take 1-2 tablets by mouth as needed       amLODIPine (NORVASC) 5 MG tablet Take 1 tablet (5 mg) by mouth daily 90 tablet 2     atenolol (TENORMIN) 50 MG tablet Take 1 tablet (50 mg) by mouth daily 90 tablet 3     cyclobenzaprine (FLEXERIL) 10 MG tablet Take 1 tablet (10 mg) by mouth 3 times daily as needed for muscle spasms (Patient not taking: Reported on 12/19/2018) 20 tablet 1     estradiol (ESTRACE) 0.1 MG/GM cream Place 2 g vaginally twice a week 42.5 g 2     IBUPROFEN PO Take 400 mg by mouth every 6 hours as needed for moderate pain       loperamide (IMODIUM A-D) 2 MG tablet Take 1 tablet (2 mg) by mouth 4 times daily as needed for diarrhea 10 tablet 0     ondansetron (ZOFRAN-ODT) 4 MG ODT tab Take 1 tablet (4 mg) by mouth every 6 hours as needed for nausea 8 tablet 0     traZODone (DESYREL) 50 MG tablet Take 2 tablets (100 mg) by mouth nightly as needed for sleep 180 tablet 3     Allergies   Allergen Reactions     Bactrim [Sulfamethoxazole W/Trimethoprim]      itching     Nitrofurantoin Itching       Review of Systems   Constitutional: Negative.  Negative for diaphoresis, fever and weight loss.   Eyes: Negative for pain.   Respiratory: Negative.  Negative for cough and hemoptysis.    Cardiovascular: Negative.  Negative for chest pain and palpitations.   Gastrointestinal: Negative.    Genitourinary: Positive for dysuria, frequency and urgency. Negative for flank pain and hematuria.   Skin: Negative.    All other systems reviewed and are negative.      /83   Pulse 72   Temp 98.1  F (36.7  C) (Oral)   Wt 100.7 kg (222 lb)   LMP 01/08/2008   SpO2 95%   BMI 40.60 kg/m    Physical Exam   Constitutional: She is oriented to person, place, and time. She appears well-developed and well-nourished. No distress.   Cardiovascular: Normal rate, regular rhythm, normal heart sounds and intact distal pulses. Exam reveals no gallop and no friction rub.   No murmur  heard.  Pulmonary/Chest: Effort normal and breath sounds normal. No respiratory distress. She has no wheezes. She has no rales.   Abdominal: Soft. Normal appearance, normal aorta and bowel sounds are normal. She exhibits no mass. There is no hepatosplenomegaly. There is no tenderness. There is no rebound, no guarding, no CVA tenderness, no tenderness at McBurney's point and negative Juarez's sign. No hernia.   Genitourinary:   Genitourinary Comments: Declined pelvic exam.   Neurological: She is alert and oriented to person, place, and time.   Skin: Skin is warm and dry.   Psychiatric: She has a normal mood and affect. Judgment normal.   Nursing note and vitals reviewed.        Assessment/Plan:  Dysuria: UA and micro is suspicious for UTI and will empirically treat with cipro [id number].  She has failed macrobid and is allergic to sulfa.  Will send for UC to help guide abx treatment.   Wet prep was negative, will treat with diflucan based on H&P.  Discussed risks and benefits of medication along with side effects, direction for use, and tendon rupture.  Declined STD testing.  Recommend increase fluids, regular voids, proper wiping techniques and voiding after intercourse.  Educated patient on warning signs of kidney infection and to go to the ER if she develops any of these symptoms.  Recheck in clinic if symptoms worsen or if symptoms do not improve.  -     Wet prep  -     UA with Microscopic reflex to Culture  -     ciprofloxacin (CIPRO) 500 MG tablet; Take 1 tablet (500 mg) by mouth 2 times daily for 10 days  -     fluconazole (DIFLUCAN) 150 MG tablet; Take 1 tablet (150 mg) by mouth once for 1 dose May repeat after 3days if symptoms persist  -     Urine Culture Aerobic Bacterial        Oralia See BRONSON Herrera

## 2019-01-08 ENCOUNTER — TELEPHONE (OUTPATIENT)
Dept: FAMILY MEDICINE | Facility: CLINIC | Age: 59
End: 2019-01-08

## 2019-01-08 NOTE — TELEPHONE ENCOUNTER
I received a message from urgent care saying she has positive urine culture with multiple drug resistance.    I was asked to follow-up on this.    Please call patient and add her to my schedule.     Germán Jernigan M.D.

## 2019-01-09 LAB
BACTERIA SPEC CULT: ABNORMAL
SPECIMEN SOURCE: ABNORMAL

## 2019-01-09 NOTE — TELEPHONE ENCOUNTER
Request RN help.   Ended up in the ER Monday night [facility] and was admitted in the hospital. Was given Invanz IV   [facility].   HOWEVER, they apparently have not seen our Urine Culture that is growing this bacteria. Spoke with patient and she was told by admitting team that she did not have an infection.     Request RN help. Please contact [facility] and speak with RN in charge about culture and fax results to hospital. Not sure why they are not seeing our culture.     I will also release this through [employer] but request RN to RN communication. Thank you  Rachel Amezcua M.D.

## 2019-01-09 NOTE — TELEPHONE ENCOUNTER
"Called [facility] and spoke with patient's RN, Angela.  She said they did a UC on patient as well and their's is still just a preliminary showing \"No growth\".    Gave Angela the +UC result of E. Coli verbally and faxed report to her at [phone].  She said she will contact infectious disease for direction from them.    Routing to Dr Amezcua as AMBER.        Eleanor Alejandro, BSN RN    "

## 2019-01-15 ENCOUNTER — DOCUMENTATION ONLY (OUTPATIENT)
Dept: LAB | Facility: CLINIC | Age: 59
End: 2019-01-15

## 2019-01-15 DIAGNOSIS — I10 ESSENTIAL HYPERTENSION WITH GOAL BLOOD PRESSURE LESS THAN 140/90: ICD-10-CM

## 2019-01-15 DIAGNOSIS — Z13.6 CARDIOVASCULAR SCREENING; LDL GOAL LESS THAN 130: Primary | ICD-10-CM

## 2019-01-15 DIAGNOSIS — Z00.00 ROUTINE HISTORY AND PHYSICAL EXAMINATION OF ADULT: ICD-10-CM

## 2019-01-17 DIAGNOSIS — I10 ESSENTIAL HYPERTENSION WITH GOAL BLOOD PRESSURE LESS THAN 140/90: ICD-10-CM

## 2019-01-17 DIAGNOSIS — Z13.6 CARDIOVASCULAR SCREENING; LDL GOAL LESS THAN 130: ICD-10-CM

## 2019-01-17 DIAGNOSIS — Z00.00 ROUTINE HISTORY AND PHYSICAL EXAMINATION OF ADULT: ICD-10-CM

## 2019-01-17 LAB
ANION GAP SERPL CALCULATED.3IONS-SCNC: 5 MMOL/L (ref 3–14)
BUN SERPL-MCNC: 9 MG/DL (ref 7–30)
CALCIUM SERPL-MCNC: 8.8 MG/DL (ref 8.5–10.1)
CHLORIDE SERPL-SCNC: 111 MMOL/L (ref 94–109)
CHOLEST SERPL-MCNC: 176 MG/DL
CO2 SERPL-SCNC: 27 MMOL/L (ref 20–32)
CREAT SERPL-MCNC: 0.85 MG/DL (ref 0.52–1.04)
GFR SERPL CREATININE-BSD FRML MDRD: 76 ML/MIN/{1.73_M2}
GLUCOSE SERPL-MCNC: 96 MG/DL (ref 70–99)
HDLC SERPL-MCNC: 44 MG/DL
LDLC SERPL CALC-MCNC: 107 MG/DL
NONHDLC SERPL-MCNC: 132 MG/DL
POTASSIUM SERPL-SCNC: 4.3 MMOL/L (ref 3.4–5.3)
SODIUM SERPL-SCNC: 143 MMOL/L (ref 133–144)
TRIGL SERPL-MCNC: 127 MG/DL

## 2019-01-17 PROCEDURE — 36415 COLL VENOUS BLD VENIPUNCTURE: CPT | Performed by: FAMILY MEDICINE

## 2019-01-17 PROCEDURE — 80061 LIPID PANEL: CPT | Performed by: FAMILY MEDICINE

## 2019-01-17 PROCEDURE — 80048 BASIC METABOLIC PNL TOTAL CA: CPT | Performed by: FAMILY MEDICINE

## 2019-01-17 RX ORDER — AMLODIPINE BESYLATE 5 MG/1
TABLET ORAL
Qty: 90 TABLET | Refills: 0 | Status: SHIPPED | OUTPATIENT
Start: 2019-01-17 | End: 2019-01-23

## 2019-01-23 ENCOUNTER — OFFICE VISIT (OUTPATIENT)
Dept: FAMILY MEDICINE | Facility: CLINIC | Age: 59
End: 2019-01-23
Payer: COMMERCIAL

## 2019-01-23 VITALS
BODY MASS INDEX: 41.41 KG/M2 | HEIGHT: 62 IN | OXYGEN SATURATION: 95 % | HEART RATE: 63 BPM | DIASTOLIC BLOOD PRESSURE: 80 MMHG | WEIGHT: 225 LBS | TEMPERATURE: 98.7 F | SYSTOLIC BLOOD PRESSURE: 136 MMHG

## 2019-01-23 DIAGNOSIS — I10 ESSENTIAL HYPERTENSION WITH GOAL BLOOD PRESSURE LESS THAN 140/90: ICD-10-CM

## 2019-01-23 DIAGNOSIS — G47.00 INSOMNIA, UNSPECIFIED TYPE: ICD-10-CM

## 2019-01-23 DIAGNOSIS — Z23 NEED FOR VACCINATION: ICD-10-CM

## 2019-01-23 DIAGNOSIS — Z12.31 VISIT FOR SCREENING MAMMOGRAM: ICD-10-CM

## 2019-01-23 DIAGNOSIS — Z00.00 ENCOUNTER FOR PREVENTATIVE ADULT HEALTH CARE EXAMINATION: Primary | ICD-10-CM

## 2019-01-23 PROCEDURE — 90750 HZV VACC RECOMBINANT IM: CPT | Performed by: FAMILY MEDICINE

## 2019-01-23 PROCEDURE — 99396 PREV VISIT EST AGE 40-64: CPT | Mod: 25 | Performed by: FAMILY MEDICINE

## 2019-01-23 PROCEDURE — 90471 IMMUNIZATION ADMIN: CPT | Performed by: FAMILY MEDICINE

## 2019-01-23 RX ORDER — AMLODIPINE BESYLATE 5 MG/1
5 TABLET ORAL DAILY
Qty: 90 TABLET | Refills: 3 | Status: SHIPPED | OUTPATIENT
Start: 2019-01-23 | End: 2020-01-31

## 2019-01-23 RX ORDER — TRAZODONE HYDROCHLORIDE 50 MG/1
100 TABLET, FILM COATED ORAL
Qty: 180 TABLET | Refills: 3 | Status: SHIPPED | OUTPATIENT
Start: 2019-01-23 | End: 2020-01-31

## 2019-01-23 RX ORDER — ATENOLOL 50 MG/1
50 TABLET ORAL DAILY
Qty: 90 TABLET | Refills: 3 | Status: SHIPPED | OUTPATIENT
Start: 2019-01-23 | End: 2020-01-31

## 2019-01-23 ASSESSMENT — ENCOUNTER SYMPTOMS
FEVER: 0
SORE THROAT: 0
CHILLS: 0
SHORTNESS OF BREATH: 0
BREAST MASS: 0
CONSTIPATION: 0
MYALGIAS: 0
HEMATOCHEZIA: 0
HEMATURIA: 0
FREQUENCY: 0
PARESTHESIAS: 0
ABDOMINAL PAIN: 0
DIZZINESS: 0
EYE PAIN: 0
COUGH: 0
DIARRHEA: 0
NERVOUS/ANXIOUS: 0
DYSURIA: 0
HEADACHES: 1
NAUSEA: 0
PALPITATIONS: 0
WEAKNESS: 0

## 2019-01-23 ASSESSMENT — MIFFLIN-ST. JEOR: SCORE: 1553.84

## 2019-01-23 ASSESSMENT — ANXIETY QUESTIONNAIRES
5. BEING SO RESTLESS THAT IT IS HARD TO SIT STILL: NOT AT ALL
GAD7 TOTAL SCORE: 1
IF YOU CHECKED OFF ANY PROBLEMS ON THIS QUESTIONNAIRE, HOW DIFFICULT HAVE THESE PROBLEMS MADE IT FOR YOU TO DO YOUR WORK, TAKE CARE OF THINGS AT HOME, OR GET ALONG WITH OTHER PEOPLE: NOT DIFFICULT AT ALL
1. FEELING NERVOUS, ANXIOUS, OR ON EDGE: NOT AT ALL
2. NOT BEING ABLE TO STOP OR CONTROL WORRYING: NOT AT ALL
3. WORRYING TOO MUCH ABOUT DIFFERENT THINGS: NOT AT ALL
7. FEELING AFRAID AS IF SOMETHING AWFUL MIGHT HAPPEN: NOT AT ALL
6. BECOMING EASILY ANNOYED OR IRRITABLE: NOT AT ALL

## 2019-01-23 ASSESSMENT — PATIENT HEALTH QUESTIONNAIRE - PHQ9
SUM OF ALL RESPONSES TO PHQ QUESTIONS 1-9: 1
5. POOR APPETITE OR OVEREATING: SEVERAL DAYS

## 2019-01-23 NOTE — PATIENT INSTRUCTIONS
1. I recommend including veggies, fresh fruits (3 to 5 servings), nuts (unsalted) in your daily diet. Cut down on carbs like bread, pasta, rice, potatoes. Cut down on red meats like burgers etc. Increase healthy proteins like beans, tofu, tuna, chicken and turkey.    2. Get regular exercise like jogging, biking, swimming at least 3 times per week (150 minutes per week).      3. Do self breast exams monthly. Report any lumps. Set up the mammogram - [phone].    4. Avoid the sun or otherwise use sun screen - please look at the guide I gave you about melanoma.    5. See the dentist and eye doctor regularly.     6. Drop off or mail the advanced directive that you have at home.     7. Make a ancillary only visit to get your 2nd Shingles vaccine in 2-6 months.    8. If all is well, see you in 1 year for a physical with fating labs.
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
## 2019-01-24 ASSESSMENT — ANXIETY QUESTIONNAIRES: GAD7 TOTAL SCORE: 1

## 2019-02-14 ENCOUNTER — ANCILLARY PROCEDURE (OUTPATIENT)
Dept: MAMMOGRAPHY | Facility: CLINIC | Age: 59
End: 2019-02-14
Payer: COMMERCIAL

## 2019-02-14 DIAGNOSIS — Z12.31 VISIT FOR SCREENING MAMMOGRAM: ICD-10-CM

## 2019-02-14 PROCEDURE — 77067 SCR MAMMO BI INCL CAD: CPT | Mod: TC

## 2019-02-25 ENCOUNTER — OFFICE VISIT (OUTPATIENT)
Dept: FAMILY MEDICINE | Facility: CLINIC | Age: 59
End: 2019-02-25
Payer: COMMERCIAL

## 2019-02-25 ENCOUNTER — ANCILLARY PROCEDURE (OUTPATIENT)
Dept: GENERAL RADIOLOGY | Facility: CLINIC | Age: 59
End: 2019-02-25
Attending: FAMILY MEDICINE
Payer: COMMERCIAL

## 2019-02-25 VITALS
HEART RATE: 66 BPM | DIASTOLIC BLOOD PRESSURE: 85 MMHG | SYSTOLIC BLOOD PRESSURE: 145 MMHG | BODY MASS INDEX: 41.52 KG/M2 | WEIGHT: 227 LBS | TEMPERATURE: 98.3 F | OXYGEN SATURATION: 97 %

## 2019-02-25 DIAGNOSIS — M79.644 THUMB PAIN, RIGHT: ICD-10-CM

## 2019-02-25 DIAGNOSIS — M79.644 THUMB PAIN, RIGHT: Primary | ICD-10-CM

## 2019-02-25 PROCEDURE — 73140 X-RAY EXAM OF FINGER(S): CPT | Mod: RT

## 2019-02-25 PROCEDURE — 99213 OFFICE O/P EST LOW 20 MIN: CPT | Performed by: FAMILY MEDICINE

## 2019-02-25 ASSESSMENT — PAIN SCALES - GENERAL: PAINLEVEL: MILD PAIN (3)

## 2019-02-25 NOTE — NURSING NOTE
"Chief Complaint   Patient presents with     Thumb Discomfort     pain in right thump times 2 weeks, noticed a lump yesterday        Initial /85   Pulse 66   Temp 98.3  F (36.8  C) (Oral)   Wt 103 kg (227 lb)   LMP 01/08/2008   SpO2 97%   Breastfeeding? No   BMI 41.52 kg/m   Estimated body mass index is 41.52 kg/m  as calculated from the following:    Height as of 1/23/19: 1.575 m (5' 2\").    Weight as of this encounter: 103 kg (227 lb).  Medication Reconciliation: complete    Maryjane Deleon CMA      "

## 2019-02-25 NOTE — PROGRESS NOTES
xSUBJECTIVE:  Angela Ibarra, a 58 year old female scheduled an appointment to discuss the following issues:  right Thumb mass. Right haned.   Pain x2 week but lump just noted. No similar issues. left thumb ok.   No major injury/falls. On computer a lot. No other hobbies currently.   Ice. Tylenol. No brace.   History wrist fracture like 10 years ago.   No ulcer in pasdt. Normal kidneys.   Past Medical History:   Diagnosis Date     Calculus of kidney      Migraine, unspecified, without mention of intractable migraine without mention of status migrainosus      Other specified iron deficiency anemias      Papanicolaou smear of cervix with low grade squamous intraepithelial lesion (LGSIL)     Colpo and hyst pathology benign     Primary osteoarthritis of right hip      Synovitis of ankle      Unspecified essential hypertension     Essential hypertension       Past Surgical History:   Procedure Laterality Date     ARTHROSCOPY ANKLE  2014    Procedure: ARTHROSCOPY ANKLE;  Surgeon: Shaun Bhatt DPM;  [facility]     ARTHROSCOPY KNEE Left 10/4/2018    Procedure: ARTHROSCOPY KNEE;  Left knee arthroscopy  medial meniscal and chondral debridement;  Surgeon: Aryan Holley MD;  Location: [facility]     C  DELIVERY ONLY      , Low Cervical     C GASTRIC BYPASS,OBESITY,W/SM BOWEL RECONS  10/99     C LIGATE FALLOPIAN TUBE  2000    laparoscopy     COLONOSCOPY WITH CO2 INSUFFLATION N/A 2017    Procedure: COLONOSCOPY WITH CO2 INSUFFLATION;  COLON SCREEN/ YURI;  Surgeon: Cody Arana MD;  Location: [facility]     HYSTERECTOMY, PAP NO LONGER INDICATED  2008     LAPAROSCOPIC CHOLECYSTECTOMY  8/3/2012    Procedure: LAPAROSCOPIC CHOLECYSTECTOMY;  Laparoscopic Cholecystectomy ;  Surgeon: Corwin Cabezas MD;  [facility]     OPEN REDUCTION INTERNAL FIXATION ANKLE  2014    Procedure: OPEN REDUCTION INTERNAL FIXATION ANKLE;  Surgeon: Shaun Bhatt DPM;   [facility]     OPEN REDUCTION INTERNAL FIXATION ELBOW  10/15/2013    Procedure: OPEN REDUCTION INTERNAL FIXATION ELBOW;  OPEN REDUCTION INTERNAL FIXATION LEFT PROXIMAL ULNA;  Surgeon: Artem Last DO;  [facility]     ORTHOPEDIC SURGERY      left shoulder surgery     REMOVE MESH VAGINA  10/10/2011    Procedure:REMOVE MESH VAGINA; Excision of Vaginal Mesh; Surgeon:SERGE SALGADO; Location:[facility]     SURGICAL HISTORY OF -   4/20/10    Transobturator midurethral sling     SURGICAL HISTORY OF -   8/1999    exploratory laparotomy, colitis     SURGICAL HISTORY OF -   4/20/10    Transobturator midurethral sling     TUBAL LIGATION         Family History   Problem Relation Age of Onset     C.A.D. Mother         MI     Hypertension Mother      Hypertension Father      Breast Cancer No family hx of        Social History     Tobacco Use     Smoking status: Never Smoker     Smokeless tobacco: Never Used   Substance Use Topics     Alcohol use: Yes     Comment: occas       ROS:  All other ROS negative    OBJECTIVE:  /85   Pulse 66   Temp 98.3  F (36.8  C) (Oral)   Wt 103 kg (227 lb)   LMP 01/08/2008   SpO2 97%   Breastfeeding? No   BMI 41.52 kg/m    EXAM:  GENERAL APPEARANCE: healthy, alert and no distress  MS: extremities normal- no gross deformities noted, no evidence of inflammation in joints, FROM in all extremities.  MS: pain at base of thumb and mild swelling. Pain with hyperflexion right thumb. No pain in snuff box and good RANGE OF MOTION   SKIN: no suspicious lesions or rashes  NEURO: Normal strength and tone, sensory exam grossly normal, mentation intact and speech normal  PSYCH: mentation appears normal and affect normal/bright    ASSESSMENT / PLAN:  (M79.644) Thumb pain, right  (primary encounter diagnosis)  Comment: likely tendonitis  Plan: XR Finger Right G/E 2 Views, diclofenac         (VOLTAREN) 50 MG EC tablet, ORTHOPEDICS ADULT         REFERRAL        Reveiwed risks and side  effects of medication  Ice and brace prn - given. Follow-up ortho if worse/not improving overall in next week. Await rad report. Call/email with questions/concerns     rBaeden Schilling

## 2019-02-27 ENCOUNTER — OFFICE VISIT (OUTPATIENT)
Dept: URGENT CARE | Facility: URGENT CARE | Age: 59
End: 2019-02-27
Payer: COMMERCIAL

## 2019-02-27 ENCOUNTER — TELEPHONE (OUTPATIENT)
Dept: FAMILY MEDICINE | Facility: CLINIC | Age: 59
End: 2019-02-27

## 2019-02-27 ENCOUNTER — OFFICE VISIT (OUTPATIENT)
Dept: ORTHOPEDICS | Facility: CLINIC | Age: 59
End: 2019-02-27
Attending: FAMILY MEDICINE
Payer: COMMERCIAL

## 2019-02-27 VITALS
WEIGHT: 227 LBS | HEIGHT: 62 IN | BODY MASS INDEX: 41.77 KG/M2 | SYSTOLIC BLOOD PRESSURE: 116 MMHG | DIASTOLIC BLOOD PRESSURE: 68 MMHG

## 2019-02-27 VITALS
WEIGHT: 227 LBS | TEMPERATURE: 98.6 F | HEART RATE: 67 BPM | OXYGEN SATURATION: 95 % | SYSTOLIC BLOOD PRESSURE: 159 MMHG | DIASTOLIC BLOOD PRESSURE: 89 MMHG | BODY MASS INDEX: 41.52 KG/M2 | RESPIRATION RATE: 16 BRPM

## 2019-02-27 DIAGNOSIS — N30.00 ACUTE CYSTITIS WITHOUT HEMATURIA: Primary | ICD-10-CM

## 2019-02-27 DIAGNOSIS — R30.0 DYSURIA: ICD-10-CM

## 2019-02-27 DIAGNOSIS — R82.90 NONSPECIFIC FINDING ON EXAMINATION OF URINE: ICD-10-CM

## 2019-02-27 DIAGNOSIS — M18.11 PRIMARY OSTEOARTHRITIS OF FIRST CARPOMETACARPAL JOINT OF RIGHT HAND: Primary | ICD-10-CM

## 2019-02-27 PROCEDURE — 81001 URINALYSIS AUTO W/SCOPE: CPT | Performed by: NURSE PRACTITIONER

## 2019-02-27 PROCEDURE — 87186 SC STD MICRODIL/AGAR DIL: CPT | Performed by: NURSE PRACTITIONER

## 2019-02-27 PROCEDURE — 87086 URINE CULTURE/COLONY COUNT: CPT | Performed by: NURSE PRACTITIONER

## 2019-02-27 PROCEDURE — 99243 OFF/OP CNSLTJ NEW/EST LOW 30: CPT | Performed by: PEDIATRICS

## 2019-02-27 PROCEDURE — 99213 OFFICE O/P EST LOW 20 MIN: CPT | Performed by: NURSE PRACTITIONER

## 2019-02-27 PROCEDURE — 87088 URINE BACTERIA CULTURE: CPT | Performed by: NURSE PRACTITIONER

## 2019-02-27 RX ORDER — NITROFURANTOIN 25; 75 MG/1; MG/1
100 CAPSULE ORAL 2 TIMES DAILY
Qty: 10 CAPSULE | Refills: 0 | Status: SHIPPED | OUTPATIENT
Start: 2019-02-27 | End: 2019-04-25

## 2019-02-27 ASSESSMENT — ENCOUNTER SYMPTOMS
SHORTNESS OF BREATH: 0
DYSURIA: 1
SORE THROAT: 0
RHINORRHEA: 0
COUGH: 0
CHILLS: 0
DIARRHEA: 0
FEVER: 0
FREQUENCY: 1
VOMITING: 0
HEADACHES: 0
NAUSEA: 0

## 2019-02-27 ASSESSMENT — MIFFLIN-ST. JEOR: SCORE: 1562.92

## 2019-02-27 NOTE — PROGRESS NOTES
Sports Medicine Clinic Visit    PCP: Germán Jernigan    Angela Ibarra is a 58 year old female who is seen  in consultation at the request of  Braeden Schilling M.D. presenting with right thumb pain.  Patient noticed a bump over her thumb with soreness over her entire thumb.  Does not recall any injury.  Pain has been present for about 2-3 weeks, but the bump has only been present for about 3 days.    She was referred due to a questionable fracture on x rays, which were done by primary care.  Patient is right hand dominant.   Was given a thumb brace, does not have it today.  Described as a gamekeepers brace.  She feels it is uncomfortable since she can still move her thumb.     Injury: gradual onset   Pain more radial side of hand, points near first MC. Has noted a bump near volar/radial aspect first CMC joint; quite tender.    Not much radiation to thumb itself.     Location of Pain: right thumb, diffuse   Duration of Pain: 2-3 week(s)  Rating of Pain at worst: 10/10  Rating of Pain Currently: 2/10  Symptoms are better with: Rest  Symptoms are worse with: use  Additional Features:   Positive: swelling and weakness   Negative: bruising, popping, grinding, catching, locking, instability, paresthesias, numbness, pain in other joints and systemic symptoms  Other evaluation and/or treatments so far consists of: x rays  Prior History of related problems: denies, hx of wrist fx many years ago     Social History: computer work     Review of Systems  Musculoskeletal: as above  Remainder of review of systems is negative including constitutional, CV, pulmonary, GI, Skin and Neurologic except as noted in HPI or medical history.    Past Medical History:   Diagnosis Date     Calculus of kidney      Migraine, unspecified, without mention of intractable migraine without mention of status migrainosus      Other specified iron deficiency anemias      Papanicolaou smear of cervix with low grade squamous intraepithelial lesion  (LGSIL)     Colpo and hyst pathology benign     Primary osteoarthritis of right hip      Synovitis of ankle      Unspecified essential hypertension     Essential hypertension     Past Surgical History:   Procedure Laterality Date     ARTHROSCOPY ANKLE  2014    Procedure: ARTHROSCOPY ANKLE;  Surgeon: Shaun Bhatt DPM;  [facility]     ARTHROSCOPY KNEE Left 10/4/2018    Procedure: ARTHROSCOPY KNEE;  Left knee arthroscopy  medial meniscal and chondral debridement;  Surgeon: Aryan Holley MD;  Location: [facility]     C  DELIVERY ONLY      , Low Cervical     C GASTRIC BYPASS,OBESITY,W/SM BOWEL RECONS  10/99     C LIGATE FALLOPIAN TUBE  2000    laparoscopy     COLONOSCOPY WITH CO2 INSUFFLATION N/A 2017    Procedure: COLONOSCOPY WITH CO2 INSUFFLATION;  COLON SCREEN/ YURI;  Surgeon: Cody Arana MD;  [facility]     HYSTERECTOMY, PAP NO LONGER INDICATED  2008     LAPAROSCOPIC CHOLECYSTECTOMY  8/3/2012    Procedure: LAPAROSCOPIC CHOLECYSTECTOMY;  Laparoscopic Cholecystectomy ;  Surgeon: Corwin Cabezas MD;  [facility]     OPEN REDUCTION INTERNAL FIXATION ANKLE  2014    Procedure: OPEN REDUCTION INTERNAL FIXATION ANKLE;  Surgeon: Shaun Bhatt DPM;  [facility]     OPEN REDUCTION INTERNAL FIXATION ELBOW  10/15/2013    Procedure: OPEN REDUCTION INTERNAL FIXATION ELBOW;  OPEN REDUCTION INTERNAL FIXATION LEFT PROXIMAL ULNA;  Surgeon: Artem Last DO;  [facility]     ORTHOPEDIC SURGERY      left shoulder surgery     REMOVE MESH VAGINA  10/10/2011    Procedure:REMOVE MESH VAGINA; Excision of Vaginal Mesh; Surgeon:SERGE SALGADO; Location:[facility]     SURGICAL HISTORY OF -   4/20/10    Transobturator midurethral sling     SURGICAL HISTORY OF -   1999    exploratory laparotomy, colitis     SURGICAL HISTORY OF -   4/20/10    Transobturator midurethral sling     TUBAL LIGATION       Family History   Problem  Relation Age of Onset     C.A.D. Mother         MI     Hypertension Mother      Hypertension Father      Breast Cancer No family hx of      Social History     Socioeconomic History     Marital status:      Spouse name: Not on file     Number of children: 1     Years of education: Not on file     Highest education level: Not on file   Occupational History     Occupation:      Employer: [employer]   Social Needs     Financial resource strain: Not on file     Food insecurity:     Worry: Not on file     Inability: Not on file     Transportation needs:     Medical: Not on file     Non-medical: Not on file   Tobacco Use     Smoking status: Never Smoker     Smokeless tobacco: Never Used   Substance and Sexual Activity     Alcohol use: Yes     Comment: occas     Drug use: No     Sexual activity: Yes     Partners: Male     Birth control/protection: Surgical     Comment: tv   Lifestyle     Physical activity:     Days per week: Not on file     Minutes per session: Not on file     Stress: Not on file   Relationships     Social connections:     Talks on phone: Not on file     Gets together: Not on file     Attends Tenriism service: Not on file     Active member of club or organization: Not on file     Attends meetings of clubs or organizations: Not on file     Relationship status: Not on file     Intimate partner violence:     Fear of current or ex partner: Not on file     Emotionally abused: Not on file     Physically abused: Not on file     Forced sexual activity: Not on file   Other Topics Concern      Service Yes     Comment: in and out of [place] 85-88     Blood Transfusions No     Caffeine Concern No     Occupational Exposure No     Hobby Hazards No     Sleep Concern Yes     Stress Concern No     Weight Concern Yes     Comment: always      Special Diet No     Comment: watches sugar intake     Back Care No     Exercise Yes     Comment: treadmill and bike     Bike Helmet No     Seat Belt Yes  "    Self-Exams No     Parent/sibling w/ CABG, MI or angioplasty before 65F 55M? No   Social History Narrative    Dairy/d 1 servings/d.     Caffeine 0 servings/d    Exercise 5 x week, treadmill, bike 1 hour/day    Sunscreen used - No    Seatbelts used - Yes    Working smoke/CO detectors in the home - Yes    Guns stored in the home - No    Self Breast Exams - No    Self Testicular Exam - NA    Eye Exam up to date - Yes    Dental Exam up to date - Yes    Pap Smear up to date - Yes    Mammogram up to date - No 2000    PSA up to date - NA    Dexa Scan up to date - NA    Flex Sig / Colonoscopy up to date - Yes 8/99 Abd pain=neg    Immunizations up to date - unsure    Abuse: Current or Past(Physical, Sexual or Emotional)- No    Do you feel safe in your environment - Yes                       Objective  /68   Ht 1.575 m (5' 2\")   Wt 103 kg (227 lb)   LMP 01/08/2008   BMI 41.52 kg/m      GENERAL APPEARANCE: healthy, alert, no distress and over weight   GAIT: NORMAL  SKIN: no suspicious lesions or rashes  NEURO: Normal strength and tone, mentation intact and speech normal  PSYCH:  mentation appears normal and affect normal/bright  HEENT: no scleral icterus  CV: no extremity edema  RESP: nonlabored breathing    Exam:  Hand/wrist (right):    Inspection:  No deformity noted.  Mild to moderate swelling radial hand, over thenar eminence, first CMC joint. No ecchymosis.    Motion:  Forearm pronation full, forearm supination full.  Wrist flexion full  Wrist extension full  Wrist radial deviation full  Wrist ulnar deviation full  Digit motion moderate limitation thumb motion, with pain in hand/thenar eminence    Strength:  Able to resist at thumb all motions, mild pain with resisted abduction    Sensation:  Grossly intact .    Radial pulses normal, +2/4, capillary refill brisk.    Palpation:  Tender proximal first MC, first CMC joint, thenar eminence  Nontender remainder, including MCP joint and IP joint of thumb    Skin:   "     well perfused       capillary refill brisk    Radiology:  Visualized radiographs of right thumb 2/25/19, and reviewed the images with the patient.  Impression: first CMC joint OA. There are calcificaitons adjacent to the MCP and IP joints of the thumb, which I think are chronic.      Results for orders placed or performed in visit on 02/25/19   XR Finger Right G/E 2 Views    Narrative    FINGER RIGHT TWO OR MORE VIEWS February 25, 2019 3:24 PM     HISTORY: Pain/swelling base of right thumb.    COMPARISON: None.      Impression    IMPRESSION: Small calcification at the proximal aspect of the first  proximal phalanx. This may represent degenerative change, though a  small avulsion fracture fragment is also possible. Clinically  correlate with mechanism of injury. Margins do not yet appear well  corticated suggesting small avulsion fragment. Degenerative changes  from the interphalangeal joint. Mild osteoarthritis of the first  carpometacarpal articulation.    MOSES FALCON MD           Assessment:  1. Primary osteoarthritis of first carpometacarpal joint of right hand         Plan:  Discussed the assessment with the patient. I don't think findings on x-ray represent a fracture, given lack of trauma, and no tenderness at those sites, particularly first MCP joint.  We discussed the following for first CMC joint OA: symptom treatment, activity modification/rest, imaging, rehab, injection therapy and support for the affected area. Following discussion, plan:  Topical Treatments: Ice, Heat or Topical Analgesics prn  Over the counter medication: Patient's preferred OTC medication prn  Prescription Medication: she has voltaren from PCP, may use for this  Plain films of the area reviewed.  Activity Modification: discussed what to alter/avoid for pain  Rehab: Occupational Therapy: consideration. She is not interested currently.  Medical Equipment: discussed use of support for OA, may brace prn with activities. She has  what sounds to be gamekeeper brace currently. Offered thumb spica wrist brace, was not comfortable for hand. Other consideration may be custom brace through OT.  Discussed consideration of steroid injection, for pain relief. She is not interested currently, declined.  Follow up: will leave open ended.  Questions answered. The patient indicates understanding of these issues and agrees with the plan.    Shaun Patten DO, CAQ    CC: Braeden Schilling        Disclaimer: This note consists of symbols derived from keyboarding, dictation and/or voice recognition software. As a result, there may be errors in the script that have gone undetected. Please consider this when interpreting information found in this chart.

## 2019-02-27 NOTE — PROGRESS NOTES
SUBJECTIVE:   Angela Ibarra is a 58 year old female presenting with a chief complaint of   Chief Complaint   Patient presents with     UTI     Pain with urination, burning, itching, cloudy urine x 3 days        She is an established patient of [facility].    UTI    Onset of symptoms was 3day(s).  Course of illness is worsening  Severity moderate  Current and associated symptoms dysuria and frequency  Treatment and measures tried None  Predisposing factors include none  Patient denies rigors, flank pain, vaginal discharge, vaginal odor and vaginal itching        Review of Systems   Constitutional: Negative for chills and fever.   HENT: Negative for congestion, ear pain, rhinorrhea and sore throat.    Respiratory: Negative for cough and shortness of breath.    Gastrointestinal: Negative for diarrhea, nausea and vomiting.   Genitourinary: Positive for dysuria and frequency.   Neurological: Negative for headaches.   All other systems reviewed and are negative.      Past Medical History:   Diagnosis Date     Calculus of kidney      Migraine, unspecified, without mention of intractable migraine without mention of status migrainosus      Other specified iron deficiency anemias      Papanicolaou smear of cervix with low grade squamous intraepithelial lesion (LGSIL) 11/07    Colpo and hyst pathology benign     Primary osteoarthritis of right hip      Synovitis of ankle      Unspecified essential hypertension 1999    Essential hypertension     Family History   Problem Relation Age of Onset     C.A.D. Mother         MI     Hypertension Mother      Hypertension Father      Breast Cancer No family hx of      Current Outpatient Medications   Medication Sig Dispense Refill     Acetaminophen (TYLENOL EXTRA STRENGTH PO) Take 1-2 tablets by mouth as needed       amLODIPine (NORVASC) 5 MG tablet Take 1 tablet (5 mg) by mouth daily 90 tablet 3     atenolol (TENORMIN) 50 MG tablet Take 1 tablet (50 mg) by mouth daily 90 tablet 3      cyclobenzaprine (FLEXERIL) 10 MG tablet Take 1 tablet (10 mg) by mouth 3 times daily as needed for muscle spasms 20 tablet 1     diclofenac (VOLTAREN) 50 MG EC tablet Take 1 tablet (50 mg) by mouth 2 times daily As needed for pain. Take with food 30 tablet 3     estradiol (ESTRACE) 0.1 MG/GM cream Place 2 g vaginally twice a week 42.5 g 2     IBUPROFEN PO Take 400 mg by mouth every 6 hours as needed for moderate pain       loperamide (IMODIUM A-D) 2 MG tablet Take 1 tablet (2 mg) by mouth 4 times daily as needed for diarrhea 10 tablet 0     nitroFURantoin macrocrystal-monohydrate (MACROBID) 100 MG capsule Take 1 capsule (100 mg) by mouth 2 times daily for 5 days 10 capsule 0     ondansetron (ZOFRAN-ODT) 4 MG ODT tab Take 1 tablet (4 mg) by mouth every 6 hours as needed for nausea 8 tablet 0     traZODone (DESYREL) 50 MG tablet Take 2 tablets (100 mg) by mouth nightly as needed for sleep 180 tablet 3     Social History     Tobacco Use     Smoking status: Never Smoker     Smokeless tobacco: Never Used   Substance Use Topics     Alcohol use: Yes     Comment: occas       OBJECTIVE  /89   Pulse 67   Temp 98.6  F (37  C) (Oral)   Resp 16   Wt 103 kg (227 lb)   LMP 01/08/2008   SpO2 95%   BMI 41.52 kg/m      Physical Exam   Constitutional: No distress.   HENT:   Head: Normocephalic and atraumatic.   Right Ear: Tympanic membrane and external ear normal.   Left Ear: Tympanic membrane and external ear normal.   Mouth/Throat: Oropharynx is clear and moist.   Eyes: EOM are normal. Pupils are equal, round, and reactive to light.   Neck: Normal range of motion. Neck supple.   Pulmonary/Chest: Effort normal and breath sounds normal. No respiratory distress.   Abdominal:    ABD: soft, no tenderness to palpation , no rigidity, guarding or rebound . No CVAT       Lymphadenopathy:     She has no cervical adenopathy.   Neurological: She is alert. No cranial nerve deficit.   Skin: Skin is warm and dry. She is not  diaphoretic.   Psychiatric: She has a normal mood and affect.   Nursing note and vitals reviewed.      Labs:  Results for orders placed or performed in visit on 02/27/19 (from the past 24 hour(s))   UA reflex to Microscopic and Culture   Result Value Ref Range    Color Urine Yellow     Appearance Urine Slightly Cloudy     Glucose Urine Negative NEG^Negative mg/dL    Bilirubin Urine Negative NEG^Negative    Ketones Urine Negative NEG^Negative mg/dL    Specific Gravity Urine <=1.005 1.003 - 1.035    Blood Urine Trace (A) NEG^Negative    pH Urine 6.0 5.0 - 7.0 pH    Protein Albumin Urine Negative NEG^Negative mg/dL    Urobilinogen Urine 0.2 0.2 - 1.0 EU/dL    Nitrite Urine Negative NEG^Negative    Leukocyte Esterase Urine Moderate (A) NEG^Negative    Source Midstream Urine    Urine Microscopic   Result Value Ref Range    WBC Urine 10-25 (A) OTO5^0 - 5 /HPF    RBC Urine O - 2 OTO2^O - 2 /HPF    Squamous Epithelial /LPF Urine Few FEW^Few /LPF    Bacteria Urine Moderate (A) NEG^Negative /HPF         ASSESSMENT:      ICD-10-CM    1. Acute cystitis without hematuria N30.00 nitroFURantoin macrocrystal-monohydrate (MACROBID) 100 MG capsule   2. Dysuria R30.0 UA reflex to Microscopic and Culture     Urine Microscopic   3. Nonspecific finding on examination of urine R82.90 Urine Culture Aerobic Bacterial          PLAN:   As per ordered above.  Drink plenty of fluids.  Prevention and treatment of UTI's discussed. Follow up with primary care physician if not improving.  Advised about symptoms which might herald more serious problems.          Patient Instructions       Patient Education     Understanding Urinary Tract Infections (UTIs)  Most UTIs are caused by bacteria, although they may also be caused by viruses or fungi. Bacteria from the bowel are the most common source of infection. The infection may start because of any of the following:    Sexual activity. During sex, bacteria can travel from the penis, vagina, or rectum  into the urethra.     Bacteria on the skin outside the rectum may travel into the urethra. This is more common in women since the rectum and urethra are closer to each other than in men. Wiping from front to back after using the toilet and keeping the area clean can help prevent germs from getting to the urethra.    Blockage of urine flow through the urinary tract. If urine sits too long, germs may start to grow out of control.      Parts of the urinary tract  The infection can occur in any part of the urinary tract.    The kidneys collect and store urine.    The ureters carry urine from the kidneys to the bladder.    The bladder holds urine until you are ready to let it out.    The urethra carries urine from the bladder out of the body. It is shorter in women, so bacteria can move through it more easily. The urethra is longer in men, so a UTI is less likely to reach the bladder or kidneys in men.  Date Last Reviewed: 1/1/2017 2000-2018 [employer]. [address] [place] [address], [place] [address]. All rights reserved. This information is not intended as a substitute for professional medical care. Always follow your healthcare professional's instructions.

## 2019-02-27 NOTE — TELEPHONE ENCOUNTER
Patient was just prescribed Macrobid but this med is listed as a allergy.  Please call pharmacy to discuss.  [phone].    Thank You  Danika Wyatt, [facility]  [phone]

## 2019-02-27 NOTE — PATIENT INSTRUCTIONS
Use of supportive device as needed to help rest joint  NSAID's as needed--has voltaren  Contact office if you would like to try occupational therapy.

## 2019-02-27 NOTE — LETTER
2/27/2019         RE: Angela Ibarra  [address] [place] [address]        Dear Colleague,    Thank you for referring your patient, Angela Ibarra, to the [facility]. Please see a copy of my visit note below.    Sports Medicine Clinic Visit    PCP: Germán Jernigan    Angela Ibarra is a 58 year old female who is seen  in consultation at the request of  Braeden Schilling M.D. presenting with right thumb pain.  Patient noticed a bump over her thumb with soreness over her entire thumb.  Does not recall any injury.  Pain has been present for about 2-3 weeks, but the bump has only been present for about 3 days.    She was referred due to a questionable fracture on x rays, which were done by primary care.  Patient is right hand dominant.   Was given a thumb brace, does not have it today.  Described as a gamekeepers brace.  She feels it is uncomfortable since she can still move her thumb.     Injury: gradual onset   Pain more radial side of hand, points near first MC. Has noted a bump near volar/radial aspect first CMC joint; quite tender.    Not much radiation to thumb itself.     Location of Pain: right thumb, diffuse   Duration of Pain: 2-3 week(s)  Rating of Pain at worst: 10/10  Rating of Pain Currently: 2/10  Symptoms are better with: Rest  Symptoms are worse with: use  Additional Features:   Positive: swelling and weakness   Negative: bruising, popping, grinding, catching, locking, instability, paresthesias, numbness, pain in other joints and systemic symptoms  Other evaluation and/or treatments so far consists of: x rays  Prior History of related problems: denies, hx of wrist fx many years ago     Social History: computer work     Review of Systems  Musculoskeletal: as above  Remainder of review of systems is negative including constitutional, CV, pulmonary, GI, Skin and Neurologic except as noted in HPI or medical history.    Past Medical History:   Diagnosis Date      Calculus of kidney      Migraine, unspecified, without mention of intractable migraine without mention of status migrainosus      Other specified iron deficiency anemias      Papanicolaou smear of cervix with low grade squamous intraepithelial lesion (LGSIL)     Colpo and hyst pathology benign     Primary osteoarthritis of right hip      Synovitis of ankle      Unspecified essential hypertension     Essential hypertension     Past Surgical History:   Procedure Laterality Date     ARTHROSCOPY ANKLE  2014    Procedure: ARTHROSCOPY ANKLE;  Surgeon: Shaun Bhatt DPM;  Location: [facility] OR     ARTHROSCOPY KNEE Left 10/4/2018    Procedure: ARTHROSCOPY KNEE;  Left knee arthroscopy  medial meniscal and chondral debridement;  Surgeon: Aryan Holley MD;  Location: [facility] OR     C  DELIVERY ONLY      , Low Cervical     C GASTRIC BYPASS,OBESITY,W/SM BOWEL RECONS  10/99     C LIGATE FALLOPIAN TUBE  2000    laparoscopy     COLONOSCOPY WITH CO2 INSUFFLATION N/A 2017    Procedure: COLONOSCOPY WITH CO2 INSUFFLATION;  COLON SCREEN/ YURI;  Surgeon: Cody Arana MD;  Location: [facility] OR     HYSTERECTOMY, PAP NO LONGER INDICATED  2008     LAPAROSCOPIC CHOLECYSTECTOMY  8/3/2012    Procedure: LAPAROSCOPIC CHOLECYSTECTOMY;  Laparoscopic Cholecystectomy ;  Surgeon: Corwin Cabezas MD;  Location: [facility] OR     OPEN REDUCTION INTERNAL FIXATION ANKLE  2014    Procedure: OPEN REDUCTION INTERNAL FIXATION ANKLE;  Surgeon: Shaun Bhatt DPM;  Location: [facility] OR     OPEN REDUCTION INTERNAL FIXATION ELBOW  10/15/2013    Procedure: OPEN REDUCTION INTERNAL FIXATION ELBOW;  OPEN REDUCTION INTERNAL FIXATION LEFT PROXIMAL ULNA;  Surgeon: Artem Last DO;  Location:  OR     ORTHOPEDIC SURGERY      left shoulder surgery     REMOVE MESH VAGINA  10/10/2011    Procedure:REMOVE MESH VAGINA; Excision of Vaginal Mesh; Surgeon:SERGE SALGADO; Location: OR     SURGICAL  HISTORY OF -   4/20/10    Transobturator midurethral sling     SURGICAL HISTORY OF -   8/1999    exploratory laparotomy, colitis     SURGICAL HISTORY OF -   4/20/10    Transobturator midurethral sling     TUBAL LIGATION       Family History   Problem Relation Age of Onset     C.A.D. Mother         MI     Hypertension Mother      Hypertension Father      Breast Cancer No family hx of      Social History     Socioeconomic History     Marital status:      Spouse name: Not on file     Number of children: 1     Years of education: Not on file     Highest education level: Not on file   Occupational History     Occupation:      Employer: [employer]   Social Needs     Financial resource strain: Not on file     Food insecurity:     Worry: Not on file     Inability: Not on file     Transportation needs:     Medical: Not on file     Non-medical: Not on file   Tobacco Use     Smoking status: Never Smoker     Smokeless tobacco: Never Used   Substance and Sexual Activity     Alcohol use: Yes     Comment: occas     Drug use: No     Sexual activity: Yes     Partners: Male     Birth control/protection: Surgical     Comment: [facility]   Lifestyle     Physical activity:     Days per week: Not on file     Minutes per session: Not on file     Stress: Not on file   Relationships     Social connections:     Talks on phone: Not on file     Gets together: Not on file     Attends [employer] service: Not on file     Active member of club or organization: Not on file     Attends meetings of clubs or organizations: Not on file     Relationship status: Not on file     Intimate partner violence:     Fear of current or ex partner: Not on file     Emotionally abused: Not on file     Physically abused: Not on file     Forced sexual activity: Not on file   Other Topics Concern      Service Yes     Comment: in and out of [place] 85[facility]88     Blood Transfusions No     Caffeine Concern No     Occupational Exposure No     Hobby  "Hazards No     Sleep Concern Yes     Stress Concern No     Weight Concern Yes     Comment: always      Special Diet No     Comment: watches sugar intake     Back Care No     Exercise Yes     Comment: treadmill and bike     Bike Helmet No     Seat Belt Yes     Self-Exams No     Parent/sibling w/ CABG, MI or angioplasty before 65F 55M? No   Social History Narrative    Dairy/d 1 servings/d.     Caffeine 0 servings/d    Exercise 5 x week, treadmill, bike 1 hour/day    Sunscreen used - No    Seatbelts used - Yes    Working smoke/CO detectors in the home - Yes    Guns stored in the home - No    Self Breast Exams - No    Self Testicular Exam - NA    Eye Exam up to date - Yes    Dental Exam up to date - Yes    Pap Smear up to date - Yes    Mammogram up to date - No 2000    PSA up to date - NA    Dexa Scan up to date - NA    Flex Sig / Colonoscopy up to date - Yes 8/99 Abd pain=neg    Immunizations up to date - unsure    Abuse: Current or Past(Physical, Sexual or Emotional)- No    Do you feel safe in your environment - Yes                       Objective  /68   Ht 1.575 m (5' 2\")   Wt 103 kg (227 lb)   LMP 01/08/2008   BMI 41.52 kg/m       GENERAL APPEARANCE: healthy, alert, no distress and over weight   GAIT: NORMAL  SKIN: no suspicious lesions or rashes  NEURO: Normal strength and tone, mentation intact and speech normal  PSYCH:  mentation appears normal and affect normal/bright  HEENT: no scleral icterus  CV: no extremity edema  RESP: nonlabored breathing    Exam:  Hand/wrist (right):    Inspection:  No deformity noted.  Mild to moderate swelling radial hand, over thenar eminence, first CMC joint. No ecchymosis.    Motion:  Forearm pronation full, forearm supination full.  Wrist flexion full  Wrist extension full  Wrist radial deviation full  Wrist ulnar deviation full  Digit motion moderate limitation thumb motion, with pain in hand/thenar eminence    Strength:  Able to resist at thumb all motions, mild pain " with resisted abduction    Sensation:  Grossly intact .    Radial pulses normal, +2/4, capillary refill brisk.    Palpation:  Tender proximal first MC, first CMC joint, thenar eminence  Nontender remainder, including MCP joint and IP joint of thumb    Skin:       well perfused       capillary refill brisk    Radiology:  Visualized radiographs of right thumb 2/25/19, and reviewed the images with the patient.  Impression: first CMC joint OA. There are calcificaitons adjacent to the MCP and IP joints of the thumb, which I think are chronic.      Results for orders placed or performed in visit on 02/25/19   XR Finger Right G/E 2 Views    Narrative    FINGER RIGHT TWO OR MORE VIEWS February 25, 2019 3:24 PM     HISTORY: Pain/swelling base of right thumb.    COMPARISON: None.      Impression    IMPRESSION: Small calcification at the proximal aspect of the first  proximal phalanx. This may represent degenerative change, though a  small avulsion fracture fragment is also possible. Clinically  correlate with mechanism of injury. Margins do not yet appear well  corticated suggesting small avulsion fragment. Degenerative changes  from the interphalangeal joint. Mild osteoarthritis of the first  carpometacarpal articulation.    MOSES FALCON MD           Assessment:  1. Primary osteoarthritis of first carpometacarpal joint of right hand         Plan:  Discussed the assessment with the patient. I don't think findings on x-ray represent a fracture, given lack of trauma, and no tenderness at those sites, particularly first MCP joint.  We discussed the following for first CMC joint OA: symptom treatment, activity modification/rest, imaging, rehab, injection therapy and support for the affected area. Following discussion, plan:  Topical Treatments: Ice, Heat or Topical Analgesics prn  Over the counter medication: Patient's preferred OTC medication prn  Prescription Medication: she has voltaren from PCP, may use for this  Plain  films of the area reviewed.  Activity Modification: discussed what to alter/avoid for pain  Rehab: Occupational Therapy: consideration. She is not interested currently.  Medical Equipment: discussed use of support for OA, may brace prn with activities. She has what sounds to be gamekeeper brace currently. Offered thumb spica wrist brace, was not comfortable for hand. Other consideration may be custom brace through OT.  Discussed consideration of steroid injection, for pain relief. She is not interested currently, declined.  Follow up: will leave open ended.  Questions answered. The patient indicates understanding of these issues and agrees with the plan.    Shaun Patten DO, CAQ    CC: Braeden Schilling        Disclaimer: This note consists of symbols derived from keyboarding, dictation and/or voice recognition software. As a result, there may be errors in the script that have gone undetected. Please consider this when interpreting information found in this chart.          Again, thank you for allowing me to participate in the care of your patient.        Sincerely,        Shaun Patten DO

## 2019-02-27 NOTE — PATIENT INSTRUCTIONS
Patient Education     Understanding Urinary Tract Infections (UTIs)  Most UTIs are caused by bacteria, although they may also be caused by viruses or fungi. Bacteria from the bowel are the most common source of infection. The infection may start because of any of the following:    Sexual activity. During sex, bacteria can travel from the penis, vagina, or rectum into the urethra.     Bacteria on the skin outside the rectum may travel into the urethra. This is more common in women since the rectum and urethra are closer to each other than in men. Wiping from front to back after using the toilet and keeping the area clean can help prevent germs from getting to the urethra.    Blockage of urine flow through the urinary tract. If urine sits too long, germs may start to grow out of control.      Parts of the urinary tract  The infection can occur in any part of the urinary tract.    The kidneys collect and store urine.    The ureters carry urine from the kidneys to the bladder.    The bladder holds urine until you are ready to let it out.    The urethra carries urine from the bladder out of the body. It is shorter in women, so bacteria can move through it more easily. The urethra is longer in men, so a UTI is less likely to reach the bladder or kidneys in men.  Date Last Reviewed: 1/1/2017 2000-2018 [employer]. [address] [place] [address], [place] [address]. All rights reserved. This information is not intended as a substitute for professional medical care. Always follow your healthcare professional's instructions.

## 2019-03-01 LAB
BACTERIA SPEC CULT: ABNORMAL
SPECIMEN SOURCE: ABNORMAL

## 2019-03-01 RX ORDER — CEFDINIR 300 MG/1
300 CAPSULE ORAL 2 TIMES DAILY
Qty: 20 CAPSULE | Refills: 0 | Status: SHIPPED | OUTPATIENT
Start: 2019-03-01 | End: 2019-04-25

## 2019-04-15 ENCOUNTER — ALLIED HEALTH/NURSE VISIT (OUTPATIENT)
Dept: NURSING | Facility: CLINIC | Age: 59
End: 2019-04-15
Payer: COMMERCIAL

## 2019-04-15 DIAGNOSIS — Z23 NEED FOR VACCINATION: Primary | ICD-10-CM

## 2019-04-15 PROCEDURE — 90471 IMMUNIZATION ADMIN: CPT

## 2019-04-15 PROCEDURE — 90750 HZV VACC RECOMBINANT IM: CPT

## 2019-04-15 NOTE — PROGRESS NOTES
Screening Questionnaire for Adult Immunization    Are you sick today?   No   Do you have allergies to medications, food, a vaccine component or latex?   No   Have you ever had a serious reaction after receiving a vaccination?   No   Do you have a long-term health problem with heart disease, lung disease, asthma, kidney disease, metabolic disease (e.g. diabetes), anemia, or other blood disorder?   No   Do you have cancer, leukemia, HIV/AIDS, or any other immune system problem?   No   In the past 3 months, have you taken medications that affect  your immune system, such as prednisone, other steroids, or anticancer drugs; drugs for the treatment of rheumatoid arthritis, Crohn s disease, or psoriasis; or have you had radiation treatments?   No   Have you had a seizure, or a brain or other nervous system problem?   No   During the past year, have you received a transfusion of blood or blood     products, or been given immune (gamma) globulin or antiviral drug?   No   For women: Are you pregnant or is there a chance you could become        pregnant during the next month?   No   Have you received any vaccinations in the past 4 weeks?   No     Immunization questionnaire answers were all negative.        Per orders of Dr. Jernigan, injection of Shingrix given by Daphne Salgado. Patient instructed to remain in clinic for 15 minutes afterwards, and to report any adverse reaction to me immediately.       Screening performed by Daphne Salgdao on 4/15/2019 at 4:47 PM.

## 2019-04-25 ENCOUNTER — OFFICE VISIT (OUTPATIENT)
Dept: FAMILY MEDICINE | Facility: CLINIC | Age: 59
End: 2019-04-25
Payer: COMMERCIAL

## 2019-04-25 VITALS
SYSTOLIC BLOOD PRESSURE: 154 MMHG | TEMPERATURE: 98.2 F | OXYGEN SATURATION: 95 % | BODY MASS INDEX: 41.52 KG/M2 | DIASTOLIC BLOOD PRESSURE: 83 MMHG | WEIGHT: 227 LBS | HEART RATE: 72 BPM

## 2019-04-25 DIAGNOSIS — N30.00 ACUTE CYSTITIS WITHOUT HEMATURIA: ICD-10-CM

## 2019-04-25 DIAGNOSIS — R30.0 DYSURIA: Primary | ICD-10-CM

## 2019-04-25 PROCEDURE — 99213 OFFICE O/P EST LOW 20 MIN: CPT | Performed by: FAMILY MEDICINE

## 2019-04-25 RX ORDER — CEFDINIR 300 MG/1
300 CAPSULE ORAL 2 TIMES DAILY
Qty: 10 CAPSULE | Refills: 3 | Status: SHIPPED | OUTPATIENT
Start: 2019-04-25 | End: 2020-02-10

## 2019-04-25 NOTE — PROGRESS NOTES
HPI:    Angela Ibarra is an 58 year old female who presents for evaluation of:    Recurrent UTI - present for a few days. Symptoms are dysuria, frequency, urgency. No hematuria, pelvic pain, flank or low back pain. Denies fevers. No abnormal vaginal discharge. No urethral discharge. No concern for STD or pregnancy.  Evaluation and treatment:   On 2/27/19 she was placed on Nitrofurantoin, then changed to Omnicef.    At that time culture grew e coli - resistant to Cipro, Levaquin, Nitrofurantoin.    We were not able to collect urine today - she accidentally dropped the urine sample in the toilet.   I am placing her on Omnicef - refill given for future if needed.       Results for orders placed or performed in visit on 02/27/19   UA reflex to Microscopic and Culture   Result Value Ref Range    Color Urine Yellow     Appearance Urine Slightly Cloudy     Glucose Urine Negative NEG^Negative mg/dL    Bilirubin Urine Negative NEG^Negative    Ketones Urine Negative NEG^Negative mg/dL    Specific Gravity Urine <=1.005 1.003 - 1.035    Blood Urine Trace (A) NEG^Negative    pH Urine 6.0 5.0 - 7.0 pH    Protein Albumin Urine Negative NEG^Negative mg/dL    Urobilinogen Urine 0.2 0.2 - 1.0 EU/dL    Nitrite Urine Negative NEG^Negative    Leukocyte Esterase Urine Moderate (A) NEG^Negative    Source Midstream Urine    Urine Microscopic   Result Value Ref Range    WBC Urine 10-25 (A) OTO5^0 - 5 /HPF    RBC Urine O - 2 OTO2^O - 2 /HPF    Squamous Epithelial /LPF Urine Few FEW^Few /LPF    Bacteria Urine Moderate (A) NEG^Negative /HPF   Urine Culture Aerobic Bacterial   Result Value Ref Range    Specimen Description Midstream Urine     Culture Micro 10,000 to 50,000 colonies/mL  Escherichia coli   (A)        Susceptibility    Escherichia coli - ROSINA     AMPICILLIN <=2 Sensitive ug/mL     CEFAZOLIN* <=4 Sensitive ug/mL      * Cefazolin ROSINA breakpoints are for the treatment of uncomplicated urinary tract infections.  For the treatment  of systemic infections, please contact the laboratory for additional testing.     CEFOXITIN <=4 Sensitive ug/mL     CEFTAZIDIME <=1 Sensitive ug/mL     CEFTRIAXONE <=1 Sensitive ug/mL     CIPROFLOXACIN >=4 Resistant ug/mL     GENTAMICIN <=1 Sensitive ug/mL     LEVOFLOXACIN >=8 Resistant ug/mL     NITROFURANTOIN 128 Resistant ug/mL     TOBRAMYCIN <=1 Sensitive ug/mL     Trimethoprim/Sulfa <=1/19 Sensitive ug/mL     AMPICILLIN/SULBACTAM <=2 Sensitive ug/mL     Piperacillin/Tazo <=4 Sensitive ug/mL     CEFEPIME <=1 Sensitive ug/mL             ROS:    Per HPI    Exam:    /83 (Cuff Size: Adult Large)   Pulse 72   Temp 98.2  F (36.8  C) (Oral)   Wt 103 kg (227 lb)   LMP 01/08/2008   SpO2 95%   Breastfeeding? No   BMI 41.52 kg/m      Gen: Healthy appearing female in no acute distress  Lungs: Good air movement and otherwise clear.  CV: Heart RRR with no murmurs.   Abd: suprapubic tenderness without guarding or rebound. Otherwise soft ND with normal bowel sounds. No masses. No CVA tenderness.    Assessment and Plan - Decision Making    1. Dysuria    Per HPI    - cefdinir (OMNICEF) 300 MG capsule; Take 1 capsule (300 mg) by mouth 2 times daily  Dispense: 10 capsule; Refill: 3    2. Acute cystitis without hematuria    Per HPI    - cefdinir (OMNICEF) 300 MG capsule; Take 1 capsule (300 mg) by mouth 2 times daily  Dispense: 10 capsule; Refill: 3      Written instructions given as follows:    Patient Instructions   Use the Omnicef for 5 days - I placed refills for you for next time.    Stop by our pharmacy in a few weeks for blood pressure check.

## 2019-04-25 NOTE — PATIENT INSTRUCTIONS
Use the Omnicef for 5 days - I placed refills for you for next time.    Stop by our pharmacy in a few weeks for blood pressure check.

## 2019-05-22 NOTE — PROGRESS NOTES
SUBJECTIVE:  Angela Ibarra is a 58 year old female who is seen in follow-up for right hip pain that has been present for many years. She was last seen by me on 10/16/2018 and I recommended limited weightbearing for 4-6 weeks along with an intraarticular hip injection. This combination provided her great relief until January 2019. She increased the physical therapy she was doing and this helped until about 3-4 weeks ago. At that time, she started developing pain in the groin, hip and buttock pain. Has not had any improvements since that time. Presents today with ongoing right hip pain, which is worsened with laying on the right side. Has not had any recent treatments.     Cause: unknown  Symptoms: frequent   No: numbness and tingling   Aggravating Activities: laying on side  Other Symptoms: none  Symptoms have been worsening .    Prior history of related problems: chronic right knee pain and osteoarthritis .    Treatment up to this point: activity modification, intraarticular hip injection, physical therapy       Orthopedic PMH: history of left knee arthroscopy with Dr. Aryan Holley.    Past medical history:   Past Medical History:   Diagnosis Date     Calculus of kidney      Migraine, unspecified, without mention of intractable migraine without mention of status migrainosus      Other specified iron deficiency anemias      Papanicolaou smear of cervix with low grade squamous intraepithelial lesion (LGSIL) 11/07    Colpo and hyst pathology benign     Primary osteoarthritis of right hip      Synovitis of ankle      Unspecified essential hypertension 1999    Essential hypertension       Surgical:   Past Surgical History:   Procedure Laterality Date     ARTHROSCOPY ANKLE  4/22/2014    Procedure: ARTHROSCOPY ANKLE;  Surgeon: Shaun Bahtt DPM;  [facility]     ARTHROSCOPY KNEE Left 10/4/2018    Procedure: ARTHROSCOPY KNEE;  Left knee arthroscopy  medial meniscal and chondral debridement;  Surgeon: Leydi  Aryan Ross MD;  Location: [facility]     C  DELIVERY ONLY      , Low Cervical     C GASTRIC BYPASS,OBESITY,W/SM BOWEL RECONS  10/99     C LIGATE FALLOPIAN TUBE  2000    laparoscopy     COLONOSCOPY WITH CO2 INSUFFLATION N/A 2017    Procedure: COLONOSCOPY WITH CO2 INSUFFLATION;  COLON SCREEN/ YURI;  Surgeon: Cody Arana MD;  Location: [facility]     HYSTERECTOMY, PAP NO LONGER INDICATED  2008     LAPAROSCOPIC CHOLECYSTECTOMY  8/3/2012    Procedure: LAPAROSCOPIC CHOLECYSTECTOMY;  Laparoscopic Cholecystectomy ;  Surgeon: Corwin Cabezas MD;  [facility]     OPEN REDUCTION INTERNAL FIXATION ANKLE  2014    Procedure: OPEN REDUCTION INTERNAL FIXATION ANKLE;  Surgeon: Shaun Bhatt DPM;  [facility]     OPEN REDUCTION INTERNAL FIXATION ELBOW  10/15/2013    Procedure: OPEN REDUCTION INTERNAL FIXATION ELBOW;  OPEN REDUCTION INTERNAL FIXATION LEFT PROXIMAL ULNA;  Surgeon: Artem Last DO;  [facility]     ORTHOPEDIC SURGERY      left shoulder surgery     REMOVE MESH VAGINA  10/10/2011    Procedure:REMOVE MESH VAGINA; Excision of Vaginal Mesh; Surgeon:SERGE SALGADO; Location:[facility]     SURGICAL HISTORY OF -   4/20/10    Transobturator midurethral sling     SURGICAL HISTORY OF -   1999    exploratory laparotomy, colitis     SURGICAL HISTORY OF -   4/20/10    Transobturator midurethral sling     TUBAL LIGATION         Family Hx:    Family History   Problem Relation Age of Onset     C.A.D. Mother         MI     Hypertension Mother      Hypertension Father      Breast Cancer No family hx of        Social Hx:   Social History     Socioeconomic History     Marital status:      Spouse name: Not on file     Number of children: 1     Years of education: Not on file     Highest education level: Not on file   Occupational History     Occupation:      Employer: [employer]   Social Needs     Financial resource strain: Not on file      Food insecurity:     Worry: Not on file     Inability: Not on file     Transportation needs:     Medical: Not on file     Non-medical: Not on file   Tobacco Use     Smoking status: Never Smoker     Smokeless tobacco: Never Used   Substance and Sexual Activity     Alcohol use: Yes     Comment: occas     Drug use: No     Sexual activity: Yes     Partners: Male     Birth control/protection: Surgical     Comment: tv   Lifestyle     Physical activity:     Days per week: Not on file     Minutes per session: Not on file     Stress: Not on file   Relationships     Social connections:     Talks on phone: Not on file     Gets together: Not on file     Attends Yarsani service: Not on file     Active member of club or organization: Not on file     Attends meetings of clubs or organizations: Not on file     Relationship status: Not on file     Intimate partner violence:     Fear of current or ex partner: Not on file     Emotionally abused: Not on file     Physically abused: Not on file     Forced sexual activity: Not on file   Other Topics Concern      Service Yes     Comment: in [place] out of [place] [id number]     Blood Transfusions No     Caffeine Concern No     Occupational Exposure No     Hobby Hazards No     Sleep Concern Yes     Stress Concern No     Weight Concern Yes     Comment: always      Special Diet No     Comment: watches sugar intake     Back Care No     Exercise Yes     Comment: treadmill and bike     Bike Helmet No     Seat Belt Yes     Self-Exams No     Parent/sibling w/ CABG, MI or angioplasty before 65F 55M? No   Social History Narrative    Dairy/d 1 servings/d.     Caffeine 0 servings/d    Exercise 5 x week, treadmill, bike 1 hour/day    Sunscreen used - No    Seatbelts used - Yes    Working smoke/CO detectors in the home - Yes    Guns stored in the home - No    Self Breast Exams - No    Self Testicular Exam - NA    Eye Exam up to date - Yes    Dental Exam up to date - Yes    Pap Smear up to date -  Yes    Mammogram up to date - No 2000    PSA up to date - NA    Dexa Scan up to date - NA    Flex Sig / Colonoscopy up to date - Yes 8/99 Abd pain=neg    Immunizations up to date - unsure    Abuse: Current or Past(Physical, Sexual or Emotional)- No    Do you feel safe in your environment - Yes                     REVIEW OF SYSTEMS:  CONSTITUTIONAL:  NEGATIVE for fever, chills, change in weight  INTEGUMENTARY/SKIN:  NEGATIVE for worrisome rashes, moles or lesions  EYES:  NEGATIVE for vision changes or irritation  ENT/MOUTH:  NEGATIVE for ear, mouth and throat problems  RESP:  NEGATIVE for significant cough or SOB  BREAST:  NEGATIVE for masses, tenderness or discharge  CV:  NEGATIVE for chest pain, palpitations or peripheral edema  GI:  NEGATIVE for nausea, abdominal pain, heartburn, or change in bowel habits  :  Negative   MUSCULOSKELETAL:  See HPI above  NEURO:  NEGATIVE for weakness, dizziness or paresthesias  ENDOCRINE:  NEGATIVE for temperature intolerance, skin/hair changes  HEME/ALLERGY/IMMUNE:  NEGATIVE for bleeding problems  PSYCHIATRIC:  NEGATIVE for changes in mood or affect    This document serves as a record of the services and decisions personally performed and made by NIMISHA Tang MD . It was created on his behalf by Stella Cortes, a trained medical scribe. The creation of this document is based the provider's statements to the medical scribe.    Scribe Stella Cortes 8:50 AM 5/23/2019      /73 (BP Location: Right arm, Patient Position: Sitting, Cuff Size: Adult Regular)   Pulse 76   LMP 01/08/2008   SpO2 96%     EXAM:  GENERAL APPEARANCE: healthy, alert and no distress   GAIT: NORMAL  SKIN: no suspicious lesions or rashes  NEURO: normal strength and tone, sentation intact, reflexes normal, DTR symmetrically normal in upper extremities and DTR symmetrically normal in lower extremities  PSYCH:  mentation appears normal and affect normal/bright  VASCULAR: pulses intact, good capillary  refill  LYMPH: no lymphadenopathy  RESP: no overt rhonchi or weazes    MUSCULOSKELETAL:  RIGHT HIP:    Palpation: Tender:   Greater trochanter, sciatic notch without radiation,    Range of Motion:  Fairly good hip range of motion with mild discomfort.   Strength:  full strength    Lumbar range of motion: flexion to touch toes, extension adequate, side bend adequate  Toe stand: able.    Heel stand: Able  Seated SLR: negative  Sensation:normal  Motor: all normal  DTR's: normal  Pathologic reflexes: None    MRI of the right hip from 10/19/2018:  IMPRESSION: Mild degenerative changes of the right femoral head apex.  Underlying marrow edema is likely secondary to the degeneration. There  is no additional evidence of osteonecrosis.    ASSESSMENT/PLAN:  1. Localized osteoarthritis of the right hip     Plan:   We talked about the patient's condition and diagnosis. Given that she previously had great relief from the most recent intraarticular hip injection, I recommended that she give it another try. This is both a therapeutic and diagnostic treatment in that if she again is given great relief, it will be therapeutic and confirmation her pain is hip joint related. Order was placed. We also discussed another option: total hip arthroplasty. At this time, patient is not interested and would like to try other options before considering surgery.     Return to clinic as needed.     The information in this document, created by a scribe for me, accurately reflects the services I personally performed and the decisions made by me. I have reviewed and approved this document for accuracy.      NIMISHA Tang MD  Dept. Orthopedic Surgery  [facility]

## 2019-05-23 ENCOUNTER — OFFICE VISIT (OUTPATIENT)
Dept: ORTHOPEDICS | Facility: CLINIC | Age: 59
End: 2019-05-23
Payer: COMMERCIAL

## 2019-05-23 VITALS — SYSTOLIC BLOOD PRESSURE: 140 MMHG | DIASTOLIC BLOOD PRESSURE: 73 MMHG | OXYGEN SATURATION: 96 % | HEART RATE: 76 BPM

## 2019-05-23 DIAGNOSIS — M16.11 PRIMARY OSTEOARTHRITIS OF RIGHT HIP: Primary | ICD-10-CM

## 2019-05-23 DIAGNOSIS — M25.551 HIP PAIN, RIGHT: ICD-10-CM

## 2019-05-23 PROCEDURE — 99213 OFFICE O/P EST LOW 20 MIN: CPT | Performed by: ORTHOPAEDIC SURGERY

## 2019-05-23 ASSESSMENT — PAIN SCALES - GENERAL: PAINLEVEL: MODERATE PAIN (4)

## 2019-05-23 NOTE — LETTER
5/23/2019         RE: Angela Ibarra  [address] [place] [address]        Dear Colleague,    Thank you for referring your patient, Angela Ibarra, to the [facility]. Please see a copy of my visit note below.    SUBJECTIVE:  Angela Ibarra is a 58 year old female who is seen in follow-up for right hip pain that has been present for many years. She was last seen by me on 10/16/2018 and I recommended limited weightbearing for 4-6 weeks along with an intraarticular hip injection. This combination provided her great relief until January 2019. She increased the physical therapy she was doing and this helped until about 3-4 weeks ago. At that time, she started developing pain in the groin, hip and buttock pain. Has not had any improvements since that time. Presents today with ongoing right hip pain, which is worsened with laying on the right side. Has not had any recent treatments.     Cause: unknown  Symptoms: frequent   No: numbness and tingling   Aggravating Activities: laying on side  Other Symptoms: none  Symptoms have been worsening .    Prior history of related problems: chronic right knee pain and osteoarthritis .    Treatment up to this point: activity modification, intraarticular hip injection, physical therapy       Orthopedic PMH: history of left knee arthroscopy with Dr. Aryan Holley.    Past medical history:   Past Medical History:   Diagnosis Date     Calculus of kidney      Migraine, unspecified, without mention of intractable migraine without mention of status migrainosus      Other specified iron deficiency anemias      Papanicolaou smear of cervix with low grade squamous intraepithelial lesion (LGSIL) 11/07    Colpo and hyst pathology benign     Primary osteoarthritis of right hip      Synovitis of ankle      Unspecified essential hypertension 1999    Essential hypertension       Surgical:   Past Surgical History:   Procedure Laterality Date     ARTHROSCOPY ANKLE  4/22/2014     Procedure: ARTHROSCOPY ANKLE;  Surgeon: Shaun Bhatt DPM;  [facility]     ARTHROSCOPY KNEE Left 10/4/2018    Procedure: ARTHROSCOPY KNEE;  Left knee arthroscopy  medial meniscal and chondral debridement;  Surgeon: Aryan Holley MD;  [facility]     C  DELIVERY ONLY  1985    , Low Cervical     C GASTRIC BYPASS,OBESITY,W/SM BOWEL RECONS  10/99     C LIGATE FALLOPIAN TUBE  2000    laparoscopy     COLONOSCOPY WITH CO2 INSUFFLATION N/A 2017    Procedure: COLONOSCOPY WITH CO2 INSUFFLATION;  COLON SCREEN/ YURI;  Surgeon: Cody Arana MD;  [facility]     HYSTERECTOMY, PAP NO LONGER INDICATED  2008     LAPAROSCOPIC CHOLECYSTECTOMY  8/3/2012    Procedure: LAPAROSCOPIC CHOLECYSTECTOMY;  Laparoscopic Cholecystectomy ;  Surgeon: Corwin Cabezas MD;  [facility]     OPEN REDUCTION INTERNAL FIXATION ANKLE  2014    Procedure: OPEN REDUCTION INTERNAL FIXATION ANKLE;  Surgeon: Shaun Bhatt DPM;  [facility]     OPEN REDUCTION INTERNAL FIXATION ELBOW  10/15/2013    Procedure: OPEN REDUCTION INTERNAL FIXATION ELBOW;  OPEN REDUCTION INTERNAL FIXATION LEFT PROXIMAL ULNA;  Surgeon: Artem Last DO;  [facility]     ORTHOPEDIC SURGERY      left shoulder surgery     REMOVE MESH VAGINA  10/10/2011    Procedure:REMOVE MESH VAGINA; Excision of Vaginal Mesh; Surgeon:SERGE SALGADO; Location:[facility]     SURGICAL HISTORY OF -   4/20/10    Transobturator midurethral sling     SURGICAL HISTORY OF -   1999    exploratory laparotomy, colitis     SURGICAL HISTORY OF -   4/20/10    Transobturator midurethral sling     TUBAL LIGATION         Family Hx:    Family History   Problem Relation Age of Onset     C.A.D. Mother         MI     Hypertension Mother      Hypertension Father      Breast Cancer No family hx of        Social Hx:   Social History     Socioeconomic History     Marital status:      Spouse name: Not on file     Number  of children: 1     Years of education: Not on file     Highest education level: Not on file   Occupational History     Occupation:      Employer: [employer]   Social Needs     Financial resource strain: Not on file     Food insecurity:     Worry: Not on file     Inability: Not on file     Transportation needs:     Medical: Not on file     Non-medical: Not on file   Tobacco Use     Smoking status: Never Smoker     Smokeless tobacco: Never Used   Substance and Sexual Activity     Alcohol use: Yes     Comment: occas     Drug use: No     Sexual activity: Yes     Partners: Male     Birth control/protection: Surgical     Comment: tvh   Lifestyle     Physical activity:     Days per week: Not on file     Minutes per session: Not on file     Stress: Not on file   Relationships     Social connections:     Talks on phone: Not on file     Gets together: Not on file     Attends Anabaptism service: Not on file     Active member of club or organization: Not on file     Attends meetings of clubs or organizations: Not on file     Relationship status: Not on file     Intimate partner violence:     Fear of current or ex partner: Not on file     Emotionally abused: Not on file     Physically abused: Not on file     Forced sexual activity: Not on file   Other Topics Concern      Service Yes     Comment[place] out [place] 85[place]88     Blood Transfusions No     Caffeine Concern No     Occupational Exposure No     Hobby Hazards No     Sleep Concern Yes     Stress Concern No     Weight Concern Yes     Comment: always      Special Diet No     Comment: watches sugar intake     Back Care No     Exercise Yes     Comment: treadmill and bike     Bike Helmet No     Seat Belt Yes     Self-Exams No     Parent/sibling w/ CABG, MI or angioplasty before 65F 55M? No   Social History Narrative    Dairy/d 1 servings/d.     Caffeine 0 servings/d    Exercise 5 x week, treadmill, bike 1 hour/day    Sunscreen used - No    Seatbelts  used - Yes    Working smoke/CO detectors in the home - Yes    Guns stored in the home - No    Self Breast Exams - No    Self Testicular Exam - NA    Eye Exam up to date - Yes    Dental Exam up to date - Yes    Pap Smear up to date - Yes    Mammogram up to date - No 2000    PSA up to date - NA    Dexa Scan up to date - NA    Flex Sig / Colonoscopy up to date - Yes 8/99 Abd pain=neg    Immunizations up to date - unsure    Abuse: Current or Past(Physical, Sexual or Emotional)- No    Do you feel safe in your environment - Yes                     REVIEW OF SYSTEMS:  CONSTITUTIONAL:  NEGATIVE for fever, chills, change in weight  INTEGUMENTARY/SKIN:  NEGATIVE for worrisome rashes, moles or lesions  EYES:  NEGATIVE for vision changes or irritation  ENT/MOUTH:  NEGATIVE for ear, mouth and throat problems  RESP:  NEGATIVE for significant cough or SOB  BREAST:  NEGATIVE for masses, tenderness or discharge  CV:  NEGATIVE for chest pain, palpitations or peripheral edema  GI:  NEGATIVE for nausea, abdominal pain, heartburn, or change in bowel habits  :  Negative   MUSCULOSKELETAL:  See HPI above  NEURO:  NEGATIVE for weakness, dizziness or paresthesias  ENDOCRINE:  NEGATIVE for temperature intolerance, skin/hair changes  HEME/ALLERGY/IMMUNE:  NEGATIVE for bleeding problems  PSYCHIATRIC:  NEGATIVE for changes in mood or affect    This document serves as a record of the services and decisions personally performed and made by NIMISHA Tang MD . It was created on his behalf by Stella Cortes, a trained medical scribe. The creation of this document is based the provider's statements to the medical scribe.    Scribe Stella Cortes 8:50 AM 5/23/2019      /73 (BP Location: Right arm, Patient Position: Sitting, Cuff Size: Adult Regular)   Pulse 76   LMP 01/08/2008   SpO2 96%     EXAM:  GENERAL APPEARANCE: healthy, alert and no distress   GAIT: NORMAL  SKIN: no suspicious lesions or rashes  NEURO: normal strength and tone,  sentation intact, reflexes normal, DTR symmetrically normal in upper extremities and DTR symmetrically normal in lower extremities  PSYCH:  mentation appears normal and affect normal/bright  VASCULAR: pulses intact, good capillary refill  LYMPH: no lymphadenopathy  RESP: no overt rhonchi or weazes    MUSCULOSKELETAL:  RIGHT HIP:    Palpation: Tender:   Greater trochanter, sciatic notch without radiation,    Range of Motion:  Fairly good hip range of motion with mild discomfort.   Strength:  full strength    Lumbar range of motion: flexion to touch toes, extension adequate, side bend adequate  Toe stand: able.    Heel stand: Able  Seated SLR: negative  Sensation:normal  Motor: all normal  DTR's: normal  Pathologic reflexes: None    MRI of the right hip from 10/19/2018:  IMPRESSION: Mild degenerative changes of the right femoral head apex.  Underlying marrow edema is likely secondary to the degeneration. There  is no additional evidence of osteonecrosis.    ASSESSMENT/PLAN:  1. Localized osteoarthritis of the right hip     Plan:   We talked about the patient's condition and diagnosis. Given that she previously had great relief from the most recent intraarticular hip injection, I recommended that she give it another try. This is both a therapeutic and diagnostic treatment in that if she again is given great relief, it will be therapeutic and confirmation her pain is hip joint related. Order was placed. We also discussed another option: total hip arthroplasty. At this time, patient is not interested and would like to try other options before considering surgery.     Return to clinic as needed.     The information in this document, created by a scribe for me, accurately reflects the services I personally performed and the decisions made by me. I have reviewed and approved this document for accuracy.      NIMISHA Tang MD  Dept. Orthopedic Surgery  [facility]          Again, thank you for allowing me to  participate in the care of your patient.        Sincerely,        Mono Tang MD

## 2019-06-15 ENCOUNTER — OFFICE VISIT (OUTPATIENT)
Dept: ORTHOPEDICS | Facility: CLINIC | Age: 59
End: 2019-06-15
Payer: COMMERCIAL

## 2019-06-15 DIAGNOSIS — M25.552 LEFT HIP PAIN: Primary | ICD-10-CM

## 2019-06-15 DIAGNOSIS — M16.12 OSTEOARTHRITIS OF LEFT HIP, UNSPECIFIED OSTEOARTHRITIS TYPE: ICD-10-CM

## 2019-06-15 PROCEDURE — 20611 DRAIN/INJ JOINT/BURSA W/US: CPT | Mod: LT | Performed by: FAMILY MEDICINE

## 2019-06-15 RX ORDER — TRIAMCINOLONE ACETONIDE 40 MG/ML
40 INJECTION, SUSPENSION INTRA-ARTICULAR; INTRAMUSCULAR
Status: DISCONTINUED | OUTPATIENT
Start: 2019-06-15 | End: 2020-07-02

## 2019-06-15 RX ORDER — ROPIVACAINE HYDROCHLORIDE 5 MG/ML
3 INJECTION, SOLUTION EPIDURAL; INFILTRATION; PERINEURAL
Status: DISCONTINUED | OUTPATIENT
Start: 2019-06-15 | End: 2020-07-02

## 2019-06-15 RX ADMIN — ROPIVACAINE HYDROCHLORIDE 3 ML: 5 INJECTION, SOLUTION EPIDURAL; INFILTRATION; PERINEURAL at 09:31

## 2019-06-15 RX ADMIN — TRIAMCINOLONE ACETONIDE 40 MG: 40 INJECTION, SUSPENSION INTRA-ARTICULAR; INTRAMUSCULAR at 09:31

## 2019-06-15 NOTE — LETTER
6/15/2019         RE: Angela Ibarra  [address] [place] [address]        Dear Colleague,    Thank you for referring your patient, Angela Ibarra, to the [facility] [place]. Please see a copy of my visit note below.    Angela Ibarra  :  1960  DOS: 6/15/19  MRN: [id number]    Sports Medicine Clinic Procedure    Ultrasound Guided Right Intra-Articular Hip Injection    Clinical History: Chronic right hip pain over last 5+ years.  Patient reports multiple intra-articular steroid injection in past with varying relief.   Most recent injection completed 10/27/17.      Diagnosis:   1. Left hip pain    2. Osteoarthritis of left hip, unspecified osteoarthritis type      Referring Physician: NICHOLAS Tang MD  Large Joint Injection/Arthocentesis: L hip joint  Date/Time: 6/15/2019 9:31 AM  Performed by: Shaun Munoz DO  Authorized by: Shaun Munoz DO     Indications:  Pain and diagnostic evaluation  Needle Size:  22 G  Guidance: ultrasound    Approach:  Anterior  Location:  Hip      Site:  L hip joint  Medications:  40 mg triamcinolone 40 MG/ML; 3 mL ropivacaine 5 MG/ML  Outcome:  Tolerated well, no immediate complications  Procedure discussed: discussed risks, benefits, and alternatives    Consent Given by:  Patient  Timeout: timeout called immediately prior to procedure    Prep: patient was prepped and draped in usual sterile fashion     4 ml's of 1% lidocaine was used as local anesthetic prior to injection        Impression:  Successful Left intra-articular hip injection.    Plan:  Follow up as directed by Dr Tang  Excellent initial relief when from anesthetic effect, especially with severe pain walking in  Expectations and goals of CSI reviewed  Often 2-3 days for steroid effect, and can take up to two weeks for maximum effect  We discussed modified progressive pain-free activity as tolerated  Do not overuse in first two weeks if feeling better  due to concern for vulnerability while steroid is working  Supportive care reviewed  All questions were answered today  Contact us with additional questions or concerns  Signs and sx of concern reviewed      Shaun Munoz DO, CAQ  Primary Care Sports Medicine  [facility]         Again, thank you for allowing me to participate in the care of your patient.        Sincerely,        Shaun Munoz DO

## 2019-06-15 NOTE — PROGRESS NOTES
Angela Negraky  :  1960  DOS: 6/15/19  MRN: [id number]    Sports Medicine Clinic Procedure    Ultrasound Guided Right Intra-Articular Hip Injection    Clinical History: Chronic right hip pain over last 5+ years.  Patient reports multiple intra-articular steroid injection in past with varying relief.   Most recent injection completed 10/27/17.      Diagnosis:   1. Left hip pain    2. Osteoarthritis of left hip, unspecified osteoarthritis type      Referring Physician: NICHOLAS Tang MD  Large Joint Injection/Arthocentesis: L hip joint  Date/Time: 6/15/2019 9:31 AM  Performed by: Shaun Munoz DO  Authorized by: Shaun Munoz DO     Indications:  Pain and diagnostic evaluation  Needle Size:  22 G  Guidance: ultrasound    Approach:  Anterior  Location:  Hip      Site:  L hip joint  Medications:  40 mg triamcinolone 40 MG/ML; 3 mL ropivacaine 5 MG/ML  Outcome:  Tolerated well, no immediate complications  Procedure discussed: discussed risks, benefits, and alternatives    Consent Given by:  Patient  Timeout: timeout called immediately prior to procedure    Prep: patient was prepped and draped in usual sterile fashion     4 ml's of 1% lidocaine was used as local anesthetic prior to injection        Impression:  Successful Left intra-articular hip injection.    Plan:  Follow up as directed by Dr Tang  Excellent initial relief when from anesthetic effect, especially with severe pain walking in  Expectations and goals of CSI reviewed  Often 2-3 days for steroid effect, and can take up to two weeks for maximum effect  We discussed modified progressive pain-free activity as tolerated  Do not overuse in first two weeks if feeling better due to concern for vulnerability while steroid is working  Supportive care reviewed  All questions were answered today  Contact us with additional questions or concerns  Signs and sx of concern reviewed      Shaun Munoz DO, CAQ  Primary Care Sports  Medicine  [facility]

## 2019-07-09 PROBLEM — Z98.890 S/P LEFT KNEE ARTHROSCOPY: Status: RESOLVED | Noted: 2018-10-25 | Resolved: 2019-07-09

## 2019-07-09 PROBLEM — M25.551 HIP PAIN, RIGHT: Status: RESOLVED | Noted: 2018-11-08 | Resolved: 2019-07-09

## 2019-07-09 PROBLEM — M25.562 ACUTE PAIN OF LEFT KNEE: Status: RESOLVED | Noted: 2018-10-25 | Resolved: 2019-07-09

## 2019-09-16 ENCOUNTER — MYC MEDICAL ADVICE (OUTPATIENT)
Dept: FAMILY MEDICINE | Facility: CLINIC | Age: 59
End: 2019-09-16

## 2019-09-25 ENCOUNTER — TRANSFERRED RECORDS (OUTPATIENT)
Dept: HEALTH INFORMATION MANAGEMENT | Facility: CLINIC | Age: 59
End: 2019-09-25

## 2019-10-28 ENCOUNTER — TRANSFERRED RECORDS (OUTPATIENT)
Dept: HEALTH INFORMATION MANAGEMENT | Facility: CLINIC | Age: 59
End: 2019-10-28

## 2019-11-03 ENCOUNTER — HEALTH MAINTENANCE LETTER (OUTPATIENT)
Age: 59
End: 2019-11-03

## 2019-11-04 ENCOUNTER — TRANSFERRED RECORDS (OUTPATIENT)
Dept: HEALTH INFORMATION MANAGEMENT | Facility: CLINIC | Age: 59
End: 2019-11-04

## 2019-12-02 ENCOUNTER — TRANSFERRED RECORDS (OUTPATIENT)
Dept: HEALTH INFORMATION MANAGEMENT | Facility: CLINIC | Age: 59
End: 2019-12-02

## 2020-01-12 ENCOUNTER — OFFICE VISIT (OUTPATIENT)
Dept: URGENT CARE | Facility: URGENT CARE | Age: 60
End: 2020-01-12
Payer: COMMERCIAL

## 2020-01-12 VITALS
DIASTOLIC BLOOD PRESSURE: 66 MMHG | SYSTOLIC BLOOD PRESSURE: 138 MMHG | TEMPERATURE: 97 F | OXYGEN SATURATION: 96 % | HEART RATE: 68 BPM

## 2020-01-12 DIAGNOSIS — N39.0 RECURRENT UTI: ICD-10-CM

## 2020-01-12 DIAGNOSIS — R30.0 DYSURIA: Primary | ICD-10-CM

## 2020-01-12 DIAGNOSIS — R82.90 NONSPECIFIC FINDING ON EXAMINATION OF URINE: ICD-10-CM

## 2020-01-12 LAB
ALBUMIN UR-MCNC: ABNORMAL MG/DL
APPEARANCE UR: ABNORMAL
BACTERIA #/AREA URNS HPF: ABNORMAL /HPF
BILIRUB UR QL STRIP: NEGATIVE
COLOR UR AUTO: YELLOW
GLUCOSE UR STRIP-MCNC: NEGATIVE MG/DL
HGB UR QL STRIP: ABNORMAL
KETONES UR STRIP-MCNC: NEGATIVE MG/DL
LEUKOCYTE ESTERASE UR QL STRIP: ABNORMAL
NITRATE UR QL: POSITIVE
NON-SQ EPI CELLS #/AREA URNS LPF: ABNORMAL /LPF
PH UR STRIP: 6.5 PH (ref 5–7)
RBC #/AREA URNS AUTO: ABNORMAL /HPF
SOURCE: ABNORMAL
SP GR UR STRIP: 1.01 (ref 1–1.03)
UROBILINOGEN UR STRIP-ACNC: 0.2 EU/DL (ref 0.2–1)
WBC #/AREA URNS AUTO: >100 /HPF

## 2020-01-12 PROCEDURE — 99214 OFFICE O/P EST MOD 30 MIN: CPT | Performed by: PHYSICIAN ASSISTANT

## 2020-01-12 PROCEDURE — 87086 URINE CULTURE/COLONY COUNT: CPT | Performed by: PHYSICIAN ASSISTANT

## 2020-01-12 PROCEDURE — 87088 URINE BACTERIA CULTURE: CPT | Performed by: PHYSICIAN ASSISTANT

## 2020-01-12 PROCEDURE — 87186 SC STD MICRODIL/AGAR DIL: CPT | Performed by: PHYSICIAN ASSISTANT

## 2020-01-12 PROCEDURE — 81001 URINALYSIS AUTO W/SCOPE: CPT | Performed by: PHYSICIAN ASSISTANT

## 2020-01-12 RX ORDER — CIPROFLOXACIN 500 MG/1
500 TABLET, FILM COATED ORAL 2 TIMES DAILY
Qty: 14 TABLET | Refills: 0 | Status: SHIPPED | OUTPATIENT
Start: 2020-01-12 | End: 2020-01-12

## 2020-01-12 RX ORDER — CEPHALEXIN 500 MG/1
500 CAPSULE ORAL 4 TIMES DAILY
Qty: 28 CAPSULE | Refills: 0 | Status: SHIPPED | OUTPATIENT
Start: 2020-01-12 | End: 2020-02-10

## 2020-01-12 ASSESSMENT — ENCOUNTER SYMPTOMS
HEMATOCHEZIA: 0
HEMATURIA: 0
ABDOMINAL PAIN: 0
VOMITING: 0
DIARRHEA: 0
CONSTIPATION: 0
FREQUENCY: 1
SHORTNESS OF BREATH: 0
WHEEZING: 0
FEVER: 0
HEARTBURN: 0
NAUSEA: 0
RESPIRATORY NEGATIVE: 1
PALPITATIONS: 0
CARDIOVASCULAR NEGATIVE: 1
FATIGUE: 0
CHEST TIGHTNESS: 0
COUGH: 0
FLANK PAIN: 0
DYSURIA: 1
CHILLS: 0
GASTROINTESTINAL NEGATIVE: 1

## 2020-01-12 NOTE — PROGRESS NOTES
Subjective   Angela Ibarra is a 59 year old female who presents to clinic today for the following health issues:  HPI   URINARY TRACT SYMPTOMS    Duration: 1day    Description  dysuria, frequency, urgency but no hematuria    Intensity:  moderate    Accompanying signs and symptoms:  Fever/chills: no   Flank pain no   Nausea and vomiting: no   Vaginal symptoms: No vaginal d/c, bleeding, rashes and irritation.  No new partners.   Abdominal/Pelvic Pain: No abdominal pain, n/v, constipation, diarrhea, bloody or black tarry stools.     History  History of frequent UTI's: YES- last UTI was last month and she took some leftover omnicef.  Has had 4 bladder infections in the past 4months  History of kidney stones: YES- but this does not feel like it  Sexually Active: YES  Possibility of pregnancy: No    Precipitating or alleviating factors: None    Therapies tried and outcome: increase fluid intake   Outcome: no relief    Patient Active Problem List   Diagnosis     Obesity     NONSPECIFIC COLITIS     Migraine headache     Stress incontinence     Balance disorder     Right leg weakness     Right carpal tunnel syndrome     Osteoporosis     Loose body in ankle and foot joint     Synovitis of ankle     Malunion, fracture     Pain in joint involving ankle and foot     Abnormal gait     Other postprocedural status(V45.89)     Chondromalacia, left ankle and joints of left foot     CARDIOVASCULAR SCREENING; LDL GOAL LESS THAN 130     Advanced directives, counseling/discussion     Insomnia, unspecified type     Essential hypertension with goal blood pressure less than 140/90     Primary osteoarthritis of right hip     Cataract of both eyes, unspecified cataract type     Arthropathy of facet joint     Morbid obesity (H)     Past Surgical History:   Procedure Laterality Date     ARTHROSCOPY ANKLE  4/22/2014    Procedure: ARTHROSCOPY ANKLE;  Surgeon: Shaun Bhatt DPM;  [facility]     ARTHROSCOPY KNEE Left 10/4/2018     Procedure: ARTHROSCOPY KNEE;  Left knee arthroscopy  medial meniscal and chondral debridement;  Surgeon: Aryan Holley MD;  Location: [facility]     C  DELIVERY ONLY      , Low Cervical     C GASTRIC BYPASS,OBESITY,W/SM BOWEL RECONS  10/99     C LIGATE FALLOPIAN TUBE  2000    laparoscopy     COLONOSCOPY WITH CO2 INSUFFLATION N/A 2017    Procedure: COLONOSCOPY WITH CO2 INSUFFLATION;  COLON SCREEN/ YURI;  Surgeon: Cody Arana MD;  Location: [facility]     HYSTERECTOMY, PAP NO LONGER INDICATED  2008     LAPAROSCOPIC CHOLECYSTECTOMY  8/3/2012    Procedure: LAPAROSCOPIC CHOLECYSTECTOMY;  Laparoscopic Cholecystectomy ;  Surgeon: Corwin Cabezas MD;  [facility]     OPEN REDUCTION INTERNAL FIXATION ANKLE  2014    Procedure: OPEN REDUCTION INTERNAL FIXATION ANKLE;  Surgeon: Shaun Bhatt DPM;  [facility]     OPEN REDUCTION INTERNAL FIXATION ELBOW  10/15/2013    Procedure: OPEN REDUCTION INTERNAL FIXATION ELBOW;  OPEN REDUCTION INTERNAL FIXATION LEFT PROXIMAL ULNA;  Surgeon: Artem Last DO;  [facility]     ORTHOPEDIC SURGERY      left shoulder surgery     REMOVE MESH VAGINA  10/10/2011    Procedure:REMOVE MESH VAGINA; Excision of Vaginal Mesh; Surgeon:SERGE SALGADO; Location:[facility]     SURGICAL HISTORY OF -   4/20/10    Transobturator midurethral sling     SURGICAL HISTORY OF -   1999    exploratory laparotomy, colitis     SURGICAL HISTORY OF -   4/20/10    Transobturator midurethral sling     TUBAL LIGATION         Social History     Tobacco Use     Smoking status: Never Smoker     Smokeless tobacco: Never Used   Substance Use Topics     Alcohol use: Yes     Comment: occas     Family History   Problem Relation Age of Onset     C.A.D. Mother         MI     Hypertension Mother      Hypertension Father      Breast Cancer No family hx of          Current Outpatient Medications   Medication Sig Dispense Refill     Acetaminophen (TYLENOL  EXTRA STRENGTH PO) Take 1-2 tablets by mouth as needed       amLODIPine (NORVASC) 5 MG tablet Take 1 tablet (5 mg) by mouth daily 90 tablet 3     atenolol (TENORMIN) 50 MG tablet Take 1 tablet (50 mg) by mouth daily 90 tablet 3     cefdinir (OMNICEF) 300 MG capsule Take 1 capsule (300 mg) by mouth 2 times daily 10 capsule 3     diclofenac (VOLTAREN) 50 MG EC tablet Take 1 tablet (50 mg) by mouth 2 times daily As needed for pain. Take with food 30 tablet 3     estradiol (ESTRACE) 0.1 MG/GM cream Place 2 g vaginally twice a week 42.5 g 2     IBUPROFEN PO Take 400 mg by mouth every 6 hours as needed for moderate pain       traZODone (DESYREL) 50 MG tablet Take 2 tablets (100 mg) by mouth nightly as needed for sleep 180 tablet 3     Allergies   Allergen Reactions     Bactrim [Sulfamethoxazole W/Trimethoprim]      itching     Nitrofurantoin Itching     Patient states she is not longer allergic to this medication. Has used it since with no problems      Reviewed and updated as needed this visit by Provider       Review of Systems   Constitutional: Negative for chills, fatigue and fever.   Respiratory: Negative.  Negative for cough, chest tightness, shortness of breath and wheezing.    Cardiovascular: Negative.  Negative for chest pain, palpitations and peripheral edema.   Gastrointestinal: Negative.  Negative for abdominal pain, constipation, diarrhea, heartburn, hematochezia, nausea and vomiting.   Genitourinary: Positive for dysuria, frequency and urgency. Negative for flank pain, hematuria, pelvic pain, vaginal bleeding and vaginal discharge.   All other systems reviewed and are negative.           Objective    /66   Pulse 68   Temp 97  F (36.1  C) (Tympanic)   LMP 01/08/2008   SpO2 96%   There is no height or weight on file to calculate BMI.  Physical Exam  Vitals signs and nursing note reviewed.   Constitutional:       General: She is not in acute distress.     Appearance: Normal appearance. She is  well-developed. She is obese. She is not ill-appearing.   Cardiovascular:      Rate and Rhythm: Normal rate and regular rhythm.      Pulses: Normal pulses.      Heart sounds: Normal heart sounds, S1 normal and S2 normal. No murmur. No friction rub. No gallop.    Pulmonary:      Effort: Pulmonary effort is normal. No accessory muscle usage or respiratory distress.      Breath sounds: Normal breath sounds and air entry. No decreased breath sounds, wheezing, rhonchi or rales.   Abdominal:      General: Abdomen is protuberant. Bowel sounds are normal.      Palpations: Abdomen is soft. There is no hepatomegaly, splenomegaly or mass.      Tenderness: There is abdominal tenderness in the suprapubic area. There is no right CVA tenderness, left CVA tenderness, guarding or rebound. Negative signs include Juarez's sign, Rovsing's sign, McBurney's sign, psoas sign and obturator sign.      Hernia: No hernia is present.   Genitourinary:     Comments: Declined pelvic exam.  Skin:     General: Skin is warm and dry.   Neurological:      Mental Status: She is alert and oriented to person, place, and time.   Psychiatric:         Mood and Affect: Mood normal.         Behavior: Behavior normal.         Thought Content: Thought content normal.         Judgment: Judgment normal.     Diagnostic Test Results:  Labs reviewed in Epic  Results for orders placed or performed in visit on 01/12/20 (from the past 24 hour(s))   *UA reflex to Microscopic and Culture ([place] and [facility] (except [place] and [place])   Result Value Ref Range    Color Urine Yellow     Appearance Urine Cloudy     Glucose Urine Negative NEG^Negative mg/dL    Bilirubin Urine Negative NEG^Negative    Ketones Urine Negative NEG^Negative mg/dL    Specific Gravity Urine 1.010 1.003 - 1.035    Blood Urine Large (A) NEG^Negative    pH Urine 6.5 5.0 - 7.0 pH    Protein Albumin Urine Trace (A) NEG^Negative mg/dL    Urobilinogen Urine 0.2 0.2 - 1.0 EU/dL    Nitrite  Urine Positive (A) NEG^Negative    Leukocyte Esterase Urine Large (A) NEG^Negative    Source Midstream Urine    Urine Microscopic   Result Value Ref Range    WBC Urine >100 (A) OTO5^0 - 5 /HPF    RBC Urine 5-10 (A) OTO2^O - 2 /HPF    Squamous Epithelial /LPF Urine Few FEW^Few /LPF    Bacteria Urine Moderate (A) NEG^Negative /HPF           Assessment & Plan   Dysuria:  UA and micro is positive for UTI and will empirically treat with [id number].  She has failed omnicef and she is allergic to macrobid and bactrim.  She declined cipro stating that this does not work for her. Will send for UC to help guide abx treatment.  Recommend increase fluids, regular voids, proper wiping techniques and voiding after intercourse.  Educated patient on warning signs of kidney infection and to go to the ER if she develops any of these symptoms.  Recheck in clinic if symptoms worsen or if symptoms do not improve  -     *UA reflex to Microscopic and Culture ([place] and [facility] (except [place] and [place])  -     Urine Culture Aerobic Bacterial  -     Urine Microscopic  -     Discontinue: ciprofloxacin (CIPRO) 500 MG tablet; Take 1 tablet (500 mg) by mouth 2 times daily for 7 days  -     cephALEXin (KEFLEX) 500 MG capsule; Take 1 capsule (500 mg) by mouth 4 times daily for 7 days    Nonspecific finding on examination of urine    Recurrent UTI:  Will send to urology as this appears to be recurrent.  Recommend probiotics.  -     UROLOGY ADULT REFERRAL           Oralia Herrera PA-C  [facility]

## 2020-01-13 ENCOUNTER — MYC MEDICAL ADVICE (OUTPATIENT)
Dept: FAMILY MEDICINE | Facility: CLINIC | Age: 60
End: 2020-01-13

## 2020-01-13 LAB
BACTERIA SPEC CULT: ABNORMAL
SPECIMEN SOURCE: ABNORMAL

## 2020-01-29 ENCOUNTER — OFFICE VISIT (OUTPATIENT)
Dept: UROLOGY | Facility: CLINIC | Age: 60
End: 2020-01-29
Attending: PHYSICIAN ASSISTANT
Payer: COMMERCIAL

## 2020-01-29 VITALS — SYSTOLIC BLOOD PRESSURE: 143 MMHG | OXYGEN SATURATION: 98 % | HEART RATE: 72 BPM | DIASTOLIC BLOOD PRESSURE: 81 MMHG

## 2020-01-29 DIAGNOSIS — N39.0 RECURRENT UTI: Primary | ICD-10-CM

## 2020-01-29 LAB
ALBUMIN UR-MCNC: NEGATIVE MG/DL
APPEARANCE UR: CLEAR
BILIRUB UR QL STRIP: NEGATIVE
COLOR UR AUTO: NORMAL
GLUCOSE UR STRIP-MCNC: NEGATIVE MG/DL
HGB UR QL STRIP: NEGATIVE
KETONES UR STRIP-MCNC: NEGATIVE MG/DL
LEUKOCYTE ESTERASE UR QL STRIP: NEGATIVE
NITRATE UR QL: NEGATIVE
PH UR STRIP: 6.5 PH (ref 5–7)
SOURCE: NORMAL
SP GR UR STRIP: 1 (ref 1–1.03)
UROBILINOGEN UR STRIP-MCNC: NORMAL MG/DL (ref 0–2)

## 2020-01-29 PROCEDURE — 99214 OFFICE O/P EST MOD 30 MIN: CPT | Performed by: UROLOGY

## 2020-01-29 PROCEDURE — 81003 URINALYSIS AUTO W/O SCOPE: CPT | Performed by: UROLOGY

## 2020-01-29 RX ORDER — SULFAMETHOXAZOLE AND TRIMETHOPRIM 400; 80 MG/1; MG/1
1 TABLET ORAL DAILY PRN
Qty: 20 TABLET | Refills: 2 | Status: SHIPPED | OUTPATIENT
Start: 2020-01-29 | End: 2020-09-29

## 2020-01-29 NOTE — LETTER
1/29/2020       RE: Angela Ibarra  [address] [place] [address]     Dear Colleague,    Thank you for referring your patient, Angela Ibarra, to the [facility] at [facility]. Please see a copy of my visit note below.    I am seeing Angela Ibarra in consultation from Oralia Herrera  for evaluation of recurrent UTI.    HPI:  Angela Ibarra is a 59 year old female who is a recent pt of Dr. Mack 10/2018.  She has a urologic history of [place] TOT done 4/2010 by Dr. Jonas, status-post excision after erosion 10/2011, and status-post Durasphere injection x 1 3/2012.    History of atrophic vaginitis, had taken topical HRT in the past.      In the last year or so, had UTI 4/2019, and then 3 in 10-12 2019, and another Jan 2020.  At this time she feels her symptoms are cleared up.    Was given a prescription for a self-start antibiotics.  She knows the symptoms and signs.  Did have a urgent care visit this this.    She is off estrace cream- discussed with her we can get her a compounded form that should be less expensive.    Hazen is a trigger.  Preventative antibiotics have helped this.    PVR 10cc 10/2018    Her UTI symptoms are pain, odor, urge, frequency.  Symptoms clear with antibiotics.  Gets a little itch with bactrim and Macrobid.      RU today: 180cc ( not post void)  Kidney Stones: 20 years ago, none recent.  Pneumaturia:  None  Vaginal Estrogen: Not taking.  Cranberry supplement = has tried.   Wipes from front to back: Yes  Fluid intake: very good.  Not constipated.     Last UTI 1/12/20 was pan-sensitive E. Coli.  Previous UTI 2/2019 was E. coli but was resistant to a few things.    REVIEW OF SYSTEMS:  General: negative  Skin: negative  Eyes: negative  Ears/Nose/Throat: negative  Respiratory: negative  Cardiovascular: negative  Gastrointestinal: negative  Genitourinary: see HPI  Musculoskeletal: negative  Neurologic:  negative  Psychiatric: negative  Hematologic/Lymphatic/Immunologic: negative  Endocrine: negative    PAST MEDICAL HX:  Past Medical History:   Diagnosis Date     Calculus of kidney      Migraine, unspecified, without mention of intractable migraine without mention of status migrainosus      Other specified iron deficiency anemias      Papanicolaou smear of cervix with low grade squamous intraepithelial lesion (LGSIL)     Colpo and hyst pathology benign     Primary osteoarthritis of right hip      Synovitis of ankle      Unspecified essential hypertension 1999    Essential hypertension       PAST SURG HX:  Past Surgical History:   Procedure Laterality Date     ARTHROSCOPY ANKLE  2014    Procedure: ARTHROSCOPY ANKLE;  Surgeon: Shaun Bhatt DPM;  [facility]     ARTHROSCOPY KNEE Left 10/4/2018    Procedure: ARTHROSCOPY KNEE;  Left knee arthroscopy  medial meniscal and chondral debridement;  Surgeon: Aryan Holley MD;  Location: [facility] OR     C  DELIVERY ONLY      , Low Cervical     C GASTRIC BYPASS,OBESITY,W/SM BOWEL RECONS  10/99     C LIGATE FALLOPIAN TUBE  2000    laparoscopy     COLONOSCOPY WITH CO2 INSUFFLATION N/A 2017    Procedure: COLONOSCOPY WITH CO2 INSUFFLATION;  COLON SCREEN/ YURI;  Surgeon: Cody Arana MD;  Location: [facility]     HYSTERECTOMY, PAP NO LONGER INDICATED  2008     LAPAROSCOPIC CHOLECYSTECTOMY  8/3/2012    Procedure: LAPAROSCOPIC CHOLECYSTECTOMY;  Laparoscopic Cholecystectomy ;  Surgeon: Corwin Cabezas MD;  [facility]     OPEN REDUCTION INTERNAL FIXATION ANKLE  2014    Procedure: OPEN REDUCTION INTERNAL FIXATION ANKLE;  Surgeon: Shaun Bhatt DPM;  Location:  OR     OPEN REDUCTION INTERNAL FIXATION ELBOW  10/15/2013    Procedure: OPEN REDUCTION INTERNAL FIXATION ELBOW;  OPEN REDUCTION INTERNAL FIXATION LEFT PROXIMAL ULNA;  Surgeon: Artem Last DO;  Location:  OR     ORTHOPEDIC SURGERY       left shoulder surgery     REMOVE MESH VAGINA  10/10/2011    Procedure:REMOVE MESH VAGINA; Excision of Vaginal Mesh; Surgeon:SERGE SALGADO; Location:[facility] OR     SURGICAL HISTORY OF -   4/20/10    Transobturator midurethral sling     SURGICAL HISTORY OF -   8/1999    exploratory laparotomy, colitis     SURGICAL HISTORY OF -   4/20/10    Transobturator midurethral sling     TUBAL LIGATION          FAMILY HX:  Family History   Problem Relation Age of Onset     C.A.D. Mother         MI     Hypertension Mother      Hypertension Father      Breast Cancer No family hx of        SOCIAL HX:  Social History     Tobacco Use     Smoking status: Never Smoker     Smokeless tobacco: Never Used   Substance Use Topics     Alcohol use: Yes     Comment: occas     Drug use: No       MEDICATIONS:  Current Outpatient Medications   Medication Sig     Acetaminophen (TYLENOL EXTRA STRENGTH PO) Take 1-2 tablets by mouth as needed     amLODIPine (NORVASC) 5 MG tablet Take 1 tablet (5 mg) by mouth daily     atenolol (TENORMIN) 50 MG tablet Take 1 tablet (50 mg) by mouth daily     cefdinir (OMNICEF) 300 MG capsule Take 1 capsule (300 mg) by mouth 2 times daily     diclofenac (VOLTAREN) 50 MG EC tablet Take 1 tablet (50 mg) by mouth 2 times daily As needed for pain. Take with food     estradiol (ESTRACE) 0.1 MG/GM cream Place 2 g vaginally twice a week     IBUPROFEN PO Take 400 mg by mouth every 6 hours as needed for moderate pain     traZODone (DESYREL) 50 MG tablet Take 2 tablets (100 mg) by mouth nightly as needed for sleep     Current Facility-Administered Medications   Medication     ropivacaine (NAROPIN) injection 3 mL     triamcinolone (KENALOG-40) injection 40 mg       ALLERGIES:  Bactrim [sulfamethoxazole w/trimethoprim] and Nitrofurantoin      GENERAL PHYSICAL EXAM:     BP (!) 143/81 (BP Location: Left arm, Patient Position: Sitting, Cuff Size: Adult Large)   Pulse 72   LMP 01/08/2008   SpO2 98%    Constitutional: No acute  distress. Well nourished.   PSYCH: normal mood and affect.  NEURO: normal gait, no focal deficits.   EYES: anicteric, EOMI, PERR.  CARDIOPULMONARY: breathing non-labored, no wheeze or rales.  CARDIAC: pulse regular rate/rhythm, no peripheral edema.  GI: Abdomen soft, overweight, nondistended   MUSCULOSKELETAL: normal limb proportions, no muscle wasting, no contractures.     EXAM:  Deferred     Imaging/labs:  Lab Results   Component Value Date    CR 0.85 01/17/2019    CR 0.90 01/18/2018    CR 0.93 07/07/2017     ASSESSMENT:   Recurrent UTI, most likely re-infections rather than a persistent infection not clearing.    Treatment options discussed include observation with treatment of acute episodes, self start antibiotics, antibiotic dose with intercourse if that is the trigger, or a daily low dose antibiotic prophylaxis.        PLAN:    Vag estrogen cream - will prescribe.    Bactrim single strength to take at the time of intercourse, since that's her trigger.  She gets itch if she takes this often, but has used this before for preventative dosing with intercourse.        PVR today pre void is 180cc.  Previous PVR is 10cc.      Follow-up with Dr. Lilly if symptoms are not improving.      Copied cc to Consulting provider Oralia Herrera        Thank-you for the kind consultation.  Gustabo Soares MD     Urological Surgeon    post void residual = 188 ml    Bonnie Jimenez LPN    Again, thank you for allowing me to participate in the care of your patient.      Sincerely,    Gustabo Soares MD

## 2020-01-30 ENCOUNTER — PRE VISIT (OUTPATIENT)
Dept: ORTHOPEDICS | Facility: CLINIC | Age: 60
End: 2020-01-30

## 2020-01-30 ENCOUNTER — TELEPHONE (OUTPATIENT)
Dept: UROLOGY | Facility: CLINIC | Age: 60
End: 2020-01-30

## 2020-01-30 DIAGNOSIS — M25.551 RIGHT HIP PAIN: Primary | ICD-10-CM

## 2020-01-30 DIAGNOSIS — I10 ESSENTIAL HYPERTENSION WITH GOAL BLOOD PRESSURE LESS THAN 140/90: ICD-10-CM

## 2020-01-30 DIAGNOSIS — G47.00 INSOMNIA, UNSPECIFIED TYPE: ICD-10-CM

## 2020-01-30 DIAGNOSIS — N39.0 RECURRENT UTI: Primary | ICD-10-CM

## 2020-01-30 NOTE — TELEPHONE ENCOUNTER
Received message from Dr. Soares with request to order compounded vaginal estrogen cream per protocol for this patient. Compounded vaginal estrogen cream ordred per protocol and sent for Dr. Soares to review and sign order. Prescription sent to [facility] pharmacy with request to contact patient to discuss payment and delivery.    Melisa Trinh RN, BSN

## 2020-01-30 NOTE — TELEPHONE ENCOUNTER
PREVISIT INFORMATION                                                    Angela Varnerdamian scheduled for future visit at [facility].    Had injections and these are not helping anymore.    Patient is scheduled to see Dr. Deras on 2/4  Reason for visit: Right hip pain  Referring provider none  Has patient seen previous specialist? [facility] and Dr. Tang  Medical Records:  Available in chart.  Patient was previously seen at a [place] or [facility] facility.    REVIEW                                                      New patient packet mailed to patient: Yes  Medication reconciliation complete: Yes      Current Outpatient Medications   Medication Sig Dispense Refill     Acetaminophen (TYLENOL EXTRA STRENGTH PO) Take 1-2 tablets by mouth as needed       amLODIPine (NORVASC) 5 MG tablet Take 1 tablet (5 mg) by mouth daily 90 tablet 3     atenolol (TENORMIN) 50 MG tablet Take 1 tablet (50 mg) by mouth daily 90 tablet 3     cefdinir (OMNICEF) 300 MG capsule Take 1 capsule (300 mg) by mouth 2 times daily 10 capsule 3     COMPOUNDED NON-CONTROLLED SUBSTANCE (CMPD RX) - PHARMACY TO MIX COMPOUNDED MEDICATION Estriol 0.1% cream (1mg/gram) in HRT base. Place 1 gram vaginally daily for 2 weeks, then vaginally twice weekly. 30 g 6     diclofenac (VOLTAREN) 50 MG EC tablet Take 1 tablet (50 mg) by mouth 2 times daily As needed for pain. Take with food 30 tablet 3     estradiol (ESTRACE) 0.1 MG/GM cream Place 2 g vaginally twice a week 42.5 g 2     IBUPROFEN PO Take 400 mg by mouth every 6 hours as needed for moderate pain       sulfamethoxazole-trimethoprim (BACTRIM/SEPTRA) 400-80 MG tablet Take 1 tablet by mouth daily as needed (take just with intercourse) 20 tablet 2     traZODone (DESYREL) 50 MG tablet Take 2 tablets (100 mg) by mouth nightly as needed for sleep 180 tablet 3       Allergies: Bactrim [sulfamethoxazole w/trimethoprim] and Nitrofurantoin    XR last done 3/31/17  MRI  10/19/18     PLAN/FOLLOW-UP NEEDED                                                      Previsit review complete.  Patient will see provider at future scheduled appointment.     Patient Reminders Given:  Please, make sure you bring an updated list of your medications.   If you are having a procedure, please, present 15 minutes early.  If you need to cancel or reschedule,please call [phone].    Jess Obregon RN

## 2020-01-31 RX ORDER — TRAZODONE HYDROCHLORIDE 50 MG/1
TABLET, FILM COATED ORAL
Qty: 60 TABLET | Refills: 0 | Status: SHIPPED | OUTPATIENT
Start: 2020-01-31 | End: 2020-02-26

## 2020-01-31 RX ORDER — AMLODIPINE BESYLATE 5 MG/1
TABLET ORAL
Qty: 30 TABLET | Refills: 0 | Status: SHIPPED | OUTPATIENT
Start: 2020-01-31 | End: 2020-02-26

## 2020-01-31 RX ORDER — ATENOLOL 50 MG/1
TABLET ORAL
Qty: 30 TABLET | Refills: 0 | Status: SHIPPED | OUTPATIENT
Start: 2020-01-31 | End: 2020-02-26

## 2020-01-31 NOTE — TELEPHONE ENCOUNTER
Next 5 appointments (look out 90 days)    Feb 26, 2020  2:20 PM CST  PHYSICAL with Germ[facility] Jernigan [facility] [address] [facility] [address] [phone]        Prescription approved per [facility] Refill Protocol #30  Need to keep upcoming appointment for further refills  Latanya Eid RN

## 2020-02-04 ENCOUNTER — OFFICE VISIT (OUTPATIENT)
Dept: ORTHOPEDICS | Facility: CLINIC | Age: 60
End: 2020-02-04
Payer: COMMERCIAL

## 2020-02-04 ENCOUNTER — OFFICE VISIT (OUTPATIENT)
Dept: NUTRITION | Facility: CLINIC | Age: 60
End: 2020-02-04
Attending: ORTHOPAEDIC SURGERY
Payer: COMMERCIAL

## 2020-02-04 ENCOUNTER — ANCILLARY PROCEDURE (OUTPATIENT)
Dept: GENERAL RADIOLOGY | Facility: CLINIC | Age: 60
End: 2020-02-04
Attending: ORTHOPAEDIC SURGERY
Payer: COMMERCIAL

## 2020-02-04 VITALS
HEART RATE: 69 BPM | DIASTOLIC BLOOD PRESSURE: 83 MMHG | HEIGHT: 62 IN | OXYGEN SATURATION: 94 % | SYSTOLIC BLOOD PRESSURE: 137 MMHG | BODY MASS INDEX: 40.34 KG/M2 | WEIGHT: 219.2 LBS

## 2020-02-04 DIAGNOSIS — M25.551 RIGHT HIP PAIN: Primary | ICD-10-CM

## 2020-02-04 DIAGNOSIS — E66.01 MORBID OBESITY (H): Primary | ICD-10-CM

## 2020-02-04 DIAGNOSIS — M25.551 RIGHT HIP PAIN: ICD-10-CM

## 2020-02-04 PROCEDURE — 73522 X-RAY EXAM HIPS BI 3-4 VIEWS: CPT | Performed by: RADIOLOGY

## 2020-02-04 PROCEDURE — 97802 MEDICAL NUTRITION INDIV IN: CPT | Performed by: DIETITIAN, REGISTERED

## 2020-02-04 PROCEDURE — 99214 OFFICE O/P EST MOD 30 MIN: CPT | Performed by: ORTHOPAEDIC SURGERY

## 2020-02-04 ASSESSMENT — PAIN SCALES - GENERAL: PAINLEVEL: EXTREME PAIN (8)

## 2020-02-04 ASSESSMENT — MIFFLIN-ST. JEOR: SCORE: 1522.53

## 2020-02-04 NOTE — LETTER
2/4/2020         RE: Angela Ibarra  [address] [place] [address]        Dear Colleague,    Thank you for referring your patient, Angela Ibarra, to the [facility]. Please see a copy of my visit note below.    Chief Complaint: Pain of the Right Hip    Physician:  No ref. provider found    HPI: Angela Ibarra is a 59 year old female who presents today for evaluation of her right hip     Symptom Profile  Location of symptoms:  Groin   Onset: insidious  Trend: getting worse   Duration of symptoms: about 2 years   Quality of symptoms: aching, sharp/stabbing  Severity: moderate to  severe  Alleviate:activity modification  Exacerbating: activities  Previous Treatments: Previous treatments include activity modification, oral pain medication, physical therapy, intra-articular steroid injection     Current Status:  Results of the patient s Hip Disability and Osteoarthritis Outcome Score (HOOS)  are as follows (0-100 scales with 100 being the theoretical best):  Pain: 57.5   Symptoms: 75   ADLs:75   Sports/Recreation:75   Quality of Life: 31   ([email or web address].[email or web address]/)  [facility] Activity Score: 4    MEDICAL HISTORY:   Past Medical History:   Diagnosis Date     Calculus of kidney      Migraine, unspecified, without mention of intractable migraine without mention of status migrainosus      Other specified iron deficiency anemias      Papanicolaou smear of cervix with low grade squamous intraepithelial lesion (LGSIL) 11/07    Colpo and hyst pathology benign     Primary osteoarthritis of right hip      Synovitis of ankle      Unspecified essential hypertension 1999    Essential hypertension       Medications:     Current Outpatient Medications:      Acetaminophen (TYLENOL EXTRA STRENGTH PO), Take 1-2 tablets by mouth as needed, Disp: , Rfl:      amLODIPine (NORVASC) 5 MG tablet, TAKE ONE TABLET BY MOUTH ONCE DAILY, Disp: 30 tablet, Rfl: 0     atenolol (TENORMIN) 50 MG tablet, TAKE ONE TABLET BY MOUTH ONCE  DAILY, Disp: 30 tablet, Rfl: 0     cefdinir (OMNICEF) 300 MG capsule, Take 1 capsule (300 mg) by mouth 2 times daily, Disp: 10 capsule, Rfl: 3     COMPOUNDED NON-CONTROLLED SUBSTANCE (CMPD RX) - PHARMACY TO MIX COMPOUNDED MEDICATION, Estriol 0.1% cream (1mg/gram) in HRT base. Place 1 gram vaginally daily for 2 weeks, then vaginally twice weekly., Disp: 30 g, Rfl: 6     diclofenac (VOLTAREN) 50 MG EC tablet, Take 1 tablet (50 mg) by mouth 2 times daily As needed for pain. Take with food, Disp: 30 tablet, Rfl: 3     estradiol (ESTRACE) 0.1 MG/GM cream, Place 2 g vaginally twice a week, Disp: 42.5 g, Rfl: 2     IBUPROFEN PO, Take 400 mg by mouth every 6 hours as needed for moderate pain, Disp: , Rfl:      sulfamethoxazole-trimethoprim (BACTRIM/SEPTRA) 400-80 MG tablet, Take 1 tablet by mouth daily as needed (take just with intercourse), Disp: 20 tablet, Rfl: 2     traZODone (DESYREL) 50 MG tablet, TAKE TWO TABLETS BY MOUTH NIGHTLY AS NEEDED FOR SLEEP, Disp: 60 tablet, Rfl: 0    Current Facility-Administered Medications:      ropivacaine (NAROPIN) injection 3 mL, 3 mL, , , Shaun Munoz DO, 3 mL at 06/15/19 0931     triamcinolone (KENALOG-40) injection 40 mg, 40 mg, , , Shaun Munoz DO, 40 mg at 06/15/19 0931    Allergies: Bactrim [sulfamethoxazole w/trimethoprim] and Nitrofurantoin    SURGICAL HISTORY:   Past Surgical History:   Procedure Laterality Date     ARTHROSCOPY ANKLE  2014    Procedure: ARTHROSCOPY ANKLE;  Surgeon: Shaun Bhatt DPM;  Location: [facility]     ARTHROSCOPY KNEE Left 10/4/2018    Procedure: ARTHROSCOPY KNEE;  Left knee arthroscopy  medial meniscal and chondral debridement;  Surgeon: Aryan Holley MD;  Location: [facility] OR     C  DELIVERY ONLY      , Low Cervical     C GASTRIC BYPASS,OBESITY,W/SM BOWEL RECONS  10/99     C LIGATE FALLOPIAN TUBE  2000    laparoscopy     COLONOSCOPY WITH CO2 INSUFFLATION N/A 2017    Procedure: COLONOSCOPY WITH  CO2 INSUFFLATION;  COLON SCREEN/ YURI;  Surgeon: Cody Arana MD;  Location: [facility] OR     HYSTERECTOMY, PAP NO LONGER INDICATED  1-     LAPAROSCOPIC CHOLECYSTECTOMY  8/3/2012    Procedure: LAPAROSCOPIC CHOLECYSTECTOMY;  Laparoscopic Cholecystectomy ;  Surgeon: Corwin Cabezas MD;  Location: [facility]     OPEN REDUCTION INTERNAL FIXATION ANKLE  4/22/2014    Procedure: OPEN REDUCTION INTERNAL FIXATION ANKLE;  Surgeon: Shaun Bhatt DPM;  Location: [facility]     OPEN REDUCTION INTERNAL FIXATION ELBOW  10/15/2013    Procedure: OPEN REDUCTION INTERNAL FIXATION ELBOW;  OPEN REDUCTION INTERNAL FIXATION LEFT PROXIMAL ULNA;  Surgeon: Artem Last DO;  Location: [facility]     ORTHOPEDIC SURGERY      left shoulder surgery     REMOVE MESH VAGINA  10/10/2011    Procedure:REMOVE MESH VAGINA; Excision of Vaginal Mesh; Surgeon:SERGE SALGADO; Location:[facility] OR     SURGICAL HISTORY OF -   4/20/10    Transobturator midurethral sling     SURGICAL HISTORY OF -   8/1999    exploratory laparotomy, colitis     SURGICAL HISTORY OF -   4/20/10    Transobturator midurethral sling     TUBAL LIGATION         FAMILY HISTORY:   Family History   Problem Relation Age of Onset     C.A.D. Mother         MI     Hypertension Mother      Hypertension Father      Breast Cancer No family hx of        SOCIAL HISTORY:   Social History     Tobacco Use     Smoking status: Never Smoker     Smokeless tobacco: Never Used   Substance Use Topics     Alcohol use: Yes     Comment: occas   working, desk job  , lives with spouse      REVIEW OF SYSTEMS:  The comprehensive review of systems from the intake form was reviewed with the patient.  No fever, weight change or fatigue. No dry eyes. No oral ulcers, sore throat or voice change. No palpitations, syncope, angina or edema.  No chest pain, excessive sleepiness, shortness of breath or hemoptysis.   No abdominal pain, nausea, vomiting, diarrhea or heartburn.  No skin rash. No  "focal weakness or numbness. No bleeding or lymphadenopathy. No rhinitis or hives.     Exam:  On physical examination the patient appears the stated age, is in no acute distress, affectThe is appropriate, and breathing is non-labored.  Vitals are documented in the EMR and have been reviewed:    /83 (BP Location: Left arm, Patient Position: Sitting, Cuff Size: Adult Regular)   Pulse 69   Ht 1.575 m (5' 2\")   Wt 99.4 kg (219 lb 3.2 oz)   LMP 01/08/2008   SpO2 94%   BMI 40.09 kg/m     5' 2\"  Body mass index is 40.09 kg/m .    Rises from chair: with some effort   Gait: antalgic on the right   Trendelenburg test:  Gains the exam table: with some effort     RIGHT hip subjective: irritated   Abd: 15  Add:10  PFC:  Flexion: 85  IRF: -20  ERF:25  Impingement test: ++ groin   TTP at the ASIS consistent with LFC  TTP at the GT    LEFT hip subjective: not irritated   Abd:  Add:  PFC:  Flexion: 95  IRF:0  ERF:15  Impingement test: -  TTP at the ASIS    Distally, the circulatory, motor, and sensation exam is intact with 5/5 EHL, gastroc-soleus, and tibialis anterior.  Sensation to light touch is intact.  Dorsalis pedis and posterior tibialis pulses are palpable.  There are no sores on the feet, no bruising, and no lymphedema.    X-rays:   Advanced medial right hip osteoarthritis with complete loss of the medial joint space and associated ostepphyte formation.     Assessment: This is a 59 year old with right hip osteoarthritis associated with pain that interfers with her activites of daily living despite comprehensive non-operative management that included activity modification, oral pain medication, physical therapy, and an intra-articular steroid injection. We discussed the treatment options including living with it and total hip. We spent twenty minutes discussing total hip arthroplasty.  We discussed the implants, the procedure, the risks and benefits, and the post-operative course.  We discussed blood clots, blood " clots to the lungs, injury to blood vessels and nerves, dislocation, infection, and leg length difference.  We discussed the significant increased risks of infection, nerve injury, and dislocation in the class III obese population. Also discussed the the LFC and GT would not be affected with total hip. All the patients questions were answered to the best of my ability.    Plan:  She is going to consider her options.       Again, thank you for allowing me to participate in the care of your patient.        Sincerely,        Clinton Deras MD

## 2020-02-04 NOTE — PATIENT INSTRUCTIONS
[facility]  [address] [address] [place] [address]  Phone [phone]  Fax [phone]    SURGICAL INFORMATION & INSTRUCTIONS  Dr. Clinton Deras  Name of Surgery: Right total hip arthroplasty    Date of Surgery: 7/6/20    If you need to reschedule/schedule your surgery, please contact Krystyna, our surgery scheduler at [facility], at [phone].    Arrival Time: 9:00am    Time of Surgery:  11:00am    Please arrive early so that we can prepare you for surgery. If you arrive later than your scheduled arrival time, your surgery may be cancelled.  Please note that scheduled times may change, but you will always be notified if there is a change.     Location of Surgery:     ? [facility], [facility]  [address] [place], [place] [address]. [place], [place] [id number] [place] [address], 3rd floor for check-in  Phone [phone]  Fax [phone]  Bring parking ticket with you for validation and reduced parking rate  [email or web address]    Prior to surgery    ? Medical Leave Forms  Please fax any medical leave forms from your employer/school to [phone] (if seen in [facility]). It can take up to 5 business days to complete the forms. We will fax them back to the number listed on the forms, if you would like a copy, please let us know and we will mail a copy to you. Do not bring with on day of surgery as the forms may get lost.    ? Call your insurance company  Ask if you need pre-approval for your surgery.  If pre-approval is needed, please call our surgery scheduler for assistance with the pre-approval process.   If you do not have insurance, please let us know.     ? [place] for Surgery (if on [facility])  10 days before surgery, call [phone] to register with the surgery center. Have your insurance card ready.     Total Joint Replacement:  - Joint Replacement Class:  If you are scheduled to have a joint replacement, you (and any  family/friends that will be helping to care for you) are expected to attend an educational class at least two weeks prior to your surgery.  To register for the class please call the Patient Learning Center at [phone] or register online at [email or web address]. Classes are held at multiple locations as well as online.  You must know the date of your surgery before you can register for a class (approximate date OK). If registering online, please select hip or knee as surgery type as shoulder has not been made an option at this time.     o This is also a good opportunity to think about where you would like to have physical therapy after your surgery. You may also be able to set up appointments in advance so that everything is ready to go after surgery.    - Antibiotics Prophylaxis:  Certain procedures such as dental cleaning/work, colonoscopies and other surgeries can cause bacteria to enter the bloodstream.  These bacteria can attach to joint replacement devices.  To reduce this risk, you will need to take antibiotics before you have invasive procedures or dental work.  This is known as antibiotic prophylaxis.    - Dental Visit:  You may want to schedule an appointment with your dentist for a cleaning or needed work before your surgery.  We advise no dental work or cleaning until 6 months after your surgery with implants to hip(s), knee(s), or shoulder(s).  Once you are 6 months past your surgery, you will need to take prophylactic (preventative) antibiotics for the rest of your life before having any dental cleaning or work.  If dental work is needed before surgery, make sure everything is completely healed prior to surgery.  It may cause delays or cancellation of surgery if not healed completely. This is also for any other invasive procedures you may have such as a colonoscopy.  Please notify the clinic if you have any questions.    ? Schedule an appointment with a Primary Care Provider for a Pre-Op  Physical.  This should be done within 30 days of surgery  If you do not have a primary care provider, you may call [facility] at [phone], for an appointment.  Please have your office note and any labs or tests faxed to the facility where you are having surgery. Please be sure to request a copy of your pre-op physical and bring it with you on the day of surgery.      Tell your provider if you have any of the following:   - IF you have a pacemaker or an ICD (implanted cardiac defibrillator). If you do, please bring the ID card with you on the day of surgery  - IF you're a smoker. People who smoke have a higher risk of infection after surgery. Ask your provider how you can quit smoking.  - If you have diabetes, work with your provider to control your blood sugar. If its not well-controlled, we may need to delay surgery (or you may have problems healing afterward).  - If your surgeon asks you to see your dentist: You'll need to complete any dental work before surgery. Your dentist must send a letter to your surgery center saying it's ok to do the surgery.    ? Blood Bank Pre-Admission order (total joint replacement only):    You will need to have a Blood Type and Screen drawn at a [facility] location before your surgery.  Please check your form included in your packet to determine if it needs to be done 1-30 days before surgery or 1-3 days before surgery.    ? Pre-Op Phone Call  You will receive a pre-op phone call 1-3 days before surgery to review your eating and drinking restrictions, review medications, and confirm surgery times.      ? 7-10 days BEFORE surgery  ? Stop taking aspirin, Plavix or aspirin products 10 days before surgery or as directed by your doctor.  ? Stop taking non-steroidal anti-inflammatory medications (naproxen/Aleve, ibuprofen/Advil/Motrin, celecoxib/Celebrex, meloxicam/Mobic) 3 days before surgery or as directed by your surgeon.  This will reduce the risk of bleeding  during surgery.  ? Stop taking fish oil (Omega-3-fatty acid) 1 week before surgery.  ? It is OK to take acetaminophen (Tylenol) up until 2 hours prior to surgery.  ? Take prescription medications as directed by your doctor.  Discuss which medications to take or hold prior to your surgery, with your primary care doctor.   ? If you have diabetes, ask your primary care doctor or endocrinologist how you should take your medication(s).    ? 24 hours BEFORE surgery  Stop drinking alcohol (beer, wine, liquor) at least 24-hours before and after your surgery.     ? Evening BEFORE surgery  - You may eat a normal meal the night before surgery, but eat nothing after midnight.     - Take a shower - to help wash away bacteria (germs) from your skin.  It s normal to have bacteria on your skin and skin protects us from these germs.  When you have surgery, we cut the skin.  Sometimes germs get into the cuts and cause infection (illness caused by germs).  By following the showering instructions and using the special soap, you will lower the number of germs on your skin.  This decreases your chance of an infection.    - Buy or get 8 ounces of antiseptic surgical soap called 4% CHG.  Common brands of this soap are Hibiclens and Exidine.    - You can find it this soap at your local pharmacy, clinic or retail store.  If you have trouble finding it, ask your pharmacist to help you find the right substitute.    - Do not shave within 12 inches of your incision (surgical cut) area for at least 3 days before surgery.  Shaving can make small cuts in the skin. This puts you at a higher risk of infection.    Items you will need for each shower:   - Newly washed towel  - 4 ounces of one of the recommended soaps    Follow these instructions, the evening before surgery  - Wash your hair and body with your regular shampoo and soap. Make sure you rinse the shampoo and soap from your hair and body.  - Using clean hands, apply about 2 ounces of soap  gently on your skin from the neck to your toes. Use on your groin area last. Do not use this soap on your face or head. If you get any soap in your eyes, ears or mouth, rinse right away.  - Once the soap has been on your skin for at least 1 minute, rinse off completely and repeat washing with the surgical soap one more time.  - Rinse well and dry off using a clean towel.  If you feel any tingling, itching or other irritation, rinse right away. It is normal to feel some coolness on the skin after using the antiseptic soap. Your skin may feel a bit dry after the shower, but do not use any lotions, creams or moisturizers. Do not use hair spray or other products in your hair.  - Dress in freshly washed clothes or pajamas. Use fresh pillowcases and sheets on your bed.    ? Day of Surgery  - You may drink clear liquids from midnight up to 2 hours before surgery or as directed on your pre-op phone call.  Clear liquids include: Water, Pedialyte, Gatorade, apple juice and liquids you can read through. Please avoid liquids that are red or orange in color.   - Do NOT drink: milk, liquids that have pulp, orange juice, and lemonade or tomato juice.   - Do NOT chew gum, chew tobacco or suck on hard candy.    - Take another shower          Follow these instructions:      - Wash your hair and body with your regular shampoo and soap. Make sure you rinse the shampoo and soap from your hair and body.  - Using clean hands, apply about 2 ounces of soap gently on your skin from the neck to your toes. Use on your groin area last. Do not use this soap on your face or head. If you get any soap in your eyes, ears or mouth, rinse right away.  - Once the soap has been on your skin for at least 1 minute, rinse off completely and repeat washing with the surgical soap one more time.  - Rinse well and dry off using a clean towel.  If you feel any tingling, itching or other irritation, rinse right away. It is normal to feel some coolness on the  skin after using the antiseptic soap. Your skin may feel a bit dry after the shower, but do not use any lotions, creams or moisturizers. Do not use hair spray or other products in your hair.  - Dress in freshly washed clothes.  - Do not wear deodorant, cologne, lotion, makeup, nail polish or jewelry to surgery.    ? If there is any change in your health PRIOR to your surgery, please contact your surgeon's office.  Such as a fever, body aches, fatigue, any flu-like symptoms, rash, or any new injury to related body part.    ? Arrange for someone to drive you home after discharge.    will need to be a responsible adult (18 years or older) that will provide transportation to and from surgery and stay in the waiting room during your surgery. You may not drive yourself or take public transportation to and from surgery.    ? Arrange for someone to stay with you for 24 hours after you go home.   This person must be a responsible adult (18 years or older).    ? Bring these items to the hospital/surgery center:   ? Insurance card(s)  ? Money for parking and co-pays, if needed  ? A list of all the medications you take, including vitamins, minerals, herbs and over the counter medications.    ? A copy of your Advance Health Care Directive, if you have one.  This tells us what treatment(s) you would or would not want, and who would make health care decisions, if you could no longer speak for yourself.    ? A case for glasses, contact lenses, hearing aids or dentures.   ? Your inhaler or CPAP machine, if you use these at home    ? Leave extra cash, jewelry and other valuables at home.         When you arrive for surgery  When you get to the surgery center/hospital, you will:  - Check in. If you are under age 18, you must be with a parent or legal guardian.  - Sign consent forms, if you haven t already. These forms state that you know the risks and benefits of surgery. When you sign the forms, you give us permission to do the  surgery. Do not sign them unless you understand what will happen during and after your surgery. If you have any questions about your surgery, ask to speak with your doctor before you sign the forms. If you don t understand the answers, ask again.  - Receive a copy of the Patient s Bill of Rights. If you do not receive a copy, please ask for one.  - Change into hospital clothes. Your belongings will be placed in a bag. We will return them to you after surgery.  - Meet with the anesthesia provider. He or she will tell you what kind of anesthesia (medicine) will be used to keep you comfortable during surgery.  - Remember: it s okay to remind doctors and nurses to wash their hands before touching you.  - In most cases, your surgeon will use a marker to write his or her initials on the surgery site. This ensures that the exact site is operated on.  - For safety reasons, we will ask you the same questions many times. For example, we may ask your name and birth date over and over again.  - Friends and family can stay with you until it s time for surgery. While you re in surgery, they will be in the waiting area. Please note that cell phones are not allowed in some patient care areas.  - If you have questions about what will happen in the operating room, talk to your care team.  - You will meet with an anesthesiologist, before your surgery.  He or she may reference types of anesthesia commonly used for surgeries:   o General:  This involves the use of an IV for injection of medication and anesthesia. You are put into a sleep and have a breathing tube to assist you with breathing.  o Sedation:  You are asleep, but not so deply that you need a breathing tube.   o Local or Regional: a nerve is injected to numb the surgical area.  o Spinal: you are numbed from the waist down with medicine injected into your back.  o Femoral Nerve Block:  Anesthesia injected into the groin of leg which you are having surgery on.      After  "surgery  We will move you to a recovery room, where we will watch you closely. If you have any pain or discomfort, tell your nurse. He or she will try to make you comfortable.    - We will move you to your hospital room after you are awake and stable. If you having any pain or discomfort, please let your nursing staff know.  - Please wash your hands every time you touch the wound or change bandages or dressings.  - Do not submerge the wound in water for 3 months after surgery.  You may not use a bathtub, hot tub, pool, or lake. Showering is ok. Any open area can be a source of incoming bacteria, which can lead to infection. Keep all dressings clean and dry.   - Remove your post op Aquacel/pressure dressing at 1 week post op. It is important for this to be removed to incision to \"breathe\"as well as minimize skin issues related to the dressing being in contact with your skin for so long (can cause skin tears).   - Elevate your leg(s) as much as possible to help reduce swelling. You will also receive compression stockings for the surgical leg. Please wear these during the day and fully remove them at night. Continue to wear these for approximately 4 weeks after surgery or until your swelling has resolved.  - You may put ice on the surgical area for comfort, keeping ice on area for up to 20 minutes then off for 20 minutes.  You may do this the first few days after surgery to help reduce pain and swelling.    - Drink at least 8-10 glasses of liquid each day to help your body heal.  - Keep your lungs clear by coughing and taking deep breaths every couple hours.  This is especially important the first 48 hours after surgery.  - Typically, you will be able to start driving once you are fully off narcotics and able to do a the quick stop test (slamming on the brake) with your right leg without experiencing much pain.  - You will start physical therapy while in the hospital. Once you leave the hospital, you will need to " continue going to physical therapy to maintain and improve your range of motion and strength. Please call the physical therapy clinic of your choice to set up an appointment within 1 week of being discharged from the hospital.    - Notify your doctor if you have any of the following:   o Fever of 101 F or higher  o Numbness and/or tingling  o Increased pain, redness or swelling  o Drainage from wound  o Prolonged or uncontrolled bleeding  o Difficulty with movement    Range of Motion Restrictions for total hip replacement  These are strict for 3 months post surgery, but should be followed for life. As your muscles gain strength, you will notice that you will have more range of motion, but your risk for dislocating remains. See patient education packet for details  - NO bending past 90 degrees at the waist (operative side)   - NO crossing your operative leg over or under your non-operative leg  - NO turning your operative leg inward     Follow-up Appointments, in Clinic  If you don't already have an appointment scheduled, please call to set up an appointment at [phone].      ? Nurse Only Appointment (2 weeks post op)  ? Post-Op appointments with provider (6 weeks post op)    Dealing with pain  A nurse will check your comfort level often during your stay. He or she will work with you to manage your pain.  It s expected that you will have pain after surgery.  Our goal is to reduce or minimize pain by:   - Medicine doesn t work the same for everyone. If your medicine isn t working, tell your nurse. There may be other medicines or treatments we can try.  - Medication Refills.  If you need refills on your pain medication, please call the clinic as soon as possible.  It may take 72-business hours to obtain a refill.  Refills must be picked up at check-in 2, [facility] or mailed to a pharmacy of your choice.    - It is expected that you will wean off the pain medications in a timely manner.    - Constipation is a common side effect of pain medication, decreased activity and anesthesia from surgery.  Take a stool softener as prescribed by your doctor at the time of discharge.  You may also use over the counter medications as needed.  Be sure to increase your fiber (fruits and vegetables) and your water intake.      Please call the clinic at [phone], if you experience any problems or have questions.  If you are having an emergency, always call 911 or seek immediate evaluation at the Emergency Room.    Thank you for selecting [facility] for your care!  ---------------------------------------------      Thanks for coming today.  [facility] Medicine Clinic  [address] [address] [place] [address]    To schedule future appointments in Ortho Clinic, you may call [phone].    To schedule ordered imaging by your provider:   Call [facility] [phone]    To schedule an injection ordered by your provider:  Call [facility] Injection scheduling line: [phone]  [employer] available online at:  [email or web address]    Please call if any further questions or concerns ([phone]).  Clinic hours 8 am to 5 pm.    Return to clinic (call) if symptoms worsen or fail to improve.

## 2020-02-04 NOTE — LETTER
2/4/2020         RE: Angela Ibarra  [address] [place] [id number]        Dear Colleague,    Thank you for referring your patient, Angela Ibarra, to the nutrition services at [facility]. Please see a copy of my visit note below regarding dietary intervention for weight loss.     Medical Nutrition Therapy  Visit Type:Initial assessment and intervention    Referring Provider: Clinton Deras  Internal ([facility])     REASON FOR REFERRAL:   Angela Ibarra presents today for MNT and education related to weight management.   She is accompanied by self.     NUTRITION ASSESSMENT:   Patient comments/concerns relating to nutrition: Angela is a 59 year old female who would like to lose weight. She recently went in for a hip surgery consultation and the doctor stated she needs to lose weight before moving forward with the surgery.  Her highest weight ws 232 in June of 2019.  She stated that she loves to snack on chips, m&ms which she believes along with other processed snacks have really contributed to her weight gain.                                              Eating Patterns & Meal Planning:    Waking up at: 6:30am   Going to bed at: 9:30am     Consuming Breakfast: Yes 6 times/week  Around 6am Pop tart and oj       Consuming Lunch:  Yes 4 times/week  12-1pm leftovers or sandwich       Consuming Dinner:  Yes 7 times/week  4-5pm - meat, potato and veggies (sometimes)      Snacks: Yes 7 times/week  Candybar, chips, M&Ms around 2:30pm       Skips meals/snacks: Yes     Beverages: Yes diet soda 8oz or 16oz, 0-3 drinks alcohol per week, 4-5 8oz cups water, 8-12 oz juice      Barriers around meals/snacks include:Motivation/Ready to change , Lack of social support, Lack of time , Lack of cooking skills , Lack of control over emotions/behaviors and Lack of nutrition knowlege      Diet is high in: carbs from processed calories   Diet is low in: fat (unsaturated), fiber, fruits, protein and  vegetables    Family History   Problem Relation Age of Onset     C.A.D. Mother         MI     Hypertension Mother      Hypertension Father      Breast Cancer No family hx of           Pertinent Past Medical History:   I have reviewed this patient's medical history and updated it with pertinent information if needed.   Past Medical History:   Diagnosis Date     Calculus of kidney      Migraine, unspecified, without mention of intractable migraine without mention of status migrainosus      Other specified iron deficiency anemias      Papanicolaou smear of cervix with low grade squamous intraepithelial lesion (LGSIL)     Colpo and hyst pathology benign     Primary osteoarthritis of right hip      Synovitis of ankle      Unspecified essential hypertension 1999    Essential hypertension         Previous Surgeries:   I have reviewed this patient's surgical history and updated it with pertinent information if needed.  Past Surgical History:   Procedure Laterality Date     ARTHROSCOPY ANKLE  2014    Procedure: ARTHROSCOPY ANKLE;  Surgeon: Shaun Bhatt DPM;  [facility]     ARTHROSCOPY KNEE Left 10/4/2018    Procedure: ARTHROSCOPY KNEE;  Left knee arthroscopy  medial meniscal and chondral debridement;  Surgeon: Aryan Holley MD;  Location: [facility]     C  DELIVERY ONLY      , Low Cervical     C GASTRIC BYPASS,OBESITY,W/SM BOWEL RECONS  10/99     C LIGATE FALLOPIAN TUBE  2000    laparoscopy     COLONOSCOPY WITH CO2 INSUFFLATION N/A 2017    Procedure: COLONOSCOPY WITH CO2 INSUFFLATION;  COLON SCREEN/ YURI;  Surgeon: Cody Arana MD;  Location: [facility]     HYSTERECTOMY, PAP NO LONGER INDICATED  2008     LAPAROSCOPIC CHOLECYSTECTOMY  8/3/2012    Procedure: LAPAROSCOPIC CHOLECYSTECTOMY;  Laparoscopic Cholecystectomy ;  Surgeon: Corwin Cabezas MD;  [facility]: [facility]     OPEN REDUCTION INTERNAL FIXATION ANKLE  2014    Procedure: OPEN REDUCTION INTERNAL  FIXATION ANKLE;  Surgeon: Shaun Bhatt DPM;  [facility]     OPEN REDUCTION INTERNAL FIXATION ELBOW  10/15/2013    Procedure: OPEN REDUCTION INTERNAL FIXATION ELBOW;  OPEN REDUCTION INTERNAL FIXATION LEFT PROXIMAL ULNA;  Surgeon: Artem Last DO;  [facility]     ORTHOPEDIC SURGERY      left shoulder surgery     REMOVE MESH VAGINA  10/10/2011    Procedure:REMOVE MESH VAGINA; Excision of Vaginal Mesh; Surgeon:SERGE SALGADO; Location:[facility] OR     SURGICAL HISTORY OF -   4/20/10    Transobturator midurethral sling     SURGICAL HISTORY OF -   8/1999    exploratory laparotomy, colitis     SURGICAL HISTORY OF -   4/20/10    Transobturator midurethral sling     TUBAL LIGATION           Medications and Supplements:  Current Outpatient Medications   Medication     Acetaminophen (TYLENOL EXTRA STRENGTH PO)     amLODIPine (NORVASC) 5 MG tablet     atenolol (TENORMIN) 50 MG tablet     cefdinir (OMNICEF) 300 MG capsule     COMPOUNDED NON-CONTROLLED SUBSTANCE (CMPD RX) - PHARMACY TO MIX COMPOUNDED MEDICATION     diclofenac (VOLTAREN) 50 MG EC tablet     estradiol (ESTRACE) 0.1 MG/GM cream     IBUPROFEN PO     sulfamethoxazole-trimethoprim (BACTRIM/SEPTRA) 400-80 MG tablet     traZODone (DESYREL) 50 MG tablet     Current Facility-Administered Medications   Medication     ropivacaine (NAROPIN) injection 3 mL     triamcinolone (KENALOG-40) injection 40 mg       LABS:  Last Basic Metabolic Panel:  Lab Results   Component Value Date     01/17/2019      Lab Results   Component Value Date    POTASSIUM 4.3 01/17/2019     Lab Results   Component Value Date    CHLORIDE 111 01/17/2019     Lab Results   Component Value Date    BRITANY 8.8 01/17/2019     Lab Results   Component Value Date    CO2 27 01/17/2019     Lab Results   Component Value Date    BUN 9 01/17/2019     Lab Results   Component Value Date    CR 0.85 01/17/2019     Lab Results   Component Value Date    GLC 96 01/17/2019       Last Glucose  Profile:   Hemoglobin A1C   Date Value Ref Range Status   07/07/2017 5.9 4.3 - 6.0 % Final       Last Lipid Profile:   Cholesterol   Date Value Ref Range Status   01/17/2019 176 <200 mg/dL Final   01/18/2018 152 <200 mg/dL Final     HDL Cholesterol   Date Value Ref Range Status   01/17/2019 44 (L) >49 mg/dL Final   01/18/2018 49 (L) >49 mg/dL Final     LDL Cholesterol Calculated   Date Value Ref Range Status   01/17/2019 107 (H) <100 mg/dL Final     Comment:     Above desirable:  100-129 mg/dl  Borderline High:  130-159 mg/dL  High:             160-189 mg/dL  Very high:       >189 mg/dl     01/18/2018 82 <100 mg/dL Final     Comment:     Desirable:       <100 mg/dl     Triglycerides   Date Value Ref Range Status   01/17/2019 127 <150 mg/dL Final     Comment:     Fasting specimen   01/18/2018 104 <150 mg/dL Final     Comment:     Fasting specimen     Cholesterol/HDL Ratio   Date Value Ref Range Status   08/21/2015 4.9 0.0 - 5.0 Final   08/13/2014 4.2 0.0 - 5.0 Final       Most recent CBC:  Recent Labs   Lab Test 12/19/18  2022 03/31/17  0816   WBC 10.2 5.5   HGB 13.5 12.7   HCT 41.7 40.0    315     Most recent hepatic panel:  Recent Labs   Lab Test 07/16/13  0757 07/30/12  1637   ALT 17 10   AST 22 25     Most recent creatinine:  Recent Labs   Lab Test 01/17/19  0726 01/18/18  0714   CR 0.85 0.90       Last Thyroid Profile:   TSH   Date Value Ref Range Status   10/25/2013 1.80 0.4 - 5.0 mU/L Final       Last Mineral Profile:   Ferritin   Date Value Ref Range Status   02/08/2011 40 10 - 300 ng/mL Final     Iron   Date Value Ref Range Status   09/26/2007 20 (L) 35 - 180 ug/dL Final     Iron Binding Cap   Date Value Ref Range Status   09/26/2007 349 240 - 430 ug/dL Final         Last Vitamin Profile:   No results found for: BAV279, HEXN210, DAPO49IHLMD, VITD3, D2VIT, D3VIT, DTOT, [id number], [id number], [id number], [id number], [id number], [id number]    ANTHROPOMETRICS:  Vitals:   BP Readings from Last 1  "Encounters:   02/04/20 137/83     Pulse Readings from Last 1 Encounters:   02/04/20 69     Estimated body mass index is 40.09 kg/m  as calculated from the following:    Height as of an earlier encounter on 2/4/20: 1.575 m (5' 2\").    Weight as of an earlier encounter on 2/4/20: 99.4 kg (219 lb 3.2 oz).    Wt Readings from Last 5 Encounters:   02/04/20 99.4 kg (219 lb 3.2 oz)   04/25/19 103 kg (227 lb)   02/27/19 103 kg (227 lb)   02/27/19 103 kg (227 lb)   02/25/19 103 kg (227 lb)           NUTRITION DIAGNOSIS:   1. Overweight/Obesity related to high carb diet, low protein, healthy fats and fiber from F/V as evidenced by food recall, BMI 40.         NUTRITION INTERVENTION: CARDIOMETABOLIC    Long Term Goals:   Goal: Lose 50lbs in 6 months.       Short Term Goals:     1. Try a smoothie for breakfast at 5:30am starting on Thursday 2/6 this week have 3x per week to start. See recipes we discussed   2. Try having snack at 9am - such as hard boiled egg, or tuna with veggies, apple with peanut butter, greek yogurt with berries etc.   3. Would like to get consistent breakfast 5:30am, snack 9am, lunch noon-1pm and dinner - 5:00-6pm to avoid skipping dinner and snacking later in day      Anti-inflammatory Mediterranean Approach: Eat whole, unprocessed real foods in their unprocessed forms such as fruits, vegetables, whole grains (prefer gluten free), nuts, legumes, extra virgin olive oil, spices, modest amounts of poultry, and fish.    Avoid inflammatory foods: Eliminate gluten found in wheat, barley, rye, oats, kamut, and spelt for at least 3 weeks to identify any hidden reaction. Gluten sensitivity or allergy can cause many different types of symptoms form migraines to fatigue to weight gain.  If symptoms go away this is a clue you may be reactive to gluten.  Dairy can also be inflammatory consider eliminating for 3 weeks as well. The proteins like casein and whey in dairy can irritate and inflame your gut. Also the sugar " lactase in dairy can cause digestive issues in addition to blood sugar spikes.     Cardiometabolic Food plan - 1400 calories per day     Protein 9-10 servings per day  - include at each meal to stabilize blood sugars   (Choose 3oz or 21g per meal and aim for 1oz of 7g for snacks)   - Strive for 1-2 servings of fish per week especially of higher omega-3 fatty acid containing fish such as salmon.     Legumes <1 serving per day     Dairy alternatives 1 serving per day     Nuts and seeds 2-3 servings per day - great to incorporate as snacks     Fats and oils 4 servings per day     Non starchy vegetables 7-8 servings per day     Starchy vegetable limit 1 serving per day as they tend to impact blood sugar (they are moderate-GI).     Fruits 2 servings per day - best to couple with a little bit of protein or fat to offset a rise in blood sugars (they are low-moderate-GI foods).     Whole grains <1 serving per day - try gluten free whole grains instead       Incorporate protein powder daily:    Plant based hemp (recommended brands: [employer], [employer], Just Hemp Protein, [employer])      Plant based pea (recommended brands: Naked Pea, [employer]). If you want to try a combo of pea and hemp the brand [employer] in vanilla or chocolate is a great option.     Try Bone broth protein powder or collagen peptides in liquid bone broth, vegetable broth or 12 oz of water as snack. The bone broth powder and collagen can be used for soups as well. This can help provide essential amino acids and minerals that heal your gut as well as balance blood sugars. A great option if you have a hard time tolerating solids.     Choose Low Glycemic (GI) foods: Regulate your sugar levels by eating foods that do not spike blood sugars.  Eat low -GI foods so only small fluctuations in blood glucose and insulin levels are produced.      Examples of low-GI foods: nuts, seeds, GF oats, most vegetables especially non-starchy and fruits.     Medium or  high-GI foods should be eaten with a protein or fat, both of which blunt the glycemic effect of these foods. This reduces the overall glycemic impact of a meal.   Ex: Most grains and starchy veggies are medium/high GI.     Avoid foods containing refined sugars, artificial sweeteners, and refined grains they are considered high-GI because they lead to sharp increases in blood sugars levels, which increase insulin sensitivity causing increased TG, and low good cholesterol (HDL).   Ex: cakes, cookies, pies, bread, sodas, fruit drinks, presweetened tea, coffee drinks, energy or sport drinks, flavored milk and other processed foods.     Choose foods high in fiber: Aim for at least 5 grams of fiber per serving of food or a total of 25-35 grams fiber per day. Remember, when looking at the label, you can take the fiber away from the total carbs. Ex:15g of total carbs - 4g of fiber = 11g net carbs     Insoluble fiber acts like a bulky  inner broom,  sweeping out debris from the intestine and creating more motility and movement.      Soluble fiber attracts water and swells, creating a gel that slows digestion.  Also, slows the release of glucose from foods into the blood which stops spikes in blood sugar levels.  Soluble fiber traps toxins in the gut, helping to carry them to excretion and provides healthy bacteria in the digestive tract.     Choose High Quality Fats: Adding anti-inflammatory fats into your diet such as fish (salmon, herring, mackerel, and sardines), omega 3 eggs, silvestre seeds, ground flax seeds/milk, hemp seeds/milk, walnuts and some other certain leafy greens will increase omega-3 fats to omeaga-6 fats ratio.     Therapeutic fats both monounsaturated and polyunsaturated to include daily: ground flaxseeds, unsalted mixed nuts, avocados, olives, extra-virgin olive oil.     Emphasize high-quality oils and fats in the diet daily such as avocado oil, coconut oil, flaxseed, olive, sesame. Ex: 1 tsp to 1 tbsp of MCT  oil from coconuts can be added into coffee, smoothies, and salad dressings per day.     Avoid trans fats found in processed foods     Drink more water. Hydration is critical, so drink at least six to eight glasses of water a day. Drink more water between meals and less at meals.     Try adding herbal teas (sugar free) or lemon/lime/cucumber/fruit to water for flavor. Avoid artificial sweetener packets to flavor your water.     Cut back on coffee switch to green tea. Avoid adding sugar and milk to coffee instead use dairy alternatives such as almond, flax, coconut milk.     Focus on high quality micro-nutrients.     [employer] womens pregnancy pack - 1 pack per day split into two meals at breakfast and lunch       NUTRITION RESOURCES:  1. Purchase Eat Fat Get Thin by Dereje Smith Book/Cookbook   2. [facility] Cardiometabolic Packet     Functional Nutrition Fundamentals     Comprehensive Guide    Meal plan with recipes         PATIENT'S BEHAVIOR CHANGE GOALS:   See nutrition intervention for patient stated behavior change goals. AVS was printed and given to patient at today's appointment.    MONITOR / EVALUATE:  Registered Dietitian will monitor/evaluate the following:     Beliefs and attitudes related to food    Food and nutrition knowledge / skills    Food / Beverage / Nutrient intake     Pertinent Labs    Progress toward meeting stated nutrition-related goals    Readiness to change nutrition-related behaviors    Weight change    Digestion     COORDINATION OF CARE:  Follow up with referring provider       FOLLOW-UP:  Follow-up appointment scheduled in 10 weeks.       Time spent in minutes: 90 minutes 6 units   Encounter: Individual    Betzy Wise RD, CLT, LD  Integrative Registered Dietitian           Again, thank you for allowing me to participate in the care of your patient.        Sincerely,        Betzy Wise RD

## 2020-02-04 NOTE — PROGRESS NOTES
Chief Complaint: Pain of the Right Hip    Physician:  No ref. provider found    HPI: Angela Ibarra is a 59 year old female who presents today for evaluation of her right hip     Symptom Profile  Location of symptoms:  Groin   Onset: insidious  Trend: getting worse   Duration of symptoms: about 2 years   Quality of symptoms: aching, sharp/stabbing  Severity: moderate to  severe  Alleviate:activity modification  Exacerbating: activities  Previous Treatments: Previous treatments include activity modification, oral pain medication, physical therapy, intra-articular steroid injection     Current Status:  Results of the patient s Hip Disability and Osteoarthritis Outcome Score (HOOS)  are as follows (0-100 scales with 100 being the theoretical best):  Pain: 57.5   Symptoms: 75   ADLs:75   Sports/Recreation:75   Quality of Life: 31   ([email or web address].nu/)  [facility] Activity Score: 4    MEDICAL HISTORY:   Past Medical History:   Diagnosis Date     Calculus of kidney      Migraine, unspecified, without mention of intractable migraine without mention of status migrainosus      Other specified iron deficiency anemias      Papanicolaou smear of cervix with low grade squamous intraepithelial lesion (LGSIL) 11/07    Colpo and hyst pathology benign     Primary osteoarthritis of right hip      Synovitis of ankle      Unspecified essential hypertension 1999    Essential hypertension       Medications:     Current Outpatient Medications:      Acetaminophen (TYLENOL EXTRA STRENGTH PO), Take 1-2 tablets by mouth as needed, Disp: , Rfl:      amLODIPine (NORVASC) 5 MG tablet, TAKE ONE TABLET BY MOUTH ONCE DAILY, Disp: 30 tablet, Rfl: 0     atenolol (TENORMIN) 50 MG tablet, TAKE ONE TABLET BY MOUTH ONCE DAILY, Disp: 30 tablet, Rfl: 0     cefdinir (OMNICEF) 300 MG capsule, Take 1 capsule (300 mg) by mouth 2 times daily, Disp: 10 capsule, Rfl: 3     COMPOUNDED NON-CONTROLLED SUBSTANCE (CMPD RX) - PHARMACY TO MIX COMPOUNDED MEDICATION, Estriol  0.1% cream (1mg/gram) in HRT base. Place 1 gram vaginally daily for 2 weeks, then vaginally twice weekly., Disp: 30 g, Rfl: 6     diclofenac (VOLTAREN) 50 MG EC tablet, Take 1 tablet (50 mg) by mouth 2 times daily As needed for pain. Take with food, Disp: 30 tablet, Rfl: 3     estradiol (ESTRACE) 0.1 MG/GM cream, Place 2 g vaginally twice a week, Disp: 42.5 g, Rfl: 2     IBUPROFEN PO, Take 400 mg by mouth every 6 hours as needed for moderate pain, Disp: , Rfl:      sulfamethoxazole-trimethoprim (BACTRIM/SEPTRA) 400-80 MG tablet, Take 1 tablet by mouth daily as needed (take just with intercourse), Disp: 20 tablet, Rfl: 2     traZODone (DESYREL) 50 MG tablet, TAKE TWO TABLETS BY MOUTH NIGHTLY AS NEEDED FOR SLEEP, Disp: 60 tablet, Rfl: 0    Current Facility-Administered Medications:      ropivacaine (NAROPIN) injection 3 mL, 3 mL, , , Shaun Munoz DO, 3 mL at 06/15/19 0931     triamcinolone (KENALOG-40) injection 40 mg, 40 mg, , , Shaun Munoz DO, 40 mg at 06/15/19 0931    Allergies: Bactrim [sulfamethoxazole w/trimethoprim] and Nitrofurantoin    SURGICAL HISTORY:   Past Surgical History:   Procedure Laterality Date     ARTHROSCOPY ANKLE  2014    Procedure: ARTHROSCOPY ANKLE;  Surgeon: Shaun Bhatt DPM;  [facility]     ARTHROSCOPY KNEE Left 10/4/2018    Procedure: ARTHROSCOPY KNEE;  Left knee arthroscopy  medial meniscal and chondral debridement;  Surgeon: Aryan Holley MD;  Location: [facility]     C  DELIVERY ONLY      , Low Cervical     C GASTRIC BYPASS,OBESITY,W/SM BOWEL RECONS  10/99     C LIGATE FALLOPIAN TUBE  2000    laparoscopy     COLONOSCOPY WITH CO2 INSUFFLATION N/A 2017    Procedure: COLONOSCOPY WITH CO2 INSUFFLATION;  COLON SCREEN/ YURI;  Surgeon: Cody Arana MD;  Location: [facility]     HYSTERECTOMY, PAP NO LONGER INDICATED  2008     LAPAROSCOPIC CHOLECYSTECTOMY  8/3/2012    Procedure: LAPAROSCOPIC CHOLECYSTECTOMY;   Laparoscopic Cholecystectomy ;  Surgeon: Corwin Cabezas MD;  [facility]: [facility]     OPEN REDUCTION INTERNAL FIXATION ANKLE  4/22/2014    Procedure: OPEN REDUCTION INTERNAL FIXATION ANKLE;  Surgeon: Shaun Bhatt DPM;  [facility]     OPEN REDUCTION INTERNAL FIXATION ELBOW  10/15/2013    Procedure: OPEN REDUCTION INTERNAL FIXATION ELBOW;  OPEN REDUCTION INTERNAL FIXATION LEFT PROXIMAL ULNA;  Surgeon: Artem Last DO;  [facility]     ORTHOPEDIC SURGERY      left shoulder surgery     REMOVE MESH VAGINA  10/10/2011    Procedure:REMOVE MESH VAGINA; Excision of Vaginal Mesh; Surgeon:SERGE SALGADO; Location:[facility]     SURGICAL HISTORY OF -   4/20/10    Transobturator midurethral sling     SURGICAL HISTORY OF - 8/1999    exploratory laparotomy, colitis     SURGICAL HISTORY OF -   4/20/10    Transobturator midurethral sling     TUBAL LIGATION         FAMILY HISTORY:   Family History   Problem Relation Age of Onset     C.A.D. Mother         MI     Hypertension Mother      Hypertension Father      Breast Cancer No family hx of        SOCIAL HISTORY:   Social History     Tobacco Use     Smoking status: Never Smoker     Smokeless tobacco: Never Used   Substance Use Topics     Alcohol use: Yes     Comment: occas   working, desk job  , lives with spouse      REVIEW OF SYSTEMS:  The comprehensive review of systems from the intake form was reviewed with the patient.  No fever, weight change or fatigue. No dry eyes. No oral ulcers, sore throat or voice change. No palpitations, syncope, angina or edema.  No chest pain, excessive sleepiness, shortness of breath or hemoptysis.   No abdominal pain, nausea, vomiting, diarrhea or heartburn.  No skin rash. No focal weakness or numbness. No bleeding or lymphadenopathy. No rhinitis or hives.     Exam:  On physical examination the patient appears the stated age, is in no acute distress, affectThe is appropriate, and breathing is  "non-labored.  Vitals are documented in the EMR and have been reviewed:    /83 (BP Location: Left arm, Patient Position: Sitting, Cuff Size: Adult Regular)   Pulse 69   Ht 1.575 m (5' 2\")   Wt 99.4 kg (219 lb 3.2 oz)   LMP 01/08/2008   SpO2 94%   BMI 40.09 kg/m    5' 2\"  Body mass index is 40.09 kg/m .    Rises from chair: with some effort   Gait: antalgic on the right   Trendelenburg test:  Gains the exam table: with some effort     RIGHT hip subjective: irritated   Abd: 15  Add:10  PFC:  Flexion: 85  IRF: -20  ERF:25  Impingement test: ++ groin   TTP at the ASIS consistent with LFC  TTP at the GT    LEFT hip subjective: not irritated   Abd:  Add:  PFC:  Flexion: 95  IRF:0  ERF:15  Impingement test: -  TTP at the ASIS    Distally, the circulatory, motor, and sensation exam is intact with 5/5 EHL, gastroc-soleus, and tibialis anterior.  Sensation to light touch is intact.  Dorsalis pedis and posterior tibialis pulses are palpable.  There are no sores on the feet, no bruising, and no lymphedema.    X-rays:   Advanced medial right hip osteoarthritis with complete loss of the medial joint space and associated ostepphyte formation.     Assessment: This is a 59 year old with right hip osteoarthritis associated with pain that interfers with her activites of daily living despite comprehensive non-operative management that included activity modification, oral pain medication, physical therapy, and an intra-articular steroid injection. We discussed the treatment options including living with it and total hip. We spent twenty minutes discussing total hip arthroplasty.  We discussed the implants, the procedure, the risks and benefits, and the post-operative course.  We discussed blood clots, blood clots to the lungs, injury to blood vessels and nerves, dislocation, infection, and leg length difference.  We discussed the significant increased risks of infection, nerve injury, and dislocation in the class III obese " population. Also discussed the the LFC and GT would not be affected with total hip. All the patients questions were answered to the best of my ability.    Plan:  She is going to consider her options.

## 2020-02-04 NOTE — PATIENT INSTRUCTIONS
NUTRITION INTERVENTION:    CARDIOMETABOLIC    Long Term Goals:   Goal: Lose 50lbs in 6 months.       Short Term Goals:     1. Try a smoothie for breakfast at 5:30am starting on Thursday 2/6 this week have 3x per week to start. See recipes we discussed   2. Try having snack at 9am - such as hard boiled egg, or tuna with veggies, apple with peanut butter, greek yogurt with berries etc.   3. Would like to get consistent breakfast 5:30am, snack 9am, lunch noon-1pm and dinner - 5:00-6pm to avoid skipping dinner and snacking later in day      Anti-inflammatory Mediterranean Approach: Eat whole, unprocessed real foods in their unprocessed forms such as fruits, vegetables, whole grains (prefer gluten free), nuts, legumes, extra virgin olive oil, spices, modest amounts of poultry, and fish.    Avoid inflammatory foods: Eliminate gluten found in wheat, barley, rye, oats, kamut, and spelt for at least 3 weeks to identify any hidden reaction. Gluten sensitivity or allergy can cause many different types of symptoms form migraines to fatigue to weight gain.  If symptoms go away this is a clue you may be reactive to gluten.  Dairy can also be inflammatory consider eliminating for 3 weeks as well. The proteins like casein and whey in dairy can irritate and inflame your gut. Also the sugar lactase in dairy can cause digestive issues in addition to blood sugar spikes.     Cardiometabolic Food plan - 1400 calories per day     Protein 9-10 servings per day  - include at each meal to stabilize blood sugars   (Choose 3oz or 21g per meal and aim for 1oz of 7g for snacks)   - Strive for 1-2 servings of fish per week especially of higher omega-3 fatty acid containing fish such as salmon.     Legumes <1 serving per day     Dairy alternatives 1 serving per day     Nuts and seeds 2-3 servings per day - great to incorporate as snacks     Fats and oils 4 servings per day     Non starchy vegetables 7-8 servings per day     Starchy vegetable limit  1 serving per day as they tend to impact blood sugar (they are moderate-GI).     Fruits 2 servings per day - best to couple with a little bit of protein or fat to offset a rise in blood sugars (they are low-moderate-GI foods).     Whole grains <1 serving per day - try gluten free whole grains instead       Incorporate protein powder daily:    Plant based hemp (recommended brands: [place] Ham[place], Nutiva, Just Hemp Protein, [employer] Mill)      Plant based pea (recommended brands: Naked Pea, Now Sports). If you want to try a combo of pea and hemp the brand Heaton in vanilla or chocolate is a great option.     Try Bone broth protein powder or collagen peptides in liquid bone broth, vegetable broth or 12 oz of water as snack. The bone broth powder and collagen can be used for soups as well. This can help provide essential amino acids and minerals that heal your gut as well as balance blood sugars. A great option if you have a hard time tolerating solids.     Choose Low Glycemic (GI) foods: Regulate your sugar levels by eating foods that do not spike blood sugars.  Eat low -GI foods so only small fluctuations in blood glucose and insulin levels are produced.      Examples of low-GI foods: nuts, seeds, GF oats, most vegetables especially non-starchy and fruits.     Medium or high-GI foods should be eaten with a protein or fat, both of which blunt the glycemic effect of these foods. This reduces the overall glycemic impact of a meal.   Ex: Most grains and starchy veggies are medium/high GI.     Avoid foods containing refined sugars, artificial sweeteners, and refined grains they are considered high-GI because they lead to sharp increases in blood sugars levels, which increase insulin sensitivity causing increased TG, and low good cholesterol (HDL).   Ex: cakes, cookies, pies, bread, sodas, fruit drinks, presweetened tea, coffee drinks, energy or sport drinks, flavored milk and other processed foods.     Choose foods high  in fiber: Aim for at least 5 grams of fiber per serving of food or a total of 25-35 grams fiber per day. Remember, when looking at the label, you can take the fiber away from the total carbs. Ex:15g of total carbs - 4g of fiber = 11g net carbs     Insoluble fiber acts like a bulky  inner broom,  sweeping out debris from the intestine and creating more motility and movement.      Soluble fiber attracts water and swells, creating a gel that slows digestion.  Also, slows the release of glucose from foods into the blood which stops spikes in blood sugar levels.  Soluble fiber traps toxins in the gut, helping to carry them to excretion and provides healthy bacteria in the digestive tract.     Choose High Quality Fats: Adding anti-inflammatory fats into your diet such as fish (salmon, herring, mackerel, and sardines), omega 3 eggs, silvestre seeds, ground flax seeds/milk, hemp seeds/milk, walnuts and some other certain leafy greens will increase omega-3 fats to omeaga-6 fats ratio.     Therapeutic fats both monounsaturated and polyunsaturated to include daily: ground flaxseeds, unsalted mixed nuts, avocados, olives, extra-virgin olive oil.     Emphasize high-quality oils and fats in the diet daily such as avocado oil, coconut oil, flaxseed, olive, sesame. Ex: 1 tsp to 1 tbsp of MCT oil from coconuts can be added into coffee, smoothies, and salad dressings per day.     Avoid trans fats found in processed foods     Drink more water. Hydration is critical, so drink at least six to eight glasses of water a day. Drink more water between meals and less at meals.     Try adding herbal teas (sugar free) or lemon/lime/cucumber/fruit to water for flavor. Avoid artificial sweetener packets to flavor your water.     Cut back on coffee switch to green tea. Avoid adding sugar and milk to coffee instead use dairy alternatives such as almond, flax, coconut milk.     Focus on high quality micro-nutrients.     Metagenics womens pregnancy pack -  1 pack per day split into two meals at breakfast and lunch       NUTRITION RESOURCES:  1. Purchase Eat Fat Get Thin by Dereje Smith Book/Cookbook   2. IFM Cardiometabolic Packet     Functional Nutrition Fundamentals     Comprehensive Guide    Meal plan with recipes

## 2020-02-04 NOTE — NURSING NOTE
"Angela Ibarra's chief complaint for this visit includes:  Chief Complaint   Patient presents with     Right Hip - Pain     PCP: Germán Jernigan    Referring Provider:  No referring provider defined for this encounter.    /83 (BP Location: Left arm, Patient Position: Sitting, Cuff Size: Adult Regular)   Pulse 69   Ht 1.575 m (5' 2\")   Wt 99.4 kg (219 lb 3.2 oz)   LMP 01/08/2008   SpO2 94%   BMI 40.09 kg/m    Extreme Pain (8)     Do you need any medication refills at today's visit? No    Jewels Phipps CMA        "

## 2020-02-04 NOTE — PROGRESS NOTES
Medical Nutrition Therapy  Visit Type:Initial assessment and intervention    Referring Provider: Clinton Deras  Internal ([facility])     REASON FOR REFERRAL:   Angela Ibarra presents today for MNT and education related to weight management.   She is accompanied by self.     NUTRITION ASSESSMENT:   Patient comments/concerns relating to nutrition: Angela is a 59 year old female who would like to lose weight. She recently went in for a hip surgery consultation and the doctor stated she needs to lose weight before moving forward with the surgery.  Her highest weight ws 232 in June of 2019.  She stated that she loves to snack on chips, m&ms which she believes along with other processed snacks have really contributed to her weight gain.                                              Eating Patterns & Meal Planning:    Waking up at: 6:30am   Going to bed at: 9:30am     Consuming Breakfast: Yes 6 times/week  Around 6am Pop tart and oj       Consuming Lunch:  Yes 4 times/week  12-1pm leftovers or sandwich       Consuming Dinner:  Yes 7 times/week  4-5pm - meat, potato and veggies (sometimes)      Snacks: Yes 7 times/week  Candybar, chips, M&Ms around 2:30pm       Skips meals/snacks: Yes     Beverages: Yes diet soda 8oz or 16oz, 0-3 drinks alcohol per week, 4-5 8oz cups water, 8-12 oz juice      Barriers around meals/snacks include:Motivation/Ready to change , Lack of social support, Lack of time , Lack of cooking skills , Lack of control over emotions/behaviors and Lack of nutrition knowlege      Diet is high in: carbs from processed calories   Diet is low in: fat (unsaturated), fiber, fruits, protein and vegetables    Family History   Problem Relation Age of Onset     C.A.D. Mother         MI     Hypertension Mother      Hypertension Father      Breast Cancer No family hx of           Pertinent Past Medical History:   I have reviewed this patient's medical history and updated it with pertinent information if needed.   Past  Medical History:   Diagnosis Date     Calculus of kidney      Migraine, unspecified, without mention of intractable migraine without mention of status migrainosus      Other specified iron deficiency anemias      Papanicolaou smear of cervix with low grade squamous intraepithelial lesion (LGSIL)     Colpo and hyst pathology benign     Primary osteoarthritis of right hip      Synovitis of ankle      Unspecified essential hypertension     Essential hypertension         Previous Surgeries:   I have reviewed this patient's surgical history and updated it with pertinent information if needed.  Past Surgical History:   Procedure Laterality Date     ARTHROSCOPY ANKLE  2014    Procedure: ARTHROSCOPY ANKLE;  Surgeon: Shaun Bhatt DPM;  Location: [facility] OR     ARTHROSCOPY KNEE Left 10/4/2018    Procedure: ARTHROSCOPY KNEE;  Left knee arthroscopy  medial meniscal and chondral debridement;  Surgeon: Aryan Holley MD;  Location: [facility] OR     C  DELIVERY ONLY      , Low Cervical     C GASTRIC BYPASS,OBESITY,W/SM BOWEL RECONS  10/99     C LIGATE FALLOPIAN TUBE  2000    laparoscopy     COLONOSCOPY WITH CO2 INSUFFLATION N/A 2017    Procedure: COLONOSCOPY WITH CO2 INSUFFLATION;  COLON SCREEN/ YURI;  Surgeon: Cody Arana MD;  Location: [facility] OR     HYSTERECTOMY, PAP NO LONGER INDICATED  2008     LAPAROSCOPIC CHOLECYSTECTOMY  8/3/2012    Procedure: LAPAROSCOPIC CHOLECYSTECTOMY;  Laparoscopic Cholecystectomy ;  Surgeon: Corwin Cabezas MD;  Location: [facility] OR     OPEN REDUCTION INTERNAL FIXATION ANKLE  2014    Procedure: OPEN REDUCTION INTERNAL FIXATION ANKLE;  Surgeon: Shaun Bhatt DPM;  Location:  OR     OPEN REDUCTION INTERNAL FIXATION ELBOW  10/15/2013    Procedure: OPEN REDUCTION INTERNAL FIXATION ELBOW;  OPEN REDUCTION INTERNAL FIXATION LEFT PROXIMAL ULNA;  Surgeon: Artem Last DO;  Location:  OR     ORTHOPEDIC SURGERY      left  shoulder surgery     REMOVE MESH VAGINA  10/10/2011    Procedure:REMOVE MESH VAGINA; Excision of Vaginal Mesh; Surgeon:SERGE SALGADO; Location:[facility] OR     SURGICAL HISTORY OF -   4/20/10    Transobturator midurethral sling     SURGICAL HISTORY OF -   8/1999    exploratory laparotomy, colitis     SURGICAL HISTORY OF -   4/20/10    Transobturator midurethral sling     TUBAL LIGATION           Medications and Supplements:  Current Outpatient Medications   Medication     Acetaminophen (TYLENOL EXTRA STRENGTH PO)     amLODIPine (NORVASC) 5 MG tablet     atenolol (TENORMIN) 50 MG tablet     cefdinir (OMNICEF) 300 MG capsule     COMPOUNDED NON-CONTROLLED SUBSTANCE (CMPD RX) - PHARMACY TO MIX COMPOUNDED MEDICATION     diclofenac (VOLTAREN) 50 MG EC tablet     estradiol (ESTRACE) 0.1 MG/GM cream     IBUPROFEN PO     sulfamethoxazole-trimethoprim (BACTRIM/SEPTRA) 400-80 MG tablet     traZODone (DESYREL) 50 MG tablet     Current Facility-Administered Medications   Medication     ropivacaine (NAROPIN) injection 3 mL     triamcinolone (KENALOG-40) injection 40 mg       LABS:  Last Basic Metabolic Panel:  Lab Results   Component Value Date     01/17/2019      Lab Results   Component Value Date    POTASSIUM 4.3 01/17/2019     Lab Results   Component Value Date    CHLORIDE 111 01/17/2019     Lab Results   Component Value Date    BRITANY 8.8 01/17/2019     Lab Results   Component Value Date    CO2 27 01/17/2019     Lab Results   Component Value Date    BUN 9 01/17/2019     Lab Results   Component Value Date    CR 0.85 01/17/2019     Lab Results   Component Value Date    GLC 96 01/17/2019       Last Glucose Profile:   Hemoglobin A1C   Date Value Ref Range Status   07/07/2017 5.9 4.3 - 6.0 % Final       Last Lipid Profile:   Cholesterol   Date Value Ref Range Status   01/17/2019 176 <200 mg/dL Final   01/18/2018 152 <200 mg/dL Final     HDL Cholesterol   Date Value Ref Range Status   01/17/2019 44 (L) >49 mg/dL Final  "  01/18/2018 49 (L) >49 mg/dL Final     LDL Cholesterol Calculated   Date Value Ref Range Status   01/17/2019 107 (H) <100 mg/dL Final     Comment:     Above desirable:  100-129 mg/dl  Borderline High:  130-159 mg/dL  High:             160-189 mg/dL  Very high:       >189 mg/dl     01/18/2018 82 <100 mg/dL Final     Comment:     Desirable:       <100 mg/dl     Triglycerides   Date Value Ref Range Status   01/17/2019 127 <150 mg/dL Final     Comment:     Fasting specimen   01/18/2018 104 <150 mg/dL Final     Comment:     Fasting specimen     Cholesterol/HDL Ratio   Date Value Ref Range Status   08/21/2015 4.9 0.0 - 5.0 Final   08/13/2014 4.2 0.0 - 5.0 Final       Most recent CBC:  Recent Labs   Lab Test 12/19/18  2022 03/31/17  0816   WBC 10.2 5.5   HGB 13.5 12.7   HCT 41.7 40.0    315     Most recent hepatic panel:  Recent Labs   Lab Test 07/16/13  0757 07/30/12  1637   ALT 17 10   AST 22 25     Most recent creatinine:  Recent Labs   Lab Test 01/17/19  0726 01/18/18  0714   CR 0.85 0.90       Last Thyroid Profile:   TSH   Date Value Ref Range Status   10/25/2013 1.80 0.4 - 5.0 mU/L Final       Last Mineral Profile:   Ferritin   Date Value Ref Range Status   02/08/2011 40 10 - 300 ng/mL Final     Iron   Date Value Ref Range Status   09/26/2007 20 (L) 35 - 180 ug/dL Final     Iron Binding Cap   Date Value Ref Range Status   09/26/2007 349 240 - 430 ug/dL Final         Last Vitamin Profile:   No results found for: WRA843, THRV189, [id number], VITD3, D2VIT, D3VIT, DTOT, [id number], [id number], [id number], [id number], [id number], [id number]    ANTHROPOMETRICS:  Vitals:   BP Readings from Last 1 Encounters:   02/04/20 137/83     Pulse Readings from Last 1 Encounters:   02/04/20 69     Estimated body mass index is 40.09 kg/m  as calculated from the following:    Height as of an earlier encounter on 2/4/20: 1.575 m (5' 2\").    Weight as of an earlier encounter on 2/4/20: 99.4 kg (219 lb 3.2 oz).    Wt Readings from " Last 5 Encounters:   02/04/20 99.4 kg (219 lb 3.2 oz)   04/25/19 103 kg (227 lb)   02/27/19 103 kg (227 lb)   02/27/19 103 kg (227 lb)   02/25/19 103 kg (227 lb)           NUTRITION DIAGNOSIS:   1. Overweight/Obesity related to high carb diet, low protein, healthy fats and fiber from F/V as evidenced by food recall, BMI 40.         NUTRITION INTERVENTION: CARDIOMETABOLIC    Long Term Goals:   Goal: Lose 50lbs in 6 months.       Short Term Goals:     1. Try a smoothie for breakfast at 5:30am starting on Thursday 2/6 this week have 3x per week to start. See recipes we discussed   2. Try having snack at 9am - such as hard boiled egg, or tuna with veggies, apple with peanut butter, greek yogurt with berries etc.   3. Would like to get consistent breakfast 5:30am, snack 9am, lunch noon-1pm and dinner - 5:00-6pm to avoid skipping dinner and snacking later in day      Anti-inflammatory Mediterranean Approach: Eat whole, unprocessed real foods in their unprocessed forms such as fruits, vegetables, whole grains (prefer gluten free), nuts, legumes, extra virgin olive oil, spices, modest amounts of poultry, and fish.    Avoid inflammatory foods: Eliminate gluten found in wheat, barley, rye, oats, kamut, and spelt for at least 3 weeks to identify any hidden reaction. Gluten sensitivity or allergy can cause many different types of symptoms form migraines to fatigue to weight gain.  If symptoms go away this is a clue you may be reactive to gluten.  Dairy can also be inflammatory consider eliminating for 3 weeks as well. The proteins like casein and whey in dairy can irritate and inflame your gut. Also the sugar lactase in dairy can cause digestive issues in addition to blood sugar spikes.     Cardiometabolic Food plan - 1400 calories per day     Protein 9-10 servings per day  - include at each meal to stabilize blood sugars   (Choose 3oz or 21g per meal and aim for 1oz of 7g for snacks)   - Strive for 1-2 servings of fish per  week especially of higher omega-3 fatty acid containing fish such as salmon.     Legumes <1 serving per day     Dairy alternatives 1 serving per day     Nuts and seeds 2-3 servings per day - great to incorporate as snacks     Fats and oils 4 servings per day     Non starchy vegetables 7-8 servings per day     Starchy vegetable limit 1 serving per day as they tend to impact blood sugar (they are moderate-GI).     Fruits 2 servings per day - best to couple with a little bit of protein or fat to offset a rise in blood sugars (they are low-moderate-GI foods).     Whole grains <1 serving per day - try gluten free whole grains instead       Incorporate protein powder daily:    Plant based hemp (recommended brands: [place], Nutiva, Just Hemp Protein, [employer])      Plant based pea (recommended brands: Naked Pea, Now Sports). If you want to try a combo of pea and hemp the brand Heaton in vanilla or chocolate is a great option.     Try Bone broth protein powder or collagen peptides in liquid bone broth, vegetable broth or 12 oz of water as snack. The bone broth powder and collagen can be used for soups as well. This can help provide essential amino acids and minerals that heal your gut as well as balance blood sugars. A great option if you have a hard time tolerating solids.     Choose Low Glycemic (GI) foods: Regulate your sugar levels by eating foods that do not spike blood sugars.  Eat low -GI foods so only small fluctuations in blood glucose and insulin levels are produced.      Examples of low-GI foods: nuts, seeds, GF oats, most vegetables especially non-starchy and fruits.     Medium or high-GI foods should be eaten with a protein or fat, both of which blunt the glycemic effect of these foods. This reduces the overall glycemic impact of a meal.   Ex: Most grains and starchy veggies are medium/high GI.     Avoid foods containing refined sugars, artificial sweeteners, and refined grains they are considered  high-GI because they lead to sharp increases in blood sugars levels, which increase insulin sensitivity causing increased TG, and low good cholesterol (HDL).   Ex: cakes, cookies, pies, bread, sodas, fruit drinks, presweetened tea, coffee drinks, energy or sport drinks, flavored milk and other processed foods.     Choose foods high in fiber: Aim for at least 5 grams of fiber per serving of food or a total of 25-35 grams fiber per day. Remember, when looking at the label, you can take the fiber away from the total carbs. Ex:15g of total carbs - 4g of fiber = 11g net carbs     Insoluble fiber acts like a bulky  inner broom,  sweeping out debris from the intestine and creating more motility and movement.      Soluble fiber attracts water and swells, creating a gel that slows digestion.  Also, slows the release of glucose from foods into the blood which stops spikes in blood sugar levels.  Soluble fiber traps toxins in the gut, helping to carry them to excretion and provides healthy bacteria in the digestive tract.     Choose High Quality Fats: Adding anti-inflammatory fats into your diet such as fish (salmon, herring, mackerel, and sardines), omega 3 eggs, silvestre seeds, ground flax seeds/milk, hemp seeds/milk, walnuts and some other certain leafy greens will increase omega-3 fats to omeaga-6 fats ratio.     Therapeutic fats both monounsaturated and polyunsaturated to include daily: ground flaxseeds, unsalted mixed nuts, avocados, olives, extra-virgin olive oil.     Emphasize high-quality oils and fats in the diet daily such as avocado oil, coconut oil, flaxseed, olive, sesame. Ex: 1 tsp to 1 tbsp of MCT oil from coconuts can be added into coffee, smoothies, and salad dressings per day.     Avoid trans fats found in processed foods     Drink more water. Hydration is critical, so drink at least six to eight glasses of water a day. Drink more water between meals and less at meals.     Try adding herbal teas (sugar free) or  lemon/lime/cucumber/fruit to water for flavor. Avoid artificial sweetener packets to flavor your water.     Cut back on coffee switch to green tea. Avoid adding sugar and milk to coffee instead use dairy alternatives such as almond, flax, coconut milk.     Focus on high quality micro-nutrients.     [employer]ics womens pregnancy pack - 1 pack per day split into two meals at breakfast and lunch       NUTRITION RESOURCES:  1. Purchase Eat Fat Get Thin by Dereje Sarah Book/Cookbook   2. IFM Cardiometabolic Packet     Functional Nutrition Fundamentals     Comprehensive Guide    Meal plan with recipes         PATIENT'S BEHAVIOR CHANGE GOALS:   See nutrition intervention for patient stated behavior change goals. AVS was printed and given to patient at today's appointment.    MONITOR / EVALUATE:  Registered Dietitian will monitor/evaluate the following:     Beliefs and attitudes related to food    Food and nutrition knowledge / skills    Food / Beverage / Nutrient intake     Pertinent Labs    Progress toward meeting stated nutrition-related goals    Readiness to change nutrition-related behaviors    Weight change    Digestion     COORDINATION OF CARE:  Follow up with referring provider       FOLLOW-UP:  Follow-up appointment scheduled in 10 weeks.       Time spent in minutes: 90 minutes 6 units   Encounter: Individual    Betzy Wise RD, CLT, LD  Integrative Registered Dietitian

## 2020-02-10 ENCOUNTER — OFFICE VISIT (OUTPATIENT)
Dept: SURGERY | Facility: CLINIC | Age: 60
End: 2020-02-10
Payer: COMMERCIAL

## 2020-02-10 VITALS
SYSTOLIC BLOOD PRESSURE: 129 MMHG | HEART RATE: 70 BPM | DIASTOLIC BLOOD PRESSURE: 75 MMHG | WEIGHT: 220.3 LBS | OXYGEN SATURATION: 99 % | BODY MASS INDEX: 40.54 KG/M2 | HEIGHT: 62 IN

## 2020-02-10 DIAGNOSIS — E66.01 MORBID OBESITY (H): Primary | ICD-10-CM

## 2020-02-10 DIAGNOSIS — Z98.84 BARIATRIC SURGERY STATUS: ICD-10-CM

## 2020-02-10 DIAGNOSIS — G43.919 INTRACTABLE MIGRAINE WITHOUT STATUS MIGRAINOSUS, UNSPECIFIED MIGRAINE TYPE: ICD-10-CM

## 2020-02-10 DIAGNOSIS — R45.89 FEELING SAD: ICD-10-CM

## 2020-02-10 DIAGNOSIS — K91.2 MALNUTRITION FOLLOWING GASTROINTESTINAL SURGERY: ICD-10-CM

## 2020-02-10 DIAGNOSIS — M16.11 PRIMARY OSTEOARTHRITIS OF RIGHT HIP: ICD-10-CM

## 2020-02-10 DIAGNOSIS — R45.89: ICD-10-CM

## 2020-02-10 PROCEDURE — 99215 OFFICE O/P EST HI 40 MIN: CPT | Performed by: PHYSICIAN ASSISTANT

## 2020-02-10 ASSESSMENT — MIFFLIN-ST. JEOR: SCORE: 1527.52

## 2020-02-10 NOTE — PATIENT INSTRUCTIONS
Stop using Airseed bhavin to track food.  Set up bariatric psychological evaluation.  Set up appt for dietician.  Follow sample diet.  Have labs drawn.  Follow up in 1 month.
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
## 2020-02-12 ENCOUNTER — TELEPHONE (OUTPATIENT)
Dept: NUTRITION | Facility: CLINIC | Age: 60
End: 2020-02-12

## 2020-02-12 ENCOUNTER — ALLIED HEALTH/NURSE VISIT (OUTPATIENT)
Dept: SURGERY | Facility: CLINIC | Age: 60
End: 2020-02-12
Payer: COMMERCIAL

## 2020-02-12 VITALS — BODY MASS INDEX: 40.54 KG/M2 | WEIGHT: 220.3 LBS | HEIGHT: 62 IN

## 2020-02-12 DIAGNOSIS — Z98.84 BARIATRIC SURGERY STATUS: ICD-10-CM

## 2020-02-12 DIAGNOSIS — E66.01 MORBID OBESITY (H): ICD-10-CM

## 2020-02-12 PROCEDURE — 97802 MEDICAL NUTRITION INDIV IN: CPT | Performed by: DIETITIAN, REGISTERED

## 2020-02-12 ASSESSMENT — MIFFLIN-ST. JEOR: SCORE: 1527.52

## 2020-02-12 NOTE — PROGRESS NOTES
"NUTRITION POST OP APPOINTMENT - [facility] CARE  DATE OF VISIT: February 12, 2020    Angela Ibarra  1960  female  [id number]  59 year old     ASSESSMENT:    REASON FOR VISIT:  Angela is a 59 year old year old female presents today for 21 year PO nutrition follow-up appointment. Patient is accompanied by self.    DIAGNOSIS:  Status post gastric bypass surgery.  Obesity Obesity Grade III BMI >40     ANTHROPOMETRICS:  Initial Weight: ~220 lbs   Height: 5' 2\"    Current Weight: 220 lbs 4.8 oz   BMI: Body mass index is 40.29 kg/m .    VITAMINS AND MINERALS:  [employer] prenatal vitamin/mineral containing the following:  Vitamin A: 5500 international units  Vitamin D: 1000 international units  Thiamine: 3.4mg  Folate: 1000 mcg  Iron: 30 mg  Zinc: 25mg  Copper: 2mg     NUTRITION HISTORY:  Breakfast: [5:30am] poptart + 4oz OJ  protein bar or slim fast shake  1 egg   Lunch: [11:30-12pm]  celery or carrots + peanut butter + banana  salad   Supper: [5:30-6:30pm] (skips) or tacos (chicken or beef)  BLT  burger or brat  breakfast foods  roast  chicken or pork chops  Snacks: chips, celery or carrots + peanut butter, apple, M&Ms  Fluids consumed: Water (48-64oz+), OJ (4oz), gave up diet coke 2 weeks ago, slim fast shake (occasionally), milk (2% occasionally) EtOH (very rarely, light beer)   Consuming liquid calories: Yes  Protein intake: 50-60 grams/day  Tolerate regular texture food: Yes  Any foods not tolerated details: No  If any food not tolerated: n/a; does not like \"mushy\" vegetables  Portion size: 1 cup or more  Take 20-30 minutes to consume each meal: No   Eat protein foods first: No  Fluids and meals separate by at least 30 minutes: No  Chew foods thoroughly: Yes  Tolerating diet: Yes  Drinking high protein supplements: No  Consuming snacks per day: several  Additional Information: Pt 21y s/p open lap gastric bypass through [facility]. Has not received follow-up care for many years. Struggles with shame " surrounding her weight gain. Wants to get back on track to lose weight for hip surgery. Provided and discussed handouts on vitamins/minerals, lifestyle surgery guidelines, stage 5 meal plan.       PHYSICAL ACTIVITY:  Type: walking/walking dog - 8000 steps/day      DIAGNOSIS:  Current Nutrition Diagnosis: Altered gastrointestinal function related to alteration in gastrointestinal structure as evidenced by history of gastric bypass surgery.    INTERVENTION:   Nutrition Prescription: Eat 3 meals a day at regular intervals. Consume 60-90 grams of protein daily. Follow post-surgical vitamin and mineral protocol.  Assessed learning needs and learning preferences.    GOALS:  Begin taking all post-op vitamins/minerals per guidelines (written and discussed)  Measure portions; reduce by 50-75%  Replace snacks with milk (skim or 1%)  Separate fluids and meals by 30 minutes    Follow-Up:   Recommend q1-2m follow up visits to assist with lifestyle changes or per insurance.  Implementation: Discussed progress toward previous goals; reinforced importance of following bariatric lifestyle changes.    NUTRITION MONITORING AND EVALUATION:  Anticipated compliance: fair-good  Verbalized fair-good understanding.    Follow up: Patient to follow up in 1 months.    TIME SPENT WITH PATIENT:  60 minutes    Renetta Ashton RD, LD  Clinical Dietitian

## 2020-02-12 NOTE — TELEPHONE ENCOUNTER
Left message for patient to return clinic call regarding scheduling. Patient needs a Return  appointment for nutrition with Betzy Wise around 4/14/2020.  Unsure if patient is following up with Betzy or only going through Weight Management program.  Number to clinic and Mychart option given, please assist in scheduling once patient returns clinic call.     Call Center OKAY TO SCHEDULE.    Thanks,   Laura Allred  Primary Care   [facility] [place]

## 2020-02-17 ENCOUNTER — DOCUMENTATION ONLY (OUTPATIENT)
Dept: LAB | Facility: CLINIC | Age: 60
End: 2020-02-17

## 2020-02-17 DIAGNOSIS — Z00.00 ROUTINE HISTORY AND PHYSICAL EXAMINATION OF ADULT: Primary | ICD-10-CM

## 2020-02-18 NOTE — TELEPHONE ENCOUNTER
2nd attempt, left message to schedule as noted below.    Laura Allred  Primary Care   [facility] [place]

## 2020-02-19 DIAGNOSIS — E66.01 MORBID OBESITY (H): ICD-10-CM

## 2020-02-19 DIAGNOSIS — K91.2 MALNUTRITION FOLLOWING GASTROINTESTINAL SURGERY: ICD-10-CM

## 2020-02-19 LAB
ALBUMIN SERPL-MCNC: 3.6 G/DL (ref 3.4–5)
ALP SERPL-CCNC: 82 U/L (ref 40–150)
ALT SERPL W P-5'-P-CCNC: 21 U/L (ref 0–50)
ANION GAP SERPL CALCULATED.3IONS-SCNC: 4 MMOL/L (ref 3–14)
AST SERPL W P-5'-P-CCNC: 18 U/L (ref 0–45)
BILIRUB SERPL-MCNC: 0.2 MG/DL (ref 0.2–1.3)
BUN SERPL-MCNC: 12 MG/DL (ref 7–30)
CALCIUM SERPL-MCNC: 9.5 MG/DL (ref 8.5–10.1)
CHLORIDE SERPL-SCNC: 110 MMOL/L (ref 94–109)
CO2 SERPL-SCNC: 30 MMOL/L (ref 20–32)
CREAT SERPL-MCNC: 0.94 MG/DL (ref 0.52–1.04)
DEPRECATED CALCIDIOL+CALCIFEROL SERPL-MC: 15 UG/L (ref 20–75)
ERYTHROCYTE [DISTWIDTH] IN BLOOD BY AUTOMATED COUNT: 14.1 % (ref 10–15)
GFR SERPL CREATININE-BSD FRML MDRD: 67 ML/MIN/{1.73_M2}
GLUCOSE SERPL-MCNC: 96 MG/DL (ref 70–99)
HBA1C MFR BLD: 5.4 % (ref 0–5.6)
HCT VFR BLD AUTO: 42.4 % (ref 35–47)
HGB BLD-MCNC: 13.5 G/DL (ref 11.7–15.7)
MCH RBC QN AUTO: 28.5 PG (ref 26.5–33)
MCHC RBC AUTO-ENTMCNC: 31.8 G/DL (ref 31.5–36.5)
MCV RBC AUTO: 90 FL (ref 78–100)
PLATELET # BLD AUTO: 330 10E9/L (ref 150–450)
POTASSIUM SERPL-SCNC: 4 MMOL/L (ref 3.4–5.3)
PROT SERPL-MCNC: 7.2 G/DL (ref 6.8–8.8)
PTH-INTACT SERPL-MCNC: 118 PG/ML (ref 18–80)
RBC # BLD AUTO: 4.73 10E12/L (ref 3.8–5.2)
SODIUM SERPL-SCNC: 144 MMOL/L (ref 133–144)
VIT B12 SERPL-MCNC: 313 PG/ML (ref 193–986)
WBC # BLD AUTO: 4.7 10E9/L (ref 4–11)

## 2020-02-19 PROCEDURE — 99000 SPECIMEN HANDLING OFFICE-LAB: CPT | Performed by: PHYSICIAN ASSISTANT

## 2020-02-19 PROCEDURE — 83970 ASSAY OF PARATHORMONE: CPT | Performed by: PHYSICIAN ASSISTANT

## 2020-02-19 PROCEDURE — 84425 ASSAY OF VITAMIN B-1: CPT | Mod: 90 | Performed by: PHYSICIAN ASSISTANT

## 2020-02-19 PROCEDURE — 84590 ASSAY OF VITAMIN A: CPT | Mod: 90 | Performed by: PHYSICIAN ASSISTANT

## 2020-02-19 PROCEDURE — 82525 ASSAY OF COPPER: CPT | Mod: 90 | Performed by: PHYSICIAN ASSISTANT

## 2020-02-19 PROCEDURE — 82607 VITAMIN B-12: CPT | Performed by: PHYSICIAN ASSISTANT

## 2020-02-19 PROCEDURE — 85027 COMPLETE CBC AUTOMATED: CPT | Performed by: PHYSICIAN ASSISTANT

## 2020-02-19 PROCEDURE — 80053 COMPREHEN METABOLIC PANEL: CPT | Performed by: PHYSICIAN ASSISTANT

## 2020-02-19 PROCEDURE — 83036 HEMOGLOBIN GLYCOSYLATED A1C: CPT | Performed by: PHYSICIAN ASSISTANT

## 2020-02-19 PROCEDURE — 82306 VITAMIN D 25 HYDROXY: CPT | Performed by: PHYSICIAN ASSISTANT

## 2020-02-19 PROCEDURE — 84630 ASSAY OF ZINC: CPT | Mod: 90 | Performed by: PHYSICIAN ASSISTANT

## 2020-02-19 PROCEDURE — 36415 COLL VENOUS BLD VENIPUNCTURE: CPT | Performed by: PHYSICIAN ASSISTANT

## 2020-02-21 LAB
ANNOTATION COMMENT IMP: ABNORMAL
COPPER SERPL-MCNC: 139.3 UG/DL (ref 80–155)
RETINYL PALMITATE SERPL-MCNC: <0.02 MG/L (ref 0–0.1)
VIT A SERPL-MCNC: 0.25 MG/L (ref 0.3–1.2)
ZINC SERPL-MCNC: 104.4 UG/DL (ref 60–120)

## 2020-02-23 LAB — VIT B1 BLD-MCNC: 141 NMOL/L (ref 70–180)

## 2020-02-23 ASSESSMENT — ENCOUNTER SYMPTOMS
FREQUENCY: 0
DYSURIA: 0
NERVOUS/ANXIOUS: 1
ARTHRALGIAS: 1
HEADACHES: 1
DIZZINESS: 0
HEARTBURN: 0
WEAKNESS: 0
NAUSEA: 0
SHORTNESS OF BREATH: 0
MYALGIAS: 0
PARESTHESIAS: 0
CONSTIPATION: 0
EYE PAIN: 0
DIARRHEA: 0
FEVER: 0
JOINT SWELLING: 0
HEMATOCHEZIA: 0
CHILLS: 0
ABDOMINAL PAIN: 0
BREAST MASS: 0
SORE THROAT: 0
PALPITATIONS: 0
COUGH: 0
HEMATURIA: 0

## 2020-02-23 ASSESSMENT — PATIENT HEALTH QUESTIONNAIRE - PHQ9
10. IF YOU CHECKED OFF ANY PROBLEMS, HOW DIFFICULT HAVE THESE PROBLEMS MADE IT FOR YOU TO DO YOUR WORK, TAKE CARE OF THINGS AT HOME, OR GET ALONG WITH OTHER PEOPLE: SOMEWHAT DIFFICULT
SUM OF ALL RESPONSES TO PHQ QUESTIONS 1-9: 8
SUM OF ALL RESPONSES TO PHQ QUESTIONS 1-9: 8

## 2020-02-24 DIAGNOSIS — E50.9 VITAMIN A DEFICIENCY: ICD-10-CM

## 2020-02-24 DIAGNOSIS — E55.9 VITAMIN D DEFICIENCY: ICD-10-CM

## 2020-02-24 DIAGNOSIS — K91.2 MALNUTRITION FOLLOWING GASTROINTESTINAL SURGERY: Primary | ICD-10-CM

## 2020-02-24 DIAGNOSIS — E21.1 HYPERPARATHYROIDISM DUE TO VITAMIN D DEFICIENCY (H): ICD-10-CM

## 2020-02-24 ASSESSMENT — PATIENT HEALTH QUESTIONNAIRE - PHQ9: SUM OF ALL RESPONSES TO PHQ QUESTIONS 1-9: 8

## 2020-02-25 ASSESSMENT — ENCOUNTER SYMPTOMS
SHORTNESS OF BREATH: 0
DIARRHEA: 0
ARTHRALGIAS: 1
CHILLS: 0
ABDOMINAL PAIN: 0
DIZZINESS: 0
NERVOUS/ANXIOUS: 1
EYE PAIN: 0
WEAKNESS: 0
HEADACHES: 1
DYSURIA: 0
SORE THROAT: 0
CONSTIPATION: 0
HEMATOCHEZIA: 0
BREAST MASS: 0
JOINT SWELLING: 0
FREQUENCY: 0
FEVER: 0
COUGH: 0
HEARTBURN: 0
HEMATURIA: 0
PALPITATIONS: 0
MYALGIAS: 0
NAUSEA: 0
PARESTHESIAS: 0
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
## 2020-02-25 NOTE — PATIENT INSTRUCTIONS
1. I recommend including veggies, fresh fruits (3 to 5 servings), nuts (unsalted) in your daily diet. Cut down on carbs like bread, pasta, rice, potatoes. Cut down on red meats like burgers etc. Increase healthy proteins like beans, tofu, tuna, chicken and turkey.    2. Get regular exercise like jogging, biking, swimming at least 3 times per week (150 minutes per week).      3. Do self breast exams monthly. Report any lumps. Set up the mammogram.    4. Avoid the sun or otherwise use sun screen - please look at the guide I gave you about melanoma.    5. See the dentist and eye doctor regularly.     6. Please call [phone] to register for a class about advanced directive.     7. If all is well, see you in one year for a physical with fasting labs. But if you need refill on Tramadol, let me know.

## 2020-02-26 ENCOUNTER — OFFICE VISIT (OUTPATIENT)
Dept: FAMILY MEDICINE | Facility: CLINIC | Age: 60
End: 2020-02-26
Payer: COMMERCIAL

## 2020-02-26 VITALS
DIASTOLIC BLOOD PRESSURE: 78 MMHG | SYSTOLIC BLOOD PRESSURE: 122 MMHG | WEIGHT: 218 LBS | BODY MASS INDEX: 40.12 KG/M2 | OXYGEN SATURATION: 97 % | HEART RATE: 69 BPM | HEIGHT: 62 IN | TEMPERATURE: 97.6 F

## 2020-02-26 DIAGNOSIS — I10 ESSENTIAL HYPERTENSION WITH GOAL BLOOD PRESSURE LESS THAN 140/90: ICD-10-CM

## 2020-02-26 DIAGNOSIS — M25.551 HIP PAIN, RIGHT: ICD-10-CM

## 2020-02-26 DIAGNOSIS — Z00.00 ROUTINE GENERAL MEDICAL EXAMINATION AT A HEALTH CARE FACILITY: Primary | ICD-10-CM

## 2020-02-26 DIAGNOSIS — Z23 NEED FOR PROPHYLACTIC VACCINATION AND INOCULATION AGAINST INFLUENZA: ICD-10-CM

## 2020-02-26 DIAGNOSIS — G47.00 INSOMNIA, UNSPECIFIED TYPE: ICD-10-CM

## 2020-02-26 PROCEDURE — 99213 OFFICE O/P EST LOW 20 MIN: CPT | Mod: 25 | Performed by: FAMILY MEDICINE

## 2020-02-26 PROCEDURE — 90682 RIV4 VACC RECOMBINANT DNA IM: CPT | Performed by: FAMILY MEDICINE

## 2020-02-26 PROCEDURE — 90471 IMMUNIZATION ADMIN: CPT | Performed by: FAMILY MEDICINE

## 2020-02-26 PROCEDURE — 99396 PREV VISIT EST AGE 40-64: CPT | Mod: 25 | Performed by: FAMILY MEDICINE

## 2020-02-26 RX ORDER — TRAMADOL HYDROCHLORIDE 50 MG/1
50 TABLET ORAL 2 TIMES DAILY PRN
Qty: 60 TABLET | Refills: 0 | Status: SHIPPED | OUTPATIENT
Start: 2020-02-26 | End: 2020-04-10

## 2020-02-26 RX ORDER — TRAZODONE HYDROCHLORIDE 50 MG/1
TABLET, FILM COATED ORAL
Qty: 180 TABLET | Refills: 3 | Status: SHIPPED | OUTPATIENT
Start: 2020-02-26 | End: 2021-02-05

## 2020-02-26 RX ORDER — ATENOLOL 50 MG/1
50 TABLET ORAL DAILY
Qty: 90 TABLET | Refills: 3 | Status: SHIPPED | OUTPATIENT
Start: 2020-02-26 | End: 2021-01-29

## 2020-02-26 RX ORDER — AMLODIPINE BESYLATE 5 MG/1
5 TABLET ORAL DAILY
Qty: 90 TABLET | Refills: 3 | Status: SHIPPED | OUTPATIENT
Start: 2020-02-26 | End: 2021-01-29

## 2020-02-26 ASSESSMENT — MIFFLIN-ST. JEOR: SCORE: 1517.09

## 2020-03-04 NOTE — PROGRESS NOTES
"March 3, 2020  Return Medical Weight Management Note     Angela Ibarra  MRN:  [id number]  :  1960      Dear Germán Jernigan,    I had the pleasure of seeing your patient Angela Ibarra.  She is a 59 year old female who I am continuing to see for treatment of obesity related to:       2020   I have the following health issues associated with obesity: High Blood Pressure, Osteoarthritis (joint disease)   I have the following symptoms associated with obesity: Hip Pain     I met Angela last month for an initial medical bariatric evaluation.  At that visit I learned she had gastric bypass surgery 21 year ago at [facility]. She had significant weight regain and extreme shame related to this. She had not followed up regularly and was not taking bariatric vitamins.  The majority of our visit was focused on empathetic listening and creating a safe space for Angela.    INTERVAL HISTORY:    Previous Goals:   1. Set up bariatric psychological evaluation.- completed  I referred her Angela for a  bariatric psychological evaluation.  She scheduled this for 2020 with Shmuel.    2. Set up appt for dietician.- completed Pt had her dietician visit with Renetta and has made numerous changes.      3. Follow sample bariatric diet.- pt was able to follow the sample diet I gave her prior to meeting the dietician and then expanded on this on her appt.     4. Have labs drawn- completed.  We will discuss today.      5. Stop using weight loss bhavin- she stopped this immediately.      Pt is down 6 lbs since last visit.  When I lead with this her response \" Is that all?\" Did not feel this was a lot.  After probing though she knows she has dropped some weight  because he jeans feel looser. Feels positive about this.     It has been a bad month. Pt's father  in last month.  Wasn't eating at home because at her father's bedside and then .   He was in hospice from the  to the . diet.   \" It was hell during that " "time period and since.\"   Things are starting to get back to normal.  Portion is really working. Is noticing as she has decreased her portions her stomach pouch has shrunk too.  She is finding she is not hungry.  Nothing appeals to her.  She doesn't have any pop.  Once in a while she needs a sip of pop but that is it.    Positive changes since last visit:  She is really doing well with her portions.  She is not drinking with her meals. She feels she can have one of something and then push away the rest. M&Ms are almost gone.  Has 1-2 every couple of weeks.  Struggling With: Still feels like a total failure. Is hard on herself. Getting vitamins started.  Forgot which ones to buy and when to take them.      CURRENT WEIGHT:   214 lbs 11.2 oz    Wt Readings from Last 4 Encounters:   03/06/20 214 lb 11.2 oz (97.4 kg)   02/26/20 218 lb (98.9 kg)   02/12/20 220 lb 4.8 oz (99.9 kg)   02/10/20 220 lb 4.8 oz (99.9 kg)       BARIATRIC METRICS:  Current Weight: 214 lb 11.2 oz (97.4 kg)  Body mass index is 39.27 kg/m .   Cumulative weight loss (lbs): 5.6       MEDICATIONS:   Current Outpatient Medications   Medication     Acetaminophen (TYLENOL EXTRA STRENGTH PO)     amLODIPine (NORVASC) 5 MG tablet     atenolol (TENORMIN) 50 MG tablet     cholecalciferol 125 MCG (5000 UT) CAPS     COMPOUNDED NON-CONTROLLED SUBSTANCE (CMPD RX) - PHARMACY TO MIX COMPOUNDED MEDICATION     estradiol (ESTRACE) 0.1 MG/GM cream     IBUPROFEN PO     sulfamethoxazole-trimethoprim (BACTRIM/SEPTRA) 400-80 MG tablet     traMADol (ULTRAM) 50 MG tablet     traZODone (DESYREL) 50 MG tablet     Vitamins A & D (VITAMIN A & D) 5000-400 units CAPS     Current Facility-Administered Medications   Medication     ropivacaine (NAROPIN) injection 3 mL     triamcinolone (KENALOG-40) injection 40 mg     LABS:  Component      Latest Ref Rng & Units 2/19/2020   Sodium      133 - 144 mmol/L 144   Potassium      3.4 - 5.3 mmol/L 4.0   Chloride      94 - 109 mmol/L 110 (H) " "  Carbon Dioxide      20 - 32 mmol/L 30   Anion Gap      3 - 14 mmol/L 4   Glucose      70 - 99 mg/dL 96   Urea Nitrogen      7 - 30 mg/dL 12   Creatinine      0.52 - 1.04 mg/dL 0.94   GFR Estimate      >60 mL/min/1.73:m2 67   GFR Estimate If Black      >60 mL/min/1.73:m2 77   Calcium      8.5 - 10.1 mg/dL 9.5   Bilirubin Total      0.2 - 1.3 mg/dL 0.2   Albumin      3.4 - 5.0 g/dL 3.6   Protein Total      6.8 - 8.8 g/dL 7.2   Alkaline Phosphatase      40 - 150 U/L 82   ALT      0 - 50 U/L 21   AST      0 - 45 U/L 18   WBC      4.0 - 11.0 10e9/L 4.7   RBC Count      3.8 - 5.2 10e12/L 4.73   Hemoglobin      11.7 - 15.7 g/dL 13.5   Hematocrit      35.0 - 47.0 % 42.4   MCV      78 - 100 fl 90   MCH      26.5 - 33.0 pg 28.5   MCHC      31.5 - 36.5 g/dL 31.8   RDW      10.0 - 15.0 % 14.1   Platelet Count      150 - 450 10e9/L 330   Vitamin A      0.30 - 1.20 mg/L 0.25 (L)   Retinol Palmitate      0.00 - 0.10 mg/L <0.02   Vitamin A Interp       SEE NOTE   Vitamin B1 Whole Blood Level      70 - 180 nmol/L 141   Zinc      60.0 - 120.0 ug/dL 104.4   Copper      80.0 - 155.0 ug/dL 139.3   Hemoglobin A1C      0 - 5.6 % 5.4   Vitamin B12      193 - 986 pg/mL 313   Vitamin D Deficiency screening      20 - 75 ug/L 15 (L)   Parathyroid Hormone Intact      18 - 80 pg/mL 118 (H)       PE:  /73   Pulse 68   Ht 5' 2\" (1.575 m)   Wt 214 lb 11.2 oz (97.4 kg)   LMP 01/08/2008   SpO2 95%   BMI 39.27 kg/m    GENERAL Pt in NAD.  HEART: No JVD  LUNGS: Breathing unlabored.  MUSC: Gait normal  NEURO: Alert and oriented x3.     ASSESSMENT AND PLAN:      1. Bariatric surgery status  21 years S/P gastric bypass surgery  Reviewed and strengthened bariatric principles today.      2. Morbid obesity (H)  Patient was congratulated on weight loss success thus far. Explained 1-2 lbs weight loss per week is excellent and I would not expect more than this.  Healthy habits to assist with further weight loss were discussed.     3. Malnutrition " following gastrointestinal surgery  Reviewed pts labs results.  Recommend pt start recommended post-op vitamins.  Formulated personalized bariatric vitamin schedule for pt to increase compliance.     4. Vitamin A deficiency  Please to start 5000 units to vitamin A daily. Rx sent to  [facility]. Recheck labs in 2 months.      5. Vitamin D deficiency/ hyperparathyroidism  Please to increase vitamin D to 10,000 international unit(s) of vitamin D daily.  Rx sent 5000 international unit(s) to your  [facility]. PT will take 2 tablets daily. Recheck labs in 2 months.        FOLLOW-UP: Return to clinic in 2 months with me and 1 month with RD.     Time: 25 min spent on evaluation, management, counseling, education, & motivational interviewing with greater than 50 % of the total time was spent on counseling and coordinating care    Sincerely,      Melisa Mercado PA-C

## 2020-03-06 ENCOUNTER — OFFICE VISIT (OUTPATIENT)
Dept: SURGERY | Facility: CLINIC | Age: 60
End: 2020-03-06
Payer: COMMERCIAL

## 2020-03-06 VITALS — HEIGHT: 62 IN | WEIGHT: 214.7 LBS | BODY MASS INDEX: 39.51 KG/M2

## 2020-03-06 VITALS
SYSTOLIC BLOOD PRESSURE: 126 MMHG | OXYGEN SATURATION: 95 % | HEIGHT: 62 IN | DIASTOLIC BLOOD PRESSURE: 73 MMHG | HEART RATE: 68 BPM | WEIGHT: 214.7 LBS | BODY MASS INDEX: 39.51 KG/M2

## 2020-03-06 DIAGNOSIS — Z98.84 BARIATRIC SURGERY STATUS: ICD-10-CM

## 2020-03-06 DIAGNOSIS — K91.2 MALNUTRITION FOLLOWING GASTROINTESTINAL SURGERY: ICD-10-CM

## 2020-03-06 DIAGNOSIS — E66.01 MORBID OBESITY (H): ICD-10-CM

## 2020-03-06 DIAGNOSIS — E55.9 VITAMIN D DEFICIENCY: ICD-10-CM

## 2020-03-06 DIAGNOSIS — Z98.84 BARIATRIC SURGERY STATUS: Primary | ICD-10-CM

## 2020-03-06 DIAGNOSIS — E50.9 VITAMIN A DEFICIENCY: ICD-10-CM

## 2020-03-06 PROCEDURE — 99214 OFFICE O/P EST MOD 30 MIN: CPT | Performed by: PHYSICIAN ASSISTANT

## 2020-03-06 PROCEDURE — 97803 MED NUTRITION INDIV SUBSEQ: CPT | Performed by: DIETITIAN, REGISTERED

## 2020-03-06 ASSESSMENT — MIFFLIN-ST. JEOR
SCORE: 1502.12
SCORE: 1502.12

## 2020-03-06 NOTE — PROGRESS NOTES
"NUTRITION POST OP APPOINTMENT  DATE OF VISIT: March 6, 2020    Angela Ibarra  1960  female  [id number]  59 year old     ASSESSMENT:    REASON FOR VISIT:  Angela is a 59 year old year old female presents today for 21 year PO nutrition follow-up appointment. Patient is accompanied by self.    DIAGNOSIS:  Status post gastric bypass surgery.  Obesity Obesity Grade II BMI 35-39.9     ANTHROPOMETRICS:  Initial Weight: ~220 lbs   Height: 157.5 cm (5' 2\")  Current Weight: 97.4 kg (214 lb 11.2 oz)   BMI: 39.27 kg/(m^2).    VITAMINS AND MINERALS:  Inconsistent; prescribed by BRONSON and reviewed today    NUTRITION HISTORY:  Breakfast: [5:30am] 1c cheerios with 1% milk (1/2c)  Lunch: [11a-noon] salad with grilled chicken and hard boiled egg, raspberry vinaigrette (fat free)   Supper: [4-4:30pm] protein drink  scrambled egg + 1/2 piece of toast with SF jelly   Snacks: [mid-morning] yogurt   Fluids consumed: Water (60oz), protein drink (12oz), juice (4oz)   Consuming liquid calories: Yes  Protein intake: 60-90 grams/day  Tolerate regular texture food: Yes  Any foods not tolerated details: No  If any food not tolerated: n/a  Portion size: 1 cup or more  Take 20-30 minutes to consume each meal: No; 15 minutes   Eat protein foods first: Yes  Fluids and meals separate by at least 30 minutes: Yes  Chew foods thoroughly: Yes  Tolerating diet: Yes  Drinking high protein supplements: Yes  Consuming snacks per day: rare  Additional Information: Pt with many positive changes over the last month such as minimizing snacking and reducing portions. Seeing psychologist next month to start working through cycle of shame surrounding her weight. Father passed away this month which led to ~1 week of inconsistent meals. Otherwise much more focused on bariatric lifestyle.       PHYSICAL ACTIVITY:   walking/walking dog, Walk Away the Pounds video       DIAGNOSIS:  Previous Nutrition Diagnosis: Altered gastrointestinal function related to " alteration in gastrointestinal structure as evidenced by history of gastric bypass surgery.- no change    Previous goals:  Begin taking all post-op vitamins/minerals per guidelines (written and discussed) - not met  Measure portions; reduce by 50-75% - improving  Replace snacks with milk (skim or 1%) - improving  Separate fluids and meals by 30 minutes - met    Current Nutrition Diagnosis: Altered gastrointestinal function related to alteration in gastrointestinal structure as evidenced by history of gastric bypass surgery.    INTERVENTION:   Nutrition Prescription: Eat 3 meals a day at regular intervals. Consume 60-90 grams of protein daily. Follow post-surgical vitamin and mineral protocol.  Assessed learning needs and learning preferences.    GOALS:  Continue to practice all post-op guidelines  Take 20-30 minutes to eat each meal  Eat balanced meals at consistent intervals    Follow-Up:   Recommend q1-2 follow up visits to assist with lifestyle changes or per insurance.  Implementation: Discussed progress toward previous goals; reinforced importance of following bariatric lifestyle changes.    NUTRITION MONITORING AND EVALUATION:  Anticipated compliance: fair-good  Verbalized fair-good understanding.    Follow up: Patient to follow up in 1 months.    TIME SPENT WITH PATIENT:  25 minutes    Renetta Ashton RD, LD  Clinical Dietitian  HPI      ROS      Physical Exam

## 2020-03-06 NOTE — PATIENT INSTRUCTIONS
Bariatric Postoperative Vitamins     Flinstones Chewable Complete   2 tablets at bedtime   Vitamin A 5000 international unit(s) at bedtime    Vitamin D 5000 international unit(s)   2 tablets at bedtime  Vitamin B12   500 mcg SL Tablet or drops at bedtime (average out to 500 mcg daily)    Thiamine 100 mg at bedtime once a week     Calcium Citrate 9424-7959 mg in divided doses (Do not take at same time as multivitamin or iron)  500 mg three times with meals or 600 mg twice with meals

## 2020-03-12 ENCOUNTER — ANCILLARY PROCEDURE (OUTPATIENT)
Dept: MAMMOGRAPHY | Facility: CLINIC | Age: 60
End: 2020-03-12
Attending: FAMILY MEDICINE
Payer: COMMERCIAL

## 2020-03-12 DIAGNOSIS — Z00.00 ROUTINE GENERAL MEDICAL EXAMINATION AT A HEALTH CARE FACILITY: ICD-10-CM

## 2020-03-12 PROCEDURE — 77067 SCR MAMMO BI INCL CAD: CPT | Mod: TC

## 2020-04-03 ENCOUNTER — TELEPHONE (OUTPATIENT)
Dept: PSYCHOLOGY | Facility: CLINIC | Age: 60
End: 2020-04-03

## 2020-04-03 NOTE — TELEPHONE ENCOUNTER
Tried reaching out to patient in regards to patient s appts on 4/8, 4/15, and 5/6/2020 with [facility] to let patient know that due to covid-19, we are not seeing patients in person. No answer, left a voicemail for patient to return phone call and confirm video visit.

## 2020-04-08 ENCOUNTER — VIRTUAL VISIT (OUTPATIENT)
Dept: PSYCHOLOGY | Facility: CLINIC | Age: 60
End: 2020-04-08
Attending: PHYSICIAN ASSISTANT
Payer: COMMERCIAL

## 2020-04-08 DIAGNOSIS — F43.23 ADJUSTMENT DISORDER WITH MIXED ANXIETY AND DEPRESSED MOOD: Primary | ICD-10-CM

## 2020-04-08 PROCEDURE — 90791 PSYCH DIAGNOSTIC EVALUATION: CPT | Mod: 95 | Performed by: PSYCHOLOGIST

## 2020-04-08 ASSESSMENT — COLUMBIA-SUICIDE SEVERITY RATING SCALE - C-SSRS
2. HAVE YOU ACTUALLY HAD ANY THOUGHTS OF KILLING YOURSELF?: NO
1. IN THE PAST MONTH, HAVE YOU WISHED YOU WERE DEAD OR WISHED YOU COULD GO TO SLEEP AND NOT WAKE UP?: NO
2. HAVE YOU ACTUALLY HAD ANY THOUGHTS OF KILLING YOURSELF LIFETIME?: NO
1. IN THE PAST MONTH, HAVE YOU WISHED YOU WERE DEAD OR WISHED YOU COULD GO TO SLEEP AND NOT WAKE UP?: NO

## 2020-04-08 ASSESSMENT — PATIENT HEALTH QUESTIONNAIRE - PHQ9
5. POOR APPETITE OR OVEREATING: NOT AT ALL
SUM OF ALL RESPONSES TO PHQ QUESTIONS 1-9: 5

## 2020-04-08 ASSESSMENT — ANXIETY QUESTIONNAIRES
1. FEELING NERVOUS, ANXIOUS, OR ON EDGE: SEVERAL DAYS
2. NOT BEING ABLE TO STOP OR CONTROL WORRYING: SEVERAL DAYS
3. WORRYING TOO MUCH ABOUT DIFFERENT THINGS: NEARLY EVERY DAY
GAD7 TOTAL SCORE: 6
7. FEELING AFRAID AS IF SOMETHING AWFUL MIGHT HAPPEN: SEVERAL DAYS
IF YOU CHECKED OFF ANY PROBLEMS ON THIS QUESTIONNAIRE, HOW DIFFICULT HAVE THESE PROBLEMS MADE IT FOR YOU TO DO YOUR WORK, TAKE CARE OF THINGS AT HOME, OR GET ALONG WITH OTHER PEOPLE: NOT DIFFICULT AT ALL
6. BECOMING EASILY ANNOYED OR IRRITABLE: NOT AT ALL
5. BEING SO RESTLESS THAT IT IS HARD TO SIT STILL: NOT AT ALL

## 2020-04-08 NOTE — Clinical Note
Luca Vincent,  I spoke with Angela today and she is doing much better since her first visit with you 2/10. She has made significant improvements in her eating habits/behaviors and is experiencing less anxiety and depression (she noted her father was ill and  during the time that she started seeing you and she felt overwhelmed by this). She is experiencing some ongoing anxiety about coronavirus and worrying about her son. She would like to participate in therapy to have support during this process so that she doesn't experience setbacks (she wants to be sure that if her mom's voice pops into her head that she doesn't allow it to throw her off). She is making herself a priority right now. We decided to cancel future sessions; I think she is in a much better place and is on track. I placed the  referral order and [facility] will call her to schedule. Please let me know if you need anything else!
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
## 2020-04-09 ENCOUNTER — TELEPHONE (OUTPATIENT)
Dept: PSYCHOLOGY | Facility: CLINIC | Age: 60
End: 2020-04-09

## 2020-04-09 ENCOUNTER — MYC MEDICAL ADVICE (OUTPATIENT)
Dept: FAMILY MEDICINE | Facility: CLINIC | Age: 60
End: 2020-04-09

## 2020-04-09 ASSESSMENT — ANXIETY QUESTIONNAIRES: GAD7 TOTAL SCORE: 6

## 2020-04-09 NOTE — TELEPHONE ENCOUNTER
We did not reach Angela Varnerdamian, we left a message with our contact number [phone].  If we do not hear from them within the next 3 business days we will mail a letter offering our scheduling services.    If they do call to schedule, in the future, we will inform you of the outcome.    Thank you for your referral,  [facility]

## 2020-04-10 ENCOUNTER — OFFICE VISIT (OUTPATIENT)
Dept: SURGERY | Facility: CLINIC | Age: 60
End: 2020-04-10
Payer: COMMERCIAL

## 2020-04-10 ENCOUNTER — VIRTUAL VISIT (OUTPATIENT)
Dept: FAMILY MEDICINE | Facility: CLINIC | Age: 60
End: 2020-04-10
Payer: COMMERCIAL

## 2020-04-10 ENCOUNTER — TELEPHONE (OUTPATIENT)
Dept: SURGERY | Facility: CLINIC | Age: 60
End: 2020-04-10

## 2020-04-10 DIAGNOSIS — M25.551 HIP PAIN, RIGHT: ICD-10-CM

## 2020-04-10 DIAGNOSIS — Z98.84 BARIATRIC SURGERY STATUS: ICD-10-CM

## 2020-04-10 PROCEDURE — 97803 MED NUTRITION INDIV SUBSEQ: CPT | Mod: TEL | Performed by: DIETITIAN, REGISTERED

## 2020-04-10 PROCEDURE — 99213 OFFICE O/P EST LOW 20 MIN: CPT | Mod: TEL | Performed by: FAMILY MEDICINE

## 2020-04-10 RX ORDER — TRAMADOL HYDROCHLORIDE 50 MG/1
50 TABLET ORAL 2 TIMES DAILY PRN
Qty: 60 TABLET | Refills: 0 | Status: SHIPPED | OUTPATIENT
Start: 2020-04-10 | End: 2020-05-15

## 2020-04-10 NOTE — PROGRESS NOTES
"Angela Ibarra is a 59 year old female who is being evaluated via a billable video visit.      The patient has been notified of following:     \"This video visit will be conducted via a call between you and your physician/provider. We have found that certain health care needs can be provided without the need for an in-person physical exam.  This service lets us provide the care you need with a video conversation.  If a prescription is necessary we can send it directly to your pharmacy.  If lab work is needed we can place an order for that and you can then stop by our lab to have the test done at a later time.    Video visits are billed at different rates depending on your insurance coverage.  Please reach out to your insurance provider with any questions.    If during the course of the call the physician/provider feels a video visit is not appropriate, you will not be charged for this service.\"    Patient has given verbal consent for Video visit? {YES-NO  Default Yes:4444::\"Yes\"}    How would you like to obtain your AVS? {AVS Preference:368195}    Patient would like the video invitation sent by: {video visit invitation:566457}  {If patient encounters technical issues they should call [phone] :966367}    Subjective     Angela Ibarra is a 59 year old female who presents to clinic today for the following health issues:    HPI     Hip pain per pt x       Video Start Time: {video visit start time for provider to select:510530}    {additonal problems for provider to add (Optional):024340}    {HIST REVIEW/ LINKS 2 (Optional):137265}    {Additional problems for the provider to add (optional):535182}  Reviewed and updated as needed this visit by Provider         Review of Systems   {ROS COMP (Optional):189981}      Objective    LMP 01/08/2008   Estimated body mass index is 39.27 kg/m  as calculated from the following:    Height as of 3/6/20: 1.575 m (5' 2\").    Weight as of 3/6/20: 97.4 kg (214 lb 11.2 " "oz).  Physical Exam   {Unselected :123933::\"GENERAL: healthy, alert and no distress\"}    {Diagnostic Test Results (Optional):073977::\"Diagnostic Test Results:\",\"Labs reviewed in Epic\"}        {PROVIDER CHARTING PREFERENCE:265558}      Video-Visit Details    Type of service:  Video Visit    Video End Time (time video stopped): ***    Originating Location (pt. Location): {video visit patient location:251121::\"Home\"}    Distant Location (provider location):  [facility]     Mode of Communication:  Video Conference via [employer]    No follow-ups on file.       {signature options:554474}      "

## 2020-04-10 NOTE — PROGRESS NOTES
"  Angela Ibarra is a 59 year old female who is being evaluated via a billable telephone visit.      The patient has been notified of following:     \"This telephone visit will be conducted via a call between you and your physician/provider. We have found that certain health care needs can be provided without the need for a physical exam.  This service lets us provide the care you need with a short phone conversation.  If a prescription is necessary we can send it directly to your pharmacy.  If lab work is needed we can place an order for that and you can then stop by our lab to have the test done at a later time.    Telephone visits are billed at different rates depending on your insurance coverage. During this emergency period, for some insurers they may be billed the same as an in-person visit.  Please reach out to your insurance provider with any questions.    If during the course of the call the physician/provider feels a telephone visit is not appropriate, you will not be charged for this service.\"    Patient has given verbal consent for Telephone visit?  Yes      NUTRITION POST OP APPOINTMENT  DATE OF VISIT: April 10, 2020    Angela Ibarra  1960  female  [id number]  59 year old     ASSESSMENT:    REASON FOR VISIT:  Angela is a 59 year old year old female presents today for 21 year PO nutrition follow-up appointment. Patient is accompanied by self.    DIAGNOSIS:  Status post gastric bypass surgery.  Obesity Grade II BMI 35-39.9     ANTHROPOMETRICS:  Initial Weight: ~220 lbs   Height: 157.5 cm (5' 2\")  Previous weight: 97.4 kg (214 lb 11.2 oz) (Patient does not have scale to weigh herself)  BMI: 39.27 kg/(m^2).    VITAMINS AND MINERALS:  2 Multivitamin with Minerals (night)  1000 mg Calcium citrate With Vitamin D in AM and 500 mg at lunch   5000 International units Vitamin D (night)  500 mcg Vitamin B-12  dropper  Thiamine -300 mg every 3 weeks   Vitamin A and D due to low vitamin A     NUTRITION " HISTORY:  Breakfast: [5:30am] egg or protein shake or Greek yogurt or 1c cheerios with 1% milk (1/2c)  Lunch: [11a-noon] shredded pork or salad with grilled chicken and hard boiled egg, raspberry vinaigrette (fat free)   Supper: [4-4:30pm] protein drink  4 oz chicken breast scrambled egg + 1/2 piece of toast with SF jelly   Snacks: [mid-morning] yogurt   Fluids consumed: Water (60oz), protein drink (12oz), juice (3oz) 1-2 times per week   Consuming liquid calories: Yes  Protein intake: 60 grams/day  Tolerate regular texture food: No  Any foods not tolerated details: No  If any food not tolerated: none  Portion size: 1 cup  Take 20-30 minutes to consume each meal: Yes   Eat protein foods first: Yes  Fluids and meals separate by at least 30 minutes: Yes  Chew foods thoroughly: Yes  Tolerating diet: Yes  Drinking high protein supplements: Yes  Consuming snacks per day: 1-2  Additional Information: Patient has been focusing on diet and behavior changes since her appointment in February.  Patient states her dad was in hospice for ALS at this time and passed away so it was difficult to focus.  Patient feels she has lost weight as is able to fit into pair of jeans that she was not able to wear in January 2020.  Patient with hip pain so limited exercise.  Patient states she needs hip replacement and needs to lose weight in order to have one.    PHYSICAL ACTIVITY:  Type: walking dog 2 times per day (needs hip replacement)  Frequency (days per week): 7    DIAGNOSIS:  Previous Nutrition Diagnosis: Altered gastrointestinal function related to alteration in gastrointestinal structure as evidenced by history of gastric bypass surgery.- no change    Previous goals:  Continue to practice all post-op guidelines-met  Take 20-30 minutes to eat each meal- improving  Eat balanced meals at consistent intervals-improving      Current Nutrition Diagnosis: Altered gastrointestinal function related to alteration in gastrointestinal  structure as evidenced by history of gastric bypass surgery.    INTERVENTION:   Nutrition Prescription: Eat 3 meals a day at regular intervals. Consume 60-90 grams of protein daily. Follow post-surgical vitamin and mineral protocol.  Assessed learning needs and learning preferences.    GOALS:  3 food groups per meal  Ask self if hungry before snacking     Implementation: Discussed progress toward previous goals; reinforced importance of following bariatric lifestyle changes.    NUTRITION MONITORING AND EVALUATION:  Anticipated compliance: good  Verbalized good understanding.    Follow up: Patient to follow up in 2 months.    TIME SPENT WITH PATIENT:  25 minutes    Foster Figueroa, RD, LD  [facility] Outpatient Dietitian  [phone] (office phone)
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
## 2020-04-10 NOTE — TELEPHONE ENCOUNTER
Huong Mi, PhD sent to Melisa Mercado PA-C Hi Melisa,   I spoke with Angela today and she is doing much better since her first visit with you 2/10. She has made significant improvements in her eating habits/behaviors and is experiencing less anxiety and depression (she noted her father was ill and  during the time that she started seeing you and she felt overwhelmed by this). She is experiencing some ongoing anxiety about coronavirus and worrying about her son. She would like to participate in therapy to have support during this process so that she doesn't experience setbacks (she wants to be sure that if her mom's voice pops into her head that she doesn't allow it to throw her off). She is making herself a priority right now. We decided to cancel future sessions; I think she is in a much better place and is on track. I placed the  referral order and [facility] will call her to schedule. Please let me know if you need anything else!      So glad this has helped.  Pt has a visit today.  I will forward this to the dietician that the will her.      JOSE

## 2020-05-14 DIAGNOSIS — M25.551 HIP PAIN, RIGHT: ICD-10-CM

## 2020-05-15 RX ORDER — TRAMADOL HYDROCHLORIDE 50 MG/1
TABLET ORAL
Qty: 60 TABLET | Refills: 0 | Status: ON HOLD | OUTPATIENT
Start: 2020-05-15 | End: 2020-07-08

## 2020-05-20 ENCOUNTER — MYC MEDICAL ADVICE (OUTPATIENT)
Dept: SURGERY | Facility: CLINIC | Age: 60
End: 2020-05-20

## 2020-05-26 DIAGNOSIS — E21.1 HYPERPARATHYROIDISM DUE TO VITAMIN D DEFICIENCY (H): ICD-10-CM

## 2020-05-26 DIAGNOSIS — K91.2 MALNUTRITION FOLLOWING GASTROINTESTINAL SURGERY: ICD-10-CM

## 2020-05-26 DIAGNOSIS — Z00.00 ROUTINE HISTORY AND PHYSICAL EXAMINATION OF ADULT: ICD-10-CM

## 2020-05-26 DIAGNOSIS — E55.9 VITAMIN D DEFICIENCY: ICD-10-CM

## 2020-05-26 DIAGNOSIS — E50.9 VITAMIN A DEFICIENCY: ICD-10-CM

## 2020-05-26 LAB
CHOLEST SERPL-MCNC: 186 MG/DL
DEPRECATED CALCIDIOL+CALCIFEROL SERPL-MC: 106 UG/L (ref 20–75)
HDLC SERPL-MCNC: 44 MG/DL
LDLC SERPL CALC-MCNC: 117 MG/DL
NONHDLC SERPL-MCNC: 142 MG/DL
PTH-INTACT SERPL-MCNC: 48 PG/ML (ref 18–80)
TRIGL SERPL-MCNC: 123 MG/DL

## 2020-05-26 PROCEDURE — 84590 ASSAY OF VITAMIN A: CPT | Mod: 90 | Performed by: PHYSICIAN ASSISTANT

## 2020-05-26 PROCEDURE — 82306 VITAMIN D 25 HYDROXY: CPT | Performed by: PHYSICIAN ASSISTANT

## 2020-05-26 PROCEDURE — 83970 ASSAY OF PARATHORMONE: CPT | Performed by: PHYSICIAN ASSISTANT

## 2020-05-26 PROCEDURE — 99000 SPECIMEN HANDLING OFFICE-LAB: CPT | Performed by: PHYSICIAN ASSISTANT

## 2020-05-26 PROCEDURE — 36415 COLL VENOUS BLD VENIPUNCTURE: CPT | Performed by: FAMILY MEDICINE

## 2020-05-26 PROCEDURE — 80061 LIPID PANEL: CPT | Performed by: FAMILY MEDICINE

## 2020-05-28 LAB
ANNOTATION COMMENT IMP: NORMAL
RETINYL PALMITATE SERPL-MCNC: 0.06 MG/L (ref 0–0.1)
VIT A SERPL-MCNC: 0.5 MG/L (ref 0.3–1.2)

## 2020-06-05 ENCOUNTER — TELEPHONE (OUTPATIENT)
Dept: ORTHOPEDICS | Facility: CLINIC | Age: 60
End: 2020-06-05

## 2020-06-08 DIAGNOSIS — Z11.59 ENCOUNTER FOR SCREENING FOR OTHER VIRAL DISEASES: Primary | ICD-10-CM

## 2020-06-08 PROBLEM — M25.551 RIGHT HIP PAIN: Status: ACTIVE | Noted: 2020-06-08

## 2020-06-08 NOTE — TELEPHONE ENCOUNTER
Date Scheduled: 7-6-20  Facility: [facility]  Surgeon: Dr. Deras   Post-op appointment scheduled:   Next 5 appointments (look out 90 days)    Jul 21, 2020  9:30 AM CDT  Nurse Only with MG NURSE ONLY ORTHO  [facility]) [id number] [place] [id number] [phone]   Aug 18, 2020  9:45 AM CDT  Return Visit with Clinton Deras MD  [facility]) [id number] [place] [id number] [phone]           scheduled?: No  Surgery packet/instructions confirmed received?  No, please mail  Special Considerations: Pt informed about needing COVID testing and a pre op physical.  Krystyna Ann, Surgery Scheduling Coordinator

## 2020-06-08 NOTE — TELEPHONE ENCOUNTER
Location on order says N[facility], not sure if this was a mistake or if she is supposed to be RAP. I don't think she meets the criteria for RAP, but not sure.     Procedure: Right total hip arthroplasty  Facility: [facility] (order says [facility]?)  Length: 120 minutes  Anesthesia: General  Post-op appointments needed: 2 weeks nurse only, 6 weeks with provider only.  Surgery packet/instructions given to patient?  to be mailed    Cj Farmer RN

## 2020-06-12 DIAGNOSIS — M25.551 HIP PAIN, RIGHT: ICD-10-CM

## 2020-06-15 RX ORDER — TRAMADOL HYDROCHLORIDE 50 MG/1
TABLET ORAL
Qty: 60 TABLET | Refills: 0 | OUTPATIENT
Start: 2020-06-15

## 2020-06-15 NOTE — TELEPHONE ENCOUNTER
Called and spoke to the patient @ [phone]. Patient states she does not need the refill requested by the pharmacy. Please disregard.  Antoinette Voss TC

## 2020-06-22 ENCOUNTER — DOCUMENTATION ONLY (OUTPATIENT)
Dept: EDUCATION SERVICES | Facility: CLINIC | Age: 60
End: 2020-06-22

## 2020-06-22 DIAGNOSIS — Z01.818 PREOPERATIVE EXAMINATION: Primary | ICD-10-CM

## 2020-06-22 PROCEDURE — 86900 BLOOD TYPING SEROLOGIC ABO: CPT | Performed by: ORTHOPAEDIC SURGERY

## 2020-06-22 PROCEDURE — 36415 COLL VENOUS BLD VENIPUNCTURE: CPT

## 2020-06-22 PROCEDURE — 86901 BLOOD TYPING SEROLOGIC RH(D): CPT | Performed by: ORTHOPAEDIC SURGERY

## 2020-06-22 PROCEDURE — 86850 RBC ANTIBODY SCREEN: CPT | Performed by: ORTHOPAEDIC SURGERY

## 2020-06-30 ENCOUNTER — TELEPHONE (OUTPATIENT)
Dept: ORTHOPEDICS | Facility: CLINIC | Age: 60
End: 2020-06-30

## 2020-06-30 ENCOUNTER — OFFICE VISIT (OUTPATIENT)
Dept: FAMILY MEDICINE | Facility: CLINIC | Age: 60
End: 2020-06-30
Payer: COMMERCIAL

## 2020-06-30 VITALS
OXYGEN SATURATION: 96 % | WEIGHT: 174 LBS | TEMPERATURE: 98.5 F | DIASTOLIC BLOOD PRESSURE: 70 MMHG | SYSTOLIC BLOOD PRESSURE: 120 MMHG | BODY MASS INDEX: 31.83 KG/M2 | HEART RATE: 72 BPM

## 2020-06-30 DIAGNOSIS — M16.11 ARTHRITIS OF RIGHT HIP: ICD-10-CM

## 2020-06-30 DIAGNOSIS — I10 ESSENTIAL HYPERTENSION WITH GOAL BLOOD PRESSURE LESS THAN 140/90: ICD-10-CM

## 2020-06-30 DIAGNOSIS — Z01.818 PREOP GENERAL PHYSICAL EXAM: Primary | ICD-10-CM

## 2020-06-30 LAB
ANION GAP SERPL CALCULATED.3IONS-SCNC: 7 MMOL/L (ref 3–14)
BASOPHILS # BLD AUTO: 0.1 10E9/L (ref 0–0.2)
BASOPHILS NFR BLD AUTO: 0.6 %
BUN SERPL-MCNC: 17 MG/DL (ref 7–30)
CALCIUM SERPL-MCNC: 9.3 MG/DL (ref 8.5–10.1)
CHLORIDE SERPL-SCNC: 110 MMOL/L (ref 94–109)
CO2 SERPL-SCNC: 23 MMOL/L (ref 20–32)
CREAT SERPL-MCNC: 1.09 MG/DL (ref 0.52–1.04)
DIFFERENTIAL METHOD BLD: NORMAL
EOSINOPHIL # BLD AUTO: 0.2 10E9/L (ref 0–0.7)
EOSINOPHIL NFR BLD AUTO: 2.2 %
ERYTHROCYTE [DISTWIDTH] IN BLOOD BY AUTOMATED COUNT: 12.9 % (ref 10–15)
GFR SERPL CREATININE-BSD FRML MDRD: 55 ML/MIN/{1.73_M2}
GLUCOSE SERPL-MCNC: 102 MG/DL (ref 70–99)
HCT VFR BLD AUTO: 41.7 % (ref 35–47)
HGB BLD-MCNC: 13.5 G/DL (ref 11.7–15.7)
LYMPHOCYTES # BLD AUTO: 1.9 10E9/L (ref 0.8–5.3)
LYMPHOCYTES NFR BLD AUTO: 23.4 %
MCH RBC QN AUTO: 29.1 PG (ref 26.5–33)
MCHC RBC AUTO-ENTMCNC: 32.4 G/DL (ref 31.5–36.5)
MCV RBC AUTO: 90 FL (ref 78–100)
MONOCYTES # BLD AUTO: 0.6 10E9/L (ref 0–1.3)
MONOCYTES NFR BLD AUTO: 7.3 %
NEUTROPHILS # BLD AUTO: 5.4 10E9/L (ref 1.6–8.3)
NEUTROPHILS NFR BLD AUTO: 66.5 %
PLATELET # BLD AUTO: 336 10E9/L (ref 150–450)
POTASSIUM SERPL-SCNC: 4.1 MMOL/L (ref 3.4–5.3)
RBC # BLD AUTO: 4.64 10E12/L (ref 3.8–5.2)
SODIUM SERPL-SCNC: 140 MMOL/L (ref 133–144)
WBC # BLD AUTO: 8.2 10E9/L (ref 4–11)

## 2020-06-30 PROCEDURE — 99214 OFFICE O/P EST MOD 30 MIN: CPT | Performed by: FAMILY MEDICINE

## 2020-06-30 PROCEDURE — 93000 ELECTROCARDIOGRAM COMPLETE: CPT | Performed by: FAMILY MEDICINE

## 2020-06-30 PROCEDURE — 80048 BASIC METABOLIC PNL TOTAL CA: CPT | Performed by: FAMILY MEDICINE

## 2020-06-30 PROCEDURE — 36415 COLL VENOUS BLD VENIPUNCTURE: CPT | Performed by: FAMILY MEDICINE

## 2020-06-30 PROCEDURE — 85025 COMPLETE CBC W/AUTO DIFF WBC: CPT | Performed by: FAMILY MEDICINE
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
## 2020-06-30 NOTE — TELEPHONE ENCOUNTER
Forms received from [employer], forms completed and faxed back to [phone].  Spouses forms also completed and faxed to [phone].  Copy sent to bruce.  Jess Obregon RN

## 2020-07-03 DIAGNOSIS — Z11.59 ENCOUNTER FOR SCREENING FOR OTHER VIRAL DISEASES: ICD-10-CM

## 2020-07-03 PROCEDURE — U0003 INFECTIOUS AGENT DETECTION BY NUCLEIC ACID (DNA OR RNA); SEVERE ACUTE RESPIRATORY SYNDROME CORONAVIRUS 2 (SARS-COV-2) (CORONAVIRUS DISEASE [COVID-19]), AMPLIFIED PROBE TECHNIQUE, MAKING USE OF HIGH THROUGHPUT TECHNOLOGIES AS DESCRIBED BY CMS-2020-01-R: HCPCS | Performed by: ORTHOPAEDIC SURGERY

## 2020-07-04 LAB
SARS-COV-2 RNA SPEC QL NAA+PROBE: NOT DETECTED
SPECIMEN SOURCE: NORMAL

## 2020-07-05 ENCOUNTER — ANESTHESIA EVENT (OUTPATIENT)
Dept: SURGERY | Facility: CLINIC | Age: 60
DRG: 470 | End: 2020-07-05
Payer: COMMERCIAL

## 2020-07-06 ENCOUNTER — HOSPITAL ENCOUNTER (INPATIENT)
Facility: CLINIC | Age: 60
LOS: 2 days | Discharge: HOME | DRG: 470 | End: 2020-07-08
Attending: ORTHOPAEDIC SURGERY | Admitting: ORTHOPAEDIC SURGERY
Payer: COMMERCIAL

## 2020-07-06 ENCOUNTER — ANESTHESIA (OUTPATIENT)
Dept: SURGERY | Facility: CLINIC | Age: 60
DRG: 470 | End: 2020-07-06
Payer: COMMERCIAL

## 2020-07-06 ENCOUNTER — APPOINTMENT (OUTPATIENT)
Dept: PHYSICAL THERAPY | Facility: CLINIC | Age: 60
DRG: 470 | End: 2020-07-06
Attending: ORTHOPAEDIC SURGERY
Payer: COMMERCIAL

## 2020-07-06 ENCOUNTER — APPOINTMENT (OUTPATIENT)
Dept: GENERAL RADIOLOGY | Facility: CLINIC | Age: 60
DRG: 470 | End: 2020-07-06
Attending: STUDENT IN AN ORGANIZED HEALTH CARE EDUCATION/TRAINING PROGRAM
Payer: COMMERCIAL

## 2020-07-06 DIAGNOSIS — M25.551 RIGHT HIP PAIN: ICD-10-CM

## 2020-07-06 DIAGNOSIS — Z98.890 STATUS POST HIP SURGERY: Primary | ICD-10-CM

## 2020-07-06 LAB
ABO + RH BLD: NORMAL
ABO + RH BLD: NORMAL
ALBUMIN UR-MCNC: NEGATIVE MG/DL
ANION GAP SERPL CALCULATED.3IONS-SCNC: 4 MMOL/L (ref 3–14)
APPEARANCE UR: CLEAR
BACTERIA #/AREA URNS HPF: ABNORMAL /HPF
BILIRUB UR QL STRIP: NEGATIVE
BLD GP AB SCN SERPL QL: NORMAL
BLOOD BANK CMNT PATIENT-IMP: NORMAL
BUN SERPL-MCNC: 19 MG/DL (ref 7–30)
CALCIUM SERPL-MCNC: 8.7 MG/DL (ref 8.5–10.1)
CHLORIDE SERPL-SCNC: 109 MMOL/L (ref 94–109)
CO2 SERPL-SCNC: 26 MMOL/L (ref 20–32)
COLOR UR AUTO: YELLOW
CREAT SERPL-MCNC: 0.82 MG/DL (ref 0.52–1.04)
GFR SERPL CREATININE-BSD FRML MDRD: 78 ML/MIN/{1.73_M2}
GLUCOSE BLDC GLUCOMTR-MCNC: 93 MG/DL (ref 70–99)
GLUCOSE SERPL-MCNC: 150 MG/DL (ref 70–99)
GLUCOSE UR STRIP-MCNC: NEGATIVE MG/DL
HGB UR QL STRIP: NEGATIVE
KETONES UR STRIP-MCNC: NEGATIVE MG/DL
LEUKOCYTE ESTERASE UR QL STRIP: ABNORMAL
NITRATE UR QL: NEGATIVE
PH UR STRIP: 6.5 PH (ref 5–7)
POTASSIUM SERPL-SCNC: 4.3 MMOL/L (ref 3.4–5.3)
RBC #/AREA URNS AUTO: 1 /HPF (ref 0–2)
SODIUM SERPL-SCNC: 139 MMOL/L (ref 133–144)
SOURCE: ABNORMAL
SP GR UR STRIP: 1.01 (ref 1–1.03)
SPECIMEN EXP DATE BLD: NORMAL
SQUAMOUS #/AREA URNS AUTO: 1 /HPF (ref 0–1)
UROBILINOGEN UR STRIP-MCNC: 2 MG/DL (ref 0–2)
WBC #/AREA URNS AUTO: 16 /HPF (ref 0–5)

## 2020-07-06 PROCEDURE — 25000125 ZZHC RX 250: Performed by: ORTHOPAEDIC SURGERY

## 2020-07-06 PROCEDURE — 25000128 H RX IP 250 OP 636: Performed by: STUDENT IN AN ORGANIZED HEALTH CARE EDUCATION/TRAINING PROGRAM

## 2020-07-06 PROCEDURE — 25000128 H RX IP 250 OP 636: Performed by: INTERNAL MEDICINE

## 2020-07-06 PROCEDURE — 71000015 ZZH RECOVERY PHASE 1 LEVEL 2 EA ADDTL HR: Performed by: ORTHOPAEDIC SURGERY

## 2020-07-06 PROCEDURE — 25000125 ZZHC RX 250: Performed by: NURSE ANESTHETIST, CERTIFIED REGISTERED

## 2020-07-06 PROCEDURE — 37000009 ZZH ANESTHESIA TECHNICAL FEE, EACH ADDTL 15 MIN: Performed by: ORTHOPAEDIC SURGERY

## 2020-07-06 PROCEDURE — 25800030 ZZH RX IP 258 OP 636: Performed by: ANESTHESIOLOGY

## 2020-07-06 PROCEDURE — 25800030 ZZH RX IP 258 OP 636: Performed by: STUDENT IN AN ORGANIZED HEALTH CARE EDUCATION/TRAINING PROGRAM

## 2020-07-06 PROCEDURE — 25800025 ZZH RX 258: Performed by: ORTHOPAEDIC SURGERY

## 2020-07-06 PROCEDURE — 25000128 H RX IP 250 OP 636: Performed by: NURSE ANESTHETIST, CERTIFIED REGISTERED

## 2020-07-06 PROCEDURE — 25000132 ZZH RX MED GY IP 250 OP 250 PS 637: Performed by: PHYSICIAN ASSISTANT

## 2020-07-06 PROCEDURE — 99231 SBSQ HOSP IP/OBS SF/LOW 25: CPT | Performed by: INTERNAL MEDICINE

## 2020-07-06 PROCEDURE — 0SR901A REPLACEMENT OF RIGHT HIP JOINT WITH METAL SYNTHETIC SUBSTITUTE, UNCEMENTED, OPEN APPROACH: ICD-10-PCS | Performed by: ORTHOPAEDIC SURGERY

## 2020-07-06 PROCEDURE — 36000064 ZZH SURGERY LEVEL 4 EA 15 ADDTL MIN - UMMC: Performed by: ORTHOPAEDIC SURGERY

## 2020-07-06 PROCEDURE — 37000008 ZZH ANESTHESIA TECHNICAL FEE, 1ST 30 MIN: Performed by: ORTHOPAEDIC SURGERY

## 2020-07-06 PROCEDURE — 25000132 ZZH RX MED GY IP 250 OP 250 PS 637: Performed by: STUDENT IN AN ORGANIZED HEALTH CARE EDUCATION/TRAINING PROGRAM

## 2020-07-06 PROCEDURE — 40000986 XR PELVIS AD HIP PORTABLE RIGHT 1 VIEW

## 2020-07-06 PROCEDURE — C1776 JOINT DEVICE (IMPLANTABLE): HCPCS | Performed by: ORTHOPAEDIC SURGERY

## 2020-07-06 PROCEDURE — 99207 ZZC CONSULT E&M CHANGED TO SUBSEQUENT LEVEL: CPT | Performed by: INTERNAL MEDICINE

## 2020-07-06 PROCEDURE — 97162 PT EVAL MOD COMPLEX 30 MIN: CPT | Mod: GP

## 2020-07-06 PROCEDURE — 25000128 H RX IP 250 OP 636: Performed by: PHYSICIAN ASSISTANT

## 2020-07-06 PROCEDURE — 00000146 ZZHCL STATISTIC GLUCOSE BY METER IP

## 2020-07-06 PROCEDURE — 40000170 ZZH STATISTIC PRE-PROCEDURE ASSESSMENT II: Performed by: ORTHOPAEDIC SURGERY

## 2020-07-06 PROCEDURE — 97110 THERAPEUTIC EXERCISES: CPT | Mod: GP

## 2020-07-06 PROCEDURE — 80048 BASIC METABOLIC PNL TOTAL CA: CPT | Performed by: INTERNAL MEDICINE

## 2020-07-06 PROCEDURE — 36000062 ZZH SURGERY LEVEL 4 1ST 30 MIN - UMMC: Performed by: ORTHOPAEDIC SURGERY

## 2020-07-06 PROCEDURE — 25800030 ZZH RX IP 258 OP 636: Performed by: PHYSICIAN ASSISTANT

## 2020-07-06 PROCEDURE — 27210794 ZZH OR GENERAL SUPPLY STERILE: Performed by: ORTHOPAEDIC SURGERY

## 2020-07-06 PROCEDURE — 97530 THERAPEUTIC ACTIVITIES: CPT | Mod: GP

## 2020-07-06 PROCEDURE — 71000014 ZZH RECOVERY PHASE 1 LEVEL 2 FIRST HR: Performed by: ORTHOPAEDIC SURGERY

## 2020-07-06 PROCEDURE — 12000001 ZZH R&B MED SURG/OB UMMC

## 2020-07-06 PROCEDURE — 36415 COLL VENOUS BLD VENIPUNCTURE: CPT | Performed by: INTERNAL MEDICINE

## 2020-07-06 PROCEDURE — C1713 ANCHOR/SCREW BN/BN,TIS/BN: HCPCS | Performed by: ORTHOPAEDIC SURGERY

## 2020-07-06 PROCEDURE — 81001 URINALYSIS AUTO W/SCOPE: CPT | Performed by: ORTHOPAEDIC SURGERY

## 2020-07-06 PROCEDURE — 25000566 ZZH SEVOFLURANE, EA 15 MIN: Performed by: ORTHOPAEDIC SURGERY

## 2020-07-06 DEVICE — IMP LINER S&N ACET R3 XLPE 36X48MM 0DEG 71339548: Type: IMPLANTABLE DEVICE | Site: HIP | Status: FUNCTIONAL

## 2020-07-06 DEVICE — IMP SCR ACET SNN SPHERICAL HEAD 6.5X40MM 71332540: Type: IMPLANTABLE DEVICE | Site: HIP | Status: FUNCTIONAL

## 2020-07-06 DEVICE — IMP HEAD FEMORAL SNR COBALT 32MM +4 71303204: Type: IMPLANTABLE DEVICE | Site: HIP | Status: FUNCTIONAL

## 2020-07-06 DEVICE — IMPLANTABLE DEVICE: Type: IMPLANTABLE DEVICE | Site: HIP | Status: FUNCTIONAL

## 2020-07-06 DEVICE — IMP SHELL SNR ACET R3 3H 48MM 71335548: Type: IMPLANTABLE DEVICE | Site: HIP | Status: FUNCTIONAL

## 2020-07-06 DEVICE — IMP SCR ACET SNN SPHERICAL HEAD 6.5X25MM 71332525: Type: IMPLANTABLE DEVICE | Site: HIP | Status: FUNCTIONAL

## 2020-07-06 RX ORDER — AMLODIPINE BESYLATE 5 MG/1
5 TABLET ORAL DAILY
Status: DISCONTINUED | OUTPATIENT
Start: 2020-07-07 | End: 2020-07-06

## 2020-07-06 RX ORDER — ATENOLOL 50 MG/1
50 TABLET ORAL DAILY
Status: DISCONTINUED | OUTPATIENT
Start: 2020-07-07 | End: 2020-07-08 | Stop reason: HOSPADM

## 2020-07-06 RX ORDER — NALOXONE HYDROCHLORIDE 0.4 MG/ML
.1-.4 INJECTION, SOLUTION INTRAMUSCULAR; INTRAVENOUS; SUBCUTANEOUS
Status: DISCONTINUED | OUTPATIENT
Start: 2020-07-06 | End: 2020-07-06 | Stop reason: HOSPADM

## 2020-07-06 RX ORDER — FENTANYL CITRATE 50 UG/ML
25-50 INJECTION, SOLUTION INTRAMUSCULAR; INTRAVENOUS
Status: DISCONTINUED | OUTPATIENT
Start: 2020-07-06 | End: 2020-07-06 | Stop reason: HOSPADM

## 2020-07-06 RX ORDER — AMOXICILLIN 250 MG
2 CAPSULE ORAL 2 TIMES DAILY
Qty: 30 TABLET | Refills: 0 | Status: SHIPPED | OUTPATIENT
Start: 2020-07-06 | End: 2020-08-18

## 2020-07-06 RX ORDER — ONDANSETRON 2 MG/ML
4 INJECTION INTRAMUSCULAR; INTRAVENOUS EVERY 30 MIN PRN
Status: DISCONTINUED | OUTPATIENT
Start: 2020-07-06 | End: 2020-07-06 | Stop reason: HOSPADM

## 2020-07-06 RX ORDER — DEXAMETHASONE SODIUM PHOSPHATE 4 MG/ML
INJECTION, SOLUTION INTRA-ARTICULAR; INTRALESIONAL; INTRAMUSCULAR; INTRAVENOUS; SOFT TISSUE PRN
Status: DISCONTINUED | OUTPATIENT
Start: 2020-07-06 | End: 2020-07-06

## 2020-07-06 RX ORDER — PROCHLORPERAZINE MALEATE 10 MG
10 TABLET ORAL EVERY 6 HOURS PRN
Status: DISCONTINUED | OUTPATIENT
Start: 2020-07-06 | End: 2020-07-08 | Stop reason: HOSPADM

## 2020-07-06 RX ORDER — ACETAMINOPHEN 325 MG/1
650 TABLET ORAL EVERY 4 HOURS PRN
Status: DISCONTINUED | OUTPATIENT
Start: 2020-07-09 | End: 2020-07-08 | Stop reason: HOSPADM

## 2020-07-06 RX ORDER — MEPERIDINE HYDROCHLORIDE 25 MG/ML
12.5 INJECTION INTRAMUSCULAR; INTRAVENOUS; SUBCUTANEOUS
Status: DISCONTINUED | OUTPATIENT
Start: 2020-07-06 | End: 2020-07-06 | Stop reason: HOSPADM

## 2020-07-06 RX ORDER — LIDOCAINE 40 MG/G
CREAM TOPICAL
Status: DISCONTINUED | OUTPATIENT
Start: 2020-07-06 | End: 2020-07-06 | Stop reason: HOSPADM

## 2020-07-06 RX ORDER — BISACODYL 10 MG
10 SUPPOSITORY, RECTAL RECTAL DAILY PRN
Status: DISCONTINUED | OUTPATIENT
Start: 2020-07-06 | End: 2020-07-08 | Stop reason: HOSPADM

## 2020-07-06 RX ORDER — ACETAMINOPHEN 325 MG/1
975 TABLET ORAL ONCE
Status: COMPLETED | OUTPATIENT
Start: 2020-07-06 | End: 2020-07-06

## 2020-07-06 RX ORDER — POLYETHYLENE GLYCOL 3350 17 G/17G
17 POWDER, FOR SOLUTION ORAL DAILY
Qty: 7 PACKET | Refills: 0 | Status: SHIPPED | OUTPATIENT
Start: 2020-07-06 | End: 2020-08-18

## 2020-07-06 RX ORDER — ONDANSETRON 2 MG/ML
4 INJECTION INTRAMUSCULAR; INTRAVENOUS EVERY 6 HOURS PRN
Status: DISCONTINUED | OUTPATIENT
Start: 2020-07-06 | End: 2020-07-08 | Stop reason: HOSPADM

## 2020-07-06 RX ORDER — OXYCODONE HYDROCHLORIDE 5 MG/1
5-10 TABLET ORAL
Status: DISCONTINUED | OUTPATIENT
Start: 2020-07-06 | End: 2020-07-07

## 2020-07-06 RX ORDER — ONDANSETRON 4 MG/1
4 TABLET, ORALLY DISINTEGRATING ORAL EVERY 30 MIN PRN
Status: DISCONTINUED | OUTPATIENT
Start: 2020-07-06 | End: 2020-07-06 | Stop reason: HOSPADM

## 2020-07-06 RX ORDER — POLYETHYLENE GLYCOL 3350 17 G/17G
17 POWDER, FOR SOLUTION ORAL DAILY
Status: DISCONTINUED | OUTPATIENT
Start: 2020-07-06 | End: 2020-07-08 | Stop reason: HOSPADM

## 2020-07-06 RX ORDER — HYDROMORPHONE HCL/0.9% NACL/PF 0.2MG/0.2
.2-.4 SYRINGE (ML) INTRAVENOUS
Status: DISCONTINUED | OUTPATIENT
Start: 2020-07-06 | End: 2020-07-08 | Stop reason: HOSPADM

## 2020-07-06 RX ORDER — SODIUM CHLORIDE, SODIUM LACTATE, POTASSIUM CHLORIDE, CALCIUM CHLORIDE 600; 310; 30; 20 MG/100ML; MG/100ML; MG/100ML; MG/100ML
INJECTION, SOLUTION INTRAVENOUS CONTINUOUS
Status: DISCONTINUED | OUTPATIENT
Start: 2020-07-06 | End: 2020-07-06 | Stop reason: HOSPADM

## 2020-07-06 RX ORDER — ASPIRIN 81 MG/1
162 TABLET ORAL DAILY
Status: DISCONTINUED | OUTPATIENT
Start: 2020-07-07 | End: 2020-07-08 | Stop reason: HOSPADM

## 2020-07-06 RX ORDER — TRANEXAMIC ACID 650 MG/1
1950 TABLET ORAL ONCE
Status: COMPLETED | OUTPATIENT
Start: 2020-07-06 | End: 2020-07-06

## 2020-07-06 RX ORDER — ACETAMINOPHEN 325 MG/1
650 TABLET ORAL EVERY 4 HOURS PRN
Qty: 1 BOTTLE | Refills: 0 | Status: SHIPPED | OUTPATIENT
Start: 2020-07-09 | End: 2024-04-15

## 2020-07-06 RX ORDER — NALOXONE HYDROCHLORIDE 0.4 MG/ML
.1-.4 INJECTION, SOLUTION INTRAMUSCULAR; INTRAVENOUS; SUBCUTANEOUS
Status: DISCONTINUED | OUTPATIENT
Start: 2020-07-06 | End: 2020-07-08 | Stop reason: HOSPADM

## 2020-07-06 RX ORDER — MAGNESIUM HYDROXIDE 1200 MG/15ML
LIQUID ORAL PRN
Status: DISCONTINUED | OUTPATIENT
Start: 2020-07-06 | End: 2020-07-06 | Stop reason: HOSPADM

## 2020-07-06 RX ORDER — ONDANSETRON 2 MG/ML
INJECTION INTRAMUSCULAR; INTRAVENOUS PRN
Status: DISCONTINUED | OUTPATIENT
Start: 2020-07-06 | End: 2020-07-06

## 2020-07-06 RX ORDER — FENTANYL CITRATE 50 UG/ML
INJECTION, SOLUTION INTRAMUSCULAR; INTRAVENOUS PRN
Status: DISCONTINUED | OUTPATIENT
Start: 2020-07-06 | End: 2020-07-06

## 2020-07-06 RX ORDER — OXYCODONE HYDROCHLORIDE 5 MG/1
5-10 TABLET ORAL
Qty: 30 TABLET | Refills: 0 | Status: SHIPPED | OUTPATIENT
Start: 2020-07-06 | End: 2020-07-08

## 2020-07-06 RX ORDER — PROPOFOL 10 MG/ML
INJECTION, EMULSION INTRAVENOUS PRN
Status: DISCONTINUED | OUTPATIENT
Start: 2020-07-06 | End: 2020-07-06

## 2020-07-06 RX ORDER — AMOXICILLIN 250 MG
2 CAPSULE ORAL 2 TIMES DAILY
Status: DISCONTINUED | OUTPATIENT
Start: 2020-07-06 | End: 2020-07-08 | Stop reason: HOSPADM

## 2020-07-06 RX ORDER — EPHEDRINE SULFATE 50 MG/ML
INJECTION, SOLUTION INTRAMUSCULAR; INTRAVENOUS; SUBCUTANEOUS PRN
Status: DISCONTINUED | OUTPATIENT
Start: 2020-07-06 | End: 2020-07-06

## 2020-07-06 RX ORDER — LIDOCAINE HYDROCHLORIDE 20 MG/ML
INJECTION, SOLUTION INFILTRATION; PERINEURAL PRN
Status: DISCONTINUED | OUTPATIENT
Start: 2020-07-06 | End: 2020-07-06

## 2020-07-06 RX ORDER — ACETAMINOPHEN 325 MG/1
975 TABLET ORAL EVERY 8 HOURS
Status: DISCONTINUED | OUTPATIENT
Start: 2020-07-06 | End: 2020-07-08 | Stop reason: HOSPADM

## 2020-07-06 RX ORDER — CEFAZOLIN SODIUM 2 G/100ML
2 INJECTION, SOLUTION INTRAVENOUS
Status: COMPLETED | OUTPATIENT
Start: 2020-07-06 | End: 2020-07-06

## 2020-07-06 RX ORDER — LIDOCAINE 40 MG/G
CREAM TOPICAL
Status: DISCONTINUED | OUTPATIENT
Start: 2020-07-06 | End: 2020-07-08 | Stop reason: HOSPADM

## 2020-07-06 RX ORDER — CEFAZOLIN SODIUM 2 G/100ML
2 INJECTION, SOLUTION INTRAVENOUS EVERY 8 HOURS
Status: COMPLETED | OUTPATIENT
Start: 2020-07-06 | End: 2020-07-07

## 2020-07-06 RX ORDER — KETOROLAC TROMETHAMINE 30 MG/ML
30 INJECTION, SOLUTION INTRAMUSCULAR; INTRAVENOUS EVERY 6 HOURS
Status: DISCONTINUED | OUTPATIENT
Start: 2020-07-06 | End: 2020-07-06

## 2020-07-06 RX ORDER — KETOROLAC TROMETHAMINE 30 MG/ML
15 INJECTION, SOLUTION INTRAMUSCULAR; INTRAVENOUS EVERY 6 HOURS
Status: COMPLETED | OUTPATIENT
Start: 2020-07-06 | End: 2020-07-07

## 2020-07-06 RX ORDER — HYDROMORPHONE HYDROCHLORIDE 1 MG/ML
.3-.5 INJECTION, SOLUTION INTRAMUSCULAR; INTRAVENOUS; SUBCUTANEOUS EVERY 10 MIN PRN
Status: DISCONTINUED | OUTPATIENT
Start: 2020-07-06 | End: 2020-07-06 | Stop reason: HOSPADM

## 2020-07-06 RX ORDER — CEFAZOLIN SODIUM 1 G/3ML
1 INJECTION, POWDER, FOR SOLUTION INTRAMUSCULAR; INTRAVENOUS SEE ADMIN INSTRUCTIONS
Status: DISCONTINUED | OUTPATIENT
Start: 2020-07-06 | End: 2020-07-06 | Stop reason: HOSPADM

## 2020-07-06 RX ORDER — ONDANSETRON 4 MG/1
4 TABLET, ORALLY DISINTEGRATING ORAL EVERY 6 HOURS PRN
Status: DISCONTINUED | OUTPATIENT
Start: 2020-07-06 | End: 2020-07-08 | Stop reason: HOSPADM

## 2020-07-06 RX ORDER — SODIUM CHLORIDE, SODIUM LACTATE, POTASSIUM CHLORIDE, CALCIUM CHLORIDE 600; 310; 30; 20 MG/100ML; MG/100ML; MG/100ML; MG/100ML
INJECTION, SOLUTION INTRAVENOUS CONTINUOUS
Status: DISCONTINUED | OUTPATIENT
Start: 2020-07-06 | End: 2020-07-08 | Stop reason: HOSPADM

## 2020-07-06 RX ADMIN — POLYETHYLENE GLYCOL 3350 17 G: 17 POWDER, FOR SOLUTION ORAL at 18:27

## 2020-07-06 RX ADMIN — ACETAMINOPHEN 975 MG: 325 TABLET, FILM COATED ORAL at 14:25

## 2020-07-06 RX ADMIN — ACETAMINOPHEN 975 MG: 325 TABLET, FILM COATED ORAL at 06:14

## 2020-07-06 RX ADMIN — LIDOCAINE HYDROCHLORIDE 60 MG: 20 INJECTION, SOLUTION INFILTRATION; PERINEURAL at 07:37

## 2020-07-06 RX ADMIN — CEFAZOLIN SODIUM 2 G: 2 INJECTION, SOLUTION INTRAVENOUS at 18:53

## 2020-07-06 RX ADMIN — Medication 1 G: at 09:45

## 2020-07-06 RX ADMIN — TRANEXAMIC ACID 1950 MG: 650 TABLET ORAL at 06:14

## 2020-07-06 RX ADMIN — OXYCODONE HYDROCHLORIDE 5 MG: 5 TABLET ORAL at 14:25

## 2020-07-06 RX ADMIN — Medication 5 MG: at 09:30

## 2020-07-06 RX ADMIN — SUGAMMADEX 200 MG: 100 INJECTION, SOLUTION INTRAVENOUS at 09:46

## 2020-07-06 RX ADMIN — FENTANYL CITRATE 50 MCG: 50 INJECTION, SOLUTION INTRAMUSCULAR; INTRAVENOUS at 07:36

## 2020-07-06 RX ADMIN — FENTANYL CITRATE 50 MCG: 50 INJECTION, SOLUTION INTRAMUSCULAR; INTRAVENOUS at 09:02

## 2020-07-06 RX ADMIN — FENTANYL CITRATE 50 MCG: 50 INJECTION, SOLUTION INTRAMUSCULAR; INTRAVENOUS at 08:05

## 2020-07-06 RX ADMIN — ROCURONIUM BROMIDE 50 MG: 10 INJECTION INTRAVENOUS at 07:37

## 2020-07-06 RX ADMIN — Medication 2 G: at 07:45

## 2020-07-06 RX ADMIN — OXYCODONE HYDROCHLORIDE 5 MG: 5 TABLET ORAL at 18:28

## 2020-07-06 RX ADMIN — KETOROLAC TROMETHAMINE 15 MG: 30 INJECTION, SOLUTION INTRAMUSCULAR at 18:25

## 2020-07-06 RX ADMIN — SODIUM CHLORIDE, POTASSIUM CHLORIDE, SODIUM LACTATE AND CALCIUM CHLORIDE: 600; 310; 30; 20 INJECTION, SOLUTION INTRAVENOUS at 07:25

## 2020-07-06 RX ADMIN — Medication 5 MG: at 08:46

## 2020-07-06 RX ADMIN — OXYCODONE HYDROCHLORIDE 5 MG: 5 TABLET ORAL at 21:55

## 2020-07-06 RX ADMIN — MIDAZOLAM 2 MG: 1 INJECTION INTRAMUSCULAR; INTRAVENOUS at 07:25

## 2020-07-06 RX ADMIN — OXYCODONE HYDROCHLORIDE 5 MG: 5 TABLET ORAL at 11:17

## 2020-07-06 RX ADMIN — HYDROMORPHONE HYDROCHLORIDE 0.5 MG: 1 INJECTION, SOLUTION INTRAMUSCULAR; INTRAVENOUS; SUBCUTANEOUS at 09:12

## 2020-07-06 RX ADMIN — ONDANSETRON 4 MG: 2 INJECTION INTRAMUSCULAR; INTRAVENOUS at 09:28

## 2020-07-06 RX ADMIN — SODIUM CHLORIDE, POTASSIUM CHLORIDE, SODIUM LACTATE AND CALCIUM CHLORIDE: 600; 310; 30; 20 INJECTION, SOLUTION INTRAVENOUS at 12:26

## 2020-07-06 RX ADMIN — DEXAMETHASONE SODIUM PHOSPHATE 4 MG: 4 INJECTION, SOLUTION INTRAMUSCULAR; INTRAVENOUS at 07:50

## 2020-07-06 RX ADMIN — FENTANYL CITRATE 50 MCG: 50 INJECTION, SOLUTION INTRAMUSCULAR; INTRAVENOUS at 09:33

## 2020-07-06 RX ADMIN — ROCURONIUM BROMIDE 20 MG: 10 INJECTION INTRAVENOUS at 08:54

## 2020-07-06 RX ADMIN — PROPOFOL 170 MG: 10 INJECTION, EMULSION INTRAVENOUS at 07:37

## 2020-07-06 RX ADMIN — SODIUM CHLORIDE, POTASSIUM CHLORIDE, SODIUM LACTATE AND CALCIUM CHLORIDE: 600; 310; 30; 20 INJECTION, SOLUTION INTRAVENOUS at 09:43

## 2020-07-06 RX ADMIN — HYDROMORPHONE HYDROCHLORIDE 0.5 MG: 1 INJECTION, SOLUTION INTRAMUSCULAR; INTRAVENOUS; SUBCUTANEOUS at 09:40

## 2020-07-06 RX ADMIN — SENNOSIDES AND DOCUSATE SODIUM 2 TABLET: 8.6; 5 TABLET ORAL at 21:54

## 2020-07-06 RX ADMIN — ACETAMINOPHEN 975 MG: 325 TABLET, FILM COATED ORAL at 21:54

## 2020-07-06 ASSESSMENT — ACTIVITIES OF DAILY LIVING (ADL)
RETIRED_EATING: 0-->INDEPENDENT
COGNITION: 0 - NO COGNITION ISSUES REPORTED
SWALLOWING: 0-->SWALLOWS FOODS/LIQUIDS WITHOUT DIFFICULTY
BATHING: 0-->INDEPENDENT
TRANSFERRING: 0-->INDEPENDENT
WHICH_OF_THE_ABOVE_FUNCTIONAL_RISKS_HAD_A_RECENT_ONSET_OR_CHANGE?: AMBULATION
TOILETING: 0-->INDEPENDENT
RETIRED_COMMUNICATION: 0-->UNDERSTANDS/COMMUNICATES WITHOUT DIFFICULTY
FALL_HISTORY_WITHIN_LAST_SIX_MONTHS: NO
AMBULATION: 1-->ASSISTIVE EQUIPMENT
DRESS: 0-->INDEPENDENT

## 2020-07-06 ASSESSMENT — MIFFLIN-ST. JEOR: SCORE: 1361.25

## 2020-07-06 NOTE — BRIEF OP NOTE
Orthopaedic Surgery Brief Op-Note      Patient: Angela Ibarra  : 1960  Date of Service: 2020 10:24 AM    Pre-operative Diagnosis: Right hip pain [M25.551]  Post-operative Diagnosis: same    Procedure(s) Performed: Procedure(s):  ARTHROPLASTY, HIP, TOTAL RIGHT    Staff: Dr. Deras  Assistants:   Balwinder Walters MD    Anesthesia: General  EBL: 50 cc    Implants:   Implant Name Type Inv. Item Serial No.  Lot No. LRB No. Used Action   IMP SHELL SNR ACET R3 3H 48MM [id number] Total Joint Component/Insert IMP SHELL SNR ACET R3 3H 48MM [id number]  [place]  [id number] Right 1 Implanted   IMP LINER S&amp;N ACET R3 XLPE [id number] [id number] Total Joint Component/Insert IMP LINER S&amp;N ACET R3 XLPE [id number] [id number]  [place]  [id number] Right 1 Implanted   IMP SCR ACET SNN SPHERICAL HEAD 6.5X40MM [id number] Metallic Hardware/[place] IMP SCR ACET SNN SPHERICAL HEAD 6.5X40MM [id number]  [employer]  [id number] Right 1 Implanted   IMP SCR ACET SNN SPHERICAL HEAD 6.5X25MM [id number] Metallic Hardware/[place] IMP SCR ACET SNN SPHERICAL HEAD 6.5X25MM [id number]  [place]  [id number] Right 1 Implanted   IMP STEM S&amp;N POLARSTEM STD TI/HA SZ6 NON ROBERT [id number] Total Joint Component/Insert IMP STEM S&amp;N POLARSTEM STD TI/HA SZ6 NON ROBERT [id number]  [place]  [id number] Right 1 Implanted   IMP HEAD FEMORAL SNR [place] 32MM +4 [id number] Total Joint Component/Insert IMP HEAD FEMORAL SNR [place] 32MM +4 [id number]  [place]  [id number] Right 1 Implanted     Drains: none  Intra-op Labs/Cxs/Specimens: None  Complications: No apparent complications during procedure  Findings: Please see dictated operative note for details    Disposition: Stable to PACU, then admit to Orthopaedics    POST OPERATIVE PLAN    Assessment/Plan: Angela Ibarra is a 59 year old female s/p Procedure(s):  ARTHROPLASTY, HIP, TOTAL RIGHT on 2020 with Dr. Deras.    Activity: Up with assist and assistive devices as needed until independent. Posterior hip precautions to  operative side x 3 months:  1) No hip flexion beyond 90 degrees  2) No adduction beyond midline  3) No internal rotation beyond neutral   Weight bearing status: WBAT  Antibiotics: Cefazolin x 24 hours  Diet: Begin with clear fluids and progress diet as tolerated. Bowel regimen. Anti-emetics PRN.    DVT prophylaxis: Mechanical while in hospital with ASA 162mg daily x4 weeks  Elevation: Elevate heels off of bed on pillows   Wound Care: Aquacel x 7 days.    Drains: none  Pain management: Orals PRN, IV for breakthrough only  X-rays: AP Pelvis and Lateral operative hip in PACU.   Physical Therapy: Mobilization, ROM, ADL's, Posterior hip precautions.    Occupational Therapy: ADL's  Labs: Trend Hgb on POD #1, 2  Consults: PT, OT. Hospitalist, appreciate assistance in caring for this patient throughout the perioperative period    Future Appointments   Date Time Provider Department Center   7/7/2020  6:30 AM UR OT OVERFLOW [facility]   7/7/2020  9:45 AM Bonnie Morgan Pt, PT [facility]   7/7/2020  2:00 PM Bonnie Morgan Pt, PT [facility]   7/21/2020  9:30 AM MG NURSE ONLY ORTHO [facility] [facility]   8/18/2020  9:45 AM Clinton Deras MD [facility]       Disposition: Pending progress with therapies, pain control on orals, and medical stability, anticipate discharge to Home vs TCU on POD #1-2.    Balwinder Walters MD  Orthopedic Surgery PGY-4  Pager: [phone]

## 2020-07-06 NOTE — PLAN OF CARE
Assumed care at 1230  VS: VSS   O2: >90% on RA   Output: Bladder scan 199 in PACU; attempted bedpan after near syncopal episode; unsuccessful. Scan 419. Pt will try again after eating and symptoms resolved.   Last BM: 7/3. Pt states her norm is BM QOD   Activity: WBAT; 1A with FWW and gait belt. Worked with PT this afternoon   Up for meals? Yes   Skin: Incision R hip   Pain: 5mg oxycodone    CMS: Tingling in R foot. PP, brisk cap refil   Dressing: Aquacel CDI   Diet: Regular   LDA: PIV infusing   Equipment: PCDs, abduction pillow, IV pole, capnography, FWW, gait belt   Plan: TBD   Additional Info: Pt received general anesthesia. EBL 25  When up with PT, pt's BP dropped to 97/63, pt became dizzy/LH, with near syncopal episode. Pt laid flat, cool cloth applied; symptoms resolving. MD notified; no new orders at this time.

## 2020-07-06 NOTE — PROGRESS NOTES
07/06/20 1348   Quick Adds   Type of Visit Initial PT Evaluation       Present no   Living Environment   Lives With spouse   Living Arrangements house   Home Accessibility stairs to enter home   Number of Stairs, Main Entrance 2   Stair Railings, Main Entrance railing on right side (ascending)   Transportation Anticipated family or friend will provide   Living Environment Comment PT: Two stairs to enter then steps to basement but does not have to do.    Self-Care   Usual Activity Tolerance moderate   Current Activity Tolerance moderate   Regular Exercise No   Equipment Currently Used at Home cane, straight;crutches   Activity/Exercise/Self-Care Comment PT: has been using cane lately 2/2 pain, antalgic gait and unable to walk dog.    Functional Level Prior   Ambulation 1-->assistive equipment   Transferring 0-->independent   Toileting 0-->independent   Bathing 0-->independent   Communication 0-->understands/communicates without difficulty   Swallowing 0-->swallows foods/liquids without difficulty   Cognition 0 - no cognition issues reported   Fall history within last six months no   Which of the above functional risks had a recent onset or change? ambulation   Prior Functional Level Comment PT: IND at baseline   General Information   Onset of Illness/Injury or Date of Surgery - Date 07/06/20   Referring Physician Balwinder Walters MD   Patient/Family Goals Statement PT: R DENIZ    Pertinent History of Current Problem (include personal factors and/or comorbidities that impact the POC) to heal well and to be able to walk again   Precautions/Limitations fall precautions;right hip precautions   Weight-Bearing Status - RLE weight-bearing as tolerated   Heart Disease Risk Factors Lack of physical activity;Age;Overweight;Stress   General Observations PT: Pt in supine, agreeable to PT eval   General Info Comments PT: Activity orders: Up w/ Assist   Cognitive Status Examination   Orientation orientation  to person, place and time   Level of Consciousness alert   Follows Commands and Answers Questions 100% of the time   Personal Safety and Judgment intact   Memory intact   Pain Assessment   Patient Currently in Pain Yes, see Vital Sign flowsheet  (R hip pain)   Integumentary/Edema   Integumentary/Edema no deficits were identifed   Integumentary/Edema Comments intact incision site   Posture    Posture Forward head position;Protracted shoulders   Range of Motion (ROM)   ROM Comment WFL LLE, within precautions RLE   Strength   Strength Comments reduced in surgical LE 2/2 post-op   Bed Mobility   Bed Mobility Comments Julian at LLE only   Transfer Skills   Transfer Comments CGA to FWW   Gait   Gait Comments NT, symptomatic   Balance   Balance Comments fair in standing, decreased 2/2 dizziness symptoms   Sensory Examination   Sensory Perception Comments intact sensation BLEs   Coordination   Coordination Comments WFL   Muscle Tone   Muscle Tone Comments WFL   Modality Interventions   Planned Modality Interventions Cryotherapy   Planned Modality Interventions Comments post-op pain and swelling   General Therapy Interventions   Planned Therapy Interventions balance training;bed mobility training;gait training;strengthening;transfer training;risk factor education;home program guidelines;progressive activity/exercise   Clinical Impression   Criteria for Skilled Therapeutic Intervention yes, treatment indicated   PT Diagnosis impaired functional mobility   Influenced by the following impairments precautions, pain , medical status, post-op decreased strength, ROM, balance   Functional limitations due to impairments impaired bed mobility, transfersm, gait   Clinical Presentation Evolving/Changing   Clinical Presentation Rationale current status, clinical judgement   Clinical Decision Making (Complexity) Moderate complexity   Therapy Frequency 2x/day   Predicted Duration of Therapy Intervention (days/wks) 2 days   Anticipated  Equipment Needs at Discharge walker   Anticipated Discharge Disposition Home with Outpatient Therapy   Risk & Benefits of therapy have been explained Yes   Patient, Family & other staff in agreement with plan of care Yes   Clinical Impression Comments see careplan note   Total Evaluation Time   Total Evaluation Time (Minutes) 5

## 2020-07-06 NOTE — PLAN OF CARE
Discharge Planner PT   Patient plan for discharge: home  Current status: PT eval completed, treatment initiated. Pt educated on precautions needs supervision w/ bed mobility to adhere. DENIZ HEP initiated and pt tolerates well. Ghanshyam for supine>sit, CGA for sit<>stand to FWW. Pt becoming lightheaded w/ stand, needs maxA of 2 for sit>supine. See vitals flowsheet.    Barriers to return to prior living situation: medical, mobility  Recommendations for discharge: home w/ assist and OP PT  Rationale for recommendations: Anticipate pt to progress to home w/ assist over LOS.        Entered by: Bonnie Lo 07/06/2020 2:33 PM

## 2020-07-06 NOTE — OP NOTE
OPERATIVE REPORT    DATE OF SERVICE: 7/6/20    SURGEON: Clinton Deras MD.    ASSISTANT(S):  Balwinder Walters MD    PREOPERATIVE DIAGNOSIS:  Osteoarthritis    POSTOPERATIVE DIAGNOSIS:  Osteoarthritis     OPERATION PERFORMED:  Right total hip arthroplasty    IMPLANTS:    Implant Name Type Inv. Item Serial No.  Lot No. LRB No. Used Action   IMP SHELL SNR ACET R3 3H 48MM [id number] Total Joint Component/Insert IMP SHELL SNR ACET R3 3H 48MM [id number]  [place]  [id number] Right 1 Implanted   IMP LINER S&amp;N ACET R3 XLPE [id number] [id number] Total Joint Component/Insert IMP LINER S&amp;N ACET R3 XLPE [id number] [id number]  [place]  [id number] Right 1 Implanted   IMP SCR ACET SNN SPHERICAL HEAD 6.5X40MM [id number] Metallic Hardware/[place] IMP SCR ACET SNN SPHERICAL HEAD 6.5X40MM [id number]  [place]  [id number] Right 1 Implanted   IMP SCR ACET SNN SPHERICAL HEAD 6.5X25MM [id number] Metallic Hardware/[place] IMP SCR ACET SNN SPHERICAL HEAD 6.5X25MM [id number]  [place]  [id number] Right 1 Implanted   IMP STEM S&amp;N POLARSTEM STD TI/HA SZ6 NON ROBERT [id number] Total Joint Component/Insert IMP STEM S&amp;N POLARSTEM STD TI/HA SZ6 NON ROBERT [id number]  [place]  [id number] Right 1 Implanted   IMP HEAD FEMORAL SNR [place] 32MM +4 [id number] Total Joint Component/Insert IMP HEAD FEMORAL SNR [place] 32MM +4 [id number]  [place]  [id number] Right 1 Implanted         ANESTHETIC: General     OPERATIVE FINDINGS:  End stage arthrosis of the hip    BLOOD LOSS: about 325 ml     COMPLICATIONS:  None apparent    OPERATIVE INDICATIONS:  The patient has a long history of debilitating pain secondary to ostearthritis of the hip.  Despite comprehensive non-operative management these symptoms continued to interfere with activities of daily living.  After discussion of further treatment options including the risks and benefits that patient elected to proceed with a total hip.    DESCRIPTION OF THE PROCEDURE:  The patient was identified in the preoperative holding area.  The  consent form including the risks and benefits were reviewed with the patient.  The operative limb was identified and marked.  The patient was brought back to the operating room and placed supine on the operating table.  An anesthetic was induced by the anesthesia team.   The patient was placed in the lateral decubitus position and prepped and draped in the normal standard fashion for a hip replacement.  A time-out was called.  Antibiotics were given.  We utilized an approximately 15 cm curvilinear incision, centered on the vastus ridge, and performed a standard posterior approach to the hip.  The tensor fascia was split.  A small portion of gluteus uma was split in line with its fibers.  The sciatic nerve was palpated.  The east-west retractor was placed.  The posterior border of gluteus medius was exposed and retracted.  The tendon of piriformis and that of the obturators was released from their attachments.  A trapdoor posterior capsulotomy was performed.  The hip was dislocated.  The lesser trochanter was exposed.  A ruler was used to measure and electrocautery was used to aj our neck cut as preoperatively templated.  The head was measured with a caliper and found to be 44.  This measurement was used to choose our first reamer.  The neck cut was re-measured. The femur was elevated.  A Hohmann was placed over the anterior rim of the acetabulum and the femur was subluxed anterior.  A split was made in the inferior capsule.  The transverse acetabular ligament was left intact and used a guide for the anterversion of the acetabular component.  Circumferential retractors were placed.  We began reaming and went up by two until sufficient contact was made with the acetabular rim.  We then went up by one millimeter for a one millimeter press-fit.  We were within one size of our preoperative plan.   A trail was placed.  It had an excellent press fit.  We then placed out final component in 40 degrees of inclination  "and approximately 20 degrees of anteversion, parallel to the transverse ligament.  The press fit was excellent.  Screws were placed for additional initial fixation.  A flat liner was then placed. It locked into place.  Attention was turned to the femur.  Retractors were placed to elevated the proximal femur and to protect the tendon of gluteus medius.  Remaining lateral neck was removed and the piriformis fossa was cleared of soft-tissue.  A box osteotome and canal finder were used to prepare for broaching.  A sharp broach was used to lateralize slightly.  We then broached up sequentially to a size 6.  It was rotationally stable and sat up 1-2 millimeters from the neck-cut.  Preoperatively the patient had templated to a standard offset stem.  The standard offset stem appropriately tensioned the abductors.  We trialed the following fmoeral heads: 0 and +4. The +4 most appropriately tensioned the abductors and clinically equalized the leg-lengths.  The stability exam was excellent.  The hip was stable and there was no impingement posteriorly with hyper-extension and maximal external rotation.  With full extension, the knee could be fixed to bring the foot nearly to the buttock.  With the hip in ninety degrees of flexion and neutral rotation there was greater than 60 degrees of internal rotation before subluxation.  There appropriate movement with a \"ozzy\" test.  Happy with our stability exam, the final implant was placed in approximately twenty degrees of anteversion.  It sat within 1 mm of the broach.  We then trailed with a +4 head.  The stability exam was identical.  We then placed the final head on a clean, dry neck and impacted it into place. The hip was reduced after directly visualizing the entire acetabulum.  The wound was then irrigated.  The posterior capsule and short external rotators were sutured to the greater trochanter with non-absorbable suture through bone tunnels.The fascia was closed with " interrupted Vicryl, the dermis with interrupted Vicryl, and skin with running monocryl, Dermabond and steri-strips.  At the end of the procedure the sponge and needle counts were correct times two.  The patient tolerated the procedure well and returned to the PAR extubated and stable.    POSTOPERATIVE PLAN:  1. Weight bearing as tolerated  2. Standard posterior hip precautions  3. DVT prophylaxis   4. 24 hours of prophylactic antibiotics  5. Follow-up:  Wound clinic in 2 weeks and with [facility] in clinic in 6 weeks for x-rays and a rehabilitation check.

## 2020-07-06 NOTE — PHARMACY-ADMISSION MEDICATION HISTORY
Admission medication history interview status for the 7/6/2020 admission is complete. See Epic admission navigator for allergy information, pharmacy, prior to admission medications and immunization status.     Medication history interview sources:  Patient interview, [facility] fill history    Changes made to PTA medication list (reason)  Added: None  Deleted: Duplicate estradiol cream order  Changed: None    Additional medication history information (including reliability of information, actions taken by pharmacist):  -Of note, patient reports she typically uses her specially compounded estradiol cream on Mondays and Fridays. However, she reports she has not used it in a few weeks because she was unsure whether it would affect her surgery.    Prior to Admission medications    Medication Sig Last Dose Taking? Auth Provider   acetaminophen (TYLENOL) 325 MG tablet Take 2 tablets (650 mg) by mouth every 4 hours as needed for other  Yes Alana Hayes APRN CNS   amLODIPine (NORVASC) 5 MG tablet Take 1 tablet (5 mg) by mouth daily 7/6/2020 at 0405 Yes Germán Jernigan MD   aspirin (ASA) 81 MG EC tablet Take 2 tablets (162 mg) by mouth daily  Yes Alana Hayes APRN CNS   atenolol (TENORMIN) 50 MG tablet Take 1 tablet (50 mg) by mouth daily 7/6/2020 at 0405 Yes Germán Jernigan MD   Cholecalciferol (VITAMIN D) 125 MCG (5000 UT) CAPS Take 1 capsule (5,000 Units) by mouth daily 7/5/2020 at 1900 Yes Melisa Mercado PA-C   COMPOUNDED NON-CONTROLLED SUBSTANCE (CMPD RX) - PHARMACY TO MIX COMPOUNDED MEDICATION Estriol 0.1% cream (1mg/gram) in HRT base. Place 1 gram vaginally daily for 2 weeks, then vaginally twice weekly. Past Month Yes Gustabo Soares MD   ibuprofen (ADVIL/MOTRIN) 200 MG tablet Take 400 mg by mouth every 6 hours as needed for moderate pain  Past Month at PRN Yes Reported, Patient   oxyCODONE (ROXICODONE) 5 MG tablet Take 1-2 tablets (5-10 mg) by mouth every 3 hours as  needed  Yes Alana Hayes APRN CNS   polyethylene glycol (MIRALAX) 17 g packet Take 17 g by mouth daily  Yes Alana Hayes APRN CNS   senna-docusate (SENOKOT-S/PERICOLACE) 8.6-50 MG tablet Take 2 tablets by mouth 2 times daily  Yes Alana Hayes APRN CNS   sulfamethoxazole-trimethoprim (BACTRIM/SEPTRA) 400-80 MG tablet Take 1 tablet by mouth daily as needed (take just with intercourse) Past Month at PRN Yes Gustabo Soares MD   traMADol (ULTRAM) 50 MG tablet TAKE ONE TABLET BY MOUTH TWICE A DAY AS NEEDED FOR SEVERE PAIN Past Month at PRN Yes Germán Jernigan MD   traZODone (DESYREL) 50 MG tablet TAKE TWO TABLETS BY MOUTH NIGHTLY AS NEEDED FOR SLEEP 7/5/2020 at 2115 Yes Germán Jernigan MD     Medication history completed by:   Ivana Luque, PharmD, [facility]

## 2020-07-06 NOTE — ANESTHESIA POSTPROCEDURE EVALUATION
Anesthesia POST Procedure Evaluation    Patient: Angela Ibarra   MRN:     [id number] Gender:   female   Age:    59 year old :      1960        Preoperative Diagnosis: Right hip pain [M25.551]   Procedure(s):  ARTHROPLASTY, HIP, TOTAL RIGHT   Postop Comments: No value filed.     Anesthesia Type: General       Disposition: Admission   Postop Pain Control: Uneventful            Sign Out: Well controlled pain   PONV: No   Neuro/Psych: Uneventful            Sign Out: Acceptable/Baseline neuro status   Airway/Respiratory: Uneventful            Sign Out: Acceptable/Baseline resp. status   CV/Hemodynamics: Uneventful            Sign Out: Acceptable CV status   Other NRE: NONE   DID A NON-ROUTINE EVENT OCCUR? No    Event details/Postop Comments:  Patient doing well post-operatively.  No significant issues.  Hemodynamically stable, pain well controlled, nausea well controlled.  Stable for discharge from the PACU           Last Anesthesia Record Vitals:  CRNA VITALS  2020 0935 - 2020 1035      2020             Resp Rate (observed):  (!) 1          Last PACU Vitals:  Vitals Value Taken Time   /80 2020 11:15 AM   Temp 36.3  C (97.3  F) 2020 10:45 AM   Pulse 67 2020 11:15 AM   Resp 13 2020 11:27 AM   SpO2 93 % 2020 11:27 AM   Temp src     NIBP     Pulse     SpO2     Resp     Temp     Ht Rate     Temp 2     Vitals shown include unvalidated device data.      Electronically Signed By: Hany Dacosta MD, 2020, 11:28 AM

## 2020-07-06 NOTE — ANESTHESIA CARE TRANSFER NOTE
Patient: Angela Ibarra    Procedure(s):  ARTHROPLASTY, HIP, TOTAL RIGHT    Diagnosis: Right hip pain [M25.551]  Diagnosis Additional Information: No value filed.    Anesthesia Type:   General     Note:  Airway :Face Mask  Patient transferred to:PACU  Handoff Report: Identifed the Patient, Identified the Reponsible Provider, Reviewed the pertinent medical history, Discussed the surgical course, Reviewed Intra-OP anesthesia mangement and issues during anesthesia, Set expectations for post-procedure period and Allowed opportunity for questions and acknowledgement of understanding      Vitals: (Last set prior to Anesthesia Care Transfer)    CRNA VITALS  7/6/2020 0935 - 7/6/2020 1012      7/6/2020             Resp Rate (observed):  (!) 1                Electronically Signed By: Charles Patrick Schlatter, APRN CRNA  July 6, 2020  10:12 AM

## 2020-07-06 NOTE — ANESTHESIA PREPROCEDURE EVALUATION
"Anesthesia Pre-Procedure Evaluation    Patient: Angela Ibarra   MRN:     [id number] Gender:   female   Age:    59 year old :      1960        Preoperative Diagnosis: Right hip pain [M25.551]   Procedure(s):  ARTHROPLASTY, HIP, TOTAL RIGHT     LABS:  CBC:   Lab Results   Component Value Date    WBC 8.2 2020    WBC 4.7 2020    HGB 13.5 2020    HGB 13.5 2020    HCT 41.7 2020    HCT 42.4 2020     2020     2020     BMP:   Lab Results   Component Value Date     2020     2020    POTASSIUM 4.1 2020    POTASSIUM 4.0 2020    CHLORIDE 110 (H) 2020    CHLORIDE 110 (H) 2020    CO2 23 2020    CO2 30 2020    BUN 17 2020    BUN 12 2020    CR 1.09 (H) 2020    CR 0.94 2020     (H) 2020    GLC 96 2020     COAGS:   Lab Results   Component Value Date    PTT 26 2011    INR 0.91 2011     POC: No results found for: BGM, HCG, HCGS  OTHER:   Lab Results   Component Value Date    A1C 5.4 2020    BRITANY 9.3 2020    ALBUMIN 3.6 2020    PROTTOTAL 7.2 2020    ALT 21 2020    AST 18 2020    ALKPHOS 82 2020    BILITOTAL 0.2 2020    LIPASE 55 2012    AMYLASE 55 2012    TSH 1.80 10/25/2013    CRP 2.2 2007    SED 16 2007        Preop Vitals    BP Readings from Last 3 Encounters:   20 120/70   20 126/73   20 122/78    Pulse Readings from Last 3 Encounters:   20 72   20 68   20 69      Resp Readings from Last 3 Encounters:   19 16   18 14   10/15/18 18    SpO2 Readings from Last 3 Encounters:   20 96%   20 95%   20 97%      Temp Readings from Last 1 Encounters:   20 36.9  C (98.5  F) (Tympanic)    Ht Readings from Last 1 Encounters:   20 1.575 m (5' 2\")      Wt Readings from Last 1 Encounters:   20 78.9 kg (174 lb)    " "Estimated body mass index is 31.83 kg/m  as calculated from the following:    Height as of 3/6/20: 1.575 m (5' 2\").    Weight as of 20: 78.9 kg (174 lb).     LDA:  Urethral Catheter Straight-tip 16 fr (Active)   Number of days: 3042        Past Medical History:   Diagnosis Date     Calculus of kidney      Migraine, unspecified, without mention of intractable migraine without mention of status migrainosus      Other specified iron deficiency anemias      Papanicolaou smear of cervix with low grade squamous intraepithelial lesion (LGSIL)     Colpo and hyst pathology benign     Primary osteoarthritis of right hip      Synovitis of ankle      Unspecified essential hypertension     Essential hypertension      Past Surgical History:   Procedure Laterality Date     ARTHROSCOPY ANKLE  2014    Procedure: ARTHROSCOPY ANKLE;  Surgeon: Shaun Bhatt DPM;  Location: [facility]     ARTHROSCOPY KNEE Left 10/4/2018    Procedure: ARTHROSCOPY KNEE;  Left knee arthroscopy  medial meniscal and chondral debridement;  Surgeon: Aryan Holley MD;  Location: [facility] OR     C  DELIVERY ONLY      , Low Cervical     C GASTRIC BYPASS,OBESITY,W/SM BOWEL RECONS  10/99     C LIGATE FALLOPIAN TUBE  2000    laparoscopy     COLONOSCOPY WITH CO2 INSUFFLATION N/A 2017    Procedure: COLONOSCOPY WITH CO2 INSUFFLATION;  COLON SCREEN/ YURI;  Surgeon: Cody Arana MD;  Location: [facility] OR     HYSTERECTOMY, PAP NO LONGER INDICATED  2008     LAPAROSCOPIC CHOLECYSTECTOMY  8/3/2012    Procedure: LAPAROSCOPIC CHOLECYSTECTOMY;  Laparoscopic Cholecystectomy ;  Surgeon: Corwin Cabezas MD;  Location: [facility]     OPEN REDUCTION INTERNAL FIXATION ANKLE  2014    Procedure: OPEN REDUCTION INTERNAL FIXATION ANKLE;  Surgeon: Shaun Bhatt DPM;  Location: [facility]     OPEN REDUCTION INTERNAL FIXATION ELBOW  10/15/2013    Procedure: OPEN REDUCTION INTERNAL FIXATION ELBOW;  OPEN REDUCTION INTERNAL " FIXATION LEFT PROXIMAL ULNA;  Surgeon: Artem Last DO;  Location: [facility]     ORTHOPEDIC SURGERY      left shoulder surgery     REMOVE MESH VAGINA  10/10/2011    Procedure:REMOVE MESH VAGINA; Excision of Vaginal Mesh; Surgeon:SERGE SALGADO; Location:[facility] OR     SURGICAL HISTORY OF -   4/20/10    Transobturator midurethral sling     SURGICAL HISTORY OF -   8/1999    exploratory laparotomy, colitis     SURGICAL HISTORY OF -   4/20/10    Transobturator midurethral sling     TUBAL LIGATION        Allergies   Allergen Reactions     Bactrim [Sulfamethoxazole W/Trimethoprim]      itching     Nitrofurantoin Itching     Patient states she is not longer allergic to this medication. Has used it since with no problems         Anesthesia Evaluation     . Pt has had prior anesthetic. Type: Regional and General    No history of anesthetic complications          ROS/MED HX    ENT/Pulmonary:  - neg pulmonary ROS     Neurologic:     (+)migraines,     Cardiovascular:     (+) Dyslipidemia, hypertension-range: < 140 / 90, ---. : . . . :. . Previous cardiac testing date:results:date: results:ECG reviewed date: results:Sinus Rhythm   -Anterior infarct -age undetermined.    - T-abnormality -Possible  Anterior ischemia.     ABNORMAL date: results:          METS/Exercise Tolerance:  >4 METS   Hematologic:         Musculoskeletal:         GI/Hepatic:         Renal/Genitourinary: Comment: Stress Incontinence    (+) chronic renal disease, type: CRI, Nephrolithiasis , Pt does not require dialysis, Pt has no history of transplant,       Endo: Comment: BMI = 33.6    (+) Obesity, .      Psychiatric:     (+) psychiatric history depression      Infectious Disease:         Malignancy:         Other:                         PHYSICAL EXAM:   Mental Status/Neuro: A/A/O   Airway: Facies: Feasible  Mallampati: III  Mouth/Opening: Full  TM distance: > 6 cm  Neck ROM: Full   Respiratory: Auscultation: CTAB     Resp. Rate: Normal     Resp.  Effort: Normal      CV: Rhythm: Regular  Rate: Age appropriate  Heart: Normal Sounds  Edema: None   Comments:      Dental: Normal Dentition                Assessment:   ASA SCORE: 3    H&P: History and physical reviewed and following examination; no interval change.    NPO Status: NPO Appropriate     Plan:   Anes. Type:  General   Pre-Medication: None   Induction:  IV (Standard)   Airway: ETT; Oral   Access/Monitoring: PIV   Maintenance: Balanced     Postop Plan:   Postop Pain: Opioids  Postop Sedation/Airway: Not planned  Disposition: Inpatient/Admit     PONV Management:   Adult Risk Factors: Female, Postop Opioids   Prevention: Ondansetron, Dexamethasone     CONSENT: Direct conversation   Plan and risks discussed with: Patient   Blood Products: Consented (ALL Blood Products)                   Hany Dacosta MD

## 2020-07-06 NOTE — CONSULTS
Consult Date:  07/06/2020      ASSESSMENT:   1.  Status post right total hip arthroplasty.  The patient is doing well postoperatively.  Pain is well controlled.   2.  History of hypertension, on atenolol and amlodipine.  Atenolol has also taken for migraine prevention.  Blood pressures have been stable.   3.  History of migraine headaches, controlled with atenolol.   4.  Unknown coronary artery status.  The patient has never had a formal coronary artery evaluation.   5.  No history of recurrent UTIs with most recent symptomatic UTI requiring treatment approximately 6 months ago.  Urinalysis prior to surgery revealed a few bacteria and small leukocyte esterase.  The patient was not having any  symptoms prior to surgery.   6.  No history of deep venous thrombosis or pulmonary embolism.      RECOMMENDATIONS:  Routine postoperative labs ordered; in particular, basic metabolic panel will be obtained in view of a serum creatinine of 1.09 on 06/30.  Ketorolac has been ordered and may be continued if her renal function is stable.  Amlodipine will be held.  Atenolol will be continued with hold parameters in view of its use for both hypertension and migraine prevention.  DVT prophylaxis will be with aspirin per Dr. Deras's protocol.      Thank you for having me see this patient.      DISCUSSION:  Angela Ibarra is a 59-year-old female who underwent elective right total hip arthroplasty today by Dr. Deras for the treatment of osteoarthritis of the right hip.  I have been asked to see her by Dr. Deras to assess hypertension and migraine headaches.      Please see Dr. Deras's notes in the charting for details regarding the patient's orthopedic history and the indications for surgery.      The events of surgery are noted.  The patient received general anesthesia.  Estimated blood loss was 50 mL.  Blood pressures were stable throughout the case with systolics generally in the 110s.  Heart rates have been in the 60s-70s.       The patient was seen by me shortly after admission to the Orthopedic unit.  She notes excellent pain control and overall feels well.  Denies cough, chest pain, shortness of breath, nausea, vomiting.      PAST MEDICAL HISTORY:   1.  Osteoarthritis of the right hip with progressive right hip pain which has limited her activity to be severely over the last few months.   2.  Hypertension.   3.  History of migraine headaches, well controlled with atenolol.   4.  History of recurrent UTIs with last symptomatic UTI requiring treatment late last year.  This is felt to be associated with atrophic vaginitis.   5.  History of depression.   6.  No known coronary artery disease.  The patient has never had formal coronary artery evaluation.  She knows no history of DVT or PE.      PAST SURGICAL HISTORY:  Include hysterectomy, also multiple orthopedic procedures as well as cholecystectomy.      ALLERGIES:  ALLERGIES ARE SAID TO BACTRIM, WHICH CAUSES ITCHING.      PREOPERATIVE MEDICATIONS:     1.  Tylenol on a p.r.n. basis.   2.  Amlodipine 5 mg daily.   3.  Atenolol 50 mg daily.   4.  Vitamin D 5000 units daily.   5.  Estradiol cream twice weekly.   6.  Ibuprofen on a p.r.n. basis.     7.  Bactrim on a p.r.n. basis.   8.  Tramadol 50 mg twice daily p.r.n. severe pain.   9.  Trazodone 100 mg at bedtime as needed for insomnia.      SOCIAL HISTORY:  The patient is .  She denies cigarette or alcohol use.      FAMILY HISTORY:  Reviewed by me and is noncontributory.      REVIEW OF SYSTEMS:  Remarkable for limitation of activity secondary to right hip pain.  She denies limitation of activity secondary to shortness of breath, chest pain, dizziness, syncope, near-syncope.  She denies dysuria, fevers, chills, abdominal pain.  I would add that she does typically get symptoms of abdominal pain and dysuria with UTIs.      PHYSICAL EXAMINATION:   GENERAL:  She is a very pleasant, well-appearing female who is lying comfortably in bed  postoperatively.   VITAL SIGNS:  Stable.   HEENT:  Pharynx benign.   NECK:  Supple.  There is no thyromegaly.   LUNGS:  Clear.   CARDIAC:  Regular rate and rhythm without murmurs.   ABDOMEN:  Soft, nontender, nondistended.   EXTREMITIES:  There was no calf edema.   SKIN:  No rash.      LABORATORY DATA:  Preoperative labs included a creatinine of 1.09, potassium 4.1 on .  CBC was normal.  Most recent EKG from  was normal.         OSCAR OTT MD             D: 2020   T: 2020   MT: CECELIA      Name:     KATHIE HERNANDEZ   MRN:      [id number]        Account:       [id number]   :      1960           Consult Date:  2020      Document: [id number]

## 2020-07-07 ENCOUNTER — APPOINTMENT (OUTPATIENT)
Dept: PHYSICAL THERAPY | Facility: CLINIC | Age: 60
DRG: 470 | End: 2020-07-07
Attending: STUDENT IN AN ORGANIZED HEALTH CARE EDUCATION/TRAINING PROGRAM
Payer: COMMERCIAL

## 2020-07-07 ENCOUNTER — APPOINTMENT (OUTPATIENT)
Dept: PHYSICAL THERAPY | Facility: CLINIC | Age: 60
DRG: 470 | End: 2020-07-07
Attending: ORTHOPAEDIC SURGERY
Payer: COMMERCIAL

## 2020-07-07 ENCOUNTER — APPOINTMENT (OUTPATIENT)
Dept: OCCUPATIONAL THERAPY | Facility: CLINIC | Age: 60
DRG: 470 | End: 2020-07-07
Attending: STUDENT IN AN ORGANIZED HEALTH CARE EDUCATION/TRAINING PROGRAM
Payer: COMMERCIAL

## 2020-07-07 LAB
ANION GAP SERPL CALCULATED.3IONS-SCNC: 5 MMOL/L (ref 3–14)
BUN SERPL-MCNC: 18 MG/DL (ref 7–30)
CALCIUM SERPL-MCNC: 8.4 MG/DL (ref 8.5–10.1)
CHLORIDE SERPL-SCNC: 107 MMOL/L (ref 94–109)
CO2 SERPL-SCNC: 28 MMOL/L (ref 20–32)
CREAT SERPL-MCNC: 0.93 MG/DL (ref 0.52–1.04)
GFR SERPL CREATININE-BSD FRML MDRD: 67 ML/MIN/{1.73_M2}
GLUCOSE SERPL-MCNC: 93 MG/DL (ref 70–99)
HGB BLD-MCNC: 10.6 G/DL (ref 11.7–15.7)
POTASSIUM SERPL-SCNC: 4.3 MMOL/L (ref 3.4–5.3)
SODIUM SERPL-SCNC: 140 MMOL/L (ref 133–144)

## 2020-07-07 PROCEDURE — 25000132 ZZH RX MED GY IP 250 OP 250 PS 637: Performed by: STUDENT IN AN ORGANIZED HEALTH CARE EDUCATION/TRAINING PROGRAM

## 2020-07-07 PROCEDURE — 97530 THERAPEUTIC ACTIVITIES: CPT | Mod: GP | Performed by: PHYSICAL THERAPIST

## 2020-07-07 PROCEDURE — 80048 BASIC METABOLIC PNL TOTAL CA: CPT | Performed by: STUDENT IN AN ORGANIZED HEALTH CARE EDUCATION/TRAINING PROGRAM

## 2020-07-07 PROCEDURE — 25800030 ZZH RX IP 258 OP 636: Performed by: STUDENT IN AN ORGANIZED HEALTH CARE EDUCATION/TRAINING PROGRAM

## 2020-07-07 PROCEDURE — 85018 HEMOGLOBIN: CPT | Performed by: STUDENT IN AN ORGANIZED HEALTH CARE EDUCATION/TRAINING PROGRAM

## 2020-07-07 PROCEDURE — 97110 THERAPEUTIC EXERCISES: CPT | Mod: GP | Performed by: PHYSICAL THERAPIST

## 2020-07-07 PROCEDURE — 97535 SELF CARE MNGMENT TRAINING: CPT | Mod: GO

## 2020-07-07 PROCEDURE — 25000128 H RX IP 250 OP 636: Performed by: INTERNAL MEDICINE

## 2020-07-07 PROCEDURE — 12000001 ZZH R&B MED SURG/OB UMMC

## 2020-07-07 PROCEDURE — 36415 COLL VENOUS BLD VENIPUNCTURE: CPT | Performed by: STUDENT IN AN ORGANIZED HEALTH CARE EDUCATION/TRAINING PROGRAM

## 2020-07-07 PROCEDURE — 25000132 ZZH RX MED GY IP 250 OP 250 PS 637: Performed by: CLINICAL NURSE SPECIALIST

## 2020-07-07 PROCEDURE — 99232 SBSQ HOSP IP/OBS MODERATE 35: CPT | Performed by: INTERNAL MEDICINE

## 2020-07-07 PROCEDURE — 25000128 H RX IP 250 OP 636: Performed by: STUDENT IN AN ORGANIZED HEALTH CARE EDUCATION/TRAINING PROGRAM

## 2020-07-07 PROCEDURE — 25000132 ZZH RX MED GY IP 250 OP 250 PS 637: Performed by: INTERNAL MEDICINE

## 2020-07-07 PROCEDURE — 97165 OT EVAL LOW COMPLEX 30 MIN: CPT | Mod: GO

## 2020-07-07 PROCEDURE — 97116 GAIT TRAINING THERAPY: CPT | Mod: GP | Performed by: PHYSICAL THERAPIST

## 2020-07-07 RX ORDER — HYDROMORPHONE HYDROCHLORIDE 2 MG/1
2-4 TABLET ORAL
Status: DISCONTINUED | OUTPATIENT
Start: 2020-07-07 | End: 2020-07-07

## 2020-07-07 RX ADMIN — KETOROLAC TROMETHAMINE 15 MG: 30 INJECTION, SOLUTION INTRAMUSCULAR at 07:02

## 2020-07-07 RX ADMIN — ASPIRIN 162 MG: 81 TABLET, COATED ORAL at 07:02

## 2020-07-07 RX ADMIN — ACETAMINOPHEN 975 MG: 325 TABLET, FILM COATED ORAL at 06:09

## 2020-07-07 RX ADMIN — Medication 125 MCG: at 07:02

## 2020-07-07 RX ADMIN — SENNOSIDES AND DOCUSATE SODIUM 2 TABLET: 8.6; 5 TABLET ORAL at 07:01

## 2020-07-07 RX ADMIN — Medication 2 MG: at 15:59

## 2020-07-07 RX ADMIN — ATENOLOL 50 MG: 50 TABLET ORAL at 07:02

## 2020-07-07 RX ADMIN — Medication 2 MG: at 20:14

## 2020-07-07 RX ADMIN — SODIUM CHLORIDE, POTASSIUM CHLORIDE, SODIUM LACTATE AND CALCIUM CHLORIDE: 600; 310; 30; 20 INJECTION, SOLUTION INTRAVENOUS at 01:59

## 2020-07-07 RX ADMIN — Medication 2 MG: at 12:27

## 2020-07-07 RX ADMIN — SENNOSIDES AND DOCUSATE SODIUM 2 TABLET: 8.6; 5 TABLET ORAL at 20:14

## 2020-07-07 RX ADMIN — OXYCODONE HYDROCHLORIDE 5 MG: 5 TABLET ORAL at 01:09

## 2020-07-07 RX ADMIN — CEFAZOLIN SODIUM 2 G: 2 INJECTION, SOLUTION INTRAVENOUS at 01:25

## 2020-07-07 RX ADMIN — Medication 2 MG: at 23:49

## 2020-07-07 RX ADMIN — HYDROMORPHONE HYDROCHLORIDE 2 MG: 2 TABLET ORAL at 08:49

## 2020-07-07 RX ADMIN — ACETAMINOPHEN 975 MG: 325 TABLET, FILM COATED ORAL at 21:28

## 2020-07-07 RX ADMIN — KETOROLAC TROMETHAMINE 15 MG: 30 INJECTION, SOLUTION INTRAMUSCULAR at 01:09

## 2020-07-07 ASSESSMENT — ACTIVITIES OF DAILY LIVING (ADL)
IADL_COMMENTS: SPOUSE CAN A
PREVIOUS_RESPONSIBILITIES: MEAL PREP;HOUSEKEEPING;LAUNDRY;SHOPPING;MEDICATION MANAGEMENT;FINANCES;DRIVING

## 2020-07-07 NOTE — PLAN OF CARE
Discharge Planner PT   Patient plan for discharge: Home with spouse for assist  Current status: Supine to/from sitting with HOB elevated and min A x 1.  Sit to/from standing with FWW and CGA x 1.  Pt ambulated 100' with FWW and CGA x 1.  Pt had one LOB when ambulating that required min A from PT in order to regain balance.  Pt tolerated DENIZ exercises well with minimal c/o pain.    Barriers to return to prior living situation: Level of assist, safety/fall risk, and stairs.   Recommendations for discharge: Home with spouse for assist and OP PT  Rationale for recommendations: Pt would benefit from further skilled PT for LE strengthening and gait training.        Entered by: Bonnie Morgan 07/07/2020 10:01 AM

## 2020-07-07 NOTE — PLAN OF CARE
VSS. A/Ox's 4. Pain rated as comfortably manageable. 5mg Oxycodone given Q3-4H for pain control. Pt has tinglying in right foot that is new since surgery. Tolerated regular diet. Bowel meds given and pt is passing flatus. Denied any nausea, CP, SOB, lightheadedness or dizziness. Voiding without pain or difficulty with assist x1 to bathroom. Pt ambulated to bathroom, PCDs are in place, pt is doing foot pumps in bed and using IS.  Resting in bed at this time with call light in reach and able to make needs known.

## 2020-07-07 NOTE — PROGRESS NOTES
07/07/20 1222   Quick Adds   Type of Visit Initial Occupational Therapy Evaluation   Living Environment   Lives With spouse   Living Arrangements house   Transportation Anticipated family or friend will provide   Living Environment Comment tub shower with shower chair.    Self-Care   Usual Activity Tolerance moderate   Current Activity Tolerance moderate   Regular Exercise No   Equipment Currently Used at Home cane, straight   Activity/Exercise/Self-Care Comment was using cane prior. has shower chair at home    Functional Level   Ambulation 1-->assistive equipment   Transferring 1-->assistive equipment   Toileting 0-->independent   Bathing 0-->independent   Dressing 0-->independent   Eating 0-->independent   Communication 0-->understands/communicates without difficulty   Swallowing 0-->swallows foods/liquids without difficulty   Cognition 0 - no cognition issues reported   Fall history within last six months no   Which of the above functional risks had a recent onset or change? none   Prior Functional Level Comment IND at baseline    General Information   Onset of Illness/Injury or Date of Surgery - Date 07/06/20   Referring Physician Braeden   Patient/Family Goals Statement return home   Additional Occupational Profile Info/Pertinent History of Current Problem R DENIZ    Precautions/Limitations fall precautions;right hip precautions   Weight-Bearing Status - RLE weight-bearing as tolerated   Cognitive Status Examination   Orientation orientation to person, place and time   Level of Consciousness alert   Follows Commands (Cognition) WNL   Memory intact   Attention No deficits were identified   Organization/Problem Solving No deficits were identified   Executive Function No deficits were identified   Visual Perception   Visual Perception Wears glasses   Sensory Examination   Sensory Quick Adds No deficits were identified   Pain Assessment   Patient Currently in Pain Yes, see Vital Sign flowsheet   Integumentary/Edema  "  Integumentary/Edema no deficits were identifed   Posture   Posture not impaired   Range of Motion (ROM)   ROM Quick Adds No deficits were identified   Strength   Manual Muscle Testing Quick Adds No deficits were identified   Muscle Tone Assessment   Muscle Tone Quick Adds No deficits were identified   Coordination   Upper Extremity Coordination No deficits were identified   Instrumental Activities of Daily Living (IADL)   Previous Responsibilities meal prep;housekeeping;laundry;shopping;medication management;finances;driving   IADL Comments spouse can A    Activities of Daily Living Analysis   Impairments Contributing to Impaired Activities of Daily Living pain;post surgical precautions   General Therapy Interventions   Planned Therapy Interventions ADL retraining;transfer training   Clinical Impression   Criteria for Skilled Therapeutic Interventions Met yes, treatment indicated   OT Diagnosis dec IND with ADl's and transfers   Influenced by the following impairments pain, post op precautions   Assessment of Occupational Performance 1-3 Performance Deficits   Identified Performance Deficits LE dressing, toilet transfer, tub transfer   Clinical Decision Making (Complexity) Low complexity   Therapy Frequency   (1 tx only)   Predicted Duration of Therapy Intervention (days/wks) 1 tx only   Anticipated Discharge Disposition Home with Assist   Risks and Benefits of Treatment have been explained. Yes   Patient, Family & other staff in agreement with plan of care Yes   [facility] TM \"6 Clicks\"   2016, Trustees of [facility], under license to [employer].  All rights reserved.   6 Clicks Short Forms Daily Activity Inpatient Short Form   [facility] AM-PAC  \"6 Clicks\" Daily Activity Inpatient Short Form   1. Putting on and taking off regular lower body clothing? 3 - A Little   2. Bathing (including washing, rinsing, drying)? 2 - A Lot   3. Toileting, which includes using toilet, bedpan or " urinal? 3 - A Little   4. Putting on and taking off regular upper body clothing? 4 - None   5. Taking care of personal grooming such as brushing teeth? 4 - None   6. Eating meals? 4 - None   Daily Activity Raw Score (Score out of 24.Lower scores equate to lower levels of function) 20   Total Evaluation Time   Total Evaluation Time (Minutes) 8

## 2020-07-07 NOTE — PLAN OF CARE
VS: Temp: 98.3  F (36.8  C) Temp src: Oral BP: 114/57 Pulse: 76 Heart Rate: 71 Resp: 16 SpO2: 94 %    O2: Stable on RA   Output: Voiding spontaneously and without difficulty   Last BM: 7/3/2020   Activity: Up Ax1/SBA with walker and gait belt    Skin: Intact other incision   Pain: Managed with scheduled Tylenol and toradol with 5 mg prn oxycodone   CMS: Pt states numbness and tingling in RLE, improving   Dressing: Aquacel CDI   Diet: Regular. Pt stated she had some nausea after dinner, but otherwise tolerated well   LDA: PIV R hand SL between abx infusions   Equipment: IV pump and pole, CAPNO, walker, personal belongings   Plan: Continue to monitor throughout shift and intervene when necessary. Discharge plan pending

## 2020-07-07 NOTE — PLAN OF CARE
"8620-2062  Patient A & O x4. VSS. Pt up with A1 WBAT with walker. Lung sounds . Patient denies chest pain, SOB, lightheadedness, dizziness, tingling, numbness, nausea, and vomiting. Bowel sounds active, last BM 7/3, passing flatus, and tolerating regular diet. Drinking well and voiding spontaneously without difficulties. PIV SL. Patient able to wiggle toes. CMS intact numbness in RLE resolved. Dressing clean, dry, and intact. Pain is tolerable and patient taking PO dilaudid for pain-oxy was making pt \"foggy\", ice pack applied.Plan is to discharge to home tomorrow per PT recommendation. Patient demonstrates ability to use call light appropriately. Will continue to monitor patient.     "

## 2020-07-07 NOTE — PROGRESS NOTES
[facility], [facility] Progress Note - Hospitalist Service       Date of Admission:  7/6/2020  Assessment & Plan   Patient Active Problem List   Diagnosis     NONSPECIFIC COLITIS     Migraine headache     Stress incontinence     Balance disorder     Right leg weakness     Right carpal tunnel syndrome     Osteoporosis     Loose body in ankle and foot joint     Synovitis of ankle     Malunion, fracture     Pain in joint involving ankle and foot     Abnormal gait     Other postprocedural status(V45.89)     Chondromalacia, left ankle and joints of left foot     CARDIOVASCULAR SCREENING; LDL GOAL LESS THAN 130     Advanced directives, counseling/discussion     Insomnia, unspecified type     Essential hypertension with goal blood pressure less than 140/90     Primary osteoarthritis of right hip     Cataract of both eyes, unspecified cataract type     Arthropathy of facet joint     Morbid obesity (H)     Bariatric surgery status     Malnutrition following gastrointestinal surgery     Right hip pain     Status post hip surgery       DJD S/P Right DENIZ  -  POD 1  -  Post-op mgnt per ortho service  -  Pain control  -  Rehabilitation therapy    Primary hypertension  -  BP elevated  -  Continue atenolol  -  Monitoring BP    Migraine headaches  -  Clinical monitoring       Diet: Advance Diet as Tolerated: Regular Diet Adult  Diet    DVT Prophylaxis: Low Risk/Ambulatory with no VTE prophylaxis indicated  Birch Catheter: in place, indication:    Code Status: Full Code           Disposition Plan   Expected discharge: Tomorrow, recommended to home once adequate pain management/ tolerating PO medications.  Entered: Justino Antunez MD 07/07/2020, 5:44 PM       The patient's care was discussed with the patient and care Team.    Justino Antunez MD  Hospitalist Service  [facility],  [place]    ______________________________________________________________________    Interval History   Did not report chest pain, palpitations or shortness of breath.      Data reviewed today: I reviewed all medications, new labs and imaging results over the last 24 hours. I personally reviewed no images or EKG's today.    Physical Exam   Vital Signs: Temp: 97.2  F (36.2  C) Temp src: Oral BP: 133/58 Pulse: 70 Heart Rate: 73 Resp: 16 SpO2: 97 % O2 Device: None (Room air) Oxygen Delivery: 1.5 LPM  Weight: 183 lbs 10.29 oz  General Appearance: Awake, alert, interactive. NAD.  Respiratory: CTA efraín  Cardiovascular: Controlled HR  GI: Abd nondistended  Skin: Normally turgid      Data   Recent Labs   Lab 07/07/20  0604 07/06/20  1509   HGB 10.6*  --     139   POTASSIUM 4.3 4.3   CHLORIDE 107 109   CO2 28 26   BUN 18 19   CR 0.93 0.82   ANIONGAP 5 4   BRITANY 8.4* 8.7   GLC 93 150*

## 2020-07-07 NOTE — PLAN OF CARE
Discharge Planner OT   Patient plan for discharge: home with spouse A tomorrow     Current status: eval completed. Pt able to don/doff pants and socks with use of AE and SBA. Pt ambulated bed <> bathroom with CGA-SBA and use of FWW. SBA for toilet transfer. OT educated on tub transfer technique with use of extended tub bench. Pt demos tub transfer onto tub bench with CGA and use of FWW. Reviewed transfer technique and educated on having spouse present to A. No further OT needs. discharge    Barriers to return to prior living situation: none   Recommendations for discharge: home with spouse A for cooking, cleaning, laundry, driving and supervision with bathing.     Rationale for recommendations: recommend pt purchase reacher, sock aide, and extended tub bench for home safety, handouts provided. Pt receptive to all education and recommendations provided. Pt able to recall all hip precautions and demonstrate understanding during session. OT goals adequate for discharge.     Occupational Therapy Discharge Summary    Reason for therapy discharge:    All goals and outcomes met, no further needs identified.    Progress towards therapy goal(s). See goals on Care Plan in [facility] electronic health record for goal details.  Adequate for discharge     Therapy recommendation(s):    No further therapy is recommended.           Entered by: Amrita Miller 07/07/2020 1:16 PM

## 2020-07-08 ENCOUNTER — APPOINTMENT (OUTPATIENT)
Dept: PHYSICAL THERAPY | Facility: CLINIC | Age: 60
DRG: 470 | End: 2020-07-08
Attending: ORTHOPAEDIC SURGERY
Payer: COMMERCIAL

## 2020-07-08 ENCOUNTER — PATIENT OUTREACH (OUTPATIENT)
Dept: CARE COORDINATION | Facility: CLINIC | Age: 60
End: 2020-07-08

## 2020-07-08 VITALS
DIASTOLIC BLOOD PRESSURE: 55 MMHG | BODY MASS INDEX: 33.79 KG/M2 | TEMPERATURE: 99.8 F | OXYGEN SATURATION: 97 % | SYSTOLIC BLOOD PRESSURE: 116 MMHG | HEIGHT: 62 IN | WEIGHT: 183.64 LBS | RESPIRATION RATE: 18 BRPM | HEART RATE: 85 BPM

## 2020-07-08 LAB — HGB BLD-MCNC: 10.2 G/DL (ref 11.7–15.7)

## 2020-07-08 PROCEDURE — 97116 GAIT TRAINING THERAPY: CPT | Mod: GP | Performed by: PHYSICAL THERAPIST

## 2020-07-08 PROCEDURE — 25000132 ZZH RX MED GY IP 250 OP 250 PS 637: Performed by: CLINICAL NURSE SPECIALIST

## 2020-07-08 PROCEDURE — 97530 THERAPEUTIC ACTIVITIES: CPT | Mod: GP | Performed by: PHYSICAL THERAPIST

## 2020-07-08 PROCEDURE — 25000132 ZZH RX MED GY IP 250 OP 250 PS 637: Performed by: INTERNAL MEDICINE

## 2020-07-08 PROCEDURE — 85018 HEMOGLOBIN: CPT | Performed by: STUDENT IN AN ORGANIZED HEALTH CARE EDUCATION/TRAINING PROGRAM

## 2020-07-08 PROCEDURE — 99232 SBSQ HOSP IP/OBS MODERATE 35: CPT | Performed by: INTERNAL MEDICINE

## 2020-07-08 PROCEDURE — 36415 COLL VENOUS BLD VENIPUNCTURE: CPT | Performed by: STUDENT IN AN ORGANIZED HEALTH CARE EDUCATION/TRAINING PROGRAM

## 2020-07-08 PROCEDURE — 25000132 ZZH RX MED GY IP 250 OP 250 PS 637: Performed by: STUDENT IN AN ORGANIZED HEALTH CARE EDUCATION/TRAINING PROGRAM

## 2020-07-08 RX ORDER — HYDROMORPHONE HYDROCHLORIDE 2 MG/1
1-2 TABLET ORAL
Qty: 30 TABLET | Refills: 0 | Status: SHIPPED | OUTPATIENT
Start: 2020-07-08 | End: 2020-08-18

## 2020-07-08 RX ADMIN — ACETAMINOPHEN 975 MG: 325 TABLET, FILM COATED ORAL at 05:31

## 2020-07-08 RX ADMIN — Medication 125 MCG: at 09:01

## 2020-07-08 RX ADMIN — Medication 2 MG: at 05:31

## 2020-07-08 RX ADMIN — SENNOSIDES AND DOCUSATE SODIUM 2 TABLET: 8.6; 5 TABLET ORAL at 09:02

## 2020-07-08 RX ADMIN — ATENOLOL 50 MG: 50 TABLET ORAL at 09:01

## 2020-07-08 RX ADMIN — ASPIRIN 162 MG: 81 TABLET, COATED ORAL at 09:01

## 2020-07-08 RX ADMIN — Medication 2 MG: at 09:01

## 2020-07-08 NOTE — PLAN OF CARE
VS: Temp: 98.5  F (36.9  C) Temp src: Oral BP: 139/60 Pulse: 70 Heart Rate: 78 Resp: 16 SpO2: 94 % O2 Device: None (Room air)       O2: Stable on RA   Output: Voiding spontaneously and without difficulty   Last BM: 7/3/2020   Activity: Ax1 with walker and gait belt    Skin: Intact other than incision   Pain: Managed with 2mg po dilaudid and scheduled tylenol   CMS: Pt stated intermittent numbness and tingling in R foot, but it is continuing to improve   Dressing: Aquacel CDI   Diet: Regular    LDA: PIV R hand SL   Equipment: Walker, gait belt, IV pump and pole   Plan: Continue to monitor throughout shift and intervene when necessary. Pt to potentially discharge today (7/8/2020)   Additional Info: Pt currently has prescription for oxycodone to discharge home with. Oxycodone does not work well for pt's pain control. Please change to po dilaudid prior to pt's discharge

## 2020-07-08 NOTE — PLAN OF CARE
Discharge Planner PT   Patient plan for discharge: Home with spouse for assist   Current status: Pt demonstrated supine to sitting at EOB with mod I.  Sit to/from standing from varying surface heights with FWW and mod I.  Pt ambulated 125' x 2 with FWW and SBA to mod I.  Pt safely ascended/descended 3 steps with 2 railings, SBA and then 3 steps with 1 railing and HHA.  Pt educated on car transfer and verbalized understanding of car transfer.   Barriers to return to prior living situation: No barriers to discharge home.   Recommendations for discharge: Home with spouse for assist and OP PT  Rationale for recommendations: Pt would benefit from further skilled PT for LE strengthening and gait training.        Entered by: Bonnie Morgan 07/08/2020 1:05 PM       Physical Therapy Discharge Summary    Reason for therapy discharge:    Discharged to home with outpatient therapy.    Progress towards therapy goal(s). See goals on Care Plan in [facility] electronic health record for goal details.  Goals met    Therapy recommendation(s):    Continued therapy is recommended.  Rationale/Recommendations:  See above.

## 2020-07-08 NOTE — DISCHARGE SUMMARY
ORTHOPAEDIC SURGERY DISCHARGE SUMMARY     Date of Admission: 7/6/2020  Date of Discharge: 7/8/2020 12:12 PM  Disposition: Home  Staff Physician: Dr. 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  Despite comprehensive non-operative management these symptoms continued to interfere with activities of daily living.  After discussion of further treatment options including the risks and benefits that patient elected to proceed with a total hip.      HOSPITAL COURSE:    The patient was admitted following the above listed procedures for pain control and rehabilitation. Angela Ibarra did well post-operatively. Medicine was consulted post operatively to aid in management of medical co-morbidities. The patient received routine nursing cares and at the time of discharge was medically stable. Vital signs were stable throughout admission. The patient is tolerating a regular diet and is voiding spontaneously. All PT/OT goals have been met for safe mobility. Pain is now controlled on oral medications which will be available on discharge. Stool softeners have been used while taking pain medications to help prevent constipation. Angela Ibarra is deemed medically safe to discharge.     Antibiotics:  Ancef given periop and 24 hours postop.  DVT prophylaxis:  ASA 162mg daily initiated after surgery and will be continued for 4 weeks.   PT Progress:  Has met PT/OT goals for safe mobility.   Pain Meds:  Weaned off all IV pain meds by discharge.  Inpatient Events: No significant events or complications.    PHYSICAL EXAM:    General: NAD, alert and oriented, cooperative with exam.   Cardio: RRR, extremities wwp.   Respiratory: Non-labored breathing.  MSK: Focused examination of RLE: Toes wwp, DP and PT 2+,  bcr in all toes. +EHL/FHL/GSC/TA with 5/5 strength. SILT SP/DP/Sa/Mazariegos/T. Dressing c/d/i.    FOLLOWUP:      Future Appointments   Date Time Provider Department Center   7/21/2020  9:30 AM MG NURSE ONLY ORTHO [facility] [place]   8/18/2020  9:45 AM Clinton Deras MD [facility] [place]       Orthopaedic Surgery appointments are at the [facility] ([address] [place] [address]). Call [phone] to schedule a follow-up appointment at this location with your provider.     PLANNED DISCHARGE ORDERS:      Discharge Medication List as of 7/8/2020 10:45 AM      START taking these medications    Details   aspirin (ASA) 81 MG EC tablet Take 2 tablets (162 mg) by mouth daily, Disp-28 tablet,R-0, E-Prescribe      HYDROmorphone (DILAUDID) 2 MG tablet Take 0.5-1 tablets (1-2 mg) by mouth every 3 hours as needed for moderate to severe pain, Disp-30 tablet,R-0, E-Prescribe      polyethylene glycol (MIRALAX) 17 g packet Take 17 g by mouth daily, Disp-7 packet,R-0, E-Prescribe      senna-docusate (SENOKOT-S/PERICOLACE) 8.6-50 MG tablet Take 2 tablets by mouth 2 times daily, Disp-30 tablet,R-0, E-Prescribe         CONTINUE these medications which have CHANGED    Details   acetaminophen (TYLENOL) 325 MG tablet Take 2 tablets (650 mg) by mouth every 4 hours as needed for other, Disp-1 Bottle,R-0, E-Prescribe         CONTINUE these medications which have NOT CHANGED    Details   amLODIPine (NORVASC) 5 MG tablet Take 1 tablet (5 mg) by mouth daily, Disp-90 tablet, R-3, E-Prescribe      atenolol (TENORMIN) 50 MG tablet Take 1 tablet (50 mg) by mouth daily, Disp-90 tablet, R-3, E-Prescribe      Cholecalciferol (VITAMIN D) 125 MCG (5000 UT) CAPS Take 1 capsule (5,000 Units) by mouth daily, Disp-90 capsule,R-2, E-PrescribeThe patient requests that this prescription be held on file for filling in the near future.      COMPOUNDED NON-CONTROLLED SUBSTANCE (CMPD RX) - PHARMACY TO MIX COMPOUNDED MEDICATION  Estriol 0.1% cream (1mg/gram) in HRT base. Place 1 gram vaginally daily for 2 weeks, then vaginally twice weekly., Disp-30 g, R-6, E-PrescribePlease contact patient regarding payment and delivery.      ibuprofen (ADVIL/MOTRIN) 200 MG tablet Take 400 mg by mouth every 6 hours as needed for moderate pain , Historical      sulfamethoxazole-trimethoprim (BACTRIM/SEPTRA) 400-80 MG tablet Take 1 tablet by mouth daily as needed (take just with intercourse), Disp-20 tablet, R-2, E-Prescribe      traZODone (DESYREL) 50 MG tablet TAKE TWO TABLETS BY MOUTH NIGHTLY AS NEEDED FOR SLEEP, Disp-180 tablet, R-3, E-Prescribe         STOP taking these medications       oxyCODONE (ROXICODONE) 5 MG tablet Comments:   Reason for Stopping:         traMADol (ULTRAM) 50 MG tablet Comments:   Reason for Stopping:                 Discharge Procedure Orders   Reason for your hospital stay   Order Comments: You were admitted to the hospital following your total hip arthroplasty.     Adult [facility] Follow-up and recommended labs and tests   Order Comments: You are to return to clinic in 2 weeks for wound check with the clinic nurse. Approximately 6 weeks after your surgery, you will be scheduled for an appointment with Dr. Deras. At this time, AP pelvis imaging will be obtained.    If you need to schedule your 2 and 6 week postoperative visits or haven't received confirmation regarding these visits, please call one of the following numbers within 7 days of discharge.    For patients that see Dr. Deras at:   -[facility]: [phone]  -[facility] [place]: [phone]  [facility]: [phone]     Discharge Instructions   Order Comments: TOTAL HIP ARTHROPLASTY POST OPERATIVE DISCHARGE INSTRUCTIONS    FOLLOW UP APPOINTMENT  You are scheduled for a post operative wound check with Dr. Deras s clinic approximately two weeks after surgery. At approximately six weeks after surgery, you will  see Dr. Deras in clinic. During this visit, repeat X rays of your operative hip will be performed.      Your follow up appointments will be at the location that you regularly see Dr. Deras:    [facility]  [address] [place] [address] [phone]    [facility]  [address] [address] [place] [address] [phone]    [facility]  [address] [place], [place] [address] [phone]    Physical therapy:   A referral for physical therapy will be provided at the time of discharge.       ACTIVITY  Weight bearing status:   You may bear weight on your operative extremity as tolerated, using assistive devices (walker, cane) as needed. As you begin to feel more comfortable ambulating, you may gradually transition from a walker to a cane. Eventually, you should wean from all assistive devices. Although we would like you to discontinue use of assistive devices as soon as possible, do not transition until you have worked with your physical therapist to achieve safe balance and comfort.     Posterior hip precautions:  You must maintain the following posterior hip precautions for the next 12 weeks with no exceptions:  1) no hip flexion >90 degrees (no bending down at the waist)  2) no internal rotation past neutral (no pivoting)  3) no adduction past midline (no crossing your legs)    Exercises:   Perform the following exercises at least three times per day for the first four weeks after surgery to prevent complications, such as blood clots in your legs:  1) Point and flex your feet  2) Move your ankle around in big circles  3) Wiggle your toes   Also, perform thigh muscle tightening exercises for 10 to 15 minutes at least three times per day for the first four weeks after surgery.    Athletic activities:  Activities such as swimming, bicycling, jogging, running, and stop-and-go sports should be  avoided until directed by your provider.    Driving:  Driving is not permitted until directed by your provider. Under no circumstance are you permitted to drive while using narcotic pain medications.    Return to work:  You may return to work when directed by your provider.       COMFORT AND PAIN MANAGEMENT  Icing:  An ice pack will be provided to control swelling and discomfort after surgery. Place a thin towel on your skin and apply the ice pack overtop. You may apply ice for 20 minutes as often as two times per hour.    Pain medications & tapering:  You will be discharged with acetaminophen (Tylenol) and a narcotic medication for pain management after surgery. Acetaminophen is most effective when it is taken per the schedule outlined by your provider (every four, six, or eight hours as prescribed). You may safely use acetaminophen as prescribed for the first four weeks after surgery provided you do not exceed the maximum daily dose prescribed by your provider (usually 3000 mg - 4000 mg). The narcotic pain medication should only be taken on an as-needed basis when necessary and should be reserved for severe pain that is not controlled with scheduled acetaminophen. In the first three days following surgery, your symptoms may warrant use of the narcotic pain medication every three, four, or six hours as prescribed. After three days, focus your efforts on decreasing (tapering) use of narcotic medications.   The most successful tapering strategy is to first, decrease the dose (number of tablets) and second, decrease the interval (time between doses). For example, if you begin taking two tablets every four hours after surgery, start your taper by decreasing one of these doses to one tablet. Every one to two days, decrease another dose to one tablet until you are eventually taking one tablet every four hours. Once this is achieved, focus on increasing the number of hours between doses, moving from one tablet every four  hours to one tablet every six hours. As tolerated, continue to increase the interval to eight and twelve hours. Eventually, taper to one dose every evening and discontinue when no longer needed.      ANTICOAGULATION  Take enoxaparin (Lovenox) as prescribed for a total of two weeks after surgery. After you complete your enoxaparin regimen, take one aspirin 325mg daily for the next two weeks.       WOUND CARE AND SHOWERING  Wound care:  You have a clean Aquacel dressing on your surgical wound. This dressing should stay in place for seven days to allow the incision to heal. If the Aquacel dressing becomes excessively wet or saturated before removal on day seven, please contact Dr. Deras's office. After seven days, remove the dressing and leave the wound open to air (see showering instructions below). If there is any drainage from the incision after removal of the Aquacel, dress the wound with sterile 4x4 gauze, using tape over top to secure the dressing in place over the incision. Please contact Dr. Deras's office if you notice the following after removal of the Aquacel dressin) significant cloudy, bloody, or malodorous drainage from the incision, 2) excessive warmth and redness around the incision.    Showering:  You may shower 48 hours after surgery provided the Aquacel dressing is in place. Although you are strictly prohibited from soaking or submerging the surgical wound underwater, you may shower in the usual fashion, allowing water and soap to run over the Aquacel. Once the Aquacel is removed on the seventh day after surgery, you may continue to shower; however, the incision should be covered with saran wrap (or any other non-permeable covering) to allow the incision to remain dry and to prevent you from scrubbing/rubbing the incision. The steri-strips (small white tape that is directly on the incision areas) should be left on until they fall off or are removed at your first office visit. After your  incision is examined at your first office visit, you will likely be allowed to return to showering without covering the incision.     Tub bathing:  Tub bathing, swimming, or any other activities that cause your incision to be submerged should be avoided until allowed by your provider. Typically, patients are allowed to return to these activities six weeks after surgery pending clearance by your provider.      CONTACTING YOUR PROVIDER:  You may experience symptoms that require follow-up before your scheduled two week appointment. Please contact Dr. Deras's office if you experience:  1) Pain that persists or worsens in the first few days after surgery  2) Excessive redness or drainage of cloudy or bloody material from the wounds (clear red tinted fluid and some mild drainage should be expected) or drainage of any kind five days after surgery  3) A temperature elevation greater than 101.5    4) Pain, swelling or redness in your calf  5) Numbness or weakness in your leg or foot      Regular business hours (Monday - Friday, 8am - 5pm):  [facility]: [phone]  [facility]: [phone]  [facility]: [phone]    After hours and weekends:  [facility] on call Orthopedic resident: [phone]     ABO/Rh Type and Screen   Standing Status: Future Standing Exp. Date: 07/25/20     Walker Order   Order Comments: DME Documentation:   Describe the reason for need to support medical necessity: gait instability.     I, the undersigned, certify that the above prescribed supplies are medically necessary for this patient and is both reasonable and necessary in reference to accepted standards of medical and necessary in reference to accepted standards of medical practice in the treatment of this patient's condition and is not prescribed as a convenience.     Order Specific Question Answer Comments   DME Provider:  [facility]    Walker Type: Standard (2 Wheel)      Diet   Order Comments: You may return to your regular, pre-surgery diet unless instructed otherwise by your provider.     Order Specific Question Answer Comments   Is discharge order? Yes        Balwinder Walters MD  Orthopedic Surgery PGY-4  Pager: [phone]

## 2020-07-08 NOTE — PLAN OF CARE
Pt alert and oriented x4. VSS. No complaints of shortness of breath or chest pain. Complained of (R) hip pain. 2 mg dilaudid given 3 qhrs. Up with 1 assist, gait belt and walker. Steady on feet. Regular diet, thin liquids. Takes pills whole. Continent of bowel and bladder. LBM: 7/3. No complaints of abdominal discomfort. Passing gas. Hip incision CDI. Ice applied. PIV saline locked. Able to make needs known. Will continue with plan of care.

## 2020-07-08 NOTE — PROGRESS NOTES
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     CARDIOVASCULAR SCREENING; LDL GOAL LESS THAN 130     Advanced directives, counseling/discussion     Insomnia, unspecified type     Essential hypertension with goal blood pressure less than 140/90     Primary osteoarthritis of right hip     Cataract of both eyes, unspecified cataract type     Arthropathy of facet joint     Morbid obesity (H)     Bariatric surgery status     Malnutrition following gastrointestinal surgery     Right hip pain     Status post hip surgery       DJD S/P Right DENIZ on 7/6   -  POD 1  -  Post-op mgnt per ortho service  -  Pain control  -  Rehabilitation therapy    Primary hypertension  -  BP elevated  -  Continue atenolol  -  Monitoring BP    Migraine headaches  -  Clinical monitoring       Diet: Advance Diet as Tolerated: Regular Diet Adult  Diet    DVT Prophylaxis: Low Risk/Ambulatory with no VTE prophylaxis indicated  Birch Catheter: in place, indication:    Code Status: Full Code           Disposition Plan   Expected discharge: Tomorrow, recommended to home once adequate pain management/ tolerating PO medications.  Entered: Justino Antunez MD 07/08/2020, 11:59 AM       The patient's care was discussed with the patient and care Team.    Justino Antunez MD  Hospitalist Service  [facility],  [place]    ______________________________________________________________________    Interval History   Reported feeling better than yesterday.  Did not report severe incisional pain.  Did not report chest pain, palpitations or shortness of breath.        Data reviewed today: I reviewed all medications, new labs and imaging results over the last 24 hours. I personally reviewed no images or EKG's today.    Physical Exam   Vital Signs: Temp: 99.8  F (37.7  C) Temp src: Oral BP: 116/55 Pulse: 85 Heart Rate: 85 Resp: 18 SpO2: 97 % O2 Device: None (Room air)    Weight: 183 lbs 10.29 oz  General Appearance: Awake, alert, interactive. NAD.  Respiratory: CTA efraín  Cardiovascular: Controlled HR  GI: Abd nondistended  Skin: Normally turgid      Data   Recent Labs   Lab 07/08/20  0656 07/07/20  0604 07/06/20  1509   HGB 10.2* 10.6*  --    NA  --  140 139   POTASSIUM  --  4.3 4.3   CHLORIDE  --  107 109   CO2  --  28 26   BUN  --  18 19   CR  --  0.93 0.82   ANIONGAP  --  5 4   BRITANY  --  8.4* 8.7   GLC  --  93 150*

## 2020-07-08 NOTE — PLAN OF CARE
Patient A&O x4 and able to make needs known using call light. VSS and lung sounds clear and equal bilaterally. Bowel sounds active and passing gas; no BM but taking stool softeners. Denies chest pain, lightheadedness, dizziness, and SOB. IV discontinued. Drinking well and tolerating regular diet. Voiding spontaneously without difficulties. Able to wiggle toes and CMS intact; numbness or tingling to RLE much improved. Pain in right hip and taking PRN Dilaudid with relief. Ice pack applied to right hip. Dressing CDI. Incentive spirometer encouraged and done several times. Turned and repositioned with heels floated off of bed. Able to transfer with assist of 1 and walker.  present for PT sessions today and demonstrated understanding. Patient discharged to home at 1200 accompanied by . All discharge instructions and follow ups reviewed with all questions answered.

## 2020-07-08 NOTE — PROGRESS NOTES
"Orthopedic Surgery Progress Note   July 7, 2020    Subjective: No acute events overnight. Pain well controlled. No concerns this morning. Feels really great compared to yesterday. Ready to discharge home today.     Objective: /60 (BP Location: Left arm)   Pulse 70   Temp 98.5  F (36.9  C) (Oral)   Resp 16   Ht 1.575 m (5' 2\")   Wt 83.3 kg (183 lb 10.3 oz)   LMP 01/08/2008   SpO2 94%   BMI 33.59 kg/m      General: NAD, alert and oriented, cooperative with exam.   Cardio: RRR, extremities wwp.   Respiratory: Non-labored breathing.  MSK: Focused examination of RLE: Toes wwp, DP and PT 2+, bcr in all toes. +EHL/FHL/GSC/TA with 5/5 strength. SILT SP/DP/Sa/Mazariegos/T. Dressing c/d/i.    Labs: Hgb 10.6    Imaging: POD0 pelvis XR reviewed. New R DENIZ in place, well seated, no obvious fracture, hip located.     Assessment and Plan: Angela Ibarra is a 59 year old female s/p Procedure(s):  ARTHROPLASTY, HIP, TOTAL RIGHT on 7/6/2020 with Dr. Deras. Doing very well.     Activity: Up with assist and assistive devices as needed until independent. Posterior hip precautions to operative side x 3 months:  1) No hip flexion beyond 90 degrees  2) No adduction beyond midline  3) No internal rotation beyond neutral   Weight bearing status: WBAT  Antibiotics: Cefazolin x 24 hours  Diet: Begin with clear fluids and progress diet as tolerated. Bowel regimen. Anti-emetics PRN.    DVT prophylaxis: Mechanical while in hospital with ASA 162mg daily x4 weeks  Elevation: Elevate heels off of bed on pillows   Wound Care: Aquacel x 7 days.    Drains: none  Pain management: Orals PRN, IV for breakthrough only  X-rays: AP Pelvis and Lateral operative hip in PACU.   Physical Therapy: Mobilization, ROM, ADL's, Posterior hip precautions.    Occupational Therapy: ADL's  Labs: Trend Hgb on POD #1, 2  Consults: PT, OT. Hospitalist, appreciate assistance in caring for this patient throughout the perioperative period            Future Appointments "   Date Time Provider Department [place]   7/7/2020  6:30 AM UR OT OVERFLOW [facility] [place]   7/7/2020  9:45 AM Bonnie Morgan Pt, PT URPT [place]   7/7/2020  2:00 PM Bonnie Morgan Pt, PT [facility]PT [place]   7/21/2020  9:30 AM MG NURSE ONLY ORTHO [facility] [place]   8/18/2020  9:45 AM Clinton Deras MD [facility] [place]         Disposition: Pending progress with therapies, pain control on orals, and medical stability, anticipate discharge to Home today 7/8       Balwinder Walters MD  Orthopedic Surgery PGY-4  Pager: [phone]

## 2020-07-09 DIAGNOSIS — Z96.641 STATUS POST TOTAL REPLACEMENT OF RIGHT HIP: Primary | ICD-10-CM

## 2020-07-09 NOTE — PROGRESS NOTES
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  Despite comprehensive non-operative management these symptoms continued to interfere with activities of daily living.  After discussion of further treatment options including the risks and benefits that patient elected to proceed with a total hip.    ASSESSMENT      Discharge Assessment  Patient reports symptoms are: Improved  Does the patient have all of their medications?: Yes  Does patient know what their new medications are for?: Yes  Does patient have a follow-up appointment scheduled?: Yes  Does patient have any other questions or concerns?: No    Post-op  Did the patient have surgery or a procedure: Yes  Incision: healing  Drainage: No  Bleeding: none  Fever: No  Chills: No  Redness: No  Warmth: No  Swelling: No  Incision site pain: No  Eating & Drinking: eating and drinking without complaints/concerns  PO Intake: regular diet  Bowel Function: normal  Urinary Status: voiding without complaint/concerns        PLAN                                                      Outpatient Plan:      Future Appointments   Date Time Provider Department Center   7/21/2020  9:30 AM MG NURSE ONLY [facility]THO [facility] [place]   8/18/2020  9:45 AM Clinton Deras MD [facility] [place] Jennifer Hegna

## 2020-07-14 ENCOUNTER — MYC MEDICAL ADVICE (OUTPATIENT)
Dept: FAMILY MEDICINE | Facility: CLINIC | Age: 60
End: 2020-07-14

## 2020-07-14 NOTE — TELEPHONE ENCOUNTER
Provider:  The current policy state that we are not testing asymptomatic patients at this time.  Does the patient's post-surgical status last week (7/8/2020) change any of the recommendations? The results of the tested neighbor are not done at this time. Thank you. Nicole Mercado R.N.

## 2020-07-21 ENCOUNTER — ALLIED HEALTH/NURSE VISIT (OUTPATIENT)
Dept: NURSING | Facility: CLINIC | Age: 60
End: 2020-07-21
Payer: COMMERCIAL

## 2020-07-21 DIAGNOSIS — M17.11 PRIMARY OSTEOARTHRITIS OF RIGHT KNEE: ICD-10-CM

## 2020-07-21 DIAGNOSIS — Z47.89 ORTHOPEDIC AFTERCARE: Primary | ICD-10-CM

## 2020-07-21 PROCEDURE — 99024 POSTOP FOLLOW-UP VISIT: CPT

## 2020-07-21 RX ORDER — TRAMADOL HYDROCHLORIDE 50 MG/1
50-100 TABLET ORAL EVERY 6 HOURS PRN
Qty: 16 TABLET | Refills: 0 | COMMUNITY
Start: 2020-07-21 | End: 2020-09-29

## 2020-07-21 NOTE — NURSING NOTE
Angela Ibarra's chief complaint for this visit includes:  Chief Complaint   Patient presents with     Allied Health Visit     PCP: Germán Jernigan    Referring Provider:  No referring provider defined for this encounter.    LMP 01/08/2008   Data Unavailable     Do you need any medication refills at today's visit? no

## 2020-07-21 NOTE — PROGRESS NOTES
Angela Ibarra comes into clinic today at the request of Dr. Deras for suture removal and wound check. The patient is status post RTHA on 7/6/20.     Incision clean, dry and intact.   Steri-strips removed. Incision cleaned and new steri-strips applied. Pt advised to let steri-strips fall off on their own. Pt instructed on wound care and symptoms of infection to watch for.     Discussed anticoagulation. Pt is currently taking Aspirin 162mg, daily for 28 days. Pt advised against any NSAIDs while on any anticoagulation med (ASA, Lovenox,or Coumadin).      Discussed current pain medication regime. Pt currently taking Dilaudid 1 per night do to not being able to get comfortable. Tylenol and Motrin, patient states motrin was approved at discharge, we had a discussion about GI upset and S&S of what to watch for. We will switch her to Tramadol per protocol, Oxycodone made patient very confused.     Discussed current physical therapy program. Pt is going to start at [facility] on the 24th, this was the first avaliable after she got home.     Patient using walker and cane to aid in ambulation    Discussed edema. Patient has mild edema, not needing compression stockings. We did discuss elevation as she does get more swollen towards the end of the day.     She did complain of some shin pain, this has happened to her before surgery also, I showed her a stretch and we discussed using heat before a walk and ice after. This pain has been making it hard to walk.     Reviewed Hip precautions.    Total time spent with Pt: 50 minutes.    Jess Obregon RN

## 2020-07-21 NOTE — PATIENT INSTRUCTIONS
"Continue anticoagulation plan of:  Aspirin 162 mg once daily for 28 days.      If incision is dry, OK to leave open to air (no bandage). If incision is draining, cover with gauze and keep clean and dry. If steri strips (tapes) applied, let fall off on their own. Please do not apply any ointments or lotions to incision. No soaking in tubs or pools for 3 months after surgery.    If any swelling in leg(s), elevate surgical leg above heart (lay flat, elevate leg on blankets or pillows). Continue to wear compression stockings during the day as long you are having leg swelling. May stop wearing or wear intermittently if not having swelling.     *Continue with post-op hip precautions. These are strict for 3 months post surgery, but should be followed for life. As your muscles gain strength, you will notice that you will have more range of motion, but your risk of dislocating remains.    NO bending past 90 degrees at the waist (operative side)   NO crossing your operative leg over or under your non-operative leg   NO turning your operative leg inward     *Please call if a sharp increase in pain, fall, change in movement or sensation, chest pain, calf pain, shortness of breath, fevers greater than 101.4, redness, erythema around the incision sites.    *If needing refills on pain meds, please call clinic at least 3 days before you run out.    *Continue physical therapy as directed.  If needing orders for Outpatient Physical therapy, please call the clinic.    *Continue to use assistive device: cane or crutch until no limp. Discuss with therapist if questions.    *Dr. Deras advises no dental work or cleaning until 6 months after any surgery with implants to hips or knees unless emergent issue arises. This includes total joint surgeries. Once you are 6 months past your surgery, you will need prophylactic antibiotics for the rest of your lifetime with any cleaning or dental work.  This is also for any other \"dirty\" procedures " that you may have, such as a colonoscopy.    If you have any questions, call the clinic at [phone] and ask for the Orthopaedics dept.     ----------------------------------------------------------------------      Thanks for coming today.  Ortho/Sports Medicine Clinic  [address] [place] [address]    To schedule future appointments in Ortho [facility], you may call [phone].    To schedule ordered imaging by your provider:   Call Central Imaging [phone]    To schedule an injection ordered by your provider:  Call Central Imaging Injection scheduling line: [phone]  [facility] available online at:  [email or web address]    Please call if any further questions or concerns ([phone]).  Clinic hours 8 am to 5 pm.    Return to clinic (call) if symptoms worsen or fail to improve.

## 2020-07-24 ENCOUNTER — THERAPY VISIT (OUTPATIENT)
Dept: PHYSICAL THERAPY | Facility: CLINIC | Age: 60
End: 2020-07-24
Attending: ORTHOPAEDIC SURGERY
Payer: COMMERCIAL

## 2020-07-24 DIAGNOSIS — Z96.641 STATUS POST TOTAL REPLACEMENT OF RIGHT HIP: Primary | ICD-10-CM

## 2020-07-24 PROCEDURE — 97110 THERAPEUTIC EXERCISES: CPT | Mod: GP | Performed by: PHYSICAL THERAPIST

## 2020-07-24 PROCEDURE — 97161 PT EVAL LOW COMPLEX 20 MIN: CPT | Mod: GP | Performed by: PHYSICAL THERAPIST

## 2020-07-24 ASSESSMENT — ACTIVITIES OF DAILY LIVING (ADL)
TWISTING/PIVOTING_ON_INVOLVED_LEG: UNABLE TO DO
HOW_WOULD_YOU_RATE_YOUR_CURRENT_LEVEL_OF_FUNCTION_DURING_YOUR_USUAL_ACTIVITIES_OF_DAILY_LIVING_FROM_0_TO_100_WITH_100_BEING_YOUR_LEVEL_OF_FUNCTION_PRIOR_TO_YOUR_HIP_PROBLEM_AND_0_BEING_THE_INABILITY_TO_PERFORM_ANY_OF_YOUR_USUAL_DAILY_ACTIVITIES?: 50
HOS_ADL_ITEM_SCORE_TOTAL: 29
GETTING_INTO_AND_OUT_OF_A_BATHTUB: SLIGHT DIFFICULTY
LIGHT_TO_MODERATE_WORK: NO DIFFICULTY AT ALL
HOS_ADL_HIGHEST_POTENTIAL_SCORE: 52
HEAVY_WORK: UNABLE TO DO
WALKING_APPROXIMATELY_10_MINUTES: SLIGHT DIFFICULTY
STANDING_FOR_15_MINUTES: SLIGHT DIFFICULTY
SITTING_FOR_15_MINUTES: NO DIFFICULTY AT ALL
WALKING_UP_STEEP_HILLS: SLIGHT DIFFICULTY
PUTTING_ON_SOCKS_AND_SHOES: UNABLE TO DO
ROLLING_OVER_IN_BED: UNABLE TO DO
WALKING_INITIALLY: SLIGHT DIFFICULTY
RECREATIONAL_ACTIVITIES: UNABLE TO DO
HOS_ADL_COUNT: 13
STEPPING_UP_AND_DOWN_CURBS: NO DIFFICULTY AT ALL
GETTING_INTO_AND_OUT_OF_AN_AVERAGE_CAR: SLIGHT DIFFICULTY
WALKING_DOWN_STEEP_HILLS: SLIGHT DIFFICULTY

## 2020-07-24 NOTE — PROGRESS NOTES
[facility] Initial Evaluation  Subjective:    Patient Health History  Angela Ibarra being seen for Pt for right hip replacement.     Problem began: 7/6/2020.   Problem occurred: Arthritis   Pain is reported as 1/10 on pain scale.  General health as reported by patient is good.  Pertinent medical history includes: calf pain-swelling-warmth, history of fractures, high blood pressure, migraines/headaches and overweight.   Red flags:  None as reported by patient.  Medical allergies: other. Other medical allergies details: Couple of anti biotics.   Surgeries include:  Orthopedic surgery. Other surgery history details: Left shoulder and arm, right ankle and hip.    Current medications:  High blood pressure medication and pain medication.    Current occupation is Administration.   Primary job tasks include:  Computer work and prolonged sitting.                                    Objective:      HIP:    PROM:   L  R   Flexion 90 per restriction 115   Extension Not tested 10   Abduction 40 50   IR nt 30   ER nt 35     Strength:   L R   HIP     Flex nt 5/5   Ext nt 4/5   Abd nt 4/5   KNEE     Flex nt 5/5   Ext nt 5/5     Special tests: deferred due to known surgery  Other TTP near posterior lateral hip incision, some mild bruising on lateral gastroc/soleus with TTP    Functional: good adherence to precautions during all sup<>sit sit<>stand transfers, able to recite post hip precautions without cues    Gait: antalgic with 2WW, good gait speed                System    Physical Exam    General     ROS    Assessment/Plan:    Patient is a 59 year old female with right side hip complaints.    Patient has the following significant findings with corresponding treatment plan.                Diagnosis 1:  S/p R DENIZ  Pain -  hot/cold therapy, manual therapy, education and home program  Decreased ROM/flexibility - manual therapy, therapeutic exercise, therapeutic activity and home program  Decreased strength -  therapeutic exercise, therapeutic activities and home program  Impaired balance - neuro re-education, gait training, therapeutic activities and home program  Impaired gait - gait training, assistive devices and home program      Cumulative Therapy Evaluation is: Low complexity.    Previous and current functional limitations:  (See Goal Flow Sheet for this information)    Short term and Long term goals: (See Goal Flow Sheet for this information)     Communication ability:  Patient appears to be able to clearly communicate and understand verbal and written communication and follow directions correctly.  Treatment Explanation - The following has been discussed with the patient:   RX ordered/plan of care  Anticipated outcomes  Possible risks and side effects  This patient would benefit from PT intervention to resume normal activities.   Rehab potential is good.    Frequency:  2 X week, once daily  Duration:  for 3 weeks tapering to 1 X a week over 8 weeks  Discharge Plan:  Achieve all LTG.  Independent in home treatment program.  Reach maximal therapeutic benefit.    Please refer to the daily flowsheet for treatment today, total treatment time and time spent performing 1:1 timed codes.

## 2020-07-28 ENCOUNTER — THERAPY VISIT (OUTPATIENT)
Dept: PHYSICAL THERAPY | Facility: CLINIC | Age: 60
End: 2020-07-28
Payer: COMMERCIAL

## 2020-07-28 DIAGNOSIS — Z96.641 STATUS POST TOTAL REPLACEMENT OF RIGHT HIP: Primary | ICD-10-CM

## 2020-07-28 PROCEDURE — 97110 THERAPEUTIC EXERCISES: CPT | Mod: GP | Performed by: PHYSICAL THERAPIST

## 2020-07-31 ENCOUNTER — THERAPY VISIT (OUTPATIENT)
Dept: PHYSICAL THERAPY | Facility: CLINIC | Age: 60
End: 2020-07-31
Payer: COMMERCIAL

## 2020-07-31 DIAGNOSIS — Z96.641 STATUS POST TOTAL REPLACEMENT OF RIGHT HIP: Primary | ICD-10-CM

## 2020-07-31 PROCEDURE — 97110 THERAPEUTIC EXERCISES: CPT | Mod: GP | Performed by: PHYSICAL THERAPIST

## 2020-08-04 ENCOUNTER — THERAPY VISIT (OUTPATIENT)
Dept: PHYSICAL THERAPY | Facility: CLINIC | Age: 60
End: 2020-08-04
Payer: COMMERCIAL

## 2020-08-04 DIAGNOSIS — Z96.641 STATUS POST TOTAL REPLACEMENT OF RIGHT HIP: Primary | ICD-10-CM

## 2020-08-04 PROCEDURE — 97110 THERAPEUTIC EXERCISES: CPT | Mod: GP | Performed by: PHYSICAL THERAPIST

## 2020-08-07 ENCOUNTER — THERAPY VISIT (OUTPATIENT)
Dept: PHYSICAL THERAPY | Facility: CLINIC | Age: 60
End: 2020-08-07
Payer: COMMERCIAL

## 2020-08-07 DIAGNOSIS — Z47.89 AFTERCARE FOLLOWING SURGERY OF THE MUSCULOSKELETAL SYSTEM: ICD-10-CM

## 2020-08-07 PROCEDURE — 97112 NEUROMUSCULAR REEDUCATION: CPT | Mod: GP | Performed by: PHYSICAL THERAPIST

## 2020-08-07 PROCEDURE — 97110 THERAPEUTIC EXERCISES: CPT | Mod: GP | Performed by: PHYSICAL THERAPIST

## 2020-08-10 ENCOUNTER — THERAPY VISIT (OUTPATIENT)
Dept: PHYSICAL THERAPY | Facility: CLINIC | Age: 60
End: 2020-08-10
Payer: COMMERCIAL

## 2020-08-10 DIAGNOSIS — Z47.89 AFTERCARE FOLLOWING SURGERY OF THE MUSCULOSKELETAL SYSTEM: ICD-10-CM

## 2020-08-10 PROCEDURE — 97530 THERAPEUTIC ACTIVITIES: CPT | Mod: GP | Performed by: PHYSICAL THERAPIST

## 2020-08-10 PROCEDURE — 97110 THERAPEUTIC EXERCISES: CPT | Mod: GP | Performed by: PHYSICAL THERAPIST

## 2020-08-13 DIAGNOSIS — Z96.641 STATUS POST TOTAL REPLACEMENT OF RIGHT HIP: Primary | ICD-10-CM

## 2020-08-14 ENCOUNTER — THERAPY VISIT (OUTPATIENT)
Dept: PHYSICAL THERAPY | Facility: CLINIC | Age: 60
End: 2020-08-14
Payer: COMMERCIAL

## 2020-08-14 DIAGNOSIS — Z47.89 AFTERCARE FOLLOWING SURGERY OF THE MUSCULOSKELETAL SYSTEM: ICD-10-CM

## 2020-08-14 PROCEDURE — 97530 THERAPEUTIC ACTIVITIES: CPT | Mod: GP | Performed by: PHYSICAL THERAPIST

## 2020-08-14 PROCEDURE — 97110 THERAPEUTIC EXERCISES: CPT | Mod: GP | Performed by: PHYSICAL THERAPIST

## 2020-08-14 NOTE — PROGRESS NOTES
Subjective:  HPI  Physical Exam                    Objective:  System    Physical Exam    General     ROS    Assessment/Plan:    PROGRESS  REPORT    Progress reporting period is from 7/24/20 to 8/14/20.     SUBJECTIVE  Subjective: pt reports feeling good, minimal to no pain. good adherence to precautions   Current Pain level: 0/10   Initial Pain level: 2/10        ;   ,     The objective findings are from DOS 8/14/20.    OBJECTIVE   painfree PROM R hip flx to 90degs (per post op restriction), abduction 45degs, ambulating without assistive device with mild antalgia.    ASSESSMENT/PLAN  Updated problem list and treatment plan: Diagnosis 1:  S/p R DENIZ  STG/LTGs have been met or progress has been made towards goals:  Yes  Assessment of Progress: The patient's condition is improving.  The patient's condition has potential to improve.  Self Management Plans:  Patient has been instructed in a home treatment program.  I have re-evaluated this patient and find that the nature, scope, duration and intensity of the therapy is appropriate for the medical condition of the patient.  Angela continues to require the following intervention to meet STG and LTG's: PT  The patient is returning to your office for a recheck appointment.    Recommendations:  Angela is doing really well post operatively, she is about 6 wks following R DENIZ and reports minimal to no pain, is currently ambulating without assistive device with some antalgia, and is demonstrating good adherence to post op posterior hip precautions. She has been progressing well and as expected with ROM and strength exercises and current plan is to continue 1x/wk [id number] prior to discharge.    Please refer to the daily flowsheet for treatment today, total treatment time and time spent performing 1:1 timed codes.

## 2020-08-18 ENCOUNTER — OFFICE VISIT (OUTPATIENT)
Dept: ORTHOPEDICS | Facility: CLINIC | Age: 60
End: 2020-08-18
Payer: COMMERCIAL

## 2020-08-18 ENCOUNTER — ANCILLARY PROCEDURE (OUTPATIENT)
Dept: GENERAL RADIOLOGY | Facility: CLINIC | Age: 60
End: 2020-08-18
Attending: ORTHOPAEDIC SURGERY
Payer: COMMERCIAL

## 2020-08-18 DIAGNOSIS — Z96.641 STATUS POST TOTAL REPLACEMENT OF RIGHT HIP: ICD-10-CM

## 2020-08-18 DIAGNOSIS — Z47.89 ORTHOPEDIC AFTERCARE: Primary | ICD-10-CM

## 2020-08-18 PROCEDURE — 99024 POSTOP FOLLOW-UP VISIT: CPT | Performed by: ORTHOPAEDIC SURGERY

## 2020-08-18 PROCEDURE — 73521 X-RAY EXAM HIPS BI 2 VIEWS: CPT | Performed by: RADIOLOGY

## 2020-08-18 NOTE — NURSING NOTE
Angela Ibarra's chief complaint for this visit includes:  Chief Complaint   Patient presents with     Right Hip - Surgical Followup     PCP: Germán Jernigan    Referring Provider:  No referring provider defined for this encounter.    LMP 01/08/2008   Data Unavailable     Do you need any medication refills at today's visit? No

## 2020-08-18 NOTE — PROGRESS NOTES
Chief Complaint: Surgical Followup of the Right Hip       HPI: Angela Ibarra returns today in follow-up for her right hip. She reports that she is doing very well. She is using no pain medication, no assist device, and is back to most of her usual activities. PHysical therapy going well. Happy with how she is doing so far.     Medications and allergies are documented in the EMR and have been reviewed.    Current Outpatient Medications:      acetaminophen (TYLENOL) 325 MG tablet, Take 2 tablets (650 mg) by mouth every 4 hours as needed for other, Disp: 1 Bottle, Rfl: 0     amLODIPine (NORVASC) 5 MG tablet, Take 1 tablet (5 mg) by mouth daily, Disp: 90 tablet, Rfl: 3     aspirin (ASA) 81 MG EC tablet, Take 2 tablets (162 mg) by mouth daily, Disp: 28 tablet, Rfl: 0     atenolol (TENORMIN) 50 MG tablet, Take 1 tablet (50 mg) by mouth daily, Disp: 90 tablet, Rfl: 3     Cholecalciferol (VITAMIN D) 125 MCG (5000 UT) CAPS, Take 1 capsule (5,000 Units) by mouth daily, Disp: 90 capsule, Rfl: 2     COMPOUNDED NON-CONTROLLED SUBSTANCE (CMPD RX) - PHARMACY TO MIX COMPOUNDED MEDICATION, Estriol 0.1% cream (1mg/gram) in HRT base. Place 1 gram vaginally daily for 2 weeks, then vaginally twice weekly., Disp: 30 g, Rfl: 6     ibuprofen (ADVIL/MOTRIN) 200 MG tablet, Take 400 mg by mouth every 6 hours as needed for moderate pain , Disp: , Rfl:      sulfamethoxazole-trimethoprim (BACTRIM/SEPTRA) 400-80 MG tablet, Take 1 tablet by mouth daily as needed (take just with intercourse), Disp: 20 tablet, Rfl: 2     traMADol (ULTRAM) 50 MG tablet, Take 1-2 tablets ( mg) by mouth every 6 hours as needed for severe pain, Disp: 16 tablet, Rfl: 0     traZODone (DESYREL) 50 MG tablet, TAKE TWO TABLETS BY MOUTH NIGHTLY AS NEEDED FOR SLEEP, Disp: 180 tablet, Rfl: 3  Allergies: Bactrim [sulfamethoxazole w/trimethoprim] and Nitrofurantoin    Physical Exam:  On physical examination the patient appears the stated age, is in no acute distress,  affect is appropriate, and breathing is non-labored.  LMP 01/08/2008   There is no height or weight on file to calculate BMI.  Gait: normal over a short distnace.   Incision: healed   ROM: fluid and painless   Distally, the circulatory, motor, and sensation exam is intact with 5/5 EHL, gastroc-soleus, and tibialis anterior.  Sensation to light touch is intact.  Dorsalis pedis and posterior tibialis pulses are palpable.  There are no sores on the feet, no bruising, and no lymphedema.    X-rays:    I reviewed the x-rays dated today.  Previous films reviewed.    Findings:  Normal progression for a total hip arthroplasty without evidence of loosening or subsidence.    Assessment: dong well   Plan: continue gait training and follow-up with video visit in 6 weeks, sooner if issues.     No ref. provider found

## 2020-08-18 NOTE — PATIENT INSTRUCTIONS
Patient Education     Sex Positions After Joint Replacement  You ve had joint replacement surgery, and your healthcare provider has said it s OK to have sex. You may be wondering what positions are safe. These positions should be safe after either a hip or a knee replacement. Try to avoid putting too much pressure on your new joint. Also, take the same care getting out of a position as you did getting into it. If you have had a hip replacement, always keep the joint within a safe range of motion.   For hip replacements  After a hip replacement, be sure the knee on the affected side:    Remains level with or below the hip.    Does not cross the belly button (the body s midpoint)   Setting the scene  Having sex can be a little easier if you plan ahead. Here are a few tips:    Take a mild pain medication about 20 to 30 minutes before sex. This can help prevent minor aches. Avoid taking medication so strong that it masks warning pain.    Have pillows and rolled towels nearby. They can be used for body support.    Relax. Do a few easy stretches within a safe range of motion.   Face-to-face    This position works after either a hip or a knee replacement. Being on the bottom is safe for a man or a woman with a new joint.    The partner on the bottom keeps his or her legs apart and turned out slightly. Use pillows to support the legs on the outside.    Depending on comfort, the person on the bottom can recline propped up on pillows or lie flat.    If the man has a new hip joint, place pillows between his knees. This keeps his knees from crossing his body s belly button, or midpoint.   Sitting in a chair    This position works after a hip or knee replacement. It is a safe position for a man or a woman with a new joint.    The man sits on a straight chair. His feet are supported or are flat on the floor.    The woman sits on the man s lap.     Woman lying and man kneeling    This position works for a woman with a new hip or  knee joint.    The woman lies on the bed on her back, buttocks near the edge of the bed. Both feet should be supported or flat on the floor.    The man kneels in front of the woman, on pillows placed on the floor. His hands are placed on either side of her body.   Side-lying position    This position works for a man with a replaced knee joint.    He should lie on his side, with the new joint on the bottom.    A woman with a replaced hip or knee joint can also use this position. She lies on her side, with the new joint on the bottom.    Use pillows for support.      Man propped on elbows    This position is for a man with a new hip joint.    He lies on top of his partner.    His legs are stretched out behind him, with a pillow between his knees.    He supports his weight on his elbows.   Special note  If your partner has had a hip replacement:    Make sure your partner s provider says it s OK to have sex.    Help your partner stay within a safe range of motion.    Control the amount and speed of movement during sex.    Don't put all your weight on your partner s hips.   Date Last Reviewed: 2018-2019 [employer] [address] [place] [address]. All rights reserved. This information is not intended as a substitute for professional medical care. Always follow your healthcare professional's instructions.         Thanks for coming today.  Ortho/Sports Medicine Clinic  [address] [address] [place] [address]    To schedule future appointments in Ortho Clinic, you may call [phone].    To schedule ordered imaging by your provider:   Call Central Imaging [phone]    To schedule an injection ordered by your provider:  Call Central Imaging Injection scheduling line: [phone]  [email or web address] available online at:  [email or web address]    Please call if any further questions or concerns ([phone]).  Clinic hours 8 am to 5 pm.    Return to clinic (call) if symptoms  worsen or fail to improve.

## 2020-08-18 NOTE — LETTER
8/18/2020         RE: Angela Ibarra  [address] [place] [id number]        Dear Colleague,    Thank you for referring your patient, Angela Ibarra, to the [facility]. Please see a copy of my visit note below.    Chief Complaint: Surgical Followup of the Right Hip       HPI: Angela Ibarra returns today in follow-up for her right hip. She reports that she is doing very well. She is using no pain medication, no assist device, and is back to most of her usual activities. PHysical therapy going well. Happy with how she is doing so far.     Medications and allergies are documented in the EMR and have been reviewed.    Current Outpatient Medications:      acetaminophen (TYLENOL) 325 MG tablet, Take 2 tablets (650 mg) by mouth every 4 hours as needed for other, Disp: 1 Bottle, Rfl: 0     amLODIPine (NORVASC) 5 MG tablet, Take 1 tablet (5 mg) by mouth daily, Disp: 90 tablet, Rfl: 3     aspirin (ASA) 81 MG EC tablet, Take 2 tablets (162 mg) by mouth daily, Disp: 28 tablet, Rfl: 0     atenolol (TENORMIN) 50 MG tablet, Take 1 tablet (50 mg) by mouth daily, Disp: 90 tablet, Rfl: 3     Cholecalciferol (VITAMIN D) 125 MCG (5000 UT) CAPS, Take 1 capsule (5,000 Units) by mouth daily, Disp: 90 capsule, Rfl: 2     COMPOUNDED NON-CONTROLLED SUBSTANCE (CMPD RX) - PHARMACY TO MIX COMPOUNDED MEDICATION, Estriol 0.1% cream (1mg/gram) in HRT base. Place 1 gram vaginally daily for 2 weeks, then vaginally twice weekly., Disp: 30 g, Rfl: 6     ibuprofen (ADVIL/MOTRIN) 200 MG tablet, Take 400 mg by mouth every 6 hours as needed for moderate pain , Disp: , Rfl:      sulfamethoxazole-trimethoprim (BACTRIM/SEPTRA) 400-80 MG tablet, Take 1 tablet by mouth daily as needed (take just with intercourse), Disp: 20 tablet, Rfl: 2     traMADol (ULTRAM) 50 MG tablet, Take 1-2 tablets ( mg) by mouth every 6 hours as needed for severe pain, Disp: 16 tablet, Rfl: 0     traZODone (DESYREL) 50 MG tablet, TAKE TWO  TABLETS BY MOUTH NIGHTLY AS NEEDED FOR SLEEP, Disp: 180 tablet, Rfl: 3  Allergies: Bactrim [sulfamethoxazole w/trimethoprim] and Nitrofurantoin    Physical Exam:  On physical examination the patient appears the stated age, is in no acute distress, affect is appropriate, and breathing is non-labored.  LMP 01/08/2008   There is no height or weight on file to calculate BMI.  Gait: normal over a short distnace.   Incision: healed   ROM: fluid and painless   Distally, the circulatory, motor, and sensation exam is intact with 5/5 EHL, gastroc-soleus, and tibialis anterior.  Sensation to light touch is intact.  Dorsalis pedis and posterior tibialis pulses are palpable.  There are no sores on the feet, no bruising, and no lymphedema.    X-rays:    I reviewed the x-rays dated today.  Previous films reviewed.    Findings:  Normal progression for a total hip arthroplasty without evidence of loosening or subsidence.    Assessment: dong well   Plan: continue gait training and follow-up with video visit in 6 weeks, sooner if issues.     No ref. provider found      Again, thank you for allowing me to participate in the care of your patient.        Sincerely,        Clinton Deras MD

## 2020-08-21 ENCOUNTER — THERAPY VISIT (OUTPATIENT)
Dept: PHYSICAL THERAPY | Facility: CLINIC | Age: 60
End: 2020-08-21
Payer: COMMERCIAL

## 2020-08-21 DIAGNOSIS — Z47.89 AFTERCARE FOLLOWING SURGERY OF THE MUSCULOSKELETAL SYSTEM: ICD-10-CM

## 2020-08-21 PROCEDURE — 97112 NEUROMUSCULAR REEDUCATION: CPT | Mod: GP | Performed by: PHYSICAL THERAPIST

## 2020-08-21 PROCEDURE — 97110 THERAPEUTIC EXERCISES: CPT | Mod: GP | Performed by: PHYSICAL THERAPIST

## 2020-08-25 ENCOUNTER — THERAPY VISIT (OUTPATIENT)
Dept: PHYSICAL THERAPY | Facility: CLINIC | Age: 60
End: 2020-08-25
Payer: COMMERCIAL

## 2020-08-25 DIAGNOSIS — Z47.89 AFTERCARE FOLLOWING SURGERY OF THE MUSCULOSKELETAL SYSTEM: ICD-10-CM

## 2020-08-25 PROCEDURE — 97530 THERAPEUTIC ACTIVITIES: CPT | Mod: GP | Performed by: PHYSICAL THERAPIST

## 2020-08-25 PROCEDURE — 97110 THERAPEUTIC EXERCISES: CPT | Mod: GP | Performed by: PHYSICAL THERAPIST

## 2020-08-25 PROCEDURE — 97112 NEUROMUSCULAR REEDUCATION: CPT | Mod: GP | Performed by: PHYSICAL THERAPIST

## 2020-09-04 ENCOUNTER — THERAPY VISIT (OUTPATIENT)
Dept: PHYSICAL THERAPY | Facility: CLINIC | Age: 60
End: 2020-09-04
Payer: COMMERCIAL

## 2020-09-04 DIAGNOSIS — Z47.89 AFTERCARE FOLLOWING SURGERY OF THE MUSCULOSKELETAL SYSTEM: ICD-10-CM

## 2020-09-04 PROCEDURE — 97112 NEUROMUSCULAR REEDUCATION: CPT | Mod: GP | Performed by: PHYSICAL THERAPIST

## 2020-09-04 PROCEDURE — 97530 THERAPEUTIC ACTIVITIES: CPT | Mod: GP | Performed by: PHYSICAL THERAPIST

## 2020-09-04 PROCEDURE — 97110 THERAPEUTIC EXERCISES: CPT | Mod: GP | Performed by: PHYSICAL THERAPIST

## 2020-09-11 ENCOUNTER — THERAPY VISIT (OUTPATIENT)
Dept: PHYSICAL THERAPY | Facility: CLINIC | Age: 60
End: 2020-09-11
Payer: COMMERCIAL

## 2020-09-11 DIAGNOSIS — Z47.89 AFTERCARE FOLLOWING SURGERY OF THE MUSCULOSKELETAL SYSTEM: ICD-10-CM

## 2020-09-11 PROCEDURE — 97530 THERAPEUTIC ACTIVITIES: CPT | Mod: GP | Performed by: PHYSICAL THERAPIST

## 2020-09-11 PROCEDURE — 97112 NEUROMUSCULAR REEDUCATION: CPT | Mod: GP | Performed by: PHYSICAL THERAPIST

## 2020-09-11 PROCEDURE — 97110 THERAPEUTIC EXERCISES: CPT | Mod: GP | Performed by: PHYSICAL THERAPIST

## 2020-09-24 ENCOUNTER — ANCILLARY PROCEDURE (OUTPATIENT)
Dept: ULTRASOUND IMAGING | Facility: CLINIC | Age: 60
End: 2020-09-24
Attending: ORTHOPAEDIC SURGERY
Payer: COMMERCIAL

## 2020-09-24 DIAGNOSIS — Z96.641 STATUS POST TOTAL REPLACEMENT OF RIGHT HIP: ICD-10-CM

## 2020-09-24 PROCEDURE — 93971 EXTREMITY STUDY: CPT | Mod: RT | Performed by: RADIOLOGY

## 2020-09-28 ENCOUNTER — THERAPY VISIT (OUTPATIENT)
Dept: PHYSICAL THERAPY | Facility: CLINIC | Age: 60
End: 2020-09-28
Payer: COMMERCIAL

## 2020-09-28 DIAGNOSIS — Z47.89 AFTERCARE FOLLOWING SURGERY OF THE MUSCULOSKELETAL SYSTEM: ICD-10-CM

## 2020-09-28 PROCEDURE — 97110 THERAPEUTIC EXERCISES: CPT | Mod: GP | Performed by: PHYSICAL THERAPIST

## 2020-09-28 ASSESSMENT — ACTIVITIES OF DAILY LIVING (ADL)
SITTING_FOR_15_MINUTES: NO DIFFICULTY AT ALL
HOS_ADL_SCORE(%): 92.65
WALKING_15_MINUTES_OR_GREATER: NO DIFFICULTY AT ALL
LIGHT_TO_MODERATE_WORK: NO DIFFICULTY AT ALL
GOING_DOWN_1_FLIGHT_OF_STAIRS: NO DIFFICULTY AT ALL
ROLLING_OVER_IN_BED: NO DIFFICULTY AT ALL
HOS_ADL_COUNT: 17
WALKING_INITIALLY: NO DIFFICULTY AT ALL
TWISTING/PIVOTING_ON_INVOLVED_LEG: NO DIFFICULTY AT ALL
PUTTING_ON_SOCKS_AND_SHOES: NO DIFFICULTY AT ALL
RECREATIONAL_ACTIVITIES: NO DIFFICULTY AT ALL
GETTING_INTO_AND_OUT_OF_AN_AVERAGE_CAR: NO DIFFICULTY AT ALL
HOS_ADL_HIGHEST_POTENTIAL_SCORE: 68
WALKING_DOWN_STEEP_HILLS: NO DIFFICULTY AT ALL
WALKING_APPROXIMATELY_10_MINUTES: NO DIFFICULTY AT ALL
WALKING_UP_STEEP_HILLS: NO DIFFICULTY AT ALL
GETTING_INTO_AND_OUT_OF_A_BATHTUB: UNABLE TO DO
GOING_UP_1_FLIGHT_OF_STAIRS: NO DIFFICULTY AT ALL
STEPPING_UP_AND_DOWN_CURBS: NO DIFFICULTY AT ALL
HEAVY_WORK: NO DIFFICULTY AT ALL
STANDING_FOR_15_MINUTES: NO DIFFICULTY AT ALL
DEEP_SQUATTING: SLIGHT DIFFICULTY
HOS_ADL_ITEM_SCORE_TOTAL: 63
HOW_WOULD_YOU_RATE_YOUR_CURRENT_LEVEL_OF_FUNCTION_DURING_YOUR_USUAL_ACTIVITIES_OF_DAILY_LIVING_FROM_0_TO_100_WITH_100_BEING_YOUR_LEVEL_OF_FUNCTION_PRIOR_TO_YOUR_HIP_PROBLEM_AND_0_BEING_THE_INABILITY_TO_PERFORM_ANY_OF_YOUR_USUAL_DAILY_ACTIVITIES?: 90

## 2020-09-28 NOTE — PROGRESS NOTES
Subjective:  HPI  Physical Exam                    Objective:  System    Physical Exam    General     ROS    Assessment/Plan:    DISCHARGE REPORT    Progress reporting period is from 7/24/20 to 9/28/20.     SUBJECTIVE  Subjective: pt reports feeling great, no pain or concerns. admits she had give days of extreme swelling and discoloration but no pain, and no change in activity or new injury but admits she had bought a pillow to put between her legs when she got the swelling but then stopped using the pillow and the swelling went away. was seen for US which showed no DVT. pt walked for 90mins at [employer] this past weekend painfree with some reported difficulty with uneven ground but no pain. reports she is sleeping on her R side wtihout discomfort. reports she is functionally where she was before hip became really sore.   Current Pain level: 0/10   Initial Pain level: 2/10        ;   ,     The objective findings are from DOS 9/28/20.    OBJECTIVE  Objective: PROM R hip flx: 100degs, abd: 55degs. painfree normal walking without antalgia on level and stairs. slight discomfort in glute during MMT hip abd but normal 5/5, hip flx 5/5      ASSESSMENT/PLAN  Updated problem list and treatment plan: Diagnosis 1:  S/p R DENIZ  STG/LTGs have been met or progress has been made towards goals:  Yes, progressing painfree walking.  Assessment of Progress: The patient's condition is improving.  The patient's condition has potential to improve.  Self Management Plans:  Patient is independent in a home treatment program.  Angela continues to require the following intervention to meet STG and LTG's: PT intervention is no longer required to meet STG/LTG. and HEP  The patient is returning to your office for a recheck appointment.    Recommendations:  Angela is doing really well 84 days post op, reportedly she is at a much higher level functionally than immediately prior to surgery and is so grateful for having the surgery. She was  able to walk 90mins at [email or web address] two days ago painfree with only difficulty being the uneven ground. Plan is to discharge PT services.    Please refer to the daily flowsheet for treatment today, total treatment time and time spent performing 1:1 timed codes.

## 2020-09-29 ENCOUNTER — VIRTUAL VISIT (OUTPATIENT)
Dept: ORTHOPEDICS | Facility: CLINIC | Age: 60
End: 2020-09-29
Payer: COMMERCIAL

## 2020-09-29 DIAGNOSIS — Z09 POSTOP CHECK: Primary | ICD-10-CM

## 2020-09-29 PROCEDURE — 99024 POSTOP FOLLOW-UP VISIT: CPT | Mod: 95 | Performed by: ORTHOPAEDIC SURGERY

## 2020-09-29 NOTE — LETTER
"    9/29/2020         RE: Angela Ibarra  [address] [place] [id number]        Dear Colleague,    Thank you for referring your patient, Angela Ibarra, to the [facility]. Please see a copy of my visit note below.    Angela Ibarra is a 59 year old female who is being evaluated via a billable telephone visit.      The patient has been notified of following:     \"This telephone visit will be conducted via a call between you and your physician/provider. We have found that certain health care needs can be provided without the need for a physical exam.  This service lets us provide the care you need with a short phone conversation.  If a prescription is necessary we can send it directly to your pharmacy.  If lab work is needed we can place an order for that and you can then stop by our lab to have the test done at a later time.    Telephone visits are billed at different rates depending on your insurance coverage. During this emergency period, for some insurers they may be billed the same as an in-person visit.  Please reach out to your insurance provider with any questions.    If during the course of the call the physician/provider feels a telephone visit is not appropriate, you will not be charged for this service.\"    Patient has given verbal consent for Telephone visit?  Yes    What phone number would you like to be contacted at? [phone]    How would you like to obtain your AVS? Alysia    Phone call duration: 10 minutes    Chief Complaint: Surgical Followup (RTHA 7/6/20)       HPI: Angela Ibarra returns today in follow-up for her right hip. She reports that she is doing \"wonderfully.\" No pain and no issues with her activities. Happy with how she is doing.     Medications and allergies are documented in the EMR and have been reviewed.    Current Outpatient Medications:      acetaminophen (TYLENOL) 325 MG tablet, Take 2 tablets (650 mg) by mouth every 4 hours as needed for other, " Disp: 1 Bottle, Rfl: 0     amLODIPine (NORVASC) 5 MG tablet, Take 1 tablet (5 mg) by mouth daily, Disp: 90 tablet, Rfl: 3     atenolol (TENORMIN) 50 MG tablet, Take 1 tablet (50 mg) by mouth daily, Disp: 90 tablet, Rfl: 3     COMPOUNDED NON-CONTROLLED SUBSTANCE (CMPD RX) - PHARMACY TO MIX COMPOUNDED MEDICATION, Estriol 0.1% cream (1mg/gram) in HRT base. Place 1 gram vaginally daily for 2 weeks, then vaginally twice weekly., Disp: 30 g, Rfl: 6     Cholecalciferol (VITAMIN D) 125 MCG (5000 UT) CAPS, Take 1 capsule (5,000 Units) by mouth daily (Patient not taking: Reported on 9/29/2020), Disp: 90 capsule, Rfl: 2     ibuprofen (ADVIL/MOTRIN) 200 MG tablet, Take 400 mg by mouth every 6 hours as needed for moderate pain , Disp: , Rfl:      traZODone (DESYREL) 50 MG tablet, TAKE TWO TABLETS BY MOUTH NIGHTLY AS NEEDED FOR SLEEP (Patient not taking: Reported on 9/29/2020), Disp: 180 tablet, Rfl: 3  Allergies: Bactrim [sulfamethoxazole w/trimethoprim] and Nitrofurantoin    Assessment: doing well.  Plan: cont with HEP and RTC at one year, sooner if issues.      No ref. provider found              Again, thank you for allowing me to participate in the care of your patient.        Sincerely,        Clinton Deras MD

## 2020-09-29 NOTE — PROGRESS NOTES
"Angela Ibarra is a 59 year old female who is being evaluated via a billable telephone visit.      The patient has been notified of following:     \"This telephone visit will be conducted via a call between you and your physician/provider. We have found that certain health care needs can be provided without the need for a physical exam.  This service lets us provide the care you need with a short phone conversation.  If a prescription is necessary we can send it directly to your pharmacy.  If lab work is needed we can place an order for that and you can then stop by our lab to have the test done at a later time.    Telephone visits are billed at different rates depending on your insurance coverage. During this emergency period, for some insurers they may be billed the same as an in-person visit.  Please reach out to your insurance provider with any questions.    If during the course of the call the physician/provider feels a telephone visit is not appropriate, you will not be charged for this service.\"    Patient has given verbal consent for Telephone visit?  Yes    What phone number would you like to be contacted at? [phone]    How would you like to obtain your AVS? Alysia    Phone call duration: 10 minutes    Chief Complaint: Surgical Followup (RTHA 7/6/20)       HPI: Angela Ibarra returns today in follow-up for her right hip. She reports that she is doing \"wonderfully.\" No pain and no issues with her activities. Happy with how she is doing.     Medications and allergies are documented in the EMR and have been reviewed.    Current Outpatient Medications:      acetaminophen (TYLENOL) 325 MG tablet, Take 2 tablets (650 mg) by mouth every 4 hours as needed for other, Disp: 1 Bottle, Rfl: 0     amLODIPine (NORVASC) 5 MG tablet, Take 1 tablet (5 mg) by mouth daily, Disp: 90 tablet, Rfl: 3     atenolol (TENORMIN) 50 MG tablet, Take 1 tablet (50 mg) by mouth daily, Disp: 90 tablet, Rfl: 3     COMPOUNDED " NON-CONTROLLED SUBSTANCE (CMPD RX) - PHARMACY TO MIX COMPOUNDED MEDICATION, Estriol 0.1% cream (1mg/gram) in HRT base. Place 1 gram vaginally daily for 2 weeks, then vaginally twice weekly., Disp: 30 g, Rfl: 6     Cholecalciferol (VITAMIN D) 125 MCG (5000 UT) CAPS, Take 1 capsule (5,000 Units) by mouth daily (Patient not taking: Reported on 9/29/2020), Disp: 90 capsule, Rfl: 2     ibuprofen (ADVIL/MOTRIN) 200 MG tablet, Take 400 mg by mouth every 6 hours as needed for moderate pain , Disp: , Rfl:      traZODone (DESYREL) 50 MG tablet, TAKE TWO TABLETS BY MOUTH NIGHTLY AS NEEDED FOR SLEEP (Patient not taking: Reported on 9/29/2020), Disp: 180 tablet, Rfl: 3  Allergies: Bactrim [sulfamethoxazole w/trimethoprim] and Nitrofurantoin    Assessment: doing well.  Plan: cont with HEP and RTC at one year, sooner if issues.      No ref. provider found

## 2020-09-29 NOTE — PATIENT INSTRUCTIONS
Thanks for coming today.  [facility]  [address] [place] [address]    To schedule future appointments in Ortho Clinic, you may call [phone].    To schedule ordered imaging by your provider:   Call Central Imaging [phone]    To schedule an injection ordered by your provider:  Call Central [facility] Injection scheduling line: [phone]  [facility] available online at:  [email or web address]    Please call if any further questions or concerns ([phone]).  Clinic hours 8 am to 5 pm.    Return to clinic (call) if symptoms worsen or fail to improve.

## 2020-10-23 ENCOUNTER — OFFICE VISIT (OUTPATIENT)
Dept: FAMILY MEDICINE | Facility: CLINIC | Age: 60
End: 2020-10-23
Payer: COMMERCIAL

## 2020-10-23 VITALS
HEART RATE: 67 BPM | SYSTOLIC BLOOD PRESSURE: 131 MMHG | TEMPERATURE: 97.2 F | OXYGEN SATURATION: 97 % | DIASTOLIC BLOOD PRESSURE: 75 MMHG

## 2020-10-23 DIAGNOSIS — Z23 NEED FOR PROPHYLACTIC VACCINATION AND INOCULATION AGAINST INFLUENZA: ICD-10-CM

## 2020-10-23 DIAGNOSIS — M25.511 ACUTE PAIN OF RIGHT SHOULDER: Primary | ICD-10-CM

## 2020-10-23 DIAGNOSIS — M79.644 PAIN OF RIGHT THUMB: ICD-10-CM

## 2020-10-23 PROCEDURE — 90471 IMMUNIZATION ADMIN: CPT | Performed by: FAMILY MEDICINE

## 2020-10-23 PROCEDURE — 90682 RIV4 VACC RECOMBINANT DNA IM: CPT | Performed by: FAMILY MEDICINE

## 2020-10-23 PROCEDURE — 99213 OFFICE O/P EST LOW 20 MIN: CPT | Mod: 25 | Performed by: FAMILY MEDICINE

## 2020-10-23 NOTE — PATIENT INSTRUCTIONS
1. Do the home exercises we discussed 12 reps, 4 times per day. If still painful after 4 weeks, ask for ortho referral.    2. Get a soft brace of the right wrist pain.    3. If all is well, let me see you in Feb for a physical with fasting labs.
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
## 2021-01-02 ENCOUNTER — OFFICE VISIT (OUTPATIENT)
Dept: URGENT CARE | Facility: URGENT CARE | Age: 61
End: 2021-01-02
Payer: COMMERCIAL

## 2021-01-02 VITALS
HEART RATE: 69 BPM | DIASTOLIC BLOOD PRESSURE: 71 MMHG | SYSTOLIC BLOOD PRESSURE: 142 MMHG | TEMPERATURE: 97.4 F | OXYGEN SATURATION: 97 %

## 2021-01-02 DIAGNOSIS — R82.90 NONSPECIFIC FINDING ON EXAMINATION OF URINE: ICD-10-CM

## 2021-01-02 DIAGNOSIS — N30.90 RECURRENT CYSTITIS: Primary | ICD-10-CM

## 2021-01-02 DIAGNOSIS — I10 ESSENTIAL HYPERTENSION WITH GOAL BLOOD PRESSURE LESS THAN 140/90: ICD-10-CM

## 2021-01-02 LAB
ALBUMIN UR-MCNC: 30 MG/DL
AMORPH CRY #/AREA URNS HPF: ABNORMAL /HPF
APPEARANCE UR: ABNORMAL
BACTERIA #/AREA URNS HPF: ABNORMAL /HPF
BILIRUB UR QL STRIP: NEGATIVE
COLOR UR AUTO: YELLOW
GLUCOSE UR STRIP-MCNC: NEGATIVE MG/DL
HGB UR QL STRIP: ABNORMAL
KETONES UR STRIP-MCNC: NEGATIVE MG/DL
LEUKOCYTE ESTERASE UR QL STRIP: ABNORMAL
NITRATE UR QL: POSITIVE
NON-SQ EPI CELLS #/AREA URNS LPF: ABNORMAL /LPF
PH UR STRIP: 6 PH (ref 5–7)
RBC #/AREA URNS AUTO: ABNORMAL /HPF
SOURCE: ABNORMAL
SP GR UR STRIP: 1.02 (ref 1–1.03)
UROBILINOGEN UR STRIP-ACNC: 0.2 EU/DL (ref 0.2–1)
WBC #/AREA URNS AUTO: >100 /HPF

## 2021-01-02 PROCEDURE — 99214 OFFICE O/P EST MOD 30 MIN: CPT | Performed by: FAMILY MEDICINE

## 2021-01-02 PROCEDURE — 81001 URINALYSIS AUTO W/SCOPE: CPT | Performed by: FAMILY MEDICINE

## 2021-01-02 PROCEDURE — 87088 URINE BACTERIA CULTURE: CPT | Performed by: FAMILY MEDICINE

## 2021-01-02 PROCEDURE — 87086 URINE CULTURE/COLONY COUNT: CPT | Performed by: FAMILY MEDICINE

## 2021-01-02 PROCEDURE — 87186 SC STD MICRODIL/AGAR DIL: CPT | Performed by: FAMILY MEDICINE

## 2021-01-02 RX ORDER — CEFDINIR 300 MG/1
300 CAPSULE ORAL 2 TIMES DAILY
Qty: 14 CAPSULE | Refills: 0 | Status: SHIPPED | OUTPATIENT
Start: 2021-01-02 | End: 2021-01-09

## 2021-01-02 NOTE — PROGRESS NOTES
SUBJECTIVE:                                                    Angela Ibarra is a 60 year old female who presents to clinic today for the following health issues: concern about uti     Dysuria/ugency/frequency 3 days  Vaginal discharge or concerns: No  Fever chills: None  Nausea vomiting: None  Flank Pain: None  Patient denies possibility of pregnancy  Denies possibility of STD, declined STD testing.    Problem list and histories reviewed & adjusted, as indicated.  Additional history: as documented    Problem list, Medication list, Allergies, and Medical/Social/Surgical histories reviewed in [facility] and updated as appropriate.    ROS:  Constitutional, HEENT, cardiovascular, pulmonary, gi and gu systems are negative, except as otherwise noted.    OBJECTIVE:                                                    BP (!) 142/71   Pulse 69   Temp 97.4  F (36.3  C) (Tympanic)   LMP 01/08/2008   SpO2 97%   There is no height or weight on file to calculate BMI.  GENERAL: healthy, alert and no distress  Skin no rashes  Psych: pleasant   ABDOMEN: no CVA tenderness  MS: no gross musculoskeletal defects noted, no edema    Diagnostic Test Results:  Results for orders placed or performed in visit on 01/02/21 (from the past 24 hour(s))   *UA reflex to Microscopic and Culture ([place] [facility] (except [place] and [place])    Specimen: Midstream Urine   Result Value Ref Range    Color Urine Yellow     Appearance Urine Slightly Cloudy     Glucose Urine Negative NEG^Negative mg/dL    Bilirubin Urine Negative NEG^Negative    Ketones Urine Negative NEG^Negative mg/dL    Specific Gravity Urine 1.020 1.003 - 1.035    Blood Urine Large (A) NEG^Negative    pH Urine 6.0 5.0 - 7.0 pH    Protein Albumin Urine 30 (A) NEG^Negative mg/dL    Urobilinogen Urine 0.2 0.2 - 1.0 EU/dL    Nitrite Urine Positive (A) NEG^Negative    Leukocyte Esterase Urine Large (A) NEG^Negative    Source Midstream Urine    Urine Microscopic   Result  Value Ref Range    WBC Urine >100 (A) OTO5^0 - 5 /HPF    RBC Urine 10-25 (A) OTO2^O - 2 /HPF    Squamous Epithelial /LPF Urine Few FEW^Few /LPF    Bacteria Urine Many (A) NEG^Negative /HPF    Amorphous Crystals Few (A) NEG^Negative /HPF        ASSESSMENT/PLAN:                                                        ICD-10-CM    1. Recurrent cystitis  N30.90 *UA reflex to Microscopic and Culture ([place] [facility] (except [place] and [place])     Urine Microscopic     cefdinir (OMNICEF) 300 MG capsule   2. Nonspecific finding on examination of urine  R82.90 Urine Culture Aerobic Bacterial   3. Essential hypertension with goal blood pressure less than 140/90  I10      Prescribed with omnicef   Aware to go to ER or come in immediately if with any fever chills nausea vomiting or flank pain.  Adverse reactions of medications discussed.  Over the counter medications discussed.   Aware to come back in if with worsening symptoms or if no relief despite treatment plan  Patient voiced understanding and had no further questions.     BP elevated- declined recheck as she states she is uncomfortable with her UTI and likely increasing her BP   She plans to follow up with her primary care provider     MD Rachel Callaway MD  [facility]

## 2021-01-03 LAB
BACTERIA SPEC CULT: ABNORMAL
BACTERIA SPEC CULT: ABNORMAL
Lab: ABNORMAL
SPECIMEN SOURCE: ABNORMAL

## 2021-01-29 ENCOUNTER — MYC MEDICAL ADVICE (OUTPATIENT)
Dept: FAMILY MEDICINE | Facility: CLINIC | Age: 61
End: 2021-01-29

## 2021-01-29 DIAGNOSIS — Z00.00 ROUTINE GENERAL MEDICAL EXAMINATION AT A HEALTH CARE FACILITY: Primary | ICD-10-CM

## 2021-01-29 DIAGNOSIS — I10 ESSENTIAL HYPERTENSION WITH GOAL BLOOD PRESSURE LESS THAN 140/90: ICD-10-CM

## 2021-01-29 RX ORDER — AMLODIPINE BESYLATE 5 MG/1
TABLET ORAL
Qty: 90 TABLET | Refills: 3 | Status: SHIPPED | OUTPATIENT
Start: 2021-01-29 | End: 2021-12-16

## 2021-01-29 RX ORDER — ATENOLOL 50 MG/1
TABLET ORAL
Qty: 90 TABLET | Refills: 3 | Status: SHIPPED | OUTPATIENT
Start: 2021-01-29 | End: 2021-12-17

## 2021-01-29 NOTE — TELEPHONE ENCOUNTER
Routing refill request to provider for review/approval because:  BP Readings from Last 3 Encounters:   01/02/21 (!) 142/71   10/23/20 131/75   07/08/20 116/55     Melyssa Slaughter BSN, RN

## 2021-02-02 DIAGNOSIS — Z96.641 STATUS POST TOTAL HIP REPLACEMENT, RIGHT: ICD-10-CM

## 2021-02-02 DIAGNOSIS — Z00.00 ROUTINE GENERAL MEDICAL EXAMINATION AT A HEALTH CARE FACILITY: ICD-10-CM

## 2021-02-02 LAB
ANION GAP SERPL CALCULATED.3IONS-SCNC: <1 MMOL/L (ref 3–14)
BASOPHILS # BLD AUTO: 0.1 10E9/L (ref 0–0.2)
BASOPHILS NFR BLD AUTO: 1.4 %
BUN SERPL-MCNC: 16 MG/DL (ref 7–30)
CALCIUM SERPL-MCNC: 10.4 MG/DL (ref 8.5–10.1)
CHLORIDE SERPL-SCNC: 107 MMOL/L (ref 94–109)
CHOLEST SERPL-MCNC: 213 MG/DL
CO2 SERPL-SCNC: 34 MMOL/L (ref 20–32)
CREAT SERPL-MCNC: 0.97 MG/DL (ref 0.52–1.04)
DIFFERENTIAL METHOD BLD: NORMAL
EOSINOPHIL # BLD AUTO: 0.2 10E9/L (ref 0–0.7)
EOSINOPHIL NFR BLD AUTO: 2.5 %
ERYTHROCYTE [DISTWIDTH] IN BLOOD BY AUTOMATED COUNT: 11.8 % (ref 10–15)
GFR SERPL CREATININE-BSD FRML MDRD: 64 ML/MIN/{1.73_M2}
GLUCOSE SERPL-MCNC: 89 MG/DL (ref 70–99)
HBA1C MFR BLD: 5.3 % (ref 0–5.6)
HCT VFR BLD AUTO: 44.5 % (ref 35–47)
HDLC SERPL-MCNC: 54 MG/DL
HGB BLD-MCNC: 14.5 G/DL (ref 11.7–15.7)
LDLC SERPL CALC-MCNC: 132 MG/DL
LYMPHOCYTES # BLD AUTO: 2.5 10E9/L (ref 0.8–5.3)
LYMPHOCYTES NFR BLD AUTO: 38.7 %
MCH RBC QN AUTO: 29.8 PG (ref 26.5–33)
MCHC RBC AUTO-ENTMCNC: 32.6 G/DL (ref 31.5–36.5)
MCV RBC AUTO: 91 FL (ref 78–100)
MONOCYTES # BLD AUTO: 0.6 10E9/L (ref 0–1.3)
MONOCYTES NFR BLD AUTO: 9.3 %
NEUTROPHILS # BLD AUTO: 3 10E9/L (ref 1.6–8.3)
NEUTROPHILS NFR BLD AUTO: 48.1 %
NONHDLC SERPL-MCNC: 159 MG/DL
PLATELET # BLD AUTO: 304 10E9/L (ref 150–450)
POTASSIUM SERPL-SCNC: 4.3 MMOL/L (ref 3.4–5.3)
RBC # BLD AUTO: 4.87 10E12/L (ref 3.8–5.2)
SODIUM SERPL-SCNC: 141 MMOL/L (ref 133–144)
TRIGL SERPL-MCNC: 134 MG/DL
WBC # BLD AUTO: 6.3 10E9/L (ref 4–11)

## 2021-02-02 PROCEDURE — 36415 COLL VENOUS BLD VENIPUNCTURE: CPT | Performed by: FAMILY MEDICINE

## 2021-02-02 PROCEDURE — 83036 HEMOGLOBIN GLYCOSYLATED A1C: CPT | Performed by: FAMILY MEDICINE

## 2021-02-02 PROCEDURE — 80048 BASIC METABOLIC PNL TOTAL CA: CPT | Performed by: FAMILY MEDICINE

## 2021-02-02 PROCEDURE — 80061 LIPID PANEL: CPT | Performed by: FAMILY MEDICINE

## 2021-02-02 PROCEDURE — 85025 COMPLETE CBC W/AUTO DIFF WBC: CPT | Performed by: FAMILY MEDICINE

## 2021-02-02 RX ORDER — AMOXICILLIN 500 MG/1
2000 CAPSULE ORAL ONCE
Qty: 4 CAPSULE | Refills: 0 | Status: SHIPPED | OUTPATIENT
Start: 2021-02-02 | End: 2021-08-17

## 2021-02-05 ENCOUNTER — OFFICE VISIT (OUTPATIENT)
Dept: FAMILY MEDICINE | Facility: CLINIC | Age: 61
End: 2021-02-05
Payer: COMMERCIAL

## 2021-02-05 VITALS
TEMPERATURE: 97 F | WEIGHT: 204 LBS | OXYGEN SATURATION: 98 % | HEIGHT: 62 IN | DIASTOLIC BLOOD PRESSURE: 76 MMHG | BODY MASS INDEX: 37.54 KG/M2 | HEART RATE: 68 BPM | SYSTOLIC BLOOD PRESSURE: 138 MMHG

## 2021-02-05 DIAGNOSIS — Z00.00 ROUTINE GENERAL MEDICAL EXAMINATION AT A HEALTH CARE FACILITY: Primary | ICD-10-CM

## 2021-02-05 DIAGNOSIS — Z12.31 ENCOUNTER FOR SCREENING MAMMOGRAM FOR BREAST CANCER: ICD-10-CM

## 2021-02-05 DIAGNOSIS — N39.0 RECURRENT UTI: ICD-10-CM

## 2021-02-05 DIAGNOSIS — G47.00 INSOMNIA, UNSPECIFIED TYPE: ICD-10-CM

## 2021-02-05 PROCEDURE — 99396 PREV VISIT EST AGE 40-64: CPT | Performed by: FAMILY MEDICINE

## 2021-02-05 RX ORDER — SULFAMETHOXAZOLE AND TRIMETHOPRIM 400; 80 MG/1; MG/1
1 TABLET ORAL DAILY PRN
Qty: 20 TABLET | Refills: 2 | Status: SHIPPED | OUTPATIENT
Start: 2021-02-05 | End: 2022-02-07

## 2021-02-05 RX ORDER — TRAZODONE HYDROCHLORIDE 50 MG/1
TABLET, FILM COATED ORAL
Qty: 180 TABLET | Refills: 3 | Status: SHIPPED | OUTPATIENT
Start: 2021-02-05 | End: 2022-06-22

## 2021-02-05 ASSESSMENT — ENCOUNTER SYMPTOMS
ABDOMINAL PAIN: 0
DIARRHEA: 0
HEMATOCHEZIA: 0
CHILLS: 0
COUGH: 0
HEMATURIA: 0
CONSTIPATION: 0

## 2021-02-05 ASSESSMENT — MIFFLIN-ST. JEOR: SCORE: 1448.59
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
## 2021-02-05 NOTE — PATIENT INSTRUCTIONS
1. I recommend including veggies, fresh fruits (3 to 5 servings), nuts (unsalted) in your daily diet. Cut down on carbs like bread, pasta, rice, potatoes. Cut down on red meats like burgers etc. Increase healthy proteins like beans, tofu, tuna, chicken and turkey.    2. Get regular exercise like jogging, biking, swimming at least 3 times per week (150 minutes per week).      3. Do self breast exams monthly. Report any lumps. Set up the mammogram.    4. Avoid the sun or otherwise use sun screen.    5. See the dentist and eye doctor regularly.     6. Please call [phone] to register for a class about advanced directive.     7. If all is well, see you in one year for a physical with fasting labs.

## 2021-03-22 ENCOUNTER — MYC MEDICAL ADVICE (OUTPATIENT)
Dept: FAMILY MEDICINE | Facility: CLINIC | Age: 61
End: 2021-03-22

## 2021-03-24 ENCOUNTER — OFFICE VISIT (OUTPATIENT)
Dept: ORTHOPEDICS | Facility: CLINIC | Age: 61
End: 2021-03-24
Payer: COMMERCIAL

## 2021-03-24 ENCOUNTER — ANCILLARY PROCEDURE (OUTPATIENT)
Dept: GENERAL RADIOLOGY | Facility: CLINIC | Age: 61
End: 2021-03-24
Attending: PEDIATRICS
Payer: COMMERCIAL

## 2021-03-24 VITALS
DIASTOLIC BLOOD PRESSURE: 82 MMHG | SYSTOLIC BLOOD PRESSURE: 120 MMHG | HEIGHT: 62 IN | WEIGHT: 204 LBS | BODY MASS INDEX: 37.54 KG/M2

## 2021-03-24 DIAGNOSIS — M25.511 CHRONIC RIGHT SHOULDER PAIN: ICD-10-CM

## 2021-03-24 DIAGNOSIS — G89.29 CHRONIC RIGHT SHOULDER PAIN: Primary | ICD-10-CM

## 2021-03-24 DIAGNOSIS — G89.29 CHRONIC RIGHT SHOULDER PAIN: ICD-10-CM

## 2021-03-24 DIAGNOSIS — M25.511 CHRONIC RIGHT SHOULDER PAIN: Primary | ICD-10-CM

## 2021-03-24 DIAGNOSIS — M75.01 ADHESIVE CAPSULITIS OF RIGHT SHOULDER: ICD-10-CM

## 2021-03-24 PROCEDURE — 99214 OFFICE O/P EST MOD 30 MIN: CPT | Performed by: PEDIATRICS

## 2021-03-24 PROCEDURE — 73030 X-RAY EXAM OF SHOULDER: CPT | Mod: RT | Performed by: RADIOLOGY

## 2021-03-24 ASSESSMENT — MIFFLIN-ST. JEOR: SCORE: 1448.59

## 2021-03-24 NOTE — LETTER
3/24/2021         RE: Angela Ibarra  [address] [place] [id number]        Dear Colleague,    Thank you for referring your patient, Angela Ibarra, to the [facility]. Please see a copy of my visit note below.    ASSESSMENT & PLAN    Angela was seen today for pain.    Diagnoses and all orders for this visit:    Chronic right shoulder pain  -     XR Shoulder Right G/E 3 Views; Future  -     [facility]; Future    Adhesive capsulitis of right shoulder  -     [facility]; Future      This issue is chronic and Worsening with time.  Consistent with frozen shoulder, though some underlying cuff pathology is also a consideration.  Discussed the potential etiology of frozen shoulder syndrome, including idiopathic, post-traumatic, and potential relation to medical conditions such as diabetes and thyroid issues.  The natural history of this condition is that it improves with time, but can take up to 18-24 months.  Treatment considerations include monitoring with symptom management and activity modification, physical therapy, steroid injection (typically intra-articular glenohumeral joint), and potential referral to see an orthopedic surgeon.    Plan physical therapy next, referral placed.  She is also interested in imaging guided right glenohumeral joint injection.  We will have her see one of my colleagues for this.    Monitor course with injection and therapy over 1 month, and contact clinic with update, particularly if not improving, sooner if needed.  Questions answered. Discussed signs and symptoms that may indicate more serious issues; the patient was instructed to seek appropriate care if noted. Angela indicates understanding of these issues and agrees with the plan.            Shanu Patten DO  [facility]    -----  Chief Complaint   Patient presents with     Right Shoulder - Pain  "      SUBJECTIVE  Angela Ibarra is a/an 60 year old female who is seen as a self referral for evaluation of her right shoulder.  Pain and lack of ROM.  Pain with IR, reaching.  Feels like something is pulling her shoulder out.        The patient is seen by themselves.  The patient is Right handed    Onset: 3+ month(s) ago. Reports insidious onset without acute precipitating event.  Location of Pain: right shoulder   Worsened by: use, lifting, reaching, IR   Better with: HEP , acetominophen   Treatments tried: Tylenol  Associated symptoms: hypomobility     Orthopedic/Surgical history: NO, HC of left shoulder injury 11 years ago   Social History/Occupation: desk job     No family history pertinent to patient's problem today.     **  Initially noted with trying to reach overhead while sitting at desk, had pain in right shoulder. Overhead reaching continues to cause pain.  Limited ability to move shoulder, including reaching behind self.  Limited ROM. Notes a pulling sensation in right shoulder with reaching in the wrong way.    Primary care note reviewed from 10/23/20; right shoulder issue appears to date back farther than past ~3 months.      REVIEW OF SYSTEMS:  Review of Systems   All other systems reviewed and are negative.        OBJECTIVE:  /82   Ht 1.575 m (5' 2\")   Wt 92.5 kg (204 lb)   LMP 01/08/2008   BMI 37.31 kg/m     General: healthy, alert and in no distress  HEENT: no scleral icterus or conjunctival erythema  Skin: no suspicious lesions or rash. No jaundice.  CV: distal perfusion intact   Resp: normal respiratory effort without conversational dyspnea   Psych: normal mood and affect  Gait: normal steady gait with appropriate coordination and balance   Neuro: Normal light sensory exam of upper extremity       Right Shoulder exam    ROM:        forward flexion ~90 with shoulder hiking, passive exceeds active by ~10-15 deg       abduction ~80 with shoulder hiking, passive does not significantly " exceed active       internal rotation back pocket       external rotation lacking 15 to 20 degrees compared to left  Pain with above motions    Strength:        abduction 5-/5       internal rotation 5/5       external rotation 4/5  Grossly symmetric, no change in pain    Impingement testing:        Pain with attempted Schrader       Mild pain with empty can    Skin:        no visible deformities       well perfused       capillary refill brisk    Sensation:        normal sensation over shoulder and upper extremity     RADIOLOGY:  I independently ordered, visualized and reviewed these images with the patient  No acute bony abnormality.    Recent Results (from the past 24 hour(s))   XR Shoulder Right G/E 3 Views    Narrative    RIGHT SHOULDER THREE OR MORE VIEWS  3/24/2021 9:01 AM     HISTORY: Chronic right shoulder pain.    COMPARISON: None.      Impression    IMPRESSION: Mild to moderate acromioclavicular degenerative changes.  Glenohumeral joint is unremarkable. No evidence of acute fracture or  dislocation.           Review of prior external note(s) from - primary care  Review of the result(s) of each unique test - imaging  Independent interpretation of a test performed by another physician/other qualified health care professional (not separately reported) - imaging  26 minutes spent on the date of the encounter doing chart review, history and exam, documentation and further activities as noted above             Again, thank you for allowing me to participate in the care of your patient.        Sincerely,        Shaun Patten DO

## 2021-03-24 NOTE — PROGRESS NOTES
ASSESSMENT & PLAN    Angela was seen today for pain.    Diagnoses and all orders for this visit:    Chronic right shoulder pain  -     XR Shoulder Right G/E 3 Views; Future  -     [facility]; Future    Adhesive capsulitis of right shoulder  -     [facility]; Future      This issue is chronic and Worsening with time.  Consistent with frozen shoulder, though some underlying cuff pathology is also a consideration.  Discussed the potential etiology of frozen shoulder syndrome, including idiopathic, post-traumatic, and potential relation to medical conditions such as diabetes and thyroid issues.  The natural history of this condition is that it improves with time, but can take up to 18-24 months.  Treatment considerations include monitoring with symptom management and activity modification, physical therapy, steroid injection (typically intra-articular glenohumeral joint), and potential referral to see an orthopedic surgeon.    Plan physical therapy next, referral placed.  She is also interested in imaging guided right glenohumeral joint injection.  We will have her see one of my colleagues for this.    Monitor course with injection and therapy over 1 month, and contact clinic with update, particularly if not improving, sooner if needed.  Questions answered. Discussed signs and symptoms that may indicate more serious issues; the patient was instructed to seek appropriate care if noted. Angela indicates understanding of these issues and agrees with the plan.            Shaun Patten DO  [facility]    -----  Chief Complaint   Patient presents with     Right Shoulder - Pain       SUBJECTIVE  Angela Ibarra is a/an 60 year old female who is seen as a self referral for evaluation of her right shoulder.  Pain and lack of ROM.  Pain with IR, reaching.  Feels like something is pulling her shoulder out.        The patient is seen by themselves.  The  "patient is Right handed    Onset: 3+ month(s) ago. Reports insidious onset without acute precipitating event.  Location of Pain: right shoulder   Worsened by: use, lifting, reaching, IR   Better with: HEP , acetominophen   Treatments tried: Tylenol  Associated symptoms: hypomobility     Orthopedic/Surgical history: NO, HC of left shoulder injury 11 years ago   Social History/Occupation: desk job     No family history pertinent to patient's problem today.     **  Initially noted with trying to reach overhead while sitting at desk, had pain in right shoulder. Overhead reaching continues to cause pain.  Limited ability to move shoulder, including reaching behind self.  Limited ROM. Notes a pulling sensation in right shoulder with reaching in the wrong way.    Primary care note reviewed from 10/23/20; right shoulder issue appears to date back farther than past ~3 months.      REVIEW OF SYSTEMS:  Review of Systems   All other systems reviewed and are negative.        OBJECTIVE:  /82   Ht 1.575 m (5' 2\")   Wt 92.5 kg (204 lb)   LMP 01/08/2008   BMI 37.31 kg/m     General: healthy, alert and in no distress  HEENT: no scleral icterus or conjunctival erythema  Skin: no suspicious lesions or rash. No jaundice.  CV: distal perfusion intact   Resp: normal respiratory effort without conversational dyspnea   Psych: normal mood and affect  Gait: normal steady gait with appropriate coordination and balance   Neuro: Normal light sensory exam of upper extremity       Right Shoulder exam    ROM:        forward flexion ~90 with shoulder hiking, passive exceeds active by ~10-15 deg       abduction ~80 with shoulder hiking, passive does not significantly exceed active       internal rotation back pocket       external rotation lacking 15 to 20 degrees compared to left  Pain with above motions    Strength:        abduction 5-/5       internal rotation 5/5       external rotation 4/5  Grossly symmetric, no change in " pain    Impingement testing:        Pain with attempted Schrader       Mild pain with empty can    Skin:        no visible deformities       well perfused       capillary refill brisk    Sensation:        normal sensation over shoulder and upper extremity     RADIOLOGY:  I independently ordered, visualized and reviewed these images with the patient  No acute bony abnormality.    Recent Results (from the past 24 hour(s))   XR Shoulder Right G/E 3 Views    Narrative    RIGHT SHOULDER THREE OR MORE VIEWS  3/24/2021 9:01 AM     HISTORY: Chronic right shoulder pain.    COMPARISON: None.      Impression    IMPRESSION: Mild to moderate acromioclavicular degenerative changes.  Glenohumeral joint is unremarkable. No evidence of acute fracture or  dislocation.           Review of prior external note(s) from - primary care  Review of the result(s) of each unique test - imaging  Independent interpretation of a test performed by another physician/other qualified health care professional (not separately reported) - imaging  26 minutes spent on the date of the encounter doing chart review, history and exam, documentation and further activities as noted above

## 2021-03-31 ENCOUNTER — THERAPY VISIT (OUTPATIENT)
Dept: PHYSICAL THERAPY | Facility: CLINIC | Age: 61
End: 2021-03-31
Attending: PEDIATRICS
Payer: COMMERCIAL

## 2021-03-31 DIAGNOSIS — G89.29 CHRONIC RIGHT SHOULDER PAIN: Primary | ICD-10-CM

## 2021-03-31 DIAGNOSIS — M75.01 ADHESIVE CAPSULITIS OF RIGHT SHOULDER: ICD-10-CM

## 2021-03-31 DIAGNOSIS — M25.511 CHRONIC RIGHT SHOULDER PAIN: Primary | ICD-10-CM

## 2021-03-31 PROCEDURE — 97110 THERAPEUTIC EXERCISES: CPT | Mod: GP | Performed by: PHYSICAL THERAPIST

## 2021-03-31 PROCEDURE — 97161 PT EVAL LOW COMPLEX 20 MIN: CPT | Mod: GP | Performed by: PHYSICAL THERAPIST

## 2021-03-31 NOTE — PROGRESS NOTES
Physical Therapy Initial Evaluation  Subjective:    Patient Health History  Angela Ibarra being seen for R shoulder.     Problem began: 1/20/2021.   Problem occurred: unknown   Pain is reported as 4/10 on pain scale.  General health as reported by patient is good.  Pertinent medical history includes: high blood pressure, history of fractures and overweight.   Red flags:  None as reported by patient.  Medical allergies: none.   Surgeries include:  None.    Current medications:  High blood pressure medication.    Current occupation is administration.   Primary job tasks include:  Computer work.                                    Objective:    SHOULDER:    Cervical Screen: limited sidebending, see below    Shoulder:   PROM L PROM R AROM L AROM R MMT L MMT R   Flex   180 135 5/5 4/5+   Abd   170 122 5/5 4/5+   Full Can     5/5 4/5+   Empty Can     5/5 4/5+   IR   T8 T12 5/5 5/5   ER   77 30 4/5 4/5   Ext/IR           Scapulothoraic Rhythm: limited range, increased upward rotation and increased upper trap activation    Palpation: grossly tender R shoulder    Special tests:   L R   Impingement     Neer's neg +   Hawkin's-Kyle neg +   Coracoid Impingement     Internal impingement     Labral     Anterior Slide     Tyrrell's     Crank     Instability     Apprehension (anterior)     Relocation (anterior)     Anterior Load & Shift     Posterior Load & Shift     Posterior instability (with 90 degrees flex)     Multi-Directional Instability      Sulcus     Biceps      Speed's     Rotator Cuff Tear     Drop Arm neg neg   Belly Press neg neg   Lift off  neg neg     GH Mobility  L  R   Posterior glide wnl capsular   Inferior glide wnl Capsular   Anterior glide wnl      AROM cervical SB: L 30degs R: 25degs   Rotation 75degs bilaterally      Sharps pursor negative  spurling's + on R to R upper arm    Limited C2-6 mobility R>L, soft tissue tightness in R>L upper trap, R mid scalene, R levator  scapulae          System    Physical Exam    General     ROS    Assessment/Plan:    Patient is a 60 year old female with right side shoulder complaints.    Patient has the following significant findings with corresponding treatment plan.                Diagnosis 1:  R shoulder pain  Pain -  hot/cold therapy, US, electric stimulation, manual therapy, education and home program  Decreased ROM/flexibility - manual therapy, therapeutic exercise, therapeutic activity and home program  Decreased joint mobility - manual therapy, therapeutic exercise, therapeutic activity and home program  Decreased strength - therapeutic exercise, therapeutic activities and home program    Cumulative Therapy Evaluation is: Low complexity.    Previous and current functional limitations:  (See Goal Flow Sheet for this information)    Short term and Long term goals: (See Goal Flow Sheet for this information)     Communication ability:  Patient appears to be able to clearly communicate and understand verbal and written communication and follow directions correctly.  Treatment Explanation - The following has been discussed with the patient:   RX ordered/plan of care  Anticipated outcomes  Possible risks and side effects  This patient would benefit from PT intervention to resume normal activities.   Rehab potential is good.    Frequency:  1 X week, once daily  Duration:  for 16 weeks  Discharge Plan:  Achieve all LTG.  Independent in home treatment program.  Reach maximal therapeutic benefit.    Please refer to the daily flowsheet for treatment today, total treatment time and time spent performing 1:1 timed codes.

## 2021-04-07 ENCOUNTER — THERAPY VISIT (OUTPATIENT)
Dept: PHYSICAL THERAPY | Facility: CLINIC | Age: 61
End: 2021-04-07
Payer: COMMERCIAL

## 2021-04-07 DIAGNOSIS — G89.29 CHRONIC RIGHT SHOULDER PAIN: Primary | ICD-10-CM

## 2021-04-07 DIAGNOSIS — M25.511 CHRONIC RIGHT SHOULDER PAIN: Primary | ICD-10-CM

## 2021-04-07 PROCEDURE — 97110 THERAPEUTIC EXERCISES: CPT | Mod: GP | Performed by: PHYSICAL THERAPIST

## 2021-04-09 ENCOUNTER — THERAPY VISIT (OUTPATIENT)
Dept: PHYSICAL THERAPY | Facility: CLINIC | Age: 61
End: 2021-04-09
Payer: COMMERCIAL

## 2021-04-09 DIAGNOSIS — G89.29 CHRONIC RIGHT SHOULDER PAIN: Primary | ICD-10-CM

## 2021-04-09 DIAGNOSIS — M25.511 CHRONIC RIGHT SHOULDER PAIN: Primary | ICD-10-CM

## 2021-04-09 PROCEDURE — 97140 MANUAL THERAPY 1/> REGIONS: CPT | Mod: GP | Performed by: PHYSICAL THERAPIST

## 2021-04-09 PROCEDURE — 97110 THERAPEUTIC EXERCISES: CPT | Mod: GP | Performed by: PHYSICAL THERAPIST

## 2021-04-14 ENCOUNTER — THERAPY VISIT (OUTPATIENT)
Dept: PHYSICAL THERAPY | Facility: CLINIC | Age: 61
End: 2021-04-14
Payer: COMMERCIAL

## 2021-04-14 DIAGNOSIS — M25.511 CHRONIC RIGHT SHOULDER PAIN: Primary | ICD-10-CM

## 2021-04-14 DIAGNOSIS — G89.29 CHRONIC RIGHT SHOULDER PAIN: Primary | ICD-10-CM

## 2021-04-14 PROCEDURE — 97140 MANUAL THERAPY 1/> REGIONS: CPT | Mod: GP | Performed by: PHYSICAL THERAPIST

## 2021-04-14 PROCEDURE — 97110 THERAPEUTIC EXERCISES: CPT | Mod: GP | Performed by: PHYSICAL THERAPIST

## 2021-04-14 PROCEDURE — 97112 NEUROMUSCULAR REEDUCATION: CPT | Mod: GP | Performed by: PHYSICAL THERAPIST

## 2021-04-16 ENCOUNTER — ANCILLARY PROCEDURE (OUTPATIENT)
Dept: MAMMOGRAPHY | Facility: CLINIC | Age: 61
End: 2021-04-16
Attending: FAMILY MEDICINE
Payer: COMMERCIAL

## 2021-04-16 DIAGNOSIS — G47.00 INSOMNIA, UNSPECIFIED TYPE: ICD-10-CM

## 2021-04-16 PROCEDURE — 77063 BREAST TOMOSYNTHESIS BI: CPT | Mod: TC | Performed by: RADIOLOGY

## 2021-04-16 PROCEDURE — 99207 PR NO CHARGE LOS: CPT

## 2021-04-16 PROCEDURE — 77067 SCR MAMMO BI INCL CAD: CPT | Mod: TC | Performed by: RADIOLOGY

## 2021-04-21 ENCOUNTER — OFFICE VISIT (OUTPATIENT)
Dept: ORTHOPEDICS | Facility: CLINIC | Age: 61
End: 2021-04-21
Payer: COMMERCIAL

## 2021-04-21 VITALS — HEIGHT: 62 IN | WEIGHT: 204 LBS | BODY MASS INDEX: 37.54 KG/M2

## 2021-04-21 DIAGNOSIS — M75.01 ADHESIVE CAPSULITIS OF RIGHT SHOULDER: ICD-10-CM

## 2021-04-21 DIAGNOSIS — G89.29 CHRONIC RIGHT SHOULDER PAIN: Primary | ICD-10-CM

## 2021-04-21 DIAGNOSIS — M25.511 CHRONIC RIGHT SHOULDER PAIN: Primary | ICD-10-CM

## 2021-04-21 PROCEDURE — 20611 DRAIN/INJ JOINT/BURSA W/US: CPT | Mod: RT | Performed by: FAMILY MEDICINE

## 2021-04-21 RX ORDER — ROPIVACAINE HYDROCHLORIDE 5 MG/ML
3 INJECTION, SOLUTION EPIDURAL; INFILTRATION; PERINEURAL
Status: DISCONTINUED | OUTPATIENT
Start: 2021-04-21 | End: 2022-02-07

## 2021-04-21 RX ORDER — TRIAMCINOLONE ACETONIDE 40 MG/ML
40 INJECTION, SUSPENSION INTRA-ARTICULAR; INTRAMUSCULAR
Status: DISCONTINUED | OUTPATIENT
Start: 2021-04-21 | End: 2022-02-07

## 2021-04-21 RX ADMIN — TRIAMCINOLONE ACETONIDE 40 MG: 40 INJECTION, SUSPENSION INTRA-ARTICULAR; INTRAMUSCULAR at 08:20

## 2021-04-21 RX ADMIN — ROPIVACAINE HYDROCHLORIDE 3 ML: 5 INJECTION, SOLUTION EPIDURAL; INFILTRATION; PERINEURAL at 08:20

## 2021-04-21 ASSESSMENT — MIFFLIN-ST. JEOR: SCORE: 1448.59

## 2021-04-21 NOTE — LETTER
2021         RE: Angela Ibarra  [address] [place] [id number]        Dear Colleague,    Thank you for referring your patient, Angela Ibarra, to the [facility]. Please see a copy of my visit note below.    Angela Ibarra  :  1960  DOS: 2021  MRN: [id number]    Sports Medicine Clinic Procedure    Ultrasound Guided Right Shoulder Intra-articular Glenohumeral Joint Injection    Clinical History: Gradual onset of right shoulder pain with decreasing AROM over the past ~ 3+ months.  She has been physical therapy over past 3 weeks with only mild improvement.    Diagnosis:   1. Chronic right shoulder pain    2. Adhesive capsulitis of right shoulder      Referring Physician: Shaun Patten DO  Large Joint Injection/Arthocentesis: R glenohumeral joint    Date/Time: 2021 8:20 AM  Performed by: Shaun Munoz DO  Authorized by: Shaun Munoz DO     Indications:  Pain  Needle Size:  21 G  Guidance: ultrasound    Approach:  Posterolateral  Location:  Shoulder      Site:  R glenohumeral joint  Medications:  3 mL ropivacaine 5 MG/ML; 40 mg triamcinolone 40 MG/ML  Outcome:  Tolerated well, no immediate complications  Procedure discussed: discussed risks, benefits, and alternatives    Consent Given by:  Patient  Timeout: timeout called immediately prior to procedure    Prep: patient was prepped and draped in usual sterile fashion          Impression:  Successful Right intra-articular shoulder injection.    Plan:  Follow up with Dr Patten and PT as directed, HEP reviewed, Seven Stages of Jaxson performed today after injection   Expectations and goals of CSI reviewed  Often 2-3 days for steroid effect, and can take up to two weeks for maximum effect  We discussed modified progressive pain-free activity as tolerated  Do not overuse in first two weeks if feeling better due to concern for vulnerability while steroid is  working  Supportive care reviewed  All questions were answered today  Contact us with additional questions or concerns  Signs and sx of concern reviewed        Shaun Munoz DO, CAQ  Primary Care Sports Medicine  [facility]         Again, thank you for allowing me to participate in the care of your patient.        Sincerely,        Shaun Munoz DO

## 2021-04-21 NOTE — PROGRESS NOTES
Angela Ibarra  :  1960  DOS: 2021  MRN: [id number]    Sports Medicine Clinic Procedure    Ultrasound Guided Right Shoulder Intra-articular Glenohumeral Joint Injection    Clinical History: Gradual onset of right shoulder pain with decreasing AROM over the past ~ 3+ months.  She has been physical therapy over past 3 weeks with only mild improvement.    Diagnosis:   1. Chronic right shoulder pain    2. Adhesive capsulitis of right shoulder      Referring Physician: Shaun Patten DO  Large Joint Injection/Arthocentesis: R glenohumeral joint    Date/Time: 2021 8:20 AM  Performed by: Shaun Munoz DO  Authorized by: Shaun Munoz DO     Indications:  Pain  Needle Size:  21 G  Guidance: ultrasound    Approach:  Posterolateral  Location:  Shoulder      Site:  R glenohumeral joint  Medications:  3 mL ropivacaine 5 MG/ML; 40 mg triamcinolone 40 MG/ML  Outcome:  Tolerated well, no immediate complications  Procedure discussed: discussed risks, benefits, and alternatives    Consent Given by:  Patient  Timeout: timeout called immediately prior to procedure    Prep: patient was prepped and draped in usual sterile fashion          Impression:  Successful Right intra-articular shoulder injection.    Plan:  Follow up with Dr Patten and PT as directed, HEP reviewed, Seven Stages of Jaxson performed today after injection   Expectations and goals of CSI reviewed  Often 2-3 days for steroid effect, and can take up to two weeks for maximum effect  We discussed modified progressive pain-free activity as tolerated  Do not overuse in first two weeks if feeling better due to concern for vulnerability while steroid is working  Supportive care reviewed  All questions were answered today  Contact us with additional questions or concerns  Signs and sx of concern reviewed        Shaun Munoz DO, CAQ  Primary Care Sports Medicine  [facility]

## 2021-04-28 ENCOUNTER — THERAPY VISIT (OUTPATIENT)
Dept: PHYSICAL THERAPY | Facility: CLINIC | Age: 61
End: 2021-04-28
Payer: COMMERCIAL

## 2021-04-28 DIAGNOSIS — M25.511 CHRONIC RIGHT SHOULDER PAIN: Primary | ICD-10-CM

## 2021-04-28 DIAGNOSIS — G89.29 CHRONIC RIGHT SHOULDER PAIN: Primary | ICD-10-CM

## 2021-04-28 PROCEDURE — 97110 THERAPEUTIC EXERCISES: CPT | Mod: GP | Performed by: PHYSICAL THERAPIST

## 2021-04-28 PROCEDURE — 97112 NEUROMUSCULAR REEDUCATION: CPT | Mod: GP | Performed by: PHYSICAL THERAPIST

## 2021-04-28 PROCEDURE — 97140 MANUAL THERAPY 1/> REGIONS: CPT | Mod: GP | Performed by: PHYSICAL THERAPIST

## 2021-05-09 ENCOUNTER — OFFICE VISIT (OUTPATIENT)
Dept: URGENT CARE | Facility: URGENT CARE | Age: 61
End: 2021-05-09
Payer: COMMERCIAL

## 2021-05-09 VITALS
SYSTOLIC BLOOD PRESSURE: 130 MMHG | DIASTOLIC BLOOD PRESSURE: 75 MMHG | TEMPERATURE: 97.9 F | HEART RATE: 93 BPM | OXYGEN SATURATION: 98 %

## 2021-05-09 DIAGNOSIS — N30.01 ACUTE CYSTITIS WITH HEMATURIA: Primary | ICD-10-CM

## 2021-05-09 LAB
ALBUMIN UR-MCNC: NEGATIVE MG/DL
APPEARANCE UR: ABNORMAL
BACTERIA #/AREA URNS HPF: ABNORMAL /HPF
BILIRUB UR QL STRIP: NEGATIVE
COLOR UR AUTO: YELLOW
GLUCOSE UR STRIP-MCNC: NEGATIVE MG/DL
HGB UR QL STRIP: ABNORMAL
KETONES UR STRIP-MCNC: NEGATIVE MG/DL
LEUKOCYTE ESTERASE UR QL STRIP: ABNORMAL
NITRATE UR QL: NEGATIVE
NON-SQ EPI CELLS #/AREA URNS LPF: ABNORMAL /LPF
PH UR STRIP: 6 PH (ref 5–7)
RBC #/AREA URNS AUTO: ABNORMAL /HPF
SOURCE: ABNORMAL
SP GR UR STRIP: 1.01 (ref 1–1.03)
UROBILINOGEN UR STRIP-ACNC: 0.2 EU/DL (ref 0.2–1)
WBC #/AREA URNS AUTO: ABNORMAL /HPF
WBC CLUMPS #/AREA URNS HPF: PRESENT /HPF

## 2021-05-09 PROCEDURE — 87088 URINE BACTERIA CULTURE: CPT | Performed by: STUDENT IN AN ORGANIZED HEALTH CARE EDUCATION/TRAINING PROGRAM

## 2021-05-09 PROCEDURE — 81001 URINALYSIS AUTO W/SCOPE: CPT | Performed by: STUDENT IN AN ORGANIZED HEALTH CARE EDUCATION/TRAINING PROGRAM

## 2021-05-09 PROCEDURE — 87086 URINE CULTURE/COLONY COUNT: CPT | Performed by: STUDENT IN AN ORGANIZED HEALTH CARE EDUCATION/TRAINING PROGRAM

## 2021-05-09 PROCEDURE — 99213 OFFICE O/P EST LOW 20 MIN: CPT | Performed by: STUDENT IN AN ORGANIZED HEALTH CARE EDUCATION/TRAINING PROGRAM

## 2021-05-09 PROCEDURE — 87186 SC STD MICRODIL/AGAR DIL: CPT | Performed by: STUDENT IN AN ORGANIZED HEALTH CARE EDUCATION/TRAINING PROGRAM

## 2021-05-09 RX ORDER — CEFDINIR 300 MG/1
300 CAPSULE ORAL 2 TIMES DAILY
Qty: 14 CAPSULE | Refills: 0 | Status: SHIPPED | OUTPATIENT
Start: 2021-05-09 | End: 2022-02-07

## 2021-05-09 NOTE — PROGRESS NOTES
HPI:       Angela Ibarra is a 60 year old  female who presents for the new concern(s) of    Tract infection symptoms:  Patient states that her symptoms started this morning when she woke up.  Her most predominant symptom is lower abdominal pain.  She notes increase in urinary frequency as well as dysuria.  She gets frequent UTIs, with her last one being in January.  She was treated with cefdinir at that time.  She states she has had pyelonephritis in the past, but she denies any systemic symptoms at this point time.  No fevers, no chills.  No back pain.  She notes no blood in her urine.  She notes no constipation.  She notes that she frequently gets UTIs after sexual intercourse, and she did have sexual intercourse yesterday.  She is worried that her  may have accidentally gotten bacteria near her urethra, which is why she is sick now today.         PMHX:     Patient Active Problem List   Diagnosis     NONSPECIFIC COLITIS     Migraine headache     Stress incontinence     Balance disorder     Right leg weakness     Right carpal tunnel syndrome     Osteoporosis     Loose body in ankle and foot joint     Synovitis of ankle     Malunion, fracture     Pain in joint involving ankle and foot     Abnormal gait     Other postprocedural status(V45.89)     Chondromalacia, left ankle and joints of left foot     CARDIOVASCULAR SCREENING; LDL GOAL LESS THAN 130     Advanced directives, counseling/discussion     Insomnia, unspecified type     Essential hypertension with goal blood pressure less than 140/90     Primary osteoarthritis of right hip     Cataract of both eyes, unspecified cataract type     Arthropathy of facet joint     Morbid obesity (H)     Bariatric surgery status     Malnutrition following gastrointestinal surgery     Right hip pain     Status post hip surgery       Current Outpatient Medications   Medication Sig Dispense Refill     acetaminophen (TYLENOL) 325 MG tablet Take 2 tablets (650 mg) by  mouth every 4 hours as needed for other 1 Bottle 0     amLODIPine (NORVASC) 5 MG tablet TAKE ONE TABLET BY MOUTH ONCE DAILY 90 tablet 3     atenolol (TENORMIN) 50 MG tablet TAKE ONE TABLET BY MOUTH ONCE DAILY 90 tablet 3     cefdinir (OMNICEF) 300 MG capsule Take 1 capsule (300 mg) by mouth 2 times daily 14 capsule 0     Cholecalciferol (VITAMIN D) 125 MCG (5000 UT) CAPS Take 1 capsule (5,000 Units) by mouth daily 90 capsule 2     COMPOUNDED NON-CONTROLLED SUBSTANCE (CMPD RX) - PHARMACY TO MIX COMPOUNDED MEDICATION Estriol 0.1% cream (1mg/gram) in HRT base. Place 1 gram vaginally daily for 2 weeks, then vaginally twice weekly. 30 g 6     ibuprofen (ADVIL/MOTRIN) 200 MG tablet Take 400 mg by mouth every 6 hours as needed for moderate pain        sulfamethoxazole-trimethoprim (BACTRIM) 400-80 MG tablet Take 1 tablet by mouth daily as needed (take just with intercourse) No need to fill now. She will call when she needs it. 20 tablet 2     traZODone (DESYREL) 50 MG tablet TAKE TWO TABLETS BY MOUTH NIGHTLY AS NEEDED FOR SLEEP. Pharmacy please fill only if she calls. 180 tablet 3       Social History     Socioeconomic History     Marital status:      Spouse name: Not on file     Number of children: 1     Years of education: Not on file     Highest education level: Not on file   Occupational History     Occupation:      Employer: [employer]   Social Needs     Financial resource strain: Not on file     Food insecurity     Worry: Not on file     Inability: Not on file     Transportation needs     Medical: Not on file     Non-medical: Not on file   Tobacco Use     Smoking status: Never Smoker     Smokeless tobacco: Never Used   Substance and Sexual Activity     Alcohol use: Yes     Comment: rarely - less than once a month     Drug use: No     Sexual activity: Yes     Partners: Male     Birth control/protection: Post-menopausal     Comment: tvh   Lifestyle     Physical activity     Days per week: Not on file      Minutes per session: Not on file     Stress: Not on file   Relationships     Social connections     Talks on phone: Not on file     Gets together: Not on file     Attends Hindu service: Not on file     Active member of club or organization: Not on file     Attends meetings of clubs or organizations: Not on file     Relationship status: Not on file     Intimate partner violence     Fear of current or ex partner: Not on file     Emotionally abused: Not on file     Physically abused: Not on file     Forced sexual activity: Not on file   Other Topics Concern      Service Yes     Comment: in and out of [place] 85[facility]88     Blood Transfusions No     Caffeine Concern No     Occupational Exposure No     Hobby Hazards No     Sleep Concern Yes     Stress Concern No     Weight Concern Yes     Comment: always      Special Diet No     Comment: watches sugar intake     Back Care No     Exercise Yes     Comment: treadmill and bike     Bike Helmet No     Seat Belt Yes     Self-Exams No     Parent/sibling w/ CABG, MI or angioplasty before 65F 55M? No   Social History Narrative    Dairy/d 1 servings/d.     Caffeine 0 servings/d    Exercise 5 x week, treadmill, bike 1 hour/day    Sunscreen used - No    Seatbelts used - Yes    Working smoke/CO detectors in the home - Yes    Guns stored in the home - No    Self Breast Exams - No    Self Testicular Exam - NA    Eye Exam up to date - Yes    Dental Exam up to date - Yes    Pap Smear up to date - Yes    Mammogram up to date - No 2000    PSA up to date - NA    Dexa Scan up to date - NA    Flex Sig / Colonoscopy up to date - Yes 8/99 Abd pain=neg    Immunizations up to date - unsure    Abuse: Current or Past(Physical, Sexual or Emotional)- No    Do you feel safe in your environment - Yes                          Allergies   Allergen Reactions     Bactrim [Sulfamethoxazole W/Trimethoprim]      itching     Nitrofurantoin Itching     Patient states she is not longer allergic to  this medication. Has used it since with no problems        Results for orders placed or performed in visit on 05/09/21 (from the past 24 hour(s))   *UA reflex to Microscopic and Culture ([place] and [facility] (except [place] and [place])    Specimen: Midstream Urine   Result Value Ref Range    Color Urine Yellow     Appearance Urine Slightly Cloudy     Glucose Urine Negative NEG^Negative mg/dL    Bilirubin Urine Negative NEG^Negative    Ketones Urine Negative NEG^Negative mg/dL    Specific Gravity Urine 1.010 1.003 - 1.035    Blood Urine Trace (A) NEG^Negative    pH Urine 6.0 5.0 - 7.0 pH    Protein Albumin Urine Negative NEG^Negative mg/dL    Urobilinogen Urine 0.2 0.2 - 1.0 EU/dL    Nitrite Urine Negative NEG^Negative    Leukocyte Esterase Urine Large (A) NEG^Negative    Source Midstream Urine    Urine Microscopic   Result Value Ref Range    WBC Urine  (A) OTO5^0 - 5 /HPF    RBC Urine 2-5 (A) OTO2^O - 2 /HPF    WBC Clumps Present (A) NEG^Negative /HPF    Squamous Epithelial /LPF Urine Few FEW^Few /LPF    Bacteria Urine Moderate (A) NEG^Negative /HPF            Review of Systems:     A 10 point review of systems including constitutional, cardiovascular, respiratory, HEENT, GI, , neurological, MSK, skin, endocrine, is negative unless stated in HPI          Physical Exam:     Vitals:    05/09/21 1555   BP: 130/75   Pulse: 93   Temp: 97.9  F (36.6  C)   TempSrc: Tympanic   SpO2: 98%     There is no height or weight on file to calculate BMI.    GENERAL APPEARANCE: healthy, alert and no distress  Back: No CVA tenderness  SKIN: no suspicious lesions or rashes  PSYCH: mood and affect normal/bright      Assessment and Plan     1. Acute cystitis with hematuria  History and urinalysis consistent with UTI.  Likely related to recent intercourse.  Discussed antibiotic treatment with cefdinir.  We will follow up on bacterial culture, and she can be notified if not sensitive, although she grew out E. coli in the  past that was sensitive to this.  We discussed the most common side effect of antibiotics being diarrhea.  She will  Azo from the pharmacy as well, which she gets over-the-counter.  Discussed reasons to present back to clinic, including pyelonephritis symptoms.  Patient voiced understanding.  All questions answered.  - *UA reflex to Microscopic and Culture ([place] and [facility] (except [place] and [place])  - Urine Microscopic  - Urine Culture Aerobic Bacterial  - cefdinir (OMNICEF) 300 MG capsule; Take 1 capsule (300 mg) by mouth 2 times daily  Dispense: 14 capsule; Refill: 0        Options for treatment and follow-up care were reviewed with the patient and/or guardian. Angela Ibarra and/or guardian engaged in the decision making process and verbalized understanding of the options discussed and agreed with the final plan.    Keven Arreola MD  [facility] Resident, PGY3

## 2021-05-12 LAB
BACTERIA SPEC CULT: ABNORMAL
BACTERIA SPEC CULT: ABNORMAL
Lab: ABNORMAL
SPECIMEN SOURCE: ABNORMAL

## 2021-09-18 ENCOUNTER — HEALTH MAINTENANCE LETTER (OUTPATIENT)
Age: 61
End: 2021-09-18

## 2021-09-21 DIAGNOSIS — Z96.641 STATUS POST TOTAL HIP REPLACEMENT, RIGHT: Primary | ICD-10-CM

## 2021-09-28 ENCOUNTER — OFFICE VISIT (OUTPATIENT)
Dept: ORTHOPEDICS | Facility: CLINIC | Age: 61
End: 2021-09-28
Payer: COMMERCIAL

## 2021-09-28 ENCOUNTER — ANCILLARY PROCEDURE (OUTPATIENT)
Dept: GENERAL RADIOLOGY | Facility: CLINIC | Age: 61
End: 2021-09-28
Attending: ORTHOPAEDIC SURGERY
Payer: COMMERCIAL

## 2021-09-28 DIAGNOSIS — Z96.641 STATUS POST TOTAL HIP REPLACEMENT, RIGHT: ICD-10-CM

## 2021-09-28 DIAGNOSIS — Z47.89 ORTHOPEDIC AFTERCARE: Primary | ICD-10-CM

## 2021-09-28 PROCEDURE — 99213 OFFICE O/P EST LOW 20 MIN: CPT | Performed by: ORTHOPAEDIC SURGERY

## 2021-09-28 PROCEDURE — 73502 X-RAY EXAM HIP UNI 2-3 VIEWS: CPT | Mod: RT | Performed by: RADIOLOGY

## 2021-09-28 NOTE — PROGRESS NOTES
"Chief Complaint: Surgical Followup (1 year s/p right total hip arthroplasty DOS: 7/6/2020)       HPI: Angela Ibarra returns today in follow-up for her right total hip. She reports that she is doing well. No pain, no assist device. She had a great year physically with he r hip replacement. She had a hardyear emotionally having lost a pet.   Reports that [place] and no trouble with the hip. \"I have zerio pain. Zero.\"    Medications and allergies are documented in the EMR and have been reviewed.    Current Outpatient Medications:      acetaminophen (TYLENOL) 325 MG tablet, Take 2 tablets (650 mg) by mouth every 4 hours as needed for other, Disp: 1 Bottle, Rfl: 0     amLODIPine (NORVASC) 5 MG tablet, TAKE ONE TABLET BY MOUTH ONCE DAILY, Disp: 90 tablet, Rfl: 3     atenolol (TENORMIN) 50 MG tablet, TAKE ONE TABLET BY MOUTH ONCE DAILY, Disp: 90 tablet, Rfl: 3     cefdinir (OMNICEF) 300 MG capsule, Take 1 capsule (300 mg) by mouth 2 times daily, Disp: 14 capsule, Rfl: 0     Cholecalciferol (VITAMIN D) 125 MCG (5000 UT) CAPS, Take 1 capsule (5,000 Units) by mouth daily, Disp: 90 capsule, Rfl: 2     COMPOUNDED NON-CONTROLLED SUBSTANCE (CMPD RX) - PHARMACY TO MIX COMPOUNDED MEDICATION, Estriol 0.1% cream (1mg/gram) in HRT base. Place 1 gram vaginally daily for 2 weeks, then vaginally twice weekly., Disp: 30 g, Rfl: 6     ibuprofen (ADVIL/MOTRIN) 200 MG tablet, Take 400 mg by mouth every 6 hours as needed for moderate pain , Disp: , Rfl:      sulfamethoxazole-trimethoprim (BACTRIM) 400-80 MG tablet, Take 1 tablet by mouth daily as needed (take just with intercourse) No need to fill now. She will call when she needs it., Disp: 20 tablet, Rfl: 2     traZODone (DESYREL) 50 MG tablet, TAKE TWO TABLETS BY MOUTH NIGHTLY AS NEEDED FOR SLEEP. Pharmacy please fill only if she calls., Disp: 180 tablet, Rfl: 3    Current Facility-Administered Medications:      ropivacaine (NAROPIN) " injection 3 mL, 3 mL, , , Shaun Munoz DO, 3 mL at 04/21/21 0820     triamcinolone (KENALOG-40) injection 40 mg, 40 mg, , , Shaun Munoz DO, 40 mg at 04/21/21 0820  Allergies: Bactrim [sulfamethoxazole w/trimethoprim] and Nitrofurantoin    Physical Exam:  On physical examination the patient appears the stated age, is in no acute distress, affect is appropriate, and breathing is non-labored.  LMP 01/08/2008   There is no height or weight on file to calculate BMI.  Gait: normal   ROM: fluid and painless   Distally, the circulatory, motor, and sensation exam is intact with 5/5 EHL, gastroc-soleus, and tibialis anterior.  Sensation to light touch is intact.  Dorsalis pedis and posterior tibialis pulses are palpable.  There are no sores on the feet, no bruising, and no lymphedema.    X-rays:    I reviewed the x-rays dated today.  Previous films reviewed.    Findings:  Normal progression for a total hip arthroplasty without evidence of loosening or subsidence.    Assessment: doing very well   Plan: appropriate for 5 year follow-up, sooner if issues.     Germán Jernigan

## 2021-09-28 NOTE — NURSING NOTE
Angela Ibarra's chief complaint for this visit includes:  Chief Complaint   Patient presents with     Surgical Followup     1 year s/p right total hip arthroplasty DOS: 7/6/2020     PCP: Germán Jernigan    Referring Provider:  Germán Jernigan MD  [id number]   [place] [id number]    [facility] 01/08/2008   Data Unavailable     Do you need any medication refills at today's visit? No    Tammi Sánchez MA, ATC

## 2021-09-28 NOTE — LETTER
"    9/28/2021         RE: Angela Ibarra  [address] [place] [id number]        Dear Colleague,    Thank you for referring your patient, Angela Ibarra, to the [facility]. Please see a copy of my visit note below.    Chief Complaint: Surgical Followup (1 year s/p right total hip arthroplasty DOS: 7/6/2020)       HPI: Angela Ibarra returns today in follow-up for her right total hip. She reports that she is doing well. No pain, no assist device. She had a great year physically with he r hip replacement. She had a hardyear emotionally having lost a pet.   Reports that she went hiking on the [place] recently for vacation and no trouble with the hip. \"I have zerio pain. Zero.\"    Medications and allergies are documented in the EMR and have been reviewed.    Current Outpatient Medications:      acetaminophen (TYLENOL) 325 MG tablet, Take 2 tablets (650 mg) by mouth every 4 hours as needed for other, Disp: 1 Bottle, Rfl: 0     amLODIPine (NORVASC) 5 MG tablet, TAKE ONE TABLET BY MOUTH ONCE DAILY, Disp: 90 tablet, Rfl: 3     atenolol (TENORMIN) 50 MG tablet, TAKE ONE TABLET BY MOUTH ONCE DAILY, Disp: 90 tablet, Rfl: 3     cefdinir (OMNICEF) 300 MG capsule, Take 1 capsule (300 mg) by mouth 2 times daily, Disp: 14 capsule, Rfl: 0     Cholecalciferol (VITAMIN D) 125 MCG (5000 UT) CAPS, Take 1 capsule (5,000 Units) by mouth daily, Disp: 90 capsule, Rfl: 2     COMPOUNDED NON-CONTROLLED SUBSTANCE (CMPD RX) - PHARMACY TO MIX COMPOUNDED MEDICATION, Estriol 0.1% cream (1mg/gram) in HRT base. Place 1 gram vaginally daily for 2 weeks, then vaginally twice weekly., Disp: 30 g, Rfl: 6     ibuprofen (ADVIL/MOTRIN) 200 MG tablet, Take 400 mg by mouth every 6 hours as needed for moderate pain , Disp: , Rfl:      sulfamethoxazole-trimethoprim (BACTRIM) 400-80 MG tablet, Take 1 tablet by mouth daily as needed (take just with intercourse) No need to fill now. She will call when she needs it., " Disp: 20 tablet, Rfl: 2     traZODone (DESYREL) 50 MG tablet, TAKE TWO TABLETS BY MOUTH NIGHTLY AS NEEDED FOR SLEEP. Pharmacy please fill only if she calls., Disp: 180 tablet, Rfl: 3    Current Facility-Administered Medications:      ropivacaine (NAROPIN) injection 3 mL, 3 mL, , , Shaun Munoz DO, 3 mL at 04/21/21 0820     triamcinolone (KENALOG-40) injection 40 mg, 40 mg, , , Shaun Munoz DO, 40 mg at 04/21/21 0820  Allergies: Bactrim [sulfamethoxazole w/trimethoprim] and Nitrofurantoin    Physical Exam:  On physical examination the patient appears the stated age, is in no acute distress, affect is appropriate, and breathing is non-labored.  LMP 01/08/2008   There is no height or weight on file to calculate BMI.  Gait: normal   ROM: fluid and painless   Distally, the circulatory, motor, and sensation exam is intact with 5/5 EHL, gastroc-soleus, and tibialis anterior.  Sensation to light touch is intact.  Dorsalis pedis and posterior tibialis pulses are palpable.  There are no sores on the feet, no bruising, and no lymphedema.    X-rays:    I reviewed the x-rays dated today.  Previous films reviewed.    Findings:  Normal progression for a total hip arthroplasty without evidence of loosening or subsidence.    Assessment: doing very well   Plan: appropriate for 5 year follow-up, sooner if issues.     Germángosia Jernigan        Again, thank you for allowing me to participate in the care of your patient.        Sincerely,        Clinton Deras MD

## 2021-11-01 ENCOUNTER — OFFICE VISIT (OUTPATIENT)
Dept: FAMILY MEDICINE | Facility: CLINIC | Age: 61
End: 2021-11-01
Payer: COMMERCIAL

## 2021-11-01 ENCOUNTER — ANCILLARY PROCEDURE (OUTPATIENT)
Dept: GENERAL RADIOLOGY | Facility: CLINIC | Age: 61
End: 2021-11-01
Attending: PHYSICIAN ASSISTANT
Payer: COMMERCIAL

## 2021-11-01 VITALS
TEMPERATURE: 98.4 F | BODY MASS INDEX: 41.04 KG/M2 | SYSTOLIC BLOOD PRESSURE: 131 MMHG | WEIGHT: 223 LBS | HEART RATE: 76 BPM | OXYGEN SATURATION: 99 % | DIASTOLIC BLOOD PRESSURE: 81 MMHG | HEIGHT: 62 IN

## 2021-11-01 DIAGNOSIS — M79.645 THUMB PAIN, LEFT: Primary | ICD-10-CM

## 2021-11-01 DIAGNOSIS — M25.532 LEFT WRIST PAIN: ICD-10-CM

## 2021-11-01 DIAGNOSIS — M79.645 THUMB PAIN, LEFT: ICD-10-CM

## 2021-11-01 DIAGNOSIS — M19.041 DEGENERATIVE ARTHRITIS OF METACARPOPHALANGEAL JOINT OF RIGHT THUMB: ICD-10-CM

## 2021-11-01 PROCEDURE — 99214 OFFICE O/P EST MOD 30 MIN: CPT | Performed by: PHYSICIAN ASSISTANT

## 2021-11-01 PROCEDURE — 73110 X-RAY EXAM OF WRIST: CPT | Mod: RT | Performed by: RADIOLOGY

## 2021-11-01 ASSESSMENT — PAIN SCALES - GENERAL: PAINLEVEL: MILD PAIN (2)

## 2021-11-01 ASSESSMENT — MIFFLIN-ST. JEOR: SCORE: 1529.77

## 2021-11-01 NOTE — RESULT ENCOUNTER NOTE
Clarissa Miller,       Your recent test results are attached, if you have any questions or concerns please feel free to contact me via e-mail or call [phone].  **moderate arthritis seen in thumb. Otherwise normal xray. No change to plan.       Sincerely,  Angella Herrera PA-C

## 2021-11-01 NOTE — PROGRESS NOTES
Assessment & Plan     Thumb pain, left  Has moderate arthritis noted  She would like to know if her pain is related to her old work comp injury in early 2000s, I am unable to comment on this  Has failed home conservative treatments (ice, nsaids, bracing)  Will refer to hand specialist for further eval/treatment  Unsure why it travels up into her elbow, she doesn't have classic cubital tunnel or carpel tunnel symptoms  May need injection or other treatment or further imaging, will defer to specialist  - XR Wrist Right G/E 3 Views; Future  - Orthopedic  Referral; Future    Left wrist pain    - XR Wrist Right G/E 3 Views; Future  - Orthopedic  Referral; Future    Degenerative arthritis of metacarpophalangeal joint of right thumb          Return in about 4 weeks (around 11/29/2021) for if not improving.    [facility] MARY ANNE Miller is a 61 year old who presents for the following health issues :    HPI       New patient to me:    H/o hip replacement on right side. H/o frozen shoulder right side.       Musculoskeletal problem/pain  Onset/Duration: ongoing  Description  Location: hand and wrist - right  Joint Swelling: YES  Redness: no  Pain: no  Warmth: no  Intensity:  moderate, severe  Progression of Symptoms:  worsening  Accompanying signs and symptoms:   Fevers: no  Numbness/tingling/weakness: YES  History  Trauma to the area: YES , broken wrist 2001  Recent illness:  no  Previous similar problem: no  Previous evaluation:  no  Precipitating or alleviating factors:  Aggravating factors include: lifting and overuse  Therapies tried and outcome: support wrap and acetaminophen    Last month has been worse.   Feels like an ache in wrist near thumb and wrist/cmp joint area, gets swelling there, but then the pain can shoot up her forearm.   No known injury recently. No fevers or redness to suggest acute infection.   Hurts during the day but  "worse at night. Tried wearing a brace last night but hurt and felt too tight, so she tried a compression brace.  Bowls a  Lot.   Range of motion is normal but feels stiff.     Feels like she doesn't have any strength in that hand.   Feels tingling in the thumb area all the way into elbow.  Burning sensation in her hands.   No numbness.  Fingers feels stiff and swell sometimes.   Does a lot of computer and mouse work. Is right handed.           Review of Systems   Constitutional, HEENT, cardiovascular, pulmonary, GI, , musculoskeletal, neuro, skin, endocrine and psych systems are negative, except as otherwise noted.      Objective    Ht 1.575 m (5' 2\")   LMP 01/08/2008   BMI 37.31 kg/m    Body mass index is 37.31 kg/m .  Physical Exam   GENERAL: alert, no distress and obese  RESP: lungs clear to auscultation - no rales, rhonchi or wheezes  CV: regular rate and rhythm, normal S1 S2, no S3 or S4, no murmur, click or rub, no peripheral edema and peripheral pulses strong  MS: {:R hand-mild edema noted over area of concern. Right cmp area tender to touch. Has dequervans crepitus and mild edema but finklestein's negative. Range of motion of wrist and fingers normal. Sensation sensation and radial pulse intact. Temperature appropriate. Medial nerve tinels negative. phalens negative.   SKIN: no suspicious lesions or rashes  NEURO: Normal strength and tone, mentation intact and speech normal  PSYCH: mentation appears normal, affect normal/bright    Narrative & Impression   XR WRIST RIGHT G/E 3 VIEWS 11/1/2021 10:33 AM      HISTORY: thumb and wrist pain, mostly base of thumb area, and  swelling, old injury many years ago; Thumb pain, left; Left wrist pain                                                                      IMPRESSION: First carpometacarpal moderately advanced degenerative  arthrosis. Otherwise unremarkable wrist radiographs.     MIRIAM EASLEY MD         SYSTEM ID:  [id number]             "

## 2021-11-01 NOTE — PATIENT INSTRUCTIONS
Patient Education     De Quervain Tenosynovitis    De Quervain tenosynovitis is inflammation of tendons and synovium on the thumb side of the wrist. Tendons are fibers that attach muscle to bone. Synovium is a slick membrane that helps tendons move. Movements done over and over can irritate and inflame these tissues. This can cause pain when you touch or grasp objects, turn or twist your wrist, or make a fist. You may also have pain and swelling near the base of the thumb or numbness along the back of your thumb and index finger. You may also feel the thumb catch or snap when you move it.  Treatment will depend on how bad the symptoms are. It can often be treated with medicines, injections, splinting, and home care. If your case is severe, you may be referred to a specialist to talk about surgery.  Home care  Your healthcare provider may prescribe medicines to relieve pain and reduce inflammation. A steroid medicine may be injected near the tendons. This reduces swelling and pain. The healthcare provider may also suggest taking over-the-counter medicines such as ibuprofen or naproxen. These help reduce inflammation. Take all medicines only as directed.  The following are general care guidelines:    Avoid repetitive movements of your wrist and thumb.    Note any activity that causes pain or swelling. If possible, avoid or limit that activity.    Put a cold pack on your thumb. To make an ice pack, put ice cubes in a plastic bag that seals at the top. Wrap the bag in a clean, thin towel or cloth. Hold this to your thumb for up to 20 minutes at a time. Don't put ice directly on the skin.    Your healthcare provider may put a splint on the thumb to hold it still. Use the splint as you have been instructed. In some cases, you may need to use a splint 24 hours a day for 4 to 6 weeks. This will allow the wrist and thumb to heal.  Follow-up care  Follow up with your healthcare provider, or as advised. You may need  more treatment if your injury is severe or if your symptoms don't get better. This additional treatment may include local injections, physical therapy, and surgery.  When to seek medical advice  Call your healthcare provider right away if any of these occur:    Increase in pain or swelling    You have fever, chills, redness, warmth, or drainage    Symptoms get worse after taking medicine    Pain spreads farther down the thumb or into the forearm    Pain continues to get in the way of daily life  Elli last reviewed this educational content on 6/1/2018 2000-2021 [employer]. All rights reserved. This information is not intended as a substitute for professional medical care. Always follow your healthcare professional's instructions.

## 2021-11-12 ENCOUNTER — OFFICE VISIT (OUTPATIENT)
Dept: ORTHOPEDICS | Facility: CLINIC | Age: 61
End: 2021-11-12
Attending: PHYSICIAN ASSISTANT
Payer: COMMERCIAL

## 2021-11-12 VITALS — DIASTOLIC BLOOD PRESSURE: 82 MMHG | SYSTOLIC BLOOD PRESSURE: 145 MMHG | HEART RATE: 66 BPM

## 2021-11-12 DIAGNOSIS — M79.645 THUMB PAIN, LEFT: ICD-10-CM

## 2021-11-12 DIAGNOSIS — M18.11 ARTHRITIS OF CARPOMETACARPAL (CMC) JOINT OF RIGHT THUMB: Primary | ICD-10-CM

## 2021-11-12 DIAGNOSIS — M25.532 LEFT WRIST PAIN: ICD-10-CM

## 2021-11-12 PROCEDURE — 99207 PR DROP WITH A PROCEDURE: CPT | Performed by: STUDENT IN AN ORGANIZED HEALTH CARE EDUCATION/TRAINING PROGRAM

## 2021-11-12 PROCEDURE — 20604 DRAIN/INJ JOINT/BURSA W/US: CPT | Mod: RT | Performed by: STUDENT IN AN ORGANIZED HEALTH CARE EDUCATION/TRAINING PROGRAM

## 2021-11-12 RX ORDER — TRIAMCINOLONE ACETONIDE 40 MG/ML
20 INJECTION, SUSPENSION INTRA-ARTICULAR; INTRAMUSCULAR
Status: DISCONTINUED | OUTPATIENT
Start: 2021-11-12 | End: 2022-02-07

## 2021-11-12 RX ADMIN — TRIAMCINOLONE ACETONIDE 20 MG: 40 INJECTION, SUSPENSION INTRA-ARTICULAR; INTRAMUSCULAR at 15:01

## 2021-11-12 NOTE — LETTER
11/12/2021         RE: Angela Ibarra  [address] [place] [id number]        Dear Colleague,    Thank you for referring your patient, Angela Ibarra, to the [facility]. Please see a copy of my visit note below.    Ortho Hand    HPI: 61-year-old right-hand-dominant non-smoking female presenting with right thumb base pain for the last 1-2 months.  Patient has been using both a wrist cock-up splint as well as a thumb spica-based splint with certain activities that exacerbate the pain.  She has been using nonsteroidal anti-inflammatory medications.  She states that she has noticed a little bit of dorsal radial hand swelling.  She has not done icing.  She has not had any steroid injection.  This is her first attempt at treatment for this problem.    ROS: Negative, see HPI  Past medical history: Hip osteoarthritis, hypertension  Past surgical history: None to the hands or wrist  Medications: Atenolol, amlodipine, ibuprofen  Allergies: Bactrim, nitrofurantoin  Social history: Non-smoker, denies any tobacco use history  Family history: No bleeding or clotting problems, or issues with anesthesia    Examination:  BP (!) 145/82 (BP Location: Right arm, Patient Position: Sitting, Cuff Size: Adult Regular)   Pulse 66   LMP 01/08/2008   Nonlabored breathing  Not distressed  Right thumb with some swelling over the CMC joint, positive grind, positive shuck test and exquisitely tender  Negative Finkelstein's on the right  Otherwise normal right wrist and thumb motion    XR: Eaton stage III right CMC arthritis    A/P: 61-year-old right-hand-dominant non-smoker presenting with advanced right thumb CMC arthritis    -My recommendation is to treat this conservatively with a steroid injection and immobilization during activities that exacerbate symptoms.  Discussed the risks of steroid injection including infection, bleeding, injury to nerves, skin depigmentation, fat atrophy, non-response to  treatment.  Despite these risks, patient consents to and would like to proceed with right thumb CMC steroid injection.  -Kenalog 20 mg mixed with 1% lidocaine was injected following Chloroprep application. Patient has immediate relief.   -Return to clinic in 4 to 6 weeks for reevaluation  -A total of 30 minutes was devoted to review of chart, direct face-to-face patient counseling and documentation during this encounter    Seth Saunders MD, PhD    Small Joint Injection/Arthrocentesis: R thumb CMC    Date/Time: 11/12/2021 3:01 PM  Performed by: Seth Saunders MD  Authorized by: Seth Saunders MD   Indications:  Pain  Needle Size:  25 G  Guidance: ultrasound       Location:  Thumb    Site:  R thumb CMC                    Medications:  20 mg triamcinolone 40 MG/ML        Outcome:  Tolerated well, no immediate complications  Procedure discussed: discussed risks, benefits, and alternatives    Consent Given by:  Patient  Timeout: timeout called immediately prior to procedure    Prep: patient was prepped and draped in usual sterile fashion                Again, thank you for allowing me to participate in the care of your patient.        Sincerely,        Seth Saunders MD

## 2021-11-12 NOTE — PROGRESS NOTES
Ortho Hand    HPI: 61-year-old right-hand-dominant non-smoking female presenting with right thumb base pain for the last 1-2 months.  Patient has been using both a wrist cock-up splint as well as a thumb spica-based splint with certain activities that exacerbate the pain.  She has been using nonsteroidal anti-inflammatory medications.  She states that she has noticed a little bit of dorsal radial hand swelling.  She has not done icing.  She has not had any steroid injection.  This is her first attempt at treatment for this problem.    ROS: Negative, see HPI  Past medical history: Hip osteoarthritis, hypertension  Past surgical history: None to the hands or wrist  Medications: Atenolol, amlodipine, ibuprofen  Allergies: Bactrim, nitrofurantoin  Social history: Non-smoker, denies any tobacco use history  Family history: No bleeding or clotting problems, or issues with anesthesia    Examination:  BP (!) 145/82 (BP Location: Right arm, Patient Position: Sitting, Cuff Size: Adult Regular)   Pulse 66   LMP 01/08/2008   Nonlabored breathing  Not distressed  Right thumb with some swelling over the CMC joint, positive grind, positive shuck test and exquisitely tender  Negative Finkelstein's on the right  Otherwise normal right wrist and thumb motion    XR: Eaton stage III right CMC arthritis    A/P: 61-year-old right-hand-dominant non-smoker presenting with advanced right thumb CMC arthritis    -My recommendation is to treat this conservatively with a steroid injection and immobilization during activities that exacerbate symptoms.  Discussed the risks of steroid injection including infection, bleeding, injury to nerves, skin depigmentation, fat atrophy, non-response to treatment.  Despite these risks, patient consents to and would like to proceed with right thumb CMC steroid injection.  -Kenalog 20 mg mixed with 1% lidocaine was injected following Chloroprep application. Patient has immediate relief.   -Return to clinic in  4 to 6 weeks for reevaluation  -A total of 30 minutes was devoted to review of chart, direct face-to-face patient counseling and documentation during this encounter    Seth Saunders MD, PhD

## 2021-11-12 NOTE — NURSING NOTE
Angela Ibarra's goals for this visit include:   Chief Complaint   Patient presents with     New Patient     Right thumb and right wrist pain. Xrays completed 11/01/21. Patient takes Tylenol and uses ice for pain.       She requests these members of her care team be copied on today's visit information:     PCP: Germán Jernigan    Referring Provider:  Angella Herrera PA-C  [address] [place] [address]    BP (!) 145/82 (BP Location: Right arm, Patient Position: Sitting, Cuff Size: Adult Regular)   Pulse 66   LMP 01/08/2008     Do you need any medication refills at today's visit? Linda Hall CMA

## 2021-11-12 NOTE — PROGRESS NOTES
Small Joint Injection/Arthrocentesis: R thumb CMC    Date/Time: 11/12/2021 3:01 PM  Performed by: Seth Saunders MD  Authorized by: Seth Saunders MD   Indications:  Pain  Needle Size:  25 G  Guidance: ultrasound       Location:  Thumb    Site:  R thumb CMC                    Medications:  20 mg triamcinolone 40 MG/ML        Outcome:  Tolerated well, no immediate complications  Procedure discussed: discussed risks, benefits, and alternatives    Consent Given by:  Patient  Timeout: timeout called immediately prior to procedure    Prep: patient was prepped and draped in usual sterile fashion

## 2021-11-12 NOTE — NURSING NOTE
[facility]   ORTHOPEDICS & SPORTS MEDICINE  [address] [place] [address]  Dept: [phone]  ______________________________________________________________________________    Patient: Angela Ibarra   : 1960   MRN: [id number]   2021    INVASIVE PROCEDURE SAFETY CHECKLIST    Date: 2021  Procedure: Right CMC joint cortisone injection   Patient Name: Angela Ibarra  MRN: [id number]  YOB: 1960    Action: Complete sections as appropriate. Any discrepancy results in a HARD COPY until resolved.     PRE PROCEDURE:  Patient ID verified with 2 identifiers (name and  or MRN): Yes  Procedure and site verified with patient/designee (when able): Yes  Accurate consent documentation in medical record: Yes  H&P (or appropriate assessment) documented in medical record: Yes  H&P must be up to 20 days prior to procedure and updates within 24 hours of procedure as applicable: Yes  Relevant diagnostic and radiology test results appropriately labeled and displayed as applicable: Yes  Procedure site(s) marked with provider initials: Yes    TIMEOUT:  Time-Out performed immediately prior to starting procedure, including verbal and active participation of all team members addressing the following:Yes  * Correct patient identify  * Confirmed that the correct side and site are marked  * An accurate procedure consent form  * Agreement on the procedure to be done  * Correct patient position  * Relevant images and results are properly labeled and appropriately displayed  * The need to administer antibiotics or fluids for irrigation purposes during the procedure as applicable   * Safety precautions based on patient history or medication use    DURING PROCEDURE: Verification of correct person, site, and procedures any time the responsibility for care of the patient is transferred to another member of the care team.       Prior to injection, verified patient identity  using patient's name and date of birth.  Due to injection administration, patient instructed to remain in clinic for 15 minutes  afterwards, and to report any adverse reaction to me immediately.    Joint injection was performed.        Kadie Rangel, JANIE  November 12, 2021

## 2021-12-16 DIAGNOSIS — I10 ESSENTIAL HYPERTENSION WITH GOAL BLOOD PRESSURE LESS THAN 140/90: ICD-10-CM

## 2021-12-16 NOTE — TELEPHONE ENCOUNTER
Reason for Call:  Medication or medication refill:    Do you use a [facility]?  Name of the pharmacy and phone number for the current request:  [id number] - [place], [place] - [address]    Name of the medication requested: Blood pressure medication    Other request: Patient is looking to schedule her yearly exam but the soonest available is not until June with her PCP    Can we leave a detailed message on this number? YES    Phone number patient can be reached at: Home number on file [phone] (home)    Best Time: any    Call taken on 12/16/2021 at 4:58 PM by Shahab Babb

## 2021-12-17 ENCOUNTER — OFFICE VISIT (OUTPATIENT)
Dept: ORTHOPEDICS | Facility: CLINIC | Age: 61
End: 2021-12-17
Payer: COMMERCIAL

## 2021-12-17 DIAGNOSIS — M18.11 ARTHRITIS OF CARPOMETACARPAL (CMC) JOINT OF RIGHT THUMB: Primary | ICD-10-CM

## 2021-12-17 PROCEDURE — 99212 OFFICE O/P EST SF 10 MIN: CPT | Performed by: STUDENT IN AN ORGANIZED HEALTH CARE EDUCATION/TRAINING PROGRAM

## 2021-12-17 RX ORDER — AMLODIPINE BESYLATE 5 MG/1
5 TABLET ORAL DAILY
Qty: 90 TABLET | Refills: 3 | Status: SHIPPED | OUTPATIENT
Start: 2021-12-17 | End: 2022-12-20

## 2021-12-17 RX ORDER — ATENOLOL 50 MG/1
50 TABLET ORAL DAILY
Qty: 90 TABLET | Refills: 3 | Status: SHIPPED | OUTPATIENT
Start: 2021-12-17 | End: 2022-12-20

## 2021-12-17 ASSESSMENT — PAIN SCALES - GENERAL: PAINLEVEL: NO PAIN (0)

## 2021-12-17 NOTE — PROGRESS NOTES
Ortho Hand    4 weeks status post right thumb CMC steroid injection.  Her pain before the injection was a 8-9 out of 10.  Her pain today is remarkable in a 0 out of 10.  Patient is very happy with the result of the steroid injection.      On exam, there is no tenderness of the right thumb CMC joint.    Patient will return to clinic as needed should the right thumb base pain return.  Explained to the patient that the effect of the steroid injection may last many months.    Seth Saunders MD, PhD

## 2021-12-17 NOTE — TELEPHONE ENCOUNTER
Routing refill request to provider for review/approval because:  BP Readings from Last 3 Encounters:   11/12/21 (!) 145/82   11/01/21 131/81   05/09/21 130/75     Melyssa Slaughter BSN, RN

## 2021-12-17 NOTE — LETTER
12/17/2021         RE: Angela Ibarra  [address] [place] [id number]        Dear Colleague,    Thank you for referring your patient, Angela Ibarra, to the [facility]. Please see a copy of my visit note below.    Ortho Hand    4 weeks status post right thumb CMC steroid injection.  Her pain before the injection was a 8-9 out of 10.  Her pain today is remarkable in a 0 out of 10.  Patient is very happy with the result of the steroid injection.      On exam, there is no tenderness of the right thumb CMC joint.    Patient will return to clinic as needed should the right thumb base pain return.  Explained to the patient that the effect of the steroid injection may last many months.    Seth Saunders MD, PhD      Again, thank you for allowing me to participate in the care of your patient.        Sincerely,        Seth Saunders MD

## 2022-02-07 ENCOUNTER — OFFICE VISIT (OUTPATIENT)
Dept: FAMILY MEDICINE | Facility: CLINIC | Age: 62
End: 2022-02-07
Payer: COMMERCIAL

## 2022-02-07 VITALS
HEIGHT: 62 IN | WEIGHT: 218 LBS | BODY MASS INDEX: 40.12 KG/M2 | OXYGEN SATURATION: 98 % | SYSTOLIC BLOOD PRESSURE: 130 MMHG | TEMPERATURE: 96.8 F | DIASTOLIC BLOOD PRESSURE: 78 MMHG | HEART RATE: 64 BPM

## 2022-02-07 DIAGNOSIS — Z00.00 ROUTINE GENERAL MEDICAL EXAMINATION AT A HEALTH CARE FACILITY: Primary | ICD-10-CM

## 2022-02-07 DIAGNOSIS — N39.0 RECURRENT UTI: ICD-10-CM

## 2022-02-07 DIAGNOSIS — Z13.220 SCREENING FOR HYPERLIPIDEMIA: ICD-10-CM

## 2022-02-07 DIAGNOSIS — Z12.11 COLON CANCER SCREENING: ICD-10-CM

## 2022-02-07 DIAGNOSIS — E66.01 MORBID OBESITY (H): ICD-10-CM

## 2022-02-07 LAB
ANION GAP SERPL CALCULATED.3IONS-SCNC: 5 MMOL/L (ref 3–14)
BUN SERPL-MCNC: 14 MG/DL (ref 7–30)
CALCIUM SERPL-MCNC: 10.2 MG/DL (ref 8.5–10.1)
CHLORIDE BLD-SCNC: 107 MMOL/L (ref 94–109)
CHOLEST SERPL-MCNC: 157 MG/DL
CO2 SERPL-SCNC: 27 MMOL/L (ref 20–32)
CREAT SERPL-MCNC: 0.93 MG/DL (ref 0.52–1.04)
FASTING STATUS PATIENT QL REPORTED: YES
GFR SERPL CREATININE-BSD FRML MDRD: 70 ML/MIN/1.73M2
GLUCOSE BLD-MCNC: 90 MG/DL (ref 70–99)
HBA1C MFR BLD: 5.6 % (ref 0–5.6)
HDLC SERPL-MCNC: 46 MG/DL
LDLC SERPL CALC-MCNC: 81 MG/DL
NONHDLC SERPL-MCNC: 111 MG/DL
POTASSIUM BLD-SCNC: 4.2 MMOL/L (ref 3.4–5.3)
SODIUM SERPL-SCNC: 139 MMOL/L (ref 133–144)
TRIGL SERPL-MCNC: 150 MG/DL

## 2022-02-07 PROCEDURE — 83036 HEMOGLOBIN GLYCOSYLATED A1C: CPT | Performed by: FAMILY MEDICINE

## 2022-02-07 PROCEDURE — 80061 LIPID PANEL: CPT | Performed by: FAMILY MEDICINE

## 2022-02-07 PROCEDURE — 99396 PREV VISIT EST AGE 40-64: CPT | Performed by: FAMILY MEDICINE

## 2022-02-07 PROCEDURE — 36415 COLL VENOUS BLD VENIPUNCTURE: CPT | Performed by: FAMILY MEDICINE

## 2022-02-07 PROCEDURE — 99213 OFFICE O/P EST LOW 20 MIN: CPT | Mod: 25 | Performed by: FAMILY MEDICINE

## 2022-02-07 PROCEDURE — 80048 BASIC METABOLIC PNL TOTAL CA: CPT | Performed by: FAMILY MEDICINE

## 2022-02-07 RX ORDER — SULFAMETHOXAZOLE AND TRIMETHOPRIM 400; 80 MG/1; MG/1
1 TABLET ORAL DAILY PRN
Qty: 20 TABLET | Refills: 2 | Status: SHIPPED | OUTPATIENT
Start: 2022-02-07 | End: 2023-02-03

## 2022-02-07 ASSESSMENT — ENCOUNTER SYMPTOMS
ABDOMINAL PAIN: 0
SHORTNESS OF BREATH: 0
NERVOUS/ANXIOUS: 0
PALPITATIONS: 0
COUGH: 0
HEADACHES: 1
FREQUENCY: 0
HEARTBURN: 0
CONSTIPATION: 0
PARESTHESIAS: 0
CHILLS: 0
NAUSEA: 0
HEMATOCHEZIA: 0
JOINT SWELLING: 0
FEVER: 0
BREAST MASS: 0
EYE PAIN: 0
ARTHRALGIAS: 0
DYSURIA: 0
WEAKNESS: 0
SORE THROAT: 0
MYALGIAS: 0
HEMATURIA: 0
DIZZINESS: 0
DIARRHEA: 0

## 2022-02-07 ASSESSMENT — PAIN SCALES - GENERAL: PAINLEVEL: NO PAIN (0)

## 2022-02-07 ASSESSMENT — MIFFLIN-ST. JEOR: SCORE: 1507.09

## 2022-02-07 NOTE — PATIENT INSTRUCTIONS
Preventive Health Recommendations  Female Ages 50 - 64    Yearly exam: See your health care provider every year in order to  o Review health changes.   o Discuss preventive care.    o Review your medicines if your doctor has prescribed any.      Get a Pap test every three years (unless you have an abnormal result and your provider advises testing more often).    If you get Pap tests with HPV test, you only need to test every 5 years, unless you have an abnormal result.     You do not need a Pap test if your uterus was removed (hysterectomy) and you have not had cancer.    You should be tested each year for STDs (sexually transmitted diseases) if you're at risk.     Have a mammogram every 1 to 2 years.    Have a colonoscopy at age 50, or have a yearly FIT test (stool test). These exams screen for colon cancer.      Have a cholesterol test every 5 years, or more often if advised.    Have a diabetes test (fasting glucose) every three years. If you are at risk for diabetes, you should have this test more often.     If you are at risk for osteoporosis (brittle bone disease), think about having a bone density scan (DEXA).    Shots: Get a flu shot each year. Get a tetanus shot every 10 years.    Nutrition:     Eat at least 5 servings of fruits and vegetables each day.    Eat whole-grain bread, whole-wheat pasta and brown rice instead of white grains and rice.    Get adequate Calcium and Vitamin D.     Lifestyle    Exercise at least 150 minutes a week (30 minutes a day, 5 days a week). This will help you control your weight and prevent disease.    Limit alcohol to one drink per day.    No smoking.     Wear sunscreen to prevent skin cancer.     See your dentist every six months for an exam and cleaning.    See your eye doctor every 1 to 2 years.      Patient Education     Preventing Osteoporosis: Meeting Your Calcium Needs    Your body needs calcium to build and repair bones. But it can't make calcium on its own. That's  why it's important to eat calcium-rich foods. Some foods are naturally rich in calcium. Others have calcium added (fortified). It's best to get calcium from the foods you eat. But if you can't get enough, you may want to take calcium supplements. To meet your daily calcium needs, try the foods listed below.  Dairy Fish & beans Other sources   Source   Calcium (mg) per serving   Source   Calcium (mg) per serving   Source   Calcium (mg) per serving   Low-fat yogurt, plain   415 mg/8 oz.   Sardines, Atlantic, canned, with bones   351 mg/3 oz.   Oatmeal, instant, fortified   215 mg/1 cup   Nonfat milk   302 mg/1 cup   [place], sockeye, canned, with bones   239 mg/3 oz.   Tofu made with calcium sulfate   204 mg/3 oz.   Low-fat milk   297 mg/1 cup   Soybeans, fresh, boiled   131 mg/1/2 cup   Collards   179 mg/1/2 cup   Swiss cheese   272 mg/1 oz.   White beans, cooked   81 mg/1/2 cup   English muffin, whole wheat   175 mg/1 muffin   Cheddar cheese   205 mg/1 oz.   Navy beans, cooked   79 mg/1/2 cup   Kale   90 mg/1/2 cup   Ice cream strawberry   79 mg/1/2 cup           Orange, navel   56 mg/1 medium   Note: Calcium levels may vary depending on brand and size.  Daily calcium needs  14 to 18 years old: 1,300 mg  19 to 30 years old: 1,000 mg  31 to 50 years old: 1,000 mg  51 to 70 years old, women: 1,200 mg  51 to 70 years old, men: 1,000 mg  Pregnant or nursin to 18 years old: 1,300 mg, 19 to 50 years old: 1,000 mg  Older than 70 (women and men): 1,200 mg   [employer] last reviewed this educational content on 2018-2021 [employer]. All rights reserved. This information is not intended as a substitute for professional medical care. Always follow your healthcare professional's instructions.

## 2022-05-16 ENCOUNTER — ANCILLARY PROCEDURE (OUTPATIENT)
Dept: MAMMOGRAPHY | Facility: CLINIC | Age: 62
End: 2022-05-16
Payer: COMMERCIAL

## 2022-05-16 DIAGNOSIS — Z12.31 VISIT FOR SCREENING MAMMOGRAM: ICD-10-CM

## 2022-05-16 PROCEDURE — 77063 BREAST TOMOSYNTHESIS BI: CPT | Mod: TC | Performed by: RADIOLOGY

## 2022-05-16 PROCEDURE — 77067 SCR MAMMO BI INCL CAD: CPT | Mod: TC | Performed by: RADIOLOGY

## 2022-06-16 DIAGNOSIS — Z47.89 ORTHOPEDIC AFTERCARE: Primary | ICD-10-CM

## 2022-06-17 RX ORDER — AMOXICILLIN 500 MG/1
CAPSULE ORAL
Qty: 4 CAPSULE | Refills: 0 | Status: SHIPPED | OUTPATIENT
Start: 2022-06-17 | End: 2023-02-01

## 2022-06-17 NOTE — TELEPHONE ENCOUNTER
Prescription refill requested for:   Amoxicillin (AMOXIL) 500 MG capsule   Last Written Prescription Date:  2/3/21  Last Fill Quantity: 4,   # refills: 0  Last Office Visit: 9/28/21  Future Office visit:           Curtis Chilel, EMT

## 2022-06-30 NOTE — PROGRESS NOTES
SUBJECTIVE:   CC: Angela Ibarra is an 61 year old woman who presents for preventive health visit.       Patient has been advised of split billing requirements and indicates understanding: Yes  Healthy Habits:     Getting at least 3 servings of Calcium per day:  Yes    Bi-annual eye exam:  Yes    Dental care twice a year:  Yes    Sleep apnea or symptoms of sleep apnea:  None    Diet:  Regular (no restrictions)    Frequency of exercise:  4-5 days/week    Duration of exercise:  30-45 minutes    Taking medications regularly:  Yes    Medication side effects:  None    PHQ-2 Total Score: 0    Additional concerns today:  No      Preventive -     Immunization History   Administered Date(s) Administered     COVID-19,PF,Pfizer (12+ Yrs) 03/14/2021, 04/05/2021, 10/06/2021     FLU 6-35 months 11/08/2012, 01/08/2019     Influenza (IIV3) PF 11/08/2012, 09/08/2016     Influenza Quad, Recombinant, pf(RIV4) (Flublok) 02/26/2020, 10/23/2020, 10/06/2021     Influenza Vaccine IM > 6 months Valent IIV4 (Alfuria,Fluzone) 10/25/2013, 02/04/2015, 09/08/2016, 10/11/2017     TD (ADULT, 7+) 01/01/1988, 06/20/2000     TDAP Vaccine (Adacel) 11/08/2012     Zoster vaccine recombinant adjuvanted (SHINGRIX) 01/23/2019, 04/15/2019     -Mammogram:     RECOMMENDED FOLLOW-UP: Annual Mammography.  Recommend routine annual screening mammography.  will schedule in 04/2022    -PAP:   discontinued   Hx of hysterectomy     Lab Results   Component Value Date    PAP NIL 02/24/2009    PAP LSIL 11/21/2007    PAP NIL 11/22/2006     - Colon CA screen: Colonoscopy, age 50-75 every 10 years or FIT every year or Cologuard every 3 years   Colonoscopy 09/28/2017  Impression:               - Diverticulosis in the entire examined colon.                             - One 2 mm polyp in the transverse colon, removed                             with a cold snare. Resected and retrieved.                             - The examination was otherwise normal.    Recommendation:           - Path report will tell when next colonoscopy                             should be.                             For the diverticulosis will need to start on                             Metamucil or its equivalent for more details look                             at patient instructions.                             You may benefit from MAC. Make sure your doctor                             orders it with MAC. This is just added to the                             orders and is just typed into the area where they                             ask questions about your taking blood thinners.                             This is administered by anesthesia to make you more                             comfortable than I can do safely during your                             colonosocpy. This is done with anesthesia. You have                             a lot of sharp turns in your colon and ansesthesia                             may be able to keep you more comfortable. You will                             need and History and physical for this. Just remind                             your primary doctor about this when ordering your                             next colonocsocpy.                             5,10     Due colonoscopy in 09/2022       -lipids screen: ordered     Diabetes screen: ordered         Wt Readings from Last 4 Encounters:   02/07/22 98.9 kg (218 lb)   11/01/21 101.2 kg (223 lb)   04/21/21 92.5 kg (204 lb)   03/24/21 92.5 kg (204 lb)       Today's PHQ-2 Score:   PHQ-2 ( 1999 Pfizer) 2/7/2022   Q1: Little interest or pleasure in doing things 0   Q2: Feeling down, depressed or hopeless 0   PHQ-2 Score 0   PHQ-2 Total Score (12-17 Years)- Positive if 3 or more points; Administer PHQ-A if positive -   Q1: Little interest or pleasure in doing things Not at all   Q2: Feeling down, depressed or hopeless Not at all   PHQ-2 Score 0       Abuse: Current or Past (Physical, Sexual or  Emotional) - No  Do you feel safe in your environment? Yes        Social History     Tobacco Use     Smoking status: Never Smoker     Smokeless tobacco: Never Used   Substance Use Topics     Alcohol use: Yes     Comment: rarely - less than once a month         Alcohol Use 2/7/2022   Prescreen: >3 drinks/day or >7 drinks/week? No   Prescreen: >3 drinks/day or >7 drinks/week? -       Reviewed orders with patient.  Reviewed health maintenance and updated orders accordingly - Yes  Lab work is in process  BP Readings from Last 3 Encounters:   02/07/22 130/78   11/12/21 (!) 145/82   11/01/21 131/81    Wt Readings from Last 3 Encounters:   02/07/22 98.9 kg (218 lb)   11/01/21 101.2 kg (223 lb)   04/21/21 92.5 kg (204 lb)                  Patient Active Problem List   Diagnosis     NONSPECIFIC COLITIS     Migraine headache     Stress incontinence     Balance disorder     Right leg weakness     Right carpal tunnel syndrome     Osteoporosis     Loose body in ankle and foot joint     Synovitis of ankle     Malunion, fracture     Pain in joint involving ankle and foot     Abnormal gait     Other postprocedural status(V45.89)     Chondromalacia, left ankle and joints of left foot     CARDIOVASCULAR SCREENING; LDL GOAL LESS THAN 130     Advanced directives, counseling/discussion     Insomnia, unspecified type     Essential hypertension with goal blood pressure less than 140/90     Primary osteoarthritis of right hip     Cataract of both eyes, unspecified cataract type     Arthropathy of facet joint     Morbid obesity (H)     Bariatric surgery status     Malnutrition following gastrointestinal surgery     Right hip pain     Status post hip surgery     Degenerative arthritis of metacarpophalangeal joint of right thumb     Thumb pain, left     Past Surgical History:   Procedure Laterality Date     ARTHROPLASTY HIP Right 7/6/2020    Procedure: Right total hip arthroplasty;  Surgeon: Clinton Deras MD;  Location:  OR      ARTHROSCOPY ANKLE  2014    Procedure: ARTHROSCOPY ANKLE;  Surgeon: Shaun Bhatt DPM;  Location: [facility]     ARTHROSCOPY KNEE Left 10/4/2018    Procedure: ARTHROSCOPY KNEE;  Left knee arthroscopy  medial meniscal and chondral debridement;  Surgeon: Aryan Holley MD;  Location: [facility]     COLONOSCOPY WITH CO2 INSUFFLATION N/A 2017    Procedure: COLONOSCOPY WITH CO2 INSUFFLATION;  COLON SCREEN/ YURI;  Surgeon: Cody Arana MD;  Location: [facility]     HYSTERECTOMY, PAP NO LONGER INDICATED  2008     LAPAROSCOPIC CHOLECYSTECTOMY  8/3/2012    Procedure: LAPAROSCOPIC CHOLECYSTECTOMY;  Laparoscopic Cholecystectomy ;  Surgeon: Corwin Cabezas MD;  Location: [facility]     OPEN REDUCTION INTERNAL FIXATION ANKLE  2014    Procedure: OPEN REDUCTION INTERNAL FIXATION ANKLE;  Surgeon: Shaun Bhatt DPM;  [facility]: [facility]     OPEN REDUCTION INTERNAL FIXATION ELBOW  10/15/2013    Procedure: OPEN REDUCTION INTERNAL FIXATION ELBOW;  OPEN REDUCTION INTERNAL FIXATION LEFT PROXIMAL ULNA;  Surgeon: Artem Last DO;  Location: [facility]     ORTHOPEDIC SURGERY      left shoulder surgery     REMOVE MESH VAGINA  10/10/2011    Procedure:REMOVE MESH VAGINA; Excision of Vaginal Mesh; Surgeon:SERGE SALGADO; Location:[facility] OR     SURGICAL HISTORY OF -   4/20/10    Transobturator midurethral sling     SURGICAL HISTORY OF -   1999    exploratory laparotomy, colitis     SURGICAL HISTORY OF -   4/20/10    Transobturator midurethral sling     TUBAL LIGATION       ZZC  DELIVERY ONLY      , Low Cervical     ZZC GASTRIC BYPASS,OBESITY,W/SM BOWEL RECONS  10/99     ZZC LIGATE FALLOPIAN TUBE  2000    laparoscopy       Social History     Tobacco Use     Smoking status: Never Smoker     Smokeless tobacco: Never Used   Substance Use Topics     Alcohol use: Yes     Comment: rarely - less than once a month     Family History   Problem Relation Age of Onset     C.A.D. Mother          hard copy, drawn during this pregnancy MI     Hypertension Mother      Hypertension Father      Substance Abuse Brother      Breast Cancer No family hx of          Current Outpatient Medications   Medication Sig Dispense Refill     acetaminophen (TYLENOL) 325 MG tablet Take 2 tablets (650 mg) by mouth every 4 hours as needed for other 1 Bottle 0     amLODIPine (NORVASC) 5 MG tablet Take 1 tablet (5 mg) by mouth daily 90 tablet 3     atenolol (TENORMIN) 50 MG tablet Take 1 tablet (50 mg) by mouth daily 90 tablet 3     Cholecalciferol (VITAMIN D) 125 MCG (5000 UT) CAPS Take 1 capsule (5,000 Units) by mouth daily 90 capsule 2     COMPOUNDED NON-CONTROLLED SUBSTANCE (CMPD RX) - PHARMACY TO MIX COMPOUNDED MEDICATION Estriol 0.1% cream (1mg/gram) in HRT base. Place 1 gram vaginally daily for 2 weeks, then vaginally twice weekly. 30 g 6     ibuprofen (ADVIL/MOTRIN) 200 MG tablet Take 400 mg by mouth every 6 hours as needed for moderate pain        sulfamethoxazole-trimethoprim (BACTRIM) 400-80 MG tablet Take 1 tablet by mouth daily as needed (take just with intercourse) No need to fill now. She will call when she needs it. 20 tablet 2     traZODone (DESYREL) 50 MG tablet TAKE TWO TABLETS BY MOUTH NIGHTLY AS NEEDED FOR SLEEP. Pharmacy please fill only if she calls. 180 tablet 3     Allergies   Allergen Reactions     Bactrim [Sulfamethoxazole W/Trimethoprim]      itching     Nitrofurantoin Itching     Patient states she is not longer allergic to this medication. Has used it since with no problems      Recent Labs   Lab Test 02/02/21  1005 07/07/20  0604 06/30/20  1542 05/26/20  0840 02/19/20  0713 01/17/19  0726 01/18/18  0714 07/07/17  0922   A1C 5.3  --   --   --  5.4  --   --  5.9   *  --   --  117*  --  107*   < >  --    HDL 54  --   --  44*  --  44*   < >  --    TRIG 134  --   --  123  --  127   < >  --    ALT  --   --   --   --  21  --   --   --    CR 0.97 0.93   < >  --  0.94 0.85   < > 0.93   GFRESTIMATED 64 67   < >  --  67 76   < > 63    GFRESTBLACK 74 77   < >  --  77 88   < > 76   POTASSIUM 4.3 4.3   < >  --  4.0 4.3   < > 4.5    < > = values in this interval not displayed.        Breast Cancer Screening:    Breast CA Risk Assessment (FHS-7) 2/7/2022   Do you have a family history of breast, colon, or ovarian cancer? No / Unknown         Mammogram Screening: Recommended mammography every 1-2 years with patient discussion and risk factor consideration  Pertinent mammograms are reviewed under the imaging tab.    History of abnormal Pap smear: Status post benign hysterectomy. Health Maintenance and Surgical History updated.  PAP / HPV 2/24/2009 11/21/2007 11/22/2006   PAP (Historical) NIL LSIL(A) NIL     Reviewed and updated as needed this visit by clinical staff  Tobacco  Allergies  Meds   Med Hx  Surg Hx  Fam Hx         Reviewed and updated as needed this visit by Provider               Past Medical History:   Diagnosis Date     Calculus of kidney      Migraine, unspecified, without mention of intractable migraine without mention of status migrainosus      Other specified iron deficiency anemias      Papanicolaou smear of cervix with low grade squamous intraepithelial lesion (LGSIL) 11/07    Colpo and hyst pathology benign     Primary osteoarthritis of right hip      Synovitis of ankle      Unspecified essential hypertension 1999    Essential hypertension      Past Surgical History:   Procedure Laterality Date     ARTHROPLASTY HIP Right 7/6/2020    Procedure: Right total hip arthroplasty;  Surgeon: Clinton Deras MD;  [facility]     ARTHROSCOPY ANKLE  4/22/2014    Procedure: ARTHROSCOPY ANKLE;  Surgeon: Shaun Bhatt DPM;  [facility]     ARTHROSCOPY KNEE Left 10/4/2018    Procedure: ARTHROSCOPY KNEE;  Left knee arthroscopy  medial meniscal and chondral debridement;  Surgeon: Aryan Holley MD;  Location: [facility]     COLONOSCOPY WITH CO2 INSUFFLATION N/A 9/28/2017    Procedure: COLONOSCOPY WITH CO2 INSUFFLATION;  COLON  SCREEN/ YURI;  Surgeon: Cody Arana MD;  Location: [facility]     HYSTERECTOMY, PAP NO LONGER INDICATED  2008     LAPAROSCOPIC CHOLECYSTECTOMY  8/3/2012    Procedure: LAPAROSCOPIC CHOLECYSTECTOMY;  Laparoscopic Cholecystectomy ;  Surgeon: Corwin Cabezas MD;  [facility]     OPEN REDUCTION INTERNAL FIXATION ANKLE  2014    Procedure: OPEN REDUCTION INTERNAL FIXATION ANKLE;  Surgeon: Shaun Bhatt DPM;  [facility]     OPEN REDUCTION INTERNAL FIXATION ELBOW  10/15/2013    Procedure: OPEN REDUCTION INTERNAL FIXATION ELBOW;  OPEN REDUCTION INTERNAL FIXATION LEFT PROXIMAL ULNA;  Surgeon: Artem Last DO;  [facility]     ORTHOPEDIC SURGERY      left shoulder surgery     REMOVE MESH VAGINA  10/10/2011    Procedure:REMOVE MESH VAGINA; Excision of Vaginal Mesh; Surgeon:SERGE SALGADO; Location:[facility]     SURGICAL HISTORY OF -   4/20/10    Transobturator midurethral sling     SURGICAL HISTORY OF -   1999    exploratory laparotomy, colitis     SURGICAL HISTORY OF -   4/20/10    Transobturator midurethral sling     TUBAL LIGATION       ZZC  DELIVERY ONLY      , Low Cervical     ZZC GASTRIC BYPASS,OBESITY,W/SM BOWEL RECONS  10/99     ZZC LIGATE FALLOPIAN TUBE  2000    laparoscopy       Review of Systems   Constitutional: Negative for chills and fever.   HENT: Negative for congestion, ear pain, hearing loss and sore throat.    Eyes: Negative for pain and visual disturbance.   Respiratory: Negative for cough and shortness of breath.    Cardiovascular: Negative for chest pain, palpitations and peripheral edema.   Gastrointestinal: Negative for abdominal pain, constipation, diarrhea, heartburn, hematochezia and nausea.   Breasts:  Negative for tenderness, breast mass and discharge.   Genitourinary: Negative for dysuria, frequency, genital sores, hematuria, pelvic pain, urgency, vaginal bleeding and vaginal discharge.   Musculoskeletal: Negative for  "arthralgias, joint swelling and myalgias.   Skin: Negative for rash.   Neurological: Positive for headaches. Negative for dizziness, weakness and paresthesias.   Psychiatric/Behavioral: Negative for mood changes. The patient is not nervous/anxious.           OBJECTIVE:   BP (!) 153/81 (BP Location: Right arm, Patient Position: Sitting, Cuff Size: Adult Regular)   Pulse 64   Temp 96.8  F (36  C) (Tympanic)   Ht 1.575 m (5' 2\")   Wt 98.9 kg (218 lb)   LMP 01/08/2008   SpO2 98%   BMI 39.87 kg/m    Physical Exam  GENERAL: healthy, alert and no distress  EYES: Eyes grossly normal to inspection, PERRL and conjunctivae and sclerae normal  HENT: ear canals and TM's normal, nose and mouth without ulcers or lesions  NECK: no adenopathy, no asymmetry, masses, or scars and thyroid normal to palpation  RESP: lungs clear to auscultation - no rales, rhonchi or wheezes  CV: regular rate and rhythm, normal S1 S2, no S3 or S4, no murmur, click or rub, no peripheral edema and peripheral pulses strong  ABDOMEN: soft, nontender, no hepatosplenomegaly, no masses and bowel sounds normal  MS: no gross musculoskeletal defects noted, no edema  SKIN: no suspicious lesions or rashes  NEURO: Normal strength and tone, mentation intact and speech normal  PSYCH: mentation appears normal, affect normal/bright        ASSESSMENT/PLAN:   (Z00.00) Routine general medical examination at a health care facility  (primary encounter diagnosis)  Comment: Preventive care reviewed and updated  Plan: Hemoglobin A1c, Basic metabolic panel  (Ca, Cl,        CO2, Creat, Gluc, K, Na, BUN)            (N39.0) Recurrent UTI  Comment: Doing well on Bactrim as needed prior to intercourse.  Refill Bactrim  Plan: sulfamethoxazole-trimethoprim (BACTRIM) 400-80         MG tablet            (E66.01) Morbid obesity (H)  Comment:   Body mass index is 39.87 kg/m .  Wt Readings from Last 4 Encounters:   02/07/22 98.9 kg (218 lb)   11/01/21 101.2 kg (223 lb)   04/21/21 92.5 " "kg (204 lb)   03/24/21 92.5 kg (204 lb)   Obesity contributing to other comorbidities include hypertension    Plan:   Discussed diet and exercise  (Z13.220) Screening for hyperlipidemia  Comment:   Plan: Lipid panel reflex to direct LDL Fasting            (Z12.11) Colon cancer screening  Comment:   Plan: Adult Gastro Ref - Procedure Only        Due for colonoscopy on 09/20/2022      Patient has been advised of split billing requirements and indicates understanding: Yes    COUNSELING:  Reviewed preventive health counseling, as reflected in patient instructions       Regular exercise       Healthy diet/nutrition       Colorectal Cancer Screening    Estimated body mass index is 39.87 kg/m  as calculated from the following:    Height as of this encounter: 1.575 m (5' 2\").    Weight as of this encounter: 98.9 kg (218 lb).    Weight management plan: Discussed healthy diet and exercise guidelines    She reports that she has never smoked. She has never used smokeless tobacco.      Counseling Resources:  ATP IV Guidelines  Pooled Cohorts Equation Calculator  Breast Cancer Risk Calculator  BRCA-Related Cancer Risk Assessment: FHS-7 Tool  FRAX Risk Assessment  ICSI Preventive Guidelines  Dietary Guidelines for Americans, 2010  USDA's MyPlate  ASA Prophylaxis  Lung CA Screening    Yary Boone MD  [facility]  "

## 2022-08-15 ENCOUNTER — OFFICE VISIT (OUTPATIENT)
Dept: FAMILY MEDICINE | Facility: CLINIC | Age: 62
End: 2022-08-15
Payer: COMMERCIAL

## 2022-08-15 VITALS
WEIGHT: 211 LBS | BODY MASS INDEX: 38.59 KG/M2 | OXYGEN SATURATION: 98 % | DIASTOLIC BLOOD PRESSURE: 79 MMHG | TEMPERATURE: 98 F | SYSTOLIC BLOOD PRESSURE: 138 MMHG | HEART RATE: 68 BPM

## 2022-08-15 DIAGNOSIS — M25.561 ACUTE PAIN OF RIGHT KNEE: Primary | ICD-10-CM

## 2022-08-15 PROCEDURE — 99213 OFFICE O/P EST LOW 20 MIN: CPT | Performed by: NURSE PRACTITIONER

## 2022-08-15 ASSESSMENT — ENCOUNTER SYMPTOMS
HEADACHES: 0
WHEEZING: 0
NAUSEA: 0
RHINORRHEA: 0
FATIGUE: 0
EYE ITCHING: 0
ARTHRALGIAS: 1
LIGHT-HEADEDNESS: 0
DIARRHEA: 0
SINUS PRESSURE: 0
JOINT SWELLING: 1
CONSTIPATION: 0
DIAPHORESIS: 0
FEVER: 0
SORE THROAT: 0
SHORTNESS OF BREATH: 0
DIZZINESS: 0
COUGH: 0
CHILLS: 0
EYE DISCHARGE: 0

## 2022-08-15 NOTE — PROGRESS NOTES
"  Assessment & Plan     Acute pain of right knee  Ibuprofen 400 mg p.o. twice daily with food for the next 10 days.  Education given regarding stretching to do to improve pain.  Notify if no improvement and may need to refer to orthopedics.      18 minutes spent on the date of the encounter doing chart review, history and exam, documentation and further activities per the note     BMI:   Estimated body mass index is 38.59 kg/m  as calculated from the following:    Height as of 2/7/22: 1.575 m (5' 2\").    Weight as of this encounter: 95.7 kg (211 lb).       Return in about 2 weeks (around 8/29/2022), or if symptoms worsen or fail to improve.    LORIE Riley CNP  [facility] NISHANT Miller is a 61 year old accompanied by her self, presenting for the following health issues:  Musculoskeletal Problem (Knee pain/)      Musculoskeletal Problem  Associated symptoms include arthralgias (right knee) and joint swelling (right knee). Pertinent negatives include no chills, coughing, diaphoresis, fatigue, fever, headaches, nausea, rash or sore throat.   History of Present Illness       Reason for visit:  Knee issue  Symptom intensity:  Mild  Symptom progression:  Worsening  Had these symptoms before:  No  What makes it worse:  Sitting for to long  What makes it better:  Ice, Tylenol (sometimes)    She eats 2-3 servings of fruits and vegetables daily.She consumes 1 sweetened beverage(s) daily.She exercises with enough effort to increase her heart rate 20 to 29 minutes per day.  She exercises with enough effort to increase her heart rate 5 days per week.   She is taking medications regularly.           In March or April-hit right knee on steering wheel. It hurt very badly. Right knee did get better. Then over the last 2-3 weeks started to hurt again. On Sat night felt like knee was going to give out. Has been on knee more, recently replaced floor in kitchen. Right legs swells off and on. " Using ice and tylenol as needed. That does help some. Going up and down stairs without problem.       Review of Systems   Constitutional: Negative for chills, diaphoresis, fatigue and fever.   HENT: Negative for ear discharge, ear pain, hearing loss, rhinorrhea, sinus pressure and sore throat.    Eyes: Negative for discharge and itching.   Respiratory: Negative for cough, shortness of breath and wheezing.    Gastrointestinal: Negative for constipation, diarrhea and nausea.   Musculoskeletal: Positive for arthralgias (right knee) and joint swelling (right knee).   Skin: Negative for rash.   Neurological: Negative for dizziness, light-headedness and headaches.          Objective    /79 (BP Location: Left arm, Patient Position: Chair, Cuff Size: Adult Large)   Pulse 68   Temp 98  F (36.7  C) (Temporal)   Wt 95.7 kg (211 lb)   LMP 01/08/2008   SpO2 98%   BMI 38.59 kg/m    Body mass index is 38.59 kg/m .  Physical Exam  Constitutional:       Appearance: She is well-developed.   Cardiovascular:      Rate and Rhythm: Normal rate and regular rhythm.   Pulmonary:      Effort: Pulmonary effort is normal.      Breath sounds: Normal breath sounds.   Musculoskeletal:      Right knee: Swelling present. No effusion, erythema or bony tenderness. Normal range of motion. Tenderness present.   Skin:     General: Skin is warm.   Neurological:      Mental Status: She is alert.                  .  ..

## 2022-08-15 NOTE — PATIENT INSTRUCTIONS
Please do the stretches that I showed you in clinic 3 times per day.    You may take over-the-counter ibuprofen, 400 mg twice per day with food for the next 10 days.    Please let me know if the pain does not improve in may need to get you into see orthopedics.    It was a pleasure meeting you today!

## 2022-09-23 ENCOUNTER — OFFICE VISIT (OUTPATIENT)
Dept: ORTHOPEDICS | Facility: CLINIC | Age: 62
End: 2022-09-23
Payer: COMMERCIAL

## 2022-09-23 DIAGNOSIS — M18.11 ARTHRITIS OF CARPOMETACARPAL (CMC) JOINT OF RIGHT THUMB: Primary | ICD-10-CM

## 2022-09-23 PROCEDURE — 20600 DRAIN/INJ JOINT/BURSA W/O US: CPT | Mod: RT | Performed by: STUDENT IN AN ORGANIZED HEALTH CARE EDUCATION/TRAINING PROGRAM

## 2022-09-23 RX ADMIN — TRIAMCINOLONE ACETONIDE 20 MG: 40 INJECTION, SUSPENSION INTRA-ARTICULAR; INTRAMUSCULAR at 16:18

## 2022-09-23 NOTE — PROGRESS NOTES
Small Joint Injection/Arthrocentesis: R thumb CMC    Date/Time: 9/23/2022 4:18 PM  Performed by: Seth Saunders MD  Authorized by: Seth Saunders MD   Indications:  Pain  Needle Size:  25 G  Guidance: landmark       Location:  Thumb    Site:  R thumb CMC                    Medications:  20 mg triamcinolone 40 MG/ML        Outcome:  Tolerated well, no immediate complications  Procedure discussed: discussed risks, benefits, and alternatives    Consent Given by:  Patient  Timeout: timeout called immediately prior to procedure    Prep: patient was prepped and draped in usual sterile fashion

## 2022-09-23 NOTE — LETTER
9/23/2022         RE: Angela Ibarra  [address] [place] [id number]        Dear Colleague,    Thank you for referring your patient, Angela Ibarra, to the [facility]. Please see a copy of my visit note below.    Small Joint Injection/Arthrocentesis: R thumb CMC    Date/Time: 9/23/2022 4:18 PM  Performed by: Seth Saunders MD  Authorized by: Seth Saunders MD   Indications:  Pain  Needle Size:  25 G  Guidance: landmark       Location:  Thumb    Site:  R thumb CMC                    Medications:  20 mg triamcinolone 40 MG/ML        Outcome:  Tolerated well, no immediate complications  Procedure discussed: discussed risks, benefits, and alternatives    Consent Given by:  Patient  Timeout: timeout called immediately prior to procedure    Prep: patient was prepped and draped in usual sterile fashion              Ortho Hand    Returns with recurrence of R thumb base pain. The last steroid injection on 11/2021 was very effective and lasted >9 months. She would like another injection today.     On exam, R thumb CMC grind with tenderness. Negative R Finkelstein's.     A/P: 61F p/w R thumb CMC arthritis    -Offered a right thumb CMC joint steroid injection. Patient would like this. Discussed the risks of steroid injections, including but not limited to: infection, bleeding, pain, injury to tendons or nerves, fat atrophy, skin depigmentation. Despite these risks, patient consents to steroid injection. Cleansed the skin with Chlorhexidine and 1:1 mixture of 20 mg Kenalog and 1% lidocaine was injected into each treatment area/joint. Patient tolerated the procedure with no issues and experienced immediate relief.   -Return to clinic when pain recurs  -A total of 20 minutes was devoted to review of chart, direct face-to-face patient counseling and documentation during this encounter, exclusive of any procedure performed.    Seth Saunders MD, PhD          Again, thank you  for allowing me to participate in the care of your patient.        Sincerely,        Seth Saunders MD

## 2022-09-23 NOTE — NURSING NOTE
[facility]   ORTHOPEDICS & SPORTS MEDICINE  [address] [place] [address]  Dept: [phone]  ______________________________________________________________________________    Patient: Angela Ibarra   : 1960   MRN: [id number]   2022    INVASIVE PROCEDURE SAFETY CHECKLIST    Date: 2022   Procedure: right thumb injection  Patient Name: Angela Ibarra  MRN: [id number]  YOB: 1960    Action: Complete sections as appropriate. Any discrepancy results in a HARD COPY until resolved.     PRE PROCEDURE:  Patient ID verified with 2 identifiers (name and  or MRN): Yes  Procedure and site verified with patient/designee (when able): Yes  Accurate consent documentation in medical record: Yes  H&P (or appropriate assessment) documented in medical record: Yes  H&P must be up to 20 days prior to procedure and updates within 24 hours of procedure as applicable: NA  Relevant diagnostic and radiology test results appropriately labeled and displayed as applicable: NA  Procedure site(s) marked with provider initials: NA    TIMEOUT:  Time-Out performed immediately prior to starting procedure, including verbal and active participation of all team members addressing the following:Yes  * Correct patient identify  * Confirmed that the correct side and site are marked  * An accurate procedure consent form  * Agreement on the procedure to be done  * Correct patient position  * Relevant images and results are properly labeled and appropriately displayed  * The need to administer antibiotics or fluids for irrigation purposes during the procedure as applicable   * Safety precautions based on patient history or medication use    DURING PROCEDURE: Verification of correct person, site, and procedures any time the responsibility for care of the patient is transferred to another member of the care team.       Prior to injection, verified patient identity using patient's name and  date of birth.  Due to injection administration, patient instructed to remain in clinic for 15 minutes  afterwards, and to report any adverse reaction to me immediately.    Joint injection was performed.      Drug Amount Wasted:  Yes: 20 mg/ml   Vial/Syringe: Single dose vial  Expiration Date:  6/30/2024      Burt Crespo, EMT  September 23, 2022

## 2022-09-25 RX ORDER — TRIAMCINOLONE ACETONIDE 40 MG/ML
20 INJECTION, SUSPENSION INTRA-ARTICULAR; INTRAMUSCULAR
Status: DISCONTINUED | OUTPATIENT
Start: 2022-09-23 | End: 2024-05-06

## 2022-09-25 NOTE — PROGRESS NOTES
Ortho Hand    Returns with recurrence of R thumb base pain. The last steroid injection on 11/2021 was very effective and lasted >9 months. She would like another injection today.     On exam, R thumb CMC grind with tenderness. Negative R Finkelstein's.     A/P: 61F p/w R thumb CMC arthritis    -Offered a right thumb CMC joint steroid injection. Patient would like this. Discussed the risks of steroid injections, including but not limited to: infection, bleeding, pain, injury to tendons or nerves, fat atrophy, skin depigmentation. Despite these risks, patient consents to steroid injection. Cleansed the skin with Chlorhexidine and 1:1 mixture of 20 mg Kenalog and 1% lidocaine was injected into each treatment area/joint. Patient tolerated the procedure with no issues and experienced immediate relief.   -Return to clinic when pain recurs  -A total of 20 minutes was devoted to review of chart, direct face-to-face patient counseling and documentation during this encounter, exclusive of any procedure performed.    Seth Saunders MD, PhD

## 2022-11-19 ENCOUNTER — HEALTH MAINTENANCE LETTER (OUTPATIENT)
Age: 62
End: 2022-11-19

## 2022-12-30 ENCOUNTER — TELEPHONE (OUTPATIENT)
Dept: ORTHOPEDICS | Facility: CLINIC | Age: 62
End: 2022-12-30

## 2022-12-30 ENCOUNTER — OFFICE VISIT (OUTPATIENT)
Dept: ORTHOPEDICS | Facility: CLINIC | Age: 62
End: 2022-12-30
Payer: COMMERCIAL

## 2022-12-30 DIAGNOSIS — M18.11 ARTHRITIS OF CARPOMETACARPAL (CMC) JOINT OF RIGHT THUMB: Primary | ICD-10-CM

## 2022-12-30 PROCEDURE — 99214 OFFICE O/P EST MOD 30 MIN: CPT | Performed by: STUDENT IN AN ORGANIZED HEALTH CARE EDUCATION/TRAINING PROGRAM

## 2022-12-30 NOTE — TELEPHONE ENCOUNTER
Procedure: Right thumb CMC joint arthroplasty with suture suspensionplasty  Facility:  [facility]  Length: 60 minutes  Anesthesia: Interscalene Block, MAC  Post-op appointments needed: 2 weeks provider only with OT to follow, 6 weeks with provider only.  Surgery packet/instructions given to patient?  Yes     Cj Farmer RN

## 2022-12-30 NOTE — LETTER
12/30/2022         RE: Angela Ibarra  [address] [place] [phone]        Dear Colleague,    Thank you for referring your patient, Angela Ibarra, to the [facility]. Please see a copy of my visit note below.    Ortho Hand    Patient returns with recurrence of R thumb base pain. The steroid injection lasted 2 months.     On exam, the R thumb with exquisite CMC +grind tenderness.    A/P: 62F p/w advanced R thumb CMC arthritis    -Discussed the options for treatment, including another steroid injection versus surgery. Since the last steroid injection had little effect, it is reasonable to proceed with surgery. Patient agrees.   -Discussed the risks of surgery, including but not limited to: infection, bleeding, pain, poor scarring, recurrence of pain, subsidence, need for revision surgery, injury to nerves or tendons, neuroma formation, complex regional pain syndrome. Despite these risks, patient consents to RIGHT thumb CMC arthroplasty with suture suspension.   -Discussed the postoperative recovery and need for thumb base offloading for 12 weeks  -MAC with block  -Case request placed  -A total of 20 minutes was devoted to review of chart, direct face-to-face patient counseling and documentation during this encounter, exclusive of any procedure performed.    Seth Saunders MD, PhD        Again, thank you for allowing me to participate in the care of your patient.        Sincerely,        Seth Saunders MD

## 2022-12-30 NOTE — NURSING NOTE
Pre-Operative Teaching Flowsheet     Person(s) involved in teaching: Patient     Motivation Level:  Receptive (willing/able to accept information) and asks appropriate questions where applicable: Yes  Any cultural factors/Jainism beliefs that may influence understanding or compliance? No     Patient demonstrates understanding of the following:  Pre-operative planning, including the necessary appointments and preparation needed prior to surgery: Yes  Which situations necessitate calling provider and whom to contact: Yes  Pain management techniques pre and post op: Yes  Stoplight tool introduced, questions answered, patient expressed understanding: Yes  How, and when, to access community resources: Yes    Additional Teaching Concerns Addressed:  Post-operative living arrangements and necessary adaptations to living environment.  Instructional Materials Used/Given: Yes, pre-op packet given to patient with additional system forms added as needed depending on type of surgery. Pre-op soap given (if in clinic).     Time spent with patient: 20 minutes.    Cj Farmer RN

## 2022-12-31 NOTE — PROGRESS NOTES
Ortho Hand    Patient returns with recurrence of R thumb base pain. The steroid injection lasted 2 months.     On exam, the R thumb with exquisite CMC +grind tenderness.    A/P: 62F p/w advanced R thumb CMC arthritis    -Discussed the options for treatment, including another steroid injection versus surgery. Since the last steroid injection had little effect, it is reasonable to proceed with surgery. Patient agrees.   -Discussed the risks of surgery, including but not limited to: infection, bleeding, pain, poor scarring, recurrence of pain, subsidence, need for revision surgery, injury to nerves or tendons, neuroma formation, complex regional pain syndrome. Despite these risks, patient consents to RIGHT thumb CMC arthroplasty with suture suspension.   -Discussed the postoperative recovery and need for thumb base offloading for 12 weeks  -MAC with block  -Case request placed  -A total of 20 minutes was devoted to review of chart, direct face-to-face patient counseling and documentation during this encounter, exclusive of any procedure performed.    Seth Saunders MD, PhD

## 2023-01-02 PROBLEM — M18.11 ARTHRITIS OF CARPOMETACARPAL (CMC) JOINT OF RIGHT THUMB: Status: ACTIVE | Noted: 2023-01-02

## 2023-01-02 NOTE — TELEPHONE ENCOUNTER
1/2 Called and spoke to patient, hand therapy is currently scheduled.     Shelia ocampo Procedure   Orthopedics, Podiatry, Sports Medicine, Ent ,Eye , Audiology, Adult Endocrine & Diabetes, Nutrition & Medication Therapy Management Specialties   [facility]

## 2023-01-02 NOTE — TELEPHONE ENCOUNTER
Scheduled surgery for 2/22 in [facility] with Dr. Saunders.    H&P with PCP.    Post-op scheduled for 3/10 and PT will need OT.    Surgery packet given in clinic.    No further questions/concerns at this time.

## 2023-01-30 ASSESSMENT — ENCOUNTER SYMPTOMS
CONSTIPATION: 0
FEVER: 0
DIARRHEA: 0
DIZZINESS: 0
WEAKNESS: 0
PARESTHESIAS: 0
DYSURIA: 0
NAUSEA: 0
BREAST MASS: 0
HEADACHES: 1
COUGH: 0
SORE THROAT: 0
FREQUENCY: 0
SHORTNESS OF BREATH: 0
ARTHRALGIAS: 1
HEMATOCHEZIA: 0
MYALGIAS: 0
JOINT SWELLING: 1
PALPITATIONS: 0
NERVOUS/ANXIOUS: 0
CHILLS: 0
HEMATURIA: 0
HEARTBURN: 0
ABDOMINAL PAIN: 0
EYE PAIN: 0

## 2023-02-01 ENCOUNTER — OFFICE VISIT (OUTPATIENT)
Dept: FAMILY MEDICINE | Facility: CLINIC | Age: 63
End: 2023-02-01
Payer: COMMERCIAL

## 2023-02-01 VITALS
OXYGEN SATURATION: 96 % | TEMPERATURE: 98.1 F | WEIGHT: 220 LBS | SYSTOLIC BLOOD PRESSURE: 137 MMHG | BODY MASS INDEX: 40.48 KG/M2 | HEIGHT: 62 IN | DIASTOLIC BLOOD PRESSURE: 80 MMHG | HEART RATE: 61 BPM

## 2023-02-01 DIAGNOSIS — M18.11 ARTHRITIS OF CARPOMETACARPAL (CMC) JOINT OF RIGHT THUMB: ICD-10-CM

## 2023-02-01 DIAGNOSIS — Z01.818 PREOP GENERAL PHYSICAL EXAM: Primary | ICD-10-CM

## 2023-02-01 DIAGNOSIS — Z47.89 ORTHOPEDIC AFTERCARE: ICD-10-CM

## 2023-02-01 PROBLEM — M79.645 THUMB PAIN, LEFT: Status: RESOLVED | Noted: 2021-11-01 | Resolved: 2023-02-01

## 2023-02-01 LAB — HGB BLD-MCNC: 14 G/DL (ref 11.7–15.7)

## 2023-02-01 PROCEDURE — 85018 HEMOGLOBIN: CPT | Performed by: NURSE PRACTITIONER

## 2023-02-01 PROCEDURE — 80048 BASIC METABOLIC PNL TOTAL CA: CPT | Performed by: NURSE PRACTITIONER

## 2023-02-01 PROCEDURE — 99214 OFFICE O/P EST MOD 30 MIN: CPT | Performed by: NURSE PRACTITIONER

## 2023-02-01 PROCEDURE — 36415 COLL VENOUS BLD VENIPUNCTURE: CPT | Performed by: NURSE PRACTITIONER

## 2023-02-01 RX ORDER — AMOXICILLIN 500 MG/1
CAPSULE ORAL
Qty: 4 CAPSULE | Refills: 0 | Status: SHIPPED | OUTPATIENT
Start: 2023-02-01 | End: 2024-03-12

## 2023-02-01 ASSESSMENT — PAIN SCALES - GENERAL: PAINLEVEL: NO PAIN (0)

## 2023-02-01 NOTE — PROGRESS NOTES
[facility]  [address] [address]  Phone: [phone]  Primary Provider: Germán Jernigan  Pre-op Performing Provider: TIMUR GUERIN    PREOPERATIVE EVALUATION:  Today's date: 2/1/2023    Angela Ibarra is a 62 year old female who presents for a preoperative evaluation.    Surgical Information:  Surgery/Procedure: RIGHT THUMB CARPOMETACARPAL JOINT ARTHROPLASTY WITH SUTURE SUSPENSION  Surgery Location:  OR  Surgeon: Seth Saunders  Surgery Date: 2/22/2023  Time of Surgery:   Where patient plans to recover: At home with family  Fax number for surgical facility: Note does not need to be faxed, will be available electronically in Epic.    Type of Anesthesia Anticipated: to be determined    Assessment & Plan     The proposed surgical procedure is considered LOW risk.    Preop general physical exam  - Basic metabolic panel  (Ca, Cl, CO2, Creat, Gluc, K, Na, BUN)  - Hemoglobin    Arthritis of carpometacarpal (CMC) joint of right thumb    Orthopedic aftercare  Hx of right total hip replacement.  Needs refill on amoxicillin for future dental appointment.   - amoxicillin (AMOXIL) 500 MG capsule; TAKE 4 CAPSULES (2,000 MG) BY MOUTH 1 HOUR PRIOR TO DENTAL PROCEDURE. Strength: 500 mg      Risks and Recommendations:  The patient has the following additional risks and recommendations for perioperative complications:   - Morbid obesity (BMI >40)    Medication Instructions:  Patient is to take all scheduled medications on the day of surgery    RECOMMENDATION:  APPROVAL GIVEN to proceed with proposed procedure, without further diagnostic evaluation.    Subjective     HPI related to upcoming procedure: Angeal Ibarra is a 61 yo female who presents for a preoperative physical for RIGHT THUMB CARPOMETACARPAL JOINT ARTHROPLASTY WITH SUTURE SUSPENSION for a history of arthritis.      Preop Questions 1/25/2023   1. Have you ever had a heart attack or stroke? No   2. Have you  ever had surgery on your heart or blood vessels, such as a stent placement, a coronary artery bypass, or surgery on an artery in your head, neck, heart, or legs? No   3. Do you have chest pain with activity? No   4. Do you have a history of  heart failure? No   5. Do you currently have a cold, bronchitis or symptoms of other infection? No   6. Do you have a cough, shortness of breath, or wheezing? No   7. Do you or anyone in your family have previous history of blood clots? No   8. Do you or does anyone in your family have a serious bleeding problem such as prolonged bleeding following surgeries or cuts? No   9. Have you ever had problems with anemia or been told to take iron pills? YES - remote history of anemia, was on iron   10. Have you had any abnormal blood loss such as black, tarry or bloody stools, or abnormal vaginal bleeding? No   11. Have you ever had a blood transfusion? No   12. Are you willing to have a blood transfusion if it is medically needed before, during, or after your surgery? Yes   13. Have you or any of your relatives ever had problems with anesthesia? No   14. Do you have sleep apnea, excessive snoring or daytime drowsiness? No   15. Do you have any artifical heart valves or other implanted medical devices like a pacemaker, defibrillator, or continuous glucose monitor? No   16. Do you have artificial joints? YES - right hip   17. Are you allergic to latex? No   18. Is there any chance that you may be pregnant? -       Health Care Directive:  Patient does not have a Health Care Directive or Living Will: Discussed advance care planning with patient; information given to patient to review.    Preoperative Review of :   reviewed - no record of controlled substances prescribed.      Status of Chronic Conditions:  See problem list for active medical problems.  Problems all longstanding and stable, except as noted/documented.  See ROS for pertinent symptoms related to these  conditions.      Review of Systems  CONSTITUTIONAL: NEGATIVE for fever, chills, change in weight  INTEGUMENTARY/SKIN: NEGATIVE for worrisome rashes, moles or lesions  EYES: NEGATIVE for vision changes or irritation  ENT/MOUTH: NEGATIVE for ear, mouth and throat problems  RESP: NEGATIVE for significant cough or SOB  CV: NEGATIVE for chest pain, palpitations or peripheral edema  GI: NEGATIVE for nausea, abdominal pain, heartburn, or change in bowel habits  : NEGATIVE for frequency, dysuria, or hematuria  MUSCULOSKELETAL: NEGATIVE for significant arthralgias or myalgia  NEURO: NEGATIVE for weakness, dizziness or paresthesias  ENDOCRINE: NEGATIVE for temperature intolerance, skin/hair changes  HEME: NEGATIVE for bleeding problems  PSYCHIATRIC: NEGATIVE for changes in mood or affect    Patient Active Problem List    Diagnosis Date Noted     Arthritis of carpometacarpal (CMC) joint of right thumb 01/02/2023     Priority: Medium     Added automatically from request for surgery [id number]       Degenerative arthritis of metacarpophalangeal joint of right thumb 11/01/2021     Priority: Medium     Status post hip surgery 07/06/2020     Priority: Medium     Right hip pain 06/08/2020     Priority: Medium     Added automatically from request for surgery [id number]       Bariatric surgery status 02/10/2020     Priority: Medium     Malnutrition following gastrointestinal surgery 02/10/2020     Priority: Medium     Morbid obesity (H) 08/28/2018     Priority: Medium     Arthropathy of facet joint 08/16/2018     Priority: Medium     Cataract of both eyes, unspecified cataract type 04/06/2018     Priority: Medium     Primary osteoarthritis of right hip 02/16/2018     Priority: Medium     Insomnia, unspecified type 09/08/2016     Priority: Medium     Essential hypertension with goal blood pressure less than 140/90 09/08/2016     Priority: Medium     Advanced directives, counseling/discussion 12/29/2015     Priority: Medium     Patient  states has Advance Directive and will bring in a copy to clinic.  Susanne Gotti LPN         CARDIOVASCULAR SCREENING; LDL GOAL LESS THAN 130 08/04/2014     Priority: Medium     Chondromalacia, left ankle and joints of left foot 05/22/2014     Priority: Medium     Pain in joint involving ankle and foot 05/13/2014     Priority: Medium     Abnormal gait 05/13/2014     Priority: Medium     Other postprocedural status(V45.89) 05/13/2014     Priority: Medium     Malunion, fracture 03/21/2014     Priority: Medium     Loose body in ankle and foot joint 03/07/2014     Priority: Medium     Synovitis of ankle 03/07/2014     Priority: Medium     Osteoporosis 11/25/2013     Priority: Medium     Problem list name updated by automated process. Provider to review       Right carpal tunnel syndrome 08/13/2013     Priority: Medium     Right leg weakness 09/12/2011     Priority: Medium     Balance disorder 07/20/2011     Priority: Medium     Thought possibly to be due to low B12 by neuro       Stress incontinence 03/09/2010     Priority: Medium     S/p TOT procedure 4/20/10       Migraine headache 09/18/2008     Priority: Medium     Reduced with atenolol  (Problem list name updated by automated process. Provider to review and confirm.)       NONSPECIFIC COLITIS 07/23/2007     Priority: Medium     Hosp in 99  Recurrent ? Infectious colitis  No definate signs of inflamatory bowel disease        Past Medical History:   Diagnosis Date     Calculus of kidney      Migraine, unspecified, without mention of intractable migraine without mention of status migrainosus      Other specified iron deficiency anemias      Papanicolaou smear of cervix with low grade squamous intraepithelial lesion (LGSIL) 11/07    Colpo and hyst pathology benign     Primary osteoarthritis of right hip      Synovitis of ankle      Unspecified essential hypertension 1999    Essential hypertension     Past Surgical History:   Procedure Laterality Date     ARTHROPLASTY HIP  Right 2020    Procedure: Right total hip arthroplasty;  Surgeon: Clinton Deras MD;  Location: [facility] OR     ARTHROSCOPY ANKLE  2014    Procedure: ARTHROSCOPY ANKLE;  Surgeon: Shaun Bhatt DPM;  Location: [facility] OR     ARTHROSCOPY KNEE Left 10/4/2018    Procedure: ARTHROSCOPY KNEE;  Left knee arthroscopy  medial meniscal and chondral debridement;  Surgeon: Aryan Holley MD;  Location: [facility] OR     COLONOSCOPY WITH CO2 INSUFFLATION N/A 2017    Procedure: COLONOSCOPY WITH CO2 INSUFFLATION;  COLON SCREEN/ YURI;  Surgeon: Cody Arana MD;  Location: [facility] OR     HYSTERECTOMY, PAP NO LONGER INDICATED  2008     LAPAROSCOPIC CHOLECYSTECTOMY  8/3/2012    Procedure: LAPAROSCOPIC CHOLECYSTECTOMY;  Laparoscopic Cholecystectomy ;  Surgeon: Corwin Cabezas MD;  Location: [facility]     OPEN REDUCTION INTERNAL FIXATION ANKLE  2014    Procedure: OPEN REDUCTION INTERNAL FIXATION ANKLE;  Surgeon: Shaun Bhatt DPM;  Location: [facility]     OPEN REDUCTION INTERNAL FIXATION ELBOW  10/15/2013    Procedure: OPEN REDUCTION INTERNAL FIXATION ELBOW;  OPEN REDUCTION INTERNAL FIXATION LEFT PROXIMAL ULNA;  Surgeon: Artem Last DO;  Location: [facility]     ORTHOPEDIC SURGERY      left shoulder surgery     REMOVE MESH VAGINA  10/10/2011    Procedure:REMOVE MESH VAGINA; Excision of Vaginal Mesh; Surgeon:SERGE SALGADO; Location:[facility] OR     SURGICAL HISTORY OF -   4/20/10    Transobturator midurethral sling     SURGICAL HISTORY OF -   1999    exploratory laparotomy, colitis     SURGICAL HISTORY OF -   4/20/10    Transobturator midurethral sling     TUBAL LIGATION       ZZC  DELIVERY ONLY      , Low Cervical     ZZC GASTRIC BYPASS,OBESITY,W/SM BOWEL RECONS  10/99     ZZC LIGATE FALLOPIAN TUBE  2000    laparoscopy     Current Outpatient Medications   Medication Sig Dispense Refill     acetaminophen (TYLENOL) 325 MG tablet Take 2 tablets (650 mg) by mouth every 4 hours  "as needed for other 1 Bottle 0     amLODIPine (NORVASC) 5 MG tablet TAKE 1 TABLET BY MOUTH EVERY DAY 90 tablet 0     amoxicillin (AMOXIL) 500 MG capsule TAKE 4 CAPSULES (2,000 MG) BY MOUTH 1 HOUR PRIOR TO DENTAL PROCEDURE. Strength: 500 mg 4 capsule 0     atenolol (TENORMIN) 50 MG tablet TAKE 1 TABLET BY MOUTH EVERY DAY 90 tablet 0     Cholecalciferol (VITAMIN D) 125 MCG (5000 UT) CAPS Take 1 capsule (5,000 Units) by mouth daily 90 capsule 2     ibuprofen (ADVIL/MOTRIN) 200 MG tablet Take 400 mg by mouth every 6 hours as needed for moderate pain        sulfamethoxazole-trimethoprim (BACTRIM) 400-80 MG tablet Take 1 tablet by mouth daily as needed (take just with intercourse) No need to fill now. She will call when she needs it. 20 tablet 2     traZODone (DESYREL) 50 MG tablet TAKE TWO TABLETS BY MOUTH NIGHTLY AS NEEDED FOR SLEEP. 180 tablet 1       Allergies   Allergen Reactions     Bactrim [Sulfamethoxazole W/Trimethoprim]      itching     Nitrofurantoin Itching     Patient states she is not longer allergic to this medication. Has used it since with no problems         Social History     Tobacco Use     Smoking status: Never     Smokeless tobacco: Never   Substance Use Topics     Alcohol use: Yes     Comment: rarely - less than once a month       History   Drug Use No         Objective     /80   Pulse 61   Temp 98.1  F (36.7  C)   Ht 1.575 m (5' 2\")   Wt 99.8 kg (220 lb)   LMP 01/08/2008   SpO2 96%   BMI 40.24 kg/m      Physical Exam    GENERAL APPEARANCE: healthy, alert and no distress     EYES: EOMI, PERRL     HENT: nose and mouth without ulcers or lesions     NECK: no adenopathy, no asymmetry, masses, or scars and thyroid normal to palpation     RESP: lungs clear to auscultation - no rales, rhonchi or wheezes     CV: regular rates and rhythm, normal S1 S2, no S3 or S4 and no murmur, click or rub     MS: extremities normal- no gross deformities noted, no evidence of inflammation in joints, FROM in " all extremities.     SKIN: no suspicious lesions or rashes     NEURO: Normal strength and tone, sensory exam grossly normal, mentation intact and speech normal     PSYCH: mentation appears normal. and affect normal/bright     LYMPHATICS: No cervical adenopathy    Recent Labs   Lab Test 02/07/22  1126 02/02/21  1005   HGB  --  14.5   PLT  --  304    141   POTASSIUM 4.2 4.3   CR 0.93 0.97   A1C 5.6 5.3        Diagnostics:  Labs pending at this time.  Results will be reviewed when available.   No EKG required, no history of coronary heart disease, significant arrhythmia, peripheral arterial disease or other structural heart disease.    Revised Cardiac Risk Index (RCRI):  The patient has the following serious cardiovascular risks for perioperative complications:   - No serious cardiac risks = 0 points     RCRI Interpretation: 0 points: Class I (very low risk - 0.4% complication rate)           Signed Electronically by: Ivana Hamm CNP  Copy of this evaluation report is provided to requesting physician.

## 2023-02-01 NOTE — PATIENT INSTRUCTIONS
For informational purposes only. Not to replace the advice of your health care provider. Copyright   2003,  [employer] â€“ [employer] [facility]. All rights reserved. Clinically reviewed by Irasema Marcano MD. [employer] [id number] - REV .  Preparing for Your Surgery  Getting started  A nurse will call you to review your health history and instructions. They will give you an arrival time based on your scheduled surgery time. Please be ready to share:    Your doctor's clinic name and phone number    Your medical, surgical, and anesthesia history    A list of allergies and sensitivities    A list of medicines, including herbal treatments and over-the-counter drugs    Whether the patient has a legal guardian (ask how to send us the papers in advance)  Please tell us if you're pregnant--or if there's any chance you might be pregnant. Some surgeries may injure a fetus (unborn baby), so they require a pregnancy test. Surgeries that are safe for a fetus don't always need a test, and you can choose whether to have one.   If you have a child who's having surgery, please ask for a copy of Preparing for Your Child's Surgery.    Preparing for surgery    Within 10 to 30 days of surgery: Have a pre-op exam (sometimes called an H&P, or History and Physical). This can be done at a clinic or pre-operative center.  ? If you're having a , you may not need this exam. Talk to your care team.    At your pre-op exam, talk to your care team about all medicines you take. If you need to stop any medicines before surgery, ask when to start taking them again.  ? We do this for your safety. Many medicines can make you bleed too much during surgery. Some change how well surgery (anesthesia) drugs work.    Call your insurance company to let them know you're having surgery. (If you don't have insurance, call [phone].)    Call your clinic if there's any change in your health. This includes signs of a cold or flu (sore throat, runny nose,  cough, rash, fever). It also includes a scrape or scratch near the surgery site.    If you have questions on the day of surgery, call your hospital or surgery center.  Eating and drinking guidelines  For your safety: Unless your surgeon tells you otherwise, follow the guidelines below.    Eat and drink as usual until 8 hours before you arrive for surgery. After that, no food or milk.    Drink clear liquids until 2 hours before you arrive. These are liquids you can see through, like water, Gatorade, and Propel Water. They also include plain black coffee and tea (no cream or milk), candy, and breath mints. You can spit out gum when you arrive.    If you drink alcohol: Stop drinking it the night before surgery.    If your care team tells you to take medicine on the morning of surgery, it's okay to take it with a sip of water.  Preventing infection    Shower or bathe the night before and morning of your surgery. Follow the instructions your clinic gave you. (If no instructions, use regular soap.)    Don't shave or clip hair near your surgery site. We'll remove the hair if needed.    Don't smoke or vape the morning of surgery. You may chew nicotine gum up to 2 hours before surgery. A nicotine patch is okay.  ? Note: Some surgeries require you to completely quit smoking and nicotine. Check with your surgeon.    Your care team will make every effort to keep you safe from infection. We will:  ? Clean our hands often with soap and water (or an alcohol-based hand rub).  ? Clean the skin at your surgery site with a special soap that kills germs.  ? Give you a special gown to keep you warm. (Cold raises the risk of infection.)  ? Wear special hair covers, masks, gowns and gloves during surgery.  ? Give antibiotic medicine, if prescribed. Not all surgeries need antibiotics.  What to bring on the day of surgery    Photo ID and insurance card    Copy of your health care directive, if you have one    Glasses and hearing aids (bring  cases)  ? You can't wear contacts during surgery    Inhaler and eye drops, if you use them (tell us about these when you arrive)    CPAP machine or breathing device, if you use them    A few personal items, if spending the night    If you have . . .  ? A pacemaker, ICD (cardiac defibrillator) or other implant: Bring the ID card.  ? An implanted stimulator: Bring the remote control.  ? A legal guardian: Bring a copy of the certified (court-stamped) guardianship papers.  Please remove any jewelry, including body piercings. Leave jewelry and other valuables at home.  If you're going home the day of surgery    You must have a responsible adult drive you home. They should stay with you overnight as well.    If you don't have someone to stay with you, and you aren't safe to go home alone, we may keep you overnight. Insurance often won't pay for this.  After surgery  If it's hard to control your pain or you need more pain medicine, please call your surgeon's office.  Questions?   If you have any questions for your care team, list them here: _________________________________________________________________________________________________________________________________________________________________________ ____________________________________ ____________________________________ ____________________________________

## 2023-02-02 LAB
ANION GAP SERPL CALCULATED.3IONS-SCNC: 5 MMOL/L (ref 3–14)
BUN SERPL-MCNC: 20 MG/DL (ref 7–30)
CALCIUM SERPL-MCNC: 10.7 MG/DL (ref 8.5–10.1)
CHLORIDE BLD-SCNC: 107 MMOL/L (ref 94–109)
CO2 SERPL-SCNC: 29 MMOL/L (ref 20–32)
CREAT SERPL-MCNC: 0.91 MG/DL (ref 0.52–1.04)
GFR SERPL CREATININE-BSD FRML MDRD: 71 ML/MIN/1.73M2
GLUCOSE BLD-MCNC: 95 MG/DL (ref 70–99)
POTASSIUM BLD-SCNC: 4.8 MMOL/L (ref 3.4–5.3)
SODIUM SERPL-SCNC: 141 MMOL/L (ref 133–144)

## 2023-02-03 ENCOUNTER — OFFICE VISIT (OUTPATIENT)
Dept: FAMILY MEDICINE | Facility: CLINIC | Age: 63
End: 2023-02-03
Payer: COMMERCIAL

## 2023-02-03 VITALS
BODY MASS INDEX: 40.3 KG/M2 | HEART RATE: 62 BPM | SYSTOLIC BLOOD PRESSURE: 142 MMHG | HEIGHT: 62 IN | TEMPERATURE: 97.4 F | WEIGHT: 219 LBS | RESPIRATION RATE: 20 BRPM | DIASTOLIC BLOOD PRESSURE: 80 MMHG | OXYGEN SATURATION: 96 %

## 2023-02-03 DIAGNOSIS — I10 ESSENTIAL HYPERTENSION WITH GOAL BLOOD PRESSURE LESS THAN 140/90: ICD-10-CM

## 2023-02-03 DIAGNOSIS — G47.00 INSOMNIA, UNSPECIFIED TYPE: ICD-10-CM

## 2023-02-03 DIAGNOSIS — Z13.220 SCREENING FOR HYPERLIPIDEMIA: ICD-10-CM

## 2023-02-03 DIAGNOSIS — N39.0 RECURRENT UTI: ICD-10-CM

## 2023-02-03 DIAGNOSIS — Z11.4 SCREENING FOR HIV (HUMAN IMMUNODEFICIENCY VIRUS): ICD-10-CM

## 2023-02-03 DIAGNOSIS — Z12.11 COLON CANCER SCREENING: ICD-10-CM

## 2023-02-03 DIAGNOSIS — Z00.00 ROUTINE GENERAL MEDICAL EXAMINATION AT A HEALTH CARE FACILITY: Primary | ICD-10-CM

## 2023-02-03 DIAGNOSIS — Z12.11 SCREEN FOR COLON CANCER: ICD-10-CM

## 2023-02-03 DIAGNOSIS — Z12.31 VISIT FOR SCREENING MAMMOGRAM: ICD-10-CM

## 2023-02-03 LAB
CHOLEST SERPL-MCNC: 193 MG/DL
FASTING STATUS PATIENT QL REPORTED: NO
HDLC SERPL-MCNC: 49 MG/DL
LDLC SERPL CALC-MCNC: 111 MG/DL
NONHDLC SERPL-MCNC: 144 MG/DL
TRIGL SERPL-MCNC: 165 MG/DL

## 2023-02-03 PROCEDURE — 80061 LIPID PANEL: CPT | Performed by: FAMILY MEDICINE

## 2023-02-03 PROCEDURE — 99214 OFFICE O/P EST MOD 30 MIN: CPT | Mod: 25 | Performed by: FAMILY MEDICINE

## 2023-02-03 PROCEDURE — 36415 COLL VENOUS BLD VENIPUNCTURE: CPT | Performed by: FAMILY MEDICINE

## 2023-02-03 PROCEDURE — 99396 PREV VISIT EST AGE 40-64: CPT | Mod: 25 | Performed by: FAMILY MEDICINE

## 2023-02-03 PROCEDURE — 90471 IMMUNIZATION ADMIN: CPT | Performed by: FAMILY MEDICINE

## 2023-02-03 PROCEDURE — 90715 TDAP VACCINE 7 YRS/> IM: CPT | Performed by: FAMILY MEDICINE

## 2023-02-03 RX ORDER — TRAZODONE HYDROCHLORIDE 50 MG/1
TABLET, FILM COATED ORAL
Qty: 180 TABLET | Refills: 1 | Status: SHIPPED | OUTPATIENT
Start: 2023-02-03 | End: 2023-08-02

## 2023-02-03 RX ORDER — AMLODIPINE BESYLATE 5 MG/1
5 TABLET ORAL DAILY
Qty: 90 TABLET | Refills: 3 | Status: SHIPPED | OUTPATIENT
Start: 2023-02-03 | End: 2024-02-12

## 2023-02-03 RX ORDER — ATENOLOL 50 MG/1
50 TABLET ORAL DAILY
Qty: 90 TABLET | Refills: 3 | Status: SHIPPED | OUTPATIENT
Start: 2023-02-03 | End: 2024-02-12

## 2023-02-03 RX ORDER — SULFAMETHOXAZOLE AND TRIMETHOPRIM 400; 80 MG/1; MG/1
1 TABLET ORAL DAILY PRN
Qty: 20 TABLET | Refills: 2 | Status: SHIPPED | OUTPATIENT
Start: 2023-02-03 | End: 2024-02-12

## 2023-02-03 ASSESSMENT — ENCOUNTER SYMPTOMS
MYALGIAS: 0
HEMATURIA: 0
JOINT SWELLING: 1
ARTHRALGIAS: 1
PALPITATIONS: 0
HEARTBURN: 0
WEAKNESS: 0
DIARRHEA: 0
PARESTHESIAS: 0
HEMATOCHEZIA: 0
NERVOUS/ANXIOUS: 0
SORE THROAT: 0
EYE PAIN: 0
BREAST MASS: 0
FEVER: 0
CHILLS: 0
DIZZINESS: 0
NAUSEA: 0
SHORTNESS OF BREATH: 0
FREQUENCY: 0
CONSTIPATION: 0
HEADACHES: 1
ABDOMINAL PAIN: 0
COUGH: 0
DYSURIA: 0

## 2023-02-03 ASSESSMENT — PAIN SCALES - GENERAL: PAINLEVEL: NO PAIN (0)

## 2023-02-03 NOTE — PATIENT INSTRUCTIONS
1. Follow a healthy diet - reliable information is available at: myplate.gov.    2. Get regular exercise based on your abilities like walking, jogging, biking, swimming and strength training (150 minutes per week).      3. Do self breast exams monthly. Report any lumps.    4. Avoid the sun or otherwise use sun screen to prevent skin cancer.    5. See the dentist and eye doctor regularly.     6. Advanced directive or living will is a good idea. Fill out the form by going to: [email or web address], then have it notarized and send me a copy.     7. I will contact you with results. If all is well, see you in one year for a physical with fasting labs.
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
## 2023-02-06 NOTE — RESULT ENCOUNTER NOTE
Clarissa Miller,    Your cholesterol was a bit high. Your estimated 10 year risk of a heart attack or stroke is 7%. Statin drugs are recommended at 7.5% or greater.     Therefore you don't need medications. But please double up your efforts in diet and exercise.    Regards,    Germán Jernigan M.D.    The 10-year ASCVD risk score (Christi ALVAREZ, et al., 2019) is: 7%    Values used to calculate the score:      Age: 62 years      Sex: Female      Is Non- : No      Diabetic: No      Tobacco smoker: No      Systolic Blood Pressure: 142 mmHg      Is BP treated: Yes      HDL Cholesterol: 49 mg/dL      Total Cholesterol: 193 mg/dL    
not applicable

## 2023-02-21 ENCOUNTER — ANESTHESIA EVENT (OUTPATIENT)
Dept: SURGERY | Facility: AMBULATORY SURGERY CENTER | Age: 63
End: 2023-02-21
Payer: COMMERCIAL

## 2023-02-21 NOTE — DISCHARGE INSTRUCTIONS
Hand Surgery Discharge Instructions    Patient has been treated for right thumb arthroplasty with Dr. Saunders on 2/22/2023.     Care: Please keep the splint/cast/dressing clean and dry at all times. Should it get wet, please call the clinic to schedule an appointment - as it may need to be replaced. Do not hesitate to use the sling to help protect the affected extremity and in particular in the setting of a nerve block.     Medications: You can take Ibuprofen 400-800 mg and Tylenol 650 mg for pain relief. Please take each every 6 hours, and for optimal pain relief - please stagger the medications so that you are taking one or the other every 3 hours. If you had a nerve block, the effects may last 8-12 hours. If you have been prescribed additional pain medications, please take as instructed and as needed. If you are taking additional pain medications, please do not exceed 4000 mg of Tylenol daily from all sources. Also, if you are taking narcotic medications, please do not operate heavy machinery or drive. If you have been prescribed antibiotics, please also take as instructed.     Diet: Start with clear liquids and slow down if nauseated. Advance the diet as tolerated.    Weight-bearing/Activities: Move your fingers to prevent stiffness. Elevate the affected extremity to improve throbbing sensation or pain. No strenuous activities and do not raise your heart rate above 100 bpm for the first few weeks. If you had a fracture fixed, your extremity is non-weight-bearing. Otherwise, you can limit your weight-bearing with the affected extremity to 2-3 pounds unless otherwise specified.    ER Instructions: Please call the office and/or consider return to the ER if you experience worsening pain not relieved by medications, increased swelling, redness or high fevers >101F or if there are unexpected problems like shortness of breath.    Post-op follow-up: Clinic at 2 weeks [place] splint [place] [facility] [place] custom splint fabrication or cast placement following.     Seth Saunders MD, PhD    [facility]   Adult Discharge Orders & Instructions     For 24 hours after surgery    Get plenty of rest.  A responsible adult must stay with you for at least 24 hours after you leave the hospital.   Do not drive or use heavy equipment.  If you have weakness or tingling, don't drive or use heavy equipment until this feeling goes away.  Do not drink alcohol.  Avoid strenuous or risky activities.  Ask for help when climbing stairs.   You may feel lightheaded.  IF so, sit for a few minutes before standing.  Have someone help you get up.   If you have nausea (feel sick to your stomach): Drink only clear liquids such as apple juice, ginger ale, broth or 7-Up.  Rest may also help.  Be sure to drink enough fluids.  Move to a regular diet as you feel able.  You may have a slight fever. Call the doctor if your fever is over 100 F (37.7 C) (taken under the tongue) or lasts longer than 24 hours.  You may have a dry mouth, a sore throat, muscle aches or trouble sleeping.  These should go away after 24 hours.  Do not make important or legal decisions.     Call your doctor for any of the followin.  Signs of infection (fever, growing tenderness at the surgery site, a large amount of drainage or bleeding, severe pain, foul-smelling drainage, redness, swelling).    2. It has been over 8 to 10 hours since surgery and you are still not able to urinate (pass water).    3.  Headache for over 24 hours.                 4. Signs of Covid-19 infection (temperature over 100 degrees, shortness of breath, cough, loss of taste/smell, generalized body aches, persistent headache,                  chills, sore throat, nausea/vomiting/diarrhea).      Information about liposomal bupivacaine (Exparel)    What is Liposomal Bupivacaine?    Liposomal Bupivacaine is a numbing medication that can help you manage your pain after surgery.   "This medication is similar to \"novacaine,\" which is often used by the dentist.  Liposomal bupivacaine is released slowly and can help control pain for up to 72 hours.    What is the purpose of Liposomal Bupivacaine?  To manage your pain after surgery  To help you sleep better, take deep breaths, walk more comfortable, and feel up to visiting with others    How is the procedure done?  Liposomal bupivacaine is a medication given by an injection.  It is usually given right before your surgery.  If this is the case, you will be awake or sedated, but you should experience minimal pain during the procedure.  For some people, the injection may be given at the very end of your surgery.  It all depends on the type of surgery and your situation.  The procedure usually takes about 5-15 minutes.  An ultrasound machine will help the anesthesiologist insert it in the right place or the surgeon will inject it under direct vision.   A needle is used to place the numbing medication under your skin.  It provides pain relief by numbing the tissue in the area where your surgeon will make the incision.    What can I expect?  You may experience numbness, tingling, or a feeling of heaviness around the area that was injected.  If you experience any of the follow symptoms IMMEDIATELY CALL THE REGIONAL ANESTHESIA PAIN SERVICE:  Numbness or tingling occurs in areas other than around the injection site  Blurry vision  Ringing in your ears  A metallic taste in your mouth    CALL the Regional Anesthesia Pain Service at:  [phone].  Press \"4\" for the  and let the hospital  know that you are having a problem with a nerve block and that you would like to page the \"adult care inpatient pain management/[facility] team\".  From 7 am - 5 pm, page the \"staff\" physician  From 5 pm - 7 am, page the \"resident\" physician (if no response from \"resident\" physician, call the  back and the \"staff\" physician).    You should not " receive any other type of numbing medication within 4 days after receiving liposomal bupivacaine unless your anesthesiologist approves.    Post Operative Instructions: Regional Anesthetic for Upper Extremity with Liposomal Bupivacaine  General Information:   Regional anesthesia is when local anesthetic or  numbing  medication is injected around the nerves to anesthetize or  numb  the area supplied by that set of nerves. It is a type of analgesia used to control pain and decreases the need for narcotics following surgery.    Types of Regional Blocks:  Interscalene: A block injected into the neck on the operative shoulder/arm of a patient having shoulder surgery  Supraclavicular: A block injected near the clavicle on the operative shoulder of a patient having elbow, forearm, or hand surgery    Procedure:  The type of anesthesia your doctor used to numb your shoulder or arm will usually not start to wear off for 24-48 hours, but may last as long as 72 hours. You should be careful during that period, since it is possible to injure your arm without being aware of the injury. While your arm is numb, you should:  Avoid striking or bumping your arm  Avoid extreme hot or cold    Diet:  There are no restrictions on your diet. You should drink plenty of fluids.     Discomfort:  You will have a tingling and prickly sensation in your arm as the feeling begins to return. You can also expect some discomfort. The amount of discomfort is unpredictable, but if you have more pain than can be controlled with pain medication you should notify your physician.     Pain Medications:  Begin taking your oral pain pills before bedtime and during the night to avoid a sudden onset of pain as part of the block wears off.  Do not engage in drinking, driving, or hazardous occupations while taking pain medication.     Stitches:   You may have stitches or special skin closures. You doctor will inform you when to return to the office to have them  removed.     Activity:  On the day of surgery you should try to stay in bed with your hand elevated on pillows. You may resume your normal activity after that, wearing a sling for comfort. Contact your physician if you have any of the problems:   Continued numbness or tingling in the arm or hand after 72 hours  Swelling of the fingers or fingers that are cold to the touch  Excessive bleeding or drainage  Severe pain

## 2023-02-21 NOTE — ANESTHESIA PREPROCEDURE EVALUATION
Anesthesia Pre-Procedure Evaluation    Patient: Angela Ibarra   MRN: [id number] : 1960        Procedure : Procedure(s):  RIGHT THUMB CARPOMETACARPAL JOINT ARTHROPLASTY WITH SUTURE SUSPENSION          Past Medical History:   Diagnosis Date     Calculus of kidney      Migraine, unspecified, without mention of intractable migraine without mention of status migrainosus      Other specified iron deficiency anemias      Papanicolaou smear of cervix with low grade squamous intraepithelial lesion (LGSIL)     Colpo and hyst pathology benign     Primary osteoarthritis of right hip      Synovitis of ankle      Unspecified essential hypertension 1999    Essential hypertension      Past Surgical History:   Procedure Laterality Date     ARTHROPLASTY HIP Right 2020    Procedure: Right total hip arthroplasty;  Surgeon: Clinton Deras MD;  Location: [facility] OR     ARTHROSCOPY ANKLE  2014    Procedure: ARTHROSCOPY ANKLE;  Surgeon: Shaun Bhatt DPM;  Location: [facility] OR     ARTHROSCOPY KNEE Left 10/4/2018    Procedure: ARTHROSCOPY KNEE;  Left knee arthroscopy  medial meniscal and chondral debridement;  Surgeon: Aryan Holley MD;  Location: [facility] OR     COLONOSCOPY WITH CO2 INSUFFLATION N/A 2017    Procedure: COLONOSCOPY WITH CO2 INSUFFLATION;  COLON SCREEN/ YURI;  Surgeon: Cody Arana MD;  Location: [facility] OR     HYSTERECTOMY, PAP NO LONGER INDICATED  2008     LAPAROSCOPIC CHOLECYSTECTOMY  8/3/2012    Procedure: LAPAROSCOPIC CHOLECYSTECTOMY;  Laparoscopic Cholecystectomy ;  Surgeon: Corwin Cabezas MD;  Location: [facility] OR     OPEN REDUCTION INTERNAL FIXATION ANKLE  2014    Procedure: OPEN REDUCTION INTERNAL FIXATION ANKLE;  Surgeon: Shaun Bhatt DPM;  Location: [facility]     OPEN REDUCTION INTERNAL FIXATION ELBOW  10/15/2013    Procedure: OPEN REDUCTION INTERNAL FIXATION ELBOW;  OPEN REDUCTION INTERNAL FIXATION LEFT PROXIMAL ULNA;  Surgeon: Artem Last DO;   Location: [facility] OR     ORTHOPEDIC SURGERY      left shoulder surgery     REMOVE MESH VAGINA  10/10/2011    Procedure:REMOVE MESH VAGINA; Excision of Vaginal Mesh; Surgeon:SERGE SALGADO; Location:[facility] OR     SURGICAL HISTORY OF -   4/20/10    Transobturator midurethral sling     SURGICAL HISTORY OF -   1999    exploratory laparotomy, colitis     SURGICAL HISTORY OF -   4/20/10    Transobturator midurethral sling     TUBAL LIGATION       ZZC  DELIVERY ONLY      , Low Cervical     ZZC GASTRIC BYPASS,OBESITY,W/SM BOWEL RECONS  10/99     ZZC LIGATE FALLOPIAN TUBE  2000    laparoscopy      Allergies   Allergen Reactions     Bactrim [Sulfamethoxazole W/Trimethoprim]      itching     Nitrofurantoin Itching     Patient states she is not longer allergic to this medication. Has used it since with no problems       Social History     Tobacco Use     Smoking status: Never     Smokeless tobacco: Never   Substance Use Topics     Alcohol use: Not Currently     Comment: rarely - less than once a month      Wt Readings from Last 1 Encounters:   23 99.3 kg (219 lb)        Anesthesia Evaluation   Pt has had prior anesthetic. Type: General and MAC.        ROS/MED HX  ENT/Pulmonary:     (+) DOMO risk factors, hypertension, obese,     Neurologic:  - neg neurologic ROS     Cardiovascular:     (+) hypertension-----    METS/Exercise Tolerance: 4 - Raking leaves, gardening    Hematologic:       Musculoskeletal:   (+) arthritis,     GI/Hepatic:  - neg GI/hepatic ROS     Renal/Genitourinary:     (+) renal disease,     Endo: Comment: Morbid - neg endo ROS   (+) Obesity,     Psychiatric/Substance Use:  - neg psychiatric ROS     Infectious Disease:       Malignancy:       Other:            Physical Exam    Airway        Mallampati: II   TM distance: > 3 FB      Respiratory Devices and Support         Dental    unable to assess        Cardiovascular   cardiovascular exam normal          Pulmonary   pulmonary exam  normal                OUTSIDE LABS:  CBC:   Lab Results   Component Value Date    WBC 6.3 02/02/2021    WBC 8.2 06/30/2020    HGB 14.0 02/01/2023    HGB 14.5 02/02/2021    HCT 44.5 02/02/2021    HCT 41.7 06/30/2020     02/02/2021     06/30/2020     BMP:   Lab Results   Component Value Date     02/01/2023     02/07/2022    POTASSIUM 4.8 02/01/2023    POTASSIUM 4.2 02/07/2022    CHLORIDE 107 02/01/2023    CHLORIDE 107 02/07/2022    CO2 29 02/01/2023    CO2 27 02/07/2022    BUN 20 02/01/2023    BUN 14 02/07/2022    CR 0.91 02/01/2023    CR 0.93 02/07/2022    GLC 95 02/01/2023    GLC 90 02/07/2022     COAGS:   Lab Results   Component Value Date    PTT 26 01/19/2011    INR 0.91 01/19/2011     POC:   Lab Results   Component Value Date    BGM 93 07/06/2020     HEPATIC:   Lab Results   Component Value Date    ALBUMIN 3.6 02/19/2020    PROTTOTAL 7.2 02/19/2020    ALT 21 02/19/2020    AST 18 02/19/2020    ALKPHOS 82 02/19/2020    BILITOTAL 0.2 02/19/2020     OTHER:   Lab Results   Component Value Date    A1C 5.6 02/07/2022    BRITANY 10.7 (H) 02/01/2023    LIPASE 55 07/30/2012    AMYLASE 55 07/30/2012    TSH 1.80 10/25/2013    CRP 2.2 09/26/2007    SED 16 09/26/2007       Anesthesia Plan    ASA Status:  3      Anesthesia Type: MAC.     - Reason for MAC: immobility needed, straight local not clinically adequate   Induction: Propofol.   Maintenance: TIVA.        Consents    Anesthesia Plan(s) and associated risks, benefits, and realistic alternatives discussed. Questions answered and patient/representative(s) expressed understanding.     - Discussed: Risks, Benefits and Alternatives for BOTH SEDATION and the PROCEDURE were discussed     - Discussed with:  Patient         Postoperative Care    Pain management: Peripheral nerve block (Single Shot), IV analgesics, Multi-modal analgesia.   PONV prophylaxis: Ondansetron (or other 5HT-3)     Comments:           H&P reviewed: Unable to attach H&P to encounter due  to EHR limitations. H&P Update: appropriate H&P reviewed, patient examined. No interval changes since H&P (within 30 days).         Dereje Casas MD

## 2023-02-22 ENCOUNTER — HOSPITAL ENCOUNTER (OUTPATIENT)
Facility: AMBULATORY SURGERY CENTER | Age: 63
Discharge: HOME | End: 2023-02-22
Attending: STUDENT IN AN ORGANIZED HEALTH CARE EDUCATION/TRAINING PROGRAM | Admitting: STUDENT IN AN ORGANIZED HEALTH CARE EDUCATION/TRAINING PROGRAM
Payer: COMMERCIAL

## 2023-02-22 ENCOUNTER — ANESTHESIA (OUTPATIENT)
Dept: SURGERY | Facility: AMBULATORY SURGERY CENTER | Age: 63
End: 2023-02-22
Payer: COMMERCIAL

## 2023-02-22 VITALS
SYSTOLIC BLOOD PRESSURE: 158 MMHG | TEMPERATURE: 97.9 F | HEART RATE: 54 BPM | DIASTOLIC BLOOD PRESSURE: 74 MMHG | RESPIRATION RATE: 20 BRPM | OXYGEN SATURATION: 93 %

## 2023-02-22 DIAGNOSIS — M19.041 DEGENERATIVE ARTHRITIS OF METACARPOPHALANGEAL JOINT OF RIGHT THUMB: Primary | ICD-10-CM

## 2023-02-22 DIAGNOSIS — M18.11 ARTHRITIS OF CARPOMETACARPAL (CMC) JOINT OF RIGHT THUMB: ICD-10-CM

## 2023-02-22 PROCEDURE — G8907 PT DOC NO EVENTS ON DISCHARG: HCPCS

## 2023-02-22 PROCEDURE — G8918 PT W/O PREOP ORDER IV AB PRO: HCPCS

## 2023-02-22 PROCEDURE — 25447 ARTHRP NTRCRP/CRP/MTCR NTRPS: CPT | Mod: RT | Performed by: STUDENT IN AN ORGANIZED HEALTH CARE EDUCATION/TRAINING PROGRAM

## 2023-02-22 PROCEDURE — 25447 ARTHRP NTRCRP/CRP/MTCR NTRPS: CPT | Mod: RT

## 2023-02-22 PROCEDURE — 26480 TRANSPLANT HAND TENDON: CPT | Mod: RT | Performed by: STUDENT IN AN ORGANIZED HEALTH CARE EDUCATION/TRAINING PROGRAM

## 2023-02-22 RX ORDER — CEPHALEXIN 500 MG/1
500 CAPSULE ORAL 4 TIMES DAILY
Qty: 12 CAPSULE | Refills: 0 | Status: SHIPPED | OUTPATIENT
Start: 2023-02-22 | End: 2023-02-25

## 2023-02-22 RX ORDER — FENTANYL CITRATE 50 UG/ML
50 INJECTION, SOLUTION INTRAMUSCULAR; INTRAVENOUS EVERY 5 MIN PRN
Status: DISCONTINUED | OUTPATIENT
Start: 2023-02-22 | End: 2023-02-23 | Stop reason: HOSPADM

## 2023-02-22 RX ORDER — OXYCODONE HYDROCHLORIDE 5 MG/1
5 TABLET ORAL EVERY 6 HOURS PRN
Qty: 12 TABLET | Refills: 0 | Status: SHIPPED | OUTPATIENT
Start: 2023-02-22 | End: 2023-02-25

## 2023-02-22 RX ORDER — NALOXONE HYDROCHLORIDE 0.4 MG/ML
0.4 INJECTION, SOLUTION INTRAMUSCULAR; INTRAVENOUS; SUBCUTANEOUS
Status: DISCONTINUED | OUTPATIENT
Start: 2023-02-22 | End: 2023-02-23 | Stop reason: HOSPADM

## 2023-02-22 RX ORDER — PROPOFOL 10 MG/ML
INJECTION, EMULSION INTRAVENOUS PRN
Status: DISCONTINUED | OUTPATIENT
Start: 2023-02-22 | End: 2023-02-22

## 2023-02-22 RX ORDER — ACETAMINOPHEN 325 MG/1
975 TABLET ORAL ONCE
Status: CANCELLED | OUTPATIENT
Start: 2023-02-22 | End: 2023-02-22

## 2023-02-22 RX ORDER — ONDANSETRON 4 MG/1
4 TABLET, ORALLY DISINTEGRATING ORAL EVERY 30 MIN PRN
Status: DISCONTINUED | OUTPATIENT
Start: 2023-02-22 | End: 2023-02-23 | Stop reason: HOSPADM

## 2023-02-22 RX ORDER — SODIUM CHLORIDE, SODIUM LACTATE, POTASSIUM CHLORIDE, CALCIUM CHLORIDE 600; 310; 30; 20 MG/100ML; MG/100ML; MG/100ML; MG/100ML
INJECTION, SOLUTION INTRAVENOUS CONTINUOUS
Status: DISCONTINUED | OUTPATIENT
Start: 2023-02-22 | End: 2023-02-23 | Stop reason: HOSPADM

## 2023-02-22 RX ORDER — ONDANSETRON 2 MG/ML
4 INJECTION INTRAMUSCULAR; INTRAVENOUS EVERY 30 MIN PRN
Status: DISCONTINUED | OUTPATIENT
Start: 2023-02-22 | End: 2023-02-23 | Stop reason: HOSPADM

## 2023-02-22 RX ORDER — FENTANYL CITRATE 50 UG/ML
25 INJECTION, SOLUTION INTRAMUSCULAR; INTRAVENOUS EVERY 5 MIN PRN
Status: DISCONTINUED | OUTPATIENT
Start: 2023-02-22 | End: 2023-02-23 | Stop reason: HOSPADM

## 2023-02-22 RX ORDER — NALOXONE HYDROCHLORIDE 0.4 MG/ML
0.2 INJECTION, SOLUTION INTRAMUSCULAR; INTRAVENOUS; SUBCUTANEOUS
Status: DISCONTINUED | OUTPATIENT
Start: 2023-02-22 | End: 2023-02-23 | Stop reason: HOSPADM

## 2023-02-22 RX ORDER — FENTANYL CITRATE 50 UG/ML
25-50 INJECTION, SOLUTION INTRAMUSCULAR; INTRAVENOUS
Status: DISCONTINUED | OUTPATIENT
Start: 2023-02-22 | End: 2023-02-23 | Stop reason: HOSPADM

## 2023-02-22 RX ORDER — ONDANSETRON 4 MG/1
4 TABLET, ORALLY DISINTEGRATING ORAL EVERY 8 HOURS PRN
Qty: 12 TABLET | Refills: 0 | Status: SHIPPED | OUTPATIENT
Start: 2023-02-22 | End: 2023-03-06

## 2023-02-22 RX ORDER — LIDOCAINE 40 MG/G
CREAM TOPICAL
Status: CANCELLED | OUTPATIENT
Start: 2023-02-22

## 2023-02-22 RX ORDER — LIDOCAINE 40 MG/G
CREAM TOPICAL
Status: DISCONTINUED | OUTPATIENT
Start: 2023-02-22 | End: 2023-02-23 | Stop reason: HOSPADM

## 2023-02-22 RX ORDER — FLUMAZENIL 0.1 MG/ML
0.2 INJECTION, SOLUTION INTRAVENOUS
Status: DISCONTINUED | OUTPATIENT
Start: 2023-02-22 | End: 2023-02-23 | Stop reason: HOSPADM

## 2023-02-22 RX ORDER — BUPIVACAINE HYDROCHLORIDE 5 MG/ML
INJECTION, SOLUTION EPIDURAL; INTRACAUDAL
Status: COMPLETED | OUTPATIENT
Start: 2023-02-22 | End: 2023-02-22

## 2023-02-22 RX ORDER — SODIUM CHLORIDE, SODIUM LACTATE, POTASSIUM CHLORIDE, CALCIUM CHLORIDE 600; 310; 30; 20 MG/100ML; MG/100ML; MG/100ML; MG/100ML
INJECTION, SOLUTION INTRAVENOUS CONTINUOUS
Status: CANCELLED | OUTPATIENT
Start: 2023-02-22

## 2023-02-22 RX ORDER — LIDOCAINE HYDROCHLORIDE 20 MG/ML
INJECTION, SOLUTION INFILTRATION; PERINEURAL PRN
Status: DISCONTINUED | OUTPATIENT
Start: 2023-02-22 | End: 2023-02-22

## 2023-02-22 RX ORDER — CEFAZOLIN SODIUM 2 G/100ML
2 INJECTION, SOLUTION INTRAVENOUS SEE ADMIN INSTRUCTIONS
Status: DISCONTINUED | OUTPATIENT
Start: 2023-02-22 | End: 2023-02-23 | Stop reason: HOSPADM

## 2023-02-22 RX ORDER — OXYCODONE HYDROCHLORIDE 5 MG/1
10 TABLET ORAL
Status: DISCONTINUED | OUTPATIENT
Start: 2023-02-22 | End: 2023-02-23 | Stop reason: HOSPADM

## 2023-02-22 RX ORDER — CEFAZOLIN SODIUM 2 G/100ML
2 INJECTION, SOLUTION INTRAVENOUS
Status: COMPLETED | OUTPATIENT
Start: 2023-02-22 | End: 2023-02-22

## 2023-02-22 RX ORDER — OXYCODONE HYDROCHLORIDE 5 MG/1
5 TABLET ORAL
Status: COMPLETED | OUTPATIENT
Start: 2023-02-22 | End: 2023-02-22

## 2023-02-22 RX ORDER — PROPOFOL 10 MG/ML
INJECTION, EMULSION INTRAVENOUS CONTINUOUS PRN
Status: DISCONTINUED | OUTPATIENT
Start: 2023-02-22 | End: 2023-02-22

## 2023-02-22 RX ADMIN — CEFAZOLIN SODIUM 2 G: 2 INJECTION, SOLUTION INTRAVENOUS at 06:55

## 2023-02-22 RX ADMIN — FENTANYL CITRATE 50 MCG: 50 INJECTION, SOLUTION INTRAMUSCULAR; INTRAVENOUS at 06:47

## 2023-02-22 RX ADMIN — LIDOCAINE HYDROCHLORIDE 60 MG: 20 INJECTION, SOLUTION INFILTRATION; PERINEURAL at 07:00

## 2023-02-22 RX ADMIN — PROPOFOL 50 MG: 10 INJECTION, EMULSION INTRAVENOUS at 07:04

## 2023-02-22 RX ADMIN — BUPIVACAINE HYDROCHLORIDE 15 ML: 5 INJECTION, SOLUTION EPIDURAL; INTRACAUDAL at 06:45

## 2023-02-22 RX ADMIN — PROPOFOL 150 MCG/KG/MIN: 10 INJECTION, EMULSION INTRAVENOUS at 07:04

## 2023-02-22 RX ADMIN — OXYCODONE HYDROCHLORIDE 5 MG: 5 TABLET ORAL at 08:16

## 2023-02-22 RX ADMIN — FENTANYL CITRATE 25 MCG: 50 INJECTION, SOLUTION INTRAMUSCULAR; INTRAVENOUS at 08:13

## 2023-02-22 RX ADMIN — FENTANYL CITRATE 25 MCG: 50 INJECTION, SOLUTION INTRAMUSCULAR; INTRAVENOUS at 07:26

## 2023-02-22 RX ADMIN — SODIUM CHLORIDE, SODIUM LACTATE, POTASSIUM CHLORIDE, CALCIUM CHLORIDE: 600; 310; 30; 20 INJECTION, SOLUTION INTRAVENOUS at 06:09

## 2023-02-22 NOTE — PROGRESS NOTES
Patient's  called and left message--[facility] (where RX sent)-does not have any oxycodone available.   requested Rx to be sent to [facility]--Voice mail message left for Dr Saunders to send a new RX to [facility].    Attempted to call patient-went to voice mail.  Msg left on [phone] that a msg was left for Dr Saunders to send a new RX. Instructed to call MD office if further questions.  Provided with surgery center number

## 2023-02-22 NOTE — ANESTHESIA CARE TRANSFER NOTE
Patient: Angela Ibarra    Procedure: Procedure(s):  RIGHT THUMB CARPOMETACARPAL JOINT ARTHROPLASTY WITH SUTURE SUSPENSION       Diagnosis: Arthritis of carpometacarpal (CMC) joint of right thumb [M18.11]  Diagnosis Additional Information: No value filed.    Anesthesia Type:   MAC     Note:      Level of Consciousness: awake  Oxygen Supplementation: room air    Independent Airway: airway patency satisfactory and stable  Dentition: dentition unchanged  Vital Signs Stable: post-procedure vital signs reviewed and stable  Report to RN Given: handoff report given  Patient transferred to: Phase II    Handoff Report: Identifed the Patient, Identified the Reponsible Provider, Reviewed the pertinent medical history, Discussed the surgical course, Reviewed Intra-OP anesthesia mangement and issues during anesthesia, Set expectations for post-procedure period and Allowed opportunity for questions and acknowledgement of understanding      Vitals:  Vitals Value Taken Time   /76    Temp 97.2    Pulse 68    Resp 12    SpO2 98        Electronically Signed By: LORIE Augustin CRNA  February 22, 2023  8:05 AM

## 2023-02-22 NOTE — BRIEF OP NOTE
[facility]    Brief Operative Note    Pre-operative diagnosis: Arthritis of carpometacarpal (CMC) joint of right thumb [M18.11]  Post-operative diagnosis Same as pre-operative diagnosis    Procedure: Procedure(s):  RIGHT THUMB CARPOMETACARPAL JOINT ARTHROPLASTY WITH SUTURE SUSPENSION  Surgeon: Surgeon(s) and Role:     * Seth Saunders MD - Primary     * Yi Woodward PA-C - Assisting  Anesthesia: MAC with Block   Estimated Blood Loss: 1mL    Drains: None  Specimens: * No specimens in log *  Findings:   None.  Complications: None.  Implants: * No implants in log *

## 2023-02-22 NOTE — ANESTHESIA POSTPROCEDURE EVALUATION
Patient: Angela Ibarra    Procedure: Procedure(s):  RIGHT THUMB CARPOMETACARPAL JOINT ARTHROPLASTY WITH SUTURE SUSPENSION       Anesthesia Type:  MAC    Note:  Disposition: Outpatient   Postop Pain Control: Uneventful            Sign Out: Pain at Preoperative BASELINE (cannot move fingers or thumb but says she has pain over incisioin)   PONV: No   Neuro/Psych: Uneventful            Sign Out: Acceptable/Baseline neuro status   Airway/Respiratory: Uneventful            Sign Out: Acceptable/Baseline resp. status   CV/Hemodynamics: Uneventful            Sign Out: Acceptable CV status; No obvious hypovolemia; No obvious fluid overload   Other NRE: NONE   DID A NON-ROUTINE EVENT OCCUR? No           Last vitals:  Vitals Value Taken Time   /73 02/22/23 0815   Temp 97.9  F (36.6  C) 02/22/23 0827   Pulse     Resp 20 02/22/23 0815   SpO2 93 % 02/22/23 0827       Electronically Signed By: Dereje Casas MD  February 22, 2023  8:56 AM

## 2023-02-22 NOTE — ANESTHESIA PROCEDURE NOTES
Brachial plexus Procedure Note    Pre-Procedure   Staff -        Anesthesiologist:  Dereje Casas MD       Performed By: anesthesiologist       Location: pre-op       Procedure Start/Stop Times: 2/22/2023 6:45 AM and 2/22/2023 6:51 AM       Pre-Anesthestic Checklist: patient identified, IV checked, site marked, risks and benefits discussed, informed consent, monitors and equipment checked, pre-op evaluation, at physician/surgeon's request and post-op pain management  Timeout:       Correct Patient: Yes        Correct Procedure: Yes        Correct Site: Yes        Correct Position: Yes        Correct Laterality: Yes        Site Marked: Yes  Procedure Documentation  Procedure: Brachial plexus       Laterality: right       Patient Position: sitting       Skin prep: Chloraprep (axillary approach).       Needle Gauge: 21.        Needle Length (Inches): 1        Ultrasound guided       1. Ultrasound was used to identify targeted nerve, plexus, vascular marker, or fascial plane and place a needle adjacent to it in real-time.       2. Ultrasound was used to visualize the spread of anesthetic in close proximity to the above referenced structure.       4. The visualized anatomic structures appeared normal.       5. There were no apparent abnormal pathologic findings.    Assessment/Narrative         The placement was negative for: blood aspirated, painful injection and site bleeding       Paresthesias: No.       Bolus given via needle. no blood aspirated via catheter.        Secured via.        Insertion/Infusion Method: Single Shot       Complications: none    Medication(s) Administered   Bupivacaine 0.5% PF (Infiltration) - Infiltration   15 mL - 2/22/2023 6:45:00 AM  Bupivacaine liposome (Exparel) 1.3% LA inj susp (Infiltration) - Infiltration   10 mL - 2/22/2023 6:45:00 AM  Medication Administration Time: 2/22/2023 6:45 AM     Comments:  Exparel 133 mg      FOR [facility] ONLY:   Pain Team Contact information:  "please page the Pain Team Via [facility]. Search \"Pain\". During daytime hours, please page the attending first. At night please page the resident first.    "

## 2023-02-22 NOTE — OP NOTE
HAND SURGERY OPERATIVE REPORT     Date of Surgery: 2/22/2023  Surgical Service: Ortho Hand     Preoperative Diagnosis: Right thumb carpometacarpal joint arthritis     Postoperative Diagnosis:   1. Right thumb carpometacarpal joint arthritis  2. Right scaphotrapezoid arthritis     Procedures Performed:   1. Right thumb carpometacarpal joint arthroplasty with suture suspension  2. Right proximal trapezoid resection     Attending:  KWAME Saunders MD, PhD  Assistant: SITA Woodward PA-C (no resident assistance available)     Complications: None apparent  Specimens: None  Implants: #2 FiberWire  Estimated blood loss: < 5 mL  Tourniquet time: 30 minutes  Wound classification: Clean  Anesthesia: Block with sedation     Indications for Procedure: 62 year-old right hand dominant non-smoking female presenting with right thumb carpometacarpal joint arthritis that no longer is relieved by steroid injections. She elects to proceed with right trapeziectomy with thumb carpometacarpal joint arthroplasty using suture suspension, with possible resection scaphotrapezoid arthroplasty.      Intraoperative findings: Arthritic trapezium. Some wear of the scaphotrapezoid joint. Solid suspension. Interposed a slip of APL tendon into the proximal trapezoid.      Description of Procedure: Patient was seen in the preoperative holding area.  Consent was verified.  The right upper extremity was marked.  All additional questions were answered.  The risks of the surgery were reiterated, including (but not limited to): infection, bleeding, scarring, pain, injury to surrounding structures including nerves and tendons, need for additional revision surgery, neuroma formation, complex regional pain syndrome, stiffness.  Following discussion of all these risks, the patient again agreed to proceed with surgery.  Patient was then transferred to the operating room and placed supine on the operating table.  All pressure points were padded.  Sequential compression  devices were placed on bilateral lower extremities and verified to be operational.  IV antibiotic prophylaxis was given.  Preinduction timeout was performed.  Monitored anesthesia care was commenced. The right upper extremity was prepped and draped in usual sterile fashion. At the start of the case, the right upper extremity was exsanguinated with an Esmarch and an arm tourniquet was inflated to 250 mm Hg. Next, a longitudinally-oriented incision was made through the skin. Next, the radial sensory branches were identified, gently dissected off the deep fascia and gently retracted out of the way. The first dorsal extensor compartment was identified. The interval between the abductor pollicis longus and extensor pollicis brevis tendons was identified, and using a #15 blade the first compartment release was done. This was done with care to leave a volar leaflet and to release the EPB subsheath. Next, a self-retaining retractor was placed. The subsheath was incised to expose the dorsal radial artery. This was dissected off the thumb CMC capsule and branches were ligated with cautery. Once done, a longitudinally-oriented capsular incision was made from the distal pole of scaphoid to the thumb metacarpal base. The capsular flaps were raised thick with monopolar cautery. Once the trapezium was exposed, cautery and a blade was used to free up the trapezium. Next, a McGlamry elevator was used to complete the trapeziectomy with care taken not to damage the flexor carpi radialis tendon. A Rongeur was use to remove any additional osteophytes until there were none remaining. At this point, the scaphotrapezoidal joint was examined. It was found to be arthritic. A 1-2 mm sliver of proximal trapezoid was resected with an osteotome and Rongeur. A slip of APL tendon was resected and interposed into the scaphotrapezoid space. Next, the thumb was distracted and slightly pronated. A #2 FiberWire was used to perform a tenodesis between  "the APL and FCR tendons using several loops of suture, in figure-of-eight fashion. Care was taken not to injure or snag radial sensor nerve branches. The wound was irrigated with sterile saline. The dorsoradial capsule was then closed using 2-0 PDS suture in a running pursestring fashion. The tourniquet was taken down. Hemostasis was ensured with cautery. The surgical wound was additionally irrigated. The skin was closed with 4-0 Nylon suture in interrupted simple and horizontal mattress fashion. The incision was dressed with Xeroform, bacitracin, 4 x 4\" gauze and a thumb spica splint was placed. The patient was woken up and transferred to the postanesthesia care unit with no events.      Postoperative plan: Clinic in 2 weeks with OT splint fabrication.      Seth Saunders MD, PhD    "

## 2023-02-22 NOTE — OR NURSING
Pt had preop block.  Performed timeout.  Pt on O2 and continuous EKG monitor. VS continually monitored. Pt premedicated as per [facility] orders.  Pt tolerated the procedure.

## 2023-03-10 ENCOUNTER — OFFICE VISIT (OUTPATIENT)
Dept: ORTHOPEDICS | Facility: CLINIC | Age: 63
End: 2023-03-10
Payer: COMMERCIAL

## 2023-03-10 DIAGNOSIS — M18.11 ARTHRITIS OF CARPOMETACARPAL (CMC) JOINT OF RIGHT THUMB: Primary | ICD-10-CM

## 2023-03-10 PROCEDURE — 99024 POSTOP FOLLOW-UP VISIT: CPT | Performed by: STUDENT IN AN ORGANIZED HEALTH CARE EDUCATION/TRAINING PROGRAM

## 2023-03-10 PROCEDURE — 29125 APPL SHORT ARM SPLINT STATIC: CPT | Mod: 58 | Performed by: STUDENT IN AN ORGANIZED HEALTH CARE EDUCATION/TRAINING PROGRAM

## 2023-03-10 ASSESSMENT — PAIN SCALES - GENERAL: PAINLEVEL: NO PAIN (0)

## 2023-03-10 NOTE — PROGRESS NOTES
Cast/splint application    Date/Time: 3/10/2023 2:55 PM  Performed by: Burt Crespo, EMT  Authorized by: Seth Saunders MD     Consent:     Consent obtained:  Verbal    Consent given by:  Patient    Risks discussed:  Numbness, discoloration and pain    Alternatives discussed:  No treatment  Pre-procedure details:     Sensation:  Normal  Procedure details:     Location:  Hand    Hand:  R hand    Splint type:  Short arm (static) (Thumb spica)    Supplies used: [employer].  Post-procedure details:     Pain:  Unchanged    Sensation:  Normal    Patient tolerance of procedure:  Tolerated well, no immediate complications    Patient provided with cast or splint care instructions: Yes

## 2023-03-10 NOTE — NURSING NOTE
Chief Complaint   Patient presents with     Right Hand - Surgical Followup       There were no vitals filed for this visit.    There is no height or weight on file to calculate BMI.      RBUEN Dallas NREMT

## 2023-03-10 NOTE — LETTER
3/10/2023         RE: Angela Ibarra  [address] [place] [id number]        Dear Colleague,    Thank you for referring your patient, Angela Ibarra, to the [facility]. Please see a copy of my visit note below.    Ortho Hand    No issues.  No pain.  No fevers or chills.  Anxious regarding movement of the thumb.    On exam, right wrist incision well-healing with no signs of infection and no wounds.  Patient can actively move the right thumb with no tenderness at all.  Right radial sensory distributed sensation is normal.    A/P: 62-year-old female 2 weeks status post right thumb CMC arthroplasty with suture suspension, proximal trapezoidectomy, doing well    -Sutures removed today  -Steri-Strips placed  -Placed into temporary thumb spica splint  -OT on Monday for Orthoplast splint fabrication and start of gentle right thumb motion.  Emphasized the importance of no strong  activity, limit lifting to the right hand to 2-3 pounds, and no loading of the right thumb for 3 months.  -Return to clinic in 4 weeks for reevaluation    Seth Saunders MD, PhD    Cast/splint application    Date/Time: 3/10/2023 2:55 PM  Performed by: Burt Crespo, EMT  Authorized by: Seth Saunders MD     Consent:     Consent obtained:  Verbal    Consent given by:  Patient    Risks discussed:  Numbness, discoloration and pain    Alternatives discussed:  No treatment  Pre-procedure details:     Sensation:  Normal  Procedure details:     Location:  Hand    Hand:  R hand    Splint type:  Short arm (static) (Thumb spica)    Supplies used: orthoglass.  Post-procedure details:     Pain:  Unchanged    Sensation:  Normal    Patient tolerance of procedure:  Tolerated well, no immediate complications    Patient provided with cast or splint care instructions: Yes            Again, thank you for allowing me to participate in the care of your patient.        Sincerely,        Seth Saunders MD

## 2023-03-10 NOTE — PROGRESS NOTES
Ortho Hand    No issues.  No pain.  No fevers or chills.  Anxious regarding movement of the thumb.    On exam, right wrist incision well-healing with no signs of infection and no wounds.  Patient can actively move the right thumb with no tenderness at all.  Right radial sensory distributed sensation is normal.    A/P: 62-year-old female 2 weeks status post right thumb CMC arthroplasty with suture suspension, proximal trapezoidectomy, doing well    -Sutures removed today  -Steri-Strips placed  -Placed into temporary thumb spica splint  -OT on Monday for Orthoplast splint fabrication and start of gentle right thumb motion.  Emphasized the importance of no strong  activity, limit lifting to the right hand to 2-3 pounds, and no loading of the right thumb for 3 months.  -Return to clinic in 4 weeks for reevaluation    Seth Saunders MD, PhD

## 2023-03-13 ENCOUNTER — THERAPY VISIT (OUTPATIENT)
Dept: OCCUPATIONAL THERAPY | Facility: CLINIC | Age: 63
End: 2023-03-13
Payer: COMMERCIAL

## 2023-03-13 DIAGNOSIS — Z47.1 AFTERCARE FOLLOWING RIGHT WRIST JOINT REPLACEMENT SURGERY: Primary | ICD-10-CM

## 2023-03-13 DIAGNOSIS — Z96.631 AFTERCARE FOLLOWING RIGHT WRIST JOINT REPLACEMENT SURGERY: Primary | ICD-10-CM

## 2023-03-13 PROCEDURE — 97110 THERAPEUTIC EXERCISES: CPT | Mod: GO

## 2023-03-13 PROCEDURE — 97165 OT EVAL LOW COMPLEX 30 MIN: CPT | Mod: GO

## 2023-03-13 PROCEDURE — 97535 SELF CARE MNGMENT TRAINING: CPT | Mod: GO

## 2023-03-13 PROCEDURE — 97760 ORTHOTIC MGMT&TRAING 1ST ENC: CPT | Mod: GO

## 2023-03-13 NOTE — PROGRESS NOTES
[facility] Initial Evaluation     Current Date: 3/13/2023    Diagnosis: s/p R CMC arthroplasty   DOS: 2/22/23  Procedure: CMC arthroplasty w/ suture suspension, prox trapezoidectomy  Post: 2 w 5 d    Subjective:  Patient Health History  Angela Ibarra being seen for hand.     Problem began: 2/22/2023.   Problem occurred: couple years ago   Pain is reported as 2/10 on pain scale.  General health as reported by patient is good.       Medical allergies: none.   Surgeries include:  Orthopedic surgery. Other surgery history details: lots.    Current medications:  High blood pressure medication.    Current occupation is admin work.   Primary job tasks include:  Computer work.                    Occupational Profile Information:  Right hand dominant  Prior functional level:  no limitations  Patient reports symptoms of pain, stiffness/loss of motion and weakness/loss of strength  Special tests:  x-ray.    Previous treatment: surgical intervention  Barriers include:none  Mobility: No difficulty  Transportation: drives  Currently working in normal job without restrictions  Leisure activities/hobbies: reading  Other: household chores are difficult at this time    Functional Outcome Measure:   Upper Extremity Functional Index Score:  SCORE:   Column Totals: 51/80:   (A lower score indicates greater disability.)    Objective:  Pain Level (Scale 0-10)   3/13/2023   At Rest 0   With Use 2-4     Pain Description  Date 3/13/2023   Location wrist and thumb   Pain Quality Throbbing   Frequency intermittent     Pain is worst  daytime   Exacerbated by  n/a, precautions   Relieved by cold and rest   Progression N/a, post-surgical      Edema (Circumference measured in cm)  Mild   3/13/2023 3/13/2023    L R   DWC 16 17      Scar   Sensitivity: Mild Quality:  Scabbed, w/ steri-strips covering    Sensation   WNL throughout all nerve distributions; per patient report    ROM  Thumb 3/13/2023 3/13/2023   AROM  (PROM) L R   MP /45 /20    IP /50 /10   RABD 50 30   PABD 55 45     Wrist ROM deferred today    Strength deferred today    Assessment:  Patient presents with symptoms consistent with diagnosis of right CMC arthritis s/p arthroplasty.     Patient's limitations or Problem List includes:  Pain, Decreased ROM/motion, Increased edema and Weakness of the right wrist, hand and thumb which interferes with the patient's ability to perform Self Care Tasks (dressing), Work Tasks, Recreational Activities and Household Chores as compared to previous level of function.    Rehab Potential:  Excellent - Return to full activity, no limitations    Patient will benefit from skilled Occupational Therapy to increase ROM,  strength and pinch strength and decrease pain and edema to return to previous activity level and resume normal daily tasks and to reach their rehab potential.    Barriers to Learning:  No barrier    Communication Issues:  Patient appears to be able to clearly communicate and understand verbal and written communication and follow directions correctly.    Chart Review: Chart Review and Simple history review with patient    Identified Performance Deficits: bathing/showering, dressing, home establishment and management, meal preparation and cleanup, shopping, work and leisure activities    Assessment of Occupational Performance:  5 or more Performance Deficits    Clinical Decision Making (Complexity): Low complexity    Treatment Explanation:  The following has been discussed with the patient:    RX ordered/plan of care  Anticipated outcomes  Possible risks and side effects    Plan:  Frequency:  1 X week, once daily  Duration:  for 12 weeks    Treatment Plan:    Modalities:    US and Paraffin   Therapeutic Exercise:    AAROM, PROM, Tendon Gliding, Isotonics and Isometrics  Therapeutic Activities:   Functional activities   Neuromuscular re-ed:   Desensitization  Manual Techniques:   Joint mobilization, Scar mobilization and Myofascial  release  Orthotic Fabrication:    Static  Self Care:    Self Care Tasks, Ergonomic Considerations and Work Tasks    Discharge Plan:    Achieve all LTG.  Independent in home treatment program.  Reach maximal therapeutic benefit.    Home Exercise Program:  Thumb Active Range of Motion IP Joint Blocking    Thumb Active Range of Motion MP Joint Blocking    Thumb Active Range of Motion Composite Flexion    Thumb Active Range of Motion Palmar Abduction    Wear custom forearm-based thumb spica at all times    NWB R UE     RICE for edema     Next Visit:  Check fit of orthosis  Progress HEP per protocols  Introduce scar mgmt

## 2023-03-14 PROBLEM — Z47.1: Status: ACTIVE | Noted: 2023-03-14

## 2023-03-14 PROBLEM — Z96.631: Status: ACTIVE | Noted: 2023-03-14

## 2023-03-20 ENCOUNTER — THERAPY VISIT (OUTPATIENT)
Dept: OCCUPATIONAL THERAPY | Facility: CLINIC | Age: 63
End: 2023-03-20
Payer: COMMERCIAL

## 2023-03-20 DIAGNOSIS — Z47.1 AFTERCARE FOLLOWING RIGHT WRIST JOINT REPLACEMENT SURGERY: Primary | ICD-10-CM

## 2023-03-20 DIAGNOSIS — Z96.631 AFTERCARE FOLLOWING RIGHT WRIST JOINT REPLACEMENT SURGERY: Primary | ICD-10-CM

## 2023-03-20 DIAGNOSIS — M18.11 ARTHRITIS OF CARPOMETACARPAL (CMC) JOINT OF RIGHT THUMB: ICD-10-CM

## 2023-03-20 PROCEDURE — 97110 THERAPEUTIC EXERCISES: CPT | Mod: GO

## 2023-03-20 PROCEDURE — 97112 NEUROMUSCULAR REEDUCATION: CPT | Mod: GO

## 2023-03-20 NOTE — PROGRESS NOTES
Objective Measures 3/20/23    ROM  Thumb 3/13/2023 3/13/2023 3/20/23   AROM  (PROM) L R R   MP /45 /20 40   IP /50 /10 22   RABD 50 30 nt   PABD 55 45 nt     ROM  Pain Report: - none  + mild    ++ moderate    +++ severe   Wrist 3/20/2023 3/20/2023   AROM (PROM) L R   Extension 80 65   Flexion 62 30   RD 40 15   UD 40 15   Supination 90 75   Pronation 90 80

## 2023-03-28 ENCOUNTER — THERAPY VISIT (OUTPATIENT)
Dept: OCCUPATIONAL THERAPY | Facility: CLINIC | Age: 63
End: 2023-03-28
Payer: COMMERCIAL

## 2023-03-28 DIAGNOSIS — M18.11 ARTHRITIS OF CARPOMETACARPAL (CMC) JOINT OF RIGHT THUMB: Primary | ICD-10-CM

## 2023-03-28 DIAGNOSIS — Z47.1 AFTERCARE FOLLOWING RIGHT WRIST JOINT REPLACEMENT SURGERY: ICD-10-CM

## 2023-03-28 DIAGNOSIS — Z96.631 AFTERCARE FOLLOWING RIGHT WRIST JOINT REPLACEMENT SURGERY: ICD-10-CM

## 2023-03-28 PROCEDURE — 97110 THERAPEUTIC EXERCISES: CPT | Mod: GO

## 2023-03-28 PROCEDURE — 97763 ORTHC/PROSTC MGMT SBSQ ENC: CPT | Mod: GO

## 2023-03-28 NOTE — PROGRESS NOTES
SOAP note objective information for 3/28/2023.  Diagnosis: s/p R CMC arthroplasty   DOS: 2/22/23  Procedure: CMC arthroplasty w/ suture suspension, prox trapezoidectomy  Post: 4w 6d    ROM  Pain Report: - none  + mild    ++ moderate    +++ severe   Thumb 3/13/2023 3/13/2023 3/20/2023 3/28/2023   AROM  (PROM) L R R R   MP /45 /20 40 /40   IP /50 /10 22 /56   RABD 50 30 nt    PABD 55 45 nt      Wrist 3/20/2023 3/20/2023 3/28/2023   AROM (PROM) L R R   Extension 80 65 67   Flexion 62 30 38   RD 40 15 10   UD 40 15 18   Supination 90 75 80   Pronation 90 80 94     Please refer to the daily flowsheet for treatment today, total treatment time and time spent performing 1:1 timed codes.

## 2023-04-03 ENCOUNTER — OFFICE VISIT (OUTPATIENT)
Dept: URGENT CARE | Facility: URGENT CARE | Age: 63
End: 2023-04-03
Payer: COMMERCIAL

## 2023-04-03 VITALS
SYSTOLIC BLOOD PRESSURE: 152 MMHG | TEMPERATURE: 97.9 F | BODY MASS INDEX: 40.24 KG/M2 | HEART RATE: 68 BPM | DIASTOLIC BLOOD PRESSURE: 78 MMHG | WEIGHT: 220 LBS | OXYGEN SATURATION: 99 % | RESPIRATION RATE: 18 BRPM

## 2023-04-03 DIAGNOSIS — R39.9 UTI SYMPTOMS: Primary | ICD-10-CM

## 2023-04-03 LAB
ALBUMIN UR-MCNC: 30 MG/DL
APPEARANCE UR: ABNORMAL
BACTERIA #/AREA URNS HPF: ABNORMAL /HPF
BILIRUB UR QL STRIP: NEGATIVE
COLOR UR AUTO: YELLOW
GLUCOSE UR STRIP-MCNC: NEGATIVE MG/DL
HGB UR QL STRIP: ABNORMAL
KETONES UR STRIP-MCNC: NEGATIVE MG/DL
LEUKOCYTE ESTERASE UR QL STRIP: ABNORMAL
NITRATE UR QL: NEGATIVE
PH UR STRIP: 6 [PH] (ref 5–7)
RBC #/AREA URNS AUTO: ABNORMAL /HPF
SP GR UR STRIP: 1.01 (ref 1–1.03)
TRANS CELLS #/AREA URNS HPF: ABNORMAL /HPF
UROBILINOGEN UR STRIP-ACNC: 0.2 E.U./DL
WBC #/AREA URNS AUTO: >100 /HPF

## 2023-04-03 PROCEDURE — 81001 URINALYSIS AUTO W/SCOPE: CPT | Performed by: FAMILY MEDICINE

## 2023-04-03 PROCEDURE — 87086 URINE CULTURE/COLONY COUNT: CPT | Performed by: FAMILY MEDICINE

## 2023-04-03 PROCEDURE — 99213 OFFICE O/P EST LOW 20 MIN: CPT | Performed by: FAMILY MEDICINE

## 2023-04-03 PROCEDURE — 87186 SC STD MICRODIL/AGAR DIL: CPT | Performed by: FAMILY MEDICINE

## 2023-04-03 RX ORDER — CEPHALEXIN 500 MG/1
500 CAPSULE ORAL 2 TIMES DAILY
Qty: 14 CAPSULE | Refills: 1 | Status: SHIPPED | OUTPATIENT
Start: 2023-04-03 | End: 2023-04-10

## 2023-04-03 ASSESSMENT — PAIN SCALES - GENERAL: PAINLEVEL: SEVERE PAIN (6)

## 2023-04-03 NOTE — PROGRESS NOTES
SUBJECTIVE:  Angela Ibarra, a 62 year old female scheduled an appointment to discuss the following issues:  UTI symptoms  History utis reoccurrent in past. Prn bactrim - can only take short term without side effects.  Chills. No fever. Dysuria and cloudy.   No flank pain. Nausea, vomiting or diarrhea or constipation.   No caffeine. Rare ALCOHOL. No std concerns.   Medical, social, surgical, and family histories reviewed.    ROS:    OBJECTIVE:  BP (!) 152/78   Pulse 68   Temp 97.9  F (36.6  C) (Tympanic)   Resp 18   Wt 99.8 kg (220 lb)   LMP 01/08/2008   SpO2 99%   BMI 40.24 kg/m    EXAM:  GENERAL APPEARANCE: healthy, alert and no distress  RESP: lungs clear to auscultation - no rales, rhonchi or wheezes  CV: regular rates and rhythm, normal S1 S2, no S3 or S4 and no murmur, click or rub -  ABDOMEN:  soft, nontender, no HSM or masses and bowel sounds normal  MS: extremities normal- no gross deformities noted, no evidence of inflammation in joints, FROM in all extremities.  PSYCH: mentation appears normal and affect normal/bright  PSYCH: mildly anxious    ASSESSMENT/PLAN:  (R39.9) UTI symptoms  (primary encounter diagnosis)  Comment: desmond uti  Plan: UA with Microscopic reflex to Culture - lab         collect, UA Microscopic with Reflex to Culture        Push fluids and avoid ALCOHOL/caffeine. Reveiwed risks and side effects of medication*  cephALEXin (KEFLEX) 500 MG         capsule        Reveiwed risks and side effects of medication  Expected course and warning signs reviewed. To ER if worsneing flank pain/ fevers or chills/emesis. Return to clinic if not improving overall in next 2-3 days. Call/email with questions/concerns    Braeden Schilling MD

## 2023-04-04 ENCOUNTER — HOSPITAL ENCOUNTER (OUTPATIENT)
Facility: AMBULATORY SURGERY CENTER | Age: 63
End: 2023-04-04
Attending: INTERNAL MEDICINE
Payer: COMMERCIAL

## 2023-04-04 ENCOUNTER — THERAPY VISIT (OUTPATIENT)
Dept: OCCUPATIONAL THERAPY | Facility: CLINIC | Age: 63
End: 2023-04-04
Payer: COMMERCIAL

## 2023-04-04 ENCOUNTER — TELEPHONE (OUTPATIENT)
Dept: GASTROENTEROLOGY | Facility: CLINIC | Age: 63
End: 2023-04-04

## 2023-04-04 DIAGNOSIS — Z96.631 AFTERCARE FOLLOWING RIGHT WRIST JOINT REPLACEMENT SURGERY: ICD-10-CM

## 2023-04-04 DIAGNOSIS — Z47.1 AFTERCARE FOLLOWING RIGHT WRIST JOINT REPLACEMENT SURGERY: ICD-10-CM

## 2023-04-04 DIAGNOSIS — M18.11 ARTHRITIS OF CARPOMETACARPAL (CMC) JOINT OF RIGHT THUMB: Primary | ICD-10-CM

## 2023-04-04 PROCEDURE — 97035 APP MDLTY 1+ULTRASOUND EA 15: CPT | Mod: GO

## 2023-04-04 PROCEDURE — 97763 ORTHC/PROSTC MGMT SBSQ ENC: CPT | Mod: GO

## 2023-04-04 PROCEDURE — 97110 THERAPEUTIC EXERCISES: CPT | Mod: GO

## 2023-04-04 NOTE — PROGRESS NOTES
SOAP note objective information for 4/4/2023.  Diagnosis: s/p R CMC arthroplasty   DOS: 2/22/23  Procedure: CMC arthroplasty w/ suture suspension, prox trapezoidectomy  Post: 5w 6d    ROM  Pain Report: - none  + mild    ++ moderate    +++ severe   Thumb 3/13/2023 3/13/2023 3/20/2023 3/28/2023 4/4/2023   AROM  (PROM) L R R R R   MP /45 /20 40 /40 /43   IP /50 /10 22 /56 /56   RABD 50 30 nt     PABD 55 45 nt       Wrist 3/20/2023 3/20/2023 3/28/2023 4/4/2023   AROM (PROM) L R R R   Extension 80 65 67 77   Flexion 62 30 38 52   RD 40 15 10 17   UD 40 15 18 22   Supination 90 75 80 84   Pronation 90 80 94      Please refer to the daily flowsheet for treatment today, total treatment time and time spent performing 1:1 timed codes.

## 2023-04-04 NOTE — TELEPHONE ENCOUNTER
Screening Questions  BLUE  KIND OF PREP RED  LOCATION [review exclusion criteria] GREEN  SEDATION TYPE        Y Are you active on mychart?       ALE WELCH Ordering/Referring Provider?        BCBS What type of coverage do you have?      N Have you had a positive covid test in the last 14 days?     36.6 200LBS PER PT 1. BMI  [BMI 40+ - review exclusion criteria]    Y  2. Are you able to give consent for your medical care? [IF NO,RN REVIEW]          N  3. Are you taking any prescription pain medications on a routine schedule   (ex narcotics: oxycodone, roxicodone, oxycontin,  and percocet)? [RN Review]          3a. EXTENDED PREP What kind of prescription?     N 4. Do you have any chemical dependencies such as alcohol, street drugs, or methadone?        **If yes 3- 5 , please schedule with MAC sedation.**          IF YES TO ANY 6 - 10 - HOSPITAL SETTING ONLY.     N 6.   Do you need assistance transferring?     N 7.   Have you had a heart or lung transplant?    N 8.   Are you currently on dialysis?   N 9.   Do you use daily home oxygen?   N 10. Do you take nitroglycerin?   10a.  If yes, how often?     11. [FEMALES]   Are you currently pregnant?    11a.  If yes, how many weeks? [ Greater than 12 weeks, OR NEEDED]    N 12. Do you have Pulmonary Hypertension? *NEED PAC APPT AT [facility] w/ MAC*     N 13. [review exclusion criteria]  Do you have any implantable devices in your body (pacemaker, defib, LVAD)?    N 14. In the past 6 months, have you had any heart related issues including cardiomyopathy or heart attack?     14a.  If yes, did it require cardiac stenting if so when?     N 15. Have you had a stroke or Transient ischemic attack (TIA - aka  mini stroke ) within 6 months?      N 16. Do you have mod to severe Obstructive Sleep Apnea?  [Hospital only]    N 17. Do you have SEVERE AND UNCONTROLLED asthma? *NEED PAC APPT AT [facility] w/MAC*     18. Are you currently taking any blood thinners?     18a. No. Continue to  "19.   18b.    N 19. Do you take the medication Phentermine?    19a. If yes, \"Hold for 7 days before procedure.  Please consult your prescribing provider if you have questions about holding this medication.\"     N  20. Do you have chronic kidney disease?      N  21. Do you have a diagnosis of diabetes?     N  22. On a regular basis do you go 3-5 days between bowel movements?      23. Preferred LOCAL Pharmacy for Pre Prescription    [ LIST ONLY ONE PHARMACY]       [facility]/PHARMACY #[id number] - [address]      - CLOSING REMINDERS -    Informed patient they will need an adult    Cannot take any type of public or medical transportation alone    Conscious Sedation- Needs  for 6 hours after the procedure       MAC/General-Needs  for 24 hours after procedure    Pre-Procedure Covid test to be completed [[facility] PCR Testing Required]    Confirmed Nurse will call to complete assessment       - SCHEDULING DETAILS -  N Hospital Setting Required? If yes, what is the exclusion?:    GOLD  Surgeon    7/26  Date of Procedure  Lower Endoscopy [Colonoscopy]  [facility] of Procedure [facility]uled  [facility]   MIRALAX GATORADE WITHOUT MAGNEISUM CITRATE Which Colonoscopy Prep was Sent?     MAC Sedation Type     N PAC / Pre-op Required                 "

## 2023-04-05 LAB — BACTERIA UR CULT: ABNORMAL

## 2023-04-11 ENCOUNTER — THERAPY VISIT (OUTPATIENT)
Dept: OCCUPATIONAL THERAPY | Facility: CLINIC | Age: 63
End: 2023-04-11
Payer: COMMERCIAL

## 2023-04-11 DIAGNOSIS — Z47.1 AFTERCARE FOLLOWING RIGHT WRIST JOINT REPLACEMENT SURGERY: ICD-10-CM

## 2023-04-11 DIAGNOSIS — Z96.631 AFTERCARE FOLLOWING RIGHT WRIST JOINT REPLACEMENT SURGERY: ICD-10-CM

## 2023-04-11 DIAGNOSIS — M18.11 ARTHRITIS OF CARPOMETACARPAL (CMC) JOINT OF RIGHT THUMB: Primary | ICD-10-CM

## 2023-04-11 PROCEDURE — 97035 APP MDLTY 1+ULTRASOUND EA 15: CPT | Mod: GO

## 2023-04-11 PROCEDURE — 97140 MANUAL THERAPY 1/> REGIONS: CPT | Mod: GO

## 2023-04-11 PROCEDURE — 97110 THERAPEUTIC EXERCISES: CPT | Mod: GO

## 2023-04-11 NOTE — PROGRESS NOTES
SOAP note objective information for 4/11/2023.  Diagnosis: s/p R CMC arthroplasty   DOS: 2/22/23  Procedure: CMC arthroplasty w/ suture suspension, prox trapezoidectomy  Post: 6w 6d    ROM  Pain Report: - none  + mild    ++ moderate    +++ severe   Thumb 3/13/2023 3/13/2023 3/20/2023 3/28/2023 4/4/2023 4/11/2023   AROM  (PROM) L R R R R R   MP /45 /20 40 /40 /43 /39   IP /50 /10 22 /56 /56 /50   RABD 50 30 nt      PABD 55 45 nt        Wrist 3/20/2023 3/20/2023 3/28/2023 4/4/2023 4/11/2023   AROM (PROM) L R R R R   Extension 80 65 67 77 80   Flexion 62 30 38 52 57   RD 40 15 10 17 17   UD 40 15 18 22 24   Supination 90 75 80 84 80   Pronation 90 80 94       Please refer to the daily flowsheet for treatment today, total treatment time and time spent performing 1:1 timed codes.

## 2023-04-19 ENCOUNTER — THERAPY VISIT (OUTPATIENT)
Dept: OCCUPATIONAL THERAPY | Facility: CLINIC | Age: 63
End: 2023-04-19
Payer: COMMERCIAL

## 2023-04-19 DIAGNOSIS — Z47.1 AFTERCARE FOLLOWING RIGHT WRIST JOINT REPLACEMENT SURGERY: ICD-10-CM

## 2023-04-19 DIAGNOSIS — Z96.631 AFTERCARE FOLLOWING RIGHT WRIST JOINT REPLACEMENT SURGERY: ICD-10-CM

## 2023-04-19 DIAGNOSIS — M18.11 ARTHRITIS OF CARPOMETACARPAL (CMC) JOINT OF RIGHT THUMB: Primary | ICD-10-CM

## 2023-04-19 PROCEDURE — 97110 THERAPEUTIC EXERCISES: CPT | Mod: GO

## 2023-04-19 PROCEDURE — 97140 MANUAL THERAPY 1/> REGIONS: CPT | Mod: GO

## 2023-04-19 PROCEDURE — 97035 APP MDLTY 1+ULTRASOUND EA 15: CPT | Mod: GO

## 2023-04-19 NOTE — PROGRESS NOTES
SOAP note objective information for 4/19/2023.  Diagnosis: s/p R CMC arthroplasty   DOS: 2/22/23  Procedure: CMC arthroplasty w/ suture suspension, prox trapezoidectomy  Post: 8w 0d    ROM  Pain Report: - none  + mild    ++ moderate    +++ severe   Thumb 3/13/2023 3/13/2023 3/20/2023 3/28/2023 4/4/2023 4/11/2023 4/19/2023   AROM  (post) L R R R R R R   MP /45 /20 40 /40 /43 /39 /40 (/43)   IP /50 /10 22 /56 /56 /50 /52 (/56)   RABD 50 30 nt       PABD 55 45 nt         Wrist 3/20/2023 3/20/2023 3/28/2023 4/4/2023 4/11/2023 4/19/2023   AROM (post) L R R R R R   Extension 80 65 67 77 80 81 (81)   Flexion 62 30 38 52 57 62 (64)   RD 40 15 10 17 17 17 (24)   UD 40 15 18 22 24 24 (26)   Supination 90 75 80 84 80 83   Pronation 90 80 94        Please refer to the daily flowsheet for treatment today, total treatment time and time spent performing 1:1 timed codes.

## 2023-04-21 ENCOUNTER — ANCILLARY PROCEDURE (OUTPATIENT)
Dept: GENERAL RADIOLOGY | Facility: CLINIC | Age: 63
End: 2023-04-21
Attending: STUDENT IN AN ORGANIZED HEALTH CARE EDUCATION/TRAINING PROGRAM
Payer: COMMERCIAL

## 2023-04-21 ENCOUNTER — OFFICE VISIT (OUTPATIENT)
Dept: ORTHOPEDICS | Facility: CLINIC | Age: 63
End: 2023-04-21
Payer: COMMERCIAL

## 2023-04-21 DIAGNOSIS — M25.532 LEFT WRIST PAIN: Primary | ICD-10-CM

## 2023-04-21 DIAGNOSIS — M25.532 LEFT WRIST PAIN: ICD-10-CM

## 2023-04-21 PROCEDURE — 20600 DRAIN/INJ JOINT/BURSA W/O US: CPT | Mod: 79 | Performed by: STUDENT IN AN ORGANIZED HEALTH CARE EDUCATION/TRAINING PROGRAM

## 2023-04-21 PROCEDURE — 73110 X-RAY EXAM OF WRIST: CPT | Mod: LT | Performed by: RADIOLOGY

## 2023-04-21 PROCEDURE — 99212 OFFICE O/P EST SF 10 MIN: CPT | Mod: 24 | Performed by: STUDENT IN AN ORGANIZED HEALTH CARE EDUCATION/TRAINING PROGRAM

## 2023-04-21 RX ORDER — TRIAMCINOLONE ACETONIDE 40 MG/ML
20 INJECTION, SUSPENSION INTRA-ARTICULAR; INTRAMUSCULAR ONCE
Status: COMPLETED | OUTPATIENT
Start: 2023-04-21 | End: 2023-04-21

## 2023-04-21 RX ADMIN — TRIAMCINOLONE ACETONIDE 20 MG: 40 INJECTION, SUSPENSION INTRA-ARTICULAR; INTRAMUSCULAR at 17:08

## 2023-04-21 ASSESSMENT — PAIN SCALES - GENERAL: PAINLEVEL: SEVERE PAIN (7)

## 2023-04-21 NOTE — LETTER
4/21/2023         RE: Angela Ibarra  [address] [place] [id number]        Dear Colleague,    Thank you for referring your patient, Angela Ibarra, to the [facility]. Please see a copy of my visit note below.    Ortho Hand    No pain in the R thumb base. Happy with result. Has been doing motion exercises for the thumb, but no strengthening yet.     On exam, well-healed R dorsoradial incision with normal radial sensory dist sensation and near-full thumb radial and palmar abduction and thumb opposition (except cannot touch SF tip). L thumb CMC with +grind.     XR: L thumb CMC arthritis    A/P: 62F 6 weeks s/p R thumb CMC arthroplasty, prox trapezoidectomy, doing well    -Continue splinting and no thumb loading for 6 more weeks  -Offered a left thumb CMC steroid injection. Patient would like this. Discussed the risks of steroid injections, including but not limited to: infection, bleeding, pain, injury to tendons or nerves, fat atrophy, skin depigmentation. Despite these risks, patient consents to steroid injection. Cleansed the skin with Chlorhexidine and 1:1 mixture of 20 mg Kenalog and 1% lidocaine was injected into each treatment area/joint. Patient tolerated the procedure with no issues and experienced immediate relief.   -Return to clinic in 6 weeks for re-evaluation    Seth Saunders MD, PhD      Again, thank you for allowing me to participate in the care of your patient.        Sincerely,        Seth Saunders MD

## 2023-04-21 NOTE — NURSING NOTE
Chief Complaint   Patient presents with     Right Hand - Surgical Followup     Left Hand - Pain       There were no vitals filed for this visit.    There is no height or weight on file to calculate BMI.      RUBEN Dallas NREMT

## 2023-04-21 NOTE — PROGRESS NOTES
Ortho Hand    No pain in the R thumb base. Happy with result. Has been doing motion exercises for the thumb, but no strengthening yet.     On exam, well-healed R dorsoradial incision with normal radial sensory dist sensation and near-full thumb radial and palmar abduction and thumb opposition (except cannot touch SF tip). L thumb CMC with +grind.     XR: L thumb CMC arthritis    A/P: 62F 6 weeks s/p R thumb CMC arthroplasty, prox trapezoidectomy, doing well    -Continue splinting and no thumb loading for 6 more weeks  -Offered a left thumb CMC steroid injection. Patient would like this. Discussed the risks of steroid injections, including but not limited to: infection, bleeding, pain, injury to tendons or nerves, fat atrophy, skin depigmentation. Despite these risks, patient consents to steroid injection. Cleansed the skin with Chlorhexidine and 1:1 mixture of 20 mg Kenalog and 1% lidocaine was injected into each treatment area/joint. Patient tolerated the procedure with no issues and experienced immediate relief.   -Return to clinic in 6 weeks for re-evaluation    Seth Saunders MD, PhD

## 2023-04-21 NOTE — NURSING NOTE
[facility]   ORTHOPEDICS & SPORTS MEDICINE  [address] [place] [address]  Dept: [phone]  ______________________________________________________________________________    Patient: Angela Ibarra   : 1960   MRN: [id number]   2023    INVASIVE PROCEDURE SAFETY CHECKLIST    Date: 2023   Procedure: Left thumb ijection  Patient Name: Angela Ibarra  MRN: [id number]  YOB: 1960    Action: Complete sections as appropriate. Any discrepancy results in a HARD COPY until resolved.     PRE PROCEDURE:  Patient ID verified with 2 identifiers (name and  or MRN): Yes  Procedure and site verified with patient/designee (when able): Yes  Accurate consent documentation in medical record: Yes  H&P (or appropriate assessment) documented in medical record: Yes  H&P must be up to 20 days prior to procedure and updates within 24 hours of procedure as applicable: NA  Relevant diagnostic and radiology test results appropriately labeled and displayed as applicable: NA  Procedure site(s) marked with provider initials: NA    TIMEOUT:  Time-Out performed immediately prior to starting procedure, including verbal and active participation of all team members addressing the following:Yes  * Correct patient identify  * Confirmed that the correct side and site are marked  * An accurate procedure consent form  * Agreement on the procedure to be done  * Correct patient position  * Relevant images and results are properly labeled and appropriately displayed  * The need to administer antibiotics or fluids for irrigation purposes during the procedure as applicable   * Safety precautions based on patient history or medication use    DURING PROCEDURE: Verification of correct person, site, and procedures any time the responsibility for care of the patient is transferred to another member of the care team.       Prior to injection, verified patient identity using patient's name and date  of birth.  Due to injection administration, patient instructed to remain in clinic for 15 minutes  afterwards, and to report any adverse reaction to me immediately.    Bursa injection was performed.   and Joint injection was performed.      Drug Amount Wasted:  20 mg   Vial/Syringe: Single dose vial  Expiration Date:  12/31/2024      Burt Crespo, EMT  April 21, 2023

## 2023-04-26 ENCOUNTER — THERAPY VISIT (OUTPATIENT)
Dept: OCCUPATIONAL THERAPY | Facility: CLINIC | Age: 63
End: 2023-04-26
Payer: COMMERCIAL

## 2023-04-26 DIAGNOSIS — M18.11 ARTHRITIS OF CARPOMETACARPAL (CMC) JOINT OF RIGHT THUMB: Primary | ICD-10-CM

## 2023-04-26 DIAGNOSIS — Z96.631 AFTERCARE FOLLOWING RIGHT WRIST JOINT REPLACEMENT SURGERY: ICD-10-CM

## 2023-04-26 DIAGNOSIS — Z47.1 AFTERCARE FOLLOWING RIGHT WRIST JOINT REPLACEMENT SURGERY: ICD-10-CM

## 2023-04-26 PROCEDURE — 97110 THERAPEUTIC EXERCISES: CPT | Mod: GO

## 2023-04-26 PROCEDURE — 97035 APP MDLTY 1+ULTRASOUND EA 15: CPT | Mod: GO

## 2023-04-26 NOTE — PROGRESS NOTES
Hand Therapy Progress Note    Current Date:  4/26/2023    Diagnosis: s/p R CMC arthroplasty   DOS: 2/22/23  Procedure: CMC arthroplasty w/ suture suspension, prox trapezoidectomy  Post: 9w0d    Reporting period is 3/13/2023 to 4/26/2023    Subjective:   Subjective changes noted by patient:  Good. It's really good. I have to continue wearing the brace for another 6 weeks. I see him around that time, too.   Functional changes noted by patient:  Improvement in thumb range of motion  Patient has noted adverse reaction to:  None    Functional Outcome Measure:  Upper Extremity Functional Index Score:  SCORE:   Column Totals: /80: 59   (A lower score indicates greater disability.)    Objective:  Pain Level (Scale 0-10)   3/13/2023 4/26/2023   At Rest 0 0/10   With Use 2-4 1-2/10     Pain Description  Date 3/13/2023   Location wrist and thumb   Pain Quality Throbbing   Frequency intermittent     Pain is worst  daytime   Exacerbated by  n/a, precautions   Relieved by cold and rest   Progression N/a, post-surgical      Edema (Circumference measured in cm)  Mild   3/13/2023 3/13/2023    L R   DWC 16 17      Scar   Sensitivity: Mild Quality:  Scabbed, w/ steri-strips covering    Sensation   WNL throughout all nerve distributions; per patient report    ROM  Pain Report: - none  + mild    ++ moderate    +++ severe   Thumb 3/13/2023 3/13/2023 3/20/2023 3/28/2023 4/4/2023 4/11/2023 4/19/2023 4/26/2023   AROM  (post) L R R R R R R R   MP /45 /20 40 /40 /43 /39 /40 (/43) /40   IP /50 /10 22 /56 /56 /50 /52 (/56) /52   RABD 50 30 nt        PABD 55 45 nt          Wrist 3/20/2023 3/20/2023 3/28/2023 4/4/2023 4/11/2023 4/19/2023 4/26/2023   AROM (post) L R R R R R R   Extension 80 65 67 77 80 81 (81) 81   Flexion 62 30 38 52 57 62 (64) 64   RD 40 15 10 17 17 17 (24)    UD 40 15 18 22 24 24 (26)    Supination 90 75 80 84 80 83 80   Pronation 90 80 94         Strength  Not tested due to MD's recommendation.     Please refer to the daily  flowsheet for treatment provided today.     Assessment:  Response to therapy has been improvement to:  ROM of Wrist:  Ext , Flex, Radial Deviation and Ulnar Deviation  Thumb:  Flex  Pain:  frequency is less, intensity of pain is decreased, duration of pain is decreased and less tender over affected area    Overall Assessment:  Patient's symptoms are resolving.  Patient is progressing well and is ready to decrease frequency of treatment in the clinic.  Patient is becoming more independent in home exercise program  Patient would benefit from continued therapy to achieve rehab potential  STG/LTG:  STGoals have been reviewed and progress or achievement has occurred;  see goal sheet for details and updates.    Plan:  Frequency/Duration:  Recommend continuing to see patient  2 X a month, once daily  for 6 weeks  Appropriateness of Rx I have re-evaluated this patient and find that the nature, scope, duration and intensity of the therapy is appropriate for the medical condition of the patient.    Treatment Plan:  Modalities:    US and Paraffin   Therapeutic Exercise:    AAROM, PROM, Tendon Gliding, Isotonics and Isometrics  Therapeutic Activities:   Functional activities   Neuromuscular re-ed:   Desensitization  Manual Techniques:   Joint mobilization, Scar mobilization and Myofascial release  Orthotic Fabrication:    Static  Self Care:    Self Care Tasks, Ergonomic Considerations and Work Tasks    Discharge Plan:    Achieve all LTG.  Independent in home treatment program.  Reach maximal therapeutic benefit.    Home Exercise Program:  Thumb Active Range of Motion IP Joint Blocking  Thumb Active Range of Motion MP Joint Blocking  Thumb Active Range of Motion Composite Flexion  Thumb Active Range of Motion Palmar Abduction  Thumb Active Range of Motion Circumduction  Thumb Active Range of Motion Opposition  Thumb Passive Range of Motion MP Joint Flexion  Thumb Passive Range of Motion IP Joint Flexion    Next Visit:  Take ROM  measurements  US   Scar mobilization  MFR  A/PROM

## 2023-05-10 ENCOUNTER — THERAPY VISIT (OUTPATIENT)
Dept: OCCUPATIONAL THERAPY | Facility: CLINIC | Age: 63
End: 2023-05-10
Payer: COMMERCIAL

## 2023-05-10 DIAGNOSIS — Z47.1 AFTERCARE FOLLOWING RIGHT WRIST JOINT REPLACEMENT SURGERY: ICD-10-CM

## 2023-05-10 DIAGNOSIS — M18.11 ARTHRITIS OF CARPOMETACARPAL (CMC) JOINT OF RIGHT THUMB: Primary | ICD-10-CM

## 2023-05-10 DIAGNOSIS — Z96.631 AFTERCARE FOLLOWING RIGHT WRIST JOINT REPLACEMENT SURGERY: ICD-10-CM

## 2023-05-10 PROCEDURE — 97140 MANUAL THERAPY 1/> REGIONS: CPT | Mod: GO

## 2023-05-10 PROCEDURE — 97110 THERAPEUTIC EXERCISES: CPT | Mod: GO

## 2023-05-10 PROCEDURE — 97035 APP MDLTY 1+ULTRASOUND EA 15: CPT | Mod: GO

## 2023-05-10 NOTE — PROGRESS NOTES
SOAP note objective information for 5/10/2023.  Diagnosis: s/p R CMC arthroplasty   DOS: 2/22/23  Procedure: CMC arthroplasty w/ suture suspension, prox trapezoidectomy  Post: 11w 0d    ROM  Pain Report: - none  + mild    ++ moderate    +++ severe   Thumb 3/13/2023 3/13/2023 3/20/2023 3/28/2023 4/4/2023 4/11/2023 4/19/2023 4/26/2023 5/10/2023   AROM  (post) L R R R R R R R R   MP /45 /20 40 /40 /43 /39 /40 (/43) /40 /40   IP /50 /10 22 /56 /56 /50 /52 (/56) /52 /52   RABD 50 30 nt         PABD 55 45 nt           Wrist 3/20/2023 3/20/2023 3/28/2023 4/4/2023 4/11/2023 4/19/2023 4/26/2023 5/10/2023   AROM (post) L R R R R R R R   Extension 80 65 67 77 80 81 (81) 81 82   Flexion 62 30 38 52 57 62 (64) 64 62   RD 40 15 10 17 17 17 (24)     UD 40 15 18 22 24 24 (26)     Supination 90 75 80 84 80 83 80 80   Pronation 90 80 94          Please refer to the daily flowsheet for treatment today, total treatment time and time spent performing 1:1 timed codes.

## 2023-05-18 ENCOUNTER — ANCILLARY PROCEDURE (OUTPATIENT)
Dept: MAMMOGRAPHY | Facility: CLINIC | Age: 63
End: 2023-05-18
Attending: FAMILY MEDICINE
Payer: COMMERCIAL

## 2023-05-18 DIAGNOSIS — Z12.31 VISIT FOR SCREENING MAMMOGRAM: ICD-10-CM

## 2023-05-18 PROCEDURE — 77067 SCR MAMMO BI INCL CAD: CPT | Mod: TC | Performed by: RADIOLOGY

## 2023-05-18 PROCEDURE — 77063 BREAST TOMOSYNTHESIS BI: CPT | Mod: TC | Performed by: RADIOLOGY

## 2023-06-02 ENCOUNTER — OFFICE VISIT (OUTPATIENT)
Dept: ORTHOPEDICS | Facility: CLINIC | Age: 63
End: 2023-06-02
Payer: COMMERCIAL

## 2023-06-02 DIAGNOSIS — M18.11 ARTHRITIS OF CARPOMETACARPAL (CMC) JOINT OF RIGHT THUMB: Primary | ICD-10-CM

## 2023-06-02 PROCEDURE — 99213 OFFICE O/P EST LOW 20 MIN: CPT | Performed by: STUDENT IN AN ORGANIZED HEALTH CARE EDUCATION/TRAINING PROGRAM

## 2023-06-02 ASSESSMENT — PAIN SCALES - GENERAL: PAINLEVEL: NO PAIN (0)

## 2023-06-02 NOTE — LETTER
6/2/2023         RE: Angela Ibarra  [address] [place] [id number]        Dear Colleague,    Thank you for referring your patient, Angela Ibarra, to the [facility]. Please see a copy of my visit note below.    Ortho Hand    No issues. Doing well. Happy with result. L thumb CMC steroid injection working well.    On exam, well-healed R dorsoradial wrist incision with intact SSBRN sensation. Excellent R thumb opposition to all fingertips with ease, R active palmar and radial abduction of >60 deg.     A/P: 62F 12 weeks s/p R thumb CMC arthroplasty, prox trapezoidectomy    -Continue wearing splint when performing strenuous activities or heavy lifting to prolong durability of the reconstruction  -Return to clinic once the L thumb steroid injection wears out    Seth Saunders MD, PhD      Again, thank you for allowing me to participate in the care of your patient.        Sincerely,        Seth Saunders MD

## 2023-06-03 NOTE — PROGRESS NOTES
Ortho Hand    No issues. Doing well. Happy with result. L thumb CMC steroid injection working well.    On exam, well-healed R dorsoradial wrist incision with intact SSBRN sensation. Excellent R thumb opposition to all fingertips with ease, R active palmar and radial abduction of >60 deg.     A/P: 62F 12 weeks s/p R thumb CMC arthroplasty, prox trapezoidectomy    -Continue wearing splint when performing strenuous activities or heavy lifting to prolong durability of the reconstruction  -Return to clinic once the L thumb steroid injection wears out    Seth Saunders MD, PhD

## 2023-06-07 ENCOUNTER — THERAPY VISIT (OUTPATIENT)
Dept: OCCUPATIONAL THERAPY | Facility: CLINIC | Age: 63
End: 2023-06-07
Payer: COMMERCIAL

## 2023-06-07 DIAGNOSIS — Z96.631 AFTERCARE FOLLOWING RIGHT WRIST JOINT REPLACEMENT SURGERY: ICD-10-CM

## 2023-06-07 DIAGNOSIS — Z47.1 AFTERCARE FOLLOWING RIGHT WRIST JOINT REPLACEMENT SURGERY: ICD-10-CM

## 2023-06-07 DIAGNOSIS — M18.11 ARTHRITIS OF CARPOMETACARPAL (CMC) JOINT OF RIGHT THUMB: Primary | ICD-10-CM

## 2023-06-07 PROCEDURE — 97110 THERAPEUTIC EXERCISES: CPT | Mod: GO

## 2023-06-07 NOTE — PROGRESS NOTES
"   06/07/23 0500   Appointment Info   Treating Provider Linda Kendrick, OTR/L   Total/Authorized Visits 10 (POC)   Visits Used 9   Medical Diagnosis s/p R CMC arthroplasty w/ suture suspension, proximal trapezoidectomy   OT Tx Diagnosis s/p R CMC arthroplasty w/ suture suspension, proximal trapezoidectomy   Progress Note/Certification   Onset of Illness/Injury or Date of Surgery 02/22/23   Therapy Frequency 1x weekly   Predicted Duration 10 weeks   Progress Note Due Date 06/05/23   Progress Note Completed Date 04/26/23   Goals   OT Goals 1   OT Goal 1   Goal Identifier Tying Shoes   Goal Description Patient will report no difficulty tying shoes when getting dressed in the morning   Rationale In order to maximize safety and independence with performance of self-care activities   Goal Progress Mild Difficulty   Target Date 07/05/23   Subjective Report   Subjective Report \"It's doing really good. I want to see what you have to say, but I think I am ready to be done.\"   Objective Measures   Objective Measures Objective Measure 1;Objective Measure 2;Objective Measure 3;Objective Measure 4;Objective Measure 5   Objective Measure 1   Objective Measure Pain Scale   Details 0/10 pain   Objective Measure 2   Objective Measure Thumb ROM   Details MP Fl: 43 IP Fl: 64   Objective Measure 3   Objective Measure  Strength   Details R: 35lbs; L: 50lbs   Objective Measure 4   Objective Measure Lateral Pinch   Details R: 8lbs L: 13lbs   Objective Measure 5   Objective Measure 3-pt pinch   Details R: 7lbs L: 13lbs   Treatment Interventions (OT)   Interventions Therapeutic Procedure/Exercise   Therapeutic Procedure/Exercise   PTRx Ther Proc 1 Thumb Active Range of Motion IP Joint Blocking   PTRx Ther Proc 1 - Details HEP   PTRx Ther Proc 2 Thumb Active Range of Motion MP Joint Blocking   PTRx Ther Proc 2 - Details HEP   PTRx Ther Proc 3 Thumb Active Range of Motion Composite Flexion   PTRx Ther Proc 3 - Details HEP   PTRx Ther Proc " 4 Thumb Active Range of Motion Palmar Abduction   PTRx Ther Proc 4 - Details HEP   PTRx Ther Proc 5 Thumb Active Range of Motion Circumduction   PTRx Ther Proc 5 - Details HEP   PTRx Ther Proc 6 Thumb Active Range of Motion Opposition   PTRx Ther Proc 6 - Details HEP   PTRx Ther Proc 7 Thumb Passive Range of Motion MP Joint Flexion   PTRx Ther Proc 7 - Details HEP   PTRx Ther Proc 8 Thumb Passive Range of Motion IP Joint Flexion   PTRx Ther Proc 8 - Details HEP   PTRx Ther Proc 9 Hand Strengthening Gripping   PTRx Ther Proc 9 - Details 3-4x daily, 10-20 reps, reviewed, HEP   PTRx Ther Proc 10 Hand Strengthening Key Pinch   PTRx Ther Proc 10 - Details 3-4x daily, 10-20 reps, reviewed, HEP   PTRx Ther Proc 11 Hand Strengthening 3 Point Pinch   PTRx Ther Proc 11 - Details 3-4x daily, 10-20 reps, reviewed, HEP   PTRx Ther Proc 12 Thumb Strengthening Composite Flexion   Therapeutic Exercise Notes 12 3-4x daily, 10-20 reps, reviewed, HEP   PTRx Ther Proc 13 Intrinsic Strengthening Thumb Adduction   PTRx Ther Proc 13 - Details 3-4x daily, 10-20 reps, reviewed, HEP   Therapeutic Procedure: strength, endurance, ROM, flexibillity minutes (72860) 15   Ther Proc 1 DN   Ther Proc 1 - Details Please see above for the objective data gathered for today's DN   Plan   Home program See PTRX   Comments   Comments This is their last therapy appopintment.   Total Session Time   Timed Code Treatment Minutes 15   Total Treatment Time (sum of timed and untimed services) 15       PLAN  Patient is ready to discharge from hand therapy. They will independently manage their symptoms and HEP.    Beginning/End Dates of Progress Note Reporting Period:  3/13/2023 to 06/07/2023    Referring Provider:  Seth Saunders    DISCHARGE  Reason for Discharge: Patient chooses to discontinue therapy.  Patient no longer requires therapy to meet goals.    Discharge Plan: Patient to continue home program.    Referring Provider:  Seth Saunders

## 2023-07-19 ENCOUNTER — TELEPHONE (OUTPATIENT)
Dept: GASTROENTEROLOGY | Facility: CLINIC | Age: 63
End: 2023-07-19
Payer: COMMERCIAL

## 2023-07-19 NOTE — TELEPHONE ENCOUNTER
Caller: Angela Ibarra    Reason for Reschedule/Cancellation (please be detailed, any staff messages or encounters to note?): GOLD ONOFRE      Prior to reschedule please review:    Ordering Provider: YURI    Sedation per order: MAC    Does patient have any ASC Exclusions, please identify?: N      Notes on Cancelled Procedure:    Procedure: Lower Endoscopy [Colonoscopy]     Date: 07/26/2023    Location: [facility]; [address], [place] [address]    Surgeon: GOLD      Rescheduled: No  PT SAID SHE WAS GOING TO CANCEL ANYWAYS BECAUSE THE TIME WASN'T CONVENIENT FOR HER. WHEN OFFERED TO LOOK FOR EARLIER TIMES AT [facility], PT SAID SHE WOULD NEVER GO THERE.

## 2023-08-01 DIAGNOSIS — G47.00 INSOMNIA, UNSPECIFIED TYPE: ICD-10-CM

## 2023-08-02 RX ORDER — TRAZODONE HYDROCHLORIDE 50 MG/1
TABLET, FILM COATED ORAL
Qty: 180 TABLET | Refills: 1 | Status: SHIPPED | OUTPATIENT
Start: 2023-08-02 | End: 2024-01-23

## 2023-10-08 ENCOUNTER — OFFICE VISIT (OUTPATIENT)
Dept: URGENT CARE | Facility: URGENT CARE | Age: 63
End: 2023-10-08
Payer: COMMERCIAL

## 2023-10-08 VITALS
WEIGHT: 217 LBS | SYSTOLIC BLOOD PRESSURE: 137 MMHG | TEMPERATURE: 97.8 F | DIASTOLIC BLOOD PRESSURE: 85 MMHG | HEART RATE: 71 BPM | RESPIRATION RATE: 18 BRPM | OXYGEN SATURATION: 98 % | BODY MASS INDEX: 39.69 KG/M2

## 2023-10-08 DIAGNOSIS — L29.2 VULVAR ITCHING: ICD-10-CM

## 2023-10-08 DIAGNOSIS — R30.0 DYSURIA: Primary | ICD-10-CM

## 2023-10-08 LAB
BACTERIA #/AREA URNS HPF: ABNORMAL /HPF
CLUE CELLS: ABNORMAL
RBC #/AREA URNS AUTO: ABNORMAL /HPF
SQUAMOUS #/AREA URNS AUTO: ABNORMAL /LPF
TRICHOMONAS, WET PREP: ABNORMAL
WBC #/AREA URNS AUTO: ABNORMAL /HPF
WBC'S/HIGH POWER FIELD, WET PREP: ABNORMAL
YEAST, WET PREP: ABNORMAL

## 2023-10-08 PROCEDURE — 81015 MICROSCOPIC EXAM OF URINE: CPT | Performed by: FAMILY MEDICINE

## 2023-10-08 PROCEDURE — 87210 SMEAR WET MOUNT SALINE/INK: CPT | Performed by: FAMILY MEDICINE

## 2023-10-08 PROCEDURE — 87086 URINE CULTURE/COLONY COUNT: CPT | Performed by: FAMILY MEDICINE

## 2023-10-08 PROCEDURE — 99213 OFFICE O/P EST LOW 20 MIN: CPT | Performed by: FAMILY MEDICINE

## 2023-10-08 RX ORDER — CLOTRIMAZOLE 1 %
CREAM (GRAM) TOPICAL 2 TIMES DAILY
Qty: 45 G | Refills: 0 | Status: SHIPPED | OUTPATIENT
Start: 2023-10-08 | End: 2023-10-18

## 2023-10-08 NOTE — PROGRESS NOTES
Assessment & Plan     Dysuria  UA findings unremarkable.  Urine culture ordered for further evaluation  - UA Macroscopic with reflex to Microscopic and Culture; Future  - Wet preparation; Future  - Wet preparation  - UA Microscopic with Reflex to Culture; Future  - UA Microscopic with Reflex to Culture  - Urine Culture Aerobic Bacterial; Future    Vulvar itching  Shared decision made to try clotrimazole for possible yeast infection. Instructed to keep the area dry and clean.  Patient will schedule appointment with PCP as well  - clotrimazole (LOTRIMIN) 1 % external cream; Apply topically 2 times daily for 10 days      Marty Suazo MD  [facility]      Jacklyn Miller is a 62 year old, presenting for the following health issues:  Urgent Care and Vaginal Problem:    Per patient states she is having burning sensation all the time for 1 week, states back in March/April she had a UTI was given antibiotics with a refill states she took 3 doses of the refill antibiotic, azo but still not feeling better but worst.       Review of Systems   Constitutional, HEENT, cardiovascular, pulmonary, gi and gu systems are negative, except as otherwise noted.      Objective    /85   Pulse 71   Temp 97.8  F (36.6  C) (Tympanic)   Resp 18   Wt 98.4 kg (217 lb)   LMP 01/08/2008   SpO2 98%   BMI 39.69 kg/m    Body mass index is 39.69 kg/m .  Physical Exam   GENERAL: alert and no distress  NECK: no adenopathy, no asymmetry, masses, or scars and thyroid normal to palpation  RESP: lungs clear to auscultation - no rales, rhonchi or wheezes  CV: regular rates and rhythm, normal S1 S2, no S3 or S4, and no murmur, click or rub  ABDOMEN: soft, nontender   (female): Slight vulvar skin irritation, scratch marks, no vesicles or discharge noted  MS: no gross musculoskeletal defects noted, no edema  NEURO: Normal strength and tone, mentation intact and speech normal  PSYCH: mentation appears normal,  affect normal/bright      Results for orders placed or performed in visit on 10/08/23   UA Microscopic with Reflex to Culture     Status: Abnormal   Result Value Ref Range    Bacteria Urine Few (A) None Seen /HPF    RBC Urine 2-5 (A) 0-2 /HPF /HPF    WBC Urine 5-10 (A) 0-5 /HPF /HPF    Squamous Epithelials Urine Few (A) None Seen /LPF    Narrative    Urine Culture not indicated   Wet preparation     Status: Abnormal    Specimen: Vagina; Swab   Result Value Ref Range    Trichomonas Absent Absent    Yeast Absent Absent    Clue Cells Absent Absent    WBCs/high power field 1+ (A) None

## 2023-10-10 ENCOUNTER — MYC MEDICAL ADVICE (OUTPATIENT)
Dept: FAMILY MEDICINE | Facility: CLINIC | Age: 63
End: 2023-10-10
Payer: COMMERCIAL

## 2023-10-10 LAB — BACTERIA UR CULT: NORMAL

## 2023-11-10 ENCOUNTER — OFFICE VISIT (OUTPATIENT)
Dept: FAMILY MEDICINE | Facility: CLINIC | Age: 63
End: 2023-11-10
Payer: COMMERCIAL

## 2023-11-10 VITALS
HEIGHT: 62 IN | WEIGHT: 213 LBS | RESPIRATION RATE: 16 BRPM | SYSTOLIC BLOOD PRESSURE: 146 MMHG | HEART RATE: 75 BPM | TEMPERATURE: 97.9 F | DIASTOLIC BLOOD PRESSURE: 74 MMHG | OXYGEN SATURATION: 96 % | BODY MASS INDEX: 39.2 KG/M2

## 2023-11-10 DIAGNOSIS — N95.2 ATROPHIC VAGINITIS: ICD-10-CM

## 2023-11-10 DIAGNOSIS — N39.0 RECURRENT UTI: ICD-10-CM

## 2023-11-10 DIAGNOSIS — N30.00 ACUTE CYSTITIS WITHOUT HEMATURIA: Primary | ICD-10-CM

## 2023-11-10 LAB
ALBUMIN UR-MCNC: NEGATIVE MG/DL
APPEARANCE UR: ABNORMAL
BACTERIA #/AREA URNS HPF: ABNORMAL /HPF
BILIRUB UR QL STRIP: NEGATIVE
COLOR UR AUTO: YELLOW
GLUCOSE UR STRIP-MCNC: NEGATIVE MG/DL
HGB UR QL STRIP: ABNORMAL
KETONES UR STRIP-MCNC: NEGATIVE MG/DL
LEUKOCYTE ESTERASE UR QL STRIP: ABNORMAL
NITRATE UR QL: POSITIVE
PH UR STRIP: 5.5 [PH] (ref 5–7)
RBC #/AREA URNS AUTO: ABNORMAL /HPF
SP GR UR STRIP: 1.01 (ref 1–1.03)
SQUAMOUS #/AREA URNS AUTO: ABNORMAL /LPF
TRANS CELLS #/AREA URNS HPF: ABNORMAL /HPF
UROBILINOGEN UR STRIP-ACNC: 0.2 E.U./DL
WBC #/AREA URNS AUTO: ABNORMAL /HPF
WBC CLUMPS #/AREA URNS HPF: PRESENT /HPF

## 2023-11-10 PROCEDURE — 87086 URINE CULTURE/COLONY COUNT: CPT | Performed by: INTERNAL MEDICINE

## 2023-11-10 PROCEDURE — 81001 URINALYSIS AUTO W/SCOPE: CPT | Performed by: INTERNAL MEDICINE

## 2023-11-10 PROCEDURE — 99214 OFFICE O/P EST MOD 30 MIN: CPT | Performed by: INTERNAL MEDICINE

## 2023-11-10 RX ORDER — CIPROFLOXACIN 500 MG/1
500 TABLET, FILM COATED ORAL 2 TIMES DAILY
Qty: 10 TABLET | Refills: 0 | Status: SHIPPED | OUTPATIENT
Start: 2023-11-10 | End: 2024-03-10

## 2023-11-10 NOTE — PATIENT INSTRUCTIONS
At [facility], we strive to deliver an exceptional experience to you, every time we see you. If you receive a survey, please complete it as we do value your feedback.  If you have MyChart, you can expect to receive results automatically within 24 hours of their completion.  Your provider will send a note interpreting your results as well.   If you do not have MyChart, you should receive your results in about a week by mail.    Your care team:                            Family Medicine Internal Medicine   MD Mando Jones MD Shantel Branch-Fleming, MD Srinivasa Vaka, MD Katya Belousova, PAALICE Lima, MD Pediatrics   Kenny Washington, PAMD Amaris Duenas MD Amelia Massimini APRN CNP   LORIE Simmons CNP, MD Charanya Pasupathi, MD Kathleen Widmer, NP coming October 2023 Same-Day (No follow up visit)    BRONSON Forte PA coming Oct 2023     Clinic hours: Monday - Thursday 7 am-6 pm; Fridays 7 am-5 pm.   Urgent care: Monday - Friday 10 am- 8 pm; Saturday and Sunday 9 am-5 pm.    Clinic: [phone]       [facility]: Monday - Thursday 8 am - 7 pm; Friday 8 am - 6 pm  [facility]: [phone]

## 2023-11-10 NOTE — PROGRESS NOTES
"  Assessment & Plan     Acute cystitis without hematuria  She has recurrent cystitis and UTI  Cause of this remains unclear however she thinks sexual activity and dehydration could be the culprits  She had diarrhea on the weekend and she tells me it was a mess and thinks that she might have got infected at that point  She cannot take Bactrim and Macrobid so we will try ciprofloxacin  She already takes 1 tablet of Bactrim prophylactically after having intercourse  She has seen urologist in the past  In the past she has grown E. coli  - UA Macroscopic with reflex to Microscopic and Culture - Clinic Collect  - Urine Microscopic Exam  - Urine Culture  - ciprofloxacin (CIPRO) 500 MG tablet; Take 1 tablet (500 mg) by mouth 2 times daily    Recurrent UTI  I wonder if she has got atrophic vaginitis and that is also playing a part in her recurrent UTIs  She does complain of dry vaginal  - ciprofloxacin (CIPRO) 500 MG tablet; Take 1 tablet (500 mg) by mouth 2 times daily    Atrophic vaginitis  She has low libido and is also having trouble getting intimate because of the dry vagina  She had hysterectomy in the past  She wants to consider hormone replacement therapy  - Ob/Gyn  Referral; Future      30 minutes spent by me on the date of the encounter doing chart review, history and exam, documentation and further activities per the note       BMI:   Estimated body mass index is 38.96 kg/m  as calculated from the following:    Height as of this encounter: 1.575 m (5' 2\").    Weight as of this encounter: 96.6 kg (213 lb).           Jero Mauricio MD  [facility]    Jacklyn Miller is a 63 year old, presenting for the following health issues:  UTI      11/10/2023    12:19 PM   Additional Questions   Roomed by KATLYN       UTI    History of Present Illness       Reason for visit:  I believe I have a UTI  Symptom onset:  1-3 days ago  Symptoms include:  Usual UTI sysmptons Cloudy urine, pain, " smell  Symptom intensity:  Moderate  Symptom progression:  Worsening  Had these symptoms before:  Yes  Has tried/received treatment for these symptoms:  Yes  Previous treatment was successful:  Yes  Prior treatment description:  I had a UTI earlier in the year. I get them alot so I'm used to them.    She eats 2-3 servings of fruits and vegetables daily.She consumes 1 sweetened beverage(s) daily.She exercises with enough effort to increase her heart rate 20 to 29 minutes per day.  She exercises with enough effort to increase her heart rate 5 days per week.   She is taking medications regularly.                 Review of Systems   Constitutional, HEENT, cardiovascular, pulmonary, gi and gu systems are negative, except as otherwise noted.      Objective    LMP 01/08/2008   There is no height or weight on file to calculate BMI.  Physical Exam   GENERAL: healthy, alert and no distress  MS: no gross musculoskeletal defects noted, no edema

## 2023-11-11 ENCOUNTER — NURSE TRIAGE (OUTPATIENT)
Dept: NURSING | Facility: CLINIC | Age: 63
End: 2023-11-11
Payer: COMMERCIAL

## 2023-11-11 DIAGNOSIS — R39.9 UTI SYMPTOMS: ICD-10-CM

## 2023-11-11 RX ORDER — CEPHALEXIN 500 MG/1
500 CAPSULE ORAL 2 TIMES DAILY
Qty: 10 CAPSULE | Refills: 0 | Status: SHIPPED | OUTPATIENT
Start: 2023-11-11 | End: 2024-03-10

## 2023-11-11 NOTE — TELEPHONE ENCOUNTER
Pt seen yesterday at [facility] and dx with UTI, pt states hx of UTI.  Pt was prescribed Ciprofloxacin and states this has never worked has had 3 doses and states pain is worse than yesterday.  Pt has been treated with Cephalexin and requesting On Call paged to ask if antibiotic can be switched to Cephalexin.  On Call paged @ 2:53 pm.    Dr. Bladimir Locke returned page @ 2:55 pm.  Rx Cephalexin 500 mg twice daily x 5 days faxed to Pharmacy.  This information called to pt.  Reena Dunn RN  [facility] Nurse Advisor    Reason for Disposition   [1] Prescription refill request for ESSENTIAL medicine (i.e., likelihood of harm to patient if not taken) AND [2] triager unable to refill per department policy    Additional Information   Negative: New-onset or worsening symptoms, see that guideline (e.g., diarrhea, runny nose, sore throat)   Negative: Medicine question not related to refill or renewal   Negative: Caller (e.g., patient or pharmacist) requesting information about a new medicine   Negative: Caller requesting information unrelated to medicine    Protocols used: Medication Refill and Renewal Call-A-

## 2023-11-12 LAB — BACTERIA UR CULT: NORMAL

## 2023-11-16 ENCOUNTER — OFFICE VISIT (OUTPATIENT)
Dept: OBGYN | Facility: CLINIC | Age: 63
End: 2023-11-16
Attending: GENERAL PRACTICE
Payer: COMMERCIAL

## 2023-11-16 VITALS — DIASTOLIC BLOOD PRESSURE: 70 MMHG | SYSTOLIC BLOOD PRESSURE: 134 MMHG

## 2023-11-16 DIAGNOSIS — N95.2 VAGINAL ATROPHY: Primary | ICD-10-CM

## 2023-11-16 DIAGNOSIS — R68.82 DECREASED LIBIDO: ICD-10-CM

## 2023-11-16 PROCEDURE — 99203 OFFICE O/P NEW LOW 30 MIN: CPT | Performed by: GENERAL PRACTICE

## 2023-11-16 RX ORDER — ESTRADIOL 0.1 MG/G
CREAM VAGINAL
Qty: 74 G | Refills: 0 | Status: SHIPPED | OUTPATIENT
Start: 2023-11-16 | End: 2024-09-05

## 2023-11-16 NOTE — PATIENT INSTRUCTIONS
If you have labs or imaging done, the results will automatically release in [employer] without an interpretation.  Your health care professional will review those results and send an interpretation with recommendations as soon as possible, but this may be 1-3 business days.    If you have any questions regarding your visit, please contact your care team.     [employer]: [phone]  [facility] CLINIC HOURS TELEPHONE NUMBER   Ramirez TUCKER Johnny .      Joan-KENNEY Hou-KENNEY Andres-Surgery Scheduler  Lola-         Monday-[place]  8:00 am-4:00 pm  [facility]  Wednesday-[place] 8:00 am-4:00 pm  Thursday-[place] 8:00 am-4:00 pm    Typical Surgery Day Friday [facility]  [address] [place] [address]  PH: [phone] Fax    Imaging Scheduling all locations  PH: [phone]     [facility]  [address]  [place], [place] [address] [phone]    [facility]  [address] [place] [address]  PH: [phone]     **Surgeries** Our Surgery Schedulers will contact you to schedule. If you do not receive a call within 3 business days, please call [phone].  Urgent Care locations:  [facility]       Monday-Friday   10 am - 8 pm  Saturday and Sunday   9 am - 5 pm   [phone]   If you need a medication refill, please contact your pharmacy. Please allow 3 business days for your refill to be completed.  As always, Thank you for trusting us with your healthcare needs!

## 2023-11-16 NOTE — PROGRESS NOTES
Angela is a 63 year old post menopausal female referred to GYN for vaginal atrophy.  Patient reports pain/discomfort with intercourse despite using lubrication regularly. Also having decreased libido. States she wants to be intimate but also not looking forward to intercourse. Also of note, has been having multiple UTIs, sometimes associated with intercourse. No other issues or concerns.     Patient is a non-smoker, no personal/family history of DVT or clotting disorders, no CV disease.     ROS: Ten point review of systems was reviewed and negative except the above.          GYNHx:   Patient's last menstrual period was 2008.  Menses: s/p hysterectomy  Last paps:    Lab Results   Component Value Date    PAP NIL 2009    PAP LSIL 2007    PAP NIL 2006         OB:      Past Surgical History:   Procedure Laterality Date    ARTHROPLASTY CARPOMETACARPAL (THUMB JOINT) Right 2023    Procedure: RIGHT THUMB CARPOMETACARPAL JOINT ARTHROPLASTY WITH SUTURE SUSPENSION;  Surgeon: Seth Saunders MD;  Location: [facility] OR    ARTHROPLASTY HIP Right 2020    Procedure: Right total hip arthroplasty;  Surgeon: Clinton Deras MD;  Location: [facility] OR    ARTHROSCOPY ANKLE  2014    Procedure: ARTHROSCOPY ANKLE;  Surgeon: Shaun Bhatt DPM;  Location: [facility] OR    ARTHROSCOPY KNEE Left 10/4/2018    Procedure: ARTHROSCOPY KNEE;  Left knee arthroscopy  medial meniscal and chondral debridement;  Surgeon: Aryan Holley MD;  Location: [facility] OR    COLONOSCOPY WITH CO2 INSUFFLATION N/A 2017    Procedure: COLONOSCOPY WITH CO2 INSUFFLATION;  COLON SCREEN/ YURI;  Surgeon: Cody Arana MD;  Location: [facility] OR    HYSTERECTOMY, PAP NO LONGER INDICATED  2008    LAPAROSCOPIC CHOLECYSTECTOMY  8/3/2012    Procedure: LAPAROSCOPIC CHOLECYSTECTOMY;  Laparoscopic Cholecystectomy ;  Surgeon: Corwin Cabezas MD;  [facility]: [facility] OR    OPEN REDUCTION INTERNAL FIXATION ANKLE  2014     Procedure: OPEN REDUCTION INTERNAL FIXATION ANKLE;  Surgeon: Shaun Bhatt DPM;  Location: [facility] OR    OPEN REDUCTION INTERNAL FIXATION ELBOW  10/15/2013    Procedure: OPEN REDUCTION INTERNAL FIXATION ELBOW;  OPEN REDUCTION INTERNAL FIXATION LEFT PROXIMAL ULNA;  Surgeon: Artem Last DO;  Location: [facility] OR    ORTHOPEDIC SURGERY      left shoulder surgery    REMOVE MESH VAGINA  10/10/2011    Procedure:REMOVE MESH VAGINA; Excision of Vaginal Mesh; Surgeon:SERGE SALGADO; Location:[facility] OR    SURGICAL HISTORY OF -   4/20/10    Transobturator midurethral sling    SURGICAL HISTORY OF -   1999    exploratory laparotomy, colitis    SURGICAL HISTORY OF -   4/20/10    Transobturator midurethral sling    TUBAL LIGATION      ZZC  DELIVERY ONLY      , Low Cervical    ZZC GASTRIC BYPASS,OBESITY,W/SM BOWEL RECONS  10/99    ZZC LIGATE FALLOPIAN TUBE  2000    laparoscopy       Past Medical History:   Diagnosis Date    Calculus of kidney     Migraine, unspecified, without mention of intractable migraine without mention of status migrainosus     Other specified iron deficiency anemias     Papanicolaou smear of cervix with low grade squamous intraepithelial lesion (LGSIL)     Colpo and hyst pathology benign    Primary osteoarthritis of right hip     Synovitis of ankle     Unspecified essential hypertension     Essential hypertension        Allergies   Allergen Reactions    Bactrim [Sulfamethoxazole-Trimethoprim]      itching    Nitrofurantoin Itching     Patient states she is not longer allergic to this medication. Has used it since with no problems        Family History   Problem Relation Age of Onset    C.A.D. Mother         MI    Hypertension Mother     Hypertension Father     Substance Abuse Brother     Breast Cancer No family hx of        Social History     Socioeconomic History    Marital status:      Spouse name: Not on file    Number of children: 1    Years of  education: Not on file    Highest education level: Not on file   Occupational History    Occupation:      Employer: [employer]   Tobacco Use    Smoking status: Never    Smokeless tobacco: Never   Vaping Use    Vaping Use: Never used   Substance and Sexual Activity    Alcohol use: Not Currently     Comment: rarely - less than once a month    Drug use: No    Sexual activity: Yes     Partners: Male     Birth control/protection: Post-menopausal, Female Surgical     Comment: tvh   Other Topics Concern     Service Yes     Comment: in [place] out of [place] 85[place]88    Blood Transfusions No    Caffeine Concern No    Occupational Exposure No    Hobby Hazards No    Sleep Concern Yes    Stress Concern No    Weight Concern Yes     Comment: always     Special Diet No     Comment: watches sugar intake    Back Care No    Exercise Yes     Comment: treadmill and bike    Bike Helmet No    Seat Belt Yes    Self-Exams No    Parent/sibling w/ CABG, MI or angioplasty before 65F 55M? No   Social History Narrative    Dairy/d 1 servings/d.     Caffeine 0 servings/d    Exercise 5 x week, treadmill, bike 1 hour/day    Sunscreen used - No    Seatbelts used - Yes    Working smoke/CO detectors in the home - Yes    Guns stored in the home - No    Self Breast Exams - No    Self Testicular Exam - NA    Eye Exam up to date - Yes    Dental Exam up to date - Yes    Pap Smear up to date - Yes    Mammogram up to date - No 2000    PSA up to date - NA    Dexa Scan up to date - NA    Flex Sig / Colonoscopy up to date - Yes 8/99 Abd pain=neg    Immunizations up to date - unsure    Abuse: Current or Past(Physical, Sexual or Emotional)- No    Do you feel safe in your environment - Yes                     Social Determinants of Health     Financial Resource Strain: Low Risk  (11/10/2023)    Financial Resource Strain     Within the past 12 months, have you or your family members you live with been unable to get utilities (heat, electricity)  when it was really needed?: No   Food Insecurity: Low Risk  (11/10/2023)    Food Insecurity     Within the past 12 months, did you worry that your food would run out before you got money to buy more?: No     Within the past 12 months, did the food you bought just not last and you didn t have money to get more?: No   Transportation Needs: Low Risk  (11/10/2023)    Transportation Needs     Within the past 12 months, has lack of transportation kept you from medical appointments, getting your medicines, non-medical meetings or appointments, work, or from getting things that you need?: No   Physical Activity: Not on file   Stress: Not on file   Social Connections: Not on file   Interpersonal Safety: Not on file   Housing Stability: Low Risk  (11/10/2023)    Housing Stability     Do you have housing? : Yes     Are you worried about losing your housing?: No         PE: /70 (BP Location: Right arm, Patient Position: Sitting, Cuff Size: Adult Large)   LMP 01/08/2008   There is no height or weight on file to calculate BMI.    General Appearance:  healthy, alert, active, no distress  Cardiovascular:  well perfused  Lungs:  non labored breathing  Abdomen: Soft, non-tender.   Pelvic:       - Ext: Vulva and perineum are without lesion, mass or discharge. Notable for atrophy       - Urethra: atrophic. No lesions        - Vagina: atrophic and without discharge. No lesions       - Cervix: surgically absent       - Uterus:surgically absent    A/P:     ICD-10-CM    1. Vaginal atrophy  N95.2 Ob/Gyn  Referral     estradiol (ESTRACE) 0.1 MG/GM vaginal cream      2. Decreased libido  R68.82       62yo  female with vulvar-vaginal atrophy. Exam otherwise benign. Patient noting significant dryness and dyspareunia due to the atrophy. Her multiple UTIs may also be associated with atrophy surrounding her urethral meatus. Also notes decreased libido which may or may not be associated with her atrophy. Recommend trial of  estradiol cream with tapering dose down to maintenance to treat atrophy. Discussed r/b/I/a of estradiol cream. Patient to follow up with report on symptoms. If libido is not improved, will discuss options for management at future visit. All questions answered.     30 minutes spent on the date of the encounter doing chart review, review of test results, interpretation of tests, patient visit, and documentation       Ramirez Turner DO, FACOG

## 2023-11-20 ENCOUNTER — MYC MEDICAL ADVICE (OUTPATIENT)
Dept: FAMILY MEDICINE | Facility: CLINIC | Age: 63
End: 2023-11-20
Payer: COMMERCIAL

## 2023-11-20 DIAGNOSIS — N39.0 RECURRENT UTI: Primary | ICD-10-CM

## 2023-11-20 RX ORDER — GRANULES FOR ORAL 3 G/1
3 POWDER ORAL ONCE
Qty: 1 PACKET | Refills: 0 | Status: SHIPPED | OUTPATIENT
Start: 2023-11-20 | End: 2023-11-20

## 2023-12-07 ENCOUNTER — MYC MEDICAL ADVICE (OUTPATIENT)
Dept: FAMILY MEDICINE | Facility: CLINIC | Age: 63
End: 2023-12-07
Payer: COMMERCIAL

## 2023-12-07 DIAGNOSIS — R30.0 DYSURIA: Primary | ICD-10-CM

## 2023-12-07 RX ORDER — CIPROFLOXACIN 250 MG/1
250 TABLET, FILM COATED ORAL 2 TIMES DAILY
Qty: 10 TABLET | Refills: 0 | Status: SHIPPED | OUTPATIENT
Start: 2023-12-07 | End: 2024-03-10

## 2023-12-20 ENCOUNTER — OFFICE VISIT (OUTPATIENT)
Dept: FAMILY MEDICINE | Facility: CLINIC | Age: 63
End: 2023-12-20
Payer: COMMERCIAL

## 2023-12-20 VITALS
SYSTOLIC BLOOD PRESSURE: 130 MMHG | DIASTOLIC BLOOD PRESSURE: 78 MMHG | TEMPERATURE: 100.3 F | RESPIRATION RATE: 22 BRPM | HEART RATE: 78 BPM | HEIGHT: 62 IN | OXYGEN SATURATION: 94 % | BODY MASS INDEX: 38.64 KG/M2 | WEIGHT: 210 LBS

## 2023-12-20 DIAGNOSIS — R10.31 ABDOMINAL PAIN, RIGHT LOWER QUADRANT: Primary | ICD-10-CM

## 2023-12-20 DIAGNOSIS — R50.9 FEVER, UNSPECIFIED FEVER CAUSE: ICD-10-CM

## 2023-12-20 PROCEDURE — 99215 OFFICE O/P EST HI 40 MIN: CPT | Performed by: PHYSICIAN ASSISTANT

## 2023-12-20 ASSESSMENT — ENCOUNTER SYMPTOMS: BACK PAIN: 1

## 2023-12-20 ASSESSMENT — PAIN SCALES - GENERAL: PAINLEVEL: SEVERE PAIN (6)

## 2023-12-20 NOTE — PROGRESS NOTES
Jacklyn Miller is a 63 year old, presenting for the following health issues:  Back Pain      12/20/2023    12:42 PM   Additional Questions   Roomed by Erendira   Accompanied by Self     Pt reports feeling fatigued and run down - Sunday - she rested.  Felt better Monday during the day but later that night she developed a fever 101.  She took tylenol and ibuprofen - was not able to rest due to body aching.  She called in sick to work yesterday and today.  Last night was terrible - poor sleep and restless and aching.  She has had 3 negative COVID tests in the last few days.  No diarrhea.  Trying to drink but nauseous.  Last dose of fever meds was this morning at 9:30 am.  Chills so significant she cannot rest.  There is also accompanying Rt lower abd pain that radiates to her back.  She is not voiding much as she is also not drinking much due to the nausea.            Back Pain     History of Present Illness       Back Pain:  She presents for follow up of back pain. Patient's back pain is a chronic problem.  Location of back pain:  Right lower back, left lower back, right middle of back and left middle of back  Description of back pain: dull ache  Back pain spreads: nowhere    Since patient first noticed back pain, pain is: always present, but gets better and worse  Does back pain interfere with her job:  No       Reason for visit:  Talk about my frver and other symptoms  Symptom onset:  1-3 days ago  Symptoms include:  Fever. Back pain, abdominal pain  Symptom intensity:  Moderate  Symptom progression:  Staying the same  Had these symptoms before:  Yes  Has tried/received treatment for these symptoms:  Yes  Previous treatment was successful:  Yes  Prior treatment description:  Shantel had some uti issues where i ended up with a kidney infection. I  What makes it worse:  No  What makes it better:  Tylenol, but that s only temporary. By the time for the next dose my fever is back up    She eats 0-1 servings of  "fruits and vegetables daily.She consumes 1 sweetened beverage(s) daily.She exercises with enough effort to increase her heart rate 20 to 29 minutes per day.  She exercises with enough effort to increase her heart rate 3 or less days per week.   She is taking medications regularly.                 Review of Systems   Musculoskeletal:  Positive for back pain.            Objective    /78 (BP Location: Left arm, Patient Position: Chair, Cuff Size: Adult Large)   Pulse 78   Temp 100.3  F (37.9  C) (Tympanic)   Resp 22   Ht 1.575 m (5' 2\")   Wt 95.3 kg (210 lb)   LMP 01/08/2008   SpO2 94%   BMI 38.41 kg/m    Body mass index is 38.41 kg/m .  Physical Exam   GENERAL: healthy, alert, and moderate distress  Mouth: Mildly dry membranes  RESP: lungs clear to auscultation - no rales, rhonchi or wheezes  CV: regular rate and rhythm, normal S1 S2, no S3 or S4, no murmur, click or rub, no peripheral edema and peripheral pulses strong  ABDOMEN: soft, acutely tender to the Rt lower quad with light to moderate pressure.  Body habitus limits palpation but no obvious masses, and bowel sounds normal.  CVA tapping results in pain to the Rt lower quadrant.                    ASSESSMENT AND PLAN   Angela was seen today for back pain.    Diagnoses and all orders for this visit:    Abdominal pain, right lower quadrant    Fever, unspecified fever cause         Differential diagnosis at this time includes appendicitis or perhaps pyelonephritis.  Is a patient is not able to void currently demonstrating some mild symptoms of dehydration this makes it difficult to rule out UTI as well.  I am concerned that after 3 days of the symptoms that this is more than a viral circumstance and given the focal discomfort in the right lower quadrant relieving the patient's best safest course is to be seen in the emergency room where she can have appropriate lab tests and perhaps consider imaging which not available in the primary care setting.  " Patient understands and she is comfortable driving herself to the close by [facility].    Jocelynn Alvarez PA-C

## 2023-12-20 NOTE — PATIENT INSTRUCTIONS
At [facility], we strive to deliver an exceptional experience to you, every time we see you. If you receive a survey, please complete it as we do value your feedback.  If you have MyChart, you can expect to receive results automatically within 24 hours of their completion.  Your provider will send a note interpreting your results as well.   If you do not have MyChart, you should receive your results in about a week by mail.    Your care team:                            Family Medicine Internal Medicine   MD Mando Jones MD Shantel Branch-Fleming, MD Srinivasa Vaka, MD Katya Belousova, PAALICE Lima, MD Pediatrics   Kenny Washington, PAMD Amaris Duenas MD Amelia Massimini APRN CNP   LORIE Simmons CNP, MD Charanya Pasupathi, MD Kathleen Widmer, NP coming October 2023 Same-Day (No follow up visit)    BRONSON Forte PA coming Oct 2023     Clinic hours: Monday - Thursday 7 am-6 pm; Fridays 7 am-5 pm.   Urgent care: Monday - Friday 10 am- 8 pm; Saturday and Sunday 9 am-5 pm.    Clinic: [phone]       [facility]: Monday - Thursday 8 am - 7 pm; Friday 8 am - 6 pm  [facility]: [phone]

## 2024-01-23 DIAGNOSIS — G47.00 INSOMNIA, UNSPECIFIED TYPE: ICD-10-CM

## 2024-01-23 RX ORDER — TRAZODONE HYDROCHLORIDE 50 MG/1
TABLET, FILM COATED ORAL
Qty: 180 TABLET | Refills: 2 | Status: SHIPPED | OUTPATIENT
Start: 2024-01-23 | End: 2024-05-17

## 2024-02-12 ENCOUNTER — OFFICE VISIT (OUTPATIENT)
Dept: FAMILY MEDICINE | Facility: CLINIC | Age: 64
End: 2024-02-12
Attending: INTERNAL MEDICINE
Payer: COMMERCIAL

## 2024-02-12 VITALS
OXYGEN SATURATION: 96 % | BODY MASS INDEX: 38.84 KG/M2 | HEIGHT: 63 IN | TEMPERATURE: 98.7 F | DIASTOLIC BLOOD PRESSURE: 88 MMHG | RESPIRATION RATE: 20 BRPM | HEART RATE: 65 BPM | WEIGHT: 219.2 LBS | SYSTOLIC BLOOD PRESSURE: 153 MMHG

## 2024-02-12 DIAGNOSIS — N39.0 RECURRENT UTI: ICD-10-CM

## 2024-02-12 DIAGNOSIS — Z12.31 VISIT FOR SCREENING MAMMOGRAM: ICD-10-CM

## 2024-02-12 DIAGNOSIS — Z12.11 SCREEN FOR COLON CANCER: ICD-10-CM

## 2024-02-12 DIAGNOSIS — F41.1 GAD (GENERALIZED ANXIETY DISORDER): ICD-10-CM

## 2024-02-12 DIAGNOSIS — Z00.00 ENCOUNTER FOR ROUTINE HISTORY AND PHYSICAL EXAM IN FEMALE PATIENT: Primary | ICD-10-CM

## 2024-02-12 DIAGNOSIS — Z13.220 SCREENING, LIPID: ICD-10-CM

## 2024-02-12 DIAGNOSIS — I10 ESSENTIAL HYPERTENSION WITH GOAL BLOOD PRESSURE LESS THAN 140/90: ICD-10-CM

## 2024-02-12 PROCEDURE — 99396 PREV VISIT EST AGE 40-64: CPT | Performed by: PHYSICIAN ASSISTANT

## 2024-02-12 PROCEDURE — 99214 OFFICE O/P EST MOD 30 MIN: CPT | Mod: 25 | Performed by: PHYSICIAN ASSISTANT

## 2024-02-12 RX ORDER — SULFAMETHOXAZOLE AND TRIMETHOPRIM 400; 80 MG/1; MG/1
1 TABLET ORAL DAILY PRN
Qty: 20 TABLET | Refills: 2 | Status: SHIPPED | OUTPATIENT
Start: 2024-02-12 | End: 2024-05-06

## 2024-02-12 RX ORDER — AMLODIPINE BESYLATE 5 MG/1
5 TABLET ORAL DAILY
Qty: 90 TABLET | Refills: 3 | Status: SHIPPED | OUTPATIENT
Start: 2024-02-12 | End: 2024-03-12

## 2024-02-12 RX ORDER — ATENOLOL 50 MG/1
50 TABLET ORAL DAILY
Qty: 90 TABLET | Refills: 3 | Status: SHIPPED | OUTPATIENT
Start: 2024-02-12

## 2024-02-12 ASSESSMENT — PAIN SCALES - GENERAL: PAINLEVEL: NO PAIN (0)

## 2024-02-12 NOTE — PATIENT INSTRUCTIONS
Schedule mammogram    Schedule appointment with Kristen Kehr PA-C in 4-6 weeks for follow up for anxiety.   Schedule morning appointment and fasting tests can be completed at that time.       Colonoscopy order is completed                Referral for Behavioral Health Clinician (BHC)    During our visit, I encouraged you talk with our Behavioral Health Clinician (BHC). A BHC would be a great addition to your care team and will support your whole health!    What is a Behavioral Health Clinician (BHC)?    A BHC is a licensed mental health therapist. They can help you when behaviors, emotions, stress or worries get in the way of your life or health. Your BHC will work with you and your primary care team. Together, you'll come up with a unique care plan that works for you!         [facility] THANIA Ortega, DANIELA    What will happen when I meet with a BHC?    BHCs ask questions to better understand your concerns. They will provide brief, solution-focused therapy. They can also make referrals to a specialty therapist or other services to help you reach your goals.      How do I schedule an appointment with the [facility]?    Call [phone] and ask for a BHC appointment. You can schedule a virtual or in-person session.    How much time will I spend with the [facility]?     First visits are 45-60 minutes.  Return visits are typically 20-30 minutes.         How does billing work?      Outpatient mental health services are billed to insurance. Most plans don't charge a second co-pay if you see your primary care provider (PCP) the same day. Please verify your coverage and ask about your copay.         After the Appointment    As with any provider appointment, you will be given a survey in the mail to let us know how we are doing. This allows us to not only improve services where needed, but also to reinforce where services are going well.

## 2024-02-12 NOTE — PROGRESS NOTES
"Preventive Care Visit  [facility] [place] Kristen M. Kehr, PA-C, Family Medicine  Feb 12, 2024    Assessment & Plan     Encounter for routine history and physical exam in female patient  Health maintenance reviewed and updated.    Essential hypertension with goal blood pressure less than 140/90  Increased today due to anxiety.   Will recheck at her follow up appointment  - atenolol (TENORMIN) 50 MG tablet; Take 1 tablet (50 mg) by mouth daily  - amLODIPine (NORVASC) 5 MG tablet; Take 1 tablet (5 mg) by mouth daily    Recurrent UTI  - sulfamethoxazole-trimethoprim (BACTRIM) 400-80 MG tablet; Take 1 tablet by mouth daily as needed (take just with intercourse)    DENG (generalized anxiety disorder)  Start sertraline. Side effects and how to take the medication discussed.  She had been on this in the past and has been off x 3 years.   Significant increase in anxiety since working from home during COVID and not having regular communication with others. She has since retired from her previous job. She has been struggling with sleep, constant worries and anxiety. She is willing to start counseling with behavioral healthcare clinician. I will have Kristina reach out to her via [facility].   I will see her back in 1 month.   Anxiety contributes to her elevated blood pressure today.   Will plan to recheck again in 1 month.   - sertraline (ZOLOFT) 50 MG tablet; 1/2 tablet daily for 7 days, then increase to 1 tablet daily    Screen for colon cancer  - Colonoscopy Screening  Referral; Future    Screening, lipid  - Lipid panel reflex to direct LDL Fasting; Future    Visit for screening mammogram  - *MA Screening Digital Bilateral; Future            BMI  Estimated body mass index is 39.45 kg/m  as calculated from the following:    Height as of this encounter: 1.588 m (5' 2.5\").    Weight as of this encounter: 99.4 kg (219 lb 3.2 oz).   Weight management plan: not discussed today.           Subjective   Angela is " a 63 year old, presenting for the following:  Physical (Physical, renew prescriptions)        2/12/2024     4:18 PM   Additional Questions   Roomed by Renetta BILLS   Accompanied by Self         2/12/2024     4:18 PM   Patient Reported Additional Medications   Patient reports taking the following new medications None        Health Care Directive  Patient does not have a Health Care Directive or Living Will: Discussed advance care planning with patient; information given to patient to review.    History of Present Illness       Reason for visit:  Yearly physical. Renew prescriptions    She eats 0-1 servings of fruits and vegetables daily.She consumes 0 sweetened beverage(s) daily.She exercises with enough effort to increase her heart rate 10 to 19 minutes per day.  She exercises with enough effort to increase her heart rate 4 days per week.   She is taking medications regularly.      Additional problems to address:  Hypertension: she has been taking the atenolol and amlodipine for years.   Recurrent UTI: managed with bactrim after intercourse.   Anxiety: has a history of depression, managed with sertraline in the past. She stopped taking it years ago. She has developed significant anxiety. Constant worries and fears. She is using trazodone for sleep off and on.            No data to display                  1/30/2023   Nutrition   Three or more servings of calcium each day? Yes   Diet: regular (no restrictions)         1/30/2023   Exercise   Frequency of exercise: 4-5 days/week         12/20/2023   Social Factors   Worry food won't last until get money to buy more No   Food not last or not have enough money for food? No   Do you have housing?  Yes   Are you worried about losing your housing? No   Lack of transportation? No   Unable to get utilities (heat,electricity)? No          No data to display                   1/30/2023   Dental   Dentist two times every year? Yes          No data to display                   Today's PHQ-2 Score:       2/12/2024     4:00 PM   PHQ-2 ( 1999 [employer])   Q1: Little interest or pleasure in doing things 1   Q2: Feeling down, depressed or hopeless 1   PHQ-2 Score 2   Q1: Little interest or pleasure in doing things Several days   Q2: Feeling down, depressed or hopeless Several days   PHQ-2 Score 2           1/30/2023   Substance Use   Alcohol more than 3/day or more than 7/wk No     Social History     Tobacco Use    Smoking status: Never    Smokeless tobacco: Never   Vaping Use    Vaping Use: Never used   Substance Use Topics    Alcohol use: Not Currently     Comment: rarely - less than once a month    Drug use: No            No data to display                 Mammogram Screening - Mammogram every 1-2 years updated in Health Maintenance based on mutual decision making      History of abnormal Pap smear: Status post benign hysterectomy. Health Maintenance and Surgical History updated.        2/24/2009    12:00 AM 11/21/2007    12:00 AM 11/22/2006    12:00 AM   PAP / HPV   PAP (Historical) NIL  LSIL  NIL      The 10-year ASCVD risk score (Christi ALVAREZ, et al., 2019) is: 8.9%    Values used to calculate the score:      Age: 63 years      Sex: Female      Is Non- : No      Diabetic: No      Tobacco smoker: No      Systolic Blood Pressure: 153 mmHg      Is BP treated: Yes      HDL Cholesterol: 49 mg/dL      Total Cholesterol: 193 mg/dL    Reviewed and updated as needed this visit by Provider   Tobacco  Allergies  Meds  Problems  Med Hx  Surg Hx  Fam Hx            Past Medical History:   Diagnosis Date    Calculus of kidney     Migraine, unspecified, without mention of intractable migraine without mention of status migrainosus     Other specified iron deficiency anemias     Papanicolaou smear of cervix with low grade squamous intraepithelial lesion (LGSIL) 11/07    Colpo and hyst pathology benign    Primary osteoarthritis of right hip     Synovitis of ankle      Unspecified essential hypertension 1999    Essential hypertension     Past Surgical History:   Procedure Laterality Date    ARTHROPLASTY CARPOMETACARPAL (THUMB JOINT) Right 2/22/2023    Procedure: RIGHT THUMB CARPOMETACARPAL JOINT ARTHROPLASTY WITH SUTURE SUSPENSION;  Surgeon: Seth Saunders MD;  [facility]    ARTHROPLASTY HIP Right 7/6/2020    Procedure: Right total hip arthroplasty;  Surgeon: Clinton Deras MD;  [facility]    ARTHROSCOPY ANKLE  4/22/2014    Procedure: ARTHROSCOPY ANKLE;  Surgeon: Shaun Bhatt DPM;  [facility]    ARTHROSCOPY KNEE Left 10/4/2018    Procedure: ARTHROSCOPY KNEE;  Left knee arthroscopy  medial meniscal and chondral debridement;  Surgeon: Aryan Holley MD;  [facility]    COLONOSCOPY WITH CO2 INSUFFLATION N/A 9/28/2017    Procedure: COLONOSCOPY WITH CO2 INSUFFLATION;  COLON SCREEN/ YURI;  Surgeon: Cody Arana MD;  [facility]    HYSTERECTOMY, PAP NO LONGER INDICATED  1-    LAPAROSCOPIC CHOLECYSTECTOMY  8/3/2012    Procedure: LAPAROSCOPIC CHOLECYSTECTOMY;  Laparoscopic Cholecystectomy ;  Surgeon: Corwin Cabezas MD;  [facility]    OPEN REDUCTION INTERNAL FIXATION ANKLE  4/22/2014    Procedure: OPEN REDUCTION INTERNAL FIXATION ANKLE;  Surgeon: Shaun Bhatt DPM;  [facility]    OPEN REDUCTION INTERNAL FIXATION ELBOW  10/15/2013    Procedure: OPEN REDUCTION INTERNAL FIXATION ELBOW;  OPEN REDUCTION INTERNAL FIXATION LEFT PROXIMAL ULNA;  Surgeon: Artem Last DO;  [facility]    ORTHOPEDIC SURGERY      left shoulder surgery    REMOVE MESH VAGINA  10/10/2011    Procedure:REMOVE MESH VAGINA; Excision of Vaginal Mesh; Surgeon:SERGE SALGADO; Location:[facility] OR    SURGICAL HISTORY OF -   4/20/10    Transobturator midurethral sling    SURGICAL HISTORY OF - 8/1999    exploratory laparotomy, colitis    SURGICAL HISTORY OF -   4/20/10    Transobturator midurethral sling    TUBAL LIGATION       ZZC  DELIVERY ONLY      , Low Cervical    ZZC GASTRIC BYPASS,OBESITY,W/SM BOWEL RECONS  10/99    ZZC LIGATE FALLOPIAN TUBE  2000    laparoscopy     OB History    Para Term  AB Living   1 1 1 0 0 1   SAB IAB Ectopic Multiple Live Births   0 0 0 0 1      # Outcome Date GA Lbr Sha/2nd Weight Sex Delivery Anes PTL Lv   1 Term 85   3.799 kg (8 lb 6 oz) M CS   MICHAEL      Birth Comments: FTP, induced for PROM, also had HTN      Name: Zeferino     BP Readings from Last 3 Encounters:   24 (!) 153/88   23 130/78   23 134/70    Wt Readings from Last 3 Encounters:   24 99.4 kg (219 lb 3.2 oz)   23 95.3 kg (210 lb)   11/10/23 96.6 kg (213 lb)                  Patient Active Problem List   Diagnosis    NONSPECIFIC COLITIS    Migraine headache    Stress incontinence    Balance disorder    Right leg weakness    Right carpal tunnel syndrome    Osteoporosis    Loose body in ankle and foot joint    Synovitis of ankle    Malunion, fracture    Pain in joint involving ankle and foot    Abnormal gait    Other postprocedural status(V45.89)    Chondromalacia, left ankle and joints of left foot    CARDIOVASCULAR SCREENING; LDL GOAL LESS THAN 130    Advanced directives, counseling/discussion    Insomnia, unspecified type    Essential hypertension with goal blood pressure less than 140/90    Primary osteoarthritis of right hip    Cataract of both eyes, unspecified cataract type    Arthropathy of facet joint    Morbid obesity (H)    Bariatric surgery status    Malnutrition following gastrointestinal surgery    Right hip pain    Status post hip surgery    Degenerative arthritis of metacarpophalangeal joint of right thumb    Arthritis of carpometacarpal (CMC) joint of right thumb    Aftercare following right wrist joint replacement surgery     Past Surgical History:   Procedure Laterality Date    ARTHROPLASTY CARPOMETACARPAL (THUMB JOINT) Right 2023    Procedure: RIGHT  THUMB CARPOMETACARPAL JOINT ARTHROPLASTY WITH SUTURE SUSPENSION;  Surgeon: Seth Saunders MD;  [facility]    ARTHROPLASTY HIP Right 2020    Procedure: Right total hip arthroplasty;  Surgeon: Clinton Deras MD;  [facility]    ARTHROSCOPY ANKLE  2014    Procedure: ARTHROSCOPY ANKLE;  Surgeon: Shaun Bhatt DPM;  [facility]    ARTHROSCOPY KNEE Left 10/4/2018    Procedure: ARTHROSCOPY KNEE;  Left knee arthroscopy  medial meniscal and chondral debridement;  Surgeon: Aryan Holley MD;  [facility]    COLONOSCOPY WITH CO2 INSUFFLATION N/A 2017    Procedure: COLONOSCOPY WITH CO2 INSUFFLATION;  COLON SCREEN/ YURI;  Surgeon: Cody Arana MD;  [facility]    HYSTERECTOMY, PAP NO LONGER INDICATED  2008    LAPAROSCOPIC CHOLECYSTECTOMY  8/3/2012    Procedure: LAPAROSCOPIC CHOLECYSTECTOMY;  Laparoscopic Cholecystectomy ;  Surgeon: Corwin Cabezas MD;  [facility]    OPEN REDUCTION INTERNAL FIXATION ANKLE  2014    Procedure: OPEN REDUCTION INTERNAL FIXATION ANKLE;  Surgeon: Shaun Bhatt DPM;  [facility]    OPEN REDUCTION INTERNAL FIXATION ELBOW  10/15/2013    Procedure: OPEN REDUCTION INTERNAL FIXATION ELBOW;  OPEN REDUCTION INTERNAL FIXATION LEFT PROXIMAL ULNA;  Surgeon: Artem Last DO;  [facility]    ORTHOPEDIC SURGERY      left shoulder surgery    REMOVE MESH VAGINA  10/10/2011    Procedure:REMOVE MESH VAGINA; Excision of Vaginal Mesh; Surgeon:SERGE SALGADO; Location:[facility]    SURGICAL HISTORY OF -   4/20/10    Transobturator midurethral sling    SURGICAL HISTORY OF -   1999    exploratory laparotomy, colitis    SURGICAL HISTORY OF -   4/20/10    Transobturator midurethral sling    TUBAL LIGATION      ZZC  DELIVERY ONLY      , Low Cervical    ZZC GASTRIC BYPASS,OBESITY,W/SM BOWEL RECONS  10/99    ZZC LIGATE FALLOPIAN TUBE  2000    laparoscopy       Social History     Tobacco Use     Smoking status: Never    Smokeless tobacco: Never   Substance Use Topics    Alcohol use: Not Currently     Comment: rarely - less than once a month     Family History   Problem Relation Age of Onset    C.A.D. Mother         MI    Hypertension Mother     Hypertension Father     Substance Abuse Brother     Breast Cancer No family hx of          Current Outpatient Medications   Medication Sig Dispense Refill    amLODIPine (NORVASC) 5 MG tablet Take 1 tablet (5 mg) by mouth daily 90 tablet 3    atenolol (TENORMIN) 50 MG tablet Take 1 tablet (50 mg) by mouth daily 90 tablet 3    sertraline (ZOLOFT) 50 MG tablet 1/2 tablet daily for 7 days, then increase to 1 tablet daily 30 tablet 1    sulfamethoxazole-trimethoprim (BACTRIM) 400-80 MG tablet Take 1 tablet by mouth daily as needed (take just with intercourse) 20 tablet 2    acetaminophen (TYLENOL) 325 MG tablet Take 2 tablets (650 mg) by mouth every 4 hours as needed for other 1 Bottle 0    amoxicillin (AMOXIL) 500 MG capsule TAKE 4 CAPSULES (2,000 MG) BY MOUTH 1 HOUR PRIOR TO DENTAL PROCEDURE. Strength: 500 mg 4 capsule 0    cephALEXin (KEFLEX) 500 MG capsule Take 1 capsule (500 mg) by mouth 2 times daily 10 capsule 0    Cholecalciferol (VITAMIN D) 125 MCG (5000 UT) CAPS Take 1 capsule (5,000 Units) by mouth daily 90 capsule 2    ciprofloxacin (CIPRO) 250 MG tablet Take 1 tablet (250 mg) by mouth 2 times daily 10 tablet 0    ciprofloxacin (CIPRO) 500 MG tablet Take 1 tablet (500 mg) by mouth 2 times daily 10 tablet 0    estradiol (ESTRACE) 0.1 MG/GM vaginal cream Place 2 g vaginally daily for 14 days, THEN 2 g every other day for 14 days, THEN 2 g twice a week for 14 days, THEN 2 g once a week for 90 days. 74 g 0    ibuprofen (ADVIL/MOTRIN) 200 MG tablet Take 400 mg by mouth every 6 hours as needed for moderate pain       traZODone (DESYREL) 50 MG tablet TAKE TWO TABLETS BY MOUTH NIGHTLY AS NEEDED FOR SLEEP. 180 tablet 2     Allergies   Allergen Reactions    Bactrim  "[Sulfamethoxazole-Trimethoprim]      Itching, racing heart. Only has sensitivity when taking long term. Does fine if she takes once in a while after intercourse    Nitrofurantoin Itching     Patient states she is not longer allergic to this medication. Has used it since with no problems      Recent Labs   Lab Test 02/03/23  1344 02/01/23  1057 02/07/22  1126 02/02/21  1005 07/07/20  0604 05/26/20  0840 02/19/20  0713   A1C  --   --  5.6 5.3  --   --  5.4   *  --  81 132*  --    < >  --    HDL 49*  --  46* 54  --    < >  --    TRIG 165*  --  150* 134  --    < >  --    ALT  --   --   --   --   --   --  21   CR  --  0.91 0.93 0.97 0.93   < > 0.94   GFRESTIMATED  --  71 70 64 67   < > 67   GFRESTBLACK  --   --   --  74 77   < > 77   POTASSIUM  --  4.8 4.2 4.3 4.3   < > 4.0    < > = values in this interval not displayed.          Review of Systems  Constitutional, HEENT, cardiovascular, pulmonary, GI, , musculoskeletal, neuro, skin, endocrine and psych systems are negative, except as otherwise noted.     Objective    Exam  BP (!) 153/88 (BP Location: Right arm, Patient Position: Sitting, Cuff Size: Adult Regular)   Pulse 65   Temp 98.7  F (37.1  C) (Tympanic)   Resp 20   Ht 1.588 m (5' 2.5\")   Wt 99.4 kg (219 lb 3.2 oz)   LMP 01/08/2008   SpO2 96%   BMI 39.45 kg/m     Estimated body mass index is 39.45 kg/m  as calculated from the following:    Height as of this encounter: 1.588 m (5' 2.5\").    Weight as of this encounter: 99.4 kg (219 lb 3.2 oz).    Physical Exam  GENERAL: alert and no distress  EYES: Eyes grossly normal to inspection, PERRL and conjunctivae and sclerae normal  HENT: ear canals and TM's normal, nose and mouth without ulcers or lesions  NECK: no adenopathy, no asymmetry, masses, or scars  RESP: lungs clear to auscultation - no rales, rhonchi or wheezes  BREAST: normal without masses, tenderness or nipple discharge and no palpable axillary masses or adenopathy  CV: regular rate and " rhythm, normal S1 S2, no S3 or S4, no murmur, click or rub, no peripheral edema  ABDOMEN: soft, nontender, no hepatosplenomegaly, no masses and bowel sounds normal  MS: no gross musculoskeletal defects noted, no edema  SKIN: no suspicious lesions or rashes  NEURO: Normal strength and tone, mentation intact and speech normal  PSYCH: mentation appears normal, tearful, anxious, judgement and insight intact, and appearance well groomed      Signed Electronically by: Kristen M. Kehr, PA-C

## 2024-02-23 ENCOUNTER — TELEPHONE (OUTPATIENT)
Dept: GASTROENTEROLOGY | Facility: CLINIC | Age: 64
End: 2024-02-23
Payer: COMMERCIAL

## 2024-02-23 ENCOUNTER — HOSPITAL ENCOUNTER (OUTPATIENT)
Facility: AMBULATORY SURGERY CENTER | Age: 64
End: 2024-02-23
Attending: INTERNAL MEDICINE
Payer: COMMERCIAL

## 2024-02-23 NOTE — TELEPHONE ENCOUNTER
"Endoscopy Scheduling Screen    Have you had a positive Covid test in the last 14 days?  No    Are you active on MyChart?   Yes    What insurance is in the chart?  Other:  BCBS    Ordering/Referring Provider:   KEHR, KRISTEN M        (If ordering provider performs procedure, schedule with ordering provider unless otherwise instructed. )    BMI: Estimated body mass index is 39.45 kg/m  as calculated from the following:    Height as of 2/12/24: 1.588 m (5' 2.5\").    Weight as of 2/12/24: 99.4 kg (219 lb 3.2 oz).     Sedation Ordered  moderate sedation.   If patient BMI > 50 do not schedule in [facility].    If patient BMI > 45 do not schedule at [facility].    Are you taking methadone or Suboxone?  No    Have you had difficulties, pain, or discomfort during past endoscopy procedures?  No    Are you taking any prescription medications for pain 3 or more times per week?   NO - No RN review required.    Do you have a history of malignant hyperthermia or adverse reaction to anesthesia?  No    Have you been diagnosed or told you have pulmonary hypertension?   No    Do you have an LVAD?  No    Have you been told you have moderate to severe sleep apnea?  No    Have you been told you have COPD, asthma, or any other lung disease?  No    Do you have any heart conditions?  No     Have you ever had an organ transplant?   No    Have you ever had or are you awaiting a heart or lung transplant?   No    Have you had a stroke or transient ischemic attack (TIA aka \"mini stroke\" in the last 6 months?   No    Have you been diagnosed with or been told you have cirrhosis of the liver?   No    Are you currently on dialysis?   No    Do you need assistance transferring?   No    BMI: Estimated body mass index is 39.45 kg/m  as calculated from the following:    Height as of 2/12/24: 1.588 m (5' 2.5\").    Weight as of 2/12/24: 99.4 kg (219 lb 3.2 oz).     Is patients BMI > 40 and scheduling location [facility]?  No    Do you take an injectable medication for weight " loss or diabetes (excluding insulin)?  No    Do you take the medication Naltrexone?  No    Do you take blood thinners?  No       Prep   Are you currently on dialysis or do you have chronic kidney disease?  No    Do you have a diagnosis of diabetes?  No    Do you have a diagnosis of cystic fibrosis (CF)?  No    On a regular basis do you go 3 -5 days between bowel movements?  No    BMI > 40?  No    Preferred Pharmacy:    [facility] [id number] [address] AT CORNER OF [facility]  [address] [facility] [address]  Phone: [phone] Fax: [phone]      Final Scheduling Details   Colonoscopy prep sent?  N/A    Procedure scheduled  Colonoscopy    Surgeon:  BANG     Date of procedure:  05/03/2024     Pre-OP / PAC:   No - Not required for this site.    Location  [facility] - [facility] - Patient preference.    Sedation   Moderate Sedation - Per order.      Patient Reminders:   You will receive a call from a Nurse to review instructions and health history.  This assessment must be completed prior to your procedure.  Failure to complete the Nurse assessment may result in the procedure being cancelled.      On the day of your procedure, please designate an adult(s) who can drive you home stay with you for the next 24 hours. The medicines used in the exam will make you sleepy. You will not be able to drive.      You cannot take public transportation, ride share services, or non-medical taxi service without a responsible caregiver.  Medical transport services are allowed with the requirement that a responsible caregiver will receive you at your destination.  We require that drivers and caregivers are confirmed prior to your procedure.

## 2024-03-05 ASSESSMENT — ANXIETY QUESTIONNAIRES
2. NOT BEING ABLE TO STOP OR CONTROL WORRYING: SEVERAL DAYS
GAD7 TOTAL SCORE: 7
4. TROUBLE RELAXING: SEVERAL DAYS
7. FEELING AFRAID AS IF SOMETHING AWFUL MIGHT HAPPEN: SEVERAL DAYS
3. WORRYING TOO MUCH ABOUT DIFFERENT THINGS: SEVERAL DAYS
IF YOU CHECKED OFF ANY PROBLEMS ON THIS QUESTIONNAIRE, HOW DIFFICULT HAVE THESE PROBLEMS MADE IT FOR YOU TO DO YOUR WORK, TAKE CARE OF THINGS AT HOME, OR GET ALONG WITH OTHER PEOPLE: SOMEWHAT DIFFICULT
7. FEELING AFRAID AS IF SOMETHING AWFUL MIGHT HAPPEN: SEVERAL DAYS
5. BEING SO RESTLESS THAT IT IS HARD TO SIT STILL: SEVERAL DAYS
8. IF YOU CHECKED OFF ANY PROBLEMS, HOW DIFFICULT HAVE THESE MADE IT FOR YOU TO DO YOUR WORK, TAKE CARE OF THINGS AT HOME, OR GET ALONG WITH OTHER PEOPLE?: SOMEWHAT DIFFICULT
6. BECOMING EASILY ANNOYED OR IRRITABLE: SEVERAL DAYS
GAD7 TOTAL SCORE: 7
1. FEELING NERVOUS, ANXIOUS, OR ON EDGE: SEVERAL DAYS

## 2024-03-08 ENCOUNTER — OFFICE VISIT (OUTPATIENT)
Dept: ORTHOPEDICS | Facility: CLINIC | Age: 64
End: 2024-03-08
Payer: COMMERCIAL

## 2024-03-08 DIAGNOSIS — M25.532 LEFT WRIST PAIN: ICD-10-CM

## 2024-03-08 DIAGNOSIS — M18.11 ARTHRITIS OF CARPOMETACARPAL (CMC) JOINT OF RIGHT THUMB: Primary | ICD-10-CM

## 2024-03-08 PROCEDURE — 20600 DRAIN/INJ JOINT/BURSA W/O US: CPT | Mod: LT | Performed by: STUDENT IN AN ORGANIZED HEALTH CARE EDUCATION/TRAINING PROGRAM

## 2024-03-08 PROCEDURE — 99212 OFFICE O/P EST SF 10 MIN: CPT | Mod: 25 | Performed by: STUDENT IN AN ORGANIZED HEALTH CARE EDUCATION/TRAINING PROGRAM

## 2024-03-08 RX ADMIN — TRIAMCINOLONE ACETONIDE 20 MG: 40 INJECTION, SUSPENSION INTRA-ARTICULAR; INTRAMUSCULAR at 16:24

## 2024-03-08 ASSESSMENT — PAIN SCALES - GENERAL: PAINLEVEL: NO PAIN (0)

## 2024-03-08 NOTE — NURSING NOTE
[facility]   ORTHOPEDICS & SPORTS MEDICINE  [address] [place] [address]  Dept: [phone]  ______________________________________________________________________________    Patient: Angela Ibarra   : 1960   MRN: [id number]   2024    INVASIVE PROCEDURE SAFETY CHECKLIST    Date: 3/8/2024   Procedure: Left thumb injection  Patient Name: Angela Ibarra  MRN: [id number]  YOB: 1960    Action: Complete sections as appropriate. Any discrepancy results in a HARD COPY until resolved.     PRE PROCEDURE:  Patient ID verified with 2 identifiers (name and  or MRN): Yes  Procedure and site verified with patient/designee (when able): Yes  Accurate consent documentation in medical record: Yes  H&P (or appropriate assessment) documented in medical record: Yes  H&P must be up to 20 days prior to procedure and updates within 24 hours of procedure as applicable: NA  Relevant diagnostic and radiology test results appropriately labeled and displayed as applicable: NA  Procedure site(s) marked with provider initials: NA    TIMEOUT:  Time-Out performed immediately prior to starting procedure, including verbal and active participation of all team members addressing the following:Yes  * Correct patient identify  * Confirmed that the correct side and site are marked  * An accurate procedure consent form  * Agreement on the procedure to be done  * Correct patient position  * Relevant images and results are properly labeled and appropriately displayed  * The need to administer antibiotics or fluids for irrigation purposes during the procedure as applicable   * Safety precautions based on patient history or medication use    DURING PROCEDURE: Verification of correct person, site, and procedures any time the responsibility for care of the patient is transferred to another member of the care team.       Prior to injection, verified patient identity using patient's name and  date of birth.  Due to injection administration, patient instructed to remain in clinic for 15 minutes  afterwards, and to report any adverse reaction to me immediately.    Joint injection was performed.      Drug Amount Wasted:  Yes: 20 mg/ml   Vial/Syringe: Single dose vial  Expiration Date:  11/30/2025      Burt Crespo, EMT  March 8, 2024

## 2024-03-08 NOTE — NURSING NOTE
Chief Complaint   Patient presents with    Left Hand - Pain, Follow Up     Left thumb pain       There were no vitals filed for this visit.    There is no height or weight on file to calculate BMI.      RUBEN Dallas NREMT

## 2024-03-08 NOTE — PROGRESS NOTES
Small Joint Injection/Arthrocentesis: L thumb CMC    Date/Time: 3/8/2024 4:24 PM    Performed by: Seth Saunders MD  Authorized by: Seth Saunders MD  Indications:  Pain  Needle Size:  25 G  Guidance: landmark       Location:  Thumb    Site:  L thumb CMC                    Medications:  20 mg triamcinolone 40 MG/ML        Outcome:  Tolerated well, no immediate complications  Procedure discussed: discussed risks, benefits, and alternatives    Consent Given by:  Patient  Timeout: timeout called immediately prior to procedure

## 2024-03-08 NOTE — LETTER
3/8/2024         RE: Angela Ibarra  [address] [place] [id number]        Dear Colleague,    Thank you for referring your patient, Angela Ibarra, to the [facility]. Please see a copy of my visit note below.              Small Joint Injection/Arthrocentesis: L thumb CMC    Date/Time: 3/8/2024 4:24 PM    Performed by: Seth Saunders MD  Authorized by: Seth Saunders MD  Indications:  Pain  Needle Size:  25 G  Guidance: landmark       Location:  Thumb    Site:  L thumb CMC                    Medications:  20 mg triamcinolone 40 MG/ML        Outcome:  Tolerated well, no immediate complications  Procedure discussed: discussed risks, benefits, and alternatives    Consent Given by:  Patient  Timeout: timeout called immediately prior to procedure              Ortho Hand    Returns with left thumb base pain. Still doing well from the right sided surgery. She would like a left thumb base steroid injection. Last injection was effective for approximately a year.     On exam, L thumb CMC with positive grind and quite tender. Negative L Finkelstein's. R thumb basal joint reconstruction without any tenderness and full motion.     A/P: 63F s/p R thumb SSA, p/w L thumb CMC OA    -Patient not yet ready for left sided surgery. Since the last steroid injection was effective for quite awhile, it is totally reasonable to perform another injection today. Patient would like this.   -Offered a left thumb carpometacarpal joint steroid injection. Patient would like this. Discussed the risks of steroid injections, including but not limited to: infection, bleeding, pain, injury to tendons or nerves, fat atrophy, skin depigmentation. Despite these risks, patient consents to steroid injection. Cleansed the skin with Chlorhexidine and 1:1 mixture of 20 mg Kenalog and 1% lidocaine was injected into each treatment area/joint. Patient tolerated the procedure with no issues and  experienced immediate relief.   -Return to clinic as needed  -A total of 10 minutes was devoted to review of chart, direct face-to-face patient counseling and documentation during this encounter, exclusive of any procedure performed.    Seth Saunders MD, PhD          Again, thank you for allowing me to participate in the care of your patient.        Sincerely,        Seth Saunders MD

## 2024-03-10 ENCOUNTER — ANCILLARY PROCEDURE (OUTPATIENT)
Dept: GENERAL RADIOLOGY | Facility: CLINIC | Age: 64
End: 2024-03-10
Payer: COMMERCIAL

## 2024-03-10 ENCOUNTER — OFFICE VISIT (OUTPATIENT)
Dept: URGENT CARE | Facility: URGENT CARE | Age: 64
End: 2024-03-10
Payer: COMMERCIAL

## 2024-03-10 VITALS
OXYGEN SATURATION: 98 % | WEIGHT: 218 LBS | TEMPERATURE: 98.7 F | BODY MASS INDEX: 39.24 KG/M2 | DIASTOLIC BLOOD PRESSURE: 84 MMHG | HEART RATE: 66 BPM | SYSTOLIC BLOOD PRESSURE: 180 MMHG | RESPIRATION RATE: 18 BRPM

## 2024-03-10 DIAGNOSIS — M25.531 RIGHT WRIST PAIN: ICD-10-CM

## 2024-03-10 DIAGNOSIS — S63.501A WRIST SPRAIN, RIGHT, INITIAL ENCOUNTER: Primary | ICD-10-CM
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
## 2024-03-10 DIAGNOSIS — I10 ESSENTIAL HYPERTENSION WITH GOAL BLOOD PRESSURE LESS THAN 140/90: ICD-10-CM

## 2024-03-10 PROCEDURE — 73110 X-RAY EXAM OF WRIST: CPT | Mod: TC | Performed by: STUDENT IN AN ORGANIZED HEALTH CARE EDUCATION/TRAINING PROGRAM

## 2024-03-10 PROCEDURE — 99213 OFFICE O/P EST LOW 20 MIN: CPT

## 2024-03-10 RX ORDER — TRIAMCINOLONE ACETONIDE 40 MG/ML
20 INJECTION, SUSPENSION INTRA-ARTICULAR; INTRAMUSCULAR
Status: DISCONTINUED | OUTPATIENT
Start: 2024-03-08 | End: 2024-05-06

## 2024-03-10 NOTE — PROGRESS NOTES
ASSESSMENT:  (S63.038A) Wrist sprain, right, initial encounter  (primary encounter diagnosis)  Plan: Wrist/Arm/Hand Bracking Supplies Order Wrist         Brace; Right; with thumb spica    (M25.531) Right wrist pain  Plan: XR Wrist Right G/E 3 Views    (I10) Essential hypertension with goal blood pressure less than 140/90    (W19.XXXA) Fall, initial encounter    PLAN:   Informed the patient that the right wrist x-ray does not show any fracture or dislocation per the radiologist report.  We discussed using Tylenol as needed for pain with the maximum dose being 4000 mg in a 24-hour period of time.  We also discussed using ice and elevation for pain and swelling.  Informed the patient to perform activity as tolerated.  We discussed wearing the wrist brace for pain and support.  We also discussed following up with her primary care provider or orthopedics should symptoms persist.  Informed the patient to follow-up with her primary care provider to discuss her elevated blood pressure.  Patient acknowledged her understanding of the above plan.    The use of [employer] dictation services may have been used to construct the content in this note; any grammatical or spelling errors are non-intentional. Please contact the author of this note directly if you are in need of any clarification.      Niraj Choi, LORIE CNP    SUBJECTIVE:  Angela Ibarra is a 63 year old female  who complains of  right wrist injury that occurred earlier today.    Onset: Following acute injury.  Mechanism of injury: fall and bracing her fall with her right hand.  Patient denies hitting her head.  Immediate symptoms: immediate pain, immediate swelling.  Rated as: 7 on a scale of 1-10.    Described as:   Relieving Factors:  Nothing   Symptoms have been unchanged since that time.  Prior history of related problems: previous broken bone in the right wrist and CMC thumb surgery (right hand).    ROS:  Negative except noted  above.    OBJECTIVE:   Blood pressure (!) 180/84, pulse 66, temperature 98.7  F (37.1  C), temperature source Tympanic, resp. rate 18, weight 98.9 kg (218 lb), last menstrual period 01/08/2008, SpO2 98%, not currently breastfeeding.  GENERAL APPEARANCE: healthy, alert and no distress   MUSCULOSKELETAL:  RIGHT WRIST: WRIST:  Inspection: Edema over ulnar aspect  Palpation: Tender to upon palpation over ulnar aspect  Range of Motion: Decreased with flexion/extension, supination/pronation, ulnar/radial movements and while making a fist  CMS intact  NEURO: Normal strength and tone  SKIN: no suspicious lesions or rashes    X-RAY INTERPRETATION  Right wrist x-ray does not show any fracture or dislocation per the radiologist report.

## 2024-03-10 NOTE — PATIENT INSTRUCTIONS
Right wrist x-ray does not show any fracture or dislocation per the radiologist report.   Can use Tylenol as needed for pain.  Maximum dose of Tylenol is 4000mg in a 24 hour period of time.  Ice and elevation for pain and swelling.  Activity as tolerated.  Wear the wrist brace for pain and support.  Follow-up with your primary care provider or orthopedics should symptoms persist.  Follow-up with your primary care provider to discuss your elevated blood pressure.  
done

## 2024-03-11 NOTE — PROGRESS NOTES
Ortho Hand    Returns with left thumb base pain. Still doing well from the right sided surgery. She would like a left thumb base steroid injection. Last injection was effective for approximately a year.     On exam, L thumb CMC with positive grind and quite tender. Negative L Finkelstein's. R thumb basal joint reconstruction without any tenderness and full motion.     A/P: 63F s/p R thumb SSA, p/w L thumb CMC OA    -Patient not yet ready for left sided surgery. Since the last steroid injection was effective for quite awhile, it is totally reasonable to perform another injection today. Patient would like this.   -Offered a left thumb carpometacarpal joint steroid injection. Patient would like this. Discussed the risks of steroid injections, including but not limited to: infection, bleeding, pain, injury to tendons or nerves, fat atrophy, skin depigmentation. Despite these risks, patient consents to steroid injection. Cleansed the skin with Chlorhexidine and 1:1 mixture of 20 mg Kenalog and 1% lidocaine was injected into each treatment area/joint. Patient tolerated the procedure with no issues and experienced immediate relief.   -Return to clinic as needed  -A total of 10 minutes was devoted to review of chart, direct face-to-face patient counseling and documentation during this encounter, exclusive of any procedure performed.    Seth Saunders MD, PhD

## 2024-03-12 ENCOUNTER — OFFICE VISIT (OUTPATIENT)
Dept: FAMILY MEDICINE | Facility: CLINIC | Age: 64
End: 2024-03-12
Payer: COMMERCIAL

## 2024-03-12 VITALS
RESPIRATION RATE: 20 BRPM | SYSTOLIC BLOOD PRESSURE: 158 MMHG | HEART RATE: 60 BPM | OXYGEN SATURATION: 96 % | WEIGHT: 217 LBS | HEIGHT: 62 IN | DIASTOLIC BLOOD PRESSURE: 75 MMHG | BODY MASS INDEX: 39.93 KG/M2 | TEMPERATURE: 97.2 F

## 2024-03-12 DIAGNOSIS — I10 ESSENTIAL HYPERTENSION WITH GOAL BLOOD PRESSURE LESS THAN 140/90: ICD-10-CM

## 2024-03-12 DIAGNOSIS — Z13.220 SCREENING, LIPID: ICD-10-CM

## 2024-03-12 DIAGNOSIS — F41.1 GAD (GENERALIZED ANXIETY DISORDER): Primary | ICD-10-CM

## 2024-03-12 DIAGNOSIS — E55.9 VITAMIN D DEFICIENCY: ICD-10-CM

## 2024-03-12 LAB
ANION GAP SERPL CALCULATED.3IONS-SCNC: 8 MMOL/L (ref 7–15)
BUN SERPL-MCNC: 18.2 MG/DL (ref 8–23)
CALCIUM SERPL-MCNC: 9.8 MG/DL (ref 8.8–10.2)
CHLORIDE SERPL-SCNC: 105 MMOL/L (ref 98–107)
CHOLEST SERPL-MCNC: 206 MG/DL
CREAT SERPL-MCNC: 0.95 MG/DL (ref 0.51–0.95)
DEPRECATED HCO3 PLAS-SCNC: 25 MMOL/L (ref 22–29)
EGFRCR SERPLBLD CKD-EPI 2021: 67 ML/MIN/1.73M2
FASTING STATUS PATIENT QL REPORTED: YES
GLUCOSE SERPL-MCNC: 89 MG/DL (ref 70–99)
HDLC SERPL-MCNC: 40 MG/DL
LDLC SERPL CALC-MCNC: 127 MG/DL
NONHDLC SERPL-MCNC: 166 MG/DL
POTASSIUM SERPL-SCNC: 4.5 MMOL/L (ref 3.4–5.3)
SODIUM SERPL-SCNC: 138 MMOL/L (ref 135–145)
TRIGL SERPL-MCNC: 195 MG/DL
VIT D+METAB SERPL-MCNC: 27 NG/ML (ref 20–50)

## 2024-03-12 PROCEDURE — 80061 LIPID PANEL: CPT | Performed by: PHYSICIAN ASSISTANT

## 2024-03-12 PROCEDURE — 80048 BASIC METABOLIC PNL TOTAL CA: CPT | Performed by: PHYSICIAN ASSISTANT

## 2024-03-12 PROCEDURE — 82306 VITAMIN D 25 HYDROXY: CPT | Performed by: PHYSICIAN ASSISTANT

## 2024-03-12 PROCEDURE — 36415 COLL VENOUS BLD VENIPUNCTURE: CPT | Performed by: PHYSICIAN ASSISTANT

## 2024-03-12 PROCEDURE — 99214 OFFICE O/P EST MOD 30 MIN: CPT | Performed by: PHYSICIAN ASSISTANT

## 2024-03-12 RX ORDER — AMLODIPINE BESYLATE 10 MG/1
10 TABLET ORAL DAILY
Qty: 90 TABLET | Refills: 2 | Status: SHIPPED | OUTPATIENT
Start: 2024-03-12

## 2024-03-12 ASSESSMENT — PAIN SCALES - GENERAL: PAINLEVEL: NO PAIN (0)

## 2024-03-12 ASSESSMENT — ENCOUNTER SYMPTOMS: NERVOUS/ANXIOUS: 1

## 2024-03-12 NOTE — PROGRESS NOTES
Assessment & Plan     DENG (generalized anxiety disorder)  Doing well with the sertraline. Refills for 50 mg given, she will send a mojio message if concerns.     Essential hypertension with goal blood pressure less than 140/90  BP is still high.   Increase the amlodipine to 10 mg daily.   Recheck with nursing in 1-2 months  - amLODIPine (NORVASC) 10 MG tablet; Take 1 tablet (10 mg) by mouth daily  - Basic metabolic panel  (Ca, Cl, CO2, Creat, Gluc, K, Na, BUN); Future  - Basic metabolic panel  (Ca, Cl, CO2, Creat, Gluc, K, Na, BUN)    Vitamin D deficiency  - Vitamin D Deficiency; Future  - Vitamin D Deficiency    Screening, lipid  - Lipid panel reflex to direct LDL Fasting                  Subjective   Angela is a 63 year old, presenting for the following health issues:  Depression and Anxiety      3/12/2024     8:02 AM   Additional Questions   Roomed by Hang LÓPEZ MA     Anxiety    History of Present Illness       Mental Health Follow-up:  Patient presents to follow-up on Anxiety.    Patient's anxiety since last visit has been:  Better  The patient is not having other symptoms associated with anxiety.  Any significant life events: job concerns and financial concerns  Patient is feeling anxious or having panic attacks.  Patient has no concerns about alcohol or drug use.    She eats 0-1 servings of fruits and vegetables daily.She consumes 1 sweetened beverage(s) daily.She exercises with enough effort to increase her heart rate 20 to 29 minutes per day.  She exercises with enough effort to increase her heart rate 5 days per week.   She is taking medications regularly.       Angela is here for follow up after starting the sertraline. She is tolerating the medication well. She reports improvement with anxiety symptoms.     Her blood pressure continues to be elevated. She is taking the amlodipine 5 mg and the atenolol.               Review of Systems  Constitutional, HEENT, cardiovascular, pulmonary, GI, ,  "musculoskeletal, neuro, skin, endocrine and psych systems are negative, except as otherwise noted.      Objective    BP (!) 158/75   Pulse 60   Temp 97.2  F (36.2  C) (Tympanic)   Resp 20   Ht 1.575 m (5' 2\")   Wt 98.4 kg (217 lb)   LMP 01/08/2008 (Approximate)   SpO2 96%   Breastfeeding No   BMI 39.69 kg/m    Body mass index is 39.69 kg/m .  Physical Exam   GENERAL: alert and no distress  RESP: lungs clear to auscultation - no rales, rhonchi or wheezes  CV: regular rate and rhythm, normal S1 S2, no S3 or S4, no murmur, click or rub, no peripheral edema   SKIN: no suspicious lesions or rashes  PSYCH: mentation appears normal, affect normal/bright            Signed Electronically by: Kristen M. Kehr, PA-C    "

## 2024-03-29 ENCOUNTER — OFFICE VISIT (OUTPATIENT)
Dept: FAMILY MEDICINE | Facility: CLINIC | Age: 64
End: 2024-03-29
Payer: COMMERCIAL

## 2024-03-29 VITALS
SYSTOLIC BLOOD PRESSURE: 137 MMHG | DIASTOLIC BLOOD PRESSURE: 80 MMHG | HEIGHT: 62 IN | OXYGEN SATURATION: 98 % | BODY MASS INDEX: 39.69 KG/M2 | HEART RATE: 68 BPM | TEMPERATURE: 97.9 F | RESPIRATION RATE: 20 BRPM

## 2024-03-29 DIAGNOSIS — B37.31 YEAST INFECTION OF THE VAGINA: ICD-10-CM

## 2024-03-29 DIAGNOSIS — R30.0 DYSURIA: Primary | ICD-10-CM

## 2024-03-29 DIAGNOSIS — Z16.12 UTI DUE TO EXTENDED-SPECTRUM BETA LACTAMASE (ESBL) PRODUCING ESCHERICHIA COLI: ICD-10-CM

## 2024-03-29 DIAGNOSIS — N30.00 ACUTE CYSTITIS WITHOUT HEMATURIA: ICD-10-CM

## 2024-03-29 DIAGNOSIS — N39.0 UTI DUE TO EXTENDED-SPECTRUM BETA LACTAMASE (ESBL) PRODUCING ESCHERICHIA COLI: ICD-10-CM

## 2024-03-29 DIAGNOSIS — B96.29 UTI DUE TO EXTENDED-SPECTRUM BETA LACTAMASE (ESBL) PRODUCING ESCHERICHIA COLI: ICD-10-CM

## 2024-03-29 LAB
ALBUMIN UR-MCNC: NEGATIVE MG/DL
APPEARANCE UR: CLEAR
BACTERIA #/AREA URNS HPF: ABNORMAL /HPF
BILIRUB UR QL STRIP: NEGATIVE
CLUE CELLS: ABNORMAL
COLOR UR AUTO: YELLOW
GLUCOSE UR STRIP-MCNC: NEGATIVE MG/DL
HGB UR QL STRIP: ABNORMAL
KETONES UR STRIP-MCNC: NEGATIVE MG/DL
LEUKOCYTE ESTERASE UR QL STRIP: ABNORMAL
NITRATE UR QL: NEGATIVE
PH UR STRIP: 6 [PH] (ref 5–7)
RBC #/AREA URNS AUTO: ABNORMAL /HPF
SP GR UR STRIP: <=1.005 (ref 1–1.03)
SQUAMOUS #/AREA URNS AUTO: ABNORMAL /LPF
TRANS CELLS #/AREA URNS HPF: ABNORMAL /HPF
TRICHOMONAS, WET PREP: ABNORMAL
UROBILINOGEN UR STRIP-ACNC: 0.2 E.U./DL
WBC #/AREA URNS AUTO: ABNORMAL /HPF
WBC'S/HIGH POWER FIELD, WET PREP: ABNORMAL
YEAST, WET PREP: PRESENT

## 2024-03-29 PROCEDURE — 99214 OFFICE O/P EST MOD 30 MIN: CPT | Performed by: PHYSICIAN ASSISTANT

## 2024-03-29 PROCEDURE — 87210 SMEAR WET MOUNT SALINE/INK: CPT | Performed by: PHYSICIAN ASSISTANT

## 2024-03-29 PROCEDURE — 81001 URINALYSIS AUTO W/SCOPE: CPT | Performed by: PHYSICIAN ASSISTANT

## 2024-03-29 PROCEDURE — 87186 SC STD MICRODIL/AGAR DIL: CPT | Performed by: PHYSICIAN ASSISTANT

## 2024-03-29 PROCEDURE — 87086 URINE CULTURE/COLONY COUNT: CPT | Performed by: PHYSICIAN ASSISTANT

## 2024-03-29 RX ORDER — CEFDINIR 300 MG/1
300 CAPSULE ORAL 2 TIMES DAILY
Qty: 10 CAPSULE | Refills: 0 | Status: SHIPPED | OUTPATIENT
Start: 2024-03-29 | End: 2024-04-15

## 2024-03-29 RX ORDER — FLUCONAZOLE 150 MG/1
150 TABLET ORAL ONCE
Qty: 1 TABLET | Refills: 0 | Status: SHIPPED | OUTPATIENT
Start: 2024-03-29 | End: 2024-03-29

## 2024-03-29 ASSESSMENT — PAIN SCALES - GENERAL: PAINLEVEL: SEVERE PAIN (6)

## 2024-03-29 NOTE — PROGRESS NOTES
Assessment & Plan     Dysuria  - UA Macroscopic with reflex to Microscopic and Culture - Lab Collect; Future  - Wet prep - lab collect; Future  - UA Macroscopic with reflex to Microscopic and Culture - Lab Collect  - Wet prep - lab collect  - Urine Microscopic Exam  - Urine Culture    Acute cystitis without hematuria  - cefdinir (OMNICEF) 300 MG capsule; Take 1 capsule (300 mg) by mouth 2 times daily    Yeast infection of the vagina  - fluconazole (DIFLUCAN) 150 MG tablet; Take 1 tablet (150 mg) by mouth once for 1 dose    ESBL UTI  Addendum:  Culture shows global resistance except to IV antibiotics, 2nd generation cephalosporins and nitrofurantoin. Pt has an intolerance to nitrofurantoin and I sent a new rx for cefuroxime. She may need to go to [facility] for IV antibiotics if this is not effective. I am also sending a referral to urology for further evaluation because patient has had multiple UTIs and is on prophylaxis as well.       Subjective   Angela is a 63 year old, presenting for the following health issues:  Rule out Urinary Tract Infection (Per pt , dysuria, frequency , and not voiding all the way. )    Angela is a very pleasant 63 year old female who presents to clinic for UTI symptoms. She had a fecal accident last week and sx started shortly thereafter. She has been trying to alleviate them on her own by drinking a lot of water. However, she had a really bad night last night and felt she needed to be seen. Her lower abdomen is painful when she urinates and feels like she cannot empty. She also has some vaginal itching.     History of Present Illness       Reason for visit:  Uti  Symptom onset:  1-3 days ago  Symptoms include:  Burning, itchy, feels like I'm not going completely  Symptom intensity:  Moderate  Symptom progression:  Worsening  Had these symptoms before:  Yes  Has tried/received treatment for these symptoms:  Yes  Previous treatment was successful:  Yes  Prior treatment description:   "Antibiotics    She eats 0-1 servings of fruits and vegetables daily.She consumes 1 sweetened beverage(s) daily.She exercises with enough effort to increase her heart rate 20 to 29 minutes per day.  She exercises with enough effort to increase her heart rate 5 days per week.   She is taking medications regularly.       ROS: no fevers, chills, nausea, vomiting, flank pain.       Objective    /80   Pulse 68   Temp 97.9  F (36.6  C) (Tympanic)   Resp 20   Ht 1.575 m (5' 2\")   LMP 01/08/2008 (Approximate)   SpO2 98%   BMI 39.69 kg/m    Body mass index is 39.69 kg/m .  Physical Exam  Vitals reviewed.   Constitutional:       Appearance: Normal appearance. She is not ill-appearing.   Pulmonary:      Effort: Pulmonary effort is normal.   Skin:     General: Skin is warm and dry.   Neurological:      General: No focal deficit present.      Mental Status: She is alert and oriented to person, place, and time.   Psychiatric:         Mood and Affect: Mood normal.         Behavior: Behavior normal.         Thought Content: Thought content normal.          Results for orders placed or performed in visit on 03/29/24   UA Macroscopic with reflex to Microscopic and Culture - Lab Collect     Status: Abnormal    Specimen: Urine, Clean Catch   Result Value Ref Range    Color Urine Yellow Colorless, Straw, Light Yellow, Yellow    Appearance Urine Clear Clear    Glucose Urine Negative Negative mg/dL    Bilirubin Urine Negative Negative    Ketones Urine Negative Negative mg/dL    Specific Gravity Urine <=1.005 1.003 - 1.035    Blood Urine Trace (A) Negative    pH Urine 6.0 5.0 - 7.0    Protein Albumin Urine Negative Negative mg/dL    Urobilinogen Urine 0.2 0.2, 1.0 E.U./dL    Nitrite Urine Negative Negative    Leukocyte Esterase Urine Large (A) Negative   Urine Microscopic Exam     Status: Abnormal   Result Value Ref Range    Bacteria Urine Moderate (A) None Seen /HPF    RBC Urine 0-2 0-2 /HPF /HPF    WBC Urine 25-50 (A) 0-5 " /HPF /HPF    Squamous Epithelials Urine Few (A) None Seen /LPF    Transitional Epithelials Urine Few (A) None Seen /HPF   Wet prep - lab collect     Status: Abnormal    Specimen: Vagina; Swab   Result Value Ref Range    Trichomonas Absent Absent    Yeast Present (A) Absent    Clue Cells Absent Absent    WBCs/high power field 2+ (A) None   Urine Culture     Status: Abnormal    Specimen: Urine, Clean Catch   Result Value Ref Range    Culture 50,000-100,000 CFU/mL Escherichia coli ESBL (A)        Susceptibility    Escherichia coli ESBL - ROSINA*     Ampicillin >=32 Resistant ug/mL     Piperacillin/Tazobactam 8 Susceptible ug/mL     Cefazolin* >=64 Resistant ug/mL      * Cefazolin ROSINA breakpoints are for the treatment of uncomplicated urinary tract infections. For the treatment of systemic infections, please contact the laboratory for additional testing.     Cefoxitin 8 Susceptible ug/mL     Ceftazidime  Resistant      Ceftriaxone  Resistant      Cefepime  Resistant      Meropenem* <=0.25 Susceptible ug/mL      * Enterobacterales that are susceptible to meropenem are usually susceptible to ertapenem.     Gentamicin >=16 Resistant ug/mL     Tobramycin 8 Intermediate ug/mL     Ciprofloxacin >=4 Resistant ug/mL     Levofloxacin >=8 Resistant ug/mL     Nitrofurantoin <=16 Susceptible ug/mL     Trimethoprim/Sulfamethoxazole >16/304 Resistant ug/mL     * ESBL (extended spectrum beta lactamase) producing organisms require contact precautions.       Signed Electronically by: SIVA ZENG PA-C

## 2024-03-30 LAB — BACTERIA UR CULT: ABNORMAL

## 2024-03-31 RX ORDER — CEFUROXIME AXETIL 250 MG/1
250 TABLET ORAL 2 TIMES DAILY
Qty: 14 TABLET | Refills: 0 | Status: SHIPPED | OUTPATIENT
Start: 2024-03-31 | End: 2024-04-15

## 2024-04-01 ENCOUNTER — MYC MEDICAL ADVICE (OUTPATIENT)
Dept: FAMILY MEDICINE | Facility: CLINIC | Age: 64
End: 2024-04-01
Payer: COMMERCIAL

## 2024-04-04 ENCOUNTER — HOSPITAL ENCOUNTER (EMERGENCY)
Facility: CLINIC | Age: 64
Discharge: HOME | End: 2024-04-04
Attending: NURSE PRACTITIONER | Admitting: NURSE PRACTITIONER
Payer: COMMERCIAL

## 2024-04-04 ENCOUNTER — APPOINTMENT (OUTPATIENT)
Dept: CT IMAGING | Facility: CLINIC | Age: 64
End: 2024-04-04
Attending: NURSE PRACTITIONER
Payer: COMMERCIAL

## 2024-04-04 VITALS
TEMPERATURE: 98.5 F | RESPIRATION RATE: 18 BRPM | HEIGHT: 62 IN | DIASTOLIC BLOOD PRESSURE: 71 MMHG | OXYGEN SATURATION: 96 % | SYSTOLIC BLOOD PRESSURE: 128 MMHG | WEIGHT: 200 LBS | BODY MASS INDEX: 36.8 KG/M2 | HEART RATE: 66 BPM

## 2024-04-04 DIAGNOSIS — A49.9 UTI (URINARY TRACT INFECTION), BACTERIAL: ICD-10-CM

## 2024-04-04 DIAGNOSIS — N39.0 UTI (URINARY TRACT INFECTION), BACTERIAL: ICD-10-CM

## 2024-04-04 LAB
ALBUMIN SERPL BCG-MCNC: 3.9 G/DL (ref 3.5–5.2)
ALBUMIN UR-MCNC: NEGATIVE MG/DL
ALP SERPL-CCNC: 102 U/L (ref 40–150)
ALT SERPL W P-5'-P-CCNC: 10 U/L (ref 0–50)
ANION GAP SERPL CALCULATED.3IONS-SCNC: 10 MMOL/L (ref 7–15)
APPEARANCE UR: ABNORMAL
AST SERPL W P-5'-P-CCNC: 17 U/L (ref 0–45)
BACTERIA #/AREA URNS HPF: ABNORMAL /HPF
BASOPHILS # BLD AUTO: 0.1 10E3/UL (ref 0–0.2)
BASOPHILS NFR BLD AUTO: 1 %
BILIRUB SERPL-MCNC: 0.2 MG/DL
BILIRUB UR QL STRIP: NEGATIVE
BUN SERPL-MCNC: 12.1 MG/DL (ref 8–23)
CALCIUM SERPL-MCNC: 9.2 MG/DL (ref 8.8–10.2)
CHLORIDE SERPL-SCNC: 107 MMOL/L (ref 98–107)
COLOR UR AUTO: ABNORMAL
CREAT SERPL-MCNC: 0.86 MG/DL (ref 0.51–0.95)
DEPRECATED HCO3 PLAS-SCNC: 23 MMOL/L (ref 22–29)
EGFRCR SERPLBLD CKD-EPI 2021: 75 ML/MIN/1.73M2
EOSINOPHIL # BLD AUTO: 0.1 10E3/UL (ref 0–0.7)
EOSINOPHIL NFR BLD AUTO: 2 %
ERYTHROCYTE [DISTWIDTH] IN BLOOD BY AUTOMATED COUNT: 12.6 % (ref 10–15)
GLUCOSE SERPL-MCNC: 102 MG/DL (ref 70–99)
GLUCOSE UR STRIP-MCNC: NEGATIVE MG/DL
HCT VFR BLD AUTO: 39.5 % (ref 35–47)
HGB BLD-MCNC: 13.1 G/DL (ref 11.7–15.7)
HGB UR QL STRIP: ABNORMAL
HOLD SPECIMEN: NORMAL
IMM GRANULOCYTES # BLD: 0 10E3/UL
IMM GRANULOCYTES NFR BLD: 0 %
KETONES UR STRIP-MCNC: NEGATIVE MG/DL
LACTATE SERPL-SCNC: 0.8 MMOL/L (ref 0.7–2)
LEUKOCYTE ESTERASE UR QL STRIP: ABNORMAL
LYMPHOCYTES # BLD AUTO: 2 10E3/UL (ref 0.8–5.3)
LYMPHOCYTES NFR BLD AUTO: 28 %
MCH RBC QN AUTO: 29.8 PG (ref 26.5–33)
MCHC RBC AUTO-ENTMCNC: 33.2 G/DL (ref 31.5–36.5)
MCV RBC AUTO: 90 FL (ref 78–100)
MONOCYTES # BLD AUTO: 0.8 10E3/UL (ref 0–1.3)
MONOCYTES NFR BLD AUTO: 11 %
NEUTROPHILS # BLD AUTO: 4.3 10E3/UL (ref 1.6–8.3)
NEUTROPHILS NFR BLD AUTO: 59 %
NITRATE UR QL: POSITIVE
NRBC # BLD AUTO: 0 10E3/UL
NRBC BLD AUTO-RTO: 0 /100
PH UR STRIP: 6 [PH] (ref 5–7)
PLATELET # BLD AUTO: 272 10E3/UL (ref 150–450)
POTASSIUM SERPL-SCNC: 4.2 MMOL/L (ref 3.4–5.3)
PROT SERPL-MCNC: 6.8 G/DL (ref 6.4–8.3)
RBC # BLD AUTO: 4.39 10E6/UL (ref 3.8–5.2)
RBC URINE: 1 /HPF
SODIUM SERPL-SCNC: 140 MMOL/L (ref 135–145)
SP GR UR STRIP: 1 (ref 1–1.03)
SQUAMOUS EPITHELIAL: <1 /HPF
UROBILINOGEN UR STRIP-MCNC: NORMAL MG/DL
WBC # BLD AUTO: 7.4 10E3/UL (ref 4–11)
WBC URINE: 162 /HPF

## 2024-04-04 PROCEDURE — 83605 ASSAY OF LACTIC ACID: CPT | Performed by: NURSE PRACTITIONER

## 2024-04-04 PROCEDURE — 74177 CT ABD & PELVIS W/CONTRAST: CPT

## 2024-04-04 PROCEDURE — 96360 HYDRATION IV INFUSION INIT: CPT | Mod: 59 | Performed by: NURSE PRACTITIONER

## 2024-04-04 PROCEDURE — 81001 URINALYSIS AUTO W/SCOPE: CPT | Performed by: NURSE PRACTITIONER

## 2024-04-04 PROCEDURE — 84520 ASSAY OF UREA NITROGEN: CPT | Performed by: NURSE PRACTITIONER

## 2024-04-04 PROCEDURE — 85025 COMPLETE CBC W/AUTO DIFF WBC: CPT | Performed by: NURSE PRACTITIONER

## 2024-04-04 PROCEDURE — 81001 URINALYSIS AUTO W/SCOPE: CPT | Performed by: FAMILY MEDICINE

## 2024-04-04 PROCEDURE — 258N000003 HC RX IP 258 OP 636: Performed by: NURSE PRACTITIONER

## 2024-04-04 PROCEDURE — 87086 URINE CULTURE/COLONY COUNT: CPT | Performed by: NURSE PRACTITIONER

## 2024-04-04 PROCEDURE — 36415 COLL VENOUS BLD VENIPUNCTURE: CPT | Performed by: NURSE PRACTITIONER

## 2024-04-04 PROCEDURE — 87186 SC STD MICRODIL/AGAR DIL: CPT | Performed by: NURSE PRACTITIONER

## 2024-04-04 PROCEDURE — 99285 EMERGENCY DEPT VISIT HI MDM: CPT | Mod: 25 | Performed by: NURSE PRACTITIONER

## 2024-04-04 PROCEDURE — 250N000009 HC RX 250: Performed by: NURSE PRACTITIONER

## 2024-04-04 PROCEDURE — 250N000011 HC RX IP 250 OP 636: Performed by: NURSE PRACTITIONER

## 2024-04-04 PROCEDURE — 99284 EMERGENCY DEPT VISIT MOD MDM: CPT | Performed by: NURSE PRACTITIONER

## 2024-04-04 RX ORDER — NITROFURANTOIN 25; 75 MG/1; MG/1
100 CAPSULE ORAL 2 TIMES DAILY
Qty: 14 CAPSULE | Refills: 0 | Status: SHIPPED | OUTPATIENT
Start: 2024-04-04 | End: 2024-04-11

## 2024-04-04 RX ORDER — IOPAMIDOL 755 MG/ML
97 INJECTION, SOLUTION INTRAVASCULAR ONCE
Status: COMPLETED | OUTPATIENT
Start: 2024-04-04 | End: 2024-04-04

## 2024-04-04 RX ADMIN — SODIUM CHLORIDE 64 ML: 9 INJECTION, SOLUTION INTRAVENOUS at 15:07

## 2024-04-04 RX ADMIN — IOPAMIDOL 97 ML: 755 INJECTION, SOLUTION INTRAVENOUS at 15:07

## 2024-04-04 RX ADMIN — SODIUM CHLORIDE 1000 ML: 9 INJECTION, SOLUTION INTRAVENOUS at 15:50

## 2024-04-04 ASSESSMENT — ACTIVITIES OF DAILY LIVING (ADL)
ADLS_ACUITY_SCORE: 38
ADLS_ACUITY_SCORE: 36
ADLS_ACUITY_SCORE: 38
ADLS_ACUITY_SCORE: 38

## 2024-04-04 ASSESSMENT — COLUMBIA-SUICIDE SEVERITY RATING SCALE - C-SSRS
6. HAVE YOU EVER DONE ANYTHING, STARTED TO DO ANYTHING, OR PREPARED TO DO ANYTHING TO END YOUR LIFE?: NO
1. IN THE PAST MONTH, HAVE YOU WISHED YOU WERE DEAD OR WISHED YOU COULD GO TO SLEEP AND NOT WAKE UP?: NO
2. HAVE YOU ACTUALLY HAD ANY THOUGHTS OF KILLING YOURSELF IN THE PAST MONTH?: NO

## 2024-04-04 NOTE — TELEPHONE ENCOUNTER
Provider mentions in the UC Result Note about putting a urology referral in for patient but none was placed.  Eleanor BONDSN, RN

## 2024-04-04 NOTE — ED TRIAGE NOTES
Pt was seen at clinic last Thursday and prescribed omnicef for UTI.  Pt was called on Monday that culture was positive and antibiotic needed to be changed to cefuroxime.  Pt continues to have foul odor urine, cloudy and abdominal tightness.      Triage Assessment (Adult)       Row Name 04/04/24 1242          Triage Assessment    Airway WDL WDL        Respiratory WDL    Respiratory WDL WDL

## 2024-04-04 NOTE — ED TRIAGE NOTES
Pt came to ED for IV antibiotics as told by provider.      Triage Assessment (Adult)       Row Name 04/04/24 1242          Triage Assessment    Airway WDL WDL        Respiratory WDL    Respiratory WDL WDL

## 2024-04-04 NOTE — DISCHARGE INSTRUCTIONS
Stop cefuroxime.  Start nitrofurantoin 100 mg twice a day for 7 days.  Return to the emergency department if not starting to improve in 3 days or sooner if/anytime if you develop fever, vomiting, or back pain, or allergic reaction to the antibiotic.

## 2024-04-04 NOTE — TELEPHONE ENCOUNTER
Routed to provider to review.   No Urology referral was placed, and per chart review, it has been > 3 years since last [facility] Urology appointment.   Please place new referral (pending) if pt should follow-up with Urology after ED.    Pt describes in her MyChart that today is her worst morning, regarding the UTI, despite having started the new antibiotic on Monday.   Pt has been advised to go the hospital ED today for worsening sx despite tx.    Linda Cristobal, VAMSHIN, RN

## 2024-04-04 NOTE — ED PROVIDER NOTES
History     Chief Complaint   Patient presents with    UTI     HPI  Angela Ibarra is a 63 year old female who presents for chills, lower abdominal pain, and dysuria.  Symptoms started 10 days ago.  Patient is currently being treated for evaluation of urinary tract infection.  She was initially started on cefdinir and switch to cefuroxime based on her urine culture results showing 50,000-100,000 ESBL (see below).  She has allergy to Bactrim and Nitrofurantoin. She started the cefuroxime on Monday and has taken 7 doses so far.  She has not significantly improved in her symptoms.  Continues to report cloudy urine, pain with urination, and suprapubic pressure. She has no objective fevers, but continues to have chills.  Nauseated this morning after taking her antibiotic dose, but not now.  She has not had any vomiting.  She has been having some loose stools since starting the antibiotic.    Urine culture as noted below from 3/29/2024:         Allergies:  Allergies   Allergen Reactions    Bactrim [Sulfamethoxazole-Trimethoprim]      Itching, racing heart. Only has sensitivity when taking long term. Does fine if she takes once in a while after intercourse    Nitrofurantoin Itching     Patient states she is not longer allergic to this medication. Has used it since with no problems        Problem List:    Patient Active Problem List    Diagnosis Date Noted    DENG (generalized anxiety disorder) 03/12/2024     Priority: Medium    Vitamin D deficiency 03/12/2024     Priority: Medium    Aftercare following right wrist joint replacement surgery 03/14/2023     Priority: Medium    Arthritis of carpometacarpal (CMC) joint of right thumb 01/02/2023     Priority: Medium     Added automatically from request for surgery [id number]      Degenerative arthritis of metacarpophalangeal joint of right thumb 11/01/2021     Priority: Medium    Status post hip surgery 07/06/2020     Priority: Medium    Right hip pain 06/08/2020      Priority: Medium     Added automatically from request for surgery [id number]      Bariatric surgery status 02/10/2020     Priority: Medium    Malnutrition following gastrointestinal surgery 02/10/2020     Priority: Medium    Morbid obesity (H) 08/28/2018     Priority: Medium    Arthropathy of facet joint 08/16/2018     Priority: Medium    Cataract of both eyes, unspecified cataract type 04/06/2018     Priority: Medium    Primary osteoarthritis of right hip 02/16/2018     Priority: Medium    Insomnia, unspecified type 09/08/2016     Priority: Medium    Essential hypertension with goal blood pressure less than 140/90 09/08/2016     Priority: Medium    Advanced directives, counseling/discussion 12/29/2015     Priority: Medium     Patient states has Advance Directive and will bring in a copy to clinic.  Susanne Gotti LPN        CARDIOVASCULAR SCREENING; LDL GOAL LESS THAN 130 08/04/2014     Priority: Medium    Chondromalacia, left ankle and joints of left foot 05/22/2014     Priority: Medium    Pain in joint involving ankle and foot 05/13/2014     Priority: Medium    Abnormal gait 05/13/2014     Priority: Medium    Other postprocedural status(V45.89) 05/13/2014     Priority: Medium    Malunion, fracture 03/21/2014     Priority: Medium    Loose body in ankle and foot joint 03/07/2014     Priority: Medium    Synovitis of ankle 03/07/2014     Priority: Medium    Osteoporosis 11/25/2013     Priority: Medium     Problem list name updated by automated process. Provider to review      Right carpal tunnel syndrome 08/13/2013     Priority: Medium    Right leg weakness 09/12/2011     Priority: Medium    Balance disorder 07/20/2011     Priority: Medium     Thought possibly to be due to low B12 by neuro      Stress incontinence 03/09/2010     Priority: Medium     S/p TOT procedure 4/20/10      Migraine headache 09/18/2008     Priority: Medium     Reduced with atenolol  (Problem list name updated by automated process. Provider to review  and confirm.)      NONSPECIFIC COLITIS 07/23/2007     Priority: Medium     Hosp in 99  Recurrent ? Infectious colitis  No definate signs of inflamatory bowel disease          Past Medical History:    Past Medical History:   Diagnosis Date    Calculus of kidney     Migraine, unspecified, without mention of intractable migraine without mention of status migrainosus     Other specified iron deficiency anemias     Papanicolaou smear of cervix with low grade squamous intraepithelial lesion (LGSIL) 11/07    Primary osteoarthritis of right hip     Synovitis of ankle     Unspecified essential hypertension 1999       Past Surgical History:    Past Surgical History:   Procedure Laterality Date    ARTHROPLASTY CARPOMETACARPAL (THUMB JOINT) Right 2/22/2023    Procedure: RIGHT THUMB CARPOMETACARPAL JOINT ARTHROPLASTY WITH SUTURE SUSPENSION;  Surgeon: Seth Saunders MD;  Location: [facility]    ARTHROPLASTY HIP Right 7/6/2020    Procedure: Right total hip arthroplasty;  Surgeon: Clinton Deras MD;  Location: [facility]    ARTHROSCOPY ANKLE  4/22/2014    Procedure: ARTHROSCOPY ANKLE;  Surgeon: Shaun Bhatt DPM;  Location: [facility]    ARTHROSCOPY KNEE Left 10/4/2018    Procedure: ARTHROSCOPY KNEE;  Left knee arthroscopy  medial meniscal and chondral debridement;  Surgeon: Aryan Holley MD;  Location: [facility]    COLONOSCOPY WITH CO2 INSUFFLATION N/A 9/28/2017    Procedure: COLONOSCOPY WITH CO2 INSUFFLATION;  COLON SCREEN/ YURI;  Surgeon: Cody Arana MD;  Location: [facility]    HYSTERECTOMY, PAP NO LONGER INDICATED  1-    LAPAROSCOPIC CHOLECYSTECTOMY  8/3/2012    Procedure: LAPAROSCOPIC CHOLECYSTECTOMY;  Laparoscopic Cholecystectomy ;  Surgeon: Corwin Cabezas MD;  [facility]    OPEN REDUCTION INTERNAL FIXATION ANKLE  4/22/2014    Procedure: OPEN REDUCTION INTERNAL FIXATION ANKLE;  Surgeon: Shaun Bhatt DPM;  Location: [facility]    OPEN REDUCTION INTERNAL FIXATION ELBOW  10/15/2013    Procedure:  OPEN REDUCTION INTERNAL FIXATION ELBOW;  OPEN REDUCTION INTERNAL FIXATION LEFT PROXIMAL ULNA;  Surgeon: Artem Last DO;  Location: [facility]    ORTHOPEDIC SURGERY      left shoulder surgery    REMOVE MESH VAGINA  10/10/2011    Procedure:REMOVE MESH VAGINA; Excision of Vaginal Mesh; Surgeon:SERGE SALGADO; Location:[facility] OR    SURGICAL HISTORY OF -   4/20/10    Transobturator midurethral sling    SURGICAL HISTORY OF -   1999    exploratory laparotomy, colitis    SURGICAL HISTORY OF -   4/20/10    Transobturator midurethral sling    TUBAL LIGATION      ZZC  DELIVERY ONLY      , Low Cervical    ZZC GASTRIC BYPASS,OBESITY,W/SM BOWEL RECONS  10/99    ZZC LIGATE FALLOPIAN TUBE  2000    laparoscopy       Family History:    Family History   Problem Relation Age of Onset    C.A.D. Mother         MI    Hypertension Mother     Hypertension Father     Substance Abuse Brother     Breast Cancer No family hx of        Social History:  Marital Status:   [2]  Social History     Tobacco Use    Smoking status: Never    Smokeless tobacco: Never   Vaping Use    Vaping Use: Never used   Substance Use Topics    Alcohol use: Not Currently     Comment: rarely - less than once a month    Drug use: No        Medications:    nitroFURantoin macrocrystal-monohydrate (MACROBID) 100 MG capsule  acetaminophen (TYLENOL) 325 MG tablet  amLODIPine (NORVASC) 10 MG tablet  atenolol (TENORMIN) 50 MG tablet  cefdinir (OMNICEF) 300 MG capsule  cefuroxime (CEFTIN) 250 MG tablet  Cholecalciferol (VITAMIN D) 125 MCG (5000 UT) CAPS  ibuprofen (ADVIL/MOTRIN) 200 MG tablet  sertraline (ZOLOFT) 50 MG tablet  sulfamethoxazole-trimethoprim (BACTRIM) 400-80 MG tablet  traZODone (DESYREL) 50 MG tablet          Review of Systems  As mentioned above in the history present illness. All other systems were reviewed and are negative.    Physical Exam   BP: (!) 143/90  Pulse: 66  Temp: 98.5  F (36.9  C)  Resp: 18  Height: 157.5 cm  "(5' 2\")  Weight: 90.7 kg (200 lb)  SpO2: 98 %      Physical Exam  Constitutional:       General: She is not in acute distress.     Appearance: Normal appearance. She is well-developed. She is not ill-appearing.   HENT:      Head: Normocephalic and atraumatic.      Right Ear: External ear normal.      Left Ear: External ear normal.      Nose: Nose normal.      Mouth/Throat:      Mouth: Mucous membranes are moist.   Eyes:      Conjunctiva/sclera: Conjunctivae normal.   Cardiovascular:      Rate and Rhythm: Normal rate and regular rhythm.      Heart sounds: Normal heart sounds. No murmur heard.  Pulmonary:      Effort: Pulmonary effort is normal. No respiratory distress.      Breath sounds: Normal breath sounds.   Abdominal:      General: Bowel sounds are normal. There is no distension.      Palpations: Abdomen is soft.      Tenderness: There is abdominal tenderness in the left lower quadrant.   Musculoskeletal:         General: Normal range of motion.   Skin:     General: Skin is warm and dry.      Findings: No rash.   Neurological:      General: No focal deficit present.      Mental Status: She is alert and oriented to person, place, and time.         ED Course     ED Course as of 04/04/24 2048   Thu Apr 04, 2024   1617 I consulted with infectious disease, Dr. Booth, who recommends giving patient nitrofurantoin, since that is the only oral medication that her urine culture is sensitive to.  Otherwise if this fails patient will likely have to be treated with ertapenem IV.  Her nitrofurantoin allergy says she developed itching itching and there is a notation that says she has taken it before with no problem.  I will confirm this with the patient.   1618 I discussed with patient the lab and imaging results.  We discussed the recommendation by infectious disease and talked about her past reaction with itching when taking nitrofurantoin.  Patient states it did cause some itching but it was many years ago.  She does not " think she had any kind of rash and would like to try this medication and see if her symptoms improve.     Procedures         Results for orders placed or performed during the hospital encounter of 04/04/24 (from the past 24 hour(s))   UA with Microscopic reflex to Culture    Specimen: Urine, Clean Catch   Result Value Ref Range    Color Urine Straw Colorless, Straw, Light Yellow, Yellow    Appearance Urine Slightly Cloudy (A) Clear    Glucose Urine Negative Negative mg/dL    Bilirubin Urine Negative Negative    Ketones Urine Negative Negative mg/dL    Specific Gravity Urine 1.002 (L) 1.003 - 1.035    Blood Urine Small (A) Negative    pH Urine 6.0 5.0 - 7.0    Protein Albumin Urine Negative Negative mg/dL    Urobilinogen Urine Normal Normal, 2.0 mg/dL    Nitrite Urine Positive (A) Negative    Leukocyte Esterase Urine Large (A) Negative    Bacteria Urine Few (A) None Seen /HPF    RBC Urine 1 <=2 /HPF    WBC Urine 162 (H) <=5 /HPF    Squamous Epithelials Urine <1 <=1 /HPF    Narrative    Urine Culture ordered based on laboratory criteria   CBC with platelets differential    Narrative    The following orders were created for panel order CBC with platelets differential.  Procedure                               Abnormality         Status                     ---------                               -----------         ------                     CBC with platelets and d...[[id number]]                      Final result                 Please view results for these tests on the individual orders.   Comprehensive metabolic panel   Result Value Ref Range    Sodium 140 135 - 145 mmol/L    Potassium 4.2 3.4 - 5.3 mmol/L    Carbon Dioxide (CO2) 23 22 - 29 mmol/L    Anion Gap 10 7 - 15 mmol/L    Urea Nitrogen 12.1 8.0 - 23.0 mg/dL    Creatinine 0.86 0.51 - 0.95 mg/dL    GFR Estimate 75 >60 mL/min/1.73m2    Calcium 9.2 8.8 - 10.2 mg/dL    Chloride 107 98 - 107 mmol/L    Glucose 102 (H) 70 - 99 mg/dL    Alkaline Phosphatase 102 40 -  150 U/L    AST 17 0 - 45 U/L    ALT 10 0 - 50 U/L    Protein Total 6.8 6.4 - 8.3 g/dL    Albumin 3.9 3.5 - 5.2 g/dL    Bilirubin Total 0.2 <=1.2 mg/dL   Lactic acid whole blood   Result Value Ref Range    Lactic Acid 0.8 0.7 - 2.0 mmol/L   [place] Draw    Narrative    The following orders were created for panel order [place] Draw.  Procedure                               Abnormality         Status                     ---------                               -----------         ------                     Extra Blue Top Tube[[id number]]                              Final result                 Please view results for these tests on the individual orders.   CBC with platelets and differential   Result Value Ref Range    WBC Count 7.4 4.0 - 11.0 10e3/uL    RBC Count 4.39 3.80 - 5.20 10e6/uL    Hemoglobin 13.1 11.7 - 15.7 g/dL    Hematocrit 39.5 35.0 - 47.0 %    MCV 90 78 - 100 fL    MCH 29.8 26.5 - 33.0 pg    MCHC 33.2 31.5 - 36.5 g/dL    RDW 12.6 10.0 - 15.0 %    Platelet Count 272 150 - 450 10e3/uL    % Neutrophils 59 %    % Lymphocytes 28 %    % Monocytes 11 %    % Eosinophils 2 %    % Basophils 1 %    % Immature Granulocytes 0 %    NRBCs per 100 WBC 0 <1 /100    Absolute Neutrophils 4.3 1.6 - 8.3 10e3/uL    Absolute Lymphocytes 2.0 0.8 - 5.3 10e3/uL    Absolute Monocytes 0.8 0.0 - 1.3 10e3/uL    Absolute Eosinophils 0.1 0.0 - 0.7 10e3/uL    Absolute Basophils 0.1 0.0 - 0.2 10e3/uL    Absolute Immature Granulocytes 0.0 <=0.4 10e3/uL    Absolute NRBCs 0.0 10e3/uL   Extra Blue Top Tube   Result Value Ref Range    Hold Specimen JI    CT Abdomen Pelvis w Contrast    Narrative    CT ABDOMEN AND PELVIS WITH CONTRAST 4/4/2024 3:14 PM    CLINICAL HISTORY: Left lower quadrant pain.    TECHNIQUE: CT scan of the abdomen and pelvis was performed following  injection of IV contrast. Multiplanar reformats were obtained. Dose  reduction techniques were used.    CONTRAST: 97mL isovue 370    COMPARISON: CT of the abdomen and pelvis  7/30/2012.    FINDINGS:   LOWER CHEST: Unremarkable.    HEPATOBILIARY: Cholecystectomy. No hepatic masses. Small right hepatic  cyst would require no specific follow-up.    PANCREAS: Normal.    SPLEEN: Normal.    ADRENAL GLANDS: Normal.    KIDNEYS/BLADDER: Mild diffuse bladder wall thickening. No  hydronephrosis.    BOWEL: Gastric bypass procedure. No bowel obstruction. Colonic  diverticulosis. No evidence for colitis or diverticulitis.  Unremarkable appendix.    LYMPH NODES: No lymphadenopathy.    VASCULATURE: Unremarkable.    PELVIC ORGANS: Multiple images through the pelvis.    ADDITIONAL FINDINGS: None.    MUSCULOSKELETAL: Unremarkable.      Impression    IMPRESSION:   1.  Mild diffuse bladder wall thickening suggests cystitis.  2.  Colonic diverticulosis, without evidence for diverticulitis.    EMMANUEL SAMUELS MD         SYSTEM ID:  [id number]       Medications   sodium chloride 0.9% BOLUS 1,000 mL (0 mLs Intravenous Stopped 4/4/24 1658)   iopamidol (ISOVUE-370) solution 97 mL (97 mLs Intravenous $Given 4/4/24 1507)   sodium chloride 0.9 % bag 500 mL for CT scan flush use (64 mLs Intravenous $Given 4/4/24 1507)       Assessments & Plan (with Medical Decision Making)   63 year old female being treated for UTI with culture showing 50,000-100,000 ESBL. Patient's antibitioc was changed from Cefdinir to Cefuroxime (2nd generation cephalosporin) on Monday, 3 days ago based on urine culture results. Today is the 4th day on antibiotics and she continues to have symptoms and advised to come in to the ED for further evaluation and consideration of needing IV management (all the other susceptible antibiotics are IV).  On exam she is afebrile.  Normotensive.  No tachycardia.  She does not appear septic.  She has no significant abdominal tenderness.  She reports some suprapubic pressure.  No flank pain.  No leukocytosis.  Normal lactic acid.  Normal kidney function.  Abdominal/pelvis CT reveals mild diffuse bladder wall  thickening to suggest acute cystitis.  Otherwise no other acute findings.  I did consult with infectious disease, Dr. Booth, at [facility].  Although patient has nitrofurantoin listed as an allergy it does only say her reaction was itching and that she has taken it before without any problems.  Her urine culture is susceptible to this medication, and it is the only oral medication that she would be able to take.  The other medications are either resistant or need to be given IV.  Since she is not improving on the cefuroxime we decided to change her to nitrofurantoin.  I did check with the patient and she does recall many years ago having some itching taking nitrofurantoin, but does not recall any rash and certainly no anaphylactic reaction.  She is willing to try this medication.  She will return immediately if she develops allergic reaction.  She will return if she develops fever, abdominal pain, vomiting, or worse in any way.    Plan:  Stop cefuroxime.  Start nitrofurantoin 100 mg twice a day for 7 days.  Return to the emergency department if not starting to improve in 3 days or sooner if/anytime if you develop fever, vomiting, or back pain, or allergic reaction to the antibiotic.      Discharge Medication List as of 4/4/2024  5:00 PM        START taking these medications    Details   nitroFURantoin macrocrystal-monohydrate (MACROBID) 100 MG capsule Take 1 capsule (100 mg) by mouth 2 times daily for 7 days, Disp-14 capsule, R-0, E-PrescribePatient took this many years ago and had some itching. No rash. Urine culture today with ESBL and consult with ID requesting we try this antibiotic.             Final diagnoses:   UTI (urinary tract infection), bacterial       4/4/2024   [facility] EMERGENCY DEPT       Tiffany, LORIE Diaz CNP  04/04/24 2051

## 2024-04-05 LAB — BACTERIA UR CULT: ABNORMAL

## 2024-04-06 ENCOUNTER — TELEPHONE (OUTPATIENT)
Dept: EMERGENCY MEDICINE | Facility: CLINIC | Age: 64
End: 2024-04-06
Payer: COMMERCIAL

## 2024-04-06 NOTE — TELEPHONE ENCOUNTER
"[facility]    Reason for call: Lab Result Notification     Lab Result (including Rx patient on, if applicable).  If culture, copy of lab report at bottom.  Lab Result: Final Urine Culture Report on 4/4/24  [facility] Emergency Dept discharge antibiotic prescribed: Nitrofurantoin Macrocrystal-Monohydrate (Macrobid) 100 mg PO capsule, 1 capsule (100 mg) by mouth 2 times daily for 7 days   #1. Bacteria,  >100,000 CFU/ML Escherichia coli ESBL is SUSCEPTIBLE to Antibiotic.    No change in treatment per [facility] ED lab result Urine Culture protocol.     Patient's current Symptoms:   10:33A.  Some little burning at times  The urinary frequency is improving  Abdominal pain is gone  \"Haven't felt this good in a long time.\"    RN Recommendations/Instructions per [place] ED lab result protocol:   [facility] ED lab result protocol utilized: urine cx    Patient/care giver notified to contact your PCP clinic or return to the Emergency department if your:  Symptoms do not resolve after completing antibiotic.  Symptoms worsen or other concerning symptoms.          Marcin Rondon RN   "

## 2024-04-15 ENCOUNTER — ANCILLARY PROCEDURE (OUTPATIENT)
Dept: MAMMOGRAPHY | Facility: CLINIC | Age: 64
End: 2024-04-15
Attending: PHYSICIAN ASSISTANT
Payer: COMMERCIAL

## 2024-04-15 ENCOUNTER — ALLIED HEALTH/NURSE VISIT (OUTPATIENT)
Dept: FAMILY MEDICINE | Facility: CLINIC | Age: 64
End: 2024-04-15
Payer: COMMERCIAL

## 2024-04-15 ENCOUNTER — TELEPHONE (OUTPATIENT)
Dept: GASTROENTEROLOGY | Facility: CLINIC | Age: 64
End: 2024-04-15

## 2024-04-15 ENCOUNTER — HOSPITAL ENCOUNTER (OUTPATIENT)
Facility: CLINIC | Age: 64
End: 2024-04-15
Attending: SURGERY | Admitting: SURGERY
Payer: COMMERCIAL

## 2024-04-15 VITALS
OXYGEN SATURATION: 97 % | HEART RATE: 70 BPM | RESPIRATION RATE: 23 BRPM | DIASTOLIC BLOOD PRESSURE: 80 MMHG | SYSTOLIC BLOOD PRESSURE: 142 MMHG

## 2024-04-15 DIAGNOSIS — Z12.31 VISIT FOR SCREENING MAMMOGRAM: ICD-10-CM

## 2024-04-15 DIAGNOSIS — Z01.30 BLOOD PRESSURE CHECK: Primary | ICD-10-CM

## 2024-04-15 PROCEDURE — 77063 BREAST TOMOSYNTHESIS BI: CPT | Mod: TC | Performed by: STUDENT IN AN ORGANIZED HEALTH CARE EDUCATION/TRAINING PROGRAM

## 2024-04-15 PROCEDURE — 99207 PR NO CHARGE NURSE ONLY: CPT

## 2024-04-15 PROCEDURE — 77067 SCR MAMMO BI INCL CAD: CPT | Mod: TC | Performed by: STUDENT IN AN ORGANIZED HEALTH CARE EDUCATION/TRAINING PROGRAM

## 2024-04-15 ASSESSMENT — PAIN SCALES - GENERAL: PAINLEVEL: MODERATE PAIN (5)

## 2024-04-15 NOTE — TELEPHONE ENCOUNTER
Caller: Angela    Reason for Reschedule/Cancellation   (please be detailed, any staff messages or encounters to note?): Patient needs MAC per RN review. Spoke with patient and she only wants MAC for this procedure moving forward.       Prior to reschedule please review:  Ordering Provider: Kehr  Sedation Determined: MAC  Does patient have any ASC Exclusions, please identify?: n      Notes on Cancelled Procedure:  Procedure: Lower Endoscopy [Colonoscopy]   Date: 05/03/2024  Location: [facility]; [address], [place] [address]   Surgeon: Oscar      Rescheduled: Yes,   Procedure: Lower Endoscopy [Colonoscopy]    Date: 05/23/2024   Location: [facility]; [address], [place] [address]    Surgeon: Salena   Sedation Level Scheduled  MAC ,  Reason for Sedation Level per RN review    Instructions updated and sent: y     Does patient need PAC or Pre -Op Rescheduled? : no       Did you cancel or rescheduled an EUS procedure? No.

## 2024-04-15 NOTE — PROGRESS NOTES
I met with Angela Ibarra at the request of Kristen Kehr, PA-C to recheck her blood pressure.      Blood pressure medications on the med list were reviewed with patient.    Patient has taken all medications as per usual regimen: Yes  Patient reports tolerating them without any issues or concerns: Yes    Patient reports falling approx 1 hour prior to coming to clinic. Patients pain level currently  5/10 which may impact blood pressure readings today.     Vitals:    04/15/24 1328 04/15/24 1340   BP: (!) 146/80 (!) 142/80   Pulse: 74 70   Resp: 23    SpO2: 97%        After 5 minutes, the patient's blood pressure remained greater than or equal to 140/90.    Is the patient currently having any chest pain? No  Does the patient currently have a headache? No  Does the patient currently have any vision changes? No  Does the patient currently have any nausea? No  Does the patient currently have any abdominal pain? No    The previous encounter was reviewed.  The patient was discharged and the note will be sent to the provider for final review.      Eleuterio Benz LPN   [place], [place]  Adult/Pediatric Family Practice     04/15/24 at 1:31 PM

## 2024-04-18 NOTE — PROGRESS NOTES
:    Can you please reach out to Angela and get her scheduled to recheck her blood pressure in the next month.   This is not urgent, but her recheck with ancillary was a bit high.   I would like to see her back in the office for a recheck.   Thank you.   Kristen Kehr PA-C

## 2024-04-18 NOTE — PROGRESS NOTES
KATELIN and sent [employer] message to schedule     Annel PUENTE,    [facility]

## 2024-04-30 ENCOUNTER — OFFICE VISIT (OUTPATIENT)
Dept: URGENT CARE | Facility: URGENT CARE | Age: 64
End: 2024-04-30
Payer: COMMERCIAL

## 2024-04-30 VITALS
HEART RATE: 64 BPM | SYSTOLIC BLOOD PRESSURE: 147 MMHG | DIASTOLIC BLOOD PRESSURE: 89 MMHG | TEMPERATURE: 98.3 F | OXYGEN SATURATION: 97 % | RESPIRATION RATE: 20 BRPM

## 2024-04-30 DIAGNOSIS — M25.561 ACUTE PAIN OF RIGHT KNEE: ICD-10-CM

## 2024-04-30 DIAGNOSIS — S09.90XA CLOSED HEAD INJURY, INITIAL ENCOUNTER: Primary | ICD-10-CM

## 2024-04-30 PROCEDURE — 99213 OFFICE O/P EST LOW 20 MIN: CPT | Performed by: PHYSICIAN ASSISTANT

## 2024-04-30 RX ORDER — NITROFURANTOIN 25; 75 MG/1; MG/1
100 CAPSULE ORAL 2 TIMES DAILY
COMMUNITY
Start: 2024-04-21 | End: 2024-05-06

## 2024-04-30 NOTE — PROGRESS NOTES
Assessment & Plan     Closed head injury, initial encounter    Acute pain of right knee    Called emergency services, transported to [facility]. Concern for brain bleed.    20 minutes spent on the date of the encounter doing chart review, patient visit, and documentation       Return today (on 4/30/2024) for ER now.     BRONSON Ingram [facility]    Subjective   Angela Ibarra is a 63 year old who presents for the following health issues     Patient presents with:  Urgent Care  Fall: Per [employer] said she was at the dog park and her dog and patients dog collated forcing her to fall hitting her head on cement, currently in and out of it , having memory issues, and unable to put weight on her right leg       HPI    Angela presents clinic today with someone she met at the dog park earlier today.  They were there and her dogs ran into each other when she fell, injuring her right knee and hitting the back of her head on the cement.  Initially, she was complaining of knee pain and has not been able to bear weight on her knee.  Then in conversation, has been confused, does not know the day of the week, has troubles remembering her birthday and her dog's name.  She is in and out of sleep.    Review of Systems   ROS negative except as stated above.      Objective    BP (!) 147/89   Pulse 64   Temp 98.3  F (36.8  C) (Tympanic)   Resp 20   LMP 01/08/2008 (Approximate)   SpO2 97%   Physical Exam   GENERAL: In and out of sleep, confused    No results found for any visits on 04/30/24.

## 2024-05-02 NOTE — TELEPHONE ENCOUNTER
Caller: Angela    Reason for Reschedule/Cancellation   (please be detailed, any staff messages or encounters to note?): [place] in hospital and going to transitional care for some time      Prior to reschedule please review:  Ordering Provider: Kehr  Sedation Determined: MAC--patient request  Does patient have any ASC Exclusions, please identify?: n      Notes on Cancelled Procedure:  Procedure: Lower Endoscopy [Colonoscopy]   Date: 5/23  Location: [facility]; [address] [place] [address] , [place] [address]  Surgeon: Salena      Rescheduled: No, patient will call as she wants in October or later       Did you cancel or rescheduled an EUS procedure? No.

## 2024-05-06 ENCOUNTER — TRANSITIONAL CARE UNIT VISIT (OUTPATIENT)
Dept: GERIATRICS | Facility: CLINIC | Age: 64
End: 2024-05-06
Payer: COMMERCIAL

## 2024-05-06 ENCOUNTER — DOCUMENTATION ONLY (OUTPATIENT)
Dept: GERIATRICS | Facility: CLINIC | Age: 64
End: 2024-05-06
Payer: COMMERCIAL

## 2024-05-06 VITALS
HEART RATE: 69 BPM | RESPIRATION RATE: 16 BRPM | DIASTOLIC BLOOD PRESSURE: 67 MMHG | BODY MASS INDEX: 36.58 KG/M2 | HEIGHT: 62 IN | OXYGEN SATURATION: 91 % | SYSTOLIC BLOOD PRESSURE: 116 MMHG | TEMPERATURE: 97.7 F

## 2024-05-06 DIAGNOSIS — M62.81 GENERALIZED MUSCLE WEAKNESS: ICD-10-CM

## 2024-05-06 DIAGNOSIS — Z97.8 FOLEY CATHETER PRESENT: ICD-10-CM

## 2024-05-06 DIAGNOSIS — S82.142D CLOSED FRACTURE OF LEFT TIBIAL PLATEAU WITH ROUTINE HEALING, SUBSEQUENT ENCOUNTER: Primary | ICD-10-CM

## 2024-05-06 DIAGNOSIS — G93.41 METABOLIC ENCEPHALOPATHY: ICD-10-CM

## 2024-05-06 DIAGNOSIS — R33.9 URINE RETENTION: ICD-10-CM

## 2024-05-06 DIAGNOSIS — G47.00 INSOMNIA, UNSPECIFIED TYPE: ICD-10-CM

## 2024-05-06 DIAGNOSIS — I10 ESSENTIAL HYPERTENSION WITH GOAL BLOOD PRESSURE LESS THAN 140/90: ICD-10-CM

## 2024-05-06 PROCEDURE — 99309 SBSQ NF CARE MODERATE MDM 30: CPT | Performed by: NURSE PRACTITIONER

## 2024-05-06 RX ORDER — LANOLIN ALCOHOL/MO/W.PET/CERES
3 CREAM (GRAM) TOPICAL
COMMUNITY
Start: 2024-05-05

## 2024-05-06 RX ORDER — AMOXICILLIN 250 MG
1 CAPSULE ORAL PRN
COMMUNITY
Start: 2024-05-05

## 2024-05-06 RX ORDER — HYDROXYZINE PAMOATE 25 MG/1
25-50 CAPSULE ORAL EVERY 4 HOURS
COMMUNITY
Start: 2024-05-05 | End: 2024-05-17

## 2024-05-06 RX ORDER — METHOCARBAMOL 750 MG/1
750 TABLET, FILM COATED ORAL 4 TIMES DAILY
COMMUNITY
Start: 2024-05-05 | End: 2024-06-05

## 2024-05-06 RX ORDER — ACETAMINOPHEN 500 MG
1000 TABLET ORAL 3 TIMES DAILY
COMMUNITY
Start: 2024-05-05

## 2024-05-06 RX ORDER — TAMSULOSIN HYDROCHLORIDE 0.4 MG/1
0.4 CAPSULE ORAL DAILY
COMMUNITY
Start: 2024-05-06

## 2024-05-06 RX ORDER — GABAPENTIN 100 MG/1
100 CAPSULE ORAL 3 TIMES DAILY
COMMUNITY
Start: 2024-05-05 | End: 2024-06-05

## 2024-05-06 RX ORDER — OXYCODONE HYDROCHLORIDE 5 MG/1
2.5-5 TABLET ORAL EVERY 4 HOURS PRN
COMMUNITY
Start: 2024-05-17 | End: 2024-05-17

## 2024-05-06 RX ORDER — ONDANSETRON 4 MG/1
4 TABLET, ORALLY DISINTEGRATING ORAL EVERY 8 HOURS PRN
COMMUNITY
Start: 2024-05-05

## 2024-05-06 NOTE — LETTER
5/6/2024        RE: Angela Ibarra  [address] [place] [address]        [facility] GERIATRICS    PRIMARY CARE PROVIDER AND CLINIC:  Physician No Ref-Primary, No address on file  Chief Complaint   Patient presents with     Hospital F/U      [place] Medical Record Number:  [id number]  Place of Service where encounter took place:  [facility] [8]    Angela Ibarra  is a 63 year old  (1960), admitted to the above facility from  [facility] . Hospital stay 04/30/2024 through 05/05/2024..   HPI:    #. Comminuted and depressed fracture of lateral tibial plateau  #. Nondisplaced fracture of the anterior medial tibial plateau  -Orthopedic team recommended conservative management  -Left knee brace in place  - Continue pain management with Tylenol, oxycodone, Hydro morphine as needed, gabapentin, methocarbamol  - Continue bowel regimen with senna and MiraLAX  - Fall precaution and delirium protocol precaution  - Incentive spirometer  - PT/OT eval and treat    #. History of recurrent urinary tract infection  #. Asymptomatic bacteriuria, urine culture grew 30,000 colonies of ESBL which is likely colonization   - Patient was seen and evaluated by ID team.  - Recommend vaginal estrogen    #. Acute urinary retention, likely related to narcotic use and immobility  - Blevins catheter placed on 5/1  - Patient was seen and evaluated by neurology team.  - Recommend leaving Blevins in for now for bladder rest  - Voiding trial in approximately 1 week at the TCU if mobility improves  - Outpatient urology follow-up    #. Insomnia  #. Generalized anxiety disorder  -Continue PTA sertraline  -Hold PTA trazodone due to mitigate risk of oversedation    #. Essential hypertension  BP is soft, 138/59.  -Hold PTA amlodipine for now  -Continue PTA atenolol    Patient seen for follow up, appears healthy, pain+, blevins output WNL, previously no issues with urination except chronic UTI's, VS WNL,  L leg mild edema with velcro leg brace on, NWB status, understandingly frustrated with TCU stay so far, reported to SW and nurse manager.    CODE STATUS/ADVANCE DIRECTIVES DISCUSSION:  Prior  on file  ALLERGIES:   Allergies   Allergen Reactions     Bactrim [Sulfamethoxazole-Trimethoprim]      Itching, racing heart. Only has sensitivity when taking long term. Does fine if she takes once in a while after intercourse      PAST MEDICAL HISTORY:   Past Medical History:   Diagnosis Date     Calculus of kidney      Migraine, unspecified, without mention of intractable migraine without mention of status migrainosus      Other specified iron deficiency anemias      Papanicolaou smear of cervix with low grade squamous intraepithelial lesion (LGSIL)     Colpo and hyst pathology benign     Primary osteoarthritis of right hip      Synovitis of ankle      Unspecified essential hypertension     Essential hypertension      PAST SURGICAL HISTORY:   has a past surgical history that includes surgical history of -  (4/20/10); surgical history of -  (1999); surgical history of -  (4/20/10);  DELIVERY ONLY (); LIGATE FALLOPIAN TUBE (2000); hysterectomy, pap no longer indicated (2008); tubal ligation; GASTRIC BYPASS,OBESITY,W/SM BOWEL RECONS (10/99); orthopedic surgery; Remove mesh vagina (10/10/2011); Laparoscopic cholecystectomy (8/3/2012); Open reduction internal fixation elbow (10/15/2013); Arthroscopy ankle (2014); Open reduction internal fixation ankle (2014); Colonoscopy with CO2 insufflation (N/A, 2017); Arthroscopy knee (Left, 10/4/2018); Arthroplasty hip (Right, 2020); and Arthroplasty carpometacarpal (thumb joint) (Right, 2023).  FAMILY HISTORY: family history includes C.A.D. in her mother; Hypertension in her father and mother; Substance Abuse in her brother.  SOCIAL HISTORY:   reports that she has never smoked. She has never used smokeless tobacco. She reports that she  "does not currently use alcohol. She reports that she does not use drugs.  Patient's living condition: lives with spouse    Post Discharge Medication Reconciliation Status:   MED REC REQUIRED  Post Medication Reconciliation Status: discharge medications reconciled, continue medications without change       Current Outpatient Medications   Medication Sig Dispense Refill     acetaminophen (TYLENOL) 500 MG tablet Take 1,000 mg by mouth 3 times daily       amLODIPine (NORVASC) 10 MG tablet Take 1 tablet (10 mg) by mouth daily 90 tablet 2     atenolol (TENORMIN) 50 MG tablet Take 1 tablet (50 mg) by mouth daily 90 tablet 3     gabapentin (NEURONTIN) 100 MG capsule Take 100 mg by mouth 3 times daily       hydrOXYzine kunal (VISTARIL) 25 MG capsule Take 25-50 mg by mouth every 4 hours       ibuprofen (ADVIL/MOTRIN) 200 MG tablet Take 400 mg by mouth every 6 hours as needed for moderate pain       melatonin 3 MG tablet Take 3 mg by mouth nightly as needed       methocarbamol (ROBAXIN) 750 MG tablet Take 750 mg by mouth 4 times daily       ondansetron (ZOFRAN ODT) 4 MG ODT tab Take 4 mg by mouth every 8 hours as needed       oxyCODONE (ROXICODONE) 5 MG tablet Take 0.5 mg by mouth every 4 hours as needed       senna-docusate (SENOKOT-S/PERICOLACE) 8.6-50 MG tablet Take 1 tablet by mouth as needed       sertraline (ZOLOFT) 50 MG tablet 1 tablet daily 90 tablet 3     tamsulosin (FLOMAX) 0.4 MG capsule Take 0.4 mg by mouth daily       traZODone (DESYREL) 50 MG tablet TAKE TWO TABLETS BY MOUTH NIGHTLY AS NEEDED FOR SLEEP. 180 tablet 2     Cholecalciferol (VITAMIN D) 125 MCG (5000 UT) CAPS Take 1 capsule (5,000 Units) by mouth daily 90 capsule 2     No current facility-administered medications for this visit.       ROS:  4 point ROS including Respiratory, CV, GI and , other than that noted in the HPI,  is negative    Vitals:  /67   Pulse 69   Temp 97.7  F (36.5  C)   Resp 16   Ht 1.575 m (5' 2\")   LMP 01/08/2008 " (Approximate)   SpO2 91%   BMI 36.58 kg/m    Exam:  GENERAL APPEARANCE:  in no distress, appears healthy  ENT:  Mouth and posterior oropharynx normal, moist mucous membranes  RESP:  lungs clear to auscultation , no respiratory distress  CV:  regular rate and rhythm, no murmur, rub, or gallop, peripheral edema mild+ in LLE  ABDOMEN:  bowel sounds normal  M/S:   Gait and station abnormal unsteady  SKIN:  Inspection of skin and subcutaneous tissue baseline  NEURO:   Examination of sensation by touch normal  PSYCH:  affect and mood normal    Lab/Diagnostic data:  Recent labs in [facility] reviewed by me today.     ASSESSMENT/PLAN:    (S82.142D) Closed fracture of left tibial plateau with routine healing, subsequent encounter  (primary encounter diagnosis)  (G93.41) Metabolic encephalopathy  (M62.81) Generalized muscle weakness  Comment: pain+, confusion clearing, deconditioned  Plan:   -PT/OT eval and treat  -discontinue blevins, scans TID, cath >500mL  -TEDs bilateral on am off pm  -make ortho appt  -OK have brace loose/off when in bed  -continue gabapentin, hydroxyzine, ibuprofen, methocarbamol, oxycodone    (I10) Essential hypertension with goal blood pressure less than 140/90  Comment: SBP's in the 120-140 range  Plan: continue amlodipine, atenolol  -daily vitals    (G47.00) Insomnia, unspecified type  Comment: sleeping well  Plan: continue melatonin and trazodone, both PRN's    (R33.9) Urine retention  (Z97.8) Blevins catheter present  Comment: started tamsulosin, blevins patent  Plan: remove blevins today  -BS TID, straight cath result >500mL  -if not successful replace blevins        Electronically signed by:  LORIE Schrader CNP                    Sincerely,        LORIE Schrader CNP

## 2024-05-06 NOTE — PROGRESS NOTES
[facility] GERIATRICS    PRIMARY CARE PROVIDER AND CLINIC:  Physician No Ref-Primary, No address on file  Chief Complaint   Patient presents with    Hospital F/U      [place] Medical Record Number:  [id number]  Place of Service where encounter took place:  [facility] [8]    Angela Ibarra  is a 63 year old  (1960), admitted to the above facility from  [facility] . Hospital stay 04/30/2024 through 05/05/2024..   HPI:    #. Comminuted and depressed fracture of lateral tibial plateau  #. Nondisplaced fracture of the anterior medial tibial plateau  -Orthopedic team recommended conservative management  -Left knee brace in place  - Continue pain management with Tylenol, oxycodone, Hydro morphine as needed, gabapentin, methocarbamol  - Continue bowel regimen with senna and MiraLAX  - Fall precaution and delirium protocol precaution  - Incentive spirometer  - PT/OT eval and treat    #. History of recurrent urinary tract infection  #. Asymptomatic bacteriuria, urine culture grew 30,000 colonies of ESBL which is likely colonization   - Patient was seen and evaluated by ID team.  - Recommend vaginal estrogen    #. Acute urinary retention, likely related to narcotic use and immobility  - Blevins catheter placed on 5/1  - Patient was seen and evaluated by neurology team.  - Recommend leaving Blevins in for now for bladder rest  - Voiding trial in approximately 1 week at the TCU if mobility improves  - Outpatient urology follow-up    #. Insomnia  #. Generalized anxiety disorder  -Continue PTA sertraline  -Hold PTA trazodone due to mitigate risk of oversedation    #. Essential hypertension  BP is soft, 138/59.  -Hold PTA amlodipine for now  -Continue PTA atenolol    Patient seen for follow up, appears healthy, pain+, blevins output WNL, previously no issues with urination except chronic UTI's, VS WNL, L leg mild edema with velcro leg brace on, NWB status, understandingly frustrated with  TCU stay so far, reported to HUNTER and nurse manager.    CODE STATUS/ADVANCE DIRECTIVES DISCUSSION:  Prior  on file  ALLERGIES:   Allergies   Allergen Reactions    Bactrim [Sulfamethoxazole-Trimethoprim]      Itching, racing heart. Only has sensitivity when taking long term. Does fine if she takes once in a while after intercourse      PAST MEDICAL HISTORY:   Past Medical History:   Diagnosis Date    Calculus of kidney     Migraine, unspecified, without mention of intractable migraine without mention of status migrainosus     Other specified iron deficiency anemias     Papanicolaou smear of cervix with low grade squamous intraepithelial lesion (LGSIL)     Colpo and hyst pathology benign    Primary osteoarthritis of right hip     Synovitis of ankle     Unspecified essential hypertension     Essential hypertension      PAST SURGICAL HISTORY:   has a past surgical history that includes surgical history of -  (4/20/10); surgical history of -  (1999); surgical history of -  (4/20/10);  DELIVERY ONLY (); LIGATE FALLOPIAN TUBE (2000); hysterectomy, pap no longer indicated (2008); tubal ligation; GASTRIC BYPASS,OBESITY,W/SM BOWEL RECONS (10/99); orthopedic surgery; Remove mesh vagina (10/10/2011); Laparoscopic cholecystectomy (8/3/2012); Open reduction internal fixation elbow (10/15/2013); Arthroscopy ankle (2014); Open reduction internal fixation ankle (2014); Colonoscopy with CO2 insufflation (N/A, 2017); Arthroscopy knee (Left, 10/4/2018); Arthroplasty hip (Right, 2020); and Arthroplasty carpometacarpal (thumb joint) (Right, 2023).  FAMILY HISTORY: family history includes C.A.D. in her mother; Hypertension in her father and mother; Substance Abuse in her brother.  SOCIAL HISTORY:   reports that she has never smoked. She has never used smokeless tobacco. She reports that she does not currently use alcohol. She reports that she does not use drugs.  Patient's living  "condition: lives with spouse    Post Discharge Medication Reconciliation Status:   MED REC REQUIRED  Post Medication Reconciliation Status: discharge medications reconciled, continue medications without change       Current Outpatient Medications   Medication Sig Dispense Refill    acetaminophen (TYLENOL) 500 MG tablet Take 1,000 mg by mouth 3 times daily      amLODIPine (NORVASC) 10 MG tablet Take 1 tablet (10 mg) by mouth daily 90 tablet 2    atenolol (TENORMIN) 50 MG tablet Take 1 tablet (50 mg) by mouth daily 90 tablet 3    gabapentin (NEURONTIN) 100 MG capsule Take 100 mg by mouth 3 times daily      hydrOXYzine kunal (VISTARIL) 25 MG capsule Take 25-50 mg by mouth every 4 hours      ibuprofen (ADVIL/MOTRIN) 200 MG tablet Take 400 mg by mouth every 6 hours as needed for moderate pain      melatonin 3 MG tablet Take 3 mg by mouth nightly as needed      methocarbamol (ROBAXIN) 750 MG tablet Take 750 mg by mouth 4 times daily      ondansetron (ZOFRAN ODT) 4 MG ODT tab Take 4 mg by mouth every 8 hours as needed      oxyCODONE (ROXICODONE) 5 MG tablet Take 0.5 mg by mouth every 4 hours as needed      senna-docusate (SENOKOT-S/PERICOLACE) 8.6-50 MG tablet Take 1 tablet by mouth as needed      sertraline (ZOLOFT) 50 MG tablet 1 tablet daily 90 tablet 3    tamsulosin (FLOMAX) 0.4 MG capsule Take 0.4 mg by mouth daily      traZODone (DESYREL) 50 MG tablet TAKE TWO TABLETS BY MOUTH NIGHTLY AS NEEDED FOR SLEEP. 180 tablet 2    Cholecalciferol (VITAMIN D) 125 MCG (5000 UT) CAPS Take 1 capsule (5,000 Units) by mouth daily 90 capsule 2     No current facility-administered medications for this visit.       ROS:  4 point ROS including Respiratory, CV, GI and , other than that noted in the HPI,  is negative    Vitals:  /67   Pulse 69   Temp 97.7  F (36.5  C)   Resp 16   Ht 1.575 m (5' 2\")   LMP 01/08/2008 (Approximate)   SpO2 91%   BMI 36.58 kg/m    Exam:  GENERAL APPEARANCE:  in no distress, appears healthy  ENT:  " Mouth and posterior oropharynx normal, moist mucous membranes  RESP:  lungs clear to auscultation , no respiratory distress  CV:  regular rate and rhythm, no murmur, rub, or gallop, peripheral edema mild+ in LLE  ABDOMEN:  bowel sounds normal  M/S:   Gait and station abnormal unsteady  SKIN:  Inspection of skin and subcutaneous tissue baseline  NEURO:   Examination of sensation by touch normal  PSYCH:  affect and mood normal    Lab/Diagnostic data:  Recent labs in [facility] reviewed by me today.     ASSESSMENT/PLAN:    (Q52.257D) Closed fracture of left tibial plateau with routine healing, subsequent encounter  (primary encounter diagnosis)  (G93.41) Metabolic encephalopathy  (M62.81) Generalized muscle weakness  Comment: pain+, confusion clearing, deconditioned  Plan:   -PT/OT eval and treat  -discontinue blevins, scans TID, cath >500mL  -TEDs bilateral on am off pm  -make ortho appt  -OK have brace loose/off when in bed  -continue gabapentin, hydroxyzine, ibuprofen, methocarbamol, oxycodone    (I10) Essential hypertension with goal blood pressure less than 140/90  Comment: SBP's in the 120-140 range  Plan: continue amlodipine, atenolol  -daily vitals    (G47.00) Insomnia, unspecified type  Comment: sleeping well  Plan: continue melatonin and trazodone, both PRN's    (R33.9) Urine retention  (Z97.8) Blevins catheter present  Comment: started tamsulosin, blevins patent  Plan: remove blevins today  -BS TID, straight cath result >500mL  -if not successful replace blevins        Electronically signed by:  LORIE Schrader CNP

## 2024-05-07 ENCOUNTER — LAB REQUISITION (OUTPATIENT)
Dept: LAB | Facility: CLINIC | Age: 64
End: 2024-05-07
Payer: COMMERCIAL

## 2024-05-07 DIAGNOSIS — A15.0 TUBERCULOSIS OF LUNG: ICD-10-CM

## 2024-05-08 PROCEDURE — 36415 COLL VENOUS BLD VENIPUNCTURE: CPT | Mod: ORL | Performed by: NURSE PRACTITIONER

## 2024-05-08 PROCEDURE — P9603 ONE-WAY ALLOW PRORATED MILES: HCPCS | Mod: ORL | Performed by: NURSE PRACTITIONER

## 2024-05-08 PROCEDURE — 86481 TB AG RESPONSE T-CELL SUSP: CPT | Mod: ORL | Performed by: NURSE PRACTITIONER

## 2024-05-09 ENCOUNTER — TRANSITIONAL CARE UNIT VISIT (OUTPATIENT)
Dept: GERIATRICS | Facility: CLINIC | Age: 64
End: 2024-05-09
Payer: COMMERCIAL

## 2024-05-09 VITALS
SYSTOLIC BLOOD PRESSURE: 137 MMHG | DIASTOLIC BLOOD PRESSURE: 74 MMHG | BODY MASS INDEX: 40.85 KG/M2 | RESPIRATION RATE: 16 BRPM | TEMPERATURE: 97.7 F | WEIGHT: 222 LBS | HEIGHT: 62 IN | OXYGEN SATURATION: 96 % | HEART RATE: 64 BPM

## 2024-05-09 DIAGNOSIS — M25.462 EFFUSION, LEFT KNEE: ICD-10-CM

## 2024-05-09 DIAGNOSIS — R21 RASH: ICD-10-CM

## 2024-05-09 DIAGNOSIS — S82.142D CLOSED FRACTURE OF LEFT TIBIAL PLATEAU WITH ROUTINE HEALING, SUBSEQUENT ENCOUNTER: Primary | ICD-10-CM

## 2024-05-09 DIAGNOSIS — R33.9 URINE RETENTION: ICD-10-CM

## 2024-05-09 LAB
QUANTIFERON MITOGEN: 0.98 IU/ML
QUANTIFERON NIL TUBE: 0 IU/ML
QUANTIFERON TB1 TUBE: 0 IU/ML
QUANTIFERON TB2 TUBE: 0

## 2024-05-09 PROCEDURE — 99309 SBSQ NF CARE MODERATE MDM 30: CPT | Performed by: NURSE PRACTITIONER

## 2024-05-09 RX ORDER — NYSTATIN 100000 [USP'U]/G
POWDER TOPICAL 2 TIMES DAILY
Status: SHIPPED
Start: 2024-05-09

## 2024-05-09 NOTE — PROGRESS NOTES
"[facility] GERIATRICS    Chief Complaint   Patient presents with    RECHECK     HPI:  Angela Ibarra is a 63 year old  (1960), who is being seen today for an episodic care visit at: [facility] [8]. Today's concern is:   1. Closed fracture of left tibial plateau with routine healing, subsequent encounter    2. Urine retention    3. Rash      Patient seen for follow up, oriented, encephalopathy clearing, L leg pain controlled, patient concerned about recent CT and possibly needing alternative intervention other than leg brace, trial void 5/6 successful, newer pink irritated rash at coccyx/upper buttocks, overall healthy.    Allergies, and PMH/PSH reviewed in EPIC today.  REVIEW OF SYSTEMS:  4 point ROS including Respiratory, CV, GI and , other than that noted in the HPI,  is negative    Objective:   /74   Pulse 64   Temp 97.7  F (36.5  C)   Resp 16   Ht 1.575 m (5' 2\")   Wt 100.7 kg (222 lb)   LMP 01/08/2008 (Approximate)   SpO2 96%   BMI 40.60 kg/m    GENERAL APPEARANCE:  in no distress, appears healthy  ENT:  Mouth and posterior oropharynx normal, moist mucous membranes  RESP:  lungs clear to auscultation , no respiratory distress  CV:  regular rate and rhythm, no murmur, rub, or gallop, peripheral edema mild-1+ in lower legs  ABDOMEN:  bowel sounds normal  M/S:   Gait and station abnormal unsteady  SKIN:  Inspection of skin and subcutaneous tissue baseline  NEURO:   Examination of sensation by touch normal  PSYCH:  affect and mood normal    Labs done in SNF are in [facility]. Please refer to them using [facility]Care Everywhere.    Assessment/Plan:  (J86.970N) Closed fracture of left tibial plateau with routine healing, subsequent encounter  (primary encounter diagnosis)  Comment: pain controlled, wearing brace  Plan: PT/OT working with  -OK to have brace off in bed  -continue gabapentin, hydroxyzine, ibuprofen, methocarbamol and oxycodone; effective and patient " "would prefer to not wean anything yet  -follow up ortho/CT results on 5/14    (R33.9) Urine retention  Comment: post hospital retention, trial void successful  Plan: discontinue bladder scans    (R21) Rash  Comment: newer on coccyx/buttocks  Plan:   -start nystatin powder, apply to rashy area BID    MED REC REQUIRED  Post Medication Reconciliation Status: medication reconcilation previously completed during another office visit        Electronically signed by: LORIE Schrader CNP          Documentation of Face-to-Face and Certification for DME     Patient: Angela Ibarra   YOB: 1960  MR Number: [id number]  Today's Date: 5/20/2024    I certify that patient: Angela Ibarra is under my care and that I, or a nurse practitioner or physician's assistant working with me, had a face-to-face encounter that meets the physician face-to-face encounter requirements with this patient on: 5/9/2024.    This encounter with the patient was in whole, or in part, for the following medical condition, which is the primary reason for DME:   1. Closed fracture of left tibial plateau with routine healing, subsequent encounter    2. Effusion, left knee    3. Urine retention    4. Rash        I certify that, based on my findings, the following DME is medically necessary:      Wheelchair, non-standard, elevating leg rests, seat cushion, width 20\", seat depth 16\", ney-height 17\"    5'2\"  222lbs  ANDREW lifetime    The patient has a mobility limitation that significantly impairs his/her ability to participate in one or more mobility-related activities of daily living (MRADLs) and that use of a manual wheelchair will significantly improve the patient's ability to participate in MRADLs including toilet, bathing, eating..  The patient's mobility limitation cannot be sufficiently resolved by the use of an appropriately fitted cane or walker, unable to stand.  The patient's home provides adequate access between " rooms, maneuvering space, and surfaces for use of the manual wheelchair that is provided.  The patient has not expressed an unwillingness to use the manual wheelchair that is provided in the home.  The patient has sufficient upper extremity function and other physical and mental capabilities needed to safely self-propel or has a caregiver who is available, willing and able to provide assistance.    The patient is under my care, and I have initiated the establishment of the plan of care.  This patient will be followed by a physician who will periodically review the plan of care.  Physician/Provider to provide follow up care: Kehr, Kristen M    Responsible Medicare certified P[employer] Physician: Cuauhtemoc Albarran NP  NPI [id number]  Electronic Physician Signature  Date: 5/9/2024

## 2024-05-09 NOTE — LETTER
"    5/9/2024        RE: Angela Ibarra  [address] [place] [address]        [facility] GERIATRICS    Chief Complaint   Patient presents with     RECHECK     HPI:  Angela Ibarra is a 63 year old  (1960), who is being seen today for an episodic care visit at: [facility] ([facility]) [8]. Today's concern is:   1. Closed fracture of left tibial plateau with routine healing, subsequent encounter    2. Urine retention    3. Rash      Patient seen for follow up, oriented, encephalopathy clearing, L leg pain controlled, patient concerned about recent CT and possibly needing alternative intervention other than leg brace, trial void 5/6 successful, newer pink irritated rash at coccyx/upper buttocks, overall healthy.    Allergies, and PMH/PSH reviewed in EPIC today.  REVIEW OF SYSTEMS:  4 point ROS including Respiratory, CV, GI and , other than that noted in the HPI,  is negative    Objective:   /74   Pulse 64   Temp 97.7  F (36.5  C)   Resp 16   Ht 1.575 m (5' 2\")   Wt 100.7 kg (222 lb)   LMP 01/08/2008 (Approximate)   SpO2 96%   BMI 40.60 kg/m    GENERAL APPEARANCE:  in no distress, appears healthy  ENT:  Mouth and posterior oropharynx normal, moist mucous membranes  RESP:  lungs clear to auscultation , no respiratory distress  CV:  regular rate and rhythm, no murmur, rub, or gallop, peripheral edema mild-1+ in lower legs  ABDOMEN:  bowel sounds normal  M/S:   Gait and station abnormal unsteady  SKIN:  Inspection of skin and subcutaneous tissue baseline  NEURO:   Examination of sensation by touch normal  PSYCH:  affect and mood normal    Labs done in SNF are in [facility]. Please refer to them using [employer]/Care Everywhere.    Assessment/Plan:  (Y71.752D) Closed fracture of left tibial plateau with routine healing, subsequent encounter  (primary encounter diagnosis)  Comment: pain controlled, wearing brace  Plan: PT/OT working with  -OK to have brace off in bed  -continue " "gabapentin, hydroxyzine, ibuprofen, methocarbamol and oxycodone; effective and patient would prefer to not wean anything yet  -follow up ortho/CT results on 5/14    (R33.9) Urine retention  Comment: post hospital retention, trial void successful  Plan: discontinue bladder scans    (R21) Rash  Comment: newer on coccyx/buttocks  Plan:   -start nystatin powder, apply to rashy area BID    MED REC REQUIRED  Post Medication Reconciliation Status: medication reconcilation previously completed during another office visit        Electronically signed by: LORIE Schrader CNP          Documentation of Face-to-Face and Certification for DME     Patient: Angela Iabrra   YOB: 1960  MR Number: [id number]  Today's Date: 5/20/2024    I certify that patient: Angela Ibarra is under my care and that I, or a nurse practitioner or physician's assistant working with me, had a face-to-face encounter that meets the physician face-to-face encounter requirements with this patient on: 5/9/2024.    This encounter with the patient was in whole, or in part, for the following medical condition, which is the primary reason for DME:   1. Closed fracture of left tibial plateau with routine healing, subsequent encounter    2. Effusion, left knee    3. Urine retention    4. Rash        I certify that, based on my findings, the following DME is medically necessary:      Wheelchair, non-standard, elevating leg rests, seat cushion, width 20\", seat depth 16\", ney-height 17\"    5'2\"  222lbs  ANDREW lifetime    The patient has a mobility limitation that significantly impairs his/her ability to participate in one or more mobility-related activities of daily living (MRADLs) and that use of a manual wheelchair will significantly improve the patient's ability to participate in MRADLs including toilet, bathing, eating..  The patient's mobility limitation cannot be sufficiently resolved by the use of an appropriately fitted " cane or walker, unable to stand.  The patient's home provides adequate access between rooms, maneuvering space, and surfaces for use of the manual wheelchair that is provided.  The patient has not expressed an unwillingness to use the manual wheelchair that is provided in the home.  The patient has sufficient upper extremity function and other physical and mental capabilities needed to safely self-propel or has a caregiver who is available, willing and able to provide assistance.    The patient is under my care, and I have initiated the establishment of the plan of care.  This patient will be followed by a physician who will periodically review the plan of care.  Physician/Provider to provide follow up care: Kehr, Kristen M    Responsible Medicare certified PECOS Physician: Cuauhtemoc Albarran NP  NPI [id number]  Electronic Physician Signature  Date: 5/9/2024        Sincerely,        LORIE Schrader CNP

## 2024-05-09 NOTE — LETTER
"    5/9/2024        RE: Angela Ibarra  [address] [place] [address]        [facility] GERIATRICS    Chief Complaint   Patient presents with     RECHECK     HPI:  Angela Ibarra is a 63 year old  (1960), who is being seen today for an episodic care visit at: [facility] ([facility]) [8]. Today's concern is:   1. Closed fracture of left tibial plateau with routine healing, subsequent encounter    2. Urine retention    3. Rash      Patient seen for follow up, oriented, encephalopathy clearing, L leg pain controlled, patient concerned about recent CT and possibly needing alternative intervention other than leg brace, trial void 5/6 successful, newer pink irritated rash at coccyx/upper buttocks, overall healthy.    Allergies, and PMH/PSH reviewed in EPIC today.  REVIEW OF SYSTEMS:  4 point ROS including Respiratory, CV, GI and , other than that noted in the HPI,  is negative    Objective:   /74   Pulse 64   Temp 97.7  F (36.5  C)   Resp 16   Ht 1.575 m (5' 2\")   Wt 100.7 kg (222 lb)   LMP 01/08/2008 (Approximate)   SpO2 96%   BMI 40.60 kg/m    GENERAL APPEARANCE:  in no distress, appears healthy  ENT:  Mouth and posterior oropharynx normal, moist mucous membranes  RESP:  lungs clear to auscultation , no respiratory distress  CV:  regular rate and rhythm, no murmur, rub, or gallop, peripheral edema mild-1+ in lower legs  ABDOMEN:  bowel sounds normal  M/S:   Gait and station abnormal unsteady  SKIN:  Inspection of skin and subcutaneous tissue baseline  NEURO:   Examination of sensation by touch normal  PSYCH:  affect and mood normal    Labs done in SNF are in [facility]. Please refer to them using [employer]/Care Everywhere.    Assessment/Plan:  (O69.434Y) Closed fracture of left tibial plateau with routine healing, subsequent encounter  (primary encounter diagnosis)  Comment: pain controlled, wearing brace  Plan: PT/OT working with  -OK to have brace off in bed  -continue " gabapentin, hydroxyzine, ibuprofen, methocarbamol and oxycodone; effective and patient would prefer to not wean anything yet  -follow up ortho/CT results on 5/14    (R33.9) Urine retention  Comment: post hospital retention, trial void successful  Plan: discontinue bladder scans    (R21) Rash  Comment: newer on coccyx/buttocks  Plan:   -start nystatin powder, apply to rashy area BID    MED REC REQUIRED  Post Medication Reconciliation Status: medication reconcilation previously completed during another office visit        Electronically signed by: LORIE Schrader CNP          Sincerely,        LORIE Schrader CNP

## 2024-05-10 LAB
GAMMA INTERFERON BACKGROUND BLD IA-ACNC: 0 IU/ML
M TB IFN-G BLD-IMP: NEGATIVE
M TB IFN-G CD4+ BCKGRND COR BLD-ACNC: 0.98 IU/ML
MITOGEN IGNF BCKGRD COR BLD-ACNC: 0 IU/ML
MITOGEN IGNF BCKGRD COR BLD-ACNC: 0 IU/ML

## 2024-05-13 ENCOUNTER — MYC MEDICAL ADVICE (OUTPATIENT)
Dept: FAMILY MEDICINE | Facility: CLINIC | Age: 64
End: 2024-05-13
Payer: COMMERCIAL

## 2024-05-13 NOTE — TELEPHONE ENCOUNTER
Routing to provider to update, see [employer] message below.  Care Everywhere updated, records from [facility], as well as TCU records are available to view in chart. Last BP recorded was on 5/9/24, 137/74.  No future apt's scheduled at this time.  
Alert and oriented to person, place, time/situation. normal mood and affect. no apparent risk to self or others.

## 2024-05-14 ENCOUNTER — TRANSITIONAL CARE UNIT VISIT (OUTPATIENT)
Dept: GERIATRICS | Facility: CLINIC | Age: 64
End: 2024-05-14
Payer: COMMERCIAL

## 2024-05-14 VITALS
HEART RATE: 76 BPM | DIASTOLIC BLOOD PRESSURE: 73 MMHG | BODY MASS INDEX: 40.6 KG/M2 | RESPIRATION RATE: 16 BRPM | OXYGEN SATURATION: 95 % | WEIGHT: 222 LBS | TEMPERATURE: 97.6 F | SYSTOLIC BLOOD PRESSURE: 148 MMHG

## 2024-05-14 DIAGNOSIS — N39.0 RECURRENT UTI: ICD-10-CM

## 2024-05-14 DIAGNOSIS — S82.142D CLOSED FRACTURE OF LEFT TIBIAL PLATEAU WITH ROUTINE HEALING, SUBSEQUENT ENCOUNTER: Primary | ICD-10-CM

## 2024-05-14 DIAGNOSIS — I10 ESSENTIAL HYPERTENSION WITH GOAL BLOOD PRESSURE LESS THAN 140/90: ICD-10-CM

## 2024-05-14 DIAGNOSIS — R53.81 PHYSICAL DECONDITIONING: ICD-10-CM

## 2024-05-14 DIAGNOSIS — G47.00 INSOMNIA, UNSPECIFIED TYPE: ICD-10-CM

## 2024-05-14 PROCEDURE — 99316 NF DSCHRG MGMT 30 MIN+: CPT | Performed by: NURSE PRACTITIONER

## 2024-05-14 NOTE — LETTER
5/14/2024        RE: Angela Ibarra  [address] [place] [address]        [facility] GERIATRICS DISCHARGE SUMMARY  PATIENT'S NAME: Angela Ibarra  YOB: 1960  MEDICAL RECORD NUMBER:  [id number]  Place of Service where encounter took place:  [facility] ([facility]) [8]    PRIMARY CARE PROVIDER AND CLINIC RESPONSIBLE AFTER TRANSFER:   Kristen Kehr, PA-C, M [facility]   FM Provider     Transferring providers: LORIE Becerra CNP, Radha Alcaraz MD  Recent Hospitalization/ED:  [facility]  stay 4/30/24 to 5/5/24.  Date of SNF Admission:  5/5/24  Date of SNF (anticipated) Discharge:  5/17/24  Discharged to: previous independent home  Cognitive Scores: SLUMS: 28/30  Physical Function: Ambulating 65 feet with 2WW with assistance x 1. CGA with transfer, dressing, and toileting. Set-up for eating on a regular diet.  DME: Will require rental wheelchair for short term use    CODE STATUS/ADVANCE DIRECTIVES DISCUSSION:  Full Code   ALLERGIES: Bactrim [sulfamethoxazole-trimethoprim]    NURSING FACILITY COURSE   Medication Changes/Rationale:   5/9/24 - Nystatin Powder added for treatment of coccyx rash  5/14/24 - Discontinue Hydroxyzine d/t non-use  5/14/24 - Discontinue Ibuprofen d/t non-use    Summary of nursing facility stay:   This is a 63-year-old female, with a past medical history significant for morbid obesity, generalized anxiety disorder, insomnia, hypertension, recurrent UTIs, osteoarthritis, and balance disorder, who was admitted to [facility] 4/30/24 through 5/5/24 with a slight headache, neck and left knee pain after a fall. A left knee x-ray revealed proximal tibial fracture extending into the tibial plateau and joint space. A left lower extremity CT revealed comminuted and depressed fracture of the lateral tibial plateau with extension posteriorly through the metaphysis. Small nondisplaced fracture of  the anterior medical tibial plateau. Ortho recommended conservative management with left knee brace. A blevins catheter was placed for urinary retention. A TCU stay was recommended for physical rehabilitation.    Comminuted and Depressed Fracture of the Lateral Tibial Plateau with Nondisplaced Fracture of the Anterior Medial Tibial Plateau. Follow-up with [facility], Dr. Calvillo, later today. NWB to RLE. Brace to remain in place. Acetaminophen and Oxycodone ordered for pain. Home Physical and Occupational Therapy ordered. Consider Vitamin D supplement to help with healing.    Recent Urinary Retention with History of Recurrent Urinary Tract Infection and ESBL. Blevins catheter placed during hospitalization and successfully removed 5/6/24. Started on Tamsulosin during hospitalization.     Hypertension. With some soft blood pressures during hospitalization. Upon review of blood pressures over the past 5 days, systolic range from 109-151, most < 140. Diastolic range from 64-74. Continue Amlodipine and Atenolol as ordered.    Insomnia and Generalized Anxiety Disorder. Continue Melatonin, Trazodone, and Sertraline as ordered. Patient has not utilized Trazodone during TCU stay, but reports she uses Trazodone PRN at home.     Physical Deconditioning. Secondary to recent fall, co-morbidities, and hospitalization. Home Physical and Occupational Therapy ordered.    Discharge Medications:  Current Outpatient Medications   Medication Sig Dispense Refill     acetaminophen (TYLENOL) 500 MG tablet Take 1,000 mg by mouth 3 times daily       amLODIPine (NORVASC) 10 MG tablet Take 1 tablet (10 mg) by mouth daily 90 tablet 2     atenolol (TENORMIN) 50 MG tablet Take 1 tablet (50 mg) by mouth daily 90 tablet 3     gabapentin (NEURONTIN) 100 MG capsule Take 100 mg by mouth 3 times daily       melatonin 3 MG tablet Take 3 mg by mouth nightly as needed       methocarbamol (ROBAXIN) 750 MG tablet Take 750 mg by mouth 4 times daily        nystatin (MYCOSTATIN) 267067 UNIT/GM external powder Apply topically 2 times daily       ondansetron (ZOFRAN ODT) 4 MG ODT tab Take 4 mg by mouth every 8 hours as needed       oxyCODONE (ROXICODONE) 5 MG tablet Take 0.5-1 tablets (2.5-5 mg) by mouth every 4 hours as needed 20 tablet 0     senna-docusate (SENOKOT-S/PERICOLACE) 8.6-50 MG tablet Take 1 tablet by mouth as needed       sertraline (ZOLOFT) 50 MG tablet 1 tablet daily 90 tablet 3     tamsulosin (FLOMAX) 0.4 MG capsule Take 0.4 mg by mouth daily       traZODone (DESYREL) 50 MG tablet TAKE ONE TABLET BY MOUTH NIGHTLY AS NEEDED FOR SLEEP.        Controlled medications:   Medication Oxycodone electronically prescribed to [facility] pharmacy     Past Medical History:   Past Medical History:   Diagnosis Date     Calculus of kidney      Migraine, unspecified, without mention of intractable migraine without mention of status migrainosus      Other specified iron deficiency anemias      Papanicolaou smear of cervix with low grade squamous intraepithelial lesion (LGSIL) 11/07    Colpo and hyst pathology benign     Primary osteoarthritis of right hip      Synovitis of ankle      Unspecified essential hypertension 1999    Essential hypertension     Physical Exam:   Vitals: BP (!) 148/73   Pulse 76   Temp 97.6  F (36.4  C)   Resp 16   Wt 100.7 kg (222 lb)   LMP 01/08/2008 (Approximate)   SpO2 95%   BMI 40.60 kg/m    BMI: Body mass index is 40.6 kg/m .  GENERAL APPEARANCE:  Alert, in no distress  RESP:  no respiratory distress  PSYCH:  affect and mood normal     SNF labs: Labs done in SNF are in [place] EPIC. Please refer to them using EPIC/Care Everywhere.    DISCHARGE PLAN:  Follow up labs: No labs orders/due  Medical Follow Up:      Follow up with primary care provider in 1-2 weeks     Discharge Services: Home Care:  Occupational Therapy and Physical Therapy    TOTAL DISCHARGE TIME:   Greater than 30 minutes  Electronically signed by:  LORIE Becerra  CNP         Documentation of Face-to-Face and Certification for Home Health Services     Patient: Angela Ibarra   YOB: 1960  MR Number: [id number]  Today's Date: 5/14/2024    I certify that patient: Angela Ibarra is under my care and that I, or a nurse practitioner or physician's assistant working with me, had a face-to-face encounter that meets the physician face-to-face encounter requirements with this patient on: 5/14/24.    This encounter with the patient was in whole, or in part, for the following medical condition, which is the primary reason for home health care: 5/14/24.    I certify that, based on my findings, the following services are medically necessary home health services: Occupational Therapy and Physical Therapy.    My clinical findings support the need for the above services because: Occupational Therapy Services are needed to assess and treat cognitive ability and address ADL safety due to impairment in closed fracture of medial portion of left tibial plateau. and Physical Therapy Services are needed to assess and treat the following functional impairments: closed fracture of medial portion of left tibial plateau.    Further, I certify that my clinical findings support that this patient is homebound (i.e. absences from home require considerable and taxing effort and are for medical reasons or Jainism services or infrequently or of short duration when for other reasons) because: Requires assistance of another person or specialized equipment to access medical services because patient: Is unable to walk greater than 300 feet without rest...    Based on the above findings. I certify that this patient is confined to the home and needs intermittent skilled nursing care, physical therapy and/or speech therapy.  The patient is under my care, and I have initiated the establishment of the plan of care.  This patient will be followed by a physician who will periodically review the  plan of care.  Physician/Provider to provide follow up care: Kehr, Kristen M    Responsible Medicare certified PECOS Physician: Dr. Alcaraz  Physician Signature: See electronic signature associated with these discharge orders.  Date: 5/14/2024              Sincerely,        LORIE Becerra CNP

## 2024-05-14 NOTE — PROGRESS NOTES
[facility] GERIATRICS DISCHARGE SUMMARY  PATIENT'S NAME: Angela Ibarra  YOB: 1960  MEDICAL RECORD NUMBER:  [id number]  Place of Service where encounter took place:  [facility] ([facility]) [8]    PRIMARY CARE PROVIDER AND CLINIC RESPONSIBLE AFTER TRANSFER:   Kristen Kehr, PA-C, M [facility]   FM Provider     Transferring providers: LORIE Becerra CNP, Radha Alcaraz MD  Recent Hospitalization/ED:  Hospital  [facility]  stay 4/30/24 to 5/5/24.  Date of SNF Admission:  5/5/24  Date of SNF (anticipated) Discharge:  5/17/24  Discharged to: previous independent home  Cognitive Scores: SLUMS: 28/30  Physical Function: Ambulating 65 feet with 2WW with assistance x 1. CGA with transfer, dressing, and toileting. Set-up for eating on a regular diet.  DME: Will require rental wheelchair for short term use    CODE STATUS/ADVANCE DIRECTIVES DISCUSSION:  Full Code   ALLERGIES: Bactrim [sulfamethoxazole-trimethoprim]    NURSING FACILITY COURSE   Medication Changes/Rationale:   5/9/24 - Nystatin Powder added for treatment of coccyx rash  5/14/24 - Discontinue Hydroxyzine d/t non-use  5/14/24 - Discontinue Ibuprofen d/t non-use    Summary of nursing facility stay:   This is a 63-year-old female, with a past medical history significant for morbid obesity, generalized anxiety disorder, insomnia, hypertension, recurrent UTIs, osteoarthritis, and balance disorder, who was admitted to [facility] 4/30/24 through 5/5/24 with a slight headache, neck and left knee pain after a fall. A left knee x-ray revealed proximal tibial fracture extending into the tibial plateau and joint space. A left lower extremity CT revealed comminuted and depressed fracture of the lateral tibial plateau with extension posteriorly through the metaphysis. Small nondisplaced fracture of the anterior medical tibial plateau. Ortho recommended conservative management with  left knee brace. A blevins catheter was placed for urinary retention. A TCU stay was recommended for physical rehabilitation.    Comminuted and Depressed Fracture of the Lateral Tibial Plateau with Nondisplaced Fracture of the Anterior Medial Tibial Plateau. Follow-up with [facility], Dr. Calvillo, later today. NWB to RLE. Brace to remain in place. Acetaminophen and Oxycodone ordered for pain. Home Physical and Occupational Therapy ordered. Consider Vitamin D supplement to help with healing.    Recent Urinary Retention with History of Recurrent Urinary Tract Infection and ESBL. Blevins catheter placed during hospitalization and successfully removed 5/6/24. Started on Tamsulosin during hospitalization.     Hypertension. With some soft blood pressures during hospitalization. Upon review of blood pressures over the past 5 days, systolic range from 109-151, most < 140. Diastolic range from 64-74. Continue Amlodipine and Atenolol as ordered.    Insomnia and Generalized Anxiety Disorder. Continue Melatonin, Trazodone, and Sertraline as ordered. Patient has not utilized Trazodone during TCU stay, but reports she uses Trazodone PRN at home.     Physical Deconditioning. Secondary to recent fall, co-morbidities, and hospitalization. Home Physical and Occupational Therapy ordered.    Discharge Medications:  Current Outpatient Medications   Medication Sig Dispense Refill    acetaminophen (TYLENOL) 500 MG tablet Take 1,000 mg by mouth 3 times daily      amLODIPine (NORVASC) 10 MG tablet Take 1 tablet (10 mg) by mouth daily 90 tablet 2    atenolol (TENORMIN) 50 MG tablet Take 1 tablet (50 mg) by mouth daily 90 tablet 3    gabapentin (NEURONTIN) 100 MG capsule Take 100 mg by mouth 3 times daily      melatonin 3 MG tablet Take 3 mg by mouth nightly as needed      methocarbamol (ROBAXIN) 750 MG tablet Take 750 mg by mouth 4 times daily      nystatin (MYCOSTATIN) 824577 UNIT/GM external powder Apply topically 2 times daily       ondansetron (ZOFRAN ODT) 4 MG ODT tab Take 4 mg by mouth every 8 hours as needed      oxyCODONE (ROXICODONE) 5 MG tablet Take 0.5-1 tablets (2.5-5 mg) by mouth every 4 hours as needed 20 tablet 0    senna-docusate (SENOKOT-S/PERICOLACE) 8.6-50 MG tablet Take 1 tablet by mouth as needed      sertraline (ZOLOFT) 50 MG tablet 1 tablet daily 90 tablet 3    tamsulosin (FLOMAX) 0.4 MG capsule Take 0.4 mg by mouth daily      traZODone (DESYREL) 50 MG tablet TAKE ONE TABLET BY MOUTH NIGHTLY AS NEEDED FOR SLEEP.      aspirin (ASA) 81 MG chewable tablet Take 1 tablet (81 mg) by mouth 2 times daily        Controlled medications:   Medication Oxycodone electronically prescribed to [facility] pharmacy     Past Medical History:   Past Medical History:   Diagnosis Date    Calculus of kidney     Migraine, unspecified, without mention of intractable migraine without mention of status migrainosus     Other specified iron deficiency anemias     Papanicolaou smear of cervix with low grade squamous intraepithelial lesion (LGSIL) 11/07    Colpo and hyst pathology benign    Primary osteoarthritis of right hip     Synovitis of ankle     Unspecified essential hypertension 1999    Essential hypertension     Physical Exam:   Vitals: BP (!) 148/73   Pulse 76   Temp 97.6  F (36.4  C)   Resp 16   Wt 100.7 kg (222 lb)   LMP 01/08/2008 (Approximate)   SpO2 95%   BMI 40.60 kg/m    BMI: Body mass index is 40.6 kg/m .  GENERAL APPEARANCE:  Alert, in no distress  RESP:  no respiratory distress  PSYCH:  affect and mood normal     SNF labs: Labs done in SNF are in [place] EPIC. Please refer to them using EPIC/Care Everywhere.    DISCHARGE PLAN:  Follow up labs: No labs orders/due  Medical Follow Up:      Follow up with primary care provider in 1-2 weeks     Discharge Services: Home Care:  Occupational Therapy and Physical Therapy    TOTAL DISCHARGE TIME:   Greater than 30 minutes  Electronically signed by:  LORIE Becerra CNP          Documentation of Face-to-Face and Certification for Home Health Services     Patient: Angela Ibarra   YOB: 1960  MR Number: [id number]  Today's Date: 5/14/2024    I certify that patient: Angela Ibarra is under my care and that I, or a nurse practitioner or physician's assistant working with me, had a face-to-face encounter that meets the physician face-to-face encounter requirements with this patient on: 5/14/24.    This encounter with the patient was in whole, or in part, for the following medical condition, which is the primary reason for home health care: 5/14/24.    I certify that, based on my findings, the following services are medically necessary home health services: Occupational Therapy and Physical Therapy.    My clinical findings support the need for the above services because: Occupational Therapy Services are needed to assess and treat cognitive ability and address ADL safety due to impairment in closed fracture of medial portion of left tibial plateau. and Physical Therapy Services are needed to assess and treat the following functional impairments: closed fracture of medial portion of left tibial plateau.    Further, I certify that my clinical findings support that this patient is homebound (i.e. absences from home require considerable and taxing effort and are for medical reasons or Jew services or infrequently or of short duration when for other reasons) because: Requires assistance of another person or specialized equipment to access medical services because patient: Is unable to walk greater than 300 feet without rest...    Based on the above findings. I certify that this patient is confined to the home and needs intermittent skilled nursing care, physical therapy and/or speech therapy.  The patient is under my care, and I have initiated the establishment of the plan of care.  This patient will be followed by a physician who will periodically review the plan  of care.  Physician/Provider to provide follow up care: Kehr, Kristen M    Responsible Medicare certified PECOS Physician: Dr. Alcaraz  Physician Signature: See electronic signature associated with these discharge orders.  Date: 5/14/2024

## 2024-05-17 DIAGNOSIS — M25.571 PAIN IN JOINT INVOLVING RIGHT ANKLE AND FOOT: Primary | ICD-10-CM

## 2024-05-17 RX ORDER — OXYCODONE HYDROCHLORIDE 5 MG/1
2.5-5 TABLET ORAL EVERY 4 HOURS PRN
Qty: 20 TABLET | Refills: 0 | Status: SHIPPED | OUTPATIENT
Start: 2024-05-17 | End: 2024-06-05

## 2024-05-17 RX ORDER — ASPIRIN 81 MG/1
81 TABLET, CHEWABLE ORAL 2 TIMES DAILY
COMMUNITY
Start: 2024-05-17

## 2024-05-17 RX ORDER — TRAZODONE HYDROCHLORIDE 50 MG/1
TABLET, FILM COATED ORAL
COMMUNITY
Start: 2024-05-17

## 2024-05-17 NOTE — TELEPHONE ENCOUNTER
[facility]   May 17, 2024     Name: Angela Ibarra   : 1960     Facility requesting Oxycodone prescription be sent to patient's pharmacy. Per review of facility medication list, Aspirin 81 mg PO BID started by Ortho 24 for DVT prophylaxis. Med list updated.    Electronically signed by LORIE Becerra CNP

## 2024-05-22 ENCOUNTER — MEDICAL CORRESPONDENCE (OUTPATIENT)
Dept: HEALTH INFORMATION MANAGEMENT | Facility: CLINIC | Age: 64
End: 2024-05-22

## 2024-05-31 ENCOUNTER — TELEPHONE (OUTPATIENT)
Dept: FAMILY MEDICINE | Facility: CLINIC | Age: 64
End: 2024-05-31
Payer: COMMERCIAL

## 2024-05-31 NOTE — TELEPHONE ENCOUNTER
:  Please contact this patient to schedule a video visit for hospital follow up within the next week. She does not need to come in to the clinic because she is home bound with a broken leg. I think I have to have seen her for the follow up in order to sign this paperwork.   Okay to use a provider access if needed.   Kristen Kehr PA-C

## 2024-05-31 NOTE — TELEPHONE ENCOUNTER
Forms/Letter Request    Type of form/letter: Home Health Certification    Do we have the form/letter: Yes: Placed in providers basket for review    Who is the form from? Home care    Where did/will the form come from? form was faxed in    When is form/letter needed by: n/a    How would you like the form/letter returned: Fax :      Please route back to  when completed.    Annel PUENTE,    [facility]

## 2024-06-04 ENCOUNTER — TELEPHONE (OUTPATIENT)
Dept: FAMILY MEDICINE | Facility: CLINIC | Age: 64
End: 2024-06-04
Payer: COMMERCIAL

## 2024-06-04 DIAGNOSIS — Z53.9 DIAGNOSIS NOT YET DEFINED: Primary | ICD-10-CM

## 2024-06-04 PROCEDURE — G0180 MD CERTIFICATION HHA PATIENT: HCPCS | Performed by: PHYSICIAN ASSISTANT

## 2024-06-04 NOTE — TELEPHONE ENCOUNTER
Reason for Call:  Form, our goal is to have forms completed with 72 hours, however, some forms may require a visit or additional information.    Type of letter, form or note:  Home Health Certification    Who is the form from?: Home care/[facility]    Where did the form come from: form was faxed in    What clinic location was the form placed at?: [place]    Where the form was placed:  Kristen Kehr's Box/Folder    What number is listed as a contact on the form?: [phone]       Additional comments: Once complete, please route back to TC.  Thank you!    Call taken on 6/4/2024 at 10:13 AM by Nida Vences MA

## 2024-06-05 ENCOUNTER — TRANSFERRED RECORDS (OUTPATIENT)
Dept: HEALTH INFORMATION MANAGEMENT | Facility: CLINIC | Age: 64
End: 2024-06-05

## 2024-06-05 ENCOUNTER — VIRTUAL VISIT (OUTPATIENT)
Dept: FAMILY MEDICINE | Facility: CLINIC | Age: 64
End: 2024-06-05
Payer: COMMERCIAL

## 2024-06-05 DIAGNOSIS — Z09 HOSPITAL DISCHARGE FOLLOW-UP: Primary | ICD-10-CM

## 2024-06-05 DIAGNOSIS — S82.131D CLOSED FRACTURE OF MEDIAL PORTION OF RIGHT TIBIAL PLATEAU WITH ROUTINE HEALING, SUBSEQUENT ENCOUNTER: ICD-10-CM

## 2024-06-05 PROCEDURE — 99213 OFFICE O/P EST LOW 20 MIN: CPT | Mod: 95 | Performed by: PHYSICIAN ASSISTANT

## 2024-06-05 NOTE — TELEPHONE ENCOUNTER
Signed [facility] forms faxed to [employer], [employer] confirmed.  Originals placed in brown folder.  Nida MINOR    [facility]

## 2024-06-05 NOTE — PROGRESS NOTES
Angela is a 63 year old who is being evaluated via a billable video visit.    How would you like to obtain your AVS? MyChart  If the video visit is dropped, the invitation should be resent by: Send to e-mail at: [email or web address]  Will anyone else be joining your video visit? No      Assessment & Plan     Hospital discharge follow-up  Closed fracture of medial portion of right tibial plateau with routine healing, subsequent encounter  Home Health paperwork completed today. Continue care with nursing and therapy services. She will need anyfuture orthopedic orders per specialty at the time of her follow up next week. Therapy orders may change depending on her healing status.         MED REC REQUIRED  Post Medication Reconciliation Status: discharge medications reconciled, continue medications without change        Subjective   Angela is a 63 year old, presenting for the following health issues:  Hospital F/U        6/5/2024    12:44 PM   Additional Questions   Roomed by Hang LÓPEZ MA     History of Present Illness       Reason for visit:  Follow up after hospital stay  Symptom onset:  More than a month  Symptom intensity:  Mild  Symptom progression:  Improving  Had these symptoms before:  No  What makes it better:  Advil, Tylenol    She eats 2-3 servings of fruits and vegetables daily.She consumes 1 sweetened beverage(s) daily.She exercises with enough effort to increase her heart rate 9 or less minutes per day.  She exercises with enough effort to increase her heart rate 3 or less days per week.   She is taking medications regularly.     Angela is here today for hospital and TC follow up.   She had a fall and has a tibial fracture.   She was in the hospital and then TCU for rehab.   She now has home health services.   Orthopedics will be seeing her again next week to determine further treatment plan.           Review of Systems  Constitutional, HEENT, cardiovascular, pulmonary, GI, , musculoskeletal, neuro, skin,  endocrine and psych systems are negative, except as otherwise noted.      Objective           Vitals:  No vitals were obtained today due to virtual visit.    Physical Exam   GENERAL: alert and no distress  EYES: Eyes grossly normal to inspection.  No discharge or erythema, or obvious scleral/conjunctival abnormalities.  RESP: No audible wheeze, cough, or visible cyanosis.    SKIN: Visible skin clear. No significant rash, abnormal pigmentation or lesions.  NEURO: Cranial nerves grossly intact.  Mentation and speech appropriate for age.  PSYCH: Appropriate affect, tone, and pace of words          Video-Visit Details    Type of service:  Video Visit   Originating Location (pt. Location): Home    Distant Location (provider location):  On-site  Platform used for Video Visit: [employer]  Signed Electronically by: Kristen M. Kehr, PA-C

## 2024-06-26 ENCOUNTER — MEDICAL CORRESPONDENCE (OUTPATIENT)
Dept: HEALTH INFORMATION MANAGEMENT | Facility: CLINIC | Age: 64
End: 2024-06-26
Payer: COMMERCIAL

## 2024-07-23 ENCOUNTER — MEDICAL CORRESPONDENCE (OUTPATIENT)
Dept: HEALTH INFORMATION MANAGEMENT | Facility: CLINIC | Age: 64
End: 2024-07-23

## 2024-08-20 ENCOUNTER — TRANSFERRED RECORDS (OUTPATIENT)
Dept: HEALTH INFORMATION MANAGEMENT | Facility: CLINIC | Age: 64
End: 2024-08-20
Payer: COMMERCIAL

## 2024-09-04 ENCOUNTER — MYC MEDICAL ADVICE (OUTPATIENT)
Dept: FAMILY MEDICINE | Facility: CLINIC | Age: 64
End: 2024-09-04
Payer: COMMERCIAL

## 2024-09-04 DIAGNOSIS — N95.2 VAGINAL ATROPHY: ICD-10-CM

## 2024-09-04 NOTE — TELEPHONE ENCOUNTER
Dr Turner, Ob/gyn prescribed this last Nov for patient.   Do you want to take over prescribing or send this refill request to him?    Eleanor Alejandro BSN, RN

## 2024-09-05 RX ORDER — ESTRADIOL 0.1 MG/G
CREAM VAGINAL
Qty: 74 G | Refills: 3 | Status: SHIPPED | OUTPATIENT
Start: 2024-09-05

## 2024-09-10 ENCOUNTER — TRANSFERRED RECORDS (OUTPATIENT)
Dept: HEALTH INFORMATION MANAGEMENT | Facility: CLINIC | Age: 64
End: 2024-09-10
Payer: COMMERCIAL

## 2024-11-26 DIAGNOSIS — I10 ESSENTIAL HYPERTENSION WITH GOAL BLOOD PRESSURE LESS THAN 140/90: ICD-10-CM

## 2024-11-26 RX ORDER — AMLODIPINE BESYLATE 10 MG/1
10 TABLET ORAL DAILY
Qty: 90 TABLET | Refills: 0 | Status: SHIPPED | OUTPATIENT
Start: 2024-11-26

## 2024-11-26 NOTE — TELEPHONE ENCOUNTER
Refill sent, but her last blood pressure reading was high.     :  Can you please schedule Angela for a blood pressure check with ancillary nurse within the next month.   Thank you.     Kristen Kehr PA-C

## 2024-11-27 ENCOUNTER — PATIENT OUTREACH (OUTPATIENT)
Dept: FAMILY MEDICINE | Facility: CLINIC | Age: 64
End: 2024-11-27
Payer: COMMERCIAL

## 2024-11-27 NOTE — TELEPHONE ENCOUNTER
Patient Quality Outreach    Patient is due for the following:   Hypertension -  BP check    Action(s) Taken:   Patient was scheduled for ancillary  for a blood pressure recheck.     Type of outreach:    Chart review performed, no outreach needed.    Questions for provider review:    None           Hang Severino MA

## 2024-12-04 ENCOUNTER — ALLIED HEALTH/NURSE VISIT (OUTPATIENT)
Dept: FAMILY MEDICINE | Facility: CLINIC | Age: 64
End: 2024-12-04
Payer: COMMERCIAL

## 2024-12-04 VITALS — DIASTOLIC BLOOD PRESSURE: 60 MMHG | HEART RATE: 66 BPM | SYSTOLIC BLOOD PRESSURE: 128 MMHG

## 2024-12-04 DIAGNOSIS — Z01.30 BLOOD PRESSURE CHECK: Primary | ICD-10-CM

## 2024-12-04 PROCEDURE — 99207 PR NO CHARGE NURSE ONLY: CPT

## 2024-12-31 ENCOUNTER — OFFICE VISIT (OUTPATIENT)
Dept: FAMILY MEDICINE | Facility: CLINIC | Age: 64
End: 2024-12-31
Payer: COMMERCIAL

## 2024-12-31 ENCOUNTER — ANCILLARY PROCEDURE (OUTPATIENT)
Dept: GENERAL RADIOLOGY | Facility: CLINIC | Age: 64
End: 2024-12-31
Attending: PHYSICIAN ASSISTANT
Payer: COMMERCIAL

## 2024-12-31 VITALS
DIASTOLIC BLOOD PRESSURE: 78 MMHG | WEIGHT: 206 LBS | HEART RATE: 61 BPM | HEIGHT: 62 IN | SYSTOLIC BLOOD PRESSURE: 126 MMHG | BODY MASS INDEX: 37.91 KG/M2 | RESPIRATION RATE: 16 BRPM | OXYGEN SATURATION: 97 % | TEMPERATURE: 98.1 F

## 2024-12-31 DIAGNOSIS — S99.921A TOE INJURY, RIGHT, INITIAL ENCOUNTER: ICD-10-CM

## 2024-12-31 DIAGNOSIS — S99.921A TOE INJURY, RIGHT, INITIAL ENCOUNTER: Primary | ICD-10-CM

## 2024-12-31 PROCEDURE — 73630 X-RAY EXAM OF FOOT: CPT | Mod: TC | Performed by: RADIOLOGY

## 2024-12-31 PROCEDURE — 99213 OFFICE O/P EST LOW 20 MIN: CPT | Performed by: PHYSICIAN ASSISTANT

## 2024-12-31 ASSESSMENT — PAIN SCALES - GENERAL: PAINLEVEL_OUTOF10: MILD PAIN (3)

## 2024-12-31 NOTE — PROGRESS NOTES
"  Assessment & Plan     Toe injury, right, initial encounter  No fracture noted on imaging, read in clinic by me with patient. Radiology agreed with my interpretation and pt notified. Recommended using Voltaren gel on the toe 3 times daily for the next couple weeks to help with pain and inflammation. Also recommended wearing hard soled shoes. Discussed that sprains can take 6-8 weeks to resolve. If pain persists or worsens, consider CT of the toe.   - XR Foot Right G/E 3 Views; Future          BMI  Estimated body mass index is 37.68 kg/m  as calculated from the following:    Height as of this encounter: 1.575 m (5' 2\").    Weight as of this encounter: 93.4 kg (206 lb).       Subjective   Angela is a 64 year old, presenting for the following health issues:  Musculoskeletal Problem (Right Foot-kicked a leg of a chair )        12/31/2024    10:28 AM   Additional Questions   Roomed by Ilda CRANE   Accompanied by Self     Angela is a pleasant 64 year old female who presents to clinic for evaluation of right foot pain at the fourth toe. She states she was chasing after puppies about 3 weeks ago and accidentally kicked a chair. She has had pain in the a toe ever since. It hurts if she is in shoes too long or if she has been walking too long. No other concerns.     History of Present Illness       Reason for visit:  Foot  Symptom onset:  3-4 weeks ago  Symptoms include:  Pain when wslking or with shoes on  Symptom intensity:  Moderate  Symptom progression:  Worsening  Had these symptoms before:  No  What makes it worse:  Wearing shoes or walking  What makes it better:  Tulenol or advil   She is taking medications regularly.         Review of Systems  As Per HPI        Objective    /78   Pulse 61   Temp 98.1  F (36.7  C) (Tympanic)   Resp 16   Ht 1.575 m (5' 2\")   Wt 93.4 kg (206 lb)   LMP 01/08/2008 (Approximate)   SpO2 97%   BMI 37.68 kg/m    Body mass index is 37.68 kg/m .  Physical Exam  Vitals reviewed. "   Constitutional:       Appearance: Normal appearance.   Pulmonary:      Effort: Pulmonary effort is normal.   Musculoskeletal:        Legs:       Comments: Significant TTP at the MTP joint of the 4th toe on the right foot. Mild darkening of the skin (likely previous bruise present). No crepitus or liberty step offs. No swelling.    Neurological:      General: No focal deficit present.      Mental Status: She is alert and oriented to person, place, and time.   Psychiatric:         Mood and Affect: Mood normal.         Behavior: Behavior normal.         Thought Content: Thought content normal.         Judgment: Judgment normal.            Signed Electronically by: SIVA ZENG PA-C

## 2025-01-14 ENCOUNTER — TRANSFERRED RECORDS (OUTPATIENT)
Dept: HEALTH INFORMATION MANAGEMENT | Facility: CLINIC | Age: 65
End: 2025-01-14
Payer: COMMERCIAL

## (undated) DEVICE — ESU GROUND PAD ADULT W/CORD E7507

## (undated) DEVICE — DRSG GAUZE 4X4" TRAY 6939

## (undated) DEVICE — PREP CHLORAPREP 26ML TINTED ORANGE  260815

## (undated) DEVICE — DRAPE STERI TOWEL LG 1010

## (undated) DEVICE — GLOVE PROTEXIS W/NEU-THERA 7.5  2D73TE75

## (undated) DEVICE — GLOVE PROTEXIS BLUE W/NEU-THERA 7.5  2D73EB75

## (undated) DEVICE — BONE CLEANING TIP INTERPULSE  0210-010-000

## (undated) DEVICE — LINEN ORTHO PACK 5446

## (undated) DEVICE — DRSG STERI STRIP 1/2X4" R1547

## (undated) DEVICE — SOL WATER IRRIG 1000ML BOTTLE 2F7114

## (undated) DEVICE — SOL WATER IRRIG 1000ML BOTTLE 07139-09

## (undated) DEVICE — SU MONOCRYL 3-0 PS-1 27" Y936H

## (undated) DEVICE — SU VICRYL 0 CT 36" J358H

## (undated) DEVICE — SU PDS II 2-0 SH 27" Z317H

## (undated) DEVICE — DRSG KERLIX 4 1/2"X4YDS ROLL 6730

## (undated) DEVICE — SU FIBERWIRE 2 38"  AR-7200

## (undated) DEVICE — BNDG ELASTIC 3"X5YDS UNSTERILE 6611-30

## (undated) DEVICE — SUCTION IRR SYSTEM W/O TIP INTERPULSE HANDPIECE 0210-100-000

## (undated) DEVICE — ESU PENCIL SMOKE EVAC W/ROCKER SWITCH 0703-047-000

## (undated) DEVICE — DRAPE IOBAN INCISE 36X23" 6651EZ

## (undated) DEVICE — SU ETHIBOND 5 V-37 4X30" MB66G

## (undated) DEVICE — BNDG ESMARK 6" STERILE

## (undated) DEVICE — BLADE DYONICS INCISOR PLUS ELITE 3.5MM 72200095

## (undated) DEVICE — SU VICRYL 2-0 CT-1 27" UND J259H

## (undated) DEVICE — GLOVE PROTEXIS W/NEU-THERA 8.0  2D73TE80

## (undated) DEVICE — PACK TOTAL HIP W/POUCH RIVERSIDE LATEX FREE

## (undated) DEVICE — SOL NACL 0.9% INJ 1000ML BAG 2B1324X

## (undated) DEVICE — SU ETHILON 4-0 PS-2 18" 1667G

## (undated) DEVICE — GLOVE PROTEXIS POWDER FREE 7.5 ORTHOPEDIC 2D73ET75

## (undated) DEVICE — BLADE SAW RECIP STRK 70X6X0.025MM 0277-096-250S5

## (undated) DEVICE — SU PROLENE 3-0 PS-2 18" 8687H

## (undated) DEVICE — BLADE KNIFE SURG 20 371120

## (undated) DEVICE — SUCTION MANIFOLD DORNOCH ULTRA CART UL-CL500

## (undated) DEVICE — DRAPE STERI TOWEL SM 1000

## (undated) DEVICE — ADH SKIN CLOSURE PREMIERPRO EXOFIN 1.0ML 3470

## (undated) DEVICE — SOL NACL 0.9% IRRIG 1000ML BOTTLE 2F7124

## (undated) DEVICE — PACK ARTHROSCOPY KNEE SOP15AKFSM

## (undated) DEVICE — GOWN XLG DISP 9545

## (undated) DEVICE — GLOVE PROTEXIS BLUE W/NEU-THERA 8.0  2D73EB80

## (undated) DEVICE — TAPE DURAPORE 2" SILK 1538-2

## (undated) DEVICE — STRAP STIRRUP W/SLIP 30187-030

## (undated) DEVICE — STRAP KNEE/BODY 31143004

## (undated) DEVICE — LINEN MAYO STAND COVER OVERSIZE PACK 5458

## (undated) DEVICE — ESU NDL COLORADO MICRO 3CM STR N103A

## (undated) DEVICE — HOOD FLYTE W/PEELAWAY 408-800-100

## (undated) DEVICE — PACK HAND WRIST SOP15HWFSP

## (undated) DEVICE — SPONGE LAP 18X18" X8435

## (undated) DEVICE — BNDG ELASTIC 4"X5YDS UNSTERILE 6611-40

## (undated) DEVICE — BNDG ELASTIC 6"X5YDS UNSTERILE 6611-60

## (undated) DEVICE — SOL NACL 0.9% IRRIG 1000ML BOTTLE 07138-09

## (undated) DEVICE — GLOVE PROTEXIS POWDER FREE 8.0 ORTHOPEDIC 2D73ET80

## (undated) DEVICE — CAST PLASTER SPLINT 4X15" 7394

## (undated) DEVICE — DRILL BIT FLEXIBLE REFLECTION 35MM 71362935

## (undated) DEVICE — DRAPE SHEET 3/4 78X60"

## (undated) DEVICE — DRAPE STERI U 1015

## (undated) DEVICE — POSITIONER ABDUCTION PILLOW FOAM MED FP-ABDUCTM

## (undated) DEVICE — LINEN TOWEL PACK X5 5464

## (undated) DEVICE — DRSG XEROFORM 1X8"

## (undated) DEVICE — Device

## (undated) DEVICE — GOWN IMPERVIOUS SPECIALTY XLG/XLONG 32474

## (undated) DEVICE — SOL NACL 0.9% IRRIG 3000ML BAG 07972-08

## (undated) DEVICE — DRSG AQUACEL AG 3.5X9.75" HYDROFIBER 412011

## (undated) DEVICE — DEVICE RETRIEVER HEWSON 71111579

## (undated) RX ORDER — ACETAMINOPHEN 325 MG/1
TABLET ORAL
Status: DISPENSED
Start: 2018-10-04

## (undated) RX ORDER — CEFAZOLIN SODIUM 2 G/100ML
INJECTION, SOLUTION INTRAVENOUS
Status: DISPENSED
Start: 2018-10-04

## (undated) RX ORDER — DIPHENHYDRAMINE HYDROCHLORIDE 50 MG/ML
INJECTION INTRAMUSCULAR; INTRAVENOUS
Status: DISPENSED
Start: 2017-09-28

## (undated) RX ORDER — FENTANYL CITRATE 0.05 MG/ML
INJECTION, SOLUTION INTRAMUSCULAR; INTRAVENOUS
Status: DISPENSED
Start: 2023-02-22

## (undated) RX ORDER — LIDOCAINE HYDROCHLORIDE 20 MG/ML
INJECTION, SOLUTION INFILTRATION; PERINEURAL
Status: DISPENSED
Start: 2023-02-22

## (undated) RX ORDER — HYDROMORPHONE HYDROCHLORIDE 1 MG/ML
INJECTION, SOLUTION INTRAMUSCULAR; INTRAVENOUS; SUBCUTANEOUS
Status: DISPENSED
Start: 2020-07-06

## (undated) RX ORDER — FENTANYL CITRATE 50 UG/ML
INJECTION, SOLUTION INTRAMUSCULAR; INTRAVENOUS
Status: DISPENSED
Start: 2020-07-06

## (undated) RX ORDER — PROPOFOL 10 MG/ML
INJECTION, EMULSION INTRAVENOUS
Status: DISPENSED
Start: 2018-10-04

## (undated) RX ORDER — CEFAZOLIN SODIUM 1 G/3ML
INJECTION, POWDER, FOR SOLUTION INTRAMUSCULAR; INTRAVENOUS
Status: DISPENSED
Start: 2023-02-22

## (undated) RX ORDER — ACETAMINOPHEN 325 MG/1
TABLET ORAL
Status: DISPENSED
Start: 2020-07-06

## (undated) RX ORDER — KETOROLAC TROMETHAMINE 30 MG/ML
INJECTION, SOLUTION INTRAMUSCULAR; INTRAVENOUS
Status: DISPENSED
Start: 2018-10-04

## (undated) RX ORDER — OXYCODONE HYDROCHLORIDE 5 MG/1
TABLET ORAL
Status: DISPENSED
Start: 2018-10-04

## (undated) RX ORDER — EPHEDRINE SULFATE 50 MG/ML
INJECTION, SOLUTION INTRAMUSCULAR; INTRAVENOUS; SUBCUTANEOUS
Status: DISPENSED
Start: 2020-07-06

## (undated) RX ORDER — PROPOFOL 10 MG/ML
INJECTION, EMULSION INTRAVENOUS
Status: DISPENSED
Start: 2023-02-22

## (undated) RX ORDER — FENTANYL CITRATE 50 UG/ML
INJECTION, SOLUTION INTRAMUSCULAR; INTRAVENOUS
Status: DISPENSED
Start: 2023-02-22

## (undated) RX ORDER — ONDANSETRON 2 MG/ML
INJECTION INTRAMUSCULAR; INTRAVENOUS
Status: DISPENSED
Start: 2018-10-04

## (undated) RX ORDER — DEXAMETHASONE SODIUM PHOSPHATE 4 MG/ML
INJECTION, SOLUTION INTRA-ARTICULAR; INTRALESIONAL; INTRAMUSCULAR; INTRAVENOUS; SOFT TISSUE
Status: DISPENSED
Start: 2018-10-04

## (undated) RX ORDER — FENTANYL CITRATE 50 UG/ML
INJECTION, SOLUTION INTRAMUSCULAR; INTRAVENOUS
Status: DISPENSED
Start: 2017-09-28

## (undated) RX ORDER — TRANEXAMIC ACID 650 MG/1
TABLET ORAL
Status: DISPENSED
Start: 2020-07-06

## (undated) RX ORDER — BUPIVACAINE HYDROCHLORIDE 5 MG/ML
INJECTION, SOLUTION EPIDURAL; INTRACAUDAL
Status: DISPENSED
Start: 2023-02-22

## (undated) RX ORDER — LIDOCAINE HYDROCHLORIDE 20 MG/ML
INJECTION, SOLUTION EPIDURAL; INFILTRATION; INTRACAUDAL; PERINEURAL
Status: DISPENSED
Start: 2018-10-04

## (undated) RX ORDER — OXYCODONE HYDROCHLORIDE 5 MG/1
TABLET ORAL
Status: DISPENSED
Start: 2023-02-22

## (undated) RX ORDER — FENTANYL CITRATE 50 UG/ML
INJECTION, SOLUTION INTRAMUSCULAR; INTRAVENOUS
Status: DISPENSED
Start: 2018-10-04

## (undated) RX ORDER — OXYCODONE HYDROCHLORIDE 5 MG/1
TABLET ORAL
Status: DISPENSED
Start: 2020-07-06

## (undated) RX ORDER — GABAPENTIN 300 MG/1
CAPSULE ORAL
Status: DISPENSED
Start: 2018-10-04

## (undated) RX ORDER — CEFAZOLIN SODIUM 2 G/100ML
INJECTION, SOLUTION INTRAVENOUS
Status: DISPENSED
Start: 2020-07-06

## (undated) RX ORDER — GINSENG 100 MG
CAPSULE ORAL
Status: DISPENSED
Start: 2023-02-22